# Patient Record
Sex: MALE | Race: WHITE | NOT HISPANIC OR LATINO | Employment: OTHER | URBAN - METROPOLITAN AREA
[De-identification: names, ages, dates, MRNs, and addresses within clinical notes are randomized per-mention and may not be internally consistent; named-entity substitution may affect disease eponyms.]

---

## 2017-01-03 ENCOUNTER — OFFICE VISIT (OUTPATIENT)
Dept: FAMILY MEDICINE | Facility: CLINIC | Age: 62
End: 2017-01-03
Payer: MEDICARE

## 2017-01-03 VITALS
SYSTOLIC BLOOD PRESSURE: 96 MMHG | RESPIRATION RATE: 24 BRPM | WEIGHT: 207 LBS | HEART RATE: 66 BPM | TEMPERATURE: 99.1 F | HEIGHT: 68 IN | BODY MASS INDEX: 31.37 KG/M2 | OXYGEN SATURATION: 98 % | DIASTOLIC BLOOD PRESSURE: 55 MMHG

## 2017-01-03 DIAGNOSIS — M50.20 HERNIATION OF CERVICAL INTERVERTEBRAL DISC: ICD-10-CM

## 2017-01-03 DIAGNOSIS — Z11.59 NEED FOR HEPATITIS C SCREENING TEST: ICD-10-CM

## 2017-01-03 DIAGNOSIS — N18.30 TYPE 2 DIABETES MELLITUS WITH STAGE 3 CHRONIC KIDNEY DISEASE, WITH LONG-TERM CURRENT USE OF INSULIN (H): ICD-10-CM

## 2017-01-03 DIAGNOSIS — I25.810 CORONARY ARTERY DISEASE INVOLVING CORONARY BYPASS GRAFT OF NATIVE HEART WITHOUT ANGINA PECTORIS: ICD-10-CM

## 2017-01-03 DIAGNOSIS — J44.9 CHRONIC OBSTRUCTIVE PULMONARY DISEASE, UNSPECIFIED COPD TYPE (H): ICD-10-CM

## 2017-01-03 DIAGNOSIS — E11.22 TYPE 2 DIABETES MELLITUS WITH STAGE 3 CHRONIC KIDNEY DISEASE, WITH LONG-TERM CURRENT USE OF INSULIN (H): ICD-10-CM

## 2017-01-03 DIAGNOSIS — G89.4 CHRONIC PAIN SYNDROME: Primary | ICD-10-CM

## 2017-01-03 DIAGNOSIS — Z79.4 TYPE 2 DIABETES MELLITUS WITH STAGE 3 CHRONIC KIDNEY DISEASE, WITH LONG-TERM CURRENT USE OF INSULIN (H): ICD-10-CM

## 2017-01-03 PROCEDURE — 99000 SPECIMEN HANDLING OFFICE-LAB: CPT | Performed by: NURSE PRACTITIONER

## 2017-01-03 PROCEDURE — 99214 OFFICE O/P EST MOD 30 MIN: CPT | Performed by: NURSE PRACTITIONER

## 2017-01-03 PROCEDURE — 80061 LIPID PANEL: CPT | Performed by: NURSE PRACTITIONER

## 2017-01-03 PROCEDURE — 36415 COLL VENOUS BLD VENIPUNCTURE: CPT | Performed by: NURSE PRACTITIONER

## 2017-01-03 PROCEDURE — 82043 UR ALBUMIN QUANTITATIVE: CPT | Performed by: NURSE PRACTITIONER

## 2017-01-03 PROCEDURE — 86803 HEPATITIS C AB TEST: CPT | Performed by: NURSE PRACTITIONER

## 2017-01-03 RX ORDER — OXYCODONE AND ACETAMINOPHEN 5; 325 MG/1; MG/1
1 TABLET ORAL AT BEDTIME
Qty: 30 TABLET | Refills: 0 | Status: SHIPPED | OUTPATIENT
Start: 2017-01-29 | End: 2017-01-03

## 2017-01-03 RX ORDER — ALBUTEROL SULFATE 90 UG/1
2 AEROSOL, METERED RESPIRATORY (INHALATION) EVERY 6 HOURS PRN
Qty: 1 INHALER | Status: SHIPPED | OUTPATIENT
Start: 2017-01-03 | End: 2020-04-09

## 2017-01-03 RX ORDER — OXYCODONE AND ACETAMINOPHEN 5; 325 MG/1; MG/1
1 TABLET ORAL AT BEDTIME
Qty: 30 TABLET | Refills: 0 | Status: SHIPPED | OUTPATIENT
Start: 2017-02-28 | End: 2017-01-03

## 2017-01-03 RX ORDER — OXYCODONE AND ACETAMINOPHEN 5; 325 MG/1; MG/1
1 TABLET ORAL AT BEDTIME
Qty: 30 TABLET | Refills: 0 | Status: SHIPPED | OUTPATIENT
Start: 2017-03-30 | End: 2017-05-08

## 2017-01-03 RX ORDER — GABAPENTIN 300 MG/1
300 CAPSULE ORAL AT BEDTIME
Qty: 30 CAPSULE | Refills: 3 | Status: SHIPPED | OUTPATIENT
Start: 2017-01-03 | End: 2017-06-29

## 2017-01-03 NOTE — NURSING NOTE
"Chief Complaint   Patient presents with     Recheck Medication       Initial BP 96/55 mmHg  Pulse 66  Temp(Src) 99.1  F (37.3  C) (Oral)  Resp 24  Ht 5' 7.75\" (1.721 m)  Wt 207 lb (93.895 kg)  BMI 31.70 kg/m2  SpO2 98% Estimated body mass index is 31.7 kg/(m^2) as calculated from the following:    Height as of this encounter: 5' 7.75\" (1.721 m).    Weight as of this encounter: 207 lb (93.895 kg).  BP completed using cuff size: latoya Wood MA      "

## 2017-01-03 NOTE — MR AVS SNAPSHOT
After Visit Summary   1/3/2017    Hayder Alves    MRN: [id number]           Patient Information     Date Of Birth          1955        Visit Information        Provider Department      1/3/2017 2:45 PM Suma Jenkins NP [facility]        Today's Diagnoses     Chronic pain syndrome    -  1     Herniation of cervical intervertebral disc         Type 2 diabetes mellitus with stage 3 chronic kidney disease, with long-term current use of insulin (H)         Coronary artery disease involving coronary bypass graft of native heart without angina pectoris         Chronic obstructive pulmonary disease, unspecified COPD type (H)         Need for hepatitis C screening test           Care Instructions    Take Gabapentin at bedtime.  Only take Percocet at bedtime if needed.  Do not take a muscle relaxer.    Follow up with me in 3 months.         Follow-ups after your visit        Follow-up notes from your care team     Return in about 3 months (around 4/3/2017).      Your next 10 appointments already scheduled     Feb 06, 2017 12:45 PM   Fani JAMES Return with Alexandre Arthur MD   [facility] [place] ([address] [address] [phone]              Who to contact     If you have questions or need follow up information about today's clinic visit or your schedule please contact [facility] directly at [phone].  Normal or non-critical lab and imaging results will be communicated to you by MyChart, letter or phone within 4 business days after the clinic has received the results. If you do not hear from us within 7 days, please contact the clinic through MyChart or phone. If you have a critical or abnormal lab result, we will notify you by phone as soon as possible.  Submit refill requests through [employer] or call your pharmacy and they will forward the refill request to us.  "Please allow 3 business days for your refill to be completed.          Additional Information About Your Visit        MyChart Information     [employer] lets you send messages to your doctor, view your test results, renew your prescriptions, schedule appointments and more. To sign up, go to [email or web address] . Click on \"Log in\" on the left side of the screen, which will take you to the Welcome page. Then click on \"Sign up Now\" on the right side of the page.     You will be asked to enter the access code listed below, as well as some personal information. Please follow the directions to create your username and password.     Your access code is: [email or web address]  Expires: 4/3/2017  3:40 PM     Your access code will  in 90 days. If you need help or a new code, please call your [place] clinic or [phone].        Care EveryWhere ID     This is your Care EveryWhere ID. This could be used by other organizations to access your [place] medical records  [id number]        Your Vitals Were     Pulse Temperature Respirations Height BMI (Body Mass Index) Pulse Oximetry    66 99.1  F (37.3  C) (Oral) 24 5' 7.75\" (1.721 m) 31.70 kg/m2 98%       Blood Pressure from Last 3 Encounters:   17 96/55   16 122/58   16 80/48    Weight from Last 3 Encounters:   17 207 lb (93.895 kg)   16 204 lb 12.8 oz (92.897 kg)   16 203 lb (92.08 kg)              We Performed the Following     Albumin Random Urine Quantitative     Hepatitis C Screen Reflex to HCV RNA Quant and Genotype     Lipid panel reflex to direct LDL          Today's Medication Changes          These changes are accurate as of: 1/3/17  3:40 PM.  If you have any questions, ask your nurse or doctor.               Start taking these medicines.        Dose/Directions    gabapentin 300 MG capsule   Commonly known as:  NEURONTIN   Used for:  Chronic pain syndrome, Herniation of cervical intervertebral disc, Type 2 diabetes mellitus with " stage 3 chronic kidney disease, with long-term current use of insulin (H)   Started by:  Suma Jenkins NP        Dose:  300 mg   Take 1 capsule (300 mg) by mouth At Bedtime   Quantity:  30 capsule   Refills:  3       glucagon 1 MG kit   Commonly known as:  GLUCAGON EMERGENCY   Used for:  Type 2 diabetes mellitus with stage 3 chronic kidney disease, with long-term current use of insulin (H)   Started by:  Suma Jenkins NP        Dose:  1 mg   Inject 1 mg into the muscle once for 1 dose   Quantity:  1 mg   Refills:  0       oxyCODONE-acetaminophen 5-325 MG per tablet   Commonly known as:  PERCOCET   Used for:  Herniation of cervical intervertebral disc, Chronic pain syndrome   Started by:  Suma Jenkins NP        Dose:  1 tablet   Start taking on:  3/30/2017   Take 1 tablet by mouth At Bedtime Must last 30 days   Quantity:  30 tablet   Refills:  0            Where to get your medicines      [facility] [address] [place] [address] AT [address] [address]    Hours:  24-hours Phone:  [phone]    - albuterol 108 (90 BASE) MCG/ACT Inhaler  - gabapentin 300 MG capsule  - glucagon 1 MG kit      Some of these will need a paper prescription and others can be bought over the counter.  Ask your nurse if you have questions.     Bring a paper prescription for each of these medications    - oxyCODONE-acetaminophen 5-325 MG per tablet             Primary Care Provider Office Phone # Fax #    Suma Jenkins [phone]       [facility] [address] [place] [address]  [facility] [address]        Thank you!     Thank you for choosing [facility]  for your care. Our goal is always to provide you with excellent care. Hearing back from our patients is one way we can continue to improve our services. Please take a few minutes to complete the written survey that you may  receive in the mail after your visit with us. Thank you!             Your Updated Medication List - Protect others around you: Learn how to safely use, store and throw away your medicines at www.disposemymeds.org.          This list is accurate as of: 1/3/17  3:40 PM.  Always use your most recent med list.                   Brand Name Dispense Instructions for use    * albuterol (2.5 MG/3ML) 0.083% neb solution     25 vial    Take 1 vial (2.5 mg) by nebulization every 6 hours as needed for shortness of breath / dyspnea or wheezing       * albuterol 108 (90 BASE) MCG/ACT Inhaler    PROAIR HFA/PROVENTIL HFA/VENTOLIN HFA    1 Inhaler    Inhale 2 puffs into the lungs every 6 hours as needed for shortness of breath / dyspnea       ASPIRIN PO      Take 81 mg by mouth daily       blood glucose monitoring lancets     100 each    Test once daily       blood glucose monitoring test strip    no brand specified    3 Box    Use to test blood sugars 2 times daily or as directed - use strips compatible with pt's meter and insurance       CENTRUM SILVER per tablet      Take 1 tablet by mouth daily       citalopram 20 MG tablet    celeXA    30 tablet    Take 1 tablet (20 mg) by mouth daily       gabapentin 300 MG capsule    NEURONTIN    30 capsule    Take 1 capsule (300 mg) by mouth At Bedtime       glucagon 1 MG kit    GLUCAGON EMERGENCY    1 mg    Inject 1 mg into the muscle once for 1 dose       lovastatin 20 MG tablet    MEVACOR    90 tablet    Take 1 tablet (20 mg) by mouth At Bedtime       metFORMIN 500 MG tablet    GLUCOPHAGE    180 tablet    Take 1 tablet (500 mg) by mouth 2 times daily (with meals)       metoprolol 50 MG tablet    LOPRESSOR    60 tablet    Take 1 tablet (50 mg) by mouth 2 times daily       nitroglycerin 0.4 MG sublingual tablet    NITROSTAT    25 tablet    Place 1 tablet (0.4 mg) under the tongue every 5 minutes as needed       order for DME     1 Device    Equipment being ordered: Nebulizer with tubing        order for DME     1 Device    Equipment being ordered: Other: Nebulizer machine x 1 Treatment Diagnosis: COPD.       oxyCODONE-acetaminophen 5-325 MG per tablet   Start taking on:  3/30/2017    PERCOCET    30 tablet    Take 1 tablet by mouth At Bedtime Must last 30 days       traZODone 50 MG tablet    DESYREL    60 tablet    Take 1-2 tablets ( mg) by mouth At Bedtime       * Notice:  This list has 2 medication(s) that are the same as other medications prescribed for you. Read the directions carefully, and ask your doctor or other care provider to review them with you.

## 2017-01-03 NOTE — PROGRESS NOTES
"  SUBJECTIVE:                                                    Hayder Alves is a 61 year old male who presents to clinic today for the following health issues:      Medication Followup of Percocet     Taking Medication as prescribed: yes    Side Effects:  Pt takes before he goes to bed to help with foot and leg pain     Medication Helping Symptoms:  Yes, pt states it helps him sleep.      He takes Percocet at bedtime for chronic neck pain due to cervical stenosis.  He has been having more burning, stabbing pain in his feet, especially at night.  He just restarted Gabapentin 300 mg at HS a couple of days ago.  His wife thinks he took some Tizanidine recently.  She is wondering if this is contributing to his lower blood pressure.  [facility] was low at [facility] and they stopped his Lisinopril and the next week it was better, but is a bit low again today.  He does have some lightheadedness when he first stands up.  He has not fallen since prior to his last hospitalization.  Wife would like to have a glucagon kit at home.    He has not scheduled a psychiatry appointment yet.           Problem list and histories reviewed & adjusted, as indicated.  Additional history: as documented    Problem list, Medication list, Allergies, and Medical/Social/Surgical histories [facility] and updated as appropriate.    ROS:  C: NEGATIVE for fever, chills, change in weight  E/M: NEGATIVE for ear, mouth and throat problems  R: NEGATIVE for significant cough or SOB  CV: NEGATIVE for chest pain, palpitations or peripheral edema  GI: NEGATIVE for nausea, abdominal pain, heartburn, or change in bowel habits  MUSCULOSKELETAL: see HPI  NEURO: see HPI  PSYCHIATRIC: NEGATIVE for changes in mood or affect    OBJECTIVE:                                                    BP 96/55 mmHg  Pulse 66  Temp(Src) 99.1  F (37.3  C) (Oral)  Resp 24  Ht 5' 7.75\" (1.721 m)  Wt 207 lb (93.895 kg)  BMI 31.70 kg/m2  SpO2 98%  Body mass index " is 31.7 kg/(m^2).  GENERAL: healthy, alert and no distress  RESP: lungs clear to auscultation - no rales, rhonchi or wheezes  CV: regular rate and rhythm, normal S1 S2, no S3 or S4, no murmur, click or rub, no peripheral edema and peripheral pulses strong  PSYCH: mentation appears normal, affect normal         ASSESSMENT/PLAN:                                                            1. Chronic pain syndrome  Refills for 3 months of Percocet given.   Discussed that he should not take a muscle relaxer.  Continue with just 300 mg of Gabapentin at HS for now.  - oxyCODONE-acetaminophen (PERCOCET) 5-325 MG per tablet; Take 1 tablet by mouth At Bedtime Must last 30 days  Dispense: 30 tablet; Refill: 0  - gabapentin (NEURONTIN) 300 MG capsule; Take 1 capsule (300 mg) by mouth At Bedtime  Dispense: 30 capsule; Refill: 3    2. Herniation of cervical intervertebral disc  See above.   - oxyCODONE-acetaminophen (PERCOCET) 5-325 MG per tablet; Take 1 tablet by mouth At Bedtime Must last 30 days  Dispense: 30 tablet; Refill: 0  - gabapentin (NEURONTIN) 300 MG capsule; Take 1 capsule (300 mg) by mouth At Bedtime  Dispense: 30 capsule; Refill: 3    3. Type 2 diabetes mellitus with stage 3 chronic kidney disease, with long-term current use of insulin (H)  At goal  Follow up in 3 months.   - Lipid panel reflex to direct LDL  - Albumin Random Urine Quantitative  - glucagon (GLUCAGON EMERGENCY) 1 MG kit; Inject 1 mg into the muscle once for 1 dose  Dispense: 1 mg; Refill: 0  - gabapentin (NEURONTIN) 300 MG capsule; Take 1 capsule (300 mg) by mouth At Bedtime  Dispense: 30 capsule; Refill: 3    4. Coronary artery disease involving coronary bypass graft of native heart without angina pectoris  He has an appointment with cardiology scheduled for 2/6/17.    5. Chronic obstructive pulmonary disease, unspecified COPD type (H)    - albuterol (PROAIR HFA/PROVENTIL HFA/VENTOLIN HFA) 108 (90 BASE) MCG/ACT Inhaler; Inhale 2 puffs into the lungs  every 6 hours as needed for shortness of breath / dyspnea  Dispense: 1 Inhaler; Refill: PRN    6. Need for hepatitis C screening test    - Hepatitis C Screen Reflex to HCV RNA Quant and Genotype        Suma Jenkins NP  [facility]

## 2017-01-03 NOTE — PATIENT INSTRUCTIONS
Take Gabapentin at bedtime.  Only take Percocet at bedtime if needed.  Do not take a muscle relaxer.    Follow up with me in 3 months.

## 2017-01-04 LAB
CHOLEST SERPL-MCNC: 160 MG/DL
CREAT UR-MCNC: 423 MG/DL
HCV AB SERPL QL IA: NORMAL
HDLC SERPL-MCNC: 35 MG/DL
LDLC SERPL CALC-MCNC: 80 MG/DL
MICROALBUMIN UR-MCNC: 241 MG/L
MICROALBUMIN/CREAT UR: 56.97 MG/G CR (ref 0–17)
NONHDLC SERPL-MCNC: 125 MG/DL
TRIGL SERPL-MCNC: 224 MG/DL

## 2017-01-26 DIAGNOSIS — N18.30 TYPE 2 DIABETES MELLITUS WITH STAGE 3 CHRONIC KIDNEY DISEASE, WITH LONG-TERM CURRENT USE OF INSULIN (H): Primary | ICD-10-CM

## 2017-01-26 DIAGNOSIS — E11.22 TYPE 2 DIABETES MELLITUS WITH STAGE 3 CHRONIC KIDNEY DISEASE, WITH LONG-TERM CURRENT USE OF INSULIN (H): Primary | ICD-10-CM

## 2017-01-26 DIAGNOSIS — Z79.4 TYPE 2 DIABETES MELLITUS WITH STAGE 3 CHRONIC KIDNEY DISEASE, WITH LONG-TERM CURRENT USE OF INSULIN (H): Primary | ICD-10-CM

## 2017-01-26 NOTE — TELEPHONE ENCOUNTER
METFORMIN 1000 MG         Last Written Prescription Date: 11-30-16  Last Fill Quantity: 180, # refills: 3  [facility] Office Visit with [facility] or [facility] prescribing provider:  1-3-17        BP Readings from Last 3 Encounters:   01/03/17 96/55   12/09/16 122/58   12/01/16 80/48     MICROL      241   1/3/2017  No results found for this basename: microalbumin  CREATININE   Date Value Ref Range Status   12/09/2016 1.39* 0.70 - 1.30 mg/dL Final   ]  GFR ESTIMATE   Date Value Ref Range Status   12/09/2016 52* >60 mL/min/1.7m2 Final   12/01/2016 32* >60 mL/min/1.7m2 Final   10/27/2016 45* >60 mL/min/1.7m2 Final     GFR ESTIMATE IF BLACK   Date Value Ref Range Status   12/09/2016 63 >60 mL/min/1.7m2 Final   12/01/2016 39* >60 mL/min/1.7m2 Final   10/27/2016 54* >60 mL/min/1.7m2 Final     CHOL      160   1/3/2017  HDL       35   1/3/2017  LDL       80   1/3/2017  LDL      138   12/10/2014  TRIG      224   1/3/2017  CHOLHDLRATIO      7.9   10/5/2015  AST       22   10/12/2016  ALT       28   10/12/2016  A1C      6.2   10/12/2016  A1C      6.3   1/19/2016  A1C      9.7   10/5/2015  A1C      7.0   4/29/2015  A1C      8.3   12/10/2014  POTASSIUM   Date Value Ref Range Status   12/09/2016 4.3 3.5 - 5.1 mmol/L Final

## 2017-01-27 NOTE — TELEPHONE ENCOUNTER
Routing refill request to provider for review/approval because: please sign off new script   Medication is reported/historical  Per 11/30/2016 visit, pharmacy still has him taking 1000 mg BID  He will check blood sugars 1-2 times a day and follow up in one month with a log.    - metFORMIN (GLUCOPHAGE) 500 MG tablet; Take 1 tablet (500 mg) by mouth 2 times daily (with meals)  Dispense: 180 tablet; Refill: 3

## 2017-02-01 NOTE — TELEPHONE ENCOUNTER
He should have refills then because the 500 mg dose of Metformin was sent in on 11/30 with a year of refills.

## 2017-02-01 NOTE — TELEPHONE ENCOUNTER
Spoke with pt and he said he takes 1/2 of a metformin 1000mg (500mg) twice daily.  Please advise.     Thanks!     Kandi De Oliveira RN

## 2017-02-06 ENCOUNTER — OFFICE VISIT (OUTPATIENT)
Dept: CARDIOLOGY | Facility: CLINIC | Age: 62
End: 2017-02-06
Attending: NURSE PRACTITIONER
Payer: MEDICARE

## 2017-02-06 VITALS
WEIGHT: 214 LBS | HEART RATE: 56 BPM | HEIGHT: 68 IN | SYSTOLIC BLOOD PRESSURE: 110 MMHG | DIASTOLIC BLOOD PRESSURE: 60 MMHG | BODY MASS INDEX: 32.43 KG/M2

## 2017-02-06 DIAGNOSIS — E78.00 HYPERCHOLESTEROLEMIA: ICD-10-CM

## 2017-02-06 DIAGNOSIS — N18.30 CKD (CHRONIC KIDNEY DISEASE) STAGE 3, GFR 30-59 ML/MIN (H): ICD-10-CM

## 2017-02-06 DIAGNOSIS — I25.810 CORONARY ARTERY DISEASE INVOLVING CORONARY BYPASS GRAFT OF NATIVE HEART WITHOUT ANGINA PECTORIS: Primary | ICD-10-CM

## 2017-02-06 DIAGNOSIS — I50.21 ACUTE SYSTOLIC HEART FAILURE (H): ICD-10-CM

## 2017-02-06 DIAGNOSIS — I10 HYPERTENSION GOAL BP (BLOOD PRESSURE) < 130/80: ICD-10-CM

## 2017-02-06 PROCEDURE — 99215 OFFICE O/P EST HI 40 MIN: CPT | Performed by: INTERNAL MEDICINE

## 2017-02-06 RX ORDER — ROSUVASTATIN CALCIUM 20 MG/1
20 TABLET, COATED ORAL DAILY
Qty: 90 TABLET | Refills: 3 | Status: SHIPPED | OUTPATIENT
Start: 2017-02-06 | End: 2019-02-27

## 2017-02-06 RX ORDER — METOPROLOL TARTRATE 25 MG/1
25 TABLET, FILM COATED ORAL 2 TIMES DAILY
Qty: 180 TABLET | Refills: 3 | Status: SHIPPED | OUTPATIENT
Start: 2017-02-06 | End: 2017-05-08

## 2017-02-06 NOTE — MR AVS SNAPSHOT
After Visit Summary   2/6/2017    Hayder Alves    MRN: [id number]           Patient Information     Date Of Birth          1955        Visit Information        Provider Department      2/6/2017 12:45 PM Alexandre Arthur MD [facility]        Today's Diagnoses     Coronary artery disease involving coronary bypass graft of native heart without angina pectoris    -  1     Hypercholesterolemia         Hypertension goal BP (blood pressure) < 130/80         Acute systolic heart failure (H)         CKD (chronic kidney disease) stage 3, GFR 30-59 ml/min            Follow-ups after your visit        Additional Services     Follow-Up with Cardiac Advanced Practice Provider           Follow-Up with Cardiologist                 Future tests that were ordered for you today     Open Future Orders        Priority Expected Expires Ordered    Basic metabolic panel Routine 8/5/2017 2/6/2018 2/6/2017    Follow-Up with Cardiologist Routine 8/5/2017 2/6/2018 2/6/2017    Echocardiogram Routine 8/5/2017 2/6/2018 2/6/2017    Follow-Up with Cardiac Advanced Practice Provider Routine 5/7/2017 2/6/2018 2/6/2017    Basic metabolic panel Routine 5/7/2017 2/6/2018 2/6/2017    Lipid Profile Routine 8/5/2017 2/6/2018 2/6/2017    ALT Routine 8/5/2017 2/6/2018 2/6/2017            Who to contact     If you have questions or need follow up information about today's clinic visit or your schedule please contact [facility] directly at [phone].  Normal or non-critical lab and imaging results will be communicated to you by MyChart, letter or phone within 4 business days after the clinic has received the results. If you do not hear from us within 7 days, please contact the clinic through [employer] or phone. If you have a critical or abnormal lab result, we will notify you by phone as soon as possible.  Submit refill requests through [employer] or  "call your pharmacy and they will forward the refill request to us. Please allow 3 business days for your refill to be completed.          Additional Information About Your Visit        MyChart Information     [employer] lets you send messages to your doctor, view your test results, renew your prescriptions, schedule appointments and more. To sign up, go to [email or web address] . Click on \"Log in\" on the left side of the screen, which will take you to the Welcome page. Then click on \"Sign up Now\" on the right side of the page.     You will be asked to enter the access code listed below, as well as some personal information. Please follow the directions to create your username and password.     Your access code is: [email or web address]  Expires: 4/3/2017  3:40 PM     Your access code will  in 90 days. If you need help or a new code, please call your [place] clinic or [phone].        Care EveryWhere ID     This is your Care EveryWhere ID. This could be used by other organizations to access your [place] medical records  [id number]        Your Vitals Were     Pulse Height BMI (Body Mass Index)             56 1.721 m (5' 7.76\") 32.77 kg/m2          Blood Pressure from Last 3 Encounters:   17 110/60   17 96/55   16 122/58    Weight from Last 3 Encounters:   17 97.07 kg (214 lb)   17 93.895 kg (207 lb)   16 92.897 kg (204 lb 12.8 oz)              We Performed the Following     Follow-Up with Cardiologist          Today's Medication Changes          These changes are accurate as of: 17  1:45 PM.  If you have any questions, ask your nurse or doctor.               Start taking these medicines.        Dose/Directions    rosuvastatin 20 MG tablet   Commonly known as:  CRESTOR   Used for:  Hypercholesterolemia   Started by:  Alexandre Arthur MD        Dose:  20 mg   Take 1 tablet (20 mg) by mouth daily   Quantity:  90 tablet   Refills:  3         These medicines have changed or have " updated prescriptions.        Dose/Directions    metoprolol 25 MG tablet   Commonly known as:  LOPRESSOR   This may have changed:    - medication strength  - how much to take   Used for:  Coronary artery disease involving coronary bypass graft of native heart without angina pectoris   Changed by:  Alexandre Arthur MD        Dose:  25 mg   Take 1 tablet (25 mg) by mouth 2 times daily   Quantity:  180 tablet   Refills:  3         Stop taking these medicines if you haven't already. Please contact your care team if you have questions.     lovastatin 20 MG tablet   Commonly known as:  MEVACOR   Stopped by:  Alexandre Arthur MD                Where to get your medicines      These medications were sent to [facility] [address] [place] [address] AT [address] [address]    Hours:  24-hours Phone:  [phone]    - metoprolol 25 MG tablet  - rosuvastatin 20 MG tablet             Primary Care Provider Office Phone # Fax #    Suma Jenkins [phone]       [facility] [address] [place] [address]  [facility] [address]        Thank you!     Thank you for choosing [facility] AT [place]  for your care. Our goal is always to provide you with excellent care. Hearing back from our patients is one way we can continue to improve our services. Please take a few minutes to complete the written survey that you may receive in the mail after your visit with us. Thank you!             Your Updated Medication List - Protect others around you: Learn how to safely use, store and throw away your medicines at www.disposemymeds.org.          This list is accurate as of: 2/6/17  1:45 PM.  Always use your most recent med list.                   Brand Name Dispense Instructions for use    * albuterol (2.5 MG/3ML) 0.083% neb solution     25 vial    Take 1 vial (2.5 mg) by nebulization every 6 hours as needed for  shortness of breath / dyspnea or wheezing       * albuterol 108 (90 BASE) MCG/ACT Inhaler    PROAIR HFA/PROVENTIL HFA/VENTOLIN HFA    1 Inhaler    Inhale 2 puffs into the lungs every 6 hours as needed for shortness of breath / dyspnea       ASPIRIN PO      Take 81 mg by mouth daily       blood glucose monitoring lancets     100 each    Test once daily       blood glucose monitoring test strip    no brand specified    3 Box    Use to test blood sugars 2 times daily or as directed - use strips compatible with pt's meter and insurance       CENTRUM SILVER per tablet      Take 1 tablet by mouth daily       citalopram 20 MG tablet    celeXA    30 tablet    Take 1 tablet (20 mg) by mouth daily       gabapentin 300 MG capsule    NEURONTIN    30 capsule    Take 1 capsule (300 mg) by mouth At Bedtime       metFORMIN 500 MG tablet    GLUCOPHAGE    180 tablet    Take 1 tablet (500 mg) by mouth 2 times daily (with meals)       metoprolol 25 MG tablet    LOPRESSOR    180 tablet    Take 1 tablet (25 mg) by mouth 2 times daily       nitroglycerin 0.4 MG sublingual tablet    NITROSTAT    25 tablet    Place 1 tablet (0.4 mg) under the tongue every 5 minutes as needed       order for DME     1 Device    Equipment being ordered: Nebulizer with tubing       order for DME     1 Device    Equipment being ordered: Other: Nebulizer machine x 1 Treatment Diagnosis: COPD.       oxyCODONE-acetaminophen 5-325 MG per tablet   Start taking on:  3/30/2017    PERCOCET    30 tablet    Take 1 tablet by mouth At Bedtime Must last 30 days       rosuvastatin 20 MG tablet    CRESTOR    90 tablet    Take 1 tablet (20 mg) by mouth daily       traZODone 50 MG tablet    DESYREL    60 tablet    Take 1-2 tablets ( mg) by mouth At Bedtime       * Notice:  This list has 2 medication(s) that are the same as other medications prescribed for you. Read the directions carefully, and ask your doctor or other care provider to review them with you.

## 2017-02-06 NOTE — Clinical Note
2017             Suma Jenkins NP   [facility]   [address] [address]       RE:    Hayder Alves   MRN:  [id number]   :  1955      Dear Ms. Jenkins:       I had the opportunity to see Mr. Hayder Alves in Cardiology Clinic today at the [facility] in [place] for an initial C.O.R.E. M consultation.        As you recall, he is a 62-year-old gentleman with a history of coronary artery disease and bypass surgery in .  He had angioplasty and stenting of the left circumflex in .  He came into the hospital with acute respiratory failure at [facility] in 10/2016.  He required intubation and mechanical ventilation.  X-ray suggested bilateral pneumonia and he had a recent history of fevers and chills preceding this event.  However, he also was discovered to have a cardiomyopathy with an ejection fraction of about 35% and had some slightly elevated troponin levels.  His congestive heart failure was also thought to be part of the mix.  He was treated with diuresis in addition to his antibiotic therapy and eventually underwent coronary angiography on 10/19/2016 prior to discharge.  Fortunately, his bypasses were all open as was his stent in the left circumflex.  There was no acute coronary artery disease identified nor was there evidence of any significant flow-limiting lesions that might cause significant areas of ischemia.        A followup echocardiogram prior to discharge on 10/20/2016 showed a significant improvement in left ventricular function with an ejection fraction up to 50%-55% with resolution of regional wall motion abnormalities affecting the anteroseptal, apical and anterior walls.  I believe this led to some suspicion that he may have had a stress cardiomyopathy upon his initial presentation.      He was continued on medical therapy and hospitalized twice more since then, both times for falling episodes.  One of  those episodes was typical of orthostatic hypotension.  Another episode was likely due to hypoglycemia.  His glucose was in the 40s when he presented to the emergency room at [facility] with some head trauma and resuscitated quickly with glucose.      As far as his cardiac symptoms go, he has done very well.  He has not had any residual shortness of breath, chest discomfort or palpitations.  When he presented with his nontransmural infarction in 2001 prior to his bypass surgery, he did experience classic central angina in the chest radiating to the left arm.  He has had none of that over the course of this recent episode.      However, he continues to have lightheadedness.  With standing, he is immediately lightheaded and it lasts a few moments before it clears and he is able to continue.  In fact, his wife has put a chair between the bed and the bathroom so that if he needs to sit down on his way he can do that.  In fact, he also had another falling episode just a few days ago during which he was not injured, but was again related to lightheadedness.      Other pertinent issues include suboptimally treated hypercholesterolemia.  He is only on lovastatin 20 mg a day.  He also has chronic kidney disease with a creatinine of about 1.4 to 1.6.  This was up to 2.09 recently due to hypotension and ACE inhibitor.  With discontinuation of his amlodipine, Bumex, Imdur and lisinopril, his creatinine has come down to 1.39 most recently about 2 months ago.      On examination today, his blood pressure is 110/60, heart rate 56 and weight 214 pounds.  He tells me he is gaining weight, probably due to inactivity.  His lungs are clear except for maybe an occasional expiratory wheeze.  I do not see any jugular venous distention.  Heart rhythm is regular.  I do not hear any cardiac murmurs.  He has no edema.      IMPRESSION:  Mr. Hayder Alves is a 62-year-old gentleman with hypertension, dyslipidemia,  coronary artery disease and previous bypass surgery as well as subsequent stenting to his native left circumflex artery who presented with respiratory failure in October, probably due primarily to pneumonia complicating some degree of COPD.  However, he also had evidence of a cardiomyopathy with elevated troponins, but no new coronary artery disease.  A stress cardiomyopathy is a very likely explanation in this situation.  Fortunately, his left ventricular function apparently has normalized based on an echocardiogram performed at [facility] [place] recently where he went after a falling episode.  He has had several falling episodes since his initial hospitalization, probably due to overmedication for treatment of his cardiomyopathy.  Now that his left ventricular function has normalized, I think we can back off on his medications again since he continues to have falling episodes and lightheadedness.  I will decrease his metoprolol from 50 mg b.i.d. to 25 mg b.i.d.  I will be more aggressive with his cholesterol medication, however, stopping his Mevacor and starting him on Crestor 20 mg a day.      I will have him follow up with us again in 3 months and we will reevaluate his situation we go.      Sincerely,         Alexandre Arthur MD, F.A.C.C.

## 2017-02-06 NOTE — PROGRESS NOTES
HPI and Plan:   See dictation    Orders Placed This Encounter   Procedures     Basic metabolic panel     Basic metabolic panel     Lipid Profile     ALT     Follow-Up with Cardiologist     Follow-Up with Cardiac Advanced Practice Provider     Echocardiogram       Orders Placed This Encounter   Medications     metoprolol (LOPRESSOR) 25 MG tablet     Sig: Take 1 tablet (25 mg) by mouth 2 times daily     Dispense:  180 tablet     Refill:  3     rosuvastatin (CRESTOR) 20 MG tablet     Sig: Take 1 tablet (20 mg) by mouth daily     Dispense:  90 tablet     Refill:  3       Medications Discontinued During This Encounter   Medication Reason     glucagon (GLUCAGON EMERGENCY) 1 MG kit Discontinued by another Health Care Provider     metoprolol (LOPRESSOR) 50 MG tablet Reorder     lovastatin (MEVACOR) 20 MG tablet          Encounter Diagnoses   Name Primary?     Coronary artery disease involving coronary bypass graft of native heart without angina pectoris Yes     Hypercholesterolemia      Hypertension goal BP (blood pressure) < 130/80      Acute systolic heart failure (H)      CKD (chronic kidney disease) stage 3, GFR 30-59 ml/min        CURRENT MEDICATIONS:  Current Outpatient Prescriptions   Medication Sig Dispense Refill     metoprolol (LOPRESSOR) 25 MG tablet Take 1 tablet (25 mg) by mouth 2 times daily 180 tablet 3     rosuvastatin (CRESTOR) 20 MG tablet Take 1 tablet (20 mg) by mouth daily 90 tablet 3     albuterol (PROAIR HFA/PROVENTIL HFA/VENTOLIN HFA) 108 (90 BASE) MCG/ACT Inhaler Inhale 2 puffs into the lungs every 6 hours as needed for shortness of breath / dyspnea 1 Inhaler PRN     [START ON 3/30/2017] oxyCODONE-acetaminophen (PERCOCET) 5-325 MG per tablet Take 1 tablet by mouth At Bedtime Must last 30 days 30 tablet 0     gabapentin (NEURONTIN) 300 MG capsule Take 1 capsule (300 mg) by mouth At Bedtime 30 capsule 3     Multiple Vitamins-Minerals (CENTRUM SILVER) per tablet Take 1 tablet by mouth daily        traZODone (DESYREL) 50 MG tablet Take 1-2 tablets ( mg) by mouth At Bedtime 60 tablet PRN     metFORMIN (GLUCOPHAGE) 500 MG tablet Take 1 tablet (500 mg) by mouth 2 times daily (with meals) 180 tablet 3     albuterol (2.5 MG/3ML) 0.083% nebulizer solution Take 1 vial (2.5 mg) by nebulization every 6 hours as needed for shortness of breath / dyspnea or wheezing 25 vial 1     citalopram (CELEXA) 20 MG tablet Take 1 tablet (20 mg) by mouth daily 30 tablet PRN     nitroglycerin (NITROSTAT) 0.4 MG SL tablet Place 1 tablet (0.4 mg) under the tongue every 5 minutes as needed 25 tablet 5     ASPIRIN PO Take 81 mg by mouth daily       Lancets (MICROLET) MISC Test once daily 100 each 11     [DISCONTINUED] metoprolol (LOPRESSOR) 50 MG tablet Take 1 tablet (50 mg) by mouth 2 times daily 60 tablet PRN     order for DME Equipment being ordered: Other: Nebulizer machine x 1  Treatment Diagnosis: COPD. 1 Device 0     blood glucose monitoring (NO BRAND SPECIFIED) test strip Use to test blood sugars 2 times daily or as directed - use strips compatible with pt's meter and insurance 3 Box 3     order for DME Equipment being ordered: Nebulizer with tubing 1 Device 0       ALLERGIES     Allergies   Allergen Reactions     Adhesive Tape      From blood draw site.     Atorvastatin Calcium Other (See Comments)     myalgias     Latex      Lunesta      Strange dreams. Cooking during sleep        PAST MEDICAL HISTORY:  Past Medical History   Diagnosis Date     PLANTAR fasciaitis      Essential hypertension, benign      CAD (coronary artery disease) 2001     MI, CAB      Pure hypercholesterolemia      Impaired fasting glucose      Other anxiety states      Insomnia, unspecified      Bipolar I disorder, most recent episode (or current) manic, severe, specified as with psychotic behavior      Type II or unspecified type diabetes mellitus without mention of complication, not stated as uncontrolled      Syncope      probably iatrogenic due to  medications     Ischemic cardiomyopathy      Chronic systolic CHF (congestive heart failure) (H)        PAST SURGICAL HISTORY:  Past Surgical History   Procedure Laterality Date     C cabg, arterial, three  3/00     CABG X 3, stent     Surgical history of -        skin grafts X 4     Surgical history of -   1978     lumbar fusion L1-5     Surgical history of -   2008     C4-7 cervical fusion     Surgical history of -   12/09     C 6-7 fusion     Hc drug-eluting stents, single  9/10     left main       FAMILY HISTORY:  Family History   Problem Relation Age of Onset     C.A.D. Mother      mi,htn, 60's     C.A.D. Father      mi,stroke,htn, 60's     CEREBROVASCULAR DISEASE Father 64     Breast Cancer No family hx of      Cancer - colorectal No family hx of      Prostate Cancer No family hx of      CANCER Sister      pancreatic ca       SOCIAL HISTORY:  Social History     Social History     Marital Status:      Spouse Name: Lilia Alves     Number of Children: 3     Years of Education: N/A     Social History Main Topics     Smoking status: Former Smoker -- 1.50 packs/day for 30.0 years     Types: Cigarettes     Quit date: 01/06/2000     Smokeless tobacco: Never Used     Alcohol Use: Yes      Comment: occasionally      Drug Use: No     Sexual Activity:     Partners: Female     Birth Control/ Protection: None     Other Topics Concern     Parent/Sibling W/ Cabg, Mi Or Angioplasty Before 65f 55m? Yes     Special Diet No     Exercise Yes     trying     Social History Narrative       Review of Systems:  Skin:  Negative       Eyes:  Positive for glasses    ENT:  Negative      Respiratory:  Positive for dyspnea on exertion;cough     Cardiovascular:    Positive for;lightheadedness;dizziness;palpitations chest pain with exertion  Gastroenterology: Negative      Genitourinary:  Negative      Musculoskeletal:  Positive for neck pain disregard previous note, pt does not have gout  Neurologic:  Positive for headaches  "neuropathy in bilat. feet  Psychiatric:  Negative      Heme/Lymph/Imm:  Positive for allergies    Endocrine:  Positive for diabetes      Physical Exam:  Vitals: /60 mmHg  Pulse 56  Ht 1.721 m (5' 7.76\")  Wt 97.07 kg (214 lb)  BMI 32.77 kg/m2    Constitutional:  cooperative, alert and oriented, well developed, well nourished, in no acute distress        Skin:  warm and dry to the touch, no apparent skin lesions or masses noted        Head:  normocephalic, no masses or lesions        Eyes:  conjunctivae and lids unremarkable        ENT:  no pallor or cyanosis, dentition good        Neck:  JVP normal        Chest:  normal breath sounds, clear to auscultation, normal A-P diameter, normal symmetry, normal respiratory excursion, no use of accessory muscles          Cardiac: regular rhythm;normal S1 and S2;no S3 or S4;no murmurs, gallops or rubs detected                  Abdomen:  abdomen soft        Vascular: not assessed this visit                                        Extremities and Back:  no deformities, clubbing, cyanosis, erythema observed;no edema              Neurological:  affect appropriate, oriented to time, person and place;no gross motor deficits              CC  Suma Jenkins NP  [facility]  [address] [place] [address]                "

## 2017-02-07 NOTE — PROGRESS NOTES
2017             Suma Jenkins NP   [facility]   [address] [place] [place] [address]       RE:    Hayder Alves   MRN:  [id number]   :  1955      Dear Ms. Jenkins:       I had the opportunity to see Mr. Hayder Alves in Cardiology Clinic today at the [facility] in [place] for an initial C.O.R.E. M consultation.        As you recall, he is a 62-year-old gentleman with a history of coronary artery disease and bypass surgery in .  He had angioplasty and stenting of the left circumflex in .  He came into the hospital with acute respiratory failure at [facility] in 10/2016.  He required intubation and mechanical ventilation.  X-ray suggested bilateral pneumonia and he had a recent history of fevers and chills preceding this event.  However, he also was discovered to have a cardiomyopathy with an ejection fraction of about 35% and had some slightly elevated troponin levels.  His congestive heart failure was also thought to be part of the mix.  He was treated with diuresis in addition to his antibiotic therapy and eventually underwent coronary angiography on 10/19/2016 prior to discharge.  Fortunately, his bypasses were all open as was his stent in the left circumflex.  There was no acute coronary artery disease identified nor was there evidence of any significant flow-limiting lesions that might cause significant areas of ischemia.        A followup echocardiogram prior to discharge on 10/20/2016 showed a significant improvement in left ventricular function with an ejection fraction up to 50%-55% with resolution of regional wall motion abnormalities affecting the anteroseptal, apical and anterior walls.  I believe this led to some suspicion that he may have had a stress cardiomyopathy upon his initial presentation.      He was continued on medical therapy and hospitalized twice more since then, both times for falling episodes.  One of  those episodes was typical of orthostatic hypotension.  Another episode was likely due to hypoglycemia.  His glucose was in the 40s when he presented to the emergency room at [facility] with some head trauma and resuscitated quickly with glucose.      As far as his cardiac symptoms go, he has done very well.  He has not had any residual shortness of breath, chest discomfort or palpitations.  When he presented with his nontransmural infarction in 2001 prior to his bypass surgery, he did experience classic central angina in the chest radiating to the left arm.  He has had none of that over the course of this recent episode.      However, he continues to have lightheadedness.  With standing, he is immediately lightheaded and it lasts a few moments before it clears and he is able to continue.  In fact, his wife has put a chair between the bed and the bathroom so that if he needs to sit down on his way he can do that.  In fact, he also had another falling episode just a few days ago during which he was not injured, but was again related to lightheadedness.      Other pertinent issues include suboptimally treated hypercholesterolemia.  He is only on lovastatin 20 mg a day.  He also has chronic kidney disease with a creatinine of about 1.4 to 1.6.  This was up to 2.09 recently due to hypotension and ACE inhibitor.  With discontinuation of his amlodipine, Bumex, Imdur and lisinopril, his creatinine has come down to 1.39 most recently about 2 months ago.      On examination today, his blood pressure is 110/60, heart rate 56 and weight 214 pounds.  He tells me he is gaining weight, probably due to inactivity.  His lungs are clear except for maybe an occasional expiratory wheeze.  I do not see any jugular venous distention.  Heart rhythm is regular.  I do not hear any cardiac murmurs.  He has no edema.      IMPRESSION:  Mr. Hayder Alves is a 62-year-old gentleman with hypertension, dyslipidemia,  coronary artery disease and previous bypass surgery as well as subsequent stenting to his native left circumflex artery who presented with respiratory failure in October, probably due primarily to pneumonia complicating some degree of COPD.  However, he also had evidence of a cardiomyopathy with elevated troponins, but no new coronary artery disease.  A stress cardiomyopathy is a very likely explanation in this situation.  Fortunately, his left ventricular function apparently has normalized based on an echocardiogram performed at [facility] recently where he went after a falling episode.  He has had several falling episodes since his initial hospitalization, probably due to overmedication for treatment of his cardiomyopathy.  Now that his left ventricular function has normalized, I think we can back off on his medications again since he continues to have falling episodes and lightheadedness.  I will decrease his metoprolol from 50 mg b.i.d. to 25 mg b.i.d.  I will be more aggressive with his cholesterol medication, however, stopping his Mevacor and starting him on Crestor 20 mg a day.      I will have him follow up with us again in 3 months and we will reevaluate his situation we go.      Sincerely,         Alexandre Arthur MD, F.A.C.C.         ALEXANDRE ARTHUR MD, [facility]             D: 2017 13:59   T: 2017 18:04   MT: CARLOS ALBERTO      Name:     MANSOOR MOORE   MRN:      [id number]        Account:      [id number]   :      1955           Service Date: 2017      Document: [id number]

## 2017-04-11 DIAGNOSIS — G89.4 CHRONIC PAIN SYNDROME: ICD-10-CM

## 2017-04-11 DIAGNOSIS — M50.20 HERNIATION OF CERVICAL INTERVERTEBRAL DISC: ICD-10-CM

## 2017-04-11 RX ORDER — OXYCODONE AND ACETAMINOPHEN 5; 325 MG/1; MG/1
1 TABLET ORAL AT BEDTIME
Qty: 30 TABLET | Refills: 0 | Status: CANCELLED | OUTPATIENT
Start: 2017-04-11

## 2017-04-11 NOTE — TELEPHONE ENCOUNTER
Patient had oxy refill on 3/30/17 and told it must last 30 days.  This is an early refill request.    LMOM.  Wendy Lockwood RN

## 2017-04-11 NOTE — TELEPHONE ENCOUNTER
Reason for Call:  Medication or medication refill:    Do you use a [facility]?  Name of the pharmacy and phone number for the current request: NA-will  hard copy    Name of the medication requested: oxyCODONE-acetaminophen (PERCOCET) 5-325 MG per tablet    Other request: Pt will  at      Can we leave a detailed message on this number? YES    Phone number patient can be reached at: Home number on file [phone] (home)    Best Time: anytime    Call taken on 4/11/2017 at 3:43 PM by Jasmin Avery

## 2017-05-08 ENCOUNTER — OFFICE VISIT (OUTPATIENT)
Dept: FAMILY MEDICINE | Facility: CLINIC | Age: 62
End: 2017-05-08
Payer: MEDICARE

## 2017-05-08 VITALS
HEART RATE: 91 BPM | DIASTOLIC BLOOD PRESSURE: 80 MMHG | WEIGHT: 207.13 LBS | SYSTOLIC BLOOD PRESSURE: 139 MMHG | RESPIRATION RATE: 18 BRPM | OXYGEN SATURATION: 96 % | TEMPERATURE: 98 F | BODY MASS INDEX: 31.72 KG/M2

## 2017-05-08 DIAGNOSIS — E11.22 TYPE 2 DIABETES MELLITUS WITH STAGE 3 CHRONIC KIDNEY DISEASE, WITH LONG-TERM CURRENT USE OF INSULIN (H): ICD-10-CM

## 2017-05-08 DIAGNOSIS — N18.30 TYPE 2 DIABETES MELLITUS WITH STAGE 3 CHRONIC KIDNEY DISEASE, WITH LONG-TERM CURRENT USE OF INSULIN (H): ICD-10-CM

## 2017-05-08 DIAGNOSIS — I10 HYPERTENSION GOAL BP (BLOOD PRESSURE) < 130/80: ICD-10-CM

## 2017-05-08 DIAGNOSIS — I25.810 CORONARY ARTERY DISEASE INVOLVING CORONARY BYPASS GRAFT OF NATIVE HEART WITHOUT ANGINA PECTORIS: ICD-10-CM

## 2017-05-08 DIAGNOSIS — Z79.4 TYPE 2 DIABETES MELLITUS WITH STAGE 3 CHRONIC KIDNEY DISEASE, WITH LONG-TERM CURRENT USE OF INSULIN (H): ICD-10-CM

## 2017-05-08 DIAGNOSIS — I50.22 CHRONIC SYSTOLIC HEART FAILURE (H): ICD-10-CM

## 2017-05-08 DIAGNOSIS — M50.20 HERNIATION OF CERVICAL INTERVERTEBRAL DISC: ICD-10-CM

## 2017-05-08 DIAGNOSIS — I25.5 ISCHEMIC CARDIOMYOPATHY: ICD-10-CM

## 2017-05-08 DIAGNOSIS — G89.4 CHRONIC PAIN SYNDROME: Primary | ICD-10-CM

## 2017-05-08 LAB — HBA1C MFR BLD: 7 % (ref 4.3–6)

## 2017-05-08 PROCEDURE — 99214 OFFICE O/P EST MOD 30 MIN: CPT | Performed by: NURSE PRACTITIONER

## 2017-05-08 PROCEDURE — 83036 HEMOGLOBIN GLYCOSYLATED A1C: CPT | Performed by: NURSE PRACTITIONER

## 2017-05-08 PROCEDURE — 80048 BASIC METABOLIC PNL TOTAL CA: CPT | Performed by: NURSE PRACTITIONER

## 2017-05-08 PROCEDURE — 36415 COLL VENOUS BLD VENIPUNCTURE: CPT | Performed by: NURSE PRACTITIONER

## 2017-05-08 RX ORDER — OXYCODONE AND ACETAMINOPHEN 5; 325 MG/1; MG/1
1 TABLET ORAL AT BEDTIME
Qty: 30 TABLET | Refills: 0 | Status: SHIPPED | OUTPATIENT
Start: 2017-05-08 | End: 2017-05-08

## 2017-05-08 RX ORDER — OXYCODONE AND ACETAMINOPHEN 5; 325 MG/1; MG/1
1 TABLET ORAL AT BEDTIME
Qty: 30 TABLET | Refills: 0 | Status: SHIPPED | OUTPATIENT
Start: 2017-07-07 | End: 2017-08-10

## 2017-05-08 RX ORDER — OXYCODONE AND ACETAMINOPHEN 5; 325 MG/1; MG/1
1 TABLET ORAL AT BEDTIME
Qty: 30 TABLET | Refills: 0 | Status: SHIPPED | OUTPATIENT
Start: 2017-06-07 | End: 2017-05-08

## 2017-05-08 RX ORDER — METOPROLOL TARTRATE 50 MG
50 TABLET ORAL 2 TIMES DAILY
Qty: 60 TABLET | Status: SHIPPED | OUTPATIENT
Start: 2017-05-08 | End: 2018-05-12

## 2017-05-08 ASSESSMENT — ANXIETY QUESTIONNAIRES
GAD7 TOTAL SCORE: 5
5. BEING SO RESTLESS THAT IT IS HARD TO SIT STILL: MORE THAN HALF THE DAYS
3. WORRYING TOO MUCH ABOUT DIFFERENT THINGS: NOT AT ALL
7. FEELING AFRAID AS IF SOMETHING AWFUL MIGHT HAPPEN: NOT AT ALL
2. NOT BEING ABLE TO STOP OR CONTROL WORRYING: NOT AT ALL
6. BECOMING EASILY ANNOYED OR IRRITABLE: NOT AT ALL
1. FEELING NERVOUS, ANXIOUS, OR ON EDGE: SEVERAL DAYS

## 2017-05-08 ASSESSMENT — PATIENT HEALTH QUESTIONNAIRE - PHQ9: 5. POOR APPETITE OR OVEREATING: MORE THAN HALF THE DAYS

## 2017-05-08 NOTE — NURSING NOTE
"Chief Complaint   Patient presents with     Recheck Medication     Medication Problem     discuss options for nerve pain. Diabetic nerve pain in hands and feet.        Initial /80  Pulse 91  Temp 98  F (36.7  C) (Oral)  Resp 18  Wt 207 lb 2 oz (94 kg)  SpO2 96%  BMI 31.72 kg/m2 Estimated body mass index is 31.72 kg/(m^2) as calculated from the following:    Height as of 2/6/17: 5' 7.76\" (1.721 m).    Weight as of this encounter: 207 lb 2 oz (94 kg).  Medication Reconciliation: complete     Aparna Wood MA      "

## 2017-05-08 NOTE — PATIENT INSTRUCTIONS
Follow up in 3 months for medication refills.    A cardiology  will call you to set up an appointment to see Dr. Arthur.

## 2017-05-08 NOTE — PROGRESS NOTES
SUBJECTIVE:  Hayder Alves, a 62 year old male scheduled an appointment to discuss the following issues:     Chronic pain syndrome  Herniation of cervical intervertebral disc  He takes one oxycodone at bedtime to help manage his chronic neck and back pain due to stenosis and degenerative changes.    Coronary artery disease involving coronary bypass graft of native heart without angina pectoris  Chronic systolic heart failure (H)  Ischemic cardiomyopathy  He has not followed up with his cardiologist after hospitalization last fall.  [facility].  He was taken off of Lisinopril due to hypotension and elevated creatinine.  His weight has been stable.  He denies any chest pain, shortness of breath, palpitations, edema.    Type 2 diabetes mellitus with stage 3 chronic kidney disease, with long-term current use of insulin (H)  He has not been checking his blood sugars.      Medical, social, surgical, and family histories reviewed.    ROS:  C: NEGATIVE for fever, chills  E/M: NEGATIVE for ear, mouth and throat problems  R: NEGATIVE for significant cough or SOB  CV: NEGATIVE for chest pain, palpitations   GI: NEGATIVE for nausea, abdominal pain, heartburn, or change in bowel habits  MUSCULOSKELETAL:see HPI  N: NEGATIVE for weakness, dizziness or paresthesias or headache  P: NEGATIVE for changes in mood or affect    OBJECTIVE:  /80  Pulse 91  Temp 98  F (36.7  C) (Oral)  Resp 18  Wt 207 lb 2 oz (94 kg)  SpO2 96%  BMI 31.72 kg/m2  EXAM:  GENERAL APPEARANCE: healthy, alert and no distress  RESP: lungs clear to auscultation - no rales, rhonchi or wheezes  CV: regular rates and rhythm, normal S1 S2, no S3 or S4 and no murmur, click or rub -  SKIN: no suspicious lesions or rashes  PSYCH: mentation appears normal and affect normal/bright    ASSESSMENT/PLAN:  (G89.4) Chronic pain syndrome  (primary encounter diagnosis)  Comment:   Plan: oxyCODONE-acetaminophen (PERCOCET) 5-325 MG  per        tablet, DISCONTINUED: oxyCODONE-acetaminophen         (PERCOCET) 5-325 MG per tablet, DISCONTINUED:         oxyCODONE-acetaminophen (PERCOCET) 5-325 MG per        tablet        Refills given for 3 months.  Follow up in 3 months.     (M50.20) Herniation of cervical intervertebral disc  Comment:   Plan: oxyCODONE-acetaminophen (PERCOCET) 5-325 MG per        tablet, DISCONTINUED: oxyCODONE-acetaminophen         (PERCOCET) 5-325 MG per tablet, DISCONTINUED:         oxyCODONE-acetaminophen (PERCOCET) 5-325 MG per        tablet        See above.     (I25.810) Coronary artery disease involving coronary bypass graft of native heart without angina pectoris  Comment:   Plan: metoprolol (LOPRESSOR) 50 MG tablet, CARDIO          ADULT REFERRAL        Schedule to see cardiology.     (I50.22) Chronic systolic heart failure (H)  Comment:   Plan: ACE/ARB NOT PRESCRIBED, INTENTIONAL,        See above.     (I25.5) Ischemic cardiomyopathy  Comment:   Plan: See above.     (E11.22,  N18.3,  Z79.4) Type 2 diabetes mellitus with stage 3 chronic kidney disease, with long-term current use of insulin (H)  Comment: results pending  Plan: Hemoglobin A1c, Basic metabolic panel, ACE/ARB         NOT PRESCRIBED, INTENTIONAL,        Discussed diet and exercise.    Hypertension  Plan: Increase Metoprolol to 50 mg BID.

## 2017-05-08 NOTE — LETTER
[facility]   [address], [place]  [address]  [phone]      May 10, 2017      Hayder DEYSI Alves  [address] [address]              Dear Abby Long,    A1c is a little higher than last time.  Kidney function is back to normal.    Results for orders placed or performed in visit on 05/08/17   Hemoglobin A1c   Result Value Ref Range    Hemoglobin A1C 7.0 (H) 4.3 - 6.0 %   Basic metabolic panel   Result Value Ref Range    Sodium 141 133 - 144 mmol/L    Potassium 4.7 3.4 - 5.3 mmol/L    Chloride 106 94 - 109 mmol/L    Carbon Dioxide 25 20 - 32 mmol/L    Anion Gap 10 3 - 14 mmol/L    Glucose 209 (H) 70 - 99 mg/dL    Urea Nitrogen 15 7 - 30 mg/dL    Creatinine 1.10 0.66 - 1.25 mg/dL    GFR Estimate 68 >60 mL/min/1.7m2    GFR Estimate If Black 82 >60 mL/min/1.7m2    Calcium 9.2 8.5 - 10.1 mg/dL           Sincerely,    Suma Jenkins, CNP/nr

## 2017-05-08 NOTE — MR AVS SNAPSHOT
After Visit Summary   5/8/2017    Hayder Alves    MRN: [id number]           Patient Information     Date Of Birth          1955        Visit Information        Provider Department      5/8/2017 1:00 PM Suma Jenkins NP [facility]        Today's Diagnoses     Chronic pain syndrome    -  1    Herniation of cervical intervertebral disc        Coronary artery disease involving coronary bypass graft of native heart without angina pectoris        Chronic systolic heart failure (H)        Ischemic cardiomyopathy        Type 2 diabetes mellitus with stage 3 chronic kidney disease, with long-term current use of insulin (H)          Care Instructions    Follow up in 3 months for medication refills.    A cardiology  will call you to set up an appointment to see Dr. Arthur.         Follow-ups after your visit        Additional Services     CARDIO  ADULT REFERRAL       [facility] is referring you to Cardiology Services.       The  Representative will assist you in the coordination of your Cardiology care as prescribed by your physician.    The  Representative will call you within 24 hours to help schedule your appointment, or you may contact the  Representative at: [phone].         Type of Referral: Cardiology Follow Up  Dr. Arthur          Timeframe requested: within 4 weeks       Coverage of these services is subject to the terms and limitations of your health insurance plan.  Please call member services at your health plan with any benefit or coverage questions.      If X-rays, CT or MRI's have been performed, please contact the facility where they were done to arrange for , prior to your scheduled appointment.  Please bring this referral request to your appointment and present it to your specialist.                  Who to contact     If you have questions or need follow up information about today's  "clinic visit or your schedule please contact [facility] directly at [phone].  Normal or non-critical lab and imaging results will be communicated to you by MyChart, letter or phone within 4 business days after the clinic has received the results. If you do not hear from us within 7 days, please contact the clinic through [employer] or phone. If you have a critical or abnormal lab result, we will notify you by phone as soon as possible.  Submit refill requests through [employer] or call your pharmacy and they will forward the refill request to us. Please allow 3 business days for your refill to be completed.          Additional Information About Your Visit        MyChart Information     [employer] lets you send messages to your doctor, view your test results, renew your prescriptions, schedule appointments and more. To sign up, go to [email or web address] . Click on \"Log in\" on the left side of the screen, which will take you to the Welcome page. Then click on \"Sign up Now\" on the right side of the page.     You will be asked to enter the access code listed below, as well as some personal information. Please follow the directions to create your username and password.     Your access code is: [email or web address]  Expires: 2017  4:46 PM     Your access code will  in 90 days. If you need help or a new code, please call your [place] clinic or [phone].        Care EveryWhere ID     This is your Care EveryWhere ID. This could be used by other organizations to access your [place] medical records  [id number]        Your Vitals Were     Pulse Temperature Respirations Pulse Oximetry BMI (Body Mass Index)       91 98  F (36.7  C) (Oral) 18 96% 31.72 kg/m2        Blood Pressure from Last 3 Encounters:   17 (!) 165/104   17 110/60   17 96/55    Weight from Last 3 Encounters:   17 207 lb 2 oz (94 kg)   17 214 lb (97.1 kg)   17 207 lb (93.9 kg)              We " Performed the Following     Basic metabolic panel     CARDIO [facility] ADULT REFERRAL     Hemoglobin A1c          Today's Medication Changes          These changes are accurate as of: 5/8/17  1:32 PM.  If you have any questions, ask your nurse or doctor.               Start taking these medicines.        Dose/Directions    oxyCODONE-acetaminophen 5-325 MG per tablet   Commonly known as:  PERCOCET   Used for:  Herniation of cervical intervertebral disc, Chronic pain syndrome        Dose:  1 tablet   Start taking on:  7/7/2017   Take 1 tablet by mouth At Bedtime Must last 30 days   Quantity:  30 tablet   Refills:  0         These medicines have changed or have updated prescriptions.        Dose/Directions    metoprolol 50 MG tablet   Commonly known as:  LOPRESSOR   This may have changed:    - medication strength  - how much to take   Used for:  Coronary artery disease involving coronary bypass graft of native heart without angina pectoris        Dose:  50 mg   Take 1 tablet (50 mg) by mouth 2 times daily   Quantity:  60 tablet   Refills:  PRN            Where to get your medicines      These medications were sent to [facility] - [place], [place] - [address]  [address], [place] [address]     Phone:  [phone]     metoprolol 50 MG tablet         Some of these will need a paper prescription and others can be bought over the counter.  Ask your nurse if you have questions.     Bring a paper prescription for each of these medications     oxyCODONE-acetaminophen 5-325 MG per tablet                Primary Care Provider Office Phone # Fax #    Suma Jenkins [phone]       [facility] [address]  [facility] [address]        Thank you!     Thank you for choosing [facility]  for your care. Our goal is always to provide you with excellent care. Hearing back from our patients is one way we can continue to improve our services. Please  take a few minutes to complete the written survey that you may receive in the mail after your visit with us. Thank you!             Your Updated Medication List - Protect others around you: Learn how to safely use, store and throw away your medicines at www.disposemymeds.org.          This list is accurate as of: 5/8/17  1:32 PM.  Always use your most recent med list.                   Brand Name Dispense Instructions for use    * albuterol (2.5 MG/3ML) 0.083% neb solution     25 vial    Take 1 vial (2.5 mg) by nebulization every 6 hours as needed for shortness of breath / dyspnea or wheezing       * albuterol 108 (90 BASE) MCG/ACT Inhaler    PROAIR HFA/PROVENTIL HFA/VENTOLIN HFA    1 Inhaler    Inhale 2 puffs into the lungs every 6 hours as needed for shortness of breath / dyspnea       ASPIRIN PO      Take 81 mg by mouth daily       blood glucose monitoring lancets     100 each    Test once daily       blood glucose monitoring test strip    no brand specified    3 Box    Use to test blood sugars 2 times daily or as directed - use strips compatible with pt's meter and insurance       CENTRUM SILVER per tablet      Take 1 tablet by mouth daily       citalopram 20 MG tablet    celeXA    30 tablet    Take 1 tablet (20 mg) by mouth daily       gabapentin 300 MG capsule    NEURONTIN    30 capsule    Take 1 capsule (300 mg) by mouth At Bedtime       metFORMIN 500 MG tablet    GLUCOPHAGE    180 tablet    Take 1 tablet (500 mg) by mouth 2 times daily (with meals)       metoprolol 50 MG tablet    LOPRESSOR    60 tablet    Take 1 tablet (50 mg) by mouth 2 times daily       nitroglycerin 0.4 MG sublingual tablet    NITROSTAT    25 tablet    Place 1 tablet (0.4 mg) under the tongue every 5 minutes as needed       order for DME     1 Device    Equipment being ordered: Nebulizer with tubing       order for DME     1 Device    Equipment being ordered: Other: Nebulizer machine x 1 Treatment Diagnosis: COPD.        oxyCODONE-acetaminophen 5-325 MG per tablet   Start taking on:  7/7/2017    PERCOCET    30 tablet    Take 1 tablet by mouth At Bedtime Must last 30 days       rosuvastatin 20 MG tablet    CRESTOR    90 tablet    Take 1 tablet (20 mg) by mouth daily       traZODone 50 MG tablet    DESYREL    60 tablet    Take 1-2 tablets ( mg) by mouth At Bedtime       * Notice:  This list has 2 medication(s) that are the same as other medications prescribed for you. Read the directions carefully, and ask your doctor or other care provider to review them with you.

## 2017-05-09 DIAGNOSIS — I10 HYPERTENSION GOAL BP (BLOOD PRESSURE) < 130/80: ICD-10-CM

## 2017-05-09 LAB
ANION GAP SERPL CALCULATED.3IONS-SCNC: 10 MMOL/L (ref 3–14)
BUN SERPL-MCNC: 15 MG/DL (ref 7–30)
CALCIUM SERPL-MCNC: 9.2 MG/DL (ref 8.5–10.1)
CHLORIDE SERPL-SCNC: 106 MMOL/L (ref 94–109)
CO2 SERPL-SCNC: 25 MMOL/L (ref 20–32)
CREAT SERPL-MCNC: 1.1 MG/DL (ref 0.66–1.25)
GFR SERPL CREATININE-BSD FRML MDRD: 68 ML/MIN/1.7M2
GLUCOSE SERPL-MCNC: 209 MG/DL (ref 70–99)
POTASSIUM SERPL-SCNC: 4.7 MMOL/L (ref 3.4–5.3)
SODIUM SERPL-SCNC: 141 MMOL/L (ref 133–144)

## 2017-05-09 RX ORDER — LISINOPRIL 20 MG/1
TABLET ORAL
Qty: 0.1 TABLET | Refills: 0 | OUTPATIENT
Start: 2017-05-09

## 2017-05-09 RX ORDER — AMLODIPINE BESYLATE 10 MG/1
TABLET ORAL
Qty: 30 TABLET | Refills: 0 | OUTPATIENT
Start: 2017-05-09

## 2017-05-09 ASSESSMENT — ANXIETY QUESTIONNAIRES: GAD7 TOTAL SCORE: 5

## 2017-05-09 ASSESSMENT — PATIENT HEALTH QUESTIONNAIRE - PHQ9: SUM OF ALL RESPONSES TO PHQ QUESTIONS 1-9: 12

## 2017-06-28 ENCOUNTER — TELEPHONE (OUTPATIENT)
Dept: FAMILY MEDICINE | Facility: CLINIC | Age: 62
End: 2017-06-28

## 2017-06-28 NOTE — TELEPHONE ENCOUNTER
Pt feels he is having neuropathy pain in bilateral feet. He said its been going on for alittle over a month, and he shouldve mentioned it at his last appointment, but he didn't.  He said he is having trouble getting comfortable at night because of this and has trouble getting much sleep.  He wondered if you could rx something for his neuropathy pain.    Thanks!     Kandi De Oliveira RN

## 2017-06-29 ENCOUNTER — TELEPHONE (OUTPATIENT)
Dept: GENERAL RADIOLOGY | Facility: CLINIC | Age: 62
End: 2017-06-29

## 2017-06-29 DIAGNOSIS — E11.22 TYPE 2 DIABETES MELLITUS WITH STAGE 3 CHRONIC KIDNEY DISEASE, WITH LONG-TERM CURRENT USE OF INSULIN (H): ICD-10-CM

## 2017-06-29 DIAGNOSIS — N18.30 TYPE 2 DIABETES MELLITUS WITH STAGE 3 CHRONIC KIDNEY DISEASE, WITH LONG-TERM CURRENT USE OF INSULIN (H): ICD-10-CM

## 2017-06-29 DIAGNOSIS — G89.4 CHRONIC PAIN SYNDROME: ICD-10-CM

## 2017-06-29 DIAGNOSIS — Z79.4 TYPE 2 DIABETES MELLITUS WITH STAGE 3 CHRONIC KIDNEY DISEASE, WITH LONG-TERM CURRENT USE OF INSULIN (H): ICD-10-CM

## 2017-06-29 DIAGNOSIS — M50.20 HERNIATION OF CERVICAL INTERVERTEBRAL DISC: ICD-10-CM

## 2017-06-29 RX ORDER — GABAPENTIN 300 MG/1
600 CAPSULE ORAL AT BEDTIME
Qty: 30 CAPSULE | Refills: 3 | Status: SHIPPED | OUTPATIENT
Start: 2017-06-29 | End: 2017-09-22

## 2017-06-29 NOTE — TELEPHONE ENCOUNTER
Left message with female at home phone to call back to triage and ask for kandi.Kandi De Oliveira RN

## 2017-06-29 NOTE — TELEPHONE ENCOUNTER
Patient confirmed he is taking 300 mg at bedtime. Patient instructed to take 600 mg at bed as directed by Geno LAMAR DNP. Prescription refilled to reflect the change. Patient updated.       Thanks! Catalina Gramajo RN

## 2017-06-29 NOTE — TELEPHONE ENCOUNTER
Medication Detail      Disp Refills Start End KELLY   lovastatin (MEVACOR) 20 MG tablet (Discontinued) 90 tablet PRN 4/27/2016 2/6/2017 No   Sig: Take 1 tablet (20 mg) by mouth At Bedtime       Last Office Visit with [facility], P or [facility] prescribing provider: 5-8-17       Lab Results   Component Value Date    CHOL 160 01/03/2017     Lab Results   Component Value Date    HDL 35 01/03/2017     Lab Results   Component Value Date    LDL 80 01/03/2017     Lab Results   Component Value Date    TRIG 224 01/03/2017     Lab Results   Component Value Date    CHOLHDLRATIO 7.9 10/05/2015

## 2017-06-29 NOTE — TELEPHONE ENCOUNTER
Confirm that he is taking Gabapentin 300 mg at HS.  If he isn't taking it, he should start.  If his is taking it, he can increase to 2 capsules (600 mg) at bedtime (please change prescription in that case).

## 2017-06-29 NOTE — TELEPHONE ENCOUNTER
Writer called patient left non detailed message requesting return call to clinic and ask to speak with nurse            Drug not active on patient's medication list  Writer notes patient currently has rosuvastatin (CRESTOR) 20 MG tablet active on medication list with refills      Medication Detail         Disp Refills Start End KELLY     lovastatin (MEVACOR) 20 MG tablet (Discontinued) 90 tablet PRN 4/27/2016 2/6/2017 No     Sig: Take 1 tablet (20 mg) by mouth At Bedtime     Class: E-Prescribe     Route: Oral     Order: [id number]     E-Prescribing Status: Receipt confirmed by pharmacy (4/27/2016  5:25 PM CDT)       Printout Tracking      External Result Report       Pharmacy      [facility] [address] [place], [place] [address] AT [address]       This Order Has Been Discontinued      Order Status By On Reason     Discontinued Alexandre Arthur MD 2/6/17 7639 None       Associated Diagnoses      Hyperlipidemia LDL goal <100 [E78.5]         Thank you,  Shreyas Knight RN

## 2017-08-03 ENCOUNTER — TELEPHONE (OUTPATIENT)
Dept: FAMILY MEDICINE | Facility: CLINIC | Age: 62
End: 2017-08-03

## 2017-08-03 NOTE — TELEPHONE ENCOUNTER
req oxycodone  Last office visit 5/8/17  Office visit note says to follow up in 3 months  No upcoming appointment made    Please advise  Thanks!     Kandi De Oliveira RN

## 2017-08-03 NOTE — TELEPHONE ENCOUNTER
Reason for Call:  Medication or medication refill:    Do you use a [facility]?  Name of the pharmacy and phone number for the current request:  [facility] [address] - [phone]    Name of the medication requested: Oxycodone    Other request: Pt is requesting a refill for his oxycodone. Pt states he is currently all out. Please assist ASAP thanks!    Can we leave a detailed message on this number? YES    Phone number patient can be reached at: Home number on file [phone] (home)    Best Time: anytime    Call taken on 8/3/2017 at 11:41 AM by Lilia Coburn

## 2017-08-03 NOTE — TELEPHONE ENCOUNTER
Left message for patient that this cannot be filled without an office visit. Advised pt to schedule an appointment for this refill.    LAZARO AdamN, RN  [facility]

## 2017-08-03 NOTE — TELEPHONE ENCOUNTER
Needs an appointment.  He has been told MANY times that he needs an appointment every 3 months for refills.

## 2017-08-10 ENCOUNTER — OFFICE VISIT (OUTPATIENT)
Dept: FAMILY MEDICINE | Facility: CLINIC | Age: 62
End: 2017-08-10
Payer: MEDICARE

## 2017-08-10 VITALS
HEIGHT: 66 IN | RESPIRATION RATE: 14 BRPM | TEMPERATURE: 97.2 F | HEART RATE: 92 BPM | DIASTOLIC BLOOD PRESSURE: 84 MMHG | BODY MASS INDEX: 34.55 KG/M2 | OXYGEN SATURATION: 95 % | WEIGHT: 215 LBS | SYSTOLIC BLOOD PRESSURE: 138 MMHG

## 2017-08-10 DIAGNOSIS — E11.22 TYPE 2 DIABETES MELLITUS WITH STAGE 3 CHRONIC KIDNEY DISEASE, WITH LONG-TERM CURRENT USE OF INSULIN (H): ICD-10-CM

## 2017-08-10 DIAGNOSIS — M50.20 HERNIATION OF CERVICAL INTERVERTEBRAL DISC: ICD-10-CM

## 2017-08-10 DIAGNOSIS — G89.4 CHRONIC PAIN SYNDROME: Primary | ICD-10-CM

## 2017-08-10 DIAGNOSIS — Z79.4 TYPE 2 DIABETES MELLITUS WITH STAGE 3 CHRONIC KIDNEY DISEASE, WITH LONG-TERM CURRENT USE OF INSULIN (H): ICD-10-CM

## 2017-08-10 DIAGNOSIS — N18.30 TYPE 2 DIABETES MELLITUS WITH STAGE 3 CHRONIC KIDNEY DISEASE, WITH LONG-TERM CURRENT USE OF INSULIN (H): ICD-10-CM

## 2017-08-10 PROCEDURE — 99214 OFFICE O/P EST MOD 30 MIN: CPT | Performed by: NURSE PRACTITIONER

## 2017-08-10 RX ORDER — OXYCODONE AND ACETAMINOPHEN 5; 325 MG/1; MG/1
1 TABLET ORAL AT BEDTIME
Qty: 30 TABLET | Refills: 0 | Status: SHIPPED | OUTPATIENT
Start: 2017-08-10 | End: 2017-08-10

## 2017-08-10 RX ORDER — OXYCODONE AND ACETAMINOPHEN 5; 325 MG/1; MG/1
1 TABLET ORAL AT BEDTIME
Qty: 30 TABLET | Refills: 0 | Status: SHIPPED | OUTPATIENT
Start: 2017-10-09 | End: 2017-11-21

## 2017-08-10 RX ORDER — OXYCODONE AND ACETAMINOPHEN 5; 325 MG/1; MG/1
1 TABLET ORAL AT BEDTIME
Qty: 30 TABLET | Refills: 0 | Status: SHIPPED | OUTPATIENT
Start: 2017-09-09 | End: 2017-08-10

## 2017-08-10 NOTE — MR AVS SNAPSHOT
"              After Visit Summary   8/10/2017    Hayder Alves    MRN: [id number]           Patient Information     Date Of Birth          1955        Visit Information        Provider Department      8/10/2017 2:45 PM Suma Jenkins NP [facility]        Today's Diagnoses     Type 2 diabetes mellitus with stage 3 chronic kidney disease, with long-term current use of insulin (H)    -  1    Herniation of cervical intervertebral disc        Chronic pain syndrome           Follow-ups after your visit        Follow-up notes from your care team     Return in about 3 months (around 11/10/2017).      Who to contact     If you have questions or need follow up information about today's clinic visit or your schedule please contact [facility] directly at [phone].  Normal or non-critical lab and imaging results will be communicated to you by MyChart, letter or phone within 4 business days after the clinic has received the results. If you do not hear from us within 7 days, please contact the clinic through MyChart or phone. If you have a critical or abnormal lab result, we will notify you by phone as soon as possible.  Submit refill requests through [employer] or call your pharmacy and they will forward the refill request to us. Please allow 3 business days for your refill to be completed.          Additional Information About Your Visit        MyChart Information     [employer] lets you send messages to your doctor, view your test results, renew your prescriptions, schedule appointments and more. To sign up, go to [email or web address] . Click on \"Log in\" on the left side of the screen, which will take you to the Welcome page. Then click on \"Sign up Now\" on the right side of the page.     You will be asked to enter the access code listed below, as well as some personal information. Please follow the directions to create your username and password.     Your access code is: " [email or web address]  Expires: 2017  3:25 PM     Your access code will  in 90 days. If you need help or a new code, please call your [facility] or [phone].        Care EveryWhere ID     This is your Care EveryWhere ID. This could be used by other organizations to access your [place] medical records  [id number]        Your Vitals Were     Pulse Temperature Respirations Height Pulse Oximetry BMI (Body Mass Index)    92 97.2  F (36.2  C) (Tympanic) 14 5' 5.76\" (1.67 m) 95% 34.96 kg/m2       Blood Pressure from Last 3 Encounters:   08/10/17 140/84   17 139/80   17 110/60    Weight from Last 3 Encounters:   08/10/17 215 lb (97.5 kg)   17 207 lb 2 oz (94 kg)   17 214 lb (97.1 kg)              Today, you had the following     No orders found for display         Today's Medication Changes          These changes are accurate as of: 8/10/17  3:25 PM.  If you have any questions, ask your nurse or doctor.               Start taking these medicines.        Dose/Directions    dulaglutide 0.75 MG/0.5ML pen   Commonly known as:  TRULICITY   Used for:  Type 2 diabetes mellitus with stage 3 chronic kidney disease, with long-term current use of insulin (H)   Started by:  Suma Jenkins NP        Dose:  0.75 mg   Inject 0.75 mg Subcutaneous every 7 days   Quantity:  0.5 mL   Refills:  2       oxyCODONE-acetaminophen 5-325 MG per tablet   Commonly known as:  PERCOCET   Used for:  Herniation of cervical intervertebral disc, Chronic pain syndrome   Started by:  Suma Jenkins NP        Dose:  1 tablet   Start taking on:  10/9/2017   Take 1 tablet by mouth At Bedtime Must last 30 days   Quantity:  30 tablet   Refills:  0            Where to get your medicines      These medications were sent to [facility] [address] [address] AT [address] [address]    Hours:  24-hours Phone:  [phone]     dulaglutide " 0.75 MG/0.5ML pen         Some of these will need a paper prescription and others can be bought over the counter.  Ask your nurse if you have questions.     Bring a paper prescription for each of these medications     oxyCODONE-acetaminophen 5-325 MG per tablet                Primary Care Provider Office Phone # Fax #    Suma Jenkins [phone] [address] [facility] [address]        Equal Access to Services     YNES YU : Hadii aad ku hadasho Soomaali, waaxda luqadaha, qaybta kaalmada adeegyada, waxay idiin hayaan adeeg kharash la'beulah . So [facility] [phone].    ATENCIÓN: Si habla español, tiene a ellis disposición servicios gratuitos de asistencia lingüística. [facility] [phone].    We comply with applicable federal civil rights laws and [place] laws. We do not discriminate on the basis of race, color, national origin, age, disability sex, sexual orientation or gender identity.            Thank you!     Thank you for choosing [facility]  for your care. Our goal is always to provide you with excellent care. Hearing back from our patients is one way we can continue to improve our services. Please take a few minutes to complete the written survey that you may receive in the mail after your visit with us. Thank you!             Your Updated Medication List - Protect others around you: Learn how to safely use, store and throw away your medicines at www.disposemymeds.org.          This list is accurate as of: 8/10/17  3:25 PM.  Always use your most recent med list.                   Brand Name Dispense Instructions for use Diagnosis    ACE/ARB NOT PRESCRIBED (INTENTIONAL)      Please choose reason not prescribed, below    Type 2 diabetes mellitus with stage 3 chronic kidney disease, with long-term current use of insulin (H), Chronic systolic heart failure (H)       * albuterol (2.5 MG/3ML) 0.083% neb solution     25 vial    Take 1 vial (2.5 mg) by  nebulization every 6 hours as needed for shortness of breath / dyspnea or wheezing    Chronic obstructive pulmonary disease, unspecified COPD type (H)       * albuterol 108 (90 BASE) MCG/ACT Inhaler    PROAIR HFA/PROVENTIL HFA/VENTOLIN HFA    1 Inhaler    Inhale 2 puffs into the lungs every 6 hours as needed for shortness of breath / dyspnea    Chronic obstructive pulmonary disease, unspecified COPD type (H)       ASPIRIN PO      Take 81 mg by mouth daily        blood glucose monitoring lancets     100 each    Test once daily    Type 2 diabetes, HbA1C goal < 8% (H)       blood glucose monitoring test strip    no brand specified    3 Box    Use to test blood sugars 2 times daily or as directed - use strips compatible with pt's meter and insurance    Type 2 diabetes mellitus with stage 3 chronic kidney disease, unspecified long term insulin use status (H)       CENTRUM SILVER per tablet      Take 1 tablet by mouth daily        citalopram 20 MG tablet    celeXA    30 tablet    Take 1 tablet (20 mg) by mouth daily    Anxiety       dulaglutide 0.75 MG/0.5ML pen    TRULICITY    0.5 mL    Inject 0.75 mg Subcutaneous every 7 days    Type 2 diabetes mellitus with stage 3 chronic kidney disease, with long-term current use of insulin (H)       gabapentin 300 MG capsule    NEURONTIN    30 capsule    Take 2 capsules (600 mg) by mouth At Bedtime    Chronic pain syndrome, Herniation of cervical intervertebral disc, Type 2 diabetes mellitus with stage 3 chronic kidney disease, with long-term current use of insulin (H)       metFORMIN 500 MG tablet    GLUCOPHAGE    180 tablet    Take 1 tablet (500 mg) by mouth 2 times daily (with meals)    Type 2 diabetes mellitus with stage 3 chronic kidney disease, with long-term current use of insulin (H)       metoprolol 50 MG tablet    LOPRESSOR    60 tablet    Take 1 tablet (50 mg) by mouth 2 times daily    Hypertension goal BP (blood pressure) < 130/80       nitroGLYcerin 0.4 MG sublingual  tablet    NITROSTAT    25 tablet    Place 1 tablet (0.4 mg) under the tongue every 5 minutes as needed    Intermediate coronary syndrome (H)       order for DME     1 Device    Equipment being ordered: Nebulizer with tubing    Chronic obstructive pulmonary disease, unspecified COPD type (H)       order for DME     1 Device    Equipment being ordered: Other: Nebulizer machine x 1 Treatment Diagnosis: COPD.    Chronic obstructive pulmonary disease, unspecified COPD type (H)       oxyCODONE-acetaminophen 5-325 MG per tablet   Start taking on:  10/9/2017    PERCOCET    30 tablet    Take 1 tablet by mouth At Bedtime Must last 30 days    Herniation of cervical intervertebral disc, Chronic pain syndrome       rosuvastatin 20 MG tablet    CRESTOR    90 tablet    Take 1 tablet (20 mg) by mouth daily    Hypercholesterolemia       traZODone 50 MG tablet    DESYREL    60 tablet    Take 1-2 tablets ( mg) by mouth At Bedtime    Insomnia, unspecified type       * Notice:  This list has 2 medication(s) that are the same as other medications prescribed for you. Read the directions carefully, and ask your doctor or other care provider to review them with you.

## 2017-08-10 NOTE — PROGRESS NOTES
SUBJECTIVE:                                                    Hayder Alves is a 62 year old male who presents to clinic today for the following health issues:      Chronic Pain Follow-Up       Type / Location of Pain: neck and lower back  Analgesia/pain control:       Recent changes:  same      Overall control: Tolerable with discomfort  Activity level/function:      Daily activities:  Able to do light housework, cooking and Able to do moderate activities    Work:  not applicable  Adverse effects:  No  Adherance    Taking medication as directed?  Yes    Participating in other treatments: yes massages once a month  Risk Factors:    Sleep:  Fair    Mood/anxiety:  not addressed due to acute problem    Recent family or social stressors:  none noted    Other aggravating factors: none  PHQ-9 SCORE 6/3/2014 1/19/2016 5/8/2017   Total Score 18 - -   Total Score - 11 12     MIRIAM-7 SCORE 1/19/2016 5/8/2017   Total Score 2 5     Encounter-Level CSA - 10/05/2015:          Controlled Substance Agreement - Scan on 10/7/2015 10:21 AM : CONTROLLED SUBSTANCE AGREEMENT (below)                  Amount of exercise or physical activity: 4-5 days/week for an average of 15-30 minutes    Problems taking medications regularly: No    Medication side effects: feet pain  Diet: regular (no restrictions)    He is interested in medical marijuana.  Despite taking Oxycodone at bedtime and Gabapentin, he is still having pain in his hands and feet at night.      He is also interested in Trulicity.  He is currently on Metformin.           Problem list and histories reviewed & adjusted, as indicated.  Additional history: as documented        Reviewed and updated as needed this visit by clinical staff     Reviewed and updated as needed this visit by Provider         ROS:  C: NEGATIVE for fever, chills, change in weight  R: NEGATIVE for significant cough or SOB  CV: NEGATIVE for chest pain, palpitations or peripheral edema  MUSCULOSKELETAL: see  "HPI  NEURO: NEGATIVE for weakness, dizziness   ENDOCRINE: NEGATIVE for temperature intolerance, skin/hair changes    OBJECTIVE:     /84  Pulse 92  Temp 97.2  F (36.2  C) (Tympanic)  Resp 14  Ht 5' 5.76\" (1.67 m)  Wt 215 lb (97.5 kg)  SpO2 95%  BMI 34.96 kg/m2  Body mass index is 34.96 kg/(m^2).  GENERAL: healthy, alert and no distress  RESP: lungs clear to auscultation - no rales, rhonchi or wheezes  CV: regular rate and rhythm, normal S1 S2, no S3 or S4, no murmur, click or rub, no peripheral edema and peripheral pulses strong  PSYCH: mentation appears normal, affect normal/bright        ASSESSMENT/PLAN:             1. Chronic pain syndrome  I did certify him for medical marijuana.  Discussed the goal if he starts this program would be to get off of opioids.    Follow up in 3 months.   - oxyCODONE-acetaminophen (PERCOCET) 5-325 MG per tablet; Take 1 tablet by mouth At Bedtime Must last 30 days  Dispense: 30 tablet; Refill: 0    2. Herniation of cervical intervertebral disc  See above.   - oxyCODONE-acetaminophen (PERCOCET) 5-325 MG per tablet; Take 1 tablet by mouth At Bedtime Must last 30 days  Dispense: 30 tablet; Refill: 0    3. Type 2 diabetes mellitus with stage 3 chronic kidney disease, with long-term current use of insulin (H)  Discussed the use and indication of this medication as well as potential side effects.   Follow up in 3 months.   - dulaglutide (TRULICITY) 0.75 MG/0.5ML pen; Inject 0.75 mg Subcutaneous every 7 days  Dispense: 0.5 mL; Refill: 2        Suma Jenkins NP  [facility]  "

## 2017-08-10 NOTE — NURSING NOTE
"Chief Complaint   Patient presents with     Refill Request     oxycodone       Initial /84 (BP Location: Right arm, Patient Position: Sitting, Cuff Size: Adult Large)  Pulse 92  Temp 97.2  F (36.2  C) (Tympanic)  Resp 14  Ht 5' 5.76\" (1.67 m)  Wt 215 lb (97.5 kg)  SpO2 95%  BMI 34.96 kg/m2 Estimated body mass index is 34.96 kg/(m^2) as calculated from the following:    Height as of this encounter: 5' 5.76\" (1.67 m).    Weight as of this encounter: 215 lb (97.5 kg).  Medication Reconciliation: complete     sma Rae    "

## 2017-08-14 ENCOUNTER — OFFICE VISIT (OUTPATIENT)
Dept: URGENT CARE | Facility: URGENT CARE | Age: 62
End: 2017-08-14
Payer: MEDICARE

## 2017-08-14 ENCOUNTER — TELEPHONE (OUTPATIENT)
Dept: FAMILY MEDICINE | Facility: CLINIC | Age: 62
End: 2017-08-14

## 2017-08-14 VITALS
HEIGHT: 66 IN | RESPIRATION RATE: 30 BRPM | WEIGHT: 211 LBS | BODY MASS INDEX: 33.91 KG/M2 | DIASTOLIC BLOOD PRESSURE: 80 MMHG | SYSTOLIC BLOOD PRESSURE: 116 MMHG | OXYGEN SATURATION: 99 % | HEART RATE: 66 BPM | TEMPERATURE: 98.6 F

## 2017-08-14 DIAGNOSIS — J44.1 COPD EXACERBATION (H): Primary | ICD-10-CM

## 2017-08-14 PROCEDURE — 94640 AIRWAY INHALATION TREATMENT: CPT | Performed by: INTERNAL MEDICINE

## 2017-08-14 PROCEDURE — 99214 OFFICE O/P EST MOD 30 MIN: CPT | Mod: 25 | Performed by: INTERNAL MEDICINE

## 2017-08-14 RX ORDER — IPRATROPIUM BROMIDE AND ALBUTEROL SULFATE 2.5; .5 MG/3ML; MG/3ML
1 SOLUTION RESPIRATORY (INHALATION) ONCE
Qty: 1 VIAL | Refills: 1 | Status: SHIPPED
Start: 2017-08-14 | End: 2019-02-27

## 2017-08-14 RX ORDER — IPRATROPIUM BROMIDE AND ALBUTEROL SULFATE 2.5; .5 MG/3ML; MG/3ML
1 SOLUTION RESPIRATORY (INHALATION) EVERY 4 HOURS PRN
Qty: 30 VIAL | Refills: 1 | Status: SHIPPED | OUTPATIENT
Start: 2017-08-14 | End: 2017-08-16

## 2017-08-14 RX ORDER — PREDNISONE 20 MG/1
40 TABLET ORAL DAILY
Qty: 10 TABLET | Refills: 0 | Status: SHIPPED | OUTPATIENT
Start: 2017-08-14 | End: 2017-08-19

## 2017-08-14 NOTE — NURSING NOTE
"Chief Complaint   Patient presents with     Urgent Care     URI     sick x 3 days, wheezy and bad cough.        Initial /80  Pulse 66  Temp 98.6  F (37  C) (Oral)  Resp 30  Ht 5' 6\" (1.676 m)  Wt 211 lb (95.7 kg)  SpO2 99%  BMI 34.06 kg/m2 Estimated body mass index is 34.06 kg/(m^2) as calculated from the following:    Height as of this encounter: 5' 6\" (1.676 m).    Weight as of this encounter: 211 lb (95.7 kg).  Medication Reconciliation: complete  "

## 2017-08-14 NOTE — PROGRESS NOTES
SUBJECTIVE:   Hayder Alves is a 62 year old male presenting with a chief complaint of   Chief Complaint   Patient presents with     Urgent Care     URI     sick x 3 days, wheezy and bad cough.      .  Onset of symptoms was 3 day(s) ago.  Course of illness is worsening.    Current and Associated symptoms: cough   Treatment measures tried include Inhaler (name: albuterol).  Predisposing factors include HX of COPD, seasonal allergies and wife sick.    Past Medical History:   Diagnosis Date     Bipolar I disorder, most recent episode (or current) manic, severe, specified as with psychotic behavior      CAD (coronary artery disease) 2001    MI, CAB      Chronic systolic CHF (congestive heart failure) (H)      Essential hypertension, benign      Impaired fasting glucose      Insomnia, unspecified      Ischemic cardiomyopathy      Other anxiety states      PLANTAR fasciaitis      Pure hypercholesterolemia      Syncope     probably iatrogenic due to medications     Type II or unspecified type diabetes mellitus without mention of complication, not stated as uncontrolled      Current Outpatient Prescriptions   Medication Sig Dispense Refill     ipratropium - albuterol 0.5 mg/2.5 mg/3 mL (DUONEB) 0.5-2.5 (3) MG/3ML neb solution Take 1 vial (3 mLs) by nebulization once for 1 dose 1 vial 1     ipratropium - albuterol 0.5 mg/2.5 mg/3 mL (DUONEB) 0.5-2.5 (3) MG/3ML neb solution Take 1 vial (3 mLs) by nebulization every 4 hours as needed for shortness of breath / dyspnea or wheezing 30 vial 1     predniSONE (DELTASONE) 20 MG tablet Take 2 tablets (40 mg) by mouth daily for 5 days 10 tablet 0     [START ON 10/9/2017] oxyCODONE-acetaminophen (PERCOCET) 5-325 MG per tablet Take 1 tablet by mouth At Bedtime Must last 30 days 30 tablet 0     dulaglutide (TRULICITY) 0.75 MG/0.5ML pen Inject 0.75 mg Subcutaneous every 7 days 0.5 mL 2     gabapentin (NEURONTIN) 300 MG capsule Take 2 capsules (600 mg) by mouth At Bedtime 30 capsule 3      metoprolol (LOPRESSOR) 50 MG tablet Take 1 tablet (50 mg) by mouth 2 times daily 60 tablet PRN     ACE/ARB NOT PRESCRIBED, INTENTIONAL, Please choose reason not prescribed, below       rosuvastatin (CRESTOR) 20 MG tablet Take 1 tablet (20 mg) by mouth daily 90 tablet 3     albuterol (PROAIR HFA/PROVENTIL HFA/VENTOLIN HFA) 108 (90 BASE) MCG/ACT Inhaler Inhale 2 puffs into the lungs every 6 hours as needed for shortness of breath / dyspnea 1 Inhaler PRN     Multiple Vitamins-Minerals (CENTRUM SILVER) per tablet Take 1 tablet by mouth daily       traZODone (DESYREL) 50 MG tablet Take 1-2 tablets ( mg) by mouth At Bedtime 60 tablet PRN     metFORMIN (GLUCOPHAGE) 500 MG tablet Take 1 tablet (500 mg) by mouth 2 times daily (with meals) 180 tablet 3     order for DME Equipment being ordered: Other: Nebulizer machine x 1  Treatment Diagnosis: COPD. 1 Device 0     blood glucose monitoring (NO BRAND SPECIFIED) test strip Use to test blood sugars 2 times daily or as directed - use strips compatible with pt's meter and insurance 3 Box 3     albuterol (2.5 MG/3ML) 0.083% nebulizer solution Take 1 vial (2.5 mg) by nebulization every 6 hours as needed for shortness of breath / dyspnea or wheezing 25 vial 1     order for DME Equipment being ordered: Nebulizer with tubing 1 Device 0     citalopram (CELEXA) 20 MG tablet Take 1 tablet (20 mg) by mouth daily 30 tablet PRN     nitroglycerin (NITROSTAT) 0.4 MG SL tablet Place 1 tablet (0.4 mg) under the tongue every 5 minutes as needed 25 tablet 5     ASPIRIN PO Take 81 mg by mouth daily       Lancets (MICROLET) MISC Test once daily 100 each 11     Social History   Substance Use Topics     Smoking status: Former Smoker     Packs/day: 1.50     Years: 30.00     Types: Cigarettes     Quit date: 1/6/2000     Smokeless tobacco: Never Used     Alcohol use Yes      Comment: occasionally        ROS:  CONSTITUTIONAL:feels warm    ENT/MOUTH: no runny nose or sore throat   Has swollen  "gland  RESP:POSITIVE for cough-productive, SOB/dyspnea and wheezing  dyspnea on exertion with walking    OBJECTIVE  :/80  Pulse 66  Temp 98.6  F (37  C) (Oral)  Resp 30  Ht 5' 6\" (1.676 m)  Wt 211 lb (95.7 kg)  SpO2 99%  BMI 34.06 kg/m2  GENERAL APPEARANCE: healthy, alert and no distress  HENT: ear canals and TM's normal.  Nose and mouth without ulcers, erythema or lesions  NECK: no adenopathy  RESP: tight, wheezing thoughout  CV: regular rates and rhythm, normal S1 S2, no murmur noted    duoneb given  Re-exam improved air movement   Wheezing throughout    ASSESSMENT:    ICD-10-CM    1. COPD exacerbation (H) J44.1 INHALATION/NEBULIZER TREATMENT, INITIAL     ipratropium - albuterol 0.5 mg/2.5 mg/3 mL (DUONEB) 0.5-2.5 (3) MG/3ML neb solution     ipratropium - albuterol 0.5 mg/2.5 mg/3 mL (DUONEB) 0.5-2.5 (3) MG/3ML neb solution     predniSONE (DELTASONE) 20 MG tablet         PLAN:  Patient Instructions   duoneb every 4 hours while awake  Prednisone 5 day burst  Consider starting daily inhaler.  Discuss with primary.    Recheck in 1-2 days with primary clinic.  ER if worse      COPD Flare    You have had a flare-up of your COPD.  COPD, or chronic obstructive pulmonary disease, is a common lung disease. It causes your airways to become irritated and narrower. This makes it harder for you to breathe. Emphysema and chronic bronchitis are both types of COPD. This is a chronic condition, which means you always have it. Sometimes it gets worse. When this happens, it is called a flare-up.  Symptoms of COPD  People with COPD may have symptoms most of the time. In a flare-up, your symptoms get worse. These symptoms may mean you are having a flare-up:    Shortness of breath, shallow or rapid breathing, or wheezing that gets worse    Lung infection    Cough that gets worse    More mucus, thicker mucus or mucus of a different color    Tiredness, decreased energy, or trouble doing your usual " activities    Fever    Chest tightness    Your symptoms don t get better even when you use your usual medicines, inhalers, and nebulizer    Trouble talking    You feel confused  Causes of flare-ups  Unfortunately, a flare-up can happen even though you did everything right, and you followed your doctor s instructions. Some causes of flare-ups are:    Smoking or secondhand smoke    Colds, the flu, or respiratory infections    Air pollution    Sudden change in the weather    Dust, irritating chemicals, or strong fumes    Not taking your medicines as prescribed  Home care  Here are some things you can do at home to treat a flare-up:    Try not to panic. This makes it harder to breathe, and keeps you from doing the right things.    Don t smoke or be around others who are smoking.    Try to drink more fluids than usual during a flare-up, unless your doctor has told you not to because of heart and kidney problems. More fluids can help loosen the mucus.    Use your inhalers and nebulizer, if you have one, as you have been told to.    If you were given antibiotics, take them until they are used up or your doctor tells you to stop. It s important to finish the antibiotics, even though you feel better. This will make sure the infection has cleared.    If you were given prednisone or another steroid, finish it even if you feel better.  Preventing a flare-up  Even though flare-ups happen, the best way to treat one is to prevent it before it starts. Here are some pointers:    Don t smoke or be around others who are smoking.    Take your medicines as you have been told.    Talk with your doctor about getting a flu shot every year. Also find out if you need a pneumonia shot.    If there is a weather advisory warning to stay indoors, try to stay inside when possible.    Try to eat healthy and get plenty of sleep.    Try to avoid things that usually set you off, like dust, chemical fumes, hairsprays, or strong perfumes.  Follow-up  care  Follow up with your healthcare provider, or as advised.  If a culture was done, you will be told if your treatment needs to be changed. You can call as directed for the results.  If X-rays were done, you will be notified of any new findings that may affect your care.  Call 911  Call 911 if any of these occur:    You have trouble breathing    You feel confused or it s difficult to wake you up    You faint or lose consciousness    You have a rapid heart rate    You have new pain in your chest, arm, shoulder, neck or upper back  When to seek medical advice  Call your healthcare provider right away if any of these occur:    Wheezing or shortness of breath gets worse    You need to use your inhalers more often than usual without relief    Fever of 100.4 F (38 C) or higher, or as directed by your healthcare provider    Coughing up lots of dark-colored or bloody mucus (sputum)    Chest pain with each breath    You do not start to get better within 24 hours    Swelling of your ankles gets worse    Dizziness or weakness  Date Last Reviewed: 9/1/2016 2000-2017 [employer]. [address] [place] [address], [place] [address]. All rights reserved. This information is not intended as a substitute for professional medical care. Always follow your healthcare professional's instructions.         rebecca

## 2017-08-14 NOTE — PATIENT INSTRUCTIONS
duoneb every 4 hours while awake  Prednisone 5 day burst  Consider starting daily inhaler.  Discuss with primary.    Recheck in 1-2 days with primary clinic.  ER if worse      COPD Flare    You have had a flare-up of your COPD.  COPD, or chronic obstructive pulmonary disease, is a common lung disease. It causes your airways to become irritated and narrower. This makes it harder for you to breathe. Emphysema and chronic bronchitis are both types of COPD. This is a chronic condition, which means you always have it. Sometimes it gets worse. When this happens, it is called a flare-up.  Symptoms of COPD  People with COPD may have symptoms most of the time. In a flare-up, your symptoms get worse. These symptoms may mean you are having a flare-up:    Shortness of breath, shallow or rapid breathing, or wheezing that gets worse    Lung infection    Cough that gets worse    More mucus, thicker mucus or mucus of a different color    Tiredness, decreased energy, or trouble doing your usual activities    Fever    Chest tightness    Your symptoms don t get better even when you use your usual medicines, inhalers, and nebulizer    Trouble talking    You feel confused  Causes of flare-ups  Unfortunately, a flare-up can happen even though you did everything right, and you followed your doctor s instructions. Some causes of flare-ups are:    Smoking or secondhand smoke    Colds, the flu, or respiratory infections    Air pollution    Sudden change in the weather    Dust, irritating chemicals, or strong fumes    Not taking your medicines as prescribed  Home care  Here are some things you can do at home to treat a flare-up:    Try not to panic. This makes it harder to breathe, and keeps you from doing the right things.    Don t smoke or be around others who are smoking.    Try to drink more fluids than usual during a flare-up, unless your doctor has told you not to because of heart and kidney problems. More fluids can help loosen the  mucus.    Use your inhalers and nebulizer, if you have one, as you have been told to.    If you were given antibiotics, take them until they are used up or your doctor tells you to stop. It s important to finish the antibiotics, even though you feel better. This will make sure the infection has cleared.    If you were given prednisone or another steroid, finish it even if you feel better.  Preventing a flare-up  Even though flare-ups happen, the best way to treat one is to prevent it before it starts. Here are some pointers:    Don t smoke or be around others who are smoking.    Take your medicines as you have been told.    Talk with your doctor about getting a flu shot every year. Also find out if you need a pneumonia shot.    If there is a weather advisory warning to stay indoors, try to stay inside when possible.    Try to eat healthy and get plenty of sleep.    Try to avoid things that usually set you off, like dust, chemical fumes, hairsprays, or strong perfumes.  Follow-up care  Follow up with your healthcare provider, or as advised.  If a culture was done, you will be told if your treatment needs to be changed. You can call as directed for the results.  If X-rays were done, you will be notified of any new findings that may affect your care.  Call 911  Call 911 if any of these occur:    You have trouble breathing    You feel confused or it s difficult to wake you up    You faint or lose consciousness    You have a rapid heart rate    You have new pain in your chest, arm, shoulder, neck or upper back  When to seek medical advice  Call your healthcare provider right away if any of these occur:    Wheezing or shortness of breath gets worse    You need to use your inhalers more often than usual without relief    Fever of 100.4 F (38 C) or higher, or as directed by your healthcare provider    Coughing up lots of dark-colored or bloody mucus (sputum)    Chest pain with each breath    You do not start to get better  within 24 hours    Swelling of your ankles gets worse    Dizziness or weakness  Date Last Reviewed: 9/1/2016 2000-2017 [employer]. [address] [place] [address], [place] [address]. All rights reserved. This information is not intended as a substitute for professional medical care. Always follow your healthcare professional's instructions.

## 2017-08-14 NOTE — TELEPHONE ENCOUNTER
Spoke with pt  He is wheezing  No appointment's  Offered uc at  or [facility]  He will come to [facility] uc  Kandi De Oliveira RN

## 2017-08-14 NOTE — TELEPHONE ENCOUNTER
Reason for call:  Patient reporting a symptom    Symptom or request: Cough, wheezing, headache    Duration (how long have symptoms been present): 3 days    Have you been treated for this before? No    Additional comments: N/A    Phone Number patient can be reached at:  Home number on file [phone] (home)    Best Time:  Anytime    Can we leave a detailed message on this number:  YES    Call taken on 8/14/2017 at 2:09 PM by Lawanda Allred

## 2017-08-14 NOTE — MR AVS SNAPSHOT
After Visit Summary   8/14/2017    Hayder Alves    MRN: [id number]           Patient Information     Date Of Birth          1955        Visit Information        Provider Department      8/14/2017 5:10 PM Afsaneh Coffman MD [facility]        Today's Diagnoses     COPD exacerbation (H)    -  1      Care Instructions    duoneb every 4 hours while awake  Prednisone 5 day burst  Consider starting daily inhaler.  Discuss with primary.    Recheck in 1-2 days with primary clinic.  ER if worse      COPD Flare    You have had a flare-up of your COPD.  COPD, or chronic obstructive pulmonary disease, is a common lung disease. It causes your airways to become irritated and narrower. This makes it harder for you to breathe. Emphysema and chronic bronchitis are both types of COPD. This is a chronic condition, which means you always have it. Sometimes it gets worse. When this happens, it is called a flare-up.  Symptoms of COPD  People with COPD may have symptoms most of the time. In a flare-up, your symptoms get worse. These symptoms may mean you are having a flare-up:    Shortness of breath, shallow or rapid breathing, or wheezing that gets worse    Lung infection    Cough that gets worse    More mucus, thicker mucus or mucus of a different color    Tiredness, decreased energy, or trouble doing your usual activities    Fever    Chest tightness    Your symptoms don t get better even when you use your usual medicines, inhalers, and nebulizer    Trouble talking    You feel confused  Causes of flare-ups  Unfortunately, a flare-up can happen even though you did everything right, and you followed your doctor s instructions. Some causes of flare-ups are:    Smoking or secondhand smoke    Colds, the flu, or respiratory infections    Air pollution    Sudden change in the weather    Dust, irritating chemicals, or strong fumes    Not taking your medicines as prescribed  Home  care  Here are some things you can do at home to treat a flare-up:    Try not to panic. This makes it harder to breathe, and keeps you from doing the right things.    Don t smoke or be around others who are smoking.    Try to drink more fluids than usual during a flare-up, unless your doctor has told you not to because of heart and kidney problems. More fluids can help loosen the mucus.    Use your inhalers and nebulizer, if you have one, as you have been told to.    If you were given antibiotics, take them until they are used up or your doctor tells you to stop. It s important to finish the antibiotics, even though you feel better. This will make sure the infection has cleared.    If you were given prednisone or another steroid, finish it even if you feel better.  Preventing a flare-up  Even though flare-ups happen, the best way to treat one is to prevent it before it starts. Here are some pointers:    Don t smoke or be around others who are smoking.    Take your medicines as you have been told.    Talk with your doctor about getting a flu shot every year. Also find out if you need a pneumonia shot.    If there is a weather advisory warning to stay indoors, try to stay inside when possible.    Try to eat healthy and get plenty of sleep.    Try to avoid things that usually set you off, like dust, chemical fumes, hairsprays, or strong perfumes.  Follow-up care  Follow up with your healthcare provider, or as advised.  If a culture was done, you will be told if your treatment needs to be changed. You can call as directed for the results.  If X-rays were done, you will be notified of any new findings that may affect your care.  Call 911  Call 911 if any of these occur:    You have trouble breathing    You feel confused or it s difficult to wake you up    You faint or lose consciousness    You have a rapid heart rate    You have new pain in your chest, arm, shoulder, neck or upper back  When to seek medical advice  Call  "your healthcare provider right away if any of these occur:    Wheezing or shortness of breath gets worse    You need to use your inhalers more often than usual without relief    Fever of 100.4 F (38 C) or higher, or as directed by your healthcare provider    Coughing up lots of dark-colored or bloody mucus (sputum)    Chest pain with each breath    You do not start to get better within 24 hours    Swelling of your ankles gets worse    Dizziness or weakness  Date Last Reviewed: 9/1/2016 2000-2017 [employer]. [address] [place] [address]. All rights reserved. This information is not intended as a substitute for professional medical care. Always follow your healthcare professional's instructions.                Follow-ups after your visit        Who to contact     If you have questions or need follow up information about today's clinic visit or your schedule please contact [facility] directly at [phone].  Normal or non-critical lab and imaging results will be communicated to you by Revstrhart, letter or phone within 4 business days after the clinic has received the results. If you do not hear from us within 7 days, please contact the clinic through Revstrhart or phone. If you have a critical or abnormal lab result, we will notify you by phone as soon as possible.  Submit refill requests through adflyer or call your pharmacy and they will forward the refill request to us. Please allow 3 business days for your refill to be completed.          Additional Information About Your Visit        adflyer Information     adflyer lets you send messages to your doctor, view your test results, renew your prescriptions, schedule appointments and more. To sign up, go to [email or web address] . Click on \"Log in\" on the left side of the screen, which will take you to the Welcome page. Then click on \"Sign up Now\" on the right side of the page.     You will be asked to enter the " "access code listed below, as well as some personal information. Please follow the directions to create your username and password.     Your access code is: [email or web address]  Expires: 2017  3:25 PM     Your access code will  in 90 days. If you need help or a new code, please call your [place] clinic or [phone].        Care EveryWhere ID     This is your Care EveryWhere ID. This could be used by other organizations to access your [place] medical records  [id number]        Your Vitals Were     Pulse Temperature Respirations Height Pulse Oximetry BMI (Body Mass Index)    66 98.6  F (37  C) (Oral) 30 5' 6\" (1.676 m) 99% 34.06 kg/m2       Blood Pressure from Last 3 Encounters:   17 116/80   08/10/17 138/84   17 139/80    Weight from Last 3 Encounters:   17 211 lb (95.7 kg)   08/10/17 215 lb (97.5 kg)   17 207 lb 2 oz (94 kg)              We Performed the Following     INHALATION/NEBULIZER TREATMENT, INITIAL          Today's Medication Changes          These changes are accurate as of: 17  6:11 PM.  If you have any questions, ask your nurse or doctor.               Start taking these medicines.        Dose/Directions    * ipratropium - albuterol 0.5 mg/2.5 mg/3 mL 0.5-2.5 (3) MG/3ML neb solution   Commonly known as:  DUONEB   Used for:  COPD exacerbation (H)   Started by:  Afsaneh Coffman MD        Dose:  1 vial   Take 1 vial (3 mLs) by nebulization once for 1 dose   Quantity:  1 vial   Refills:  1       * ipratropium - albuterol 0.5 mg/2.5 mg/3 mL 0.5-2.5 (3) MG/3ML neb solution   Commonly known as:  DUONEB   Used for:  COPD exacerbation (H)   Started by:  Afsaneh Coffman MD        Dose:  1 vial   Take 1 vial (3 mLs) by nebulization every 4 hours as needed for shortness of breath / dyspnea or wheezing   Quantity:  30 vial   Refills:  1       predniSONE 20 MG tablet   Commonly known as:  DELTASONE   Used for:  COPD exacerbation (H)   Started by:  Afsaneh Coffman " MD Olga        Dose:  40 mg   Take 2 tablets (40 mg) by mouth daily for 5 days   Quantity:  10 tablet   Refills:  0       * Notice:  This list has 2 medication(s) that are the same as other medications prescribed for you. Read the directions carefully, and ask your doctor or other care provider to review them with you.         Where to get your medicines      These medications were sent to [facility] [address] [place] [address] AT [address] [address]    Hours:  24-hours Phone:  [phone]     predniSONE 20 MG tablet         Call your pharmacy to confirm that your medication is ready for pickup. It may take up to 24 hours for them to receive the prescription. If the prescription is not ready within 3 business days, please contact your clinic or your provider.     We will let you know when these medications are ready. If you don't hear back within 3 business days, please contact us.     ipratropium - albuterol 0.5 mg/2.5 mg/3 mL 0.5-2.5 (3) MG/3ML neb solution    ipratropium - albuterol 0.5 mg/2.5 mg/3 mL 0.5-2.5 (3) MG/3ML neb solution                Primary Care Provider Office Phone # Fax #    Suma MATTHEW Jenkins [phone] [address] [facility] [address]        Equal Access to Services     [facility]: Hadii lili tarangoo Sojonathan, waaxda luqadaha, qaybta kaalmada jermanyageo, sheryl pearce. So [facility] [phone].    ATENCIÓN: Si habla español, tiene a ellis disposición servicios gratuitos de asistencia lingüística. Llame al [phone].    We comply with applicable federal civil rights laws and [place] laws. We do not discriminate on the basis of race, color, national origin, age, disability sex, sexual orientation or gender identity.            Thank you!     Thank you for choosing [facility]  for your care. Our goal is always to provide you with  excellent care. Hearing back from our patients is one way we can continue to improve our services. Please take a few minutes to complete the written survey that you may receive in the mail after your visit with us. Thank you!             Your Updated Medication List - Protect others around you: Learn how to safely use, store and throw away your medicines at www.disposemymeds.org.          This list is accurate as of: 8/14/17  6:11 PM.  Always use your most recent med list.                   Brand Name Dispense Instructions for use Diagnosis    ACE/ARB NOT PRESCRIBED (INTENTIONAL)      Please choose reason not prescribed, below    Type 2 diabetes mellitus with stage 3 chronic kidney disease, with long-term current use of insulin (H), Chronic systolic heart failure (H)       * albuterol (2.5 MG/3ML) 0.083% neb solution     25 vial    Take 1 vial (2.5 mg) by nebulization every 6 hours as needed for shortness of breath / dyspnea or wheezing    Chronic obstructive pulmonary disease, unspecified COPD type (H)       * albuterol 108 (90 BASE) MCG/ACT Inhaler    PROAIR HFA/PROVENTIL HFA/VENTOLIN HFA    1 Inhaler    Inhale 2 puffs into the lungs every 6 hours as needed for shortness of breath / dyspnea    Chronic obstructive pulmonary disease, unspecified COPD type (H)       ASPIRIN PO      Take 81 mg by mouth daily        blood glucose monitoring lancets     100 each    Test once daily    Type 2 diabetes, HbA1C goal < 8% (H)       blood glucose monitoring test strip    no brand specified    3 Box    Use to test blood sugars 2 times daily or as directed - use strips compatible with pt's meter and insurance    Type 2 diabetes mellitus with stage 3 chronic kidney disease, unspecified long term insulin use status (H)       CENTRUM SILVER per tablet      Take 1 tablet by mouth daily        citalopram 20 MG tablet    celeXA    30 tablet    Take 1 tablet (20 mg) by mouth daily    Anxiety       dulaglutide 0.75 MG/0.5ML pen     TRULICITY    0.5 mL    Inject 0.75 mg Subcutaneous every 7 days    Type 2 diabetes mellitus with stage 3 chronic kidney disease, with long-term current use of insulin (H)       gabapentin 300 MG capsule    NEURONTIN    30 capsule    Take 2 capsules (600 mg) by mouth At Bedtime    Chronic pain syndrome, Herniation of cervical intervertebral disc, Type 2 diabetes mellitus with stage 3 chronic kidney disease, with long-term current use of insulin (H)       * ipratropium - albuterol 0.5 mg/2.5 mg/3 mL 0.5-2.5 (3) MG/3ML neb solution    DUONEB    1 vial    Take 1 vial (3 mLs) by nebulization once for 1 dose    COPD exacerbation (H)       * ipratropium - albuterol 0.5 mg/2.5 mg/3 mL 0.5-2.5 (3) MG/3ML neb solution    DUONEB    30 vial    Take 1 vial (3 mLs) by nebulization every 4 hours as needed for shortness of breath / dyspnea or wheezing    COPD exacerbation (H)       metFORMIN 500 MG tablet    GLUCOPHAGE    180 tablet    Take 1 tablet (500 mg) by mouth 2 times daily (with meals)    Type 2 diabetes mellitus with stage 3 chronic kidney disease, with long-term current use of insulin (H)       metoprolol 50 MG tablet    LOPRESSOR    60 tablet    Take 1 tablet (50 mg) by mouth 2 times daily    Hypertension goal BP (blood pressure) < 130/80       nitroGLYcerin 0.4 MG sublingual tablet    NITROSTAT    25 tablet    Place 1 tablet (0.4 mg) under the tongue every 5 minutes as needed    Intermediate coronary syndrome (H)       order for DME     1 Device    Equipment being ordered: Nebulizer with tubing    Chronic obstructive pulmonary disease, unspecified COPD type (H)       order for DME     1 Device    Equipment being ordered: Other: Nebulizer machine x 1 Treatment Diagnosis: COPD.    Chronic obstructive pulmonary disease, unspecified COPD type (H)       oxyCODONE-acetaminophen 5-325 MG per tablet   Start taking on:  10/9/2017    PERCOCET    30 tablet    Take 1 tablet by mouth At Bedtime Must last 30 days    Herniation of  cervical intervertebral disc, Chronic pain syndrome       predniSONE 20 MG tablet    DELTASONE    10 tablet    Take 2 tablets (40 mg) by mouth daily for 5 days    COPD exacerbation (H)       rosuvastatin 20 MG tablet    CRESTOR    90 tablet    Take 1 tablet (20 mg) by mouth daily    Hypercholesterolemia       traZODone 50 MG tablet    DESYREL    60 tablet    Take 1-2 tablets ( mg) by mouth At Bedtime    Insomnia, unspecified type       * Notice:  This list has 4 medication(s) that are the same as other medications prescribed for you. Read the directions carefully, and ask your doctor or other care provider to review them with you.

## 2017-08-16 DIAGNOSIS — J44.1 COPD EXACERBATION (H): ICD-10-CM

## 2017-08-16 NOTE — TELEPHONE ENCOUNTER
Rx was denied for below medication/s:    Med Prescribed:  IPRATROPI/ALB  Reason: PLAN DOES NOT COVER THIS MED  Recommendations from Insurance:  PA  Insurance Plan:  NOT GIVEN   Patient ID:  [id number]  BIN/RX#:  [id number]  Phone:  [phone]  Fax:      On current med list:  YES    Would you like to change Rx or start PA. If you would like to start PA please include any pertinent information as to why it is needed, other medications taken and reasons why other medication were discontinued.      Please forward to your MA pool.      Thank you,  Wendy ARCOS (R)(M)

## 2017-08-17 RX ORDER — IPRATROPIUM BROMIDE AND ALBUTEROL SULFATE 2.5; .5 MG/3ML; MG/3ML
1 SOLUTION RESPIRATORY (INHALATION) EVERY 4 HOURS PRN
Qty: 30 VIAL | Refills: 1 | Status: SHIPPED | OUTPATIENT
Start: 2017-08-17 | End: 2022-01-01

## 2017-08-17 RX ORDER — ALBUTEROL SULFATE 0.83 MG/ML
1 SOLUTION RESPIRATORY (INHALATION) EVERY 6 HOURS PRN
Qty: 25 VIAL | Refills: 1 | Status: SHIPPED | OUTPATIENT
Start: 2017-08-17 | End: 2022-01-01

## 2017-08-17 NOTE — TELEPHONE ENCOUNTER
Contacted patient's pharmacy, [facility] on  [address] to confirm the albuterol neb does not need a PA.  Informed patient his neb was ready at the pharmacy.  Scheduled appointment with Dr. Lockwood Friday at 2 pm.  Maribel Marques RN

## 2017-08-18 ENCOUNTER — OFFICE VISIT (OUTPATIENT)
Dept: FAMILY MEDICINE | Facility: CLINIC | Age: 62
End: 2017-08-18
Payer: MEDICARE

## 2017-08-18 ENCOUNTER — RADIANT APPOINTMENT (OUTPATIENT)
Dept: GENERAL RADIOLOGY | Facility: CLINIC | Age: 62
End: 2017-08-18
Attending: FAMILY MEDICINE
Payer: MEDICARE

## 2017-08-18 VITALS
DIASTOLIC BLOOD PRESSURE: 82 MMHG | OXYGEN SATURATION: 97 % | WEIGHT: 215.8 LBS | RESPIRATION RATE: 12 BRPM | HEART RATE: 82 BPM | SYSTOLIC BLOOD PRESSURE: 136 MMHG | TEMPERATURE: 98.4 F | BODY MASS INDEX: 34.83 KG/M2

## 2017-08-18 DIAGNOSIS — R05.9 COUGH: ICD-10-CM

## 2017-08-18 DIAGNOSIS — R06.09 DOE (DYSPNEA ON EXERTION): ICD-10-CM

## 2017-08-18 DIAGNOSIS — I50.22 CHRONIC SYSTOLIC HEART FAILURE (H): ICD-10-CM

## 2017-08-18 DIAGNOSIS — J44.1 OBSTRUCTIVE CHRONIC BRONCHITIS WITH EXACERBATION (H): Primary | ICD-10-CM

## 2017-08-18 LAB
ERYTHROCYTE [DISTWIDTH] IN BLOOD BY AUTOMATED COUNT: 11.9 % (ref 10–15)
HCT VFR BLD AUTO: 43 % (ref 40–53)
HGB BLD-MCNC: 14.6 G/DL (ref 13.3–17.7)
MCH RBC QN AUTO: 31.3 PG (ref 26.5–33)
MCHC RBC AUTO-ENTMCNC: 34 G/DL (ref 31.5–36.5)
MCV RBC AUTO: 92 FL (ref 78–100)
NT-PROBNP SERPL-MCNC: 2728 PG/ML (ref 0–125)
PLATELET # BLD AUTO: 132 10E9/L (ref 150–450)
RBC # BLD AUTO: 4.66 10E12/L (ref 4.4–5.9)
WBC # BLD AUTO: 8.4 10E9/L (ref 4–11)

## 2017-08-18 PROCEDURE — 85027 COMPLETE CBC AUTOMATED: CPT | Performed by: FAMILY MEDICINE

## 2017-08-18 PROCEDURE — 80048 BASIC METABOLIC PNL TOTAL CA: CPT | Performed by: FAMILY MEDICINE

## 2017-08-18 PROCEDURE — 99214 OFFICE O/P EST MOD 30 MIN: CPT | Performed by: FAMILY MEDICINE

## 2017-08-18 PROCEDURE — 83880 ASSAY OF NATRIURETIC PEPTIDE: CPT | Performed by: FAMILY MEDICINE

## 2017-08-18 PROCEDURE — 36415 COLL VENOUS BLD VENIPUNCTURE: CPT | Performed by: FAMILY MEDICINE

## 2017-08-18 PROCEDURE — 71020 XR CHEST 2 VW: CPT

## 2017-08-18 RX ORDER — AZITHROMYCIN 250 MG/1
TABLET, FILM COATED ORAL
Qty: 0.1 TABLET | Refills: 0 | Status: SHIPPED | OUTPATIENT
Start: 2017-08-18 | End: 2017-08-18

## 2017-08-18 RX ORDER — DOXYCYCLINE 100 MG/1
100 CAPSULE ORAL 2 TIMES DAILY
Qty: 20 CAPSULE | Refills: 0 | Status: SHIPPED | OUTPATIENT
Start: 2017-08-18 | End: 2017-10-20

## 2017-08-18 RX ORDER — CODEINE PHOSPHATE AND GUAIFENESIN 10; 100 MG/5ML; MG/5ML
1 SOLUTION ORAL EVERY 4 HOURS PRN
Qty: 120 ML | Refills: 0 | Status: SHIPPED | OUTPATIENT
Start: 2017-08-18 | End: 2017-10-20

## 2017-08-18 RX ORDER — AZITHROMYCIN 250 MG/1
TABLET, FILM COATED ORAL
Qty: 6 TABLET | Refills: 0 | Status: SHIPPED | OUTPATIENT
Start: 2017-08-18 | End: 2017-08-18

## 2017-08-18 NOTE — PROGRESS NOTES
SUBJECTIVE:   Hayder Alves is a 62 year old male who presents to clinic today for the following health issues:    63 yo with a history of COPD with one week of respiratory symptoms that are worsening including cough, shortness of breath and wheezing. Seen about 4 days ago and placed on prednisone without much difference in symptoms. symptoms worse at night.     No fever. occ sputum.     Exposed to wife with similar symptoms. she is improving.     Reports no const, cv, other resp symptoms     med hx, family hx, and soc hx reviewed and updated     Includes diabetes and cad.     OBJECTIVE: /82 (BP Location: Left arm, Patient Position: Chair, Cuff Size: Adult Regular)  Pulse 82  Temp 98.4  F (36.9  C) (Oral)  Resp 12  Wt 215 lb 12.8 oz (97.9 kg)  SpO2 97%  BMI 34.83 kg/m2 no apparent distress   Exam:  GENERAL APPEARANCE: healthy, alert and no distress  EYES: Eyes grossly normal to inspection  HENT: ear canals and TM's normal and nose and mouth without ulcers or lesions  NECK: no adenopathy, no asymmetry, masses, or scars and thyroid normal to palpation  RESP: rales L lower posterior and expiratory wheezes throughout  CV: regular rates and rhythm, normal S1 S2, no S3 or S4 and no murmur, click or rub -  ABDOMEN:  soft, nontender, no HSM or masses and bowel sounds normal  SKIN: no suspicious lesions or rashes     cxr no discrete infiltrate noted.      ICD-10-CM    1. Obstructive chronic bronchitis with exacerbation (H) J44.1    2. Cough R05 GASTROENTEROLOGY ADULT REF PROCEDURE ONLY     XR Chest 2 Views     doxycycline (VIBRAMYCIN) 100 MG capsule     CBC with platelets     BNP-N terminal pro     guaiFENesin-codeine (ROBITUSSIN AC) 100-10 MG/5ML SOLN solution     DISCONTINUED: azithromycin (ZITHROMAX) 250 MG tablet     DISCONTINUED: azithromycin (ZITHROMAX) 250 MG tablet   3. CORNELIUS (dyspnea on exertion) R06.09 BNP-N terminal pro    given not imporving yet with prednisone, added in antibiotics. follow up  discussed. Will also check bmp given hear history. follow up discussed. Risk of gerardo ac discussed.

## 2017-08-18 NOTE — NURSING NOTE
"Chief Complaint   Patient presents with     URI     cough and SOB       Initial /82 (BP Location: Left arm, Patient Position: Chair, Cuff Size: Adult Regular)  Pulse 82  Temp 98.4  F (36.9  C) (Oral)  Resp 12  Wt 215 lb 12.8 oz (97.9 kg)  SpO2 97%  BMI 34.83 kg/m2 Estimated body mass index is 34.83 kg/(m^2) as calculated from the following:    Height as of 8/14/17: 5' 6\" (1.676 m).    Weight as of this encounter: 215 lb 12.8 oz (97.9 kg).  Medication Reconciliation: complete     Saima Henning MA      "

## 2017-08-18 NOTE — MR AVS SNAPSHOT
After Visit Summary   8/18/2017    Hayder Alves    MRN: [id number]           Patient Information     Date Of Birth          1955        Visit Information        Provider Department      8/18/2017 2:00 PM Abram Lockwood MD [facility]        Today's Diagnoses     Cough    -  1    CORNELIUS (dyspnea on exertion)           Follow-ups after your visit        Additional Services     GASTROENTEROLOGY ADULT REF PROCEDURE ONLY       Last Lab Result: Creatinine (mg/dL)       Date                     Value                 05/08/2017               1.10             ----------  Body mass index is 34.83 kg/(m^2).     Needed:  No  Language:  English    Patient will be contacted to schedule procedure.     Please be aware that coverage of these services is subject to the terms and limitations of your health insurance plan.  Call member services at your health plan with any benefit or coverage questions.  Any procedures must be performed at a [facility] facility OR coordinated by your clinic's referral office.    Please bring the following with you to your appointment:    (1) Any X-Rays, CTs or MRIs which have been performed.  Contact the facility where they were done to arrange for  prior to your scheduled appointment.    (2) List of current medications   (3) This referral request   (4) Any documents/labs given to you for this referral                  Who to contact     If you have questions or need follow up information about today's clinic visit or your schedule please contact [facility] directly at [phone].  Normal or non-critical lab and imaging results will be communicated to you by MyChart, letter or phone within 4 business days after the clinic has received the results. If you do not hear from us within 7 days, please contact the clinic through MyChart or phone. If you have a critical or abnormal lab result, we will notify you by phone as soon  "as possible.  Submit refill requests through [employer] or call your pharmacy and they will forward the refill request to us. Please allow 3 business days for your refill to be completed.          Additional Information About Your Visit        [employer] Information     [employer] lets you send messages to your doctor, view your test results, renew your prescriptions, schedule appointments and more. To sign up, go to [email or web address] . Click on \"Log in\" on the left side of the screen, which will take you to the Welcome page. Then click on \"Sign up Now\" on the right side of the page.     You will be asked to enter the access code listed below, as well as some personal information. Please follow the directions to create your username and password.     Your access code is: [email or web address]  Expires: 2017  3:25 PM     Your access code will  in 90 days. If you need help or a new code, please call your [place] clinic or [phone].        Care EveryWhere ID     This is your Care EveryWhere ID. This could be used by other organizations to access your [place] medical records  [id number]        Your Vitals Were     Pulse Temperature Respirations Pulse Oximetry BMI (Body Mass Index)       82 98.4  F (36.9  C) (Oral) 12 97% 34.83 kg/m2        Blood Pressure from Last 3 Encounters:   17 136/82   17 116/80   08/10/17 138/84    Weight from Last 3 Encounters:   17 215 lb 12.8 oz (97.9 kg)   17 211 lb (95.7 kg)   08/10/17 215 lb (97.5 kg)              We Performed the Following     BNP-N terminal pro     CBC with platelets     GASTROENTEROLOGY ADULT REF PROCEDURE ONLY          Today's Medication Changes          These changes are accurate as of: 17  2:40 PM.  If you have any questions, ask your nurse or doctor.               Start taking these medicines.        Dose/Directions    doxycycline 100 MG capsule   Commonly known as:  VIBRAMYCIN   Used for:  Cough   Started by:  Abram Lockwood MD "        Dose:  100 mg   Take 1 capsule (100 mg) by mouth 2 times daily   Quantity:  20 capsule   Refills:  0       guaiFENesin-codeine 100-10 MG/5ML Soln solution   Commonly known as:  ROBITUSSIN AC   Used for:  Cough   Started by:  Abram Lockwood MD        Dose:  1 tsp.   Take 5 mLs by mouth every 4 hours as needed for cough   Quantity:  120 mL   Refills:  0            Where to get your medicines      These medications were sent to [facility] [address] [place] [address] AT [address] [address]    Hours:  24-hours Phone:  [phone]     doxycycline 100 MG capsule         Some of these will need a paper prescription and others can be bought over the counter.  Ask your nurse if you have questions.     Bring a paper prescription for each of these medications     guaiFENesin-codeine 100-10 MG/5ML Soln solution                Primary Care Provider Office Phone # Fax #    Suma Jenkins [phone] [address] [facility] [address]        Equal Access to Services     [facility] AH: Hadii aad ku hadasho Soomaali, waaxda luqadaha, qaybta kaalmada adeegyada, waxay kristanin hayaan adeisma cheung . So [facility] [phone].    ATENCIÓN: Si habla español, tiene a ellis disposición servicios gratGallup Indian Medical Centeros de asistencia lingüística. [facility] [phone].    We comply with applicable federal civil rights laws and [place] laws. We do not discriminate on the basis of race, color, national origin, age, disability sex, sexual orientation or gender identity.            Thank you!     Thank you for choosing [facility]  for your care. Our goal is always to provide you with excellent care. Hearing back from our patients is one way we can continue to improve our services. Please take a few minutes to complete the written survey that you may receive in the mail after your visit with us. Thank you!              Your Updated Medication List - Protect others around you: Learn how to safely use, store and throw away your medicines at www.disposemymeds.org.          This list is accurate as of: 8/18/17  2:40 PM.  Always use your most recent med list.                   Brand Name Dispense Instructions for use Diagnosis    ACE/ARB NOT PRESCRIBED (INTENTIONAL)      Please choose reason not prescribed, below    Type 2 diabetes mellitus with stage 3 chronic kidney disease, with long-term current use of insulin (H), Chronic systolic heart failure (H)       * albuterol (2.5 MG/3ML) 0.083% neb solution     25 vial    Take 1 vial (2.5 mg) by nebulization every 6 hours as needed for shortness of breath / dyspnea or wheezing    Chronic obstructive pulmonary disease, unspecified COPD type (H)       * albuterol 108 (90 BASE) MCG/ACT Inhaler    PROAIR HFA/PROVENTIL HFA/VENTOLIN HFA    1 Inhaler    Inhale 2 puffs into the lungs every 6 hours as needed for shortness of breath / dyspnea    Chronic obstructive pulmonary disease, unspecified COPD type (H)       * albuterol (2.5 MG/3ML) 0.083% neb solution     25 vial    Take 1 vial (2.5 mg) by nebulization every 6 hours as needed for shortness of breath / dyspnea or wheezing    COPD exacerbation (H)       ASPIRIN PO      Take 81 mg by mouth daily        blood glucose monitoring lancets     100 each    Test once daily    Type 2 diabetes, HbA1C goal < 8% (H)       blood glucose monitoring test strip    no brand specified    3 Box    Use to test blood sugars 2 times daily or as directed - use strips compatible with pt's meter and insurance    Type 2 diabetes mellitus with stage 3 chronic kidney disease, unspecified long term insulin use status (H)       CENTRUM SILVER per tablet      Take 1 tablet by mouth daily        citalopram 20 MG tablet    celeXA    30 tablet    Take 1 tablet (20 mg) by mouth daily    Anxiety       doxycycline 100 MG capsule    VIBRAMYCIN    20 capsule    Take 1 capsule  (100 mg) by mouth 2 times daily    Cough       dulaglutide 0.75 MG/0.5ML pen    TRULICITY    0.5 mL    Inject 0.75 mg Subcutaneous every 7 days    Type 2 diabetes mellitus with stage 3 chronic kidney disease, with long-term current use of insulin (H)       gabapentin 300 MG capsule    NEURONTIN    30 capsule    Take 2 capsules (600 mg) by mouth At Bedtime    Chronic pain syndrome, Herniation of cervical intervertebral disc, Type 2 diabetes mellitus with stage 3 chronic kidney disease, with long-term current use of insulin (H)       guaiFENesin-codeine 100-10 MG/5ML Soln solution    ROBITUSSIN AC    120 mL    Take 5 mLs by mouth every 4 hours as needed for cough    Cough       * ipratropium - albuterol 0.5 mg/2.5 mg/3 mL 0.5-2.5 (3) MG/3ML neb solution    DUONEB    1 vial    Take 1 vial (3 mLs) by nebulization once for 1 dose    COPD exacerbation (H)       * ipratropium - albuterol 0.5 mg/2.5 mg/3 mL 0.5-2.5 (3) MG/3ML neb solution    DUONEB    30 vial    Take 1 vial (3 mLs) by nebulization every 4 hours as needed for shortness of breath / dyspnea or wheezing    COPD exacerbation (H)       metFORMIN 500 MG tablet    GLUCOPHAGE    180 tablet    Take 1 tablet (500 mg) by mouth 2 times daily (with meals)    Type 2 diabetes mellitus with stage 3 chronic kidney disease, with long-term current use of insulin (H)       metoprolol 50 MG tablet    LOPRESSOR    60 tablet    Take 1 tablet (50 mg) by mouth 2 times daily    Hypertension goal BP (blood pressure) < 130/80       nitroGLYcerin 0.4 MG sublingual tablet    NITROSTAT    25 tablet    Place 1 tablet (0.4 mg) under the tongue every 5 minutes as needed    Intermediate coronary syndrome (H)       order for DME     1 Device    Equipment being ordered: Nebulizer with tubing    Chronic obstructive pulmonary disease, unspecified COPD type (H)       order for DME     1 Device    Equipment being ordered: Other: Nebulizer machine x 1 Treatment Diagnosis: COPD.    Chronic  obstructive pulmonary disease, unspecified COPD type (H)       oxyCODONE-acetaminophen 5-325 MG per tablet   Start taking on:  10/9/2017    PERCOCET    30 tablet    Take 1 tablet by mouth At Bedtime Must last 30 days    Herniation of cervical intervertebral disc, Chronic pain syndrome       predniSONE 20 MG tablet    DELTASONE    10 tablet    Take 2 tablets (40 mg) by mouth daily for 5 days    COPD exacerbation (H)       rosuvastatin 20 MG tablet    CRESTOR    90 tablet    Take 1 tablet (20 mg) by mouth daily    Hypercholesterolemia       traZODone 50 MG tablet    DESYREL    60 tablet    Take 1-2 tablets ( mg) by mouth At Bedtime    Insomnia, unspecified type       * Notice:  This list has 5 medication(s) that are the same as other medications prescribed for you. Read the directions carefully, and ask your doctor or other care provider to review them with you.

## 2017-08-19 ENCOUNTER — TELEPHONE (OUTPATIENT)
Dept: FAMILY MEDICINE | Facility: CLINIC | Age: 62
End: 2017-08-19

## 2017-08-19 DIAGNOSIS — I50.9 CONGESTIVE HEART FAILURE (H): Primary | ICD-10-CM

## 2017-08-19 LAB
ANION GAP SERPL CALCULATED.3IONS-SCNC: 13 MMOL/L (ref 3–14)
BUN SERPL-MCNC: 20 MG/DL (ref 7–30)
CALCIUM SERPL-MCNC: 8.8 MG/DL (ref 8.5–10.1)
CHLORIDE SERPL-SCNC: 106 MMOL/L (ref 94–109)
CO2 SERPL-SCNC: 19 MMOL/L (ref 20–32)
CREAT SERPL-MCNC: 1.22 MG/DL (ref 0.66–1.25)
GFR SERPL CREATININE-BSD FRML MDRD: 60 ML/MIN/1.7M2
GLUCOSE SERPL-MCNC: 273 MG/DL (ref 70–99)
POTASSIUM SERPL-SCNC: 4.7 MMOL/L (ref 3.4–5.3)
SODIUM SERPL-SCNC: 138 MMOL/L (ref 133–144)

## 2017-08-19 RX ORDER — FUROSEMIDE 20 MG
20 TABLET ORAL 2 TIMES DAILY
Qty: 30 TABLET | Refills: 0 | Status: SHIPPED | OUTPATIENT
Start: 2017-08-19 | End: 2017-10-06

## 2017-08-19 NOTE — TELEPHONE ENCOUNTER
I tired calling him a number of times today to go over the lab results.     His BNP was elevated and I am concerned his shortness of breath is related to CHF in part.     In case he call back, I sent in a prescription for lasix to take 2 times daily.     If his sx are worsening going to the ER also is reasonable.     Abram Lockwood

## 2017-08-21 NOTE — TELEPHONE ENCOUNTER
Left voice mail asking patient to call back and ask to speak with [place] triage nurse for a message from .    Ann Pickard RN

## 2017-08-22 ENCOUNTER — TELEPHONE (OUTPATIENT)
Dept: CARDIOLOGY | Facility: CLINIC | Age: 62
End: 2017-08-22

## 2017-08-22 NOTE — TELEPHONE ENCOUNTER
Overdue for cardiology f/u with labs. Noted has been recently seen by primary MD clinic, Dr. Lockwood. Pro BNP elevated.   Called and left voice message for patient to call for f/u OV with Dr. Arthur. Await call back.

## 2017-08-23 NOTE — TELEPHONE ENCOUNTER
spoke with pt  [facility] clinic number given  Advised to take lasix bid  Eat a banana  And come back to the clinic in 2 weeks for a lab only K check if the [facility] clinic cant get him in within 2 weeks.  Pt in agreement with plan and verbalized understanding.  Thanks!  Kandi, RN  Triage Nurse

## 2017-08-23 NOTE — TELEPHONE ENCOUNTER
"Pt called back, he is \"feeling fine\" now but when asked about shortness of breath, he said he only has it \"with exertion\"  Abram Lockwood MD had ordered Lasix for twice   Pt didn't know what that was  Explained it to him  Do you think that we should start him out on potassium too?  Or check his bmp after starting the lasix  Please see BNP  Thanks!     Kandi De Oliveira RN      "

## 2017-08-23 NOTE — TELEPHONE ENCOUNTER
He should make a follow up appointment with the [facility] clinic.  They could recheck potassium at that time.

## 2017-09-07 ENCOUNTER — TELEPHONE (OUTPATIENT)
Dept: CARDIOLOGY | Facility: CLINIC | Age: 62
End: 2017-09-07

## 2017-09-07 NOTE — TELEPHONE ENCOUNTER
Again left a voice message for patient to call back for f/u OV. Was originally due to f/u in May 2017. This is the second call in attempt to connect with patient for office visit.  Noted patient had seen PMD who was also recommending f/u with cardiology.   Await call back.

## 2017-09-22 DIAGNOSIS — E11.22 TYPE 2 DIABETES MELLITUS WITH STAGE 3 CHRONIC KIDNEY DISEASE, WITH LONG-TERM CURRENT USE OF INSULIN (H): ICD-10-CM

## 2017-09-22 DIAGNOSIS — N18.30 TYPE 2 DIABETES MELLITUS WITH STAGE 3 CHRONIC KIDNEY DISEASE, WITH LONG-TERM CURRENT USE OF INSULIN (H): ICD-10-CM

## 2017-09-22 DIAGNOSIS — G89.4 CHRONIC PAIN SYNDROME: ICD-10-CM

## 2017-09-22 DIAGNOSIS — Z79.4 TYPE 2 DIABETES MELLITUS WITH STAGE 3 CHRONIC KIDNEY DISEASE, WITH LONG-TERM CURRENT USE OF INSULIN (H): ICD-10-CM

## 2017-09-22 DIAGNOSIS — M50.20 HERNIATION OF CERVICAL INTERVERTEBRAL DISC: ICD-10-CM

## 2017-09-22 NOTE — TELEPHONE ENCOUNTER
Medication Detail      Disp Refills Start End KELLY   dulaglutide (TRULICITY) 0.75 MG/0.5ML pen 0.5 mL 2 8/10/2017  --   Sig: Inject 0.75 mg Subcutaneous every 7 days       Last Office Visit with [facility], [facility] or [facility] prescribing provider:  8-18-17   Next 5 appointments (look out 90 days)     Sep 29, 2017  1:15 PM CDT   Return Visit with Alexandre Arthur MD   [facility] [place] ([facility])    [address] [place] [address] [phone]                   BP Readings from Last 3 Encounters:   08/18/17 136/82   08/14/17 116/80   08/10/17 138/84     Lab Results   Component Value Date    MICROL 241 01/03/2017     Lab Results   Component Value Date    UMALCR 56.97 01/03/2017     Creatinine   Date Value Ref Range Status   08/18/2017 1.22 0.66 - 1.25 mg/dL Final   ]  GFR Estimate   Date Value Ref Range Status   08/18/2017 60 (L) >60 mL/min/1.7m2 Final     Comment:     Non  GFR Calc   05/08/2017 68 >60 mL/min/1.7m2 Final     Comment:     Non  GFR Calc   12/09/2016 52 (L) >60 mL/min/1.7m2 Final     GFR Estimate If Black   Date Value Ref Range Status   08/18/2017 72 >60 mL/min/1.7m2 Final     Comment:      GFR Calc   05/08/2017 82 >60 mL/min/1.7m2 Final     Comment:      GFR Calc   12/09/2016 63 >60 mL/min/1.7m2 Final     Lab Results   Component Value Date    CHOL 160 01/03/2017     Lab Results   Component Value Date    HDL 35 01/03/2017     Lab Results   Component Value Date    LDL 80 01/03/2017     Lab Results   Component Value Date    TRIG 224 01/03/2017     Lab Results   Component Value Date    CHOLHDLRATIO 7.9 10/05/2015     Lab Results   Component Value Date    AST 22 10/12/2016     Lab Results   Component Value Date    ALT 28 10/12/2016     Lab Results   Component Value Date    A1C 7.0 05/08/2017    A1C 6.2 10/12/2016    A1C 6.3 01/19/2016    A1C 9.7 10/05/2015    A1C 7.0 04/29/2015      Potassium   Date Value Ref Range Status   08/18/2017 4.7 3.4 - 5.3 mmol/L Final         Medication Detail      Disp Refills Start End KELLY   gabapentin (NEURONTIN) 300 MG capsule 30 capsule 3 6/29/2017  No   Sig: Take 2 capsules (600 mg) by mouth At Bedtime       Last Office Visit with [facility] or  [employer] [employer] [facility] prescribing provider: 8-18-17  Future Office visit:    Next 5 appointments (look out 90 days)     Sep 29, 2017  1:15 PM CDT   Return Visit with Alexandre Arthur MD   [facility] [place] ([facility])    [address] [place] [id number] [phone]                   Routing refill request to provider for review/approval because:  Drug not on the [facility], [facility] or  [employer] [facility] refill protocol or controlled substance

## 2017-09-26 RX ORDER — GABAPENTIN 300 MG/1
600 CAPSULE ORAL AT BEDTIME
Qty: 60 CAPSULE | Refills: 3 | Status: SHIPPED | OUTPATIENT
Start: 2017-09-26 | End: 2018-03-05

## 2017-09-29 ENCOUNTER — TELEPHONE (OUTPATIENT)
Dept: GENERAL RADIOLOGY | Facility: CLINIC | Age: 62
End: 2017-09-29

## 2017-09-29 DIAGNOSIS — N18.30 TYPE 2 DIABETES MELLITUS WITH STAGE 3 CHRONIC KIDNEY DISEASE, WITH LONG-TERM CURRENT USE OF INSULIN (H): ICD-10-CM

## 2017-09-29 DIAGNOSIS — E11.22 TYPE 2 DIABETES MELLITUS WITH STAGE 3 CHRONIC KIDNEY DISEASE, WITH LONG-TERM CURRENT USE OF INSULIN (H): ICD-10-CM

## 2017-09-29 DIAGNOSIS — Z79.4 TYPE 2 DIABETES MELLITUS WITH STAGE 3 CHRONIC KIDNEY DISEASE, WITH LONG-TERM CURRENT USE OF INSULIN (H): ICD-10-CM

## 2017-09-29 NOTE — TELEPHONE ENCOUNTER
PLEASE CLARIFY THE QUANTITY FOR TRULICITY PEN INJ 0.75/0.5* (1 BOX = 2 ML). IF APPROPRIATE, A 84 DAY SUPPLY IS PREFERRED (3 BOXES (6MLS)). THANK YOU.

## 2017-10-06 DIAGNOSIS — R06.09 DOE (DYSPNEA ON EXERTION): ICD-10-CM

## 2017-10-06 NOTE — TELEPHONE ENCOUNTER
Medication Detail      Disp Refills Start End KELLY   furosemide (LASIX) 20 MG tablet 30 tablet 0 8/19/2017  --   Sig: Take 1 tablet (20 mg) by mouth 2 times daily       Last Office Visit with [facility], P or [facility] prescribing provider: 10-4-17       Potassium   Date Value Ref Range Status   08/18/2017 4.7 3.4 - 5.3 mmol/L Final     Creatinine   Date Value Ref Range Status   08/18/2017 1.22 0.66 - 1.25 mg/dL Final     BP Readings from Last 3 Encounters:   08/18/17 136/82   08/14/17 116/80   08/10/17 138/84

## 2017-10-09 RX ORDER — FUROSEMIDE 20 MG
20 TABLET ORAL 2 TIMES DAILY
Qty: 60 TABLET | Refills: 0 | Status: SHIPPED | OUTPATIENT
Start: 2017-10-09 | End: 2017-11-06

## 2017-10-09 NOTE — TELEPHONE ENCOUNTER
See 8/19 message.  He is overdue for [facility] clinic follow up - he needs to see them for any further refills of Lasix.

## 2017-10-10 NOTE — TELEPHONE ENCOUNTER
LMOM again as reminder to get in touch with the [facility] clinic cardiology about further lasix refills.  Wendy Lockwood RN

## 2017-10-10 NOTE — TELEPHONE ENCOUNTER
MADDY and informed patient to call the [facility] clinic. Told him if he does not have the number to call us back and we will find it for him.  Wendy Lockwood RN

## 2017-10-19 ENCOUNTER — TELEPHONE (OUTPATIENT)
Dept: FAMILY MEDICINE | Facility: CLINIC | Age: 62
End: 2017-10-19

## 2017-10-19 NOTE — TELEPHONE ENCOUNTER
Patient called back and was informed of message per   Dr. Jaimes.    Patient verbalized understanding and stated he believes he has been experiencing diabetic nerve pain in hands and feet, so would like to keep appt tomorrow.    Dr. Jaimes-For your review and may close encounter.    Thank you!  DAVID Sanabria, LAZARON, RN

## 2017-10-19 NOTE — TELEPHONE ENCOUNTER
Dr. Jaimes asked writer to contact patient regarding tomorrow's appointment.    Per Dr. Jaimes:  1. Patient has controlled substance agreement with DALE Jenkins NP.  2. Dr. Jaimes will not refill any controlled substance medications.    Left message with person who answered phone to please have patient call back and ask to speak with triage.    LAZARO RuckerN, RN

## 2017-11-06 DIAGNOSIS — R06.09 DOE (DYSPNEA ON EXERTION): ICD-10-CM

## 2017-11-06 NOTE — LETTER
November 10, 2017      Hayder Alves  [address] [facility] [address]        Dear Hayder,       We went ahead and refilled your Furosemide medication.  For further refills you will need to set up an appointment with your Cardiologist at the .    The Cardiology appointment number is [phone].    Let us know if you have any questions by calling our clinic number.  Ask for the triage nurse.      Sincerely,        Suma Jenkins NP/Wendy Lockwood RN

## 2017-11-08 RX ORDER — FUROSEMIDE 20 MG
TABLET ORAL
Qty: 60 TABLET | Refills: 0 | Status: SHIPPED | OUTPATIENT
Start: 2017-11-08 | End: 2018-01-30

## 2017-11-09 NOTE — TELEPHONE ENCOUNTER
He has had an excessive amount of no shows.  Please call and let him know he absolutely must be seen by cardiology for any further refills!

## 2017-11-09 NOTE — TELEPHONE ENCOUNTER
Left a clear message and gave cardiology number at [facility] . Told he needs to f/u with further refills.  Wendy Lockwood RN

## 2017-11-10 NOTE — TELEPHONE ENCOUNTER
MADDY and asked to call for any questions.  Encouraged appt with cardiology.   Will send a letter also.   Wendy Lockwood RN

## 2017-11-21 ENCOUNTER — OFFICE VISIT (OUTPATIENT)
Dept: FAMILY MEDICINE | Facility: CLINIC | Age: 62
End: 2017-11-21
Payer: MEDICARE

## 2017-11-21 VITALS — OXYGEN SATURATION: 98 % | HEART RATE: 72 BPM

## 2017-11-21 DIAGNOSIS — Z79.4 TYPE 2 DIABETES MELLITUS WITH STAGE 3 CHRONIC KIDNEY DISEASE, WITH LONG-TERM CURRENT USE OF INSULIN (H): ICD-10-CM

## 2017-11-21 DIAGNOSIS — G89.4 CHRONIC PAIN SYNDROME: Primary | ICD-10-CM

## 2017-11-21 DIAGNOSIS — N18.30 TYPE 2 DIABETES MELLITUS WITH STAGE 3 CHRONIC KIDNEY DISEASE, WITH LONG-TERM CURRENT USE OF INSULIN (H): ICD-10-CM

## 2017-11-21 DIAGNOSIS — F31.70 BIPOLAR AFFECTIVE DISORDER IN REMISSION (H): ICD-10-CM

## 2017-11-21 DIAGNOSIS — Z23 NEED FOR PROPHYLACTIC VACCINATION AND INOCULATION AGAINST INFLUENZA: ICD-10-CM

## 2017-11-21 DIAGNOSIS — E11.42 DIABETIC POLYNEUROPATHY ASSOCIATED WITH TYPE 2 DIABETES MELLITUS (H): ICD-10-CM

## 2017-11-21 DIAGNOSIS — M50.20 HERNIATION OF CERVICAL INTERVERTEBRAL DISC: ICD-10-CM

## 2017-11-21 DIAGNOSIS — E11.22 TYPE 2 DIABETES MELLITUS WITH STAGE 3 CHRONIC KIDNEY DISEASE, WITH LONG-TERM CURRENT USE OF INSULIN (H): ICD-10-CM

## 2017-11-21 DIAGNOSIS — Z12.11 SPECIAL SCREENING FOR MALIGNANT NEOPLASMS, COLON: ICD-10-CM

## 2017-11-21 DIAGNOSIS — I50.22 CHRONIC SYSTOLIC HEART FAILURE (H): ICD-10-CM

## 2017-11-21 LAB — HBA1C MFR BLD: 7.5 % (ref 4.3–6)

## 2017-11-21 PROCEDURE — 90686 IIV4 VACC NO PRSV 0.5 ML IM: CPT | Performed by: NURSE PRACTITIONER

## 2017-11-21 PROCEDURE — G0008 ADMIN INFLUENZA VIRUS VAC: HCPCS | Performed by: NURSE PRACTITIONER

## 2017-11-21 PROCEDURE — 99214 OFFICE O/P EST MOD 30 MIN: CPT | Mod: 25 | Performed by: NURSE PRACTITIONER

## 2017-11-21 PROCEDURE — 83036 HEMOGLOBIN GLYCOSYLATED A1C: CPT | Performed by: NURSE PRACTITIONER

## 2017-11-21 PROCEDURE — 84460 ALANINE AMINO (ALT) (SGPT): CPT | Performed by: PATHOLOGY

## 2017-11-21 PROCEDURE — 36415 COLL VENOUS BLD VENIPUNCTURE: CPT | Performed by: NURSE PRACTITIONER

## 2017-11-21 RX ORDER — PREGABALIN 50 MG/1
CAPSULE ORAL
Qty: 270 CAPSULE | Refills: 5 | Status: SHIPPED | OUTPATIENT
Start: 2017-11-21 | End: 2018-05-23

## 2017-11-21 RX ORDER — OXYCODONE AND ACETAMINOPHEN 5; 325 MG/1; MG/1
1 TABLET ORAL AT BEDTIME
Qty: 30 TABLET | Refills: 0 | Status: SHIPPED | OUTPATIENT
Start: 2017-11-21 | End: 2017-11-21

## 2017-11-21 RX ORDER — OXYCODONE AND ACETAMINOPHEN 5; 325 MG/1; MG/1
1 TABLET ORAL AT BEDTIME
Qty: 30 TABLET | Refills: 0 | Status: SHIPPED | OUTPATIENT
Start: 2018-01-20 | End: 2018-03-05

## 2017-11-21 RX ORDER — OXYCODONE AND ACETAMINOPHEN 5; 325 MG/1; MG/1
1 TABLET ORAL AT BEDTIME
Qty: 30 TABLET | Refills: 0 | Status: SHIPPED | OUTPATIENT
Start: 2017-12-21 | End: 2017-11-21

## 2017-11-21 NOTE — PROGRESS NOTES
SUBJECTIVE:          SUBJECTIVE:  Hayder Alves, a 62 year old male scheduled an appointment to discuss the following issues:     Chronic pain syndrome  Herniation of cervical intervertebral disc  Diabetic polyneuropathy associated with type 2 diabetes mellitus (H)  He has been taking one Oxycodone daily for chronic neck pain.  He was taking it at bedtime, but now is taking it during the day.  He does have chronic hand and feet pain that bothers him at night, making it difficult to sleep at night.    Type 2 diabetes mellitus with stage 3 chronic kidney disease, with long-term current use of insulin (H)  He needs new batteries in his monitor, so has not been checking his blood sugars.    Special screening for malignant neoplasms, colon  Need for prophylactic vaccination and inoculation against influenza  Chronic systolic heart failure (H)  He still has not established care with a cardiologist or followed up at the [facility] clinic.  He denies any shortness of breath, peripheral edema, chest pain.     Medical, social, surgical, and family histories reviewed.    ROS:  C: NEGATIVE for fever, chills  E: NEGATIVE for vision changes   R: NEGATIVE for significant cough or SOB  CV: NEGATIVE for chest pain, palpitations   GI: NEGATIVE for nausea, abdominal pain, heartburn, or change in bowel habits  MUSCULOSKELETAL:see HPI  N: NEGATIVE for weakness, dizziness or paresthesias or headache  P: NEGATIVE for changes in mood or affect    OBJECTIVE:  BP (P) 132/63  Pulse 72  Temp (P) 98.3  F (36.8  C) (Oral)  Resp (P) 18  Wt (P) 207 lb 6 oz (94.1 kg)  SpO2 98%  BMI (P) 33.47 kg/m2  EXAM:  GENERAL APPEARANCE: healthy, alert and no distress  RESP: lungs clear to auscultation - no rales, rhonchi or wheezes  CV: regular rates and rhythm, normal S1 S2, no S3 or S4 and no murmur, click or rub -  PSYCH: mentation appears normal and affect normal/bright    ASSESSMENT/PLAN:  (G89.4) Chronic pain syndrome  (primary encounter  diagnosis)  Comment:   Plan: oxyCODONE-acetaminophen (PERCOCET) 5-325 MG per        tablet, DISCONTINUED: oxyCODONE-acetaminophen         (PERCOCET) 5-325 MG per tablet, DISCONTINUED:         oxyCODONE-acetaminophen (PERCOCET) 5-325 MG per        tablet        Refills given for 3 months.  Follow up in 3 months.    (M50.20) Herniation of cervical intervertebral disc  Comment:   Plan: oxyCODONE-acetaminophen (PERCOCET) 5-325 MG per        tablet, DISCONTINUED: oxyCODONE-acetaminophen         (PERCOCET) 5-325 MG per tablet, DISCONTINUED:         oxyCODONE-acetaminophen (PERCOCET) 5-325 MG per        tablet        See above.     (E11.42) Diabetic polyneuropathy associated with type 2 diabetes mellitus (H)  Comment:   Plan: pregabalin (LYRICA) 50 MG capsule        Discussed the use and indication of this medication as well as potential side effects.  May increase dose after one month if needed.     (E11.22,  N18.3,  Z79.4) Type 2 diabetes mellitus with stage 3 chronic kidney disease, with long-term current use of insulin (H)  Comment: results pending  Plan: metFORMIN (GLUCOPHAGE) 500 MG tablet,         dulaglutide (TRULICITY) 0.75 MG/0.5ML pen,         Hemoglobin A1c, ALT, CANCELED: Comprehensive         metabolic panel        The current medical regimen is effective;  continue present plan and medications.     (Z12.11) Special screening for malignant neoplasms, colon  Comment:   Plan: GASTROENTEROLOGY ADULT REF PROCEDURE ONLY            (Z23) Need for prophylactic vaccination and inoculation against influenza  Comment:   Plan: HC FLU VAC PRESRV FREE QUAD SPLIT VIR 3+YRS IM,        ADMIN 1st VACCINE            (I50.22) Chronic systolic heart failure (H)  Comment:   Plan: CARDIO  ADULT REFERRAL        Referral to cardiology given.    Bipolar disorder  Plan: mood stable.         Injectable Influenza Immunization Documentation    Prior to injection verified patient identity using patient's name and date of  birth.      1.  Is the person to be vaccinated sick today?   No    2. Does the person to be vaccinated have an allergy to a component   of the vaccine?   No  Egg Allergy Algorithm Link    3. Has the person to be vaccinated ever had a serious reaction   to influenza vaccine in the past?   No    4. Has the person to be vaccinated ever had Guillain-Barré syndrome?   No    Form completed by Aparna Wood MA

## 2017-11-21 NOTE — LETTER
November 27, 2017      Hayder Medellinreiber  [address] [facility] [address]        Dear ,    We are writing to inform you of your test results.    A1c is acceptable.  ALT (liver enzyme) is mildly elevated.  You should avoid alcohol and we can recheck this at your next visit in 3 months.    Resulted Orders   Hemoglobin A1c   Result Value Ref Range    Hemoglobin A1C 7.5 (H) 4.3 - 6.0 %   ALT   Result Value Ref Range    ALT 80 (H) 0 - 70 U/L       If you have any questions or concerns, please call the clinic at the number listed above.       Sincerely,        Suma Jenkins NP/nr

## 2017-11-21 NOTE — MR AVS SNAPSHOT
After Visit Summary   11/21/2017    Hayder Alves    MRN: [id number]           Patient Information     Date Of Birth          1955        Visit Information        Provider Department      11/21/2017 1:30 PM Suma Jenkins NP [facility]        Today's Diagnoses     Chronic pain syndrome    -  1    Herniation of cervical intervertebral disc        Diabetic polyneuropathy associated with type 2 diabetes mellitus (H)        Type 2 diabetes mellitus with stage 3 chronic kidney disease, with long-term current use of insulin (H)        Special screening for malignant neoplasms, colon        Need for prophylactic vaccination and inoculation against influenza        Chronic systolic heart failure (H)           Follow-ups after your visit        Additional Services     CARDIO  ADULT REFERRAL       [facility] is referring you to Cardiology Services.       The  Representative will assist you in the coordination of your Cardiology care as prescribed by your physician.    The  Representative will call you within 24 hours to help schedule your appointment, or you may contact the  Representative at: [phone].         Type of Referral: Cardiology Follow Up            Timeframe requested: within 4 weeks       Coverage of these services is subject to the terms and limitations of your health insurance plan.  Please call member services at your health plan with any benefit or coverage questions.      If X-rays, CT or MRI's have been performed, please contact the facility where they were done to arrange for , prior to your scheduled appointment.  Please bring this referral request to your appointment and present it to your specialist.            GASTROENTEROLOGY ADULT REF PROCEDURE ONLY       Last Lab Result: Creatinine (mg/dL)       Date                     Value                 08/18/2017               1.22            "  ----------  Body mass index is 33.47 kg/(m^2) (pended).     Needed:  No  Language:  English    Patient will be contacted to schedule procedure.     Please be aware that coverage of these services is subject to the terms and limitations of your health insurance plan.  Call member services at your health plan with any benefit or coverage questions.  Any procedures must be performed at a [facility] facility OR coordinated by your clinic's referral office.    Please bring the following with you to your appointment:    (1) Any X-Rays, CTs or MRIs which have been performed.  Contact the facility where they were done to arrange for  prior to your scheduled appointment.    (2) List of current medications   (3) This referral request   (4) Any documents/labs given to you for this referral                  Follow-up notes from your care team     Return in about 3 months (around 2/21/2018).      Who to contact     If you have questions or need follow up information about today's clinic visit or your schedule please contact [facility] directly at [phone].  Normal or non-critical lab and imaging results will be communicated to you by Audiamhart, letter or phone within 4 business days after the clinic has received the results. If you do not hear from us within 7 days, please contact the clinic through Audiamhart or phone. If you have a critical or abnormal lab result, we will notify you by phone as soon as possible.  Submit refill requests through dinCloud or call your pharmacy and they will forward the refill request to us. Please allow 3 business days for your refill to be completed.          Additional Information About Your Visit        AudiamharPhotozeen Information     dinCloud lets you send messages to your doctor, view your test results, renew your prescriptions, schedule appointments and more. To sign up, go to [email or web address] . Click on \"Log in\" on the left side of the screen, which will " "take you to the Welcome page. Then click on \"Sign up Now\" on the right side of the page.     You will be asked to enter the access code listed below, as well as some personal information. Please follow the directions to create your username and password.     Your access code is: [email or web address]  Expires: 2018  2:33 PM     Your access code will  in 90 days. If you need help or a new code, please call your [place] clinic or [phone].        Care EveryWhere ID     This is your Care EveryWhere ID. This could be used by other organizations to access your [place] medical records  [id number]        Your Vitals Were     Pulse Pulse Oximetry                72 98%           Blood Pressure from Last 3 Encounters:   17 (P) 132/63   17 136/82   17 116/80    Weight from Last 3 Encounters:   17 (P) 207 lb 6 oz (94.1 kg)   17 215 lb 12.8 oz (97.9 kg)   17 211 lb (95.7 kg)              We Performed the Following     ADMIN 1st VACCINE     ALT     CARDIO  ADULT REFERRAL     GASTROENTEROLOGY ADULT REF PROCEDURE ONLY     HC FLU VAC PRESRV FREE QUAD SPLIT VIR 3+YRS IM     Hemoglobin A1c          Today's Medication Changes          These changes are accurate as of: 17  2:33 PM.  If you have any questions, ask your nurse or doctor.               Start taking these medicines.        Dose/Directions    oxyCODONE-acetaminophen 5-325 MG per tablet   Commonly known as:  PERCOCET   Used for:  Herniation of cervical intervertebral disc, Chronic pain syndrome   Started by:  Suma Jenkins NP        Dose:  1 tablet   Start taking on:  2018   Take 1 tablet by mouth At Bedtime Must last 30 days   Quantity:  30 tablet   Refills:  0       pregabalin 50 MG capsule   Commonly known as:  LYRICA   Used for:  Diabetic polyneuropathy associated with type 2 diabetes mellitus (H)   Started by:  Suma Jenkins NP        Take one tablet at bedtime for one week then increase to one " tablet twice daily for one week then increase to one tablet three times daily   Quantity:  270 capsule   Refills:  5            Where to get your medicines      These medications were sent to [facility] [id number] [place], [place] [address] AT [address] [address]     Phone:  [phone]     dulaglutide 0.75 MG/0.5ML pen    metFORMIN 500 MG tablet         Some of these will need a paper prescription and others can be bought over the counter.  Ask your nurse if you have questions.     Bring a paper prescription for each of these medications     oxyCODONE-acetaminophen 5-325 MG per tablet    pregabalin 50 MG capsule                Primary Care Provider Office Phone # Fax #    Suma Jenkins [phone] [address] [facility] [address]        Equal Access to Services     RENA [facility] HospitalCLARA : Hadii lili ku hadasho Soomaali, waaxda luqadaha, qaybta kaalmada adeegyada, waxay kristanin hayaan jerman cheung . So [facility] [place] [phone].    ATENCIÓN: Si habla español, tiene a ellis disposición servicios gratuitos de asistencia lingüística. Radhaalistair al [phone].    We comply with applicable federal civil rights laws and [place] laws. We do not discriminate on the basis of race, color, national origin, age, disability, sex, sexual orientation, or gender identity.            Thank you!     Thank you for choosing [facility]  for your care. Our goal is always to provide you with excellent care. Hearing back from our patients is one way we can continue to improve our services. Please take a few minutes to complete the written survey that you may receive in the mail after your visit with us. Thank you!             Your Updated Medication List - Protect others around you: Learn how to safely use, store and throw away your medicines at www.disposemymeds.org.          This list is accurate as of: 11/21/17  2:33 PM.   Always use your most recent med list.                   Brand Name Dispense Instructions for use Diagnosis    ACE/ARB/ARNI NOT PRESCRIBED (INTENTIONAL)      Please choose reason not prescribed, below    Type 2 diabetes mellitus with stage 3 chronic kidney disease, with long-term current use of insulin (H), Chronic systolic heart failure (H)       * albuterol 108 (90 BASE) MCG/ACT Inhaler    PROAIR HFA/PROVENTIL HFA/VENTOLIN HFA    1 Inhaler    Inhale 2 puffs into the lungs every 6 hours as needed for shortness of breath / dyspnea    Chronic obstructive pulmonary disease, unspecified COPD type (H)       * albuterol (2.5 MG/3ML) 0.083% neb solution     25 vial    Take 1 vial (2.5 mg) by nebulization every 6 hours as needed for shortness of breath / dyspnea or wheezing    COPD exacerbation (H)       ASPIRIN PO      Take 81 mg by mouth daily        blood glucose monitoring lancets     100 each    Test once daily    Type 2 diabetes, HbA1C goal < 8% (H)       blood glucose monitoring test strip    no brand specified    3 Box    Use to test blood sugars 2 times daily or as directed - use strips compatible with pt's meter and insurance    Type 2 diabetes mellitus with stage 3 chronic kidney disease, unspecified long term insulin use status (H)       CENTRUM SILVER per tablet      Take 1 tablet by mouth daily        citalopram 20 MG tablet    celeXA    30 tablet    Take 1 tablet (20 mg) by mouth daily    Anxiety       dulaglutide 0.75 MG/0.5ML pen    TRULICITY    6 mL    Inject 0.75 mg Subcutaneous every 7 days    Type 2 diabetes mellitus with stage 3 chronic kidney disease, with long-term current use of insulin (H)       furosemide 20 MG tablet    LASIX    60 tablet    TAKE 1 TABLET BY MOUTH TWICE DAILY    CORNELIUS (dyspnea on exertion)       gabapentin 300 MG capsule    NEURONTIN    60 capsule    Take 2 capsules (600 mg) by mouth At Bedtime    Chronic pain syndrome, Herniation of cervical intervertebral disc, Type 2 diabetes  mellitus with stage 3 chronic kidney disease, with long-term current use of insulin (H)       ipratropium - albuterol 0.5 mg/2.5 mg/3 mL 0.5-2.5 (3) MG/3ML neb solution    DUONEB    30 vial    Take 1 vial (3 mLs) by nebulization every 4 hours as needed for shortness of breath / dyspnea or wheezing    COPD exacerbation (H)       metFORMIN 500 MG tablet    GLUCOPHAGE    180 tablet    Take 1 tablet (500 mg) by mouth 2 times daily (with meals)    Type 2 diabetes mellitus with stage 3 chronic kidney disease, with long-term current use of insulin (H)       metoprolol 50 MG tablet    LOPRESSOR    60 tablet    Take 1 tablet (50 mg) by mouth 2 times daily    Hypertension goal BP (blood pressure) < 130/80       nitroGLYcerin 0.4 MG sublingual tablet    NITROSTAT    25 tablet    Place 1 tablet (0.4 mg) under the tongue every 5 minutes as needed    Intermediate coronary syndrome (H)       oxyCODONE-acetaminophen 5-325 MG per tablet   Start taking on:  1/20/2018    PERCOCET    30 tablet    Take 1 tablet by mouth At Bedtime Must last 30 days    Herniation of cervical intervertebral disc, Chronic pain syndrome       pregabalin 50 MG capsule    LYRICA    270 capsule    Take one tablet at bedtime for one week then increase to one tablet twice daily for one week then increase to one tablet three times daily    Diabetic polyneuropathy associated with type 2 diabetes mellitus (H)       rosuvastatin 20 MG tablet    CRESTOR    90 tablet    Take 1 tablet (20 mg) by mouth daily    Hypercholesterolemia       traZODone 50 MG tablet    DESYREL    60 tablet    Take 1-2 tablets ( mg) by mouth At Bedtime    Insomnia, unspecified type       * Notice:  This list has 2 medication(s) that are the same as other medications prescribed for you. Read the directions carefully, and ask your doctor or other care provider to review them with you.

## 2017-11-22 LAB — ALT SERPL W P-5'-P-CCNC: 80 U/L (ref 0–70)

## 2018-01-30 DIAGNOSIS — R06.09 DOE (DYSPNEA ON EXERTION): ICD-10-CM

## 2018-01-30 DIAGNOSIS — N18.30 TYPE 2 DIABETES MELLITUS WITH STAGE 3 CHRONIC KIDNEY DISEASE, WITH LONG-TERM CURRENT USE OF INSULIN (H): ICD-10-CM

## 2018-01-30 DIAGNOSIS — G89.4 CHRONIC PAIN SYNDROME: ICD-10-CM

## 2018-01-30 DIAGNOSIS — E11.22 TYPE 2 DIABETES MELLITUS WITH STAGE 3 CHRONIC KIDNEY DISEASE, WITH LONG-TERM CURRENT USE OF INSULIN (H): ICD-10-CM

## 2018-01-30 DIAGNOSIS — M50.20 HERNIATION OF CERVICAL INTERVERTEBRAL DISC: ICD-10-CM

## 2018-01-30 DIAGNOSIS — Z79.4 TYPE 2 DIABETES MELLITUS WITH STAGE 3 CHRONIC KIDNEY DISEASE, WITH LONG-TERM CURRENT USE OF INSULIN (H): ICD-10-CM

## 2018-01-30 NOTE — TELEPHONE ENCOUNTER
"Requested Prescriptions   Pending Prescriptions Disp Refills    GABAPENTIN 300MG CAPSULES        Last Written Prescription Date:  9/26/2017  Last Fill Quantity: 60 capsule,   # refills: 3  Last Office Visit: 11/21/2017  Future Office visit:       Routing refill request to provider for review/approval because:  Drug not on the [facility], [facility] or [facility] refill protocol or controlled substance                  ________________________________________________________________________________       furosemide (LASIX) 20 MG tablet [Pharmacy Med Name: FUROSEMIDE 20MG TABLETS]  Last Written Prescription Date:  11/8/2017  Last Fill Quantity: 60 tablet,  # refills: 0   Last Office Visit: 11/21/2017   Future Office Visit:      60 tablet 0     Sig: TAKE 1 TABLET BY MOUTH TWICE DAILY    Diuretics (Including Combos) Protocol Passed    1/30/2018 10:15 AM       Passed - Blood pressure under 140/90    BP Readings from Last 3 Encounters:   11/21/17 (P) 132/63   08/18/17 136/82   08/14/17 116/80          Passed - Recent or future visit with authorizing provider's specialty    Patient had office visit in the last year or has a visit in the next 30 days with authorizing provider.  See \"Patient Info\" tab in inbasket, or \"Choose Columns\" in Meds & Orders section of the refill encounter.          Passed - Patient is age 18 or older       Passed - Normal serum creatinine on file in past 12 months    Recent Labs   Lab Test  08/18/17   1442   CR  1.22           Passed - Normal serum potassium on file in past 12 months    Recent Labs   Lab Test  08/18/17   1442   POTASSIUM  4.7           Passed - Normal serum sodium on file in past 12 months    Recent Labs   Lab Test  08/18/17   1442   NA  138                "

## 2018-02-02 NOTE — TELEPHONE ENCOUNTER
Routing refill request to provider for review/approval because:  Drug not on the [facility] refill protocol: Gabapentin  -Dyspnea on exertion not an indication for use for Furosemide, per refill protocol    Geno-Please sign if agree.    Thank you!  DAVID Oro, LAZARON, RN

## 2018-02-04 NOTE — TELEPHONE ENCOUNTER
He has been told many times (in several previous refill encounters, messages, last office visit, etc) that he needs to follow up in the [facility] clinic (heart failure clinic) and cardiology for the Furosemide refills.  He needs to have this scheduled before I will refill.  Please route back to me to refill once a date is scheduled.

## 2018-02-05 NOTE — TELEPHONE ENCOUNTER
Hp reception  Please see Geno Jenkins CNP msg below and help pt schedule with [facility] clinic  Then Geno Jenkins CNP will refill  Thanks!     Kandi De Oliveira RN

## 2018-02-06 NOTE — TELEPHONE ENCOUNTER
[facility] to relay Geno's message about scheduling with [facility]-you may contact the  Representative at: [phone].

## 2018-02-14 RX ORDER — FUROSEMIDE 20 MG
TABLET ORAL
Qty: 60 TABLET | Refills: 0 | Status: SHIPPED | OUTPATIENT
Start: 2018-02-14 | End: 2018-04-07

## 2018-02-14 RX ORDER — GABAPENTIN 300 MG/1
CAPSULE ORAL
Qty: 60 CAPSULE | Refills: 0 | Status: SHIPPED | OUTPATIENT
Start: 2018-02-14 | End: 2018-03-05

## 2018-02-28 ENCOUNTER — TELEPHONE (OUTPATIENT)
Dept: FAMILY MEDICINE | Facility: CLINIC | Age: 63
End: 2018-02-28

## 2018-02-28 DIAGNOSIS — M50.20 HERNIATION OF CERVICAL INTERVERTEBRAL DISC: ICD-10-CM

## 2018-02-28 DIAGNOSIS — G89.4 CHRONIC PAIN SYNDROME: ICD-10-CM

## 2018-02-28 RX ORDER — OXYCODONE AND ACETAMINOPHEN 5; 325 MG/1; MG/1
1 TABLET ORAL AT BEDTIME
Qty: 30 TABLET | Refills: 0 | Status: CANCELLED | OUTPATIENT
Start: 2018-02-28

## 2018-02-28 NOTE — TELEPHONE ENCOUNTER
Reason for Call:  Medication or medication refill: oxyCODONE-acetaminophen (PERCOCET) 5-325 MG per tablet    Do you use a [facility]?  Name of the pharmacy and phone number for the current request:  [facility] - [phone]    Name of the medication requested: oxyCODONE-acetaminophen (PERCOCET) 5-325 MG per tablet    Other request: Pt was wondering if an OV was needed in order to refill this script. He was last seen in November and thought he needed a three month check up. If appointment is needed please let pt know so we can schedule something as soon as possible.    Can we leave a detailed message on this number? YES    Phone number patient can be reached at: Cell number on file:    Telephone Information:   Mobile [phone]       Best Time: anytime    Call taken on 2/28/2018 at 1:43 PM by Conrad Adam

## 2018-02-28 NOTE — TELEPHONE ENCOUNTER
DISCONTINUED 12/21/17  citalopram (CELEXA) 20 MG tablet (Discontinued)      Last Written Prescription Date:  4/27/16  Last Fill Quantity: 30 TABLET,   # refills: PRN  Last Office Visit: 11/21/17 WITH LELA  Future Office visit:       Routing refill request to provider for review/approval because:  Drug not active on patient's medication list

## 2018-03-01 NOTE — TELEPHONE ENCOUNTER
Not on current med list.  LMOM for patient to see if he requested this.     Pharmacy cannot tell.    Wendy Lockwood RN

## 2018-03-02 NOTE — TELEPHONE ENCOUNTER
LMOM again asking pt to let us know if he is restarting this med or else call pharmacy and have them take it off the automated refill list.  Wendy Lockwood RN

## 2018-03-05 ENCOUNTER — OFFICE VISIT (OUTPATIENT)
Dept: FAMILY MEDICINE | Facility: CLINIC | Age: 63
End: 2018-03-05
Payer: MEDICARE

## 2018-03-05 VITALS
TEMPERATURE: 98.6 F | DIASTOLIC BLOOD PRESSURE: 79 MMHG | OXYGEN SATURATION: 97 % | RESPIRATION RATE: 16 BRPM | BODY MASS INDEX: 33.33 KG/M2 | WEIGHT: 207.38 LBS | SYSTOLIC BLOOD PRESSURE: 132 MMHG | HEIGHT: 66 IN | HEART RATE: 86 BPM

## 2018-03-05 DIAGNOSIS — Z12.11 SPECIAL SCREENING FOR MALIGNANT NEOPLASMS, COLON: ICD-10-CM

## 2018-03-05 DIAGNOSIS — Z79.4 TYPE 2 DIABETES MELLITUS WITH STAGE 3 CHRONIC KIDNEY DISEASE, WITH LONG-TERM CURRENT USE OF INSULIN (H): ICD-10-CM

## 2018-03-05 DIAGNOSIS — J44.9 CHRONIC OBSTRUCTIVE PULMONARY DISEASE, UNSPECIFIED COPD TYPE (H): ICD-10-CM

## 2018-03-05 DIAGNOSIS — M50.20 HERNIATION OF CERVICAL INTERVERTEBRAL DISC: ICD-10-CM

## 2018-03-05 DIAGNOSIS — F31.70 BIPOLAR AFFECTIVE DISORDER IN REMISSION (H): ICD-10-CM

## 2018-03-05 DIAGNOSIS — G89.4 CHRONIC PAIN SYNDROME: Primary | ICD-10-CM

## 2018-03-05 DIAGNOSIS — E78.5 HYPERLIPIDEMIA LDL GOAL <100: ICD-10-CM

## 2018-03-05 DIAGNOSIS — N18.30 TYPE 2 DIABETES MELLITUS WITH STAGE 3 CHRONIC KIDNEY DISEASE, WITH LONG-TERM CURRENT USE OF INSULIN (H): ICD-10-CM

## 2018-03-05 DIAGNOSIS — I50.22 CHRONIC SYSTOLIC HEART FAILURE (H): ICD-10-CM

## 2018-03-05 DIAGNOSIS — E11.22 TYPE 2 DIABETES MELLITUS WITH STAGE 3 CHRONIC KIDNEY DISEASE, WITH LONG-TERM CURRENT USE OF INSULIN (H): ICD-10-CM

## 2018-03-05 PROCEDURE — 99214 OFFICE O/P EST MOD 30 MIN: CPT | Performed by: NURSE PRACTITIONER

## 2018-03-05 PROCEDURE — 36415 COLL VENOUS BLD VENIPUNCTURE: CPT | Performed by: NURSE PRACTITIONER

## 2018-03-05 PROCEDURE — 83721 ASSAY OF BLOOD LIPOPROTEIN: CPT | Mod: 59 | Performed by: NURSE PRACTITIONER

## 2018-03-05 PROCEDURE — 80061 LIPID PANEL: CPT | Performed by: NURSE PRACTITIONER

## 2018-03-05 RX ORDER — OXYCODONE AND ACETAMINOPHEN 5; 325 MG/1; MG/1
1 TABLET ORAL AT BEDTIME
Qty: 30 TABLET | Refills: 0 | Status: SHIPPED | OUTPATIENT
Start: 2018-04-04 | End: 2018-03-05

## 2018-03-05 RX ORDER — OXYCODONE AND ACETAMINOPHEN 5; 325 MG/1; MG/1
1 TABLET ORAL AT BEDTIME
Qty: 30 TABLET | Refills: 0 | Status: SHIPPED | OUTPATIENT
Start: 2018-05-04 | End: 2018-05-23

## 2018-03-05 RX ORDER — OXYCODONE AND ACETAMINOPHEN 5; 325 MG/1; MG/1
1 TABLET ORAL AT BEDTIME
Qty: 30 TABLET | Refills: 0 | Status: SHIPPED | OUTPATIENT
Start: 2018-03-05 | End: 2018-03-05

## 2018-03-05 RX ORDER — GABAPENTIN 300 MG/1
CAPSULE ORAL
Qty: 120 CAPSULE | Refills: 5 | Status: SHIPPED | OUTPATIENT
Start: 2018-03-05 | End: 2018-05-23 | Stop reason: ALTCHOICE

## 2018-03-05 NOTE — NURSING NOTE
Informed patient about Health Care Directive.  Patient will bring a copy of an updated Health Care Directive to clinic.      Shawanda Henning MA

## 2018-03-05 NOTE — LETTER
March 8, 2018      Hayder Garciaiber  [address] [facility] [address]        Dear ,    We are writing to inform you of your test results.    We can recheck your cholesterol again at next visit fasting.    Resulted Orders   Lipid panel reflex to direct LDL Fasting   Result Value Ref Range    Cholesterol 165 <200 mg/dL    Triglycerides 519 (H) <150 mg/dL      Comment:      Borderline high:  150-199 mg/dl  High:             200-499 mg/dl  Very high:       >499 mg/dl  Non Fasting      HDL Cholesterol 38 (L) >39 mg/dL    LDL Cholesterol Calculated  <100 mg/dL     Cannot estimate LDL when triglyceride exceeds 400 mg/dL    Non HDL Cholesterol 127 <130 mg/dL   LDL cholesterol direct   Result Value Ref Range    LDL Cholesterol Direct 81 <100 mg/dL      Comment:      Desirable:       <100 mg/dl       If you have any questions or concerns, please call the clinic at the number listed above.     Sincerely,    Suma Jenkins, TOBIAS/nr

## 2018-03-05 NOTE — PROGRESS NOTES
"SUBJECTIVE:  Hayder Alves, a 63 year old male scheduled an appointment to discuss the following issues:     Chronic pain syndrome  Herniation of cervical intervertebral disc  His pain is stable on Percocet, which he takes once a day, typically at bedtime, for chronic neck pain.  He has been having more burning pain in his hands and feet bilaterally.  The pain bothers him more at night, but he does notice it during the day as well. He is on Gabapentin 600 mg at HS.    Type 2 diabetes mellitus with stage 3 chronic kidney disease, with long-term current use of insulin (H)  He has not been checking his blood sugars regularly.    Hyperlipidemia LDL goal <100  Special screening for malignant neoplasms, colon    Medical, social, surgical, and family histories reviewed.    ROS:  CONSTITUTIONAL: NEGATIVE for fever, chills  EYES: NEGATIVE for vision changes   RESP: NEGATIVE for significant cough or SOB  CV: NEGATIVE for chest pain, palpitations   GI: NEGATIVE for nausea, abdominal pain, heartburn, or change in bowel habits  MUSCULOSKELETAL:see HPI  NEURO: NEGATIVE for weakness, dizziness or paresthesias or headache  PSYCHIATRIC: NEGATIVE for changes in mood or affect    OBJECTIVE:  /79 (BP Location: Left arm, Patient Position: Sitting, Cuff Size: Adult Regular)  Pulse 86  Temp 98.6  F (37  C) (Oral)  Resp 16  Ht 5' 6\" (1.676 m)  Wt 207 lb 6 oz (94.1 kg)  SpO2 97%  BMI 33.47 kg/m2  EXAM:  GENERAL APPEARANCE: healthy, alert and no distress  RESP: lungs clear to auscultation - no rales, rhonchi or wheezes  CV: regular rates and rhythm, normal S1 S2, no S3 or S4 and no murmur, click or rub -  PSYCH: mentation appears normal and affect normal/bright    ASSESSMENT/PLAN:  (G89.4) Chronic pain syndrome  (primary encounter diagnosis)  Comment: stable  Plan: gabapentin (NEURONTIN) 300 MG capsule,         oxyCODONE-acetaminophen (PERCOCET) 5-325 MG per        tablet, DISCONTINUED: oxyCODONE-acetaminophen         " (PERCOCET) 5-325 MG per tablet, DISCONTINUED:         oxyCODONE-acetaminophen (PERCOCET) 5-325 MG per        tablet        Refills given for 3 months.  Follow up in 3 months.    (M50.20) Herniation of cervical intervertebral disc  Comment:   Plan: gabapentin (NEURONTIN) 300 MG capsule,         oxyCODONE-acetaminophen (PERCOCET) 5-325 MG per        tablet, DISCONTINUED: oxyCODONE-acetaminophen         (PERCOCET) 5-325 MG per tablet, DISCONTINUED:         oxyCODONE-acetaminophen (PERCOCET) 5-325 MG per        tablet        See above.    (E11.22,  N18.3,  Z79.4) Type 2 diabetes mellitus with stage 3 chronic kidney disease, with long-term current use of insulin (H)  Comment: stable  Plan: gabapentin (NEURONTIN) 300 MG capsule        The current medical regimen is effective;  continue present plan and medications.     (F31.70) Bipolar affective disorder in remission (H)  Comment: stable  Plan: The current medical regimen is effective;  continue present plan and medications.     (J44.9) Chronic obstructive pulmonary disease, unspecified COPD type (H)  Comment: stable  Plan: The current medical regimen is effective;  continue present plan and medications.     (I50.22) Chronic systolic heart failure (H)  Comment: stable  Plan: He has an appointment scheduled with the [facility] clinic.     (Z12.11) Special screening for malignant neoplasms, colon  Comment:   Plan: GASTROENTEROLOGY ADULT REF PROCEDURE ONLY         Srinivasa Urrutia [phone]            (E78.5) Hyperlipidemia LDL goal <100  Comment:   Plan: Lipid panel reflex to direct LDL Fasting

## 2018-03-05 NOTE — MR AVS SNAPSHOT
After Visit Summary   3/5/2018    Hayder Alves    MRN: [id number]           Patient Information     Date Of Birth          1955        Visit Information        Provider Department      3/5/2018 2:45 PM Suma Jenkins NP [facility]        Today's Diagnoses     Chronic pain syndrome    -  1    Herniation of cervical intervertebral disc        Type 2 diabetes mellitus with stage 3 chronic kidney disease, with long-term current use of insulin (H)        Hyperlipidemia LDL goal <100        Special screening for malignant neoplasms, colon          Care Instructions    Gabapentin - one capsule twice daily and 2 capsules at bedtime    Follow up in 3 months.          Follow-ups after your visit        Additional Services     GASTROENTEROLOGY ADULT REF PROCEDURE ONLY Srinivasa Urrutia [phone]       Last Lab Result: Creatinine (mg/dL)       Date                     Value                 08/18/2017               1.22             ----------  Body mass index is 33.47 kg/(m^2).     Needed:  No  Language:  English    Patient will be contacted to schedule procedure.     Please be aware that coverage of these services is subject to the terms and limitations of your health insurance plan.  Call member services at your health plan with any benefit or coverage questions.  Any procedures must be performed at a [facility] facility OR coordinated by your clinic's referral office.    Please bring the following with you to your appointment:    (1) Any X-Rays, CTs or MRIs which have been performed.  Contact the facility where they were done to arrange for  prior to your scheduled appointment.    (2) List of current medications   (3) This referral request   (4) Any documents/labs given to you for this referral                  Who to contact     If you have questions or need follow up information about today's clinic visit or your schedule please contact [facility] directly at [phone].  Normal or non-critical lab and imaging results will be communicated to you by Eventdoohart, letter or phone within 4 business days after the clinic has received the results. If you do not hear from us within 7 days, please contact the clinic through Eventdoohart or phone. If you have a critical or abnormal lab result, we will notify you by phone as soon as possible.  Submit refill requests through THEMA or call your pharmacy and they will forward the refill request to us. Please allow 3 business days for your refill to be completed.          Additional Information About Your Visit        [facility] Information     THEMA lets you send messages to your doctor, view your test results, renew your prescriptions, schedule appointments and more. To sign up, go to [email or web address] . Click on \"Log in\" on the left side of the screen, which will take you to the Welcome page. Then click on \"Sign up Now\" on the right side of the page.     You will be asked to enter the access code listed below, as well as some personal information. Please follow the directions to create your username and password.     Your access code is: [email or web address]  Expires: 6/3/2018  3:27 PM     Your access code will  in 90 days. If you need help or a new code, please call your [facility] clinic or [phone].        Care EveryWhere ID     This is your Care EveryWhere ID. This could be used by other organizations to access your [facility] medical records  [id number]        Your Vitals Were     Pulse Temperature Respirations Height Pulse Oximetry BMI (Body Mass Index)    86 98.6  F (37  C) (Oral) 16 5' 6\" (1.676 m) 97% 33.47 kg/m2       Blood Pressure from Last 3 Encounters:   18 132/79   17 (P) 132/63   17 136/82    Weight from Last 3 Encounters:   18 207 lb 6 oz (94.1 kg)   17 (P) 207 lb 6 oz (94.1 kg)   17 215 lb 12.8 oz (97.9 kg)              We Performed the " Following     GASTROENTEROLOGY ADULT REF PROCEDURE ONLY Srinivasa Urrutia [phone]     Lipid panel reflex to direct LDL Fasting          Today's Medication Changes          These changes are accurate as of 3/5/18  3:27 PM.  If you have any questions, ask your nurse or doctor.               Start taking these medicines.        Dose/Directions    oxyCODONE-acetaminophen 5-325 MG per tablet   Commonly known as:  PERCOCET   Used for:  Herniation of cervical intervertebral disc, Chronic pain syndrome   Started by:  Suma Jenkins NP        Dose:  1 tablet   Start taking on:  5/4/2018   Take 1 tablet by mouth At Bedtime Must last 30 days   Quantity:  30 tablet   Refills:  0         These medicines have changed or have updated prescriptions.        Dose/Directions    gabapentin 300 MG capsule   Commonly known as:  NEURONTIN   This may have changed:  See the new instructions.   Used for:  Chronic pain syndrome, Herniation of cervical intervertebral disc, Type 2 diabetes mellitus with stage 3 chronic kidney disease, with long-term current use of insulin (H)   Changed by:  Suma Jenkins NP        Take one capsule twice daily and 2 capsules at bedtime   Quantity:  120 capsule   Refills:  5            Where to get your medicines      These medications were sent to [facility] [address] [place] [address] AT [address] [address]     Phone:  [phone]     gabapentin 300 MG capsule         Some of these will need a paper prescription and others can be bought over the counter.  Ask your nurse if you have questions.     Bring a paper prescription for each of these medications     oxyCODONE-acetaminophen 5-325 MG per tablet                Primary Care Provider Office Phone # Fax #    Suma Jenkins -[phone]       [address] [facility] [address]        Equal Access to Services     YNES SHANNON: Kailash pearson  yessenia Emmanuel, wawendy branangelha, qamengta kairvin marinelli, sheryl kristanin hayaalizy agarwalisma dejuanlisbeth lamakennalizy ramses. So [facility] [phone].    ATENCIÓN: Si habla marizolañol, tiene a ellis disposición servicios gratuitos de asistencia lingüística. Linda al [phone].    We comply with applicable federal civil rights laws and [place] laws. We do not discriminate on the basis of race, color, national origin, age, disability, sex, sexual orientation, or gender identity.            Thank you!     Thank you for choosing [facility]  for your care. Our goal is always to provide you with excellent care. Hearing back from our patients is one way we can continue to improve our services. Please take a few minutes to complete the written survey that you may receive in the mail after your visit with us. Thank you!             Your Updated Medication List - Protect others around you: Learn how to safely use, store and throw away your medicines at www.disposemymeds.org.          This list is accurate as of 3/5/18  3:27 PM.  Always use your most recent med list.                   Brand Name Dispense Instructions for use Diagnosis    ACE/ARB/ARNI NOT PRESCRIBED (INTENTIONAL)      Please choose reason not prescribed, below    Type 2 diabetes mellitus with stage 3 chronic kidney disease, with long-term current use of insulin (H), Chronic systolic heart failure (H)       * albuterol 108 (90 BASE) MCG/ACT Inhaler    PROAIR HFA/PROVENTIL HFA/VENTOLIN HFA    1 Inhaler    Inhale 2 puffs into the lungs every 6 hours as needed for shortness of breath / dyspnea    Chronic obstructive pulmonary disease, unspecified COPD type (H)       * albuterol (2.5 MG/3ML) 0.083% neb solution     25 vial    Take 1 vial (2.5 mg) by nebulization every 6 hours as needed for shortness of breath / dyspnea or wheezing    COPD exacerbation (H)       ASPIRIN PO      Take 81 mg by mouth daily        blood glucose monitoring lancets     100 each    Test once  daily    Type 2 diabetes, HbA1C goal < 8% (H)       blood glucose monitoring test strip    no brand specified    3 Box    Use to test blood sugars 2 times daily or as directed - use strips compatible with pt's meter and insurance    Type 2 diabetes mellitus with stage 3 chronic kidney disease, unspecified long term insulin use status (H)       CENTRUM SILVER per tablet      Take 1 tablet by mouth daily        dulaglutide 0.75 MG/0.5ML pen    TRULICITY    6 mL    Inject 0.75 mg Subcutaneous every 7 days    Type 2 diabetes mellitus with stage 3 chronic kidney disease, with long-term current use of insulin (H)       furosemide 20 MG tablet    LASIX    60 tablet    TAKE 1 TABLET BY MOUTH TWICE DAILY    CORNELIUS (dyspnea on exertion)       gabapentin 300 MG capsule    NEURONTIN    120 capsule    Take one capsule twice daily and 2 capsules at bedtime    Chronic pain syndrome, Herniation of cervical intervertebral disc, Type 2 diabetes mellitus with stage 3 chronic kidney disease, with long-term current use of insulin (H)       ipratropium - albuterol 0.5 mg/2.5 mg/3 mL 0.5-2.5 (3) MG/3ML neb solution    DUONEB    30 vial    Take 1 vial (3 mLs) by nebulization every 4 hours as needed for shortness of breath / dyspnea or wheezing    COPD exacerbation (H)       metFORMIN 500 MG tablet    GLUCOPHAGE    180 tablet    Take 1 tablet (500 mg) by mouth 2 times daily (with meals)    Type 2 diabetes mellitus with stage 3 chronic kidney disease, with long-term current use of insulin (H)       metoprolol tartrate 50 MG tablet    LOPRESSOR    60 tablet    Take 1 tablet (50 mg) by mouth 2 times daily    Hypertension goal BP (blood pressure) < 130/80       nitroGLYcerin 0.4 MG sublingual tablet    NITROSTAT    25 tablet    Place 1 tablet (0.4 mg) under the tongue every 5 minutes as needed    Intermediate coronary syndrome (H)       oxyCODONE-acetaminophen 5-325 MG per tablet   Start taking on:  5/4/2018    PERCOCET    30 tablet    Take 1  tablet by mouth At Bedtime Must last 30 days    Herniation of cervical intervertebral disc, Chronic pain syndrome       pregabalin 50 MG capsule    LYRICA    270 capsule    Take one tablet at bedtime for one week then increase to one tablet twice daily for one week then increase to one tablet three times daily    Diabetic polyneuropathy associated with type 2 diabetes mellitus (H)       rosuvastatin 20 MG tablet    CRESTOR    90 tablet    Take 1 tablet (20 mg) by mouth daily    Hypercholesterolemia       traZODone 50 MG tablet    DESYREL    60 tablet    Take 1-2 tablets ( mg) by mouth At Bedtime    Insomnia, unspecified type       * Notice:  This list has 2 medication(s) that are the same as other medications prescribed for you. Read the directions carefully, and ask your doctor or other care provider to review them with you.

## 2018-03-06 LAB
CHOLEST SERPL-MCNC: 165 MG/DL
HDLC SERPL-MCNC: 38 MG/DL
LDLC SERPL CALC-MCNC: ABNORMAL MG/DL
LDLC SERPL DIRECT ASSAY-MCNC: 81 MG/DL
NONHDLC SERPL-MCNC: 127 MG/DL
TRIGL SERPL-MCNC: 519 MG/DL

## 2018-03-06 NOTE — TELEPHONE ENCOUNTER
Attempted ot reach again but unable to LMOM. No call backs.  Will close encounter for now.  Wendy Lockwood RN

## 2018-03-16 ENCOUNTER — NURSE TRIAGE (OUTPATIENT)
Dept: NURSING | Facility: CLINIC | Age: 63
End: 2018-03-16

## 2018-03-16 NOTE — TELEPHONE ENCOUNTER
"  [facility] Triage Call  Presenting Problem:\"I am returning a call from the clinic regarding oxygen.\" Gave NP message per epic. \"I can't even walk across the street without getting SOB. I thought maybe it would help me.\"   Patient Recommendations/Teaching:Advised to call clinic or make another appt with NP.  Cindi Gandara RN [facility]          "

## 2018-03-22 ENCOUNTER — TELEPHONE (OUTPATIENT)
Dept: FAMILY MEDICINE | Facility: CLINIC | Age: 63
End: 2018-03-22

## 2018-03-22 NOTE — TELEPHONE ENCOUNTER
natalya   Please see pt msg below and advise if there is a different med in place of Lyrica.    Thanks!     Kandi De Oliveira RN

## 2018-03-22 NOTE — TELEPHONE ENCOUNTER
There is no generic for Lyrica.  I think there is some confusion - is he taking Lyrica or Gabapentin?  He can just take Gabapentin instead of Lyrica, as that is less expensive.

## 2018-03-22 NOTE — TELEPHONE ENCOUNTER
Reason for Call:  Other prescription    Detailed comments: Pt is calling stating he went to refill his pregabalin (LYRICA) 50 MG capsule but it costs over $300. He is hoping to get a generic or a similar med that is cheaper.      Phone Number Patient can be reached at: Home number on file [phone] (home)    Best Time: anytime    Can we leave a detailed message on this number? YES    Call taken on 3/22/2018 at 3:31 PM by Jasmin Avery

## 2018-03-23 NOTE — TELEPHONE ENCOUNTER
LMOM trying to clarify what he is doing with his meds. (lyrica and gabapentin)  Wendy Lockwood RN

## 2018-03-29 NOTE — TELEPHONE ENCOUNTER
Triage called pharmacy and pt picked up the gabapentin on 3/28 for $12 .  He must have gotten the nurse message.  Wendy Lockwood RN

## 2018-05-12 DIAGNOSIS — I10 HYPERTENSION GOAL BP (BLOOD PRESSURE) < 130/80: ICD-10-CM

## 2018-05-12 NOTE — TELEPHONE ENCOUNTER
"Requested Prescriptions   Pending Prescriptions Disp Refills     metoprolol tartrate (LOPRESSOR) 50 MG tablet  Last Written Prescription Date:  5/8/2017  Last Fill Quantity: 60 tabs,  # refills: PRN   Last office visit: 3/5/2018 with prescribing provider:  Gregory   Future Office Visit:     60 tablet PRN     Sig: Take 1 tablet (50 mg) by mouth 2 times daily    Beta-Blockers Protocol Passed    5/12/2018  9:56 AM       Passed - Blood pressure under 140/90 in past 12 months    BP Readings from Last 3 Encounters:   03/05/18 132/79   11/21/17 (P) 132/63   08/18/17 136/82                Passed - Patient is age 6 or older       Passed - Recent (12 mo) or future (30 days) visit within the authorizing provider's specialty    Patient had office visit in the last 12 months or has a visit in the next 30 days with authorizing provider or within the authorizing provider's specialty.  See \"Patient Info\" tab in inbasket, or \"Choose Columns\" in Meds & Orders section of the refill encounter.              "

## 2018-05-17 RX ORDER — METOPROLOL TARTRATE 50 MG
50 TABLET ORAL 2 TIMES DAILY
Qty: 60 TABLET | Refills: 9 | Status: SHIPPED | OUTPATIENT
Start: 2018-05-17 | End: 2019-06-26

## 2018-05-22 DIAGNOSIS — G89.4 CHRONIC PAIN SYNDROME: ICD-10-CM

## 2018-05-22 DIAGNOSIS — M50.20 HERNIATION OF CERVICAL INTERVERTEBRAL DISC: ICD-10-CM

## 2018-05-22 NOTE — TELEPHONE ENCOUNTER
Reason for Call:  Medication or medication refill:    Do you use a [facility]?  Name of the pharmacy and phone number for the current request:  [facility] [address] [phone]    Name of the medication requested: oxyCODONE-acetaminophen (PERCOCET) 5-325 MG per tablet    Other request: NA    Can we leave a detailed message on this number? YES    Phone number patient can be reached at: Cell number on file:    Telephone Information:   Mobile [phone]       Best Time: Anytime    Call taken on 5/22/2018 at 12:58 PM by Andie Wiggins

## 2018-05-22 NOTE — TELEPHONE ENCOUNTER
DEYSI Jenkins patient must be looking ahead as this RX will run out on Sunday June 1. Its a little early for filling.  Oxycodone filled on 5/4 #30.   Wendy Lockwood RN

## 2018-05-23 ENCOUNTER — OFFICE VISIT (OUTPATIENT)
Dept: FAMILY MEDICINE | Facility: CLINIC | Age: 63
End: 2018-05-23
Payer: MEDICARE

## 2018-05-23 VITALS
BODY MASS INDEX: 33.47 KG/M2 | OXYGEN SATURATION: 97 % | DIASTOLIC BLOOD PRESSURE: 74 MMHG | RESPIRATION RATE: 18 BRPM | SYSTOLIC BLOOD PRESSURE: 139 MMHG | WEIGHT: 207.38 LBS | HEART RATE: 65 BPM | TEMPERATURE: 97.9 F

## 2018-05-23 DIAGNOSIS — G47.00 INSOMNIA, UNSPECIFIED TYPE: ICD-10-CM

## 2018-05-23 DIAGNOSIS — M50.20 HERNIATION OF CERVICAL INTERVERTEBRAL DISC: ICD-10-CM

## 2018-05-23 DIAGNOSIS — E11.42 DIABETIC POLYNEUROPATHY ASSOCIATED WITH TYPE 2 DIABETES MELLITUS (H): ICD-10-CM

## 2018-05-23 DIAGNOSIS — G89.4 CHRONIC PAIN SYNDROME: ICD-10-CM

## 2018-05-23 PROCEDURE — 99214 OFFICE O/P EST MOD 30 MIN: CPT | Performed by: NURSE PRACTITIONER

## 2018-05-23 RX ORDER — PERPHENAZINE 16 MG
600 TABLET ORAL DAILY
Qty: 30 CAPSULE
Start: 2018-05-23 | End: 2022-01-01

## 2018-05-23 RX ORDER — OXYCODONE AND ACETAMINOPHEN 5; 325 MG/1; MG/1
1 TABLET ORAL AT BEDTIME
Qty: 30 TABLET | Refills: 0 | Status: SHIPPED | OUTPATIENT
Start: 2018-07-22 | End: 2018-08-22

## 2018-05-23 RX ORDER — OXYCODONE AND ACETAMINOPHEN 5; 325 MG/1; MG/1
1 TABLET ORAL AT BEDTIME
Qty: 30 TABLET | Refills: 0 | Status: SHIPPED | OUTPATIENT
Start: 2018-05-23 | End: 2018-05-23

## 2018-05-23 RX ORDER — AMITRIPTYLINE HYDROCHLORIDE 10 MG/1
TABLET ORAL
Qty: 90 TABLET | Refills: 5 | Status: SHIPPED | OUTPATIENT
Start: 2018-05-23 | End: 2019-03-27

## 2018-05-23 RX ORDER — OXYCODONE AND ACETAMINOPHEN 5; 325 MG/1; MG/1
1 TABLET ORAL AT BEDTIME
Qty: 30 TABLET | Refills: 0 | Status: SHIPPED | OUTPATIENT
Start: 2018-06-22 | End: 2018-05-23

## 2018-05-23 RX ORDER — PREGABALIN 50 MG/1
CAPSULE ORAL
Qty: 270 CAPSULE | Refills: 5 | Status: SHIPPED | OUTPATIENT
Start: 2018-05-23 | End: 2018-08-22

## 2018-05-23 ASSESSMENT — PATIENT HEALTH QUESTIONNAIRE - PHQ9: 5. POOR APPETITE OR OVEREATING: SEVERAL DAYS

## 2018-05-23 ASSESSMENT — ANXIETY QUESTIONNAIRES
7. FEELING AFRAID AS IF SOMETHING AWFUL MIGHT HAPPEN: NOT AT ALL
6. BECOMING EASILY ANNOYED OR IRRITABLE: NOT AT ALL
GAD7 TOTAL SCORE: 2
2. NOT BEING ABLE TO STOP OR CONTROL WORRYING: NOT AT ALL
3. WORRYING TOO MUCH ABOUT DIFFERENT THINGS: NOT AT ALL
5. BEING SO RESTLESS THAT IT IS HARD TO SIT STILL: SEVERAL DAYS
1. FEELING NERVOUS, ANXIOUS, OR ON EDGE: NOT AT ALL

## 2018-05-23 NOTE — MR AVS SNAPSHOT
After Visit Summary   5/23/2018    Hayder Alves    MRN: [id number]           Patient Information     Date Of Birth          1955        Visit Information        Provider Department      5/23/2018 2:15 PM Suma Jenkins NP [facility]        Today's Diagnoses     Chronic pain syndrome        Herniation of cervical intervertebral disc        Diabetic polyneuropathy associated with type 2 diabetes mellitus (H)        Insomnia, unspecified type          Care Instructions    Stop Trazodone and start Amitriptyline at bedtime.  Start Lyrica instead of Gabapentin.  Follow up in 3 months.      Schedule colonoscopy by calling [phone]          Follow-ups after your visit        Who to contact     If you have questions or need follow up information about today's clinic visit or your schedule please contact [facility] directly at [phone].  Normal or non-critical lab and imaging results will be communicated to you by Zytoprotechart, letter or phone within 4 business days after the clinic has received the results. If you do not hear from us within 7 days, please contact the clinic through Zytoprotechart or phone. If you have a critical or abnormal lab result, we will notify you by phone as soon as possible.  Submit refill requests through Plugaround or call your pharmacy and they will forward the refill request to us. Please allow 3 business days for your refill to be completed.          Additional Information About Your Visit        MyChart Information     Plugaround gives you secure access to your electronic health record. If you see a primary care provider, you can also send messages to your care team and make appointments. If you have questions, please call your primary care clinic.  If you do not have a primary care provider, please call [phone] and they will assist you.        Care EveryWhere ID     This is your Care EveryWhere ID. This could be used by other  organizations to access your [place] medical records  [id number]        Your Vitals Were     Pulse Temperature Respirations Pulse Oximetry BMI (Body Mass Index)       65 97.9  F (36.6  C) (Oral) 18 97% 33.47 kg/m2        Blood Pressure from Last 3 Encounters:   05/23/18 139/74   03/05/18 132/79   11/21/17 (P) 132/63    Weight from Last 3 Encounters:   05/23/18 207 lb 6 oz (94.1 kg)   03/05/18 207 lb 6 oz (94.1 kg)   11/21/17 (P) 207 lb 6 oz (94.1 kg)              Today, you had the following     No orders found for display         Today's Medication Changes          These changes are accurate as of 5/23/18  2:43 PM.  If you have any questions, ask your nurse or doctor.               Start taking these medicines.        Dose/Directions    amitriptyline 10 MG tablet   Commonly known as:  ELAVIL   Used for:  Insomnia, unspecified type   Replaces:  traZODone 50 MG tablet   Started by:  Suma Jenkins NP        Start at 1 tab at bedtime for 3 days, then 2 tabs at bedtime for 3 days, then 3 tabs at bedtime.   Quantity:  90 tablet   Refills:  5       oxyCODONE-acetaminophen 5-325 MG per tablet   Commonly known as:  PERCOCET   Used for:  Herniation of cervical intervertebral disc, Chronic pain syndrome   Started by:  Suma Jenkins NP        Dose:  1 tablet   Start taking on:  7/22/2018   Take 1 tablet by mouth At Bedtime Must last 30 days   Quantity:  30 tablet   Refills:  0         Stop taking these medicines if you haven't already. Please contact your care team if you have questions.     gabapentin 300 MG capsule   Commonly known as:  NEURONTIN   Stopped by:  Suma Jenkins NP           traZODone 50 MG tablet   Commonly known as:  DESYREL   Replaced by:  amitriptyline 10 MG tablet   Stopped by:  Suma Jenkins NP                Where to get your medicines      [facility] [address] AT [place] [address]  [facility] [address]    Hours:  24-hours Phone:  [phone]     amitriptyline 10 MG tablet         Some of these will need a paper prescription and others can be bought over the counter.  Ask your nurse if you have questions.     Bring a paper prescription for each of these medications     oxyCODONE-acetaminophen 5-325 MG per tablet    pregabalin 50 MG capsule               Information about OPIOIDS     PRESCRIPTION OPIOIDS: WHAT YOU NEED TO KNOW   You have a prescription for an opioid (narcotic) pain medicine. Opioids can cause addiction. If you have a history of chemical dependency of any type, you are at a higher risk of becoming addicted to opioids. Only take this medicine after all other options have been tried. Take it for as short a time and as few doses as possible.     Do not:    Drive. If you drive while taking these medicines, you could be arrested for driving under the influence (DUI).    Operate heavy machinery    Do any other dangerous activities while taking these medicines.     Drink any alcohol while taking these medicines.      Take with any other medicines that contain acetaminophen. Read all labels carefully. Look for the word  acetaminophen  or  Tylenol.  Ask your pharmacist if you have questions or are unsure.    Store your pills in a secure place, locked if possible. We will not replace any lost or stolen medicine. If you don t finish your medicine, please throw away (dispose) as directed by your pharmacist. The [place] Pollution Control Agency has more information about safe disposal: https://www.pca.[facility].[place].us/living-green/managing-unwanted-medications    All opioids tend to cause constipation. Drink plenty of water and eat foods that have a lot of fiber, such as fruits, vegetables, prune juice, apple juice and high-fiber cereal. Take a laxative (Miralax, milk of magnesia, Colace, Senna) if you don t move your bowels at least every other day.          Primary Care  Provider Office Phone # Fax #    Suma JenkinsTOBIAS [phone] [address] [facility] [address]        Equal Access to Services     YNES YU : Hadii aad ku hadsosao Soomaali, waaxda luqadaha, qaybta kaalmada adeegyada, sheryl laguna laCiscobeulah pearce. So [facility] [phone].    ATENCIÓN: Si habla español, tiene a ellis disposición servicios gratuitos de asistencia lingüística. Llame al [phone].    We comply with applicable federal civil rights laws and [place] laws. We do not discriminate on the basis of race, color, national origin, age, disability, sex, sexual orientation, or gender identity.            Thank you!     Thank you for choosing [facility]  for your care. Our goal is always to provide you with excellent care. Hearing back from our patients is one way we can continue to improve our services. Please take a few minutes to complete the written survey that you may receive in the mail after your visit with us. Thank you!             Your Updated Medication List - Protect others around you: Learn how to safely use, store and throw away your medicines at www.disposemymeds.org.          This list is accurate as of 5/23/18  2:43 PM.  Always use your most recent med list.                   Brand Name Dispense Instructions for use Diagnosis    ACE/ARB/ARNI NOT PRESCRIBED (INTENTIONAL)      Please choose reason not prescribed, below    Type 2 diabetes mellitus with stage 3 chronic kidney disease, with long-term current use of insulin (H), Chronic systolic heart failure (H)       * albuterol 108 (90 Base) MCG/ACT Inhaler    PROAIR HFA/PROVENTIL HFA/VENTOLIN HFA    1 Inhaler    Inhale 2 puffs into the lungs every 6 hours as needed for shortness of breath / dyspnea    Chronic obstructive pulmonary disease, unspecified COPD type (H)       * albuterol (2.5 MG/3ML) 0.083% neb solution     25 vial    Take 1 vial (2.5 mg) by nebulization every 6 hours as  needed for shortness of breath / dyspnea or wheezing    COPD exacerbation (H)       amitriptyline 10 MG tablet    ELAVIL    90 tablet    Start at 1 tab at bedtime for 3 days, then 2 tabs at bedtime for 3 days, then 3 tabs at bedtime.    Insomnia, unspecified type       ASPIRIN PO      Take 81 mg by mouth daily        blood glucose monitoring lancets     100 each    Test once daily    Type 2 diabetes, HbA1C goal < 8% (H)       blood glucose monitoring test strip    no brand specified    3 Box    Use to test blood sugars 2 times daily or as directed - use strips compatible with pt's meter and insurance    Type 2 diabetes mellitus with stage 3 chronic kidney disease, unspecified long term insulin use status (H)       CENTRUM SILVER per tablet      Take 1 tablet by mouth daily        dulaglutide 0.75 MG/0.5ML pen    TRULICITY    6 mL    Inject 0.75 mg Subcutaneous every 7 days    Type 2 diabetes mellitus with stage 3 chronic kidney disease, with long-term current use of insulin (H)       furosemide 20 MG tablet    LASIX    60 tablet    TAKE 1 TABLET BY MOUTH TWICE DAILY    CORNELIUS (dyspnea on exertion), Chronic systolic heart failure (H)       ipratropium - albuterol 0.5 mg/2.5 mg/3 mL 0.5-2.5 (3) MG/3ML neb solution    DUONEB    30 vial    Take 1 vial (3 mLs) by nebulization every 4 hours as needed for shortness of breath / dyspnea or wheezing    COPD exacerbation (H)       metFORMIN 500 MG tablet    GLUCOPHAGE    180 tablet    Take 1 tablet (500 mg) by mouth 2 times daily (with meals)    Type 2 diabetes mellitus with stage 3 chronic kidney disease, with long-term current use of insulin (H)       metoprolol tartrate 50 MG tablet    LOPRESSOR    60 tablet    Take 1 tablet (50 mg) by mouth 2 times daily    Hypertension goal BP (blood pressure) < 130/80       nitroGLYcerin 0.4 MG sublingual tablet    NITROSTAT    25 tablet    Place 1 tablet (0.4 mg) under the tongue every 5 minutes as needed    Intermediate coronary syndrome  (H)       oxyCODONE-acetaminophen 5-325 MG per tablet   Start taking on:  7/22/2018    PERCOCET    30 tablet    Take 1 tablet by mouth At Bedtime Must last 30 days    Herniation of cervical intervertebral disc, Chronic pain syndrome       pregabalin 50 MG capsule    LYRICA    270 capsule    Take one tablet at bedtime for one week then increase to one tablet twice daily for one week then increase to one tablet three times daily    Diabetic polyneuropathy associated with type 2 diabetes mellitus (H)       rosuvastatin 20 MG tablet    CRESTOR    90 tablet    Take 1 tablet (20 mg) by mouth daily    Hypercholesterolemia       * Notice:  This list has 2 medication(s) that are the same as other medications prescribed for you. Read the directions carefully, and ask your doctor or other care provider to review them with you.

## 2018-05-23 NOTE — PATIENT INSTRUCTIONS
Stop Trazodone and start Amitriptyline at bedtime.  Start Lyrica instead of Gabapentin.  Follow up in 3 months.      Schedule colonoscopy by calling [phone]

## 2018-05-23 NOTE — PROGRESS NOTES
SUBJECTIVE:  Hayder Alves, a 63 year old male scheduled an appointment to discuss the following issues:     Chronic pain syndrome  Herniation of cervical intervertebral disc  Diabetic polyneuropathy associated with type 2 diabetes mellitus (H)  Insomnia, unspecified type    He continues to complain of pain in his hands and feet, interfering with sleep.  He has been taking Oxycodone more than just at bedtime, then runs out early.  It is unclear if he ever tried Lyrica.  Currently on Gabapentin, which he has been on in the past, does not feel that it is helping.       Medical, social, surgical, and family histories reviewed.    ROS:  CONSTITUTIONAL: NEGATIVE for fever, chills  RESP: NEGATIVE for significant cough or SOB  CV: NEGATIVE for chest pain, palpitations   GI: NEGATIVE for nausea, abdominal pain, heartburn, or change in bowel habits  MUSCULOSKELETAL:see HPI  NEURO: NEGATIVE for weakness, dizziness or paresthesias or headache  PSYCHIATRIC: NEGATIVE for changes in mood or affect    OBJECTIVE:  /74  Pulse 65  Temp 97.9  F (36.6  C) (Oral)  Resp 18  Wt 207 lb 6 oz (94.1 kg)  SpO2 97%  BMI 33.47 kg/m2  EXAM:  GENERAL APPEARANCE: healthy, alert and no distress  RESP: lungs clear to auscultation - no rales, rhonchi or wheezes  CV: regular rates and rhythm, normal S1 S2, no S3 or S4 and no murmur, click or rub -  PSYCH: mentation appears normal and affect normal/bright    ASSESSMENT/PLAN:  (G89.4) Chronic pain syndrome  Comment: due to DDD of the cervical spine and diabetic neuropathy  Plan: oxyCODONE-acetaminophen (PERCOCET) 5-325 MG per        tablet, DISCONTINUED: oxyCODONE-acetaminophen         (PERCOCET) 5-325 MG per tablet, DISCONTINUED:         oxyCODONE-acetaminophen (PERCOCET) 5-325 MG per        tablet        Refills given for 3 months.    Follow up in 3 months.  See below.     (M50.20) Herniation of cervical intervertebral disc  Comment:   Plan: oxyCODONE-acetaminophen (PERCOCET) 5-325 MG  per        tablet, DISCONTINUED: oxyCODONE-acetaminophen         (PERCOCET) 5-325 MG per tablet, DISCONTINUED:         oxyCODONE-acetaminophen (PERCOCET) 5-325 MG per        tablet        See above.     (E11.42) Diabetic polyneuropathy associated with type 2 diabetes mellitus (H)  Comment:   Plan: pregabalin (LYRICA) 50 MG capsule, alpha-lipoic        acid 600 MG CAPS        Will reorder Lyrica, take instead of Gabapentin.  Change Trazodone to Amitriptyline.  Start alpha lipoic acid.   Will increase Amitriptyline dose in future if needed.  Consider adding Cymbalta in future.     (G47.00) Insomnia, unspecified type  Comment:   Plan: amitriptyline (ELAVIL) 10 MG tablet        See above.

## 2018-05-24 ENCOUNTER — TELEPHONE (OUTPATIENT)
Dept: FAMILY MEDICINE | Facility: CLINIC | Age: 63
End: 2018-05-24

## 2018-05-24 ASSESSMENT — PATIENT HEALTH QUESTIONNAIRE - PHQ9: SUM OF ALL RESPONSES TO PHQ QUESTIONS 1-9: 6

## 2018-05-24 ASSESSMENT — ANXIETY QUESTIONNAIRES: GAD7 TOTAL SCORE: 2

## 2018-05-24 NOTE — TELEPHONE ENCOUNTER
Prior Authorization Retail Medication Request    Medication/Dose: pregabalin (LYRICA) 50 MG capsule  ICD code (if different than what is on RX):  Previously Tried and Failed:  Rationale:    Insurance Name:    Insurance ID: [id number]    Pharmacy Information (if different than what is on RX)  Name:  Phone:    Please include previous medications tried and failed.  Please ask insurance for medications on formulary.

## 2018-05-24 NOTE — TELEPHONE ENCOUNTER
Central Prior Authorization Team   Phone: [phone]      PA Initiation    Medication: pregabalin (LYRICA) 50 MG capsule, rec 5-23-18  Insurance Company: [employer] ([facility]) - Phone [phone] Fax [phone]  [facility] the Rx[facility] [address] [place] [address] [address] AT [facility] & [address] [place] [address]  Filling Pharmacy Phone: [phone]  Filling Pharmacy Fax: [phone]  Start Date: 5/24/2018

## 2018-05-24 NOTE — TELEPHONE ENCOUNTER
Prior Authorization Not Needed per Insurance    Medication: pregabalin (LYRICA) 50 MG capsule, rec 5-23-18  Insurance Company: [email or web address] ([facility]) - Phone [phone] Fax [phone]  Expected CoPay:      Pharmacy Filling the Rx: [facility] [address] [facility] [address] AT [address]  Pharmacy Notified: Yes  Patient Notified: Yes

## 2018-05-25 NOTE — TELEPHONE ENCOUNTER
Pt doesn't use my chart  Please call him and advise that he needs an appointment to get this refilled- per natalya's note below.Kandi De Oliveira RN

## 2018-05-29 RX ORDER — OXYCODONE AND ACETAMINOPHEN 5; 325 MG/1; MG/1
1 TABLET ORAL AT BEDTIME
Qty: 0.01 TABLET | Refills: 0 | Status: CANCELLED | OUTPATIENT
Start: 2018-05-29

## 2018-05-31 ENCOUNTER — TELEPHONE (OUTPATIENT)
Dept: FAMILY MEDICINE | Facility: CLINIC | Age: 63
End: 2018-05-31

## 2018-05-31 NOTE — TELEPHONE ENCOUNTER
Central Prior Authorization Team   Phone: [phone]    PA Initiation    Medication: dulaglutide (TRULICITY) 0.75 MG/0.5ML pen, REC 5-30-18  Insurance Company: [employer] ([facility]) - Phone [phone] Fax [phone]  Pharmacy Filling the Rx: [facility] [address] [place] [address] [address] AT [facility] & [address] [place] [address]  Filling Pharmacy Phone: [phone]  Filling Pharmacy Fax: [phone]  Start Date: 5/31/2018

## 2018-05-31 NOTE — TELEPHONE ENCOUNTER
"Prior Authorization Retail Medication Request    Medication/Dose: dulaglutide (TRULICITY) 0.75 MG/0.5ML pen  ICD code (if different than what is on RX):  Previously Tried and Failed:  Rationale:    Insurance Name:    Insurance ID:      Pharmacy Information (if different than what is on RX)  Name:  Phone:    Please include previous medications tried and failed.  Please ask insurance for medications on formulary.    PA started for dulaglutide (TRULICITY) 0.75 MG/0.5ML pen.  To submit the PA:  1. Go to [email or web address] and click Enter a key  2. Enter the pt's last name and date of birth and the key   Patient last name:TERESA   :[birth year]   Key:[id number]  3. Complete the form and click \"Send to Plan\"       "

## 2018-07-10 ENCOUNTER — TELEPHONE (OUTPATIENT)
Dept: FAMILY MEDICINE | Facility: CLINIC | Age: 63
End: 2018-07-10

## 2018-07-10 NOTE — TELEPHONE ENCOUNTER
Reason for Call:  Medication or medication refill:    Do you use a [facility]?  Name of the pharmacy and phone number for the current request:  [facility] [address] - [phone]    Name of the medication requested: Trulicity, pt found out it cost $630 for one month.  Wants a less expensive alternative to that medication.    Other request: Please call with any questions    Can we leave a detailed message on this number? YES    Phone number patient can be reached at: Home number on file [phone] (home)    Best Time: today if poss    Call taken on 7/10/2018 at 8:45 AM by Karen Terrell

## 2018-08-22 ENCOUNTER — OFFICE VISIT (OUTPATIENT)
Dept: FAMILY MEDICINE | Facility: CLINIC | Age: 63
End: 2018-08-22
Payer: MEDICARE

## 2018-08-22 VITALS
TEMPERATURE: 96.8 F | DIASTOLIC BLOOD PRESSURE: 70 MMHG | SYSTOLIC BLOOD PRESSURE: 138 MMHG | RESPIRATION RATE: 18 BRPM | BODY MASS INDEX: 33.09 KG/M2 | WEIGHT: 205 LBS

## 2018-08-22 DIAGNOSIS — G89.4 CHRONIC PAIN SYNDROME: ICD-10-CM

## 2018-08-22 DIAGNOSIS — Z79.4 TYPE 2 DIABETES MELLITUS WITH STAGE 3 CHRONIC KIDNEY DISEASE, WITH LONG-TERM CURRENT USE OF INSULIN (H): Primary | ICD-10-CM

## 2018-08-22 DIAGNOSIS — E11.22 TYPE 2 DIABETES MELLITUS WITH STAGE 3 CHRONIC KIDNEY DISEASE, WITH LONG-TERM CURRENT USE OF INSULIN (H): Primary | ICD-10-CM

## 2018-08-22 DIAGNOSIS — E11.42 DIABETIC POLYNEUROPATHY ASSOCIATED WITH TYPE 2 DIABETES MELLITUS (H): ICD-10-CM

## 2018-08-22 DIAGNOSIS — M50.20 HERNIATION OF CERVICAL INTERVERTEBRAL DISC: ICD-10-CM

## 2018-08-22 DIAGNOSIS — N18.30 TYPE 2 DIABETES MELLITUS WITH STAGE 3 CHRONIC KIDNEY DISEASE, WITH LONG-TERM CURRENT USE OF INSULIN (H): Primary | ICD-10-CM

## 2018-08-22 DIAGNOSIS — M72.0 DUPUYTREN CONTRACTURE: ICD-10-CM

## 2018-08-22 DIAGNOSIS — I25.810 CORONARY ARTERY DISEASE INVOLVING CORONARY BYPASS GRAFT OF NATIVE HEART WITHOUT ANGINA PECTORIS: ICD-10-CM

## 2018-08-22 LAB — HBA1C MFR BLD: 9.6 % (ref 0–5.6)

## 2018-08-22 PROCEDURE — 80048 BASIC METABOLIC PNL TOTAL CA: CPT | Performed by: NURSE PRACTITIONER

## 2018-08-22 PROCEDURE — 36415 COLL VENOUS BLD VENIPUNCTURE: CPT | Performed by: NURSE PRACTITIONER

## 2018-08-22 PROCEDURE — 99214 OFFICE O/P EST MOD 30 MIN: CPT | Performed by: NURSE PRACTITIONER

## 2018-08-22 PROCEDURE — 83036 HEMOGLOBIN GLYCOSYLATED A1C: CPT | Performed by: NURSE PRACTITIONER

## 2018-08-22 RX ORDER — OXYCODONE AND ACETAMINOPHEN 5; 325 MG/1; MG/1
1 TABLET ORAL AT BEDTIME
Qty: 30 TABLET | Refills: 0 | Status: SHIPPED | OUTPATIENT
Start: 2018-10-21 | End: 2018-12-13

## 2018-08-22 RX ORDER — BLOOD-GLUCOSE METER
KIT MISCELLANEOUS
Qty: 1 KIT | Refills: 0 | Status: SHIPPED | OUTPATIENT
Start: 2018-08-22 | End: 2023-01-01

## 2018-08-22 RX ORDER — OXYCODONE AND ACETAMINOPHEN 5; 325 MG/1; MG/1
1 TABLET ORAL AT BEDTIME
Qty: 30 TABLET | Refills: 0 | Status: SHIPPED | OUTPATIENT
Start: 2018-08-22 | End: 2018-08-22

## 2018-08-22 RX ORDER — OXYCODONE AND ACETAMINOPHEN 5; 325 MG/1; MG/1
1 TABLET ORAL AT BEDTIME
Qty: 30 TABLET | Refills: 0 | Status: SHIPPED | OUTPATIENT
Start: 2018-09-21 | End: 2018-08-22

## 2018-08-22 RX ORDER — PREGABALIN 150 MG/1
150 CAPSULE ORAL 2 TIMES DAILY
Qty: 60 CAPSULE | Refills: 5 | Status: SHIPPED | OUTPATIENT
Start: 2018-08-22 | End: 2019-04-16

## 2018-08-22 NOTE — MR AVS SNAPSHOT
After Visit Summary   8/22/2018    Hayder Alves    MRN: [id number]           Patient Information     Date Of Birth          1955        Visit Information        Provider Department      8/22/2018 2:15 PM Suma Jenkins NP [facility]        Today's Diagnoses     Type 2 diabetes mellitus with stage 3 chronic kidney disease, with long-term current use of insulin (H)    -  1    Diabetic polyneuropathy associated with type 2 diabetes mellitus (H)        Herniation of cervical intervertebral disc        Chronic pain syndrome        Coronary artery disease involving coronary bypass graft of native heart without angina pectoris        Dupuytren contracture          Care Instructions    Increase Lyrica to 150 mg twice daily.    A  will call you to set up a cardiology follow up appointment.     See me again in 3 months.           Follow-ups after your visit        Additional Services     CARDIO  ADULT REFERRAL       [facility] is referring you to Cardiology Services.       The  Representative will assist you in the coordination of your Cardiology care as prescribed by your physician.    The  Representative will call you within 24 hours to help schedule your appointment, or you may contact the  Representative at: [phone].         Type of Referral: Cardiology Follow Up            Timeframe requested: routine       Coverage of these services is subject to the terms and limitations of your health insurance plan.  Please call member services at your health plan with any benefit or coverage questions.      If X-rays, CT or MRI's have been performed, please contact the facility where they were done to arrange for , prior to your scheduled appointment.  Please bring this referral request to your appointment and present it to your specialist.            ORTHOPEDICS ADULT REFERRAL       Your provider has referred  you to: [facility] [place] [phone]   [email or web address]    Please be aware that coverage of these services is subject to the terms and limitations of your health insurance plan.  Call member services at your health plan with any benefit or coverage questions.      Please bring the following to your appointment:    >>   Any x-rays, CTs or MRIs which have been performed.  Contact the facility where they were done to arrange for  prior to your scheduled appointment.    >>   List of current medications   >>   This referral request   >>   Any documents/labs given to you for this referral                  Your next 10 appointments already scheduled     Sep 13, 2018  2:00 PM CDT   New Visit with Eri Palafox MD   [facility] ([facility])    [facility] [place] [id number] [phone]              Who to contact     If you have questions or need follow up information about today's clinic visit or your schedule please contact [facility] directly at [phone].  Normal or non-critical lab and imaging results will be communicated to you by MyChart, letter or phone within 4 business days after the clinic has received the results. If you do not hear from us within 7 days, please contact the clinic through [employer] or phone. If you have a critical or abnormal lab result, we will notify you by phone as soon as possible.  Submit refill requests through [employer] or call your pharmacy and they will forward the refill request to us. Please allow 3 business days for your refill to be completed.          Additional Information About Your Visit        [employer] Information     [employer] gives you secure access to your electronic health record. If you see a primary care provider, you can also send messages to your care team and make appointments. If you have questions, please call your primary care  clinic.  If you do not have a primary care provider, please call [phone] and they will assist you.        Care EveryWhere ID     This is your Care EveryWhere ID. This could be used by other organizations to access your [facility] medical records  [id number]        Your Vitals Were     Temperature Respirations BMI (Body Mass Index)             96.8  F (36  C) (Oral) 18 33.09 kg/m2          Blood Pressure from Last 3 Encounters:   08/22/18 138/70   05/23/18 139/74   03/05/18 132/79    Weight from Last 3 Encounters:   08/22/18 205 lb (93 kg)   05/23/18 207 lb 6 oz (94.1 kg)   03/05/18 207 lb 6 oz (94.1 kg)              We Performed the Following     Basic metabolic panel     CARDIO  ADULT REFERRAL     Hemoglobin A1c     ORTHOPEDICS ADULT REFERRAL          Today's Medication Changes          These changes are accurate as of 8/22/18  2:56 PM.  If you have any questions, ask your nurse or doctor.               Start taking these medicines.        Dose/Directions    blood glucose monitoring meter device kit   Used for:  Type 2 diabetes mellitus with stage 3 chronic kidney disease, with long-term current use of insulin (H)        Use to test blood sugars 2 times daily or as directed.   Quantity:  1 kit   Refills:  0       oxyCODONE-acetaminophen 5-325 MG per tablet   Commonly known as:  PERCOCET   Used for:  Herniation of cervical intervertebral disc, Chronic pain syndrome        Dose:  1 tablet   Start taking on:  10/21/2018   Take 1 tablet by mouth At Bedtime Must last 30 days   Quantity:  30 tablet   Refills:  0         These medicines have changed or have updated prescriptions.        Dose/Directions    pregabalin 150 MG capsule   Commonly known as:  LYRICA   This may have changed:    - medication strength  - how much to take  - how to take this  - when to take this  - additional instructions   Used for:  Diabetic polyneuropathy associated with type 2 diabetes mellitus (H)        Dose:  150 mg   Take 1  capsule (150 mg) by mouth 2 times daily   Quantity:  60 capsule   Refills:  5            Where to get your medicines      These medications were sent to [facility] [address] [facility] [address] [facility] & [address] [place], [place] [address] [address]    Hours:  24-hours Phone:  [phone]     blood glucose monitoring meter device kit         Some of these will need a paper prescription and others can be bought over the counter.  Ask your nurse if you have questions.     Bring a paper prescription for each of these medications     oxyCODONE-acetaminophen 5-325 MG per tablet    pregabalin 150 MG capsule               Information about OPIOIDS     PRESCRIPTION OPIOIDS: WHAT YOU NEED TO KNOW   We gave you an opioid (narcotic) pain medicine. It is important to manage your pain, but opioids are not always the best choice. You should first try all the other options your care team gave you. Take this medicine for as short a time (and as few doses) as possible.    Some activities can increase your pain, such as bandage changes or therapy sessions. It may help to take your pain medicine 30 to 60 minutes before these activities. Reduce your stress by getting enough sleep, working on hobbies you enjoy and practicing relaxation or meditation. Talk to your care team about ways to manage your pain beyond prescription opioids.    These medicines have risks:    DO NOT drive when on new or higher doses of pain medicine. These medicines can affect your alertness and reaction times, and you could be arrested for driving under the influence (DUI). If you need to use opioids long-term, talk to your care team about driving.    DO NOT operate heavy machinery    DO NOT do any other dangerous activities while taking these medicines.    DO NOT drink any alcohol while taking these medicines.     If the opioid prescribed includes acetaminophen, DO NOT take with any other medicines that contain  acetaminophen. Read all labels carefully. Look for the word  acetaminophen  or  Tylenol.  Ask your pharmacist if you have questions or are unsure.    You can get addicted to pain medicines, especially if you have a history of addiction (chemical, alcohol or substance dependence). Talk to your care team about ways to reduce this risk.    All opioids tend to cause constipation. Drink plenty of water and eat foods that have a lot of fiber, such as fruits, vegetables, prune juice, apple juice and high-fiber cereal. Take a laxative (Miralax, milk of magnesia, Colace, Senna) if you don t move your bowels at least every other day. Other side effects include upset stomach, sleepiness, dizziness, throwing up, tolerance (needing more of the medicine to have the same effect), physical dependence and slowed breathing.    Store your pills in a secure place, locked if possible. We will not replace any lost or stolen medicine. If you don t finish your medicine, please throw away (dispose) as directed by your pharmacist. The [place] Pollution Control Agency has more information about safe disposal: [email or web address]         Primary Care Provider Office Phone # Fax #    Suma Jenkins [phone] [address] [facility] [address]        Equal Access to Services     YNES YU : Hadii lili casas hadasho Soomaali, waaxda luqadaha, qaybta kaalmada adeegyada, sheryl pearce. So [facility] [phone].    ATENCIÓN: Si habla español, tiene a ellis disposición servicios gratuitos de asistencia lingüística. Llame al [phone].    We comply with applicable federal civil rights laws and [place] laws. We do not discriminate on the basis of race, color, national origin, age, disability, sex, sexual orientation, or gender identity.            Thank you!     Thank you for choosing [facility]  for your care. Our goal is  always to provide you with excellent care. Hearing back from our patients is one way we can continue to improve our services. Please take a few minutes to complete the written survey that you may receive in the mail after your visit with us. Thank you!             Your Updated Medication List - Protect others around you: Learn how to safely use, store and throw away your medicines at www.disposemymeds.org.          This list is accurate as of 8/22/18  2:56 PM.  Always use your most recent med list.                   Brand Name Dispense Instructions for use Diagnosis    ACE/ARB/ARNI NOT PRESCRIBED (INTENTIONAL)      Please choose reason not prescribed, below    Type 2 diabetes mellitus with stage 3 chronic kidney disease, with long-term current use of insulin (H), Chronic systolic heart failure (H)       * albuterol 108 (90 Base) MCG/ACT inhaler    PROAIR HFA/PROVENTIL HFA/VENTOLIN HFA    1 Inhaler    Inhale 2 puffs into the lungs every 6 hours as needed for shortness of breath / dyspnea    Chronic obstructive pulmonary disease, unspecified COPD type (H)       * albuterol (2.5 MG/3ML) 0.083% neb solution     25 vial    Take 1 vial (2.5 mg) by nebulization every 6 hours as needed for shortness of breath / dyspnea or wheezing    COPD exacerbation (H)       alpha-lipoic acid 600 MG Caps     30 capsule    Take 600 mg by mouth daily    Diabetic polyneuropathy associated with type 2 diabetes mellitus (H)       amitriptyline 10 MG tablet    ELAVIL    90 tablet    Start at 1 tab at bedtime for 3 days, then 2 tabs at bedtime for 3 days, then 3 tabs at bedtime.    Insomnia, unspecified type       ASPIRIN PO      Take 81 mg by mouth daily        blood glucose monitoring lancets     100 each    Test once daily    Type 2 diabetes, HbA1C goal < 8% (H)       blood glucose monitoring meter device kit     1 kit    Use to test blood sugars 2 times daily or as directed.    Type 2 diabetes mellitus with stage 3 chronic kidney disease,  with long-term current use of insulin (H)       blood glucose monitoring test strip    no brand specified    3 Box    Use to test blood sugars 2 times daily or as directed - use strips compatible with pt's meter and insurance    Type 2 diabetes mellitus with stage 3 chronic kidney disease, unspecified long term insulin use status (H)       CENTRUM SILVER per tablet      Take 1 tablet by mouth daily        dulaglutide 0.75 MG/0.5ML pen    TRULICITY    6 mL    Inject 0.75 mg Subcutaneous every 7 days    Type 2 diabetes mellitus with stage 3 chronic kidney disease, with long-term current use of insulin (H)       furosemide 20 MG tablet    LASIX    60 tablet    TAKE 1 TABLET BY MOUTH TWICE DAILY    CORNELIUS (dyspnea on exertion), Chronic systolic heart failure (H)       ipratropium - albuterol 0.5 mg/2.5 mg/3 mL 0.5-2.5 (3) MG/3ML neb solution    DUONEB    30 vial    Take 1 vial (3 mLs) by nebulization every 4 hours as needed for shortness of breath / dyspnea or wheezing    COPD exacerbation (H)       metFORMIN 500 MG tablet    GLUCOPHAGE    180 tablet    Take 1 tablet (500 mg) by mouth 2 times daily (with meals)    Type 2 diabetes mellitus with stage 3 chronic kidney disease, with long-term current use of insulin (H)       metoprolol tartrate 50 MG tablet    LOPRESSOR    60 tablet    Take 1 tablet (50 mg) by mouth 2 times daily    Hypertension goal BP (blood pressure) < 130/80       nitroGLYcerin 0.4 MG sublingual tablet    NITROSTAT    25 tablet    Place 1 tablet (0.4 mg) under the tongue every 5 minutes as needed    Intermediate coronary syndrome (H)       oxyCODONE-acetaminophen 5-325 MG per tablet   Start taking on:  10/21/2018    PERCOCET    30 tablet    Take 1 tablet by mouth At Bedtime Must last 30 days    Herniation of cervical intervertebral disc, Chronic pain syndrome       pregabalin 150 MG capsule    LYRICA    60 capsule    Take 1 capsule (150 mg) by mouth 2 times daily    Diabetic polyneuropathy associated with  type 2 diabetes mellitus (H)       rosuvastatin 20 MG tablet    CRESTOR    90 tablet    Take 1 tablet (20 mg) by mouth daily    Hypercholesterolemia       * Notice:  This list has 2 medication(s) that are the same as other medications prescribed for you. Read the directions carefully, and ask your doctor or other care provider to review them with you.

## 2018-08-22 NOTE — PROGRESS NOTES
SUBJECTIVE:  Hayder Alves, a 63 year old male scheduled an appointment to discuss the following issues:     Type 2 diabetes mellitus with stage 3 chronic kidney disease, with long-term current use of insulin (H)  He has not checked his blood sugars in over 3 months - can't get batteries for his machine.  Trulicity not covered.  Diabetic polyneuropathy associated with type 2 diabetes mellitus (H)  Herniation of cervical intervertebral disc  Chronic pain syndrome  He was changed from Gabapentin to Lyrica 3 months ago.  He is still having pain, mostly at night and difficulty falling asleep.  He was also changed from Trazodone to Amitriptyline but is not finding this helpful for sleep.   He takes one oxycodone nightly for pain.  Coronary artery disease involving coronary bypass graft of native heart without angina pectoris  He has not seen cardiology in over a year.  Stable, denies any chest pain, shortness of breath, edema.    Dupuytren contracture  Left hand contracture is worsening, more difficult to open hand, more pain.      Medical, social, surgical, and family histories reviewed.    ROS:  CONSTITUTIONAL: NEGATIVE for fever, chills  EYES: NEGATIVE for vision changes   RESP: NEGATIVE for significant cough or SOB  CV: NEGATIVE for chest pain, palpitations   GI: NEGATIVE for nausea, abdominal pain, heartburn, or change in bowel habits  : NEGATIVE for frequency, dysuria, or hematuria  MUSCULOSKELETAL:see HPI  NEURO: see HPI  PSYCHIATRIC: NEGATIVE for changes in mood or affect    OBJECTIVE:  /70  Pulse (P) 65  Temp 96.8  F (36  C) (Oral)  Resp 18  Wt 205 lb (93 kg)  SpO2 (P) 100%  BMI 33.09 kg/m2  EXAM:  GENERAL APPEARANCE: healthy, alert and no distress  RESP: lungs clear to auscultation - no rales, rhonchi or wheezes  CV: regular rates and rhythm, normal S1 S2, no S3 or S4 and no murmur, click or rub -  MS: contracture left palm  PSYCH: mentation appears normal and affect  normal/bright    ASSESSMENT/PLAN:  (E11.22,  N18.3,  Z79.4) Type 2 diabetes mellitus with stage 3 chronic kidney disease, with long-term current use of insulin (H)  (primary encounter diagnosis)  Comment: not at goal  Plan: blood glucose monitoring (FREESTYLE FREEDOM         LITE) meter device kit, Hemoglobin A1c, Basic         metabolic panel        Will refer to endocrinology due to need for more intense education, difficulty affording medications.    (E11.42) Diabetic polyneuropathy associated with type 2 diabetes mellitus (H)  Comment:   Plan: pregabalin (LYRICA) 150 MG capsule        Will increase Lyrica to 150 mg BID.     (M50.20) Herniation of cervical intervertebral disc  Comment:   Plan: oxyCODONE-acetaminophen (PERCOCET) 5-325 MG per        tablet, DISCONTINUED: oxyCODONE-acetaminophen         (PERCOCET) 5-325 MG per tablet, DISCONTINUED:         oxyCODONE-acetaminophen (PERCOCET) 5-325 MG per        tablet        Refills given for 3 months.   Follow up in 3 months.     (G89.4) Chronic pain syndrome  Comment:   Plan: oxyCODONE-acetaminophen (PERCOCET) 5-325 MG per        tablet, DISCONTINUED: oxyCODONE-acetaminophen         (PERCOCET) 5-325 MG per tablet, DISCONTINUED:         oxyCODONE-acetaminophen (PERCOCET) 5-325 MG per        tablet        See above.     (I25.810) Coronary artery disease involving coronary bypass graft of native heart without angina pectoris  Comment:   Plan: CARDIO  ADULT REFERRAL        See cardiology at [facility].     (M72.0) Dupuytren contracture  Comment:   Plan: ORTHOPEDICS ADULT REFERRAL        Referral to hand ortho given.

## 2018-08-22 NOTE — LETTER
August 27, 2018      Hayder JO Long  [address] [facility] [address] [address]        Dear Vijay,       Enclosed is a copy of your recent lab results. After review, your A1c is much higher and not at goal. Per Suma Jenkins she would like you to see an endocrinologist. See below for endocrinology referral, please call to schedule. If you have any questions about your lab results, please contact us at [phone].      Your provider has referred you to: [facility] [phone]   [email or web address]  [facility]: [facility] [place] [phone]   [email or web address]      Please be aware that coverage of these services is subject to the terms and limitations of your health insurance plan.  Call member services at your health plan with any benefit or coverage questions.      Please bring the following to your appointment:    >>   Any x-rays, CTs or MRIs which have been performed.  Contact the facility where they were done to arrange for  prior to your scheduled appointment.    >>   List of current medications   >>   This referral request   >>   Any documents/labs given to you for this referral          Results for orders placed or performed in visit on 08/22/18   Hemoglobin A1c   Result Value Ref Range    Hemoglobin A1C 9.6 (H) 0 - 5.6 %   Basic metabolic panel   Result Value Ref Range    Sodium 140 133 - 144 mmol/L    Potassium 4.0 3.4 - 5.3 mmol/L    Chloride 106 94 - 109 mmol/L    Carbon Dioxide 26 20 - 32 mmol/L    Anion Gap 8 3 - 14 mmol/L    Glucose 238 (H) 70 - 99 mg/dL    Urea Nitrogen 12 7 - 30 mg/dL    Creatinine 1.06 0.66 - 1.25 mg/dL    GFR Estimate 70 >60 mL/min/1.7m2    GFR Estimate If Black 85 >60 mL/min/1.7m2    Calcium 8.5 8.5 - 10.1 mg/dL         Sincerely,        Aparna MORRISON MA/Suma Jenkins, NP

## 2018-08-22 NOTE — PATIENT INSTRUCTIONS
Increase Lyrica to 150 mg twice daily.    A  will call you to set up a cardiology follow up appointment.     See me again in 3 months.

## 2018-08-23 LAB
ANION GAP SERPL CALCULATED.3IONS-SCNC: 8 MMOL/L (ref 3–14)
BUN SERPL-MCNC: 12 MG/DL (ref 7–30)
CALCIUM SERPL-MCNC: 8.5 MG/DL (ref 8.5–10.1)
CHLORIDE SERPL-SCNC: 106 MMOL/L (ref 94–109)
CO2 SERPL-SCNC: 26 MMOL/L (ref 20–32)
CREAT SERPL-MCNC: 1.06 MG/DL (ref 0.66–1.25)
GFR SERPL CREATININE-BSD FRML MDRD: 70 ML/MIN/1.7M2
GLUCOSE SERPL-MCNC: 238 MG/DL (ref 70–99)
POTASSIUM SERPL-SCNC: 4 MMOL/L (ref 3.4–5.3)
SODIUM SERPL-SCNC: 140 MMOL/L (ref 133–144)

## 2018-09-12 ENCOUNTER — DOCUMENTATION ONLY (OUTPATIENT)
Dept: CARDIOLOGY | Facility: CLINIC | Age: 63
End: 2018-09-12

## 2018-09-12 NOTE — PROGRESS NOTES
Patient overdue for follow up with Dr. Arthur, echo, and labs. Pt was due to follow up 9/2017, pt no showed to that appt. Multiple attempts made to reach patient to arrange. Mailed reminder letter for OV, echo, and labs, order date extended. GASPER Aguiar 2:23 PM 09/12/18

## 2018-10-11 DIAGNOSIS — R06.09 DOE (DYSPNEA ON EXERTION): ICD-10-CM

## 2018-10-11 DIAGNOSIS — I50.22 CHRONIC SYSTOLIC HEART FAILURE (H): ICD-10-CM

## 2018-10-13 NOTE — TELEPHONE ENCOUNTER
"Requested Prescriptions   Pending Prescriptions Disp Refills     furosemide (LASIX) 20 MG tablet [Pharmacy Med Name: FUROSEMIDE 20MG TABLETS]  Last Written Prescription Date:  4/11/2018  Last Fill Quantity: 60 tabs,  # refills: 2   Last office visit: 8/18/2017 with prescribing provider:  Gregory   Future Office Visit:     60 tablet 0     Sig: TAKE 1 TABLET BY MOUTH TWICE DAILY    Diuretics (Including Combos) Protocol Passed    10/11/2018 12:56 PM       Passed - Blood pressure under 140/90 in past 12 months    BP Readings from Last 3 Encounters:   08/22/18 138/70   05/23/18 139/74   03/05/18 132/79                Passed - Recent (12 mo) or future (30 days) visit within the authorizing provider's specialty    Patient had office visit in the last 12 months or has a visit in the next 30 days with authorizing provider or within the authorizing provider's specialty.  See \"Patient Info\" tab in inbasket, or \"Choose Columns\" in Meds & Orders section of the refill encounter.           Passed - Patient is age 18 or older       Passed - Normal serum creatinine on file in past 12 months    Recent Labs   Lab Test  08/22/18   1445   CR  1.06             Passed - Normal serum potassium on file in past 12 months    Recent Labs   Lab Test  08/22/18   1445   POTASSIUM  4.0                   Passed - Normal serum sodium on file in past 12 months    Recent Labs   Lab Test  08/22/18   1445   NA  140                "

## 2018-10-15 RX ORDER — FUROSEMIDE 20 MG
20 TABLET ORAL 2 TIMES DAILY
Qty: 60 TABLET | Refills: 1 | Status: SHIPPED | OUTPATIENT
Start: 2018-10-15 | End: 2019-02-11

## 2018-10-15 NOTE — TELEPHONE ENCOUNTER
Due for appt in Nov  This noted on refill for pt reminder. Refilled per [facility] refill policy.    Tamika Quach RN

## 2018-12-04 ENCOUNTER — TELEPHONE (OUTPATIENT)
Dept: FAMILY MEDICINE | Facility: CLINIC | Age: 63
End: 2018-12-04

## 2018-12-04 DIAGNOSIS — M50.20 HERNIATION OF CERVICAL INTERVERTEBRAL DISC: ICD-10-CM

## 2018-12-04 DIAGNOSIS — G89.4 CHRONIC PAIN SYNDROME: ICD-10-CM

## 2018-12-04 NOTE — TELEPHONE ENCOUNTER
According to last visit notes needed a 3 month follow up for pain med refill.   Plan: set up appt for 12/13 for 3 month f/u.  States he has run out of pain med but ok to wait for next week.  Wendy Lockwood RN

## 2018-12-04 NOTE — TELEPHONE ENCOUNTER
Reason for Call:  Medication or medication refill:    Do you use a [facility]?  Name of the pharmacy and phone number for the current request:  [facility] on [address]     Name of the medication requested: oxyCODONE-acetaminophen (PERCOCET) 5-325 MG per tablet    Other request: Pt will  Rx from [facility]     Can we leave a detailed message on this number? YES    Phone number patient can be reached at: Home number on file [phone] (home)    Best Time: Any Time    Call taken on 12/4/2018 at 11:36 AM by Lilia Monte

## 2018-12-13 ENCOUNTER — OFFICE VISIT (OUTPATIENT)
Dept: FAMILY MEDICINE | Facility: CLINIC | Age: 63
End: 2018-12-13
Payer: MEDICARE

## 2018-12-13 VITALS
OXYGEN SATURATION: 99 % | DIASTOLIC BLOOD PRESSURE: 66 MMHG | WEIGHT: 195 LBS | TEMPERATURE: 97.9 F | BODY MASS INDEX: 31.47 KG/M2 | RESPIRATION RATE: 16 BRPM | HEART RATE: 77 BPM | SYSTOLIC BLOOD PRESSURE: 132 MMHG

## 2018-12-13 DIAGNOSIS — G89.4 CHRONIC PAIN SYNDROME: ICD-10-CM

## 2018-12-13 DIAGNOSIS — Z79.4 TYPE 2 DIABETES MELLITUS WITH STAGE 3 CHRONIC KIDNEY DISEASE, WITH LONG-TERM CURRENT USE OF INSULIN (H): Primary | ICD-10-CM

## 2018-12-13 DIAGNOSIS — M72.0 DUPUYTREN CONTRACTURE: ICD-10-CM

## 2018-12-13 DIAGNOSIS — E11.22 TYPE 2 DIABETES MELLITUS WITH STAGE 3 CHRONIC KIDNEY DISEASE, WITH LONG-TERM CURRENT USE OF INSULIN (H): Primary | ICD-10-CM

## 2018-12-13 DIAGNOSIS — N18.30 TYPE 2 DIABETES MELLITUS WITH STAGE 3 CHRONIC KIDNEY DISEASE, WITH LONG-TERM CURRENT USE OF INSULIN (H): Primary | ICD-10-CM

## 2018-12-13 DIAGNOSIS — Z23 NEED FOR PROPHYLACTIC VACCINATION AND INOCULATION AGAINST INFLUENZA: ICD-10-CM

## 2018-12-13 DIAGNOSIS — M50.20 HERNIATION OF CERVICAL INTERVERTEBRAL DISC: ICD-10-CM

## 2018-12-13 DIAGNOSIS — I25.810 CORONARY ARTERY DISEASE INVOLVING CORONARY BYPASS GRAFT OF NATIVE HEART WITHOUT ANGINA PECTORIS: ICD-10-CM

## 2018-12-13 LAB — HBA1C MFR BLD: 11.3 % (ref 0–5.6)

## 2018-12-13 PROCEDURE — 80061 LIPID PANEL: CPT | Performed by: NURSE PRACTITIONER

## 2018-12-13 PROCEDURE — 99214 OFFICE O/P EST MOD 30 MIN: CPT | Mod: 25 | Performed by: NURSE PRACTITIONER

## 2018-12-13 PROCEDURE — 90682 RIV4 VACC RECOMBINANT DNA IM: CPT | Performed by: NURSE PRACTITIONER

## 2018-12-13 PROCEDURE — G0008 ADMIN INFLUENZA VIRUS VAC: HCPCS | Performed by: NURSE PRACTITIONER

## 2018-12-13 PROCEDURE — 36415 COLL VENOUS BLD VENIPUNCTURE: CPT | Performed by: NURSE PRACTITIONER

## 2018-12-13 PROCEDURE — 80053 COMPREHEN METABOLIC PANEL: CPT | Performed by: NURSE PRACTITIONER

## 2018-12-13 PROCEDURE — 83036 HEMOGLOBIN GLYCOSYLATED A1C: CPT | Performed by: NURSE PRACTITIONER

## 2018-12-13 PROCEDURE — 82043 UR ALBUMIN QUANTITATIVE: CPT | Performed by: NURSE PRACTITIONER

## 2018-12-13 PROCEDURE — 84443 ASSAY THYROID STIM HORMONE: CPT | Performed by: NURSE PRACTITIONER

## 2018-12-13 PROCEDURE — 83721 ASSAY OF BLOOD LIPOPROTEIN: CPT | Mod: 59 | Performed by: NURSE PRACTITIONER

## 2018-12-13 RX ORDER — OXYCODONE AND ACETAMINOPHEN 5; 325 MG/1; MG/1
1 TABLET ORAL AT BEDTIME
Qty: 30 TABLET | Refills: 0 | Status: SHIPPED | OUTPATIENT
Start: 2018-12-13 | End: 2018-12-13

## 2018-12-13 RX ORDER — OXYCODONE AND ACETAMINOPHEN 5; 325 MG/1; MG/1
1 TABLET ORAL AT BEDTIME
Qty: 30 TABLET | Refills: 0 | Status: SHIPPED | OUTPATIENT
Start: 2019-01-12 | End: 2018-12-13

## 2018-12-13 RX ORDER — OXYCODONE AND ACETAMINOPHEN 5; 325 MG/1; MG/1
1 TABLET ORAL AT BEDTIME
Qty: 30 TABLET | Refills: 0 | Status: SHIPPED | OUTPATIENT
Start: 2019-02-11 | End: 2019-02-27

## 2018-12-13 NOTE — PROGRESS NOTES
SUBJECTIVE:   Hayder Alves is a 63 year old male who presents to clinic today for the following health issues:      Diabetes Follow-up      Patient is checking blood sugars: not at all    Diabetic concerns: None     Symptoms of hypoglycemia (low blood sugar): none     Paresthesias (numbness or burning in feet) or sores: No     Date of last diabetic eye exam: DUE FOR ONE     BP Readings from Last 2 Encounters:   12/13/18 147/77   08/22/18 138/70     Hemoglobin A1C (%)   Date Value   08/22/2018 9.6 (H)   11/21/2017 7.5 (H)     LDL Cholesterol Calculated (mg/dL)   Date Value   03/05/2018     Cannot estimate LDL when triglyceride exceeds 400 mg/dL   01/03/2017 80     LDL Cholesterol Direct (mg/dL)   Date Value   03/05/2018 81       Diabetes Management Resources    Amount of exercise or physical activity: None    Problems taking medications regularly: No    Medication side effects: none    Diet: regular (no restrictions)    Medication Followup of percocet     Taking Medication as prescribed: yes    Side Effects:  None    Medication Helping Symptoms:  yes      Would like left hand exam and referral if needed.    Notes from last visit:    Type 2 diabetes mellitus with stage 3 chronic kidney disease, with long-term current use of insulin (H)  He has not checked his blood sugars in over 3 months - can't get batteries for his machine.  Trulicity not covered.  Diabetic polyneuropathy associated with type 2 diabetes mellitus (H)  Herniation of cervical intervertebral disc  Chronic pain syndrome  He was changed from Gabapentin to Lyrica 3 months ago.  He is still having pain, mostly at night and difficulty falling asleep.  He was also changed from Trazodone to Amitriptyline but is not finding this helpful for sleep.   He takes one oxycodone nightly for pain.  Coronary artery disease involving coronary bypass graft of native heart without angina pectoris  He has not seen cardiology in over a year.  Stable, denies any  chest pain, shortness of breath, edema.     Dupuytren contracture  Left hand contracture is worsening, more difficult to open hand, more pain.    He did not schedule with cardiology yet.  He did not follow through with ortho.  He was given a referral to endo after his lab results were back, but he does not remember getting this.     Problem list and histories reviewed & adjusted, as indicated.  Additional history: as documented        Reviewed and updated as needed this visit by clinical staff  Tobacco  Allergies  Meds  Med Hx  Surg Hx  Fam Hx  Soc Hx      Reviewed and updated as needed this visit by Provider         ROS:  CONSTITUTIONAL: NEGATIVE for fever, chills, change in weight  ENT/MOUTH: NEGATIVE for ear, mouth and throat problems  RESP: NEGATIVE for significant cough or SOB  CV: NEGATIVE for chest pain, palpitations or peripheral edema  MUSCULOSKELETAL: see HPI  NEURO: see HPI  PSYCHIATRIC: NEGATIVE for changes in mood or affect    OBJECTIVE:     /66   Pulse 77   Temp 97.9  F (36.6  C) (Oral)   Resp 16   Wt 88.5 kg (195 lb)   SpO2 99%   BMI 31.47 kg/m    Body mass index is 31.47 kg/m .  GENERAL: healthy, alert and no distress  RESP: lungs clear to auscultation - no rales, rhonchi or wheezes  CV: regular rate and rhythm, normal S1 S2, no S3 or S4, no murmur, click or rub, no peripheral edema and peripheral pulses strong  PSYCH: mentation appears normal, affect normal/bright    Diagnostic Test Results:  Results for orders placed or performed in visit on 12/13/18 (from the past 24 hour(s))   Hemoglobin A1c   Result Value Ref Range    Hemoglobin A1C 11.3 (H) 0 - 5.6 %       ASSESSMENT/PLAN:             1. Type 2 diabetes mellitus with stage 3 chronic kidney disease, with long-term current use of insulin (H)  Not at goal  Reprinted   - Hemoglobin A1c  - Lipid panel reflex to direct LDL Fasting  - Comprehensive metabolic panel  - Albumin Random Urine Quantitative with Creat Ratio  - TSH with free  T4 reflex    2. Chronic pain syndrome  Refills given for 3 months.  Follow up in 3 months.   - oxyCODONE-acetaminophen (PERCOCET) 5-325 MG tablet; Take 1 tablet by mouth At Bedtime Must last 30 days  Dispense: 30 tablet; Refill: 0    3. Herniation of cervical intervertebral disc  See above.   - oxyCODONE-acetaminophen (PERCOCET) 5-325 MG tablet; Take 1 tablet by mouth At Bedtime Must last 30 days  Dispense: 30 tablet; Refill: 0    4. Coronary artery disease involving coronary bypass graft of native heart without angina pectoris  Schedule cardiology appointment.     5. Need for prophylactic vaccination and inoculation against influenza    - FLU VACCINE, (RIV4) RECOMBINANT HA  , IM (FluBlok, egg free) [29547]- >18 YRS (FMG recommended  50-64 YRS)  - Vaccine Administration, Initial [68561]    6. Dupuytren contracture  Reprinted ortho referral.           Suma Jenkins NP  [facility]    Injectable Influenza Immunization Documentation    1.  Is the person to be vaccinated sick today?   No    2. Does the person to be vaccinated have an allergy to a component   of the vaccine?   No  Egg Allergy Algorithm Link    3. Has the person to be vaccinated ever had a serious reaction   to influenza vaccine in the past?   No    4. Has the person to be vaccinated ever had Guillain-Barré syndrome?   No    Form completed by

## 2018-12-13 NOTE — LETTER
December 17, 2018      Hayder Medellinreiber  [address] [address]        Dear ,    We are writing to inform you of your test results.    Your A1c is very high.  Please make sure to schedule an appointment with the endocrinologist.    Resulted Orders   Hemoglobin A1c   Result Value Ref Range    Hemoglobin A1C 11.3 (H) 0 - 5.6 %      Comment:      Normal <5.7% Prediabetes 5.7-6.4%  Diabetes 6.5% or higher - adopted from ADA   consensus guidelines.         If you have any questions or concerns, please call the clinic at the number listed above.       Sincerely,        Suma Jenkins NP/nr

## 2018-12-14 ENCOUNTER — TELEPHONE (OUTPATIENT)
Dept: FAMILY MEDICINE | Facility: CLINIC | Age: 63
End: 2018-12-14

## 2018-12-14 DIAGNOSIS — Z79.4 TYPE 2 DIABETES MELLITUS WITH STAGE 3 CHRONIC KIDNEY DISEASE, WITH LONG-TERM CURRENT USE OF INSULIN (H): Primary | ICD-10-CM

## 2018-12-14 DIAGNOSIS — N18.30 TYPE 2 DIABETES MELLITUS WITH STAGE 3 CHRONIC KIDNEY DISEASE, WITH LONG-TERM CURRENT USE OF INSULIN (H): Primary | ICD-10-CM

## 2018-12-14 DIAGNOSIS — E11.22 TYPE 2 DIABETES MELLITUS WITH STAGE 3 CHRONIC KIDNEY DISEASE, WITH LONG-TERM CURRENT USE OF INSULIN (H): Primary | ICD-10-CM

## 2018-12-14 LAB
ALBUMIN SERPL-MCNC: 3.8 G/DL (ref 3.4–5)
ALP SERPL-CCNC: 62 U/L (ref 40–150)
ALT SERPL W P-5'-P-CCNC: 43 U/L (ref 0–70)
ANION GAP SERPL CALCULATED.3IONS-SCNC: 13 MMOL/L (ref 3–14)
AST SERPL W P-5'-P-CCNC: 29 U/L (ref 0–45)
BILIRUB SERPL-MCNC: 0.5 MG/DL (ref 0.2–1.3)
BUN SERPL-MCNC: 16 MG/DL (ref 7–30)
CALCIUM SERPL-MCNC: 8.7 MG/DL (ref 8.5–10.1)
CHLORIDE SERPL-SCNC: 98 MMOL/L (ref 94–109)
CHOLEST SERPL-MCNC: 285 MG/DL
CO2 SERPL-SCNC: 22 MMOL/L (ref 20–32)
CREAT SERPL-MCNC: 1.3 MG/DL (ref 0.66–1.25)
CREAT UR-MCNC: 71 MG/DL
GFR SERPL CREATININE-BSD FRML MDRD: 55 ML/MIN/1.7M2
GLUCOSE SERPL-MCNC: 453 MG/DL (ref 70–99)
HDLC SERPL-MCNC: 35 MG/DL
LDLC SERPL CALC-MCNC: ABNORMAL MG/DL
LDLC SERPL DIRECT ASSAY-MCNC: 176 MG/DL
MICROALBUMIN UR-MCNC: 547 MG/L
MICROALBUMIN/CREAT UR: 772.6 MG/G CR (ref 0–17)
NONHDLC SERPL-MCNC: 250 MG/DL
POTASSIUM SERPL-SCNC: 3.6 MMOL/L (ref 3.4–5.3)
PROT SERPL-MCNC: 7.3 G/DL (ref 6.8–8.8)
SODIUM SERPL-SCNC: 133 MMOL/L (ref 133–144)
TRIGL SERPL-MCNC: 485 MG/DL
TSH SERPL DL<=0.005 MIU/L-ACNC: 2.1 MU/L (ref 0.4–4)

## 2018-12-14 NOTE — TELEPHONE ENCOUNTER
MADDY reporting his elevated BS of 453.  Asking him to call back today as we check in to see if he is doing okay.  Taking his diabetes medication?   Wendy Lockwood RN

## 2018-12-14 NOTE — LETTER
December 17, 2018      Hayder Alves  [address] [address]        Dear Hayder,     We have left numerous phone messages about your extremely elevated glucose level which is of great concern.  It was reading 453 on 12/13/18.    We would like to know if you are taking your diabetes medication and see how you are feeling.  This letter is just reaching our to you and we hope you will respond to us.    [phone].            Sincerely,        Suma Jenkins NP/Wendy Lockwood RN

## 2018-12-17 NOTE — TELEPHONE ENCOUNTER
Suma Rausch NP - endo appt. Scheduled for 1/23/19   Do you want to see patient in clinic prior to that date or MTM ?       Phone call from patient     Reviewed glucose results and A1C     Patient is taking metformin     Has NOT been taking trulicity which is on med list as it was not covered by his insurance     Discussed endo appointment - gave phone number to schedule with endo clinic (referral in epic)     Patient is unable to check his blood sugar at home as his meter is NOT working     Patient is feeling very thirsty - no blurry vision, dizziness, nausea/emesis - advised to go to ER if any of these symptoms arise     Patient will call to schedule with endo right after this call ends (scheduled for 1/23/19)     Sending to pcp as LESLIE Rosales Registered Nurse   [facility]

## 2018-12-17 NOTE — PATIENT INSTRUCTIONS
Patient Education     High Blood Sugar (Hyperglycemia)     When you have hyperglycemia, drink plenty of water or other sugar-free, caffeine-free liquids.   Too much glucose (sugar) in your blood is called hyperglycemia or high blood sugar. High blood sugar can lead to a dangerous condition called ketoacidosis. In severe cases, it can lead to dehydration and coma.  Possible causes of hyperglycemia    Inadequate treatment plan for diabetes     Being sick    Being under stress    Taking certain medicines, such as steroids    Eating too much food, especially carbohydrates    Being less active than usual    Not taking enough diabetes medicine  Symptoms of hyperglycemia  Hyperglycemia may not cause symptoms. If you do have symptoms, they may include:    Thirst    Frequent need to urinate    Feeling tired    Nausea and vomiting    Itchy, dry skin    Blurry vision    Fast breathing and breath that smells fruity     Weakness    Dizziness    Wounds or skin infections that don t heal    Unexplained weight loss if hyperglycemia lasts for more than a few days   What you should do  Make sure you do the following:    Check your blood sugar.    Drink plenty of sugar-free, caffeine-free liquids such as water. Don t drink fruit juice.    Check your blood sugar again every 4 hours. If you take insulin or diabetes medicines, follow your sick-day plan for taking medicine. Call your healthcare provider if you are not able to eat.    Check your blood or urine for ketones as directed.    Call your healthcare provider if your blood sugar and ketones do not return to your target range.  Preventing high blood sugar  To help keep your blood sugar from getting too high:    Control stress.    When you're ill, follow your sick-day plan.     Follow your meal plan. Eat only the amount of food on your meal plan    Follow your exercise plan.    Take your insulin or diabetes medicines as directed by your healthcare team. Also test your blood sugar  as directed. If the plan is not working for you, discuss it with your healthcare provider.  Other things to do    Carry a medical ID card, a compact USB drive, or wear a medical alert bracelet or necklace. It should say that you have diabetes. It should also say what to do in case you pass out or go into a coma.    Make sure family, friends, and coworkers know the signs of high blood sugar. Tell them what to do if your blood sugar gets very high and you can t help yourself.    Talk to your healthcare team about other things you can do to prevent high blood sugar.   Special note: Drink plenty of sugar-free and caffeine-free liquids when you feel symptoms of hyperglycemia. Call your healthcare provider if you keep having episodes of hyperglycemia.   Date Last Reviewed: 5/1/2016 2000-2018 [employer]. [address] [place] [address], [place] [address]. All rights reserved. This information is not intended as a substitute for professional medical care. Always follow your healthcare professional's instructions.

## 2018-12-17 NOTE — TELEPHONE ENCOUNTER
Sending letter.  Attempted to contact spouse but the number at work was not correct as she has not worked there for 4-5 years!  Patient is not responding to any phone calls up until now.     Wendy Jenkins

## 2019-01-02 NOTE — TELEPHONE ENCOUNTER
Attempted to reach, unable. Left message  to call back.    Due to critical nature of information detailed message was left on his phone that he should  this cheaper form of insulin at [facility] and to call triage RN so we know he got this message.      Tamika Quach , RN

## 2019-01-02 NOTE — TELEPHONE ENCOUNTER
He was doing NPH insulin (vial) in the past because it was the cheapest insulin option (around $29 a vial at [facility]) but it looks like he is no longer doing this.  He should probably get back on this until he see claudette.  I will send to [facility] in [place], since I think that might be the closest [facility] to him.

## 2019-01-07 NOTE — TELEPHONE ENCOUNTER
Message left for patient to return call to clinic and ask to speak to available triage nurse     Mona Rosales Registered Nurse   [facility]

## 2019-01-07 NOTE — TELEPHONE ENCOUNTER
Pt has appt on 1/23/ with endocrine    Pharmacy was contacted . He did  his insulin.    Therefore .Encounter will be closed    Tamika Quach RN

## 2019-01-09 ENCOUNTER — PATIENT OUTREACH (OUTPATIENT)
Dept: CARE COORDINATION | Facility: CLINIC | Age: 64
End: 2019-01-09

## 2019-01-16 ENCOUNTER — TELEPHONE (OUTPATIENT)
Dept: FAMILY MEDICINE | Facility: CLINIC | Age: 64
End: 2019-01-16

## 2019-01-16 NOTE — TELEPHONE ENCOUNTER
PT called back.  I reviewed this message with pt.  He did ask about NPH rx. He had been only taking it once per day.  I reviewed the bid dosing.  GASPER Huitron

## 2019-02-13 ENCOUNTER — ALLIED HEALTH/NURSE VISIT (OUTPATIENT)
Dept: NURSING | Facility: CLINIC | Age: 64
End: 2019-02-13
Payer: MEDICARE

## 2019-02-13 DIAGNOSIS — Z23 NEED FOR VACCINATION: Primary | ICD-10-CM

## 2019-02-13 PROCEDURE — G0009 ADMIN PNEUMOCOCCAL VACCINE: HCPCS

## 2019-02-13 PROCEDURE — 90670 PCV13 VACCINE IM: CPT

## 2019-02-13 NOTE — NURSING NOTE
Screening Questionnaire for Adult Immunization    Are you sick today?   No   Do you have allergies to medications, food, a vaccine component or latex?   No   Have you ever had a serious reaction after receiving a vaccination?   No   Do you have a long-term health problem with heart disease, lung disease, asthma, kidney disease, metabolic disease (e.g. diabetes), anemia, or other blood disorder?   yes   Do you have cancer, leukemia, HIV/AIDS, or any other immune system problem?   No   In the past 3 months, have you taken medications that affect  your immune system, such as prednisone, other steroids, or anticancer drugs; drugs for the treatment of rheumatoid arthritis, Crohn s disease, or psoriasis; or have you had radiation treatments?   No   Have you had a seizure, or a brain or other nervous system problem?   No   During the past year, have you received a transfusion of blood or blood     products, or been given immune (gamma) globulin or antiviral drug?   No   For women: Are you pregnant or is there a chance you could become        pregnant during the next month?   No   Have you received any vaccinations in the past 4 weeks?   No     Immunization questionnaire answers were all negative.        Per orders of Dr. Campo, injection of PcV 13 given by Lynne Serrano. Patient instructed to remain in clinic for 15 minutes afterwards, and to report any adverse reaction to me immediately.       Screening performed by Lynne Serrano on 2/13/2019 at 3:23 PM.

## 2019-02-27 ENCOUNTER — OFFICE VISIT (OUTPATIENT)
Dept: FAMILY MEDICINE | Facility: CLINIC | Age: 64
End: 2019-02-27
Payer: MEDICARE

## 2019-02-27 VITALS
TEMPERATURE: 97.4 F | DIASTOLIC BLOOD PRESSURE: 72 MMHG | OXYGEN SATURATION: 98 % | WEIGHT: 199 LBS | BODY MASS INDEX: 32.12 KG/M2 | RESPIRATION RATE: 18 BRPM | HEART RATE: 89 BPM | SYSTOLIC BLOOD PRESSURE: 130 MMHG

## 2019-02-27 DIAGNOSIS — E78.00 HYPERCHOLESTEROLEMIA: ICD-10-CM

## 2019-02-27 DIAGNOSIS — N18.30 TYPE 2 DIABETES MELLITUS WITH STAGE 3 CHRONIC KIDNEY DISEASE, WITH LONG-TERM CURRENT USE OF INSULIN (H): ICD-10-CM

## 2019-02-27 DIAGNOSIS — M50.20 HERNIATION OF CERVICAL INTERVERTEBRAL DISC: ICD-10-CM

## 2019-02-27 DIAGNOSIS — I50.22 CHRONIC SYSTOLIC HEART FAILURE (H): ICD-10-CM

## 2019-02-27 DIAGNOSIS — J44.9 CHRONIC OBSTRUCTIVE PULMONARY DISEASE, UNSPECIFIED COPD TYPE (H): ICD-10-CM

## 2019-02-27 DIAGNOSIS — N18.30 CKD (CHRONIC KIDNEY DISEASE) STAGE 3, GFR 30-59 ML/MIN (H): ICD-10-CM

## 2019-02-27 DIAGNOSIS — F31.70 BIPOLAR AFFECTIVE DISORDER IN REMISSION (H): ICD-10-CM

## 2019-02-27 DIAGNOSIS — Z79.4 TYPE 2 DIABETES MELLITUS WITH STAGE 3 CHRONIC KIDNEY DISEASE, WITH LONG-TERM CURRENT USE OF INSULIN (H): ICD-10-CM

## 2019-02-27 DIAGNOSIS — E11.22 TYPE 2 DIABETES MELLITUS WITH STAGE 3 CHRONIC KIDNEY DISEASE, WITH LONG-TERM CURRENT USE OF INSULIN (H): ICD-10-CM

## 2019-02-27 DIAGNOSIS — G89.4 CHRONIC PAIN SYNDROME: Primary | ICD-10-CM

## 2019-02-27 LAB — HBA1C MFR BLD: 8.5 % (ref 0–5.6)

## 2019-02-27 PROCEDURE — 83036 HEMOGLOBIN GLYCOSYLATED A1C: CPT | Performed by: NURSE PRACTITIONER

## 2019-02-27 PROCEDURE — 36415 COLL VENOUS BLD VENIPUNCTURE: CPT | Performed by: NURSE PRACTITIONER

## 2019-02-27 PROCEDURE — 80048 BASIC METABOLIC PNL TOTAL CA: CPT | Performed by: NURSE PRACTITIONER

## 2019-02-27 PROCEDURE — 99214 OFFICE O/P EST MOD 30 MIN: CPT | Performed by: NURSE PRACTITIONER

## 2019-02-27 RX ORDER — OXYCODONE AND ACETAMINOPHEN 5; 325 MG/1; MG/1
1 TABLET ORAL AT BEDTIME
Qty: 30 TABLET | Refills: 0 | Status: SHIPPED | OUTPATIENT
Start: 2019-03-13 | End: 2019-02-27

## 2019-02-27 RX ORDER — OXYCODONE AND ACETAMINOPHEN 5; 325 MG/1; MG/1
1 TABLET ORAL AT BEDTIME
Qty: 30 TABLET | Refills: 0 | Status: SHIPPED | OUTPATIENT
Start: 2019-04-12 | End: 2019-02-27

## 2019-02-27 RX ORDER — OXYCODONE AND ACETAMINOPHEN 5; 325 MG/1; MG/1
1 TABLET ORAL AT BEDTIME
Qty: 30 TABLET | Refills: 0 | Status: SHIPPED | OUTPATIENT
Start: 2019-05-11 | End: 2019-08-08

## 2019-02-27 RX ORDER — ESZOPICLONE 3 MG/1
3 TABLET, FILM COATED ORAL
COMMUNITY
End: 2019-08-08 | Stop reason: SINTOL

## 2019-02-27 RX ORDER — ROSUVASTATIN CALCIUM 20 MG/1
20 TABLET, COATED ORAL DAILY
Qty: 90 TABLET | Refills: 3 | Status: SHIPPED | OUTPATIENT
Start: 2019-02-27 | End: 2020-04-08

## 2019-02-27 ASSESSMENT — ANXIETY QUESTIONNAIRES
6. BECOMING EASILY ANNOYED OR IRRITABLE: NOT AT ALL
5. BEING SO RESTLESS THAT IT IS HARD TO SIT STILL: NOT AT ALL
IF YOU CHECKED OFF ANY PROBLEMS ON THIS QUESTIONNAIRE, HOW DIFFICULT HAVE THESE PROBLEMS MADE IT FOR YOU TO DO YOUR WORK, TAKE CARE OF THINGS AT HOME, OR GET ALONG WITH OTHER PEOPLE: NOT DIFFICULT AT ALL
3. WORRYING TOO MUCH ABOUT DIFFERENT THINGS: NOT AT ALL
2. NOT BEING ABLE TO STOP OR CONTROL WORRYING: NOT AT ALL
GAD7 TOTAL SCORE: 1
1. FEELING NERVOUS, ANXIOUS, OR ON EDGE: NOT AT ALL
7. FEELING AFRAID AS IF SOMETHING AWFUL MIGHT HAPPEN: NOT AT ALL

## 2019-02-27 ASSESSMENT — PATIENT HEALTH QUESTIONNAIRE - PHQ9
SUM OF ALL RESPONSES TO PHQ QUESTIONS 1-9: 6
5. POOR APPETITE OR OVEREATING: SEVERAL DAYS

## 2019-02-27 NOTE — PATIENT INSTRUCTIONS
Increase insulin 2 units every 3 days   Check blood sugars at least twice daily.  Continue to increase insulin by an additional 2 units every 3 days until your morning blood sugar is around 120.

## 2019-02-27 NOTE — PROGRESS NOTES
SUBJECTIVE:   Hayder Alves is a 64 year old male who presents to clinic today for the following health issues:      Chronic Pain Follow-Up       Type / Location of Pain: Herniation of cervical intervertebral disc  Analgesia/pain control:       Recent changes:  Difficult to get comfortable at night to sleep       Overall control: Inadequate pain control  Activity level/function:      Daily activities:  Can do most things most days, with some rest. Unable to stand for long periods     Work:  Retired   Adverse effects:  No  Adherance    Taking medication as directed?  Yes    Participating in other treatments: no. Thinking of signing up for yoga class  Risk Factors:    Sleep:  Poor    Mood/anxiety:  controlled    Recent family or social stressors: family member is in a care facility     Other aggravating factor: none   PHQ-9 SCORE 1/19/2016 5/8/2017 5/23/2018   PHQ-9 Total Score - - -   PHQ-9 Total Score 11 12 6     MIRIAM-7 SCORE 1/19/2016 5/8/2017 5/23/2018   Total Score 2 5 2     Encounter-Level CSA - 10/05/2015:    Controlled Substance Agreement - Scan on 10/7/2015 10:21 AM: CONTROLLED SUBSTANCE AGREEMENT (below)       Patient-Level CSA:    There are no patient-level csa.         He is back on insulin - NPH since he cannot afford other insulins.  He is not checking his blood sugar regularly, but readings have been around high 200s.      He has an appointment for an echo and cardiology for follow up on his heart failure, which he is long overdue.  He is feeling well, denies any chest pain, shortness of breath, edema.    Mood is stable.              Problem list and histories reviewed & adjusted, as indicated.  Additional history: as documented        Reviewed and updated as needed this visit by clinical staff  Tobacco  Allergies  Meds  Med Hx  Surg Hx  Fam Hx  Soc Hx      Reviewed and updated as needed this visit by Provider         ROS:  CONSTITUTIONAL: NEGATIVE for fever, chills, change in  weight  ENT/MOUTH: NEGATIVE for ear, mouth and throat problems  RESP: NEGATIVE for significant cough or SOB  CV: NEGATIVE for chest pain, palpitations or peripheral edema  GI: NEGATIVE for nausea, abdominal pain, heartburn, or change in bowel habits  MUSCULOSKELETAL: see HPI  PSYCHIATRIC: NEGATIVE for changes in mood or affect    OBJECTIVE:     /72   Pulse 89   Temp 97.4  F (36.3  C) (Oral)   Resp 18   Wt 90.3 kg (199 lb)   SpO2 98%   BMI 32.12 kg/m    Body mass index is 32.12 kg/m .  GENERAL: healthy, alert and no distress  RESP: lungs clear to auscultation - no rales, rhonchi or wheezes  CV: regular rate and rhythm, normal S1 S2, no S3 or S4, no murmur, click or rub, no peripheral edema and peripheral pulses strong  PSYCH: mentation appears normal, affect normal/bright        ASSESSMENT/PLAN:             1. Chronic pain syndrome  Refills given for 3 months.   - oxyCODONE-acetaminophen (PERCOCET) 5-325 MG tablet; Take 1 tablet by mouth At Bedtime Must last 30 days  Dispense: 30 tablet; Refill: 0    2. Herniation of cervical intervertebral disc  See above.   - oxyCODONE-acetaminophen (PERCOCET) 5-325 MG tablet; Take 1 tablet by mouth At Bedtime Must last 30 days  Dispense: 30 tablet; Refill: 0    3. Type 2 diabetes mellitus with stage 3 chronic kidney disease, with long-term current use of insulin (H)  Not at goal  Patient Instructions   Increase insulin 2 units every 3 days   Check blood sugars at least twice daily.  Continue to increase insulin by an additional 2 units every 3 days until your morning blood sugar is around 120.       - insulin NPH (HUMULIN N/NOVOLIN N VIAL) 100 UNIT/ML vial; Inject 19 Units Subcutaneous 2 times daily  Dispense: 3 mL; Refill: 3  - metFORMIN (GLUCOPHAGE) 500 MG tablet; Take 1 tablet (500 mg) by mouth 2 times daily (with meals)  Dispense: 180 tablet; Refill: 3  - Hemoglobin A1c    4. CKD (chronic kidney disease) stage 3, GFR 30-59 ml/min (H)    - Basic metabolic  panel    5. Hypercholesterolemia    - rosuvastatin (CRESTOR) 20 MG tablet; Take 1 tablet (20 mg) by mouth daily  Dispense: 90 tablet; Refill: 3    6. Chronic systolic heart failure (H)  Follow up with cardiology.     7. Bipolar affective disorder in remission (H)  Stable.     8. Chronic obstructive pulmonary disease, unspecified COPD type (H)  Stable.           Suma Jenkins NP  [facility]

## 2019-02-27 NOTE — LETTER
February 28, 2019      Hayder Alves  [address] [address]        Dear Vijay,    Here are your recent test results:    A1c is improving. We can recheck this again in 3 months.  Results for orders placed or performed in visit on 02/27/19   Hemoglobin A1c   Result Value Ref Range    Hemoglobin A1C 8.5 (H) 0 - 5.6 %               Sincerely,        Suma Jenkins NP/zarina

## 2019-02-28 LAB
ANION GAP SERPL CALCULATED.3IONS-SCNC: 9 MMOL/L (ref 3–14)
BUN SERPL-MCNC: 17 MG/DL (ref 7–30)
CALCIUM SERPL-MCNC: 9.8 MG/DL (ref 8.5–10.1)
CHLORIDE SERPL-SCNC: 104 MMOL/L (ref 94–109)
CO2 SERPL-SCNC: 25 MMOL/L (ref 20–32)
CREAT SERPL-MCNC: 1.45 MG/DL (ref 0.66–1.25)
GFR SERPL CREATININE-BSD FRML MDRD: 50 ML/MIN/{1.73_M2}
GLUCOSE SERPL-MCNC: 237 MG/DL (ref 70–99)
POTASSIUM SERPL-SCNC: 5.1 MMOL/L (ref 3.4–5.3)
SODIUM SERPL-SCNC: 138 MMOL/L (ref 133–144)

## 2019-02-28 ASSESSMENT — ANXIETY QUESTIONNAIRES: GAD7 TOTAL SCORE: 1

## 2019-03-04 ENCOUNTER — HOSPITAL ENCOUNTER (OUTPATIENT)
Dept: CARDIOLOGY | Facility: CLINIC | Age: 64
Discharge: HOME | End: 2019-03-04
Attending: INTERNAL MEDICINE | Admitting: INTERNAL MEDICINE
Payer: MEDICARE

## 2019-03-04 DIAGNOSIS — I50.21 ACUTE SYSTOLIC HEART FAILURE (H): ICD-10-CM

## 2019-03-04 PROCEDURE — 93306 TTE W/DOPPLER COMPLETE: CPT | Mod: 26 | Performed by: INTERNAL MEDICINE

## 2019-03-04 PROCEDURE — 40000264 ECHOCARDIOGRAM COMPLETE

## 2019-03-04 PROCEDURE — 25500064 ZZH RX 255 OP 636: Performed by: INTERNAL MEDICINE

## 2019-03-04 RX ADMIN — HUMAN ALBUMIN MICROSPHERES AND PERFLUTREN 9 ML: 10; .22 INJECTION, SOLUTION INTRAVENOUS at 15:34

## 2019-03-05 ENCOUNTER — CARE COORDINATION (OUTPATIENT)
Dept: CARDIOLOGY | Facility: CLINIC | Age: 64
End: 2019-03-05

## 2019-03-05 NOTE — PROGRESS NOTES
Pt had an Echocardiogram done, that Dr. Arthur ordered > 2 years ago. Patient missed an OV in 9/2017 and has not returned several attempts to reschedule appointments. Call placed to pt, no answer, left  requesting pt return the call to schedule an overdue office visit. Also mailed overdue letter.

## 2019-03-15 DIAGNOSIS — E11.22 TYPE 2 DIABETES MELLITUS WITH STAGE 3 CHRONIC KIDNEY DISEASE, WITH LONG-TERM CURRENT USE OF INSULIN (H): ICD-10-CM

## 2019-03-15 DIAGNOSIS — Z79.4 TYPE 2 DIABETES MELLITUS WITH STAGE 3 CHRONIC KIDNEY DISEASE, WITH LONG-TERM CURRENT USE OF INSULIN (H): ICD-10-CM

## 2019-03-15 DIAGNOSIS — N18.30 TYPE 2 DIABETES MELLITUS WITH STAGE 3 CHRONIC KIDNEY DISEASE, WITH LONG-TERM CURRENT USE OF INSULIN (H): ICD-10-CM

## 2019-03-15 DIAGNOSIS — E11.9 TYPE 2 DIABETES, HBA1C GOAL < 8% (H): ICD-10-CM

## 2019-03-19 RX ORDER — SYRINGE-NEEDLE,INSULIN,0.5 ML 27GX1/2"
SYRINGE, EMPTY DISPOSABLE MISCELLANEOUS
Qty: 100 EACH | Refills: 1 | Status: SHIPPED | OUTPATIENT
Start: 2019-03-19 | End: 2019-08-08

## 2019-03-19 NOTE — TELEPHONE ENCOUNTER
Left message on voicemail and ask for triage    Need to clarify if pt needs this rx-  He has refills at [facility] pharmacy  And [facility] doesn't have this on file for him- they only have trulicity    (side note- spoke with [facility]- and they have no record of faxing this request)    Thanks!     Kandi De Oliveira RN

## 2019-03-19 NOTE — TELEPHONE ENCOUNTER
"Patient stated he has plenty of insulin. Patient needs needles \"fine\" needles for Humulin Vial.       Thanks! Catalina Gramajo RN    "

## 2019-03-27 DIAGNOSIS — G47.00 INSOMNIA, UNSPECIFIED TYPE: ICD-10-CM

## 2019-03-28 RX ORDER — AMITRIPTYLINE HYDROCHLORIDE 10 MG/1
TABLET ORAL
Qty: 90 TABLET | Refills: 1 | Status: SHIPPED | OUTPATIENT
Start: 2019-03-28 | End: 2019-06-26

## 2019-03-28 NOTE — TELEPHONE ENCOUNTER
"Requested Prescriptions   Pending Prescriptions Disp Refills     amitriptyline (ELAVIL) 10 MG tablet [Pharmacy Med Name: AMITRIPTYLINE HCL 10MG TABS] 90 tablet 1     Sig: TAKE THREE TABLETS BY MOUTH EVERY NIGHT AT BEDTIME    Tricyclic Agents ( Annual appt and no PHQ9) Passed - 3/27/2019  3:09 PM       Passed - Blood Pressure under 140/90 in past 12 mos    BP Readings from Last 3 Encounters:   02/27/19 130/72   12/13/18 132/66   08/22/18 138/70                Passed - Recent (12 mo) or future (30 days) visit within authorizing provider's specialty    Patient had office visit in the last 12 months or has a visit in the next 30 days with authorizing provider or within the authorizing provider's specialty.  See \"Patient Info\" tab in inbasket, or \"Choose Columns\" in Meds & Orders section of the refill encounter.             Passed - Medication is active on med list       Passed - Patient is age 18 or older          "

## 2019-06-26 DIAGNOSIS — G47.00 INSOMNIA, UNSPECIFIED TYPE: ICD-10-CM

## 2019-06-26 DIAGNOSIS — I10 HYPERTENSION GOAL BP (BLOOD PRESSURE) < 130/80: ICD-10-CM

## 2019-06-26 NOTE — TELEPHONE ENCOUNTER
"Requested Prescriptions   Pending Prescriptions Disp Refills     metoprolol tartrate (LOPRESSOR) 50 MG tablet [Pharmacy Med Name: METOPROLOL TARTRATE 50MG TABS] 60 tablet 5     Sig: TAKE ONE TABLET BY MOUTH TWICE A DAY      Last Written Prescription Date:  5/17/2018  Last Fill Quantity: 60 tablet    ,  # refills: 9   Last Office Visit: 2/27/2019   Future Office Visit:            Beta-Blockers Protocol Passed - 6/26/2019  1:19 PM        Passed - Blood pressure under 140/90 in past 12 months     BP Readings from Last 3 Encounters:   02/27/19 130/72   12/13/18 132/66   08/22/18 138/70           Passed - Patient is age 6 or older        Passed - Recent (12 mo) or future (30 days) visit within the authorizing provider's specialty     Patient had office visit in the last 12 months or has a visit in the next 30 days with authorizing provider or within the authorizing provider's specialty.  See \"Patient Info\" tab in inbasket, or \"Choose Columns\" in Meds & Orders section of the refill encounter.          Passed - Medication is active on med list        amitriptyline (ELAVIL) 10 MG tablet [Pharmacy Med Name: AMITRIPTYLINE HCL 10MG TABS] 90 tablet 1     Sig: TAKE THREE TABLETS BY MOUTH EVERY NIGHT AT BEDTIME      Last Written Prescription Date:  3/28/2019  Last Fill Quantity: 90 tablet    ,  # refills: 1   Last Office Visit: 2/27/2019   Future Office Visit:            Tricyclic Agents ( Annual appt and no PHQ9) Passed - 6/26/2019  1:19 PM        Passed - Blood Pressure under 140/90 in past 12 mos     BP Readings from Last 3 Encounters:   02/27/19 130/72   12/13/18 132/66   08/22/18 138/70           Passed - Recent (12 mo) or future (30 days) visit within authorizing provider's specialty     Patient had office visit in the last 12 months or has a visit in the next 30 days with authorizing provider or within the authorizing provider's specialty.  See \"Patient Info\" tab in inbasket, or \"Choose Columns\" in Meds & Orders section of " the refill encounter.          Passed - Medication is active on med list        Passed - Patient is age 18 or older

## 2019-06-26 NOTE — LETTER
[facility]  [address] [address]  Phone: [phone]    06/27/19    Hayder Alves  [address] [address]      To whom it may concern:     In order to ensure we are providing the best quality care, we have reviewed your chart and see that you are due for a follow up appointment for further refills.    We have also sent your medications to your pharmacy. For future medication refills, please contact your primary care clinic to schedule the above appointment. This can be requested via [employer] or by calling the clinic at [phone].    We greatly appreciate the opportunity to serve you. Thank you for trusting us with your health care.      Sincerely,      Your care team at [facility]

## 2019-06-27 RX ORDER — AMITRIPTYLINE HYDROCHLORIDE 10 MG/1
TABLET ORAL
Qty: 90 TABLET | Refills: 0 | Status: SHIPPED | OUTPATIENT
Start: 2019-06-27 | End: 2019-08-08

## 2019-06-27 RX ORDER — METOPROLOL TARTRATE 50 MG
TABLET ORAL
Qty: 60 TABLET | Refills: 5 | Status: SHIPPED | OUTPATIENT
Start: 2019-06-27 | End: 2019-10-03

## 2019-06-27 NOTE — TELEPHONE ENCOUNTER
Left message on answering machine that medication was approved and sent to pharmacy and that an additional appointment must be scheduled prior to additional refills given. Sending letter.    Swathi Collado

## 2019-06-27 NOTE — TELEPHONE ENCOUNTER
LOV:2/27/2019     Patient was to return for f/u in May    Please call patient and schedule.     Will give 1 x luz.    Thanks! Catalina Gramajo RN

## 2019-08-08 ENCOUNTER — OFFICE VISIT (OUTPATIENT)
Dept: FAMILY MEDICINE | Facility: CLINIC | Age: 64
End: 2019-08-08
Payer: MEDICARE

## 2019-08-08 VITALS
OXYGEN SATURATION: 98 % | HEART RATE: 106 BPM | WEIGHT: 207 LBS | SYSTOLIC BLOOD PRESSURE: 138 MMHG | BODY MASS INDEX: 31.37 KG/M2 | HEIGHT: 68 IN | DIASTOLIC BLOOD PRESSURE: 86 MMHG | TEMPERATURE: 97.7 F | RESPIRATION RATE: 22 BRPM

## 2019-08-08 DIAGNOSIS — Z79.4 TYPE 2 DIABETES MELLITUS WITH STAGE 3 CHRONIC KIDNEY DISEASE, WITH LONG-TERM CURRENT USE OF INSULIN (H): ICD-10-CM

## 2019-08-08 DIAGNOSIS — G89.4 CHRONIC PAIN SYNDROME: Primary | ICD-10-CM

## 2019-08-08 DIAGNOSIS — G47.00 INSOMNIA, UNSPECIFIED TYPE: ICD-10-CM

## 2019-08-08 DIAGNOSIS — E11.22 TYPE 2 DIABETES MELLITUS WITH STAGE 3 CHRONIC KIDNEY DISEASE, WITH LONG-TERM CURRENT USE OF INSULIN (H): ICD-10-CM

## 2019-08-08 DIAGNOSIS — Z12.11 SPECIAL SCREENING FOR MALIGNANT NEOPLASMS, COLON: ICD-10-CM

## 2019-08-08 DIAGNOSIS — I50.22 CHRONIC SYSTOLIC HEART FAILURE (H): ICD-10-CM

## 2019-08-08 DIAGNOSIS — M50.20 HERNIATION OF CERVICAL INTERVERTEBRAL DISC: ICD-10-CM

## 2019-08-08 DIAGNOSIS — N18.30 TYPE 2 DIABETES MELLITUS WITH STAGE 3 CHRONIC KIDNEY DISEASE, WITH LONG-TERM CURRENT USE OF INSULIN (H): ICD-10-CM

## 2019-08-08 DIAGNOSIS — E11.42 DIABETIC POLYNEUROPATHY ASSOCIATED WITH TYPE 2 DIABETES MELLITUS (H): ICD-10-CM

## 2019-08-08 DIAGNOSIS — N18.30 CKD (CHRONIC KIDNEY DISEASE) STAGE 3, GFR 30-59 ML/MIN (H): ICD-10-CM

## 2019-08-08 DIAGNOSIS — E78.5 HYPERLIPIDEMIA LDL GOAL <100: ICD-10-CM

## 2019-08-08 LAB — HBA1C MFR BLD: 9 % (ref 0–5.6)

## 2019-08-08 PROCEDURE — 80061 LIPID PANEL: CPT | Performed by: NURSE PRACTITIONER

## 2019-08-08 PROCEDURE — 99214 OFFICE O/P EST MOD 30 MIN: CPT | Performed by: NURSE PRACTITIONER

## 2019-08-08 PROCEDURE — 83036 HEMOGLOBIN GLYCOSYLATED A1C: CPT | Performed by: NURSE PRACTITIONER

## 2019-08-08 PROCEDURE — 36415 COLL VENOUS BLD VENIPUNCTURE: CPT | Performed by: NURSE PRACTITIONER

## 2019-08-08 PROCEDURE — 80048 BASIC METABOLIC PNL TOTAL CA: CPT | Performed by: NURSE PRACTITIONER

## 2019-08-08 RX ORDER — OXYCODONE AND ACETAMINOPHEN 5; 325 MG/1; MG/1
1 TABLET ORAL AT BEDTIME
Qty: 28 TABLET | Refills: 0 | Status: SHIPPED | OUTPATIENT
Start: 2019-09-05 | End: 2019-10-03

## 2019-08-08 RX ORDER — AMITRIPTYLINE HYDROCHLORIDE 10 MG/1
TABLET ORAL
Qty: 0.1 TABLET | Refills: 0 | OUTPATIENT
Start: 2019-08-08

## 2019-08-08 RX ORDER — FUROSEMIDE 20 MG
20 TABLET ORAL 2 TIMES DAILY
Qty: 60 TABLET | Refills: 2 | Status: SHIPPED | OUTPATIENT
Start: 2019-08-08 | End: 2019-08-22 | Stop reason: ALTCHOICE

## 2019-08-08 RX ORDER — OXYCODONE AND ACETAMINOPHEN 5; 325 MG/1; MG/1
1 TABLET ORAL AT BEDTIME
Qty: 28 TABLET | Refills: 0 | Status: SHIPPED | OUTPATIENT
Start: 2019-08-08 | End: 2019-08-08

## 2019-08-08 RX ORDER — AMITRIPTYLINE HYDROCHLORIDE 10 MG/1
TABLET ORAL
Qty: 90 TABLET | Refills: 11 | Status: SHIPPED | OUTPATIENT
Start: 2019-08-08 | End: 2020-08-30

## 2019-08-08 RX ORDER — GABAPENTIN 300 MG/1
300 CAPSULE ORAL 2 TIMES DAILY
Qty: 180 CAPSULE | Refills: 1 | Status: SHIPPED | OUTPATIENT
Start: 2019-08-08 | End: 2020-03-16

## 2019-08-08 RX ORDER — SYRINGE-NEEDLE,INSULIN,0.5 ML 27GX1/2"
SYRINGE, EMPTY DISPOSABLE MISCELLANEOUS
Qty: 100 EACH | Refills: 1 | Status: SHIPPED | OUTPATIENT
Start: 2019-08-08 | End: 2020-04-16

## 2019-08-08 ASSESSMENT — PATIENT HEALTH QUESTIONNAIRE - PHQ9
SUM OF ALL RESPONSES TO PHQ QUESTIONS 1-9: 9
5. POOR APPETITE OR OVEREATING: SEVERAL DAYS

## 2019-08-08 ASSESSMENT — ANXIETY QUESTIONNAIRES
2. NOT BEING ABLE TO STOP OR CONTROL WORRYING: NOT AT ALL
IF YOU CHECKED OFF ANY PROBLEMS ON THIS QUESTIONNAIRE, HOW DIFFICULT HAVE THESE PROBLEMS MADE IT FOR YOU TO DO YOUR WORK, TAKE CARE OF THINGS AT HOME, OR GET ALONG WITH OTHER PEOPLE: NOT DIFFICULT AT ALL
GAD7 TOTAL SCORE: 1
3. WORRYING TOO MUCH ABOUT DIFFERENT THINGS: NOT AT ALL
5. BEING SO RESTLESS THAT IT IS HARD TO SIT STILL: NOT AT ALL
7. FEELING AFRAID AS IF SOMETHING AWFUL MIGHT HAPPEN: NOT AT ALL
1. FEELING NERVOUS, ANXIOUS, OR ON EDGE: NOT AT ALL
6. BECOMING EASILY ANNOYED OR IRRITABLE: NOT AT ALL

## 2019-08-08 ASSESSMENT — MIFFLIN-ST. JEOR: SCORE: 1698.45

## 2019-08-08 NOTE — PROGRESS NOTES
"Subjective     Hayder Alves is a 64 year old male who presents to clinic today for the following health issues:  Pt is not taking Lasix  HPI     Chronic Pain Follow-Up       Type / Location of Pain: Chronic pain  Analgesia/pain control:       Recent changes:  same      Overall control: Comfortably manageable  Activity level/function:      Daily activities:  Able to do light housework, cooking. Resting in between    Work:  not applicable  Adverse effects:  No  Adherance    Taking medication as directed?  Yes    Participating in other treatments: massage once a month   Risk Factors:    Sleep:  Poor    Mood/anxiety:  controlled    Recent family or social stressors:  None. New grand babies     Other aggravating factors: none  PHQ-9 SCORE 5/23/2018 2/27/2019 8/8/2019   PHQ-9 Total Score - - -   PHQ-9 Total Score 6 6 9     MIRIAM-7 SCORE 5/23/2018 2/27/2019 8/8/2019   Total Score 2 1 1     Encounter-Level CSA - 10/05/2015:    Controlled Substance Agreement - Scan on 10/7/2015 10:21 AM: CONTROLLED SUBSTANCE AGREEMENT (below)       Patient-Level CSA:    There are no patient-level csa.         Amount of exercise or physical activity: none neuropathy     Problems taking medications regularly: No    Medication side effects: none    Diet: regular (no restrictions)      Diabetes Follow-up      How often are you checking your blood sugar? Not at all. \"I keep going through meters\"    What time of day are you checking your blood sugars (select all that apply)?  N/A    Have you had any blood sugars above 200?  Not sure     Have you had any blood sugars below 70?  Not sure     What symptoms do you notice when your blood sugar is low?  None    What concerns do you have today about your diabetes? Feet/leg swelling. Pt is not taking Lasix      Do you have any of these symptoms? (Select all that apply)  Numbness in feet and Burning in feet     Have you had a diabetic eye exam in the last 12 months? No    BP Readings from Last 2 " "Encounters:   08/08/19 138/86   02/27/19 130/72     Hemoglobin A1C (%)   Date Value   08/08/2019 9.0 (H)   02/27/2019 8.5 (H)     LDL Cholesterol Calculated (mg/dL)   Date Value   08/08/2019 177 (H)   12/13/2018     Cannot estimate LDL when triglyceride exceeds 400 mg/dL     LDL Cholesterol Direct (mg/dL)   Date Value   12/13/2018 176 (H)       Diabetes Management Resources         He has still not followed up with cardiology, but did have an echo.  He denies any chest pain, shortness of breath, peripheral edema.             Reviewed and updated as needed this visit by Provider  Tobacco  Allergies  Meds  Problems  Med Hx  Surg Hx  Fam Hx         Review of Systems   ROS COMP: CONSTITUTIONAL: NEGATIVE for fever, chills, change in weight  ENT/MOUTH: NEGATIVE for ear, mouth and throat problems  RESP: NEGATIVE for significant cough or SOB  CV: NEGATIVE for chest pain, palpitations or peripheral edema  GI: NEGATIVE for nausea, abdominal pain, heartburn, or change in bowel habits  MUSCULOSKELETAL: see HPI  NEURO: see HPI  PSYCHIATRIC: NEGATIVE for changes in mood or affect      Objective    /86   Pulse 106   Temp 97.7  F (36.5  C) (Oral)   Resp 22   Ht 1.727 m (5' 8\")   Wt 93.9 kg (207 lb)   SpO2 98%   BMI 31.47 kg/m    Body mass index is 31.47 kg/m .  Physical Exam   GENERAL: healthy, alert and no distress  RESP: lungs clear to auscultation - no rales, rhonchi or wheezes  CV: regular rate and rhythm, normal S1 S2, no S3 or S4, no murmur, click or rub, no peripheral edema and peripheral pulses strong  PSYCH: mentation appears normal, affect normal/bright            Assessment & Plan     1. Chronic pain syndrome  Changed to 28 days and two prescriptions printed.   He will call for a refill on 10/3 dated 10/4.  Follow up in 3 months.   - oxyCODONE-acetaminophen (PERCOCET) 5-325 MG tablet; Take 1 tablet by mouth At Bedtime Must last 28 days  Dispense: 28 tablet; Refill: 0    2. Herniation of cervical " "intervertebral disc  See above.  - oxyCODONE-acetaminophen (PERCOCET) 5-325 MG tablet; Take 1 tablet by mouth At Bedtime Must last 28 days  Dispense: 28 tablet; Refill: 0    3. Insomnia, unspecified type  The current medical regimen is effective;  continue present plan and medications.   - amitriptyline (ELAVIL) 10 MG tablet; TAKE THREE TABLETS BY MOUTH EVERY NIGHT AT BEDTIME  Dispense: 90 tablet; Refill: 11    4. Diabetic polyneuropathy associated with type 2 diabetes mellitus (H)  The current medical regimen is effective;  continue present plan and medications.   - gabapentin (NEURONTIN) 300 MG capsule; Take 1 capsule (300 mg) by mouth 2 times daily  Dispense: 180 capsule; Refill: 1    5. Chronic systolic heart failure (H)  Schedule follow up with cardiology.   - furosemide (LASIX) 20 MG tablet; Take 1 tablet (20 mg) by mouth 2 times daily  Dispense: 60 tablet; Refill: 2    6. Type 2 diabetes mellitus with stage 3 chronic kidney disease, with long-term current use of insulin (H)  Not at goal  See MTM.   - insulin syringe-needle U-100 (31G X 5/16\" 1 ML) 31G X 5/16\" 1 ML miscellaneous; Use 2 syringes daily or as directed.  Dispense: 100 each; Refill: 1  - Hemoglobin A1c  - Basic metabolic panel    7. Hyperlipidemia LDL goal <100    - Lipid panel reflex to direct LDL Fasting    8. Special screening for malignant neoplasms, colon    - GASTROENTEROLOGY ADULT REF PROCEDURE ONLY [facility] [phone]; No Provider Preference    9. CKD (chronic kidney disease) stage 3, GFR 30-59 ml/min (H)    - Basic metabolic panel     BMI:   Estimated body mass index is 31.47 kg/m  as calculated from the following:    Height as of this encounter: 1.727 m (5' 8\").    Weight as of this encounter: 93.9 kg (207 lb).   Weight management plan: Discussed healthy diet and exercise guidelines            Return in about 3 months (around 11/8/2019).    Suma Jenkins NP  [facility]        "

## 2019-08-08 NOTE — TELEPHONE ENCOUNTER
"Requested Prescriptions   Pending Prescriptions Disp Refills     amitriptyline (ELAVIL) 10 MG tablet [Pharmacy Med Name: AMITRIPTYLINE HCL 10MG TABS] 90 tablet 0     Sig: TAKE THREE TABLETS BY MOUTH EVERY NIGHT AT BEDTIME      Last Written Prescription Date:  8/8/2019  Last Fill Quantity: 90 tablet    ,  # refills: 11   Last Office Visit:  8/8/2019   Future Office Visit:            Tricyclic Agents ( Annual appt and no PHQ9) Passed - 8/8/2019  2:48 PM        Passed - Blood Pressure under 140/90 in past 12 mos     BP Readings from Last 3 Encounters:   08/08/19 (!) 154/76   02/27/19 130/72   12/13/18 132/66           Passed - Recent (12 mo) or future (30 days) visit within authorizing provider's specialty     Patient had office visit in the last 12 months or has a visit in the next 30 days with authorizing provider or within the authorizing provider's specialty.  See \"Patient Info\" tab in inbasket, or \"Choose Columns\" in Meds & Orders section of the refill encounter.          Passed - Medication is active on med list        Passed - Patient is age 18 or older          "

## 2019-08-09 LAB
ANION GAP SERPL CALCULATED.3IONS-SCNC: 9 MMOL/L (ref 3–14)
BUN SERPL-MCNC: 18 MG/DL (ref 7–30)
CALCIUM SERPL-MCNC: 9.2 MG/DL (ref 8.5–10.1)
CHLORIDE SERPL-SCNC: 104 MMOL/L (ref 94–109)
CHOLEST SERPL-MCNC: 293 MG/DL
CO2 SERPL-SCNC: 26 MMOL/L (ref 20–32)
CREAT SERPL-MCNC: 1.31 MG/DL (ref 0.66–1.25)
GFR SERPL CREATININE-BSD FRML MDRD: 57 ML/MIN/{1.73_M2}
GLUCOSE SERPL-MCNC: 258 MG/DL (ref 70–99)
HDLC SERPL-MCNC: 36 MG/DL
LDLC SERPL CALC-MCNC: 177 MG/DL
NONHDLC SERPL-MCNC: 257 MG/DL
POTASSIUM SERPL-SCNC: 4.7 MMOL/L (ref 3.4–5.3)
SODIUM SERPL-SCNC: 139 MMOL/L (ref 133–144)
TRIGL SERPL-MCNC: 400 MG/DL

## 2019-08-09 ASSESSMENT — ANXIETY QUESTIONNAIRES: GAD7 TOTAL SCORE: 1

## 2019-08-09 NOTE — TELEPHONE ENCOUNTER
Refused Prescriptions:                       Disp   Refills    amitriptyline (ELAVIL) 10 MG tablet [Pharm*0.1 ta*0        Sig: TAKE THREE TABLETS BY MOUTH EVERY NIGHT AT BEDTIME  Refused By: KIMI BOWMAN  Reason for Refusal: Duplicate

## 2019-08-21 ENCOUNTER — TELEPHONE (OUTPATIENT)
Dept: FAMILY MEDICINE | Facility: CLINIC | Age: 64
End: 2019-08-21

## 2019-08-21 NOTE — TELEPHONE ENCOUNTER
Reason for Call:  Other prescription - rosuvastatin (CRESTOR) 20 MG tablet    Detailed comments: FYI- patient was unable to log in on [employer] for lab results. Writer assisted him with login info & gave him results. PCP asked if patient was taking his cholesterol medication. Patient checked his pill bottles did not see any for rosuvastatin (CRESTOR) 20 MG tablet. His call was transferred to [facility] for further refills.     Phone Number Patient can be reached at: Home number on file [phone] (home)    Best Time: anytime    Can we leave a detailed message on this number? YES    Call taken on 8/21/2019 at 11:07 AM by Swathi Coburn

## 2019-08-22 ENCOUNTER — OFFICE VISIT (OUTPATIENT)
Dept: CARDIOLOGY | Facility: CLINIC | Age: 64
End: 2019-08-22
Payer: MEDICARE

## 2019-08-22 VITALS
HEART RATE: 105 BPM | BODY MASS INDEX: 31.17 KG/M2 | SYSTOLIC BLOOD PRESSURE: 144 MMHG | OXYGEN SATURATION: 98 % | WEIGHT: 205 LBS | DIASTOLIC BLOOD PRESSURE: 78 MMHG | TEMPERATURE: 98.2 F

## 2019-08-22 DIAGNOSIS — I25.10 CORONARY ARTERY DISEASE INVOLVING NATIVE CORONARY ARTERY OF NATIVE HEART WITHOUT ANGINA PECTORIS: ICD-10-CM

## 2019-08-22 DIAGNOSIS — I10 ESSENTIAL HYPERTENSION: Primary | ICD-10-CM

## 2019-08-22 DIAGNOSIS — E78.5 HYPERLIPIDEMIA LDL GOAL <100: ICD-10-CM

## 2019-08-22 DIAGNOSIS — Z79.4 TYPE 2 DIABETES MELLITUS WITH STAGE 1 CHRONIC KIDNEY DISEASE, WITH LONG-TERM CURRENT USE OF INSULIN (H): ICD-10-CM

## 2019-08-22 DIAGNOSIS — E11.22 TYPE 2 DIABETES MELLITUS WITH STAGE 1 CHRONIC KIDNEY DISEASE, WITH LONG-TERM CURRENT USE OF INSULIN (H): ICD-10-CM

## 2019-08-22 DIAGNOSIS — N18.1 TYPE 2 DIABETES MELLITUS WITH STAGE 1 CHRONIC KIDNEY DISEASE, WITH LONG-TERM CURRENT USE OF INSULIN (H): ICD-10-CM

## 2019-08-22 PROCEDURE — 99215 OFFICE O/P EST HI 40 MIN: CPT | Mod: 25 | Performed by: INTERNAL MEDICINE

## 2019-08-22 PROCEDURE — 93005 ELECTROCARDIOGRAM TRACING: CPT | Performed by: INTERNAL MEDICINE

## 2019-08-22 RX ORDER — LISINOPRIL 20 MG/1
20 TABLET ORAL DAILY
Qty: 30 TABLET | Refills: 3 | Status: SHIPPED | OUTPATIENT
Start: 2019-08-22 | End: 2019-10-03

## 2019-08-22 NOTE — LETTER
8/22/2019      RE: Hayder Alves  [address] [facility] [address]       Dear Colleague,    Thank you for the opportunity to participate in the care of your patient, Hayder Alves, at the [facility]. Please see a copy of my visit note below.      In addition,   Constitutional: No change in weight, sleep or appetite.  Normal energy.  No fever or chills  Eyes: Negative for vision changes or eye problems  ENT: No problems with ears, nose or throat.  No difficulty swallowing.  Resp: No coughing, wheezing or shortness of breath  GI: No nausea, vomiting,  heartburn, abdominal pain, diarrhea, constipation or change in bowel habits  : No urinary frequency or dysuria, bladder or kidney problems  Musculoskeletal: No significant muscle or joint pains  Neurologic: No headaches, numbness, tingling, weakness, problems with balance or coordination  Psychiatric: No problems with anxiety, depression or mental health  Heme/immune/allergy: No history of bleeding or clotting problems or anemia.  No allergies or immune system problems  Integumentary: No rashes,worrisome lesions or skin problems      Physical examination  Vitals: 144/78, 105 bpm  BMI= 32    Constitutional: In general, the patient is a pleasant male in no apparent distress.    Eyes: PERRLA.  EOMI.  Sclerae white, not injected.  ENT/mouth: Normiocephalic and atraumatic.  Nares clear.  Pharynx without erythema or exudate.  Dentition intact.  No adenopathy.  No thyromegaly. Carotids +2/2 bilaterally without bruits.  No jugular venous distension.   Card/Vasc: The PMI is in the 5th ICS in the midclavicular line. There is no heave. Regular rate and rhythm. Normal S1, S2. No murmur, rub, click, or gallop. Pulses are normal bilaterally throughout. No peripheral edema.  Respiratory: Clear to asculation.  No ronchi, wheezes, rales.  No dullness to percussion.   GI:  Abdomen is soft, nontender, nondistended. No organomegaly. No AAA.  No bruits.   Integument: No significant bruises or rashes  Neurological: The neurological examination reveal a patient who was oriented to person, place, and time.    Psych: Normal  Heme/Lymph/Immun: no significant adenopathy    error    I am delighted to see Hayder Alves in consultation for coronary artery disease.    History of Present Illness:  As you know, the patient is a 64 year old  Male with h/o CABG in 2001 and PCI 2005. He last saw cardiology 2017. At that time, he had just had hospitalization for acute respiratory failure which was thought to be from COPD/pneumonia, he had troponin elevation but no new disease on angiogram, EF better than before. He also was noted to have some gait instability/falls and metoprolol was reduced. He had a h/o myalgias with atorvastatin and was advised to continue rosuvastatin 20 every day.    Patient had not seen cardiology since 2/2017. He did not bring his meds with him today and does not know what he's on. He states that he had not started taking rosuvastatin, didn't know that he was supposed to be on it, and can't  any medications until next week when he gets paid.  He reports that his grandkids are visiting and he's a bit distracted. Denies chest discomfort, dyspnea, dizziness, falls. Does not exercise because of leg pain from neuropathy.    The following portions of the patient's history were reviewed and updated as appropriate: allergies, current medications, past family history, past medical history, past social history, past surgical history, and the problem list.    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     Atorvastatin Calcium Other (See Comments)     myalgias     Latex      Lunesta      Strange dreams. Cooking during sleep          Family History:   Family History   Problem Relation Age of Onset     Cancer Sister         pancreatic ca     C.A.D. Father         mi,stroke,htn, 60's     Cerebrovascular Disease Father 64     C.A.D. Mother         mi,htn, 60's     Breast Cancer No family hx of      Cancer - colorectal No family hx of      Prostate Cancer No family hx of        Psychosocial history:  reports that he quit smoking about 19 years ago. His smoking use included cigarettes. He has a 45.00 pack-year smoking history. He has never used smokeless tobacco. He reports that he drinks alcohol. He reports that he does not use drugs.    Review of systems:   Cardiovascular: No palpitations, chest pain, shortness of breath at rest, dyspnea with exertion, orthopnea, paroxysmal nocturia dyspnea, nocturia, dizziness, syncope.    In addition,   Constitutional: No change in weight, sleep or appetite.  Normal energy.  No fever or chills  Eyes: Negative for vision changes or eye problems  ENT: No problems with ears, nose or throat.  No difficulty swallowing.  Resp: No coughing, wheezing or shortness of breath  GI: No nausea, vomiting,  heartburn, abdominal pain, diarrhea, constipation or change in bowel habits  : No urinary frequency or dysuria, bladder or kidney problems  Musculoskeletal: No significant muscle or joint pains  Neurologic: No headaches, numbness, tingling, weakness, problems with balance or coordination  Psychiatric: No problems with anxiety, depression or mental health  Heme/immune/allergy: No history of bleeding or clotting problems or anemia.  No allergies or  immune system problems  Integumentary: No rashes,worrisome lesions or skin problems      Physical examination  Vitals: 144/78, 105 bpm  BMI= 32    Constitutional: In general, the patient is a pleasant male in no apparent distress.    Eyes: PERRLA.  EOMI.  Sclerae white, not injected.  ENT/mouth: Normiocephalic and atraumatic.  Nares clear.  Pharynx without erythema or exudate.  Dentition intact.  No adenopathy.  No thyromegaly. Carotids +2/2 bilaterally without bruits.  No jugular venous distension.   Card/Vasc: The PMI is in the 5th ICS in the midclavicular line. There is no heave. Regular rate and rhythm. Normal S1, S2. No murmur, rub, click, or gallop. Pulses are normal bilaterally throughout. No peripheral edema.  Respiratory: Clear to asculation.  No ronchi, wheezes, rales.  No dullness to percussion.   GI: Abdomen is soft, nontender, nondistended. No organomegaly. No AAA.  No bruits.   Integument: No significant bruises or rashes  Neurological: The neurological examination reveal a patient who was oriented to person, place, and time.    Psych: Normal  Heme/Lymph/Immun: no significant adenopathy      I have reviewed the following labs/imaging:  Labs: 19 - (fasting) cholesterol 293, HDL 36, , , k 4.7, cr 1.31, A1C 9  Echo: 3/4/19 - Ef 50-55%, mild to moderate LVH, normal RV, no effusion, mild biatrial enlargement, mild MAC with mild MR  Angiogram 10/19/16 - LIMA to LAD patent; LCx stent 50%, SVG to occluded OM patent; 100% RCA, free RIM from SVG  to PDA patent; LVEDP 19      I have personally and independently reviewed the following:  EK19 - sinus 100 bpm, CURTIS, no acute ST/T changes      Assessment :  1. Coronary artery disease s/p CABG/PCI. Stable and asymptomatic. No CHF symptoms or signs. Normalized EF.  2. Hyperlipidemia - needs therapy. He is not taking rosuvastatin, encouraged him to start  3. Hypertension - could use better control. Consider adding lisinopril for diabetic  nephropathy. Can increase metoprolol if needed  4. Diabetes, poorly controlled. Recommend adding lisinopril and follow creatinine carefully  5. ? Medical noncompliance. I'm a bit concerned about compliance. He's not clear on what he's taking, and there may be a financial issue as well.      I suggest patient be on the following meds:  Aspirin 81 every day  Metoprolol tartrate 50 bid  Rosuvastatin 20 qhs  Lisinopril 20 every day    Discontinue lasix (I'm not sure he's taking it anyway)    The following tests will be ordered at this visit:    BMP in two weeks after starting lisinopril  Repeat fasting lipid profile in 3 months, assuming he starts rosuvastatin    The patient is to return 6 months . The patient understood the treatment plan as outlined above.  There were no barriers to learning.      Eri Palafox MD

## 2019-08-22 NOTE — PROGRESS NOTES
In addition,   Constitutional: No change in weight, sleep or appetite.  Normal energy.  No fever or chills  Eyes: Negative for vision changes or eye problems  ENT: No problems with ears, nose or throat.  No difficulty swallowing.  Resp: No coughing, wheezing or shortness of breath  GI: No nausea, vomiting,  heartburn, abdominal pain, diarrhea, constipation or change in bowel habits  : No urinary frequency or dysuria, bladder or kidney problems  Musculoskeletal: No significant muscle or joint pains  Neurologic: No headaches, numbness, tingling, weakness, problems with balance or coordination  Psychiatric: No problems with anxiety, depression or mental health  Heme/immune/allergy: No history of bleeding or clotting problems or anemia.  No allergies or immune system problems  Integumentary: No rashes,worrisome lesions or skin problems      Physical examination  Vitals: 144/78, 105 bpm  BMI= 32    Constitutional: In general, the patient is a pleasant male in no apparent distress.    Eyes: PERRLA.  EOMI.  Sclerae white, not injected.  ENT/mouth: Normiocephalic and atraumatic.  Nares clear.  Pharynx without erythema or exudate.  Dentition intact.  No adenopathy.  No thyromegaly. Carotids +2/2 bilaterally without bruits.  No jugular venous distension.   Card/Vasc: The PMI is in the 5th ICS in the midclavicular line. There is no heave. Regular rate and rhythm. Normal S1, S2. No murmur, rub, click, or gallop. Pulses are normal bilaterally throughout. No peripheral edema.  Respiratory: Clear to asculation.  No ronchi, wheezes, rales.  No dullness to percussion.   GI: Abdomen is soft, nontender, nondistended. No organomegaly. No AAA.  No bruits.   Integument: No significant bruises or rashes  Neurological: The neurological examination reveal a patient who was oriented to person, place, and time.    Psych: Normal  Heme/Lymph/Immun: no significant adenopathy    error

## 2019-08-22 NOTE — PATIENT INSTRUCTIONS
You were seen today in the Cardiovascular Clinic at [facility].     Cardiology Providers you saw during your visit: Dr. Eri Palafox    Diagnosis:  Hypertension, coronary artery disease    Results: discussed with patient      Orders:   none    Medication Changes:   Don't take lasix (furosemide)  DO take crestor (Rosuvastatin) (cholesterol medicine)  Start lisinopril 20 mg once a day (blood pressure and kidneys) -     Recommendations:   Blood test for kidney function AFTER 19    Follow-up:    6 months      For medication refills please contact your Primary Care Provider for future refills.     Please follow up with primary care provider for medication refills     Please feel free to call me with any questions or concerns.        Questions and [phone]      For Radiology / Imaging [phone]       On Call Cardiologist for after hours or on weekends: [phone]   option #4 and ask to speak to the on-call Cardiologist.          If you need a medication refill please contact your pharmacy.  Please allow 3 business days for your refill to be completed.

## 2019-08-23 NOTE — PROGRESS NOTES
I am delighted to see Hayder Alves in consultation for coronary artery disease.    History of Present Illness:  As you know, the patient is a 64 year old  Male with h/o CABG in 2001 and PCI 2005. He last saw cardiology 2017. At that time, he had just had hospitalization for acute respiratory failure which was thought to be from COPD/pneumonia, he had troponin elevation but no new disease on angiogram, EF better than before. He also was noted to have some gait instability/falls and metoprolol was reduced. He had a h/o myalgias with atorvastatin and was advised to continue rosuvastatin 20 every day.    Patient had not seen cardiology since 2/2017. He did not bring his meds with him today and does not know what he's on. He states that he had not started taking rosuvastatin, didn't know that he was supposed to be on it, and can't  any medications until next week when he gets paid.  He reports that his grandkids are visiting and he's a bit distracted. Denies chest discomfort, dyspnea, dizziness, falls. Does not exercise because of leg pain from neuropathy.    The following portions of the patient's history were reviewed and updated as appropriate: allergies, current medications, past family history, past medical history, past social history, past surgical history, and the problem list.    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     Atorvastatin Calcium Other (See Comments)      myalgias     Latex      Lunesta      Strange dreams. Cooking during sleep          Family History:   Family History   Problem Relation Age of Onset     Cancer Sister         pancreatic ca     C.A.D. Father         mi,stroke,htn, 60's     Cerebrovascular Disease Father 64     C.A.D. Mother         mi,htn, 60's     Breast Cancer No family hx of      Cancer - colorectal No family hx of      Prostate Cancer No family hx of        Psychosocial history:  reports that he quit smoking about 19 years ago. His smoking use included cigarettes. He has a 45.00 pack-year smoking history. He has never used smokeless tobacco. He reports that he drinks alcohol. He reports that he does not use drugs.    Review of systems:   Cardiovascular: No palpitations, chest pain, shortness of breath at rest, dyspnea with exertion, orthopnea, paroxysmal nocturia dyspnea, nocturia, dizziness, syncope.    In addition,   Constitutional: No change in weight, sleep or appetite.  Normal energy.  No fever or chills  Eyes: Negative for vision changes or eye problems  ENT: No problems with ears, nose or throat.  No difficulty swallowing.  Resp: No coughing, wheezing or shortness of breath  GI: No nausea, vomiting,  heartburn, abdominal pain, diarrhea, constipation or change in bowel habits  : No urinary frequency or dysuria, bladder or kidney problems  Musculoskeletal: No significant muscle or joint pains  Neurologic: No headaches, numbness, tingling, weakness, problems with balance or coordination  Psychiatric: No problems with anxiety, depression or mental health  Heme/immune/allergy: No history of bleeding or clotting problems or anemia.  No allergies or immune system problems  Integumentary: No rashes,worrisome lesions or skin problems      Physical examination  Vitals: 144/78, 105 bpm  BMI= 32    Constitutional: In general, the patient is a pleasant male in no apparent distress.    Eyes: PERRLA.  EOMI.  Sclerae white, not injected.  ENT/mouth:  Normiocephalic and atraumatic.  Nares clear.  Pharynx without erythema or exudate.  Dentition intact.  No adenopathy.  No thyromegaly. Carotids +2/2 bilaterally without bruits.  No jugular venous distension.   Card/Vasc: The PMI is in the 5th ICS in the midclavicular line. There is no heave. Regular rate and rhythm. Normal S1, S2. No murmur, rub, click, or gallop. Pulses are normal bilaterally throughout. No peripheral edema.  Respiratory: Clear to asculation.  No ronchi, wheezes, rales.  No dullness to percussion.   GI: Abdomen is soft, nontender, nondistended. No organomegaly. No AAA.  No bruits.   Integument: No significant bruises or rashes  Neurological: The neurological examination reveal a patient who was oriented to person, place, and time.    Psych: Normal  Heme/Lymph/Immun: no significant adenopathy      I have reviewed the following labs/imaging:  Labs: 19 - (fasting) cholesterol 293, HDL 36, , , k 4.7, cr 1.31, A1C 9  Echo: 3/4/19 - Ef 50-55%, mild to moderate LVH, normal RV, no effusion, mild biatrial enlargement, mild MAC with mild MR  Angiogram 10/19/16 - LIMA to LAD patent; LCx stent 50%, SVG to occluded OM patent; 100% RCA, free RIM from SVG  to PDA patent; LVEDP 19      I have personally and independently reviewed the following:  EK19 - sinus 100 bpm, CURTIS, no acute ST/T changes      Assessment :  1. Coronary artery disease s/p CABG/PCI. Stable and asymptomatic. No CHF symptoms or signs. Normalized EF.  2. Hyperlipidemia - needs therapy. He is not taking rosuvastatin, encouraged him to start  3. Hypertension - could use better control. Consider adding lisinopril for diabetic nephropathy. Can increase metoprolol if needed  4. Diabetes, poorly controlled. Recommend adding lisinopril and follow creatinine carefully  5. ? Medical noncompliance. I'm a bit concerned about compliance. He's not clear on what he's taking, and there may be a financial issue as well.      I suggest  patient be on the following meds:  Aspirin 81 every day  Metoprolol tartrate 50 bid  Rosuvastatin 20 qhs  Lisinopril 20 every day    Discontinue lasix (I'm not sure he's taking it anyway)    The following tests will be ordered at this visit:    BMP in two weeks after starting lisinopril  Repeat fasting lipid profile in 3 months, assuming he starts rosuvastatin        The patient is to return 6 months . The patient understood the treatment plan as outlined above.  There were no barriers to learning.      Eri Palafox MD

## 2019-10-03 ENCOUNTER — OFFICE VISIT (OUTPATIENT)
Dept: FAMILY MEDICINE | Facility: CLINIC | Age: 64
End: 2019-10-03
Payer: MEDICARE

## 2019-10-03 VITALS
RESPIRATION RATE: 18 BRPM | WEIGHT: 207 LBS | HEART RATE: 103 BPM | SYSTOLIC BLOOD PRESSURE: 148 MMHG | BODY MASS INDEX: 31.47 KG/M2 | DIASTOLIC BLOOD PRESSURE: 78 MMHG

## 2019-10-03 DIAGNOSIS — E11.22 TYPE 2 DIABETES MELLITUS WITH STAGE 3 CHRONIC KIDNEY DISEASE, WITH LONG-TERM CURRENT USE OF INSULIN (H): ICD-10-CM

## 2019-10-03 DIAGNOSIS — G89.4 CHRONIC PAIN SYNDROME: Primary | ICD-10-CM

## 2019-10-03 DIAGNOSIS — M50.20 HERNIATION OF CERVICAL INTERVERTEBRAL DISC: ICD-10-CM

## 2019-10-03 DIAGNOSIS — I10 ESSENTIAL HYPERTENSION: ICD-10-CM

## 2019-10-03 DIAGNOSIS — N18.30 TYPE 2 DIABETES MELLITUS WITH STAGE 3 CHRONIC KIDNEY DISEASE, WITH LONG-TERM CURRENT USE OF INSULIN (H): ICD-10-CM

## 2019-10-03 DIAGNOSIS — Z79.4 TYPE 2 DIABETES MELLITUS WITH STAGE 3 CHRONIC KIDNEY DISEASE, WITH LONG-TERM CURRENT USE OF INSULIN (H): ICD-10-CM

## 2019-10-03 DIAGNOSIS — I25.810 CORONARY ARTERY DISEASE INVOLVING CORONARY BYPASS GRAFT OF NATIVE HEART WITHOUT ANGINA PECTORIS: ICD-10-CM

## 2019-10-03 DIAGNOSIS — Z12.11 SPECIAL SCREENING FOR MALIGNANT NEOPLASMS, COLON: ICD-10-CM

## 2019-10-03 LAB
ANION GAP SERPL CALCULATED.3IONS-SCNC: 14 MMOL/L (ref 3–14)
BUN SERPL-MCNC: 44 MG/DL (ref 7–30)
CALCIUM SERPL-MCNC: 9.1 MG/DL (ref 8.5–10.1)
CHLORIDE SERPL-SCNC: 96 MMOL/L (ref 94–109)
CHOLEST SERPL-MCNC: 159 MG/DL
CO2 SERPL-SCNC: 22 MMOL/L (ref 20–32)
CREAT SERPL-MCNC: 1.65 MG/DL (ref 0.66–1.25)
GFR SERPL CREATININE-BSD FRML MDRD: 43 ML/MIN/{1.73_M2}
GLUCOSE SERPL-MCNC: 281 MG/DL (ref 70–99)
HDLC SERPL-MCNC: 42 MG/DL
LDLC SERPL CALC-MCNC: 38 MG/DL
NONHDLC SERPL-MCNC: 117 MG/DL
POTASSIUM SERPL-SCNC: 4.7 MMOL/L (ref 3.4–5.3)
SODIUM SERPL-SCNC: 132 MMOL/L (ref 133–144)
TRIGL SERPL-MCNC: 393 MG/DL

## 2019-10-03 PROCEDURE — 80061 LIPID PANEL: CPT | Performed by: NURSE PRACTITIONER

## 2019-10-03 PROCEDURE — 80048 BASIC METABOLIC PNL TOTAL CA: CPT | Performed by: NURSE PRACTITIONER

## 2019-10-03 PROCEDURE — 36415 COLL VENOUS BLD VENIPUNCTURE: CPT | Performed by: NURSE PRACTITIONER

## 2019-10-03 PROCEDURE — 99214 OFFICE O/P EST MOD 30 MIN: CPT | Performed by: NURSE PRACTITIONER

## 2019-10-03 RX ORDER — LISINOPRIL 40 MG/1
40 TABLET ORAL DAILY
Qty: 90 TABLET | Status: SHIPPED | OUTPATIENT
Start: 2019-10-03 | End: 2020-12-27

## 2019-10-03 RX ORDER — OXYCODONE AND ACETAMINOPHEN 5; 325 MG/1; MG/1
1 TABLET ORAL AT BEDTIME
Qty: 28 TABLET | Refills: 0 | Status: SHIPPED | OUTPATIENT
Start: 2019-10-04 | End: 2019-10-03

## 2019-10-03 RX ORDER — OXYCODONE AND ACETAMINOPHEN 5; 325 MG/1; MG/1
1 TABLET ORAL AT BEDTIME
Qty: 28 TABLET | Refills: 0 | Status: SHIPPED | OUTPATIENT
Start: 2019-10-03 | End: 2019-10-03

## 2019-10-03 RX ORDER — OXYCODONE AND ACETAMINOPHEN 5; 325 MG/1; MG/1
1 TABLET ORAL AT BEDTIME
Qty: 28 TABLET | Refills: 0 | Status: SHIPPED | OUTPATIENT
Start: 2019-10-31 | End: 2019-11-13

## 2019-10-03 NOTE — PATIENT INSTRUCTIONS
Increase Lisinopril to 40 mg (use up what you have by taking 2 of the 20 mg) and I will send in a new prescription for 40 mg.    Check blood sugar twice daily - once in the morning and once 2 hours after eating.    Follow up the week of November 25th

## 2019-10-03 NOTE — PROGRESS NOTES
SUBJECTIVE:  Hayder Alves, a 64 year old male scheduled an appointment to discuss the following issues:     Herniation of cervical intervertebral disc  Chronic pain syndrome  His pain is stable on one oxycodone at HS.    Essential hypertension  He was started on Lisinopril about 6 weeks ago.  He denies any side effects.    Type 2 diabetes mellitus with stage 3 chronic kidney disease, with long-term current use of insulin (H)  He has not been checking blood sugars.  A1c was 9.0 in August.     Coronary artery disease involving coronary bypass graft of native heart without angina pectoris  Special screening for malignant neoplasms, colon    Medical, social, surgical, and family histories reviewed.    ROS:  CONSTITUTIONAL: NEGATIVE for fever, chills  EYES: NEGATIVE for vision changes   RESP: NEGATIVE for significant cough or SOB  CV: NEGATIVE for chest pain, palpitations   GI: NEGATIVE for nausea, abdominal pain, heartburn, or change in bowel habits  : NEGATIVE for frequency, dysuria, or hematuria  MUSCULOSKELETAL:see HPI  NEURO: NEGATIVE for weakness, dizziness or paresthesias or headache  PSYCHIATRIC: NEGATIVE for changes in mood or affect    OBJECTIVE:  BP (!) 148/78   Pulse 103   Resp 18   Wt 93.9 kg (207 lb)   BMI 31.47 kg/m    EXAM:  GENERAL APPEARANCE: healthy, alert and no distress  RESP: lungs clear to auscultation - no rales, rhonchi or wheezes  CV: regular rates and rhythm, normal S1 S2, no S3 or S4 and no murmur, click or rub -  PSYCH: mentation appears normal and affect normal/bright    ASSESSMENT/PLAN:  (G89.4) Chronic pain syndrome  (primary encounter diagnosis)  Comment:   Plan: oxyCODONE-acetaminophen (PERCOCET) 5-325 MG         tablet, DISCONTINUED: oxyCODONE-acetaminophen         (PERCOCET) 5-325 MG tablet, DISCONTINUED:         oxyCODONE-acetaminophen (PERCOCET) 5-325 MG         tablet        Refill given dated 10/3 and 10/31.  He will follow up the week of Nov 25.    (M50.20) Herniation  of cervical intervertebral disc  Comment:   Plan: oxyCODONE-acetaminophen (PERCOCET) 5-325 MG         tablet, DISCONTINUED: oxyCODONE-acetaminophen         (PERCOCET) 5-325 MG tablet, DISCONTINUED:         oxyCODONE-acetaminophen (PERCOCET) 5-325 MG         tablet        See above.    (I10) Essential hypertension  Comment: not at goal  Plan: lisinopril (PRINIVIL/ZESTRIL) 40 MG tablet,         Basic metabolic panel        Will check creat and potassium today (since he did not get this rechecked after starting Lisinopril) and if normal, will increase to 40 mg daily.  Follow up in November.     (E11.22,  N18.3,  Z79.4) Type 2 diabetes mellitus with stage 3 chronic kidney disease, with long-term current use of insulin (H)  Comment: not at goal  Plan: Discussed diet.  Check blood sugars regularly.  Follow up in November.     (I25.810) Coronary artery disease involving coronary bypass graft of native heart without angina pectoris  Comment:   Plan: Lipid panel reflex to direct LDL Fasting            (Z12.11) Special screening for malignant neoplasms, colon  Comment:   Plan: GASTROENTEROLOGY ADULT REF PROCEDURE ONLY         Srinivasa Urrutia [phone]

## 2019-10-03 NOTE — LETTER
October 4, 2019      Hayder Alves  [address] [address]        Dear ,    We are writing to inform you of your test results.    Cholesterol level is improved!     Kidney function is stable.     Resulted Orders   Basic metabolic panel   Result Value Ref Range    Sodium 132 (L) 133 - 144 mmol/L    Potassium 4.7 3.4 - 5.3 mmol/L    Chloride 96 94 - 109 mmol/L    Carbon Dioxide 22 20 - 32 mmol/L    Anion Gap 14 3 - 14 mmol/L    Glucose 281 (H) 70 - 99 mg/dL      Comment:      Fasting specimen    Urea Nitrogen 44 (H) 7 - 30 mg/dL    Creatinine 1.65 (H) 0.66 - 1.25 mg/dL    GFR Estimate 43 (L) >60 mL/min/[1.73_m2]      Comment:      Non  GFR Calc  Starting 12/18/2018, serum creatinine based estimated GFR (eGFR) will be   calculated using the Chronic Kidney Disease Epidemiology Collaboration   (CKD-EPI) equation.      GFR Estimate If Black 50 (L) >60 mL/min/[1.73_m2]      Comment:       GFR Calc  Starting 12/18/2018, serum creatinine based estimated GFR (eGFR) will be   calculated using the Chronic Kidney Disease Epidemiology Collaboration   (CKD-EPI) equation.      Calcium 9.1 8.5 - 10.1 mg/dL   Lipid panel reflex to direct LDL Fasting   Result Value Ref Range    Cholesterol 159 <200 mg/dL    Triglycerides 393 (H) <150 mg/dL      Comment:      Borderline high:  150-199 mg/dl  High:             200-499 mg/dl  Very high:       >499 mg/dl  Fasting specimen      HDL Cholesterol 42 >39 mg/dL    LDL Cholesterol Calculated 38 <100 mg/dL      Comment:      Desirable:       <100 mg/dl    Non HDL Cholesterol 117 <130 mg/dL       If you have any questions or concerns, please call the clinic at the number listed above.       Sincerely,        Suma Jenkins, NP/nr

## 2019-10-23 DIAGNOSIS — N18.30 TYPE 2 DIABETES MELLITUS WITH STAGE 3 CHRONIC KIDNEY DISEASE, WITH LONG-TERM CURRENT USE OF INSULIN (H): ICD-10-CM

## 2019-10-23 DIAGNOSIS — E11.22 TYPE 2 DIABETES MELLITUS WITH STAGE 3 CHRONIC KIDNEY DISEASE, WITH LONG-TERM CURRENT USE OF INSULIN (H): ICD-10-CM

## 2019-10-23 DIAGNOSIS — Z79.4 TYPE 2 DIABETES MELLITUS WITH STAGE 3 CHRONIC KIDNEY DISEASE, WITH LONG-TERM CURRENT USE OF INSULIN (H): ICD-10-CM

## 2019-10-24 NOTE — TELEPHONE ENCOUNTER
Requested Prescriptions   Pending Prescriptions Disp Refills     metFORMIN (GLUCOPHAGE) 500 MG tablet [Pharmacy Med Name: METFORMIN 500MG TABLETS] 180 tablet 0     Sig: TAKE 1 TABLET(500 MG) BY MOUTH TWICE DAILY WITH MEALS      Last Written Prescription Date:  2/27/2019  Last Fill Quantity: 180 tablet    ,  # refills: 3   Last Office Visit: 10/3/2019   Future Office Visit:    Next 5 appointments (look out 90 days)    Nov 13, 2019  3:15 PM CST  Office Visit with Suma Jenkins NP  [facility] ([facility]) [address] [address]  [phone]             Biguanide Agents Failed - 10/23/2019 12:43 PM        Failed - Blood pressure less than 140/90 in past 6 months     BP Readings from Last 3 Encounters:   10/03/19 (!) 148/78   08/22/19 (!) 144/78   08/08/19 138/86           Failed - Patient's CR is NOT>1.4 OR Patient's EGFR is NOT<45 within past 12 mos.     Recent Labs   Lab Test 10/03/19  1154   GFRESTIMATED 43*   GFRESTBLACK 50*     Recent Labs   Lab Test 10/03/19  1154   CR 1.65*           Passed - Patient has documented LDL within the past 12 mos.     Recent Labs   Lab Test 10/03/19  1154   LDL 38           Passed - Patient has had a Microalbumin in the past 15 mos.     Recent Labs   Lab Test 12/13/18  1516   MICROL 547   UMALCR 772.60*           Passed - Patient is age 10 or older        Passed - Patient has documented A1c within the specified period of time.     If HgbA1C is 8 or greater, it needs to be on file within the past 3 months.  If less than 8, must be on file within the past 6 months.     Recent Labs   Lab Test 08/08/19  1609   A1C 9.0*           Passed - Patient does NOT have a diagnosis of CHF.        Passed - Medication is active on med list        Passed - Recent (6 mo) or future (30 days) visit within the authorizing provider's specialty     Patient had office visit in the last 6 months or has a visit in the next 30 days with authorizing  "provider or within the authorizing provider's specialty.  See \"Patient Info\" tab in inbasket, or \"Choose Columns\" in Meds & Orders section of the refill encounter.            "

## 2019-10-24 NOTE — TELEPHONE ENCOUNTER
Writer notes Rx sent to [facility] pharmacy on 02/27/2019 #180/3 refills    Medication refused with note to pharmacy to transfer Rx    Thank You!  Evelin Silva, RN  Triage Nurse

## 2019-10-28 DIAGNOSIS — Z79.4 TYPE 2 DIABETES MELLITUS WITH STAGE 3 CHRONIC KIDNEY DISEASE, WITH LONG-TERM CURRENT USE OF INSULIN (H): ICD-10-CM

## 2019-10-28 DIAGNOSIS — N18.30 TYPE 2 DIABETES MELLITUS WITH STAGE 3 CHRONIC KIDNEY DISEASE, WITH LONG-TERM CURRENT USE OF INSULIN (H): ICD-10-CM

## 2019-10-28 DIAGNOSIS — E11.22 TYPE 2 DIABETES MELLITUS WITH STAGE 3 CHRONIC KIDNEY DISEASE, WITH LONG-TERM CURRENT USE OF INSULIN (H): ICD-10-CM

## 2019-10-28 NOTE — TELEPHONE ENCOUNTER
Patient informed that he has refills on file at [facility] pharmacy. Patient will contact [facility] for transfer.     Swathi SEO  [place]

## 2019-10-29 NOTE — TELEPHONE ENCOUNTER
Message sent to pharmacy - Refusal reason: OTHER (ORDERS AT [facility]. [phone] PT CAN MOVE.).  Fauzia JACKMAN

## 2019-11-13 DIAGNOSIS — M50.20 HERNIATION OF CERVICAL INTERVERTEBRAL DISC: ICD-10-CM

## 2019-11-13 DIAGNOSIS — G89.4 CHRONIC PAIN SYNDROME: ICD-10-CM

## 2019-11-13 RX ORDER — OXYCODONE AND ACETAMINOPHEN 5; 325 MG/1; MG/1
1 TABLET ORAL AT BEDTIME
Qty: 28 TABLET | Refills: 0 | Status: SHIPPED | OUTPATIENT
Start: 2019-11-13 | End: 2019-12-02

## 2019-11-13 NOTE — TELEPHONE ENCOUNTER
I sent in a refill, but he no showed his appointment with me today.  Please let him know that he needs to be seen.

## 2019-11-13 NOTE — TELEPHONE ENCOUNTER
Pt requests a refill on his Percocet Rx. We had an extra hardcopy on file that was dated to start on/after 10/31, but it's been more than 30 days since the written date of 10/03, so now it's .    Thanks!    - Pharmacy Ellinger

## 2019-11-13 NOTE — LETTER
[facility]  [address] [address]  Phone: [phone]    11/14/19    Hayder Alves  [address] [address]      To whom it may concern:     In order to ensure we are providing the best quality care, we have reviewed your chart and see that you are due for a follow-up appointment.    We have also sent your oxyCODONE-acetaminophen (PERCOCET) 5-325 MG tablet medication to your pharmacy. For future medication refills, please contact the clinic to schedule the above appointment. This can be requested via [employer] or by calling the clinic at [phone].    We greatly appreciate the opportunity to serve you. Thank you for trusting us with your health care.      Sincerely,    Your care team at [facility]

## 2019-11-14 RX ORDER — OXYCODONE AND ACETAMINOPHEN 5; 325 MG/1; MG/1
TABLET ORAL
Qty: 28 TABLET | Refills: 0 | OUTPATIENT
Start: 2019-11-14

## 2019-11-14 NOTE — TELEPHONE ENCOUNTER
Requested Prescriptions   Pending Prescriptions Disp Refills     oxyCODONE-acetaminophen (PERCOCET) 5-325 MG tablet [Pharmacy Med Name: OXYCODONE-ACETAMINOPHEN 5-325 TABS] 28 tablet 0     Sig: TAKE ONE TABLET BY MOUTH AT BEDTIME MUST LAST 28 DAYS       There is no refill protocol information for this order              Last Written Prescription Date:  11/13/19  Last Fill Quantity: 28,   # refills: 0  Last Office Visit: 10/3/19  Future Office visit:       Routing refill request to provider for review/approval because:  Drug not on the [facility], [facility] or [facility] refill protocol or controlled substance

## 2019-11-14 NOTE — TELEPHONE ENCOUNTER
LM informing patient that RX was refilled. Patient is due for an office visit prior to additional refills given. Sending letter.    Swathi Collado

## 2019-11-29 DIAGNOSIS — G89.4 CHRONIC PAIN SYNDROME: ICD-10-CM

## 2019-11-29 DIAGNOSIS — M50.20 HERNIATION OF CERVICAL INTERVERTEBRAL DISC: ICD-10-CM

## 2019-11-29 NOTE — LETTER
[facility]  [address] [place] [address]  Phone: [phone]    12/03/19    Hayder Alves  [address] [address]      To whom it may concern:     In order to ensure we are providing the best quality care, we have reviewed your chart and see that you are due for a follow up visit with your medical provider.    We have also sent your oxyCODONE-acetaminophen (PERCOCET) 5-325 MG tablet medication to your pharmacy. For future medication refills, please contact the clinic to schedule the above appointment. This can be requested via [employer] or by calling the clinic at [phone].    We greatly appreciate the opportunity to serve you. Thank you for trusting us with your health care.      Sincerely,    Your care team at [facility]

## 2019-12-02 DIAGNOSIS — I50.22 CHRONIC SYSTOLIC HEART FAILURE (H): ICD-10-CM

## 2019-12-02 RX ORDER — OXYCODONE AND ACETAMINOPHEN 5; 325 MG/1; MG/1
1 TABLET ORAL AT BEDTIME
Qty: 28 TABLET | Refills: 0 | Status: SHIPPED | OUTPATIENT
Start: 2019-12-02 | End: 2020-02-19

## 2019-12-02 NOTE — TELEPHONE ENCOUNTER
The patient called checking on the status of his refill request.  Please follow up with the patient.

## 2019-12-02 NOTE — LETTER
[facility]  [address] [place] [address]  Phone: [phone]    12/05/19    Hayder Alves  [address] [address]      To whom it may concern:     In order to ensure we are providing the best quality care, we have reviewed your chart and see that you are due for a blood pressure follow-up appointment.    We have also sent your furosemide (LASIX) 20 MG tablet medication to your pharmacy. For future medication refills, please contact the clinic to schedule the above appointment. This can be requested via [employer] or by calling the clinic at [phone].    We greatly appreciate the opportunity to serve you. Thank you for trusting us with your health care.      Sincerely,    Your care team at [facility]

## 2019-12-03 NOTE — TELEPHONE ENCOUNTER
KENRICK to return clinic phone call. Patient is due for follow up visit.  Patient informed that a medication refill was sent to their pharmacy.   Sending pt letter as he does not read his [employer] message.         Amaya ARREOLA     [facility]

## 2019-12-04 RX ORDER — FUROSEMIDE 20 MG
TABLET ORAL
Qty: 60 TABLET | Refills: 0 | Status: SHIPPED | OUTPATIENT
Start: 2019-12-04 | End: 2019-12-04

## 2019-12-04 NOTE — TELEPHONE ENCOUNTER
"Geno-Please sign if agree.  BP elevated, creatinine and sodium abnormal.    Team coordinators-please contact patient to schedule BP follow up visit.    Thank you!  LAZARO PerdueN, RN          Requested Prescriptions   Pending Prescriptions Disp Refills     furosemide (LASIX) 20 MG tablet [Pharmacy Med Name: FUROSEMIDE 20MG TABLETS] 60 tablet 0     Sig: TAKE 1 TABLET(20 MG) BY MOUTH TWICE DAILY       Diuretics (Including Combos) Protocol Failed - 12/2/2019  4:43 PM        Failed - Blood pressure under 140/90 in past 12 months     BP Readings from Last 3 Encounters:   10/03/19 (!) 148/78   08/22/19 (!) 144/78   08/08/19 138/86                 Failed - Medication is active on med list        Failed - Normal serum creatinine on file in past 12 months     Recent Labs   Lab Test 10/03/19  1154   CR 1.65*              Failed - Normal serum sodium on file in past 12 months     Recent Labs   Lab Test 10/03/19  1154   *              Passed - Recent (12 mo) or future (30 days) visit within the authorizing provider's specialty     Patient has had an office visit with the authorizing provider or a provider within the authorizing providers department within the previous 12 mos or has a future within next 30 days. See \"Patient Info\" tab in inbasket, or \"Choose Columns\" in Meds & Orders section of the refill encounter.              Passed - Patient is age 18 or older        Passed - Normal serum potassium on file in past 12 months     Recent Labs   Lab Test 10/03/19  1154   POTASSIUM 4.7                      "

## 2019-12-05 NOTE — TELEPHONE ENCOUNTER
LM informing patient that RX was refilled. Patient is due for BP Follow-up prior to additional refills given. Sending letter.    Swathi Collado

## 2019-12-10 DIAGNOSIS — N18.30 TYPE 2 DIABETES MELLITUS WITH STAGE 3 CHRONIC KIDNEY DISEASE, WITH LONG-TERM CURRENT USE OF INSULIN (H): ICD-10-CM

## 2019-12-10 DIAGNOSIS — E11.22 TYPE 2 DIABETES MELLITUS WITH STAGE 3 CHRONIC KIDNEY DISEASE, WITH LONG-TERM CURRENT USE OF INSULIN (H): ICD-10-CM

## 2019-12-10 DIAGNOSIS — Z79.4 TYPE 2 DIABETES MELLITUS WITH STAGE 3 CHRONIC KIDNEY DISEASE, WITH LONG-TERM CURRENT USE OF INSULIN (H): ICD-10-CM

## 2019-12-10 NOTE — LETTER
[facility]  [address] [place] [address]  Phone: [phone]    12/16/19    Hayder Alves  [address] [address]      To whom it may concern:     In order to ensure we are providing the best quality care, we have reviewed your chart and see that you are due for a diabetic follow-up appointment.    We have also sent your HUMULIN N VIAL 100 UNIT/ML susp medication to your pharmacy. For future medication refills, please contact the clinic to schedule the above appointment. This can be requested via [employer] or by calling the clinic at [phone].    We greatly appreciate the opportunity to serve you. Thank you for trusting us with your health care.      Sincerely,    Your care team at [facility]

## 2019-12-11 NOTE — TELEPHONE ENCOUNTER
"Requested Prescriptions   Pending Prescriptions Disp Refills     HUMULIN N VIAL 100 UNIT/ML susp [Pharmacy Med Name: HUMULIN N INSULIN  Last Written Prescription Date:  2/27/19  Last Fill Quantity: 3,  # refills: 3   Last office visit: 10/3/2019 with prescribing provider:     Future Office Visit:   Next 5 appointments (look out 90 days)    Feb 20, 2020  4:00 PM CST  Return Visit with Eri Palafox MD  [facility] ([facility] [place]) [address] [place] [id number] [phone]          (\[facility] [place] [facility]\"")] 10 mL 0     Sig: INJECT 19 UNITS SUBCUTANEOUS TWICE DAILY       Intermediate Acting Insulin Protocol Failed - 12/10/2019  7:38 AM        Failed - Blood pressure less than 140/90 in past 6 months     BP Readings from Last 3 Encounters:   10/03/19 (!) 148/78   08/22/19 (!) 144/78   08/08/19 138/86           Failed - HgbA1C in past 3 or 6 months     If HgbA1C is 8 or greater, it needs to be on file within the past 3 months.  If less than 8, must be on file within the past 6 months.     Recent Labs   Lab Test 08/08/19  1609   A1C 9.0*           Passed - LDL on file in past 12 months     Recent Labs   Lab Test 10/03/19  1154   LDL 38           Passed - Microalbumin on file in past 12 months     Recent Labs   Lab Test 12/13/18  1516   MICROL 547   UMALCR 772.60*           Passed - Serum creatinine on file in past 12 months     Recent Labs   Lab Test 10/03/19  1154  02/08/13  1554   CR 1.65*   < >  --    CREAT  --   --  1.3*    < > = values in this interval not displayed.           Passed - Medication is active on med list        Passed - Patient is age 18 or older        Passed - Recent (6 mo) or future (30 days) visit within the authorizing provider's specialty     Patient had office visit in the last 6 months or has a visit in the next 30 days with authorizing provider or within the authorizing provider's specialty.  See \"Patient Info\" tab in inbasket, or \"Choose " "Columns\" in Meds & Orders section of the refill encounter.              "

## 2019-12-15 RX ORDER — INSULIN HUMAN 100 [IU]/ML
INJECTION, SUSPENSION SUBCUTANEOUS
Qty: 10 ML | Refills: 0 | Status: SHIPPED | OUTPATIENT
Start: 2019-12-15 | End: 2020-02-03

## 2019-12-15 NOTE — TELEPHONE ENCOUNTER
HP Reception Medication is being filled for 1 time refill only due to:  Patient needs to be seen because due for diabetes f/u.     Signed Prescriptions:                        Disp   Refills    HUMULIN N VIAL 100 UNIT/ML susp            10 mL  0        Sig: INJECT 19 UNITS SUBCUTANEOUS TWICE DAILY  Authorizing Provider: NICOL VOGEL  Ordering User: KIMI BOWMAN

## 2019-12-16 NOTE — TELEPHONE ENCOUNTER
LM informing patient that RX was refilled. Patient is due for a diabetic follow-up appt prior to additional refills given. Sending letter.    Swathi Collado

## 2020-02-03 DIAGNOSIS — N18.30 TYPE 2 DIABETES MELLITUS WITH STAGE 3 CHRONIC KIDNEY DISEASE, WITH LONG-TERM CURRENT USE OF INSULIN (H): ICD-10-CM

## 2020-02-03 DIAGNOSIS — Z79.4 TYPE 2 DIABETES MELLITUS WITH STAGE 3 CHRONIC KIDNEY DISEASE, WITH LONG-TERM CURRENT USE OF INSULIN (H): ICD-10-CM

## 2020-02-03 DIAGNOSIS — E11.22 TYPE 2 DIABETES MELLITUS WITH STAGE 3 CHRONIC KIDNEY DISEASE, WITH LONG-TERM CURRENT USE OF INSULIN (H): ICD-10-CM

## 2020-02-03 RX ORDER — INSULIN HUMAN 100 [IU]/ML
INJECTION, SUSPENSION SUBCUTANEOUS
Qty: 10 ML | Refills: 0 | Status: SHIPPED | OUTPATIENT
Start: 2020-02-03 | End: 2020-05-21

## 2020-02-03 NOTE — TELEPHONE ENCOUNTER
"Requested Prescriptions   Pending Prescriptions Disp Refills     HUMULIN N VIAL 100 UNIT/ML susp [Pharmacy Med Name: HUMULIN N INSULIN ([id number])]  Last Written Prescription Date:  12-15-19  Last Fill Quantity: 10 ml,  # refills: 0   Last office visit: 10/3/2019 with prescribing provider:  Geno Jenkins    Future Office Visit:   Next 5 appointments (look out 90 days)    Feb 20, 2020  4:00 PM CST  Return Visit with Eri Palafox MD  [facility] ([facility]) [address] [place] [id number] [phone]       10 mL 0     Sig: INJECT 19 UNITS SUBCUTANEOUSLY TWICE DAILY       Intermediate Acting Insulin Protocol Failed - 2/3/2020 11:01 AM        Failed - Blood pressure less than 140/90 in past 6 months     BP Readings from Last 3 Encounters:   10/03/19 (!) 148/78   08/22/19 (!) 144/78   08/08/19 138/86           Failed - HgbA1C in past 3 or 6 months     If HgbA1C is 8 or greater, it needs to be on file within the past 3 months.  If less than 8, must be on file within the past 6 months.     Recent Labs   Lab Test 08/08/19  1609   A1C 9.0*           Passed - LDL on file in past 12 months     Recent Labs   Lab Test 10/03/19  1154   LDL 38           Passed - Microalbumin on file in past 12 months     Recent Labs   Lab Test 12/13/18  1516   MICROL 547   UMALCR 772.60*           Passed - Serum creatinine on file in past 12 months     Recent Labs   Lab Test 10/03/19  1154  02/08/13  1554   CR 1.65*   < >  --    CREAT  --   --  1.3*    < > = values in this interval not displayed.           Passed - Medication is active on med list        Passed - Patient is age 18 or older        Passed - Recent (6 mo) or future (30 days) visit within the authorizing provider's specialty     Patient had office visit in the last 6 months or has a visit in the next 30 days with authorizing provider or within the authorizing provider's specialty.  See \"Patient Info\" tab in inbasket, " "or \"Choose Columns\" in Meds & Orders section of the refill encounter.           "

## 2020-02-03 NOTE — LETTER
[facility]  [address] [place] [address]  Phone: [phone]    02/04/20    Hayder lAves  [address] [facility] [address]      To whom it may concern:     In order to ensure we are providing the best quality care, we have reviewed your chart and see that you are due for a diabetic follow-up appointment.    We have also sent your HUMULIN N VIAL 100 UNIT/ML susp medication to your pharmacy. For future refills on this medication, please contact the clinic to schedule the above appointment. This can be requested via [employer] or by calling the clinic at [phone].    We greatly appreciate the opportunity to serve you. Thank you for trusting us with your health care.      Sincerely,    Your care team at [facility]

## 2020-02-04 NOTE — TELEPHONE ENCOUNTER
LM informing patient that RX was refilled. Patient is due for a diabetic follow-up appiontment prior to additional refills given. Sending letter.    Swathi SEO     [facility]

## 2020-02-04 NOTE — TELEPHONE ENCOUNTER
Routing refill request to provider for review/approval because:  Gabrielle given x1 and patient did not follow up, please advise      HP Reception - please encourage office visit for future refills

## 2020-02-19 ENCOUNTER — OFFICE VISIT (OUTPATIENT)
Dept: FAMILY MEDICINE | Facility: CLINIC | Age: 65
End: 2020-02-19
Payer: COMMERCIAL

## 2020-02-19 VITALS
OXYGEN SATURATION: 96 % | DIASTOLIC BLOOD PRESSURE: 77 MMHG | HEART RATE: 93 BPM | WEIGHT: 212 LBS | BODY MASS INDEX: 32.23 KG/M2 | SYSTOLIC BLOOD PRESSURE: 128 MMHG | RESPIRATION RATE: 20 BRPM | TEMPERATURE: 98 F

## 2020-02-19 DIAGNOSIS — G89.4 CHRONIC PAIN SYNDROME: Primary | ICD-10-CM

## 2020-02-19 DIAGNOSIS — N18.30 CKD (CHRONIC KIDNEY DISEASE) STAGE 3, GFR 30-59 ML/MIN (H): ICD-10-CM

## 2020-02-19 DIAGNOSIS — E11.22 TYPE 2 DIABETES MELLITUS WITH STAGE 3 CHRONIC KIDNEY DISEASE, WITH LONG-TERM CURRENT USE OF INSULIN (H): ICD-10-CM

## 2020-02-19 DIAGNOSIS — Z79.4 TYPE 2 DIABETES MELLITUS WITH STAGE 3 CHRONIC KIDNEY DISEASE, WITH LONG-TERM CURRENT USE OF INSULIN (H): ICD-10-CM

## 2020-02-19 DIAGNOSIS — J44.9 CHRONIC OBSTRUCTIVE PULMONARY DISEASE, UNSPECIFIED COPD TYPE (H): ICD-10-CM

## 2020-02-19 DIAGNOSIS — N18.30 TYPE 2 DIABETES MELLITUS WITH STAGE 3 CHRONIC KIDNEY DISEASE, WITH LONG-TERM CURRENT USE OF INSULIN (H): ICD-10-CM

## 2020-02-19 DIAGNOSIS — I10 HYPERTENSION GOAL BP (BLOOD PRESSURE) < 130/80: ICD-10-CM

## 2020-02-19 DIAGNOSIS — M50.20 HERNIATION OF CERVICAL INTERVERTEBRAL DISC: ICD-10-CM

## 2020-02-19 DIAGNOSIS — F31.70 BIPOLAR AFFECTIVE DISORDER IN REMISSION (H): ICD-10-CM

## 2020-02-19 DIAGNOSIS — I50.22 CHRONIC SYSTOLIC HEART FAILURE (H): ICD-10-CM

## 2020-02-19 LAB
ALBUMIN SERPL-MCNC: 3.9 G/DL (ref 3.4–5)
ALP SERPL-CCNC: 61 U/L (ref 40–150)
ALT SERPL W P-5'-P-CCNC: 31 U/L (ref 0–70)
AMPHETAMINES UR QL: NOT DETECTED NG/ML
ANION GAP SERPL CALCULATED.3IONS-SCNC: 10 MMOL/L (ref 3–14)
AST SERPL W P-5'-P-CCNC: 22 U/L (ref 0–45)
BARBITURATES UR QL SCN: NOT DETECTED NG/ML
BENZODIAZ UR QL SCN: NOT DETECTED NG/ML
BILIRUB SERPL-MCNC: 0.3 MG/DL (ref 0.2–1.3)
BUN SERPL-MCNC: 24 MG/DL (ref 7–30)
BUPRENORPHINE UR QL: NOT DETECTED NG/ML
CALCIUM SERPL-MCNC: 9 MG/DL (ref 8.5–10.1)
CANNABINOIDS UR QL: ABNORMAL NG/ML
CHLORIDE SERPL-SCNC: 107 MMOL/L (ref 94–109)
CO2 SERPL-SCNC: 21 MMOL/L (ref 20–32)
COCAINE UR QL SCN: NOT DETECTED NG/ML
CREAT SERPL-MCNC: 1.46 MG/DL (ref 0.66–1.25)
CREAT UR-MCNC: 196 MG/DL
D-METHAMPHET UR QL: NOT DETECTED NG/ML
ERYTHROCYTE [DISTWIDTH] IN BLOOD BY AUTOMATED COUNT: 11.7 % (ref 10–15)
GFR SERPL CREATININE-BSD FRML MDRD: 49 ML/MIN/{1.73_M2}
GLUCOSE SERPL-MCNC: 227 MG/DL (ref 70–99)
HBA1C MFR BLD: 6.9 % (ref 0–5.6)
HCT VFR BLD AUTO: 45 % (ref 40–53)
HGB BLD-MCNC: 15.4 G/DL (ref 13.3–17.7)
MCH RBC QN AUTO: 30.8 PG (ref 26.5–33)
MCHC RBC AUTO-ENTMCNC: 34.2 G/DL (ref 31.5–36.5)
MCV RBC AUTO: 90 FL (ref 78–100)
METHADONE UR QL SCN: NOT DETECTED NG/ML
MICROALBUMIN UR-MCNC: 2030 MG/L
MICROALBUMIN/CREAT UR: 1035.71 MG/G CR (ref 0–17)
OPIATES UR QL SCN: NOT DETECTED NG/ML
OXYCODONE UR QL SCN: NOT DETECTED NG/ML
PCP UR QL SCN: NOT DETECTED NG/ML
PLATELET # BLD AUTO: 113 10E9/L (ref 150–450)
POTASSIUM SERPL-SCNC: 4.4 MMOL/L (ref 3.4–5.3)
PROPOXYPH UR QL: NOT DETECTED NG/ML
PROT SERPL-MCNC: 7.5 G/DL (ref 6.8–8.8)
RBC # BLD AUTO: 5 10E12/L (ref 4.4–5.9)
SODIUM SERPL-SCNC: 138 MMOL/L (ref 133–144)
TRICYCLICS UR QL SCN: ABNORMAL NG/ML
WBC # BLD AUTO: 6.5 10E9/L (ref 4–11)

## 2020-02-19 PROCEDURE — 36415 COLL VENOUS BLD VENIPUNCTURE: CPT | Performed by: NURSE PRACTITIONER

## 2020-02-19 PROCEDURE — 83036 HEMOGLOBIN GLYCOSYLATED A1C: CPT | Performed by: NURSE PRACTITIONER

## 2020-02-19 PROCEDURE — 99214 OFFICE O/P EST MOD 30 MIN: CPT | Performed by: NURSE PRACTITIONER

## 2020-02-19 PROCEDURE — 85027 COMPLETE CBC AUTOMATED: CPT | Performed by: NURSE PRACTITIONER

## 2020-02-19 PROCEDURE — 80306 DRUG TEST PRSMV INSTRMNT: CPT | Performed by: NURSE PRACTITIONER

## 2020-02-19 PROCEDURE — 82043 UR ALBUMIN QUANTITATIVE: CPT | Performed by: NURSE PRACTITIONER

## 2020-02-19 PROCEDURE — 80053 COMPREHEN METABOLIC PANEL: CPT | Performed by: NURSE PRACTITIONER

## 2020-02-19 RX ORDER — OXYCODONE AND ACETAMINOPHEN 5; 325 MG/1; MG/1
1 TABLET ORAL AT BEDTIME
Qty: 28 TABLET | Refills: 0 | Status: SHIPPED | OUTPATIENT
Start: 2020-03-18 | End: 2020-03-26

## 2020-02-19 RX ORDER — OXYCODONE AND ACETAMINOPHEN 5; 325 MG/1; MG/1
1 TABLET ORAL AT BEDTIME
Qty: 28 TABLET | Refills: 0 | Status: SHIPPED | OUTPATIENT
Start: 2020-02-19 | End: 2020-02-19

## 2020-02-19 ASSESSMENT — ANXIETY QUESTIONNAIRES
3. WORRYING TOO MUCH ABOUT DIFFERENT THINGS: NOT AT ALL
IF YOU CHECKED OFF ANY PROBLEMS ON THIS QUESTIONNAIRE, HOW DIFFICULT HAVE THESE PROBLEMS MADE IT FOR YOU TO DO YOUR WORK, TAKE CARE OF THINGS AT HOME, OR GET ALONG WITH OTHER PEOPLE: NOT DIFFICULT AT ALL
6. BECOMING EASILY ANNOYED OR IRRITABLE: NOT AT ALL
1. FEELING NERVOUS, ANXIOUS, OR ON EDGE: SEVERAL DAYS
5. BEING SO RESTLESS THAT IT IS HARD TO SIT STILL: NOT AT ALL
7. FEELING AFRAID AS IF SOMETHING AWFUL MIGHT HAPPEN: SEVERAL DAYS
2. NOT BEING ABLE TO STOP OR CONTROL WORRYING: NOT AT ALL
GAD7 TOTAL SCORE: 3

## 2020-02-19 ASSESSMENT — PATIENT HEALTH QUESTIONNAIRE - PHQ9
SUM OF ALL RESPONSES TO PHQ QUESTIONS 1-9: 8
5. POOR APPETITE OR OVEREATING: SEVERAL DAYS

## 2020-02-19 NOTE — PATIENT INSTRUCTIONS
Schedule Diabetic eye exam     Refill dated 3/18 is on file at our pharmacy.  Call for a refill on 4/15.    See me again in 3 months (May)

## 2020-02-19 NOTE — LETTER
[facility]  02/19/20    Patient: Hayder Alves  YOB: 1955  Medical Record Number: [id number]                                                                  Opioid / Opioid Plus Controlled Substance Agreement    I understand that my care provider has prescribed an opioid (narcotic) controlled substance to help manage my condition(s). I am taking this medicine to help me function or work. I know this is strong medicine, and that it can cause serious side effects. Opioid medicine can be sedating, addicting and may cause a dependency on the drug. They can affect my ability to drive or think, and cause depression. They need to be taken exactly as prescribed. Combining opioids with certain medicines or chemicals (such as cocaine, sedatives and tranquilizers, sleeping pills, meth) can be dangerous or even fatal. Also, if I stop opioids suddenly, I may have severe withdrawal symptoms. Last, I understand that opioids do not work for all types of pain nor for all patients. If not helpful, I may be asked to stop them.        The risks, benefits, and side effects of these medicine(s) were explained to me. I agree that:    1. I will take part in other treatments as advised by my care team. This may be psychiatry or counseling, physical therapy, behavioral therapy, group treatment or a referral to a pain clinic. I will reduce or stop my medicine when my care team tells me to do so.  2. I will take my medicines as prescribed. I will not change the dose or schedule unless my care team tells me to. There will be no refills if I  run out early.   I may be contactedwithout warning and asked to complete a urine drug test or pill count at any time.   3. I will keep all my appointments, and understand this is part of the monitoring of opioids. My care team may require an office visit for EVERY opioid/controlled substance refill. If I miss appointments or don t follow instructions, my care team  may stop my medicine.  4. I will not ask other providers to prescribe controlled substances, and I will not accept controlled substances from other people. If I need another prescribed controlled substance for a new reason, I will tell my care team within 1 business day.  5. I will use one pharmacy to fill all of my controlled substance prescriptions, and it is up to me to make sure that I do not run out of my medicines on weekends or holidays. If my care team is willing to refill my opioid prescription without a visit, I must request refills only during office hours, refills may take up to 3 days to process, and it may take up to 5 to 7 days for my medicine to be mailed and ready at my pharmacy. Prescriptions will not be mailed anywhere except my pharmacy.        [id number]  Rev 12/18         Registration to scan to EHR                             Page 1 of 2               Controlled Substance Agreement Opioid        [facility]  02/19/20  Patient: Hayder Alves  YOB: 1955  Medical Record Number: [id number]                                                                  6. I am responsible for my prescriptions. If the medicine/prescription is lost or stolen, it will not be replaced. I also agree not to share controlled substance medicines with anyone.  7. I agree to not use ANY illegal or recreational drugs. This includes marijuana, cocaine, bath salts or other drugs. I agree not to use alcohol unless my care team says I may.          I agree to give urine samples whenever asked. If I don t give a urine sample, the care team may stop my medicine.    8. If I enroll in the [place] Medical Marijuana program, I will tell my care team. I will also sign an agreement to share my medical records with my care team.   9. I will bring in my list of medicines (or my medicine bottles) each time I come to the clinic.   10. I will tell my care team right away if I become pregnant or have a new  medical problem treated outside of my regular clinic.  11. I understand that this medicine can affect my thinking and judgment. It may be unsafe for me to drive, use machinery and do dangerous tasks. I will not do any of these things until I know how the medicine affects me. If my dose changes, I will wait to see how it affects me. I will contact my care team if I have concerns about medicine side effects.    I understand that if I do not follow any of the conditions above, my prescriptions or treatment may be stopped.      I agree that my provider, clinic care team, and pharmacy may work with any city, state or federal law enforcement agency that investigates the misuse, sale, or other diversion of my controlled medicine. I will allow my provider to discuss my care with or share a copy of this agreement with any other treating provider, pharmacy or emergency room where I receive care. I agree to give up (waive) any right of privacy or confidentiality with respect to these consents.     I have read this agreement and have asked questions about anything I did not understand.      ________________________________________________________________________  Patient signature - Date/Time -  Hayder Alves                                      ________________________________________________________________________  Witness signature                                                            ________________________________________________________________________  Provider signature - Suma Jenkins, NP      [id number]  Rev 12/18         Registration to scan to EHR                         Page 2 of 2                   Controlled Substance Agreement Opioid           Page 1 of 2  Opioid Pain Medicines (also known as Narcotics)  What You Need to Know    What are opioids?   Opioids are pain medicines that must be prescribed by a doctor.  They are also known as narcotics.    Examples are:     morphine (MS Contin,  Radha)    oxycodone (Oxycontin)    oxycodone and acetaminophen (Percocet)    hydrocodone and acetaminophen (Vicodin, Norco)     fentanyl patch (Duragesic)     hydromorphone (Dilaudid)     methadone     What do opioids do well?   Opioids are best for short-term pain after a surgery or injury. They also work well for cancer pain. Unlike other pain medicines, they do not cause liver or kidney failure or ulcers. They may help some people with long-lasting (chronic) pain.     What do opioids NOT do well?   Opioids never get rid of pain entirely, and they do not work well for most patients with chronic pain. Opioids do not reduce swelling, one of the causes of pain. They also don t work well for nerve pain.                           For informational purposes only.  Not to replace the advice of your care provider.  Copyright [facility]. All right reserved. Comic Reply 961199-Yai 02/18.      Page 2 of 2    Risks and side effects   Talk to your doctor before you start or decide to keep taking one of these medicines. Side effects include:    Lowering your breathing rate enough to cause death    Overdose, including death, especially if taking higher than prescribed doses    Long-term opioid use    Worse depression symptoms; less pleasure in things you usually enjoy    Feeling tired or sluggish    Slower thoughts or cloudy thinking    Being more sensitive to pain over time; pain is harder to control    Trouble sleeping or restless sleep    Changes in hormone levels (for example, less testosterone)    Changes in sex drive or ability to have sex    Constipation    Unsafe driving    Itching and sweating    Feeling dizzy    Nausea, vomiting and dry mouth    What else should I know about opioids?  When someone takes opioids for too long or too often, they become dependent. This means that if you stop or reduce the medicine too quickly, you will have withdrawal symptoms.    Dependence is not the same as addiction.  Addiction is when people keep using a substance that harms their body, their mind or their relations with others. If you have a history of drug or alcohol abuse, taking opioids can cause a relapse.    Over time, opioids don t work as well. Most people will need higher and higher doses. The higher the dose, the more serious the side effects. We don t know the long-term effects of opioids.      Prescribed opioids aren't the best way to manage chronic pain    Other ways to manage pain include:      Ibuprofen or acetaminophen.  You should always try this first.      Treat health problems that may be causing pain.      acupuncture or massage, deep breathing, meditation, visual imagery, aromatherapy.      Use heat or ice at the pain site      Physical therapy and exercise      Stop smoking      See a counselor or therapist                                                  People who have used opioids for a long time may have a lower quality of life, worse depression, higher levels of pain and more visits to doctors.    Never share your opioids with others. Be sure to store opioids in a secure place, locked if possible.Young children can easily swallow them and overdose.     You can overdose on opioids.  Signs of overdose include decrease or loss of consciousness, slowed breathing, trouble waking and blue lips.  If someone is worried about overdose, they should call 911.    If you are at risk for overdose, you may get naloxone (Narcan, a medicine that reverses the effects of opioids.  If you overdose, a friend or family member can give you Narcan while waiting for the ambulance.  They need to know the signs of overdose and how to give Narcan.    While you're taking opioids:    Don't use alcohol or street drugs. Taking them together can cause death.    Don't take any of these medicines unless your doctor says its okay.  Taking these with opioids can cause death.    Benzodiazepines (such as lorazepam         or  diazepam)    Muscle relaxers (such as cyclobenzaprine)    sleeping pills    other opioids    Safe disposal of opioids  Find your area drug take-back program, your pharmacy mail-back program, buy a special disposal bag (such as Deterra) from your pharmacy or flush them down the toilet.  Use the guidelines at:  www.fda.gov/drugs/resourcesforyou

## 2020-02-19 NOTE — PROGRESS NOTES
"Subjective     Hayder Alves is a 65 year old male who presents to clinic today for the following health issues:    HPI     Hand Problem   Left Hand    Chronic Pain Follow-Up    Where in your body do you have pain? Foot pain has been severe  How has your pain affected your ability to work? Retired   Which of these pain treatments have you tried since your last clinic visit? Massage once a month   How well are you sleeping? Can't get comfortable   How has your mood been since your last visit? \"Down and out\".   Have you had a significant life event? No  Taking medication as directed? Yes    PHQ-9 SCORE 2/27/2019 8/8/2019 2/19/2020   PHQ-9 Total Score - - -   PHQ-9 Total Score 6 9 8     MIRIAM-7 SCORE 2/27/2019 8/8/2019 2/19/2020   Total Score 1 1 3     No flowsheet data found.  Encounter-Level CSA - 10/05/2015:    Controlled Substance Agreement - Scan on 10/7/2015 10:21 AM: CONTROLLED SUBSTANCE AGREEMENT     Patient-Level CSA:    There are no patient-level csa.       He takes one Oxycodone at  for chronic cervical pain and neuropathy.    He has not been checking blood sugars.    He has a follow up appointment with cardiology next week.  He denies any chest pain, shortness of breath, edema.                Reviewed and updated as needed this visit by Provider         Review of Systems   ROS COMP: CONSTITUTIONAL: NEGATIVE for fever, chills, change in weight  ENT/MOUTH: NEGATIVE for ear, mouth and throat problems  RESP: NEGATIVE for significant cough or SOB  CV: NEGATIVE for chest pain, palpitations or peripheral edema  GI: NEGATIVE for nausea, abdominal pain, heartburn, or change in bowel habits  MUSCULOSKELETAL: see HPI  NEURO: see HPI  ENDOCRINE: NEGATIVE for temperature intolerance, skin/hair changes  PSYCHIATRIC: NEGATIVE for changes in mood or affect      Objective    BP (!) 149/80   Pulse 93   Temp 98  F (36.7  C) (Oral)   Resp 20   Wt 96.2 kg (212 lb)   SpO2 96%   BMI 32.23 kg/m    Body mass index is " 32.23 kg/m .  Physical Exam   GENERAL: healthy, alert and no distress  RESP: lungs clear to auscultation - no rales, rhonchi or wheezes  CV: regular rate and rhythm, normal S1 S2, no S3 or S4, no murmur, click or rub, no peripheral edema and peripheral pulses strong  ABDOMEN: soft, nontender, no hepatosplenomegaly, no masses and bowel sounds normal  PSYCH: mentation appears normal, affect normal/bright    Diagnostic Test Results:  Labs reviewed in Epic  Results for orders placed or performed in visit on 02/19/20 (from the past 24 hour(s))   Hemoglobin A1c   Result Value Ref Range    Hemoglobin A1C 6.9 (H) 0 - 5.6 %   CBC with platelets   Result Value Ref Range    WBC 6.5 4.0 - 11.0 10e9/L    RBC Count 5.00 4.4 - 5.9 10e12/L    Hemoglobin 15.4 13.3 - 17.7 g/dL    Hematocrit 45.0 40.0 - 53.0 %    MCV 90 78 - 100 fl    MCH 30.8 26.5 - 33.0 pg    MCHC 34.2 31.5 - 36.5 g/dL    RDW 11.7 10.0 - 15.0 %    Platelet Count 113 (L) 150 - 450 10e9/L           Assessment & Plan     (G89.4) Chronic pain syndrome  (primary encounter diagnosis)  Comment:   Plan: oxyCODONE-acetaminophen (PERCOCET) 5-325 MG PO         tablet, Drug Abuse Screen Panel 13, Urine (Pain        Care Package), DISCONTINUED:         oxyCODONE-acetaminophen (PERCOCET) 5-325 MG PO         tablet         checked.  Updated CSA signed.  Refills given dated today and 3/18.  He will call for a refill on 4/15 and follow up in 3 months.     (M50.20) Herniation of cervical intervertebral disc  Comment:   Plan: oxyCODONE-acetaminophen (PERCOCET) 5-325 MG PO         tablet, DISCONTINUED: oxyCODONE-acetaminophen         (PERCOCET) 5-325 MG PO tablet        See above.     (E11.22,  N18.3,  Z79.4) Type 2 diabetes mellitus with stage 3 chronic kidney disease, with long-term current use of insulin (H)  Comment: at goal  Plan: Albumin Random Urine Quantitative with Creat         Ratio, Hemoglobin A1c        The current medical regimen is effective;  continue present plan  "and medications.   Follow up in 6 months.     (I10) Hypertension goal BP (blood pressure) < 130/80  Comment: at goal  Plan: Comprehensive metabolic panel        The current medical regimen is effective;  continue present plan and medications.     (N18.3) CKD (chronic kidney disease) stage 3, GFR 30-59 ml/min (H)  Comment:   Plan: CBC with platelets, Comprehensive metabolic         panel            (F31.70) Bipolar affective disorder in remission (H)  Comment: stable  Plan: The current medical regimen is effective;  continue present plan and medications.     (I50.22) Chronic systolic heart failure (H)  Comment: stable  Plan: Follow up with cardiology.     (J44.9) Chronic obstructive pulmonary disease, unspecified COPD type (H)  Comment: stable  Plan: The current medical regimen is effective;  continue present plan and medications.      BMI:   Estimated body mass index is 32.23 kg/m  as calculated from the following:    Height as of 8/8/19: 1.727 m (5' 8\").    Weight as of this encounter: 96.2 kg (212 lb).   Weight management plan: Discussed healthy diet and exercise guidelines            No follow-ups on file.    Suma Jenkins NP  [facility]        "

## 2020-02-20 ENCOUNTER — OFFICE VISIT (OUTPATIENT)
Dept: CARDIOLOGY | Facility: CLINIC | Age: 65
End: 2020-02-20
Attending: INTERNAL MEDICINE
Payer: COMMERCIAL

## 2020-02-20 VITALS
OXYGEN SATURATION: 99 % | HEART RATE: 88 BPM | DIASTOLIC BLOOD PRESSURE: 76 MMHG | BODY MASS INDEX: 32.61 KG/M2 | TEMPERATURE: 98.9 F | RESPIRATION RATE: 20 BRPM | WEIGHT: 214.5 LBS | SYSTOLIC BLOOD PRESSURE: 136 MMHG

## 2020-02-20 DIAGNOSIS — E11.42 DIABETIC POLYNEUROPATHY ASSOCIATED WITH TYPE 2 DIABETES MELLITUS (H): ICD-10-CM

## 2020-02-20 DIAGNOSIS — E78.5 HYPERLIPIDEMIA LDL GOAL <100: ICD-10-CM

## 2020-02-20 DIAGNOSIS — R06.00 DYSPNEA, UNSPECIFIED TYPE: Primary | ICD-10-CM

## 2020-02-20 DIAGNOSIS — Z95.1 HX OF CORONARY ARTERY BYPASS GRAFT: ICD-10-CM

## 2020-02-20 DIAGNOSIS — I10 ESSENTIAL HYPERTENSION: ICD-10-CM

## 2020-02-20 PROCEDURE — 99214 OFFICE O/P EST MOD 30 MIN: CPT | Performed by: INTERNAL MEDICINE

## 2020-02-20 ASSESSMENT — ANXIETY QUESTIONNAIRES: GAD7 TOTAL SCORE: 3

## 2020-02-20 NOTE — NURSING NOTE
Cardiac Testing: Patient given instructions regarding  stress echocardiogram . Discussed purpose, preparation, procedure and when to expect results reported back to the patient. Patient demonstrated understanding of this information and agreed to call with further questions or concerns.  Return Appointment: Patient given instructions regarding scheduling next clinic visit. Patient demonstrated understanding of this information and agreed to call with further questions or concerns.  Patient stated he understood all health information given and agreed to call with further questions or concerns.

## 2020-02-20 NOTE — PATIENT INSTRUCTIONS
You were seen today in the Cardiovascular Clinic at [facility].     Cardiology Providers you saw during your visit: Dr. Eri Palafox    Diagnosis:   Encounter Diagnoses   Name Primary?     Essential hypertension      Hx of coronary artery bypass graft      Dyspnea Yes        Orders:   Orders Placed This Encounter   Procedures     Follow-Up with Cardiologist     Dobutamine Stress Echocardiogram     Recommendations:  1. Dobutamine Stress echocardiogram  2. 1 year follow up with Eri Palafox MD     Follow-up:    As needed- For future medication refills please call your Primary Care Providers    Please follow up with primary care provider for medication refills     Please feel free to call me with any questions or concerns.        Questions and [phone]      For Radiology / Imaging [phone]       On Call Cardiologist for after hours or on weekends: [phone]   option #4 and ask to speak to the on-call Cardiologist.          If you need a medication refill please contact your pharmacy.  Please allow 3 business days for your refill to be completed.    INSTRUCTIONS FOR DOBUTAMINE STRESS ECHO:    Nothing to eat or drink for 3 hours before test except for water.   No caffeine, tobacco, or alcohol for 12 hrs before test.   Stop your beta blocker two days before the test.  Metoprolol. Take your blood pressure prior to appointment.       If you have further questions, please utilize [email or web address] to contact us.   If your question concerns the above instructions, contact:  Severiano Browne LPN  Nurse Care Coordinator- [facility] Care  [phone]    If your question concerns the schedule/appointment times, contact:  [phone]

## 2020-02-20 NOTE — LETTER
2/20/2020      RE: Hayder Alves  [address] [facility] [id number]       Dear Colleague,    Thank you for the opportunity to participate in the care of your patient, Hayder Alves, at the [facility] at [facility]. Please see a copy of my visit note below.    I am delighted to see Hayder Alves for follow up of CAD.     As you know, the patient is a 65 year old  male with h/o CABG in 2001 and PCI 2005. I met him 8/22/2019 at which time he did not bring any medications with him, and did not know what meds he was on. He was supposed to on rosuvastatin but said he didn't have money for it. He was also hypertensive.  I gave him a list of medications that I think he should be on, including starting lisinopril. Since that time he reports compliance with medication. He admits that he's not been very active and both him and his dog are putting on weight. He has COPD and chronic shortness of breath with doing a flight of stairs, does not use inhalers, thinks breathing has been worse in the last 6 months. Denies any chest discomfort or arm pain which were his symptoms prior to CABG. No syncope or orthopnea.    The following portions of the patient's history were reviewed and updated as appropriate: allergies, current medications, past family history, past medical history, past social history, past surgical history, and the problem list.    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     Atorvastatin Calcium Other (See Comments)     myalgias     Latex      Lunesta      Strange dreams. Cooking during sleep        Medications:   Aspirin 81 every day  Lasix 20  qd  Metoprolol tartrate 50 bid  Rosuvastatin 20 qhs  Lisinopril 40 every day  Insulin  Metformin 500 bid  Elavil  Albuterol  Gabapentin    Family History:   Family History   Problem Relation Age of Onset     Cancer Sister         pancreatic ca     C.A.D. Father         mi,stroke,htn, 60's     Cerebrovascular Disease Father 64     C.A.D. Mother         mi,htn, 60's     Breast Cancer No family hx of      Cancer - colorectal No family hx of      Prostate Cancer No family hx of        Psychosocial history:  reports that he quit smoking about 20 years ago. His smoking use included cigarettes. He has a 45.00 pack-year smoking history. He has never used smokeless tobacco. He reports current alcohol use. He reports that he does not use drugs.    Review of systems: Cardiovascular - no chest pain, palpitations, dizziness, shortness of breath, dyspnea, orthopnea, PND.    In addition,   Constitutional: No change in weight, sleep or appetite.  Normal energy.  No fever or chills  Eyes: Negative for vision changes or eye problems  ENT: No problems with ears, nose or throat.  No difficulty swallowing.  Resp: No coughing, wheezing or shortness of breath  GI: No nausea, vomiting,  heartburn, abdominal pain, diarrhea, constipation or change in bowel habits  : No urinary frequency or dysuria, bladder or kidney problems  Musculoskeletal: No significant muscle or joint pains  Neurologic: No headaches, numbness, tingling, weakness, problems with balance or coordination  Psychiatric: No problems with anxiety, depression or mental health  Heme/immune/allergy: No history of bleeding or clotting problems or anemia.  No allergies or immune system problems  Integumentary: No rashes,worrisome lesions or skin problems      Physical examination  Vitals: 136/76, 88 bpm  BMI= 31 (97 kg)    Constitutional: In general, the patient is a pleasant male in no apparent distress.    Eyes: PERRLA.  EOMI.  Sclerae white, not injected.  ENT/mouth:  Normiocephalic and atraumatic.  Nares clear.  Pharynx without erythema or exudate.  Dentition intact.  No adenopathy.  No thyromegaly. Carotids +2/2 bilaterally without bruits.  No jugular venous distension.   Card/Vasc: The PMI is in the 5th ICS in the midclavicular line. There is no heave. Regular rate and rhythm. Normal S1, S2. No murmur, rub, click, or gallop. Pulses are normal bilaterally throughout. No peripheral edema.  Respiratory: Clear to asculation.  No ronchi, wheezes, rales.  No dullness to percussion.   GI: Abdomen is soft, nontender, nondistended. No organomegaly. No AAA.  No bruits.   Integument: No significant bruises or rashes  Neurological: The neurological examination reveal a patient who was oriented to person, place, and time.    Psych: Normal  Heme/Lymph/Immun: no significant adenopathy    I have previously reviewed the following:  Echo: 3/4/19 - E F 50-55%, mild to moderate LVH, normal RV, no effusion, mild biatrial enlargement, mild MAC with mild MR  Angiogram 10/19/16 - LIMA to LAD patent; LCx stent 50%, SVG to occluded OM patent; 100% RCA, free RIM from SVG to PDA patent; LVEDP 19  EK19 - sinus 100 bpm, CURTIS, no acute ST/T changes    I have reviewed the following labs/imaging:  Labs: 10/3/2019: cholesterol 159, HDL 42, LDL 38,   2020: K 4.4, cr 1.46, A1C 6.9, hgb 15, plt 113K      Assessment :  1. Coronary artery disease s/p CABG/PCI.  No CHF symptoms or signs. Normalized EF. Worsening dyspnea likely a combination of COPD and deconditioning; however it has been several years since any ischemia evaluations are done, will order dobutamine stress echo.  2. Hyperlipidemia - improved and at goal for LDL.  3. Hypertension - on metoprolol and lisinopril  4. Diabetes, poorly controlled, with nephropathy. On lisinopril with stable creatinine.    Plan:  Continue current meds  Dobutamine stress echo for worsening dyspnea      The patient is to return pending above. The patient  understood the treatment plan as outlined above.  There were no barriers to learning.      Eri Palafox MD

## 2020-02-20 NOTE — PROGRESS NOTES
I am delighted to see Hayder Alves for follow up of CAD.     As you know, the patient is a 65 year old  male with h/o CABG in 2001 and PCI 2005. I met him 8/22/2019 at which time he did not bring any medications with him, and did not know what meds he was on. He was supposed to on rosuvastatin but said he didn't have money for it. He was also hypertensive.  I gave him a list of medications that I think he should be on, including starting lisinopril. Since that time he reports compliance with medication. He admits that he's not been very active and both him and his dog are putting on weight. He has COPD and chronic shortness of breath with doing a flight of stairs, does not use inhalers, thinks breathing has been worse in the last 6 months. Denies any chest discomfort or arm pain which were his symptoms prior to CABG. No syncope or orthopnea.    The following portions of the patient's history were reviewed and updated as appropriate: allergies, current medications, past family history, past medical history, past social history, past surgical history, and the problem list.    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     Atorvastatin Calcium Other (See Comments)     myalgias     Latex      Lunesta      Strange dreams. Cooking during sleep        Medications:   Aspirin 81 every day  Lasix 20 qd  Metoprolol tartrate 50 bid  Rosuvastatin 20 qhs  Lisinopril 40 every day  Insulin  Metformin 500 bid  Elavil  Albuterol  Gabapentin    Family History:   Family History   Problem Relation Age of Onset     Cancer Sister         pancreatic ca     C.A.D. Father         mi,stroke,htn, 60's     Cerebrovascular Disease Father 64     C.A.D. Mother         mi,htn,  60's     Breast Cancer No family hx of      Cancer - colorectal No family hx of      Prostate Cancer No family hx of        Psychosocial history:  reports that he quit smoking about 20 years ago. His smoking use included cigarettes. He has a 45.00 pack-year smoking history. He has never used smokeless tobacco. He reports current alcohol use. He reports that he does not use drugs.    Review of systems: Cardiovascular - no chest pain, palpitations, dizziness, shortness of breath, dyspnea, orthopnea, PND.    In addition,   Constitutional: No change in weight, sleep or appetite.  Normal energy.  No fever or chills  Eyes: Negative for vision changes or eye problems  ENT: No problems with ears, nose or throat.  No difficulty swallowing.  Resp: No coughing, wheezing or shortness of breath  GI: No nausea, vomiting,  heartburn, abdominal pain, diarrhea, constipation or change in bowel habits  : No urinary frequency or dysuria, bladder or kidney problems  Musculoskeletal: No significant muscle or joint pains  Neurologic: No headaches, numbness, tingling, weakness, problems with balance or coordination  Psychiatric: No problems with anxiety, depression or mental health  Heme/immune/allergy: No history of bleeding or clotting problems or anemia.  No allergies or immune system problems  Integumentary: No rashes,worrisome lesions or skin problems      Physical examination  Vitals: 136/76, 88 bpm  BMI= 31 (97 kg)    Constitutional: In general, the patient is a pleasant male in no apparent distress.    Eyes: PERRLA.  EOMI.  Sclerae white, not injected.  ENT/mouth: Normiocephalic and atraumatic.  Nares clear.  Pharynx without erythema or exudate.  Dentition intact.  No adenopathy.  No thyromegaly. Carotids +2/2 bilaterally without bruits.  No jugular venous distension.   Card/Vasc: The PMI is in the 5th ICS in the midclavicular line. There is no heave. Regular rate and rhythm. Normal S1, S2. No murmur, rub, click, or gallop.  Pulses are normal bilaterally throughout. No peripheral edema.  Respiratory: Clear to asculation.  No ronchi, wheezes, rales.  No dullness to percussion.   GI: Abdomen is soft, nontender, nondistended. No organomegaly. No AAA.  No bruits.   Integument: No significant bruises or rashes  Neurological: The neurological examination reveal a patient who was oriented to person, place, and time.    Psych: Normal  Heme/Lymph/Immun: no significant adenopathy    I have previously reviewed the following:  Echo: 3/4/19 - EF 50-55%, mild to moderate LVH, normal RV, no effusion, mild biatrial enlargement, mild MAC with mild MR  Angiogram 10/19/16 - LIMA to LAD patent; LCx stent 50%, SVG to occluded OM patent; 100% RCA, free RIM from SVG to PDA patent; LVEDP 19  EK19 - sinus 100 bpm, CURTIS, no acute ST/T changes    I have reviewed the following labs/imaging:  Labs: 10/3/2019: cholesterol 159, HDL 42, LDL 38,   2020: K 4.4, cr 1.46, A1C 6.9, hgb 15, plt 113K      Assessment :  1. Coronary artery disease s/p CABG/PCI.  No CHF symptoms or signs. Normalized EF. Worsening dyspnea likely a combination of COPD and deconditioning; however it has been several years since any ischemia evaluations are done, will order dobutamine stress echo.  2. Hyperlipidemia - improved and at goal for LDL.  3. Hypertension - on metoprolol and lisinopril  4. Diabetes, poorly controlled, with nephropathy. On lisinopril with stable creatinine.    Plan:  Continue current meds  Dobutamine stress echo for worsening dyspnea      The patient is to return pending above. The patient understood the treatment plan as outlined above.  There were no barriers to learning.      Eri Palafox MD

## 2020-02-29 DIAGNOSIS — Z79.4 TYPE 2 DIABETES MELLITUS WITH STAGE 3 CHRONIC KIDNEY DISEASE, WITH LONG-TERM CURRENT USE OF INSULIN (H): ICD-10-CM

## 2020-02-29 DIAGNOSIS — N18.30 TYPE 2 DIABETES MELLITUS WITH STAGE 3 CHRONIC KIDNEY DISEASE, WITH LONG-TERM CURRENT USE OF INSULIN (H): ICD-10-CM

## 2020-02-29 DIAGNOSIS — E11.22 TYPE 2 DIABETES MELLITUS WITH STAGE 3 CHRONIC KIDNEY DISEASE, WITH LONG-TERM CURRENT USE OF INSULIN (H): ICD-10-CM

## 2020-02-29 NOTE — TELEPHONE ENCOUNTER
"Requested Prescriptions   Pending Prescriptions Disp Refills     metFORMIN (GLUCOPHAGE) 500 MG tablet  Last Written Prescription Date:  2/27/2019  Last Fill Quantity: 180 tabs,  # refills: 3   Last office visit: 2/19/2020 with prescribing provider:  Gregory   Future Office Visit:   180 tablet 3     Sig: Take 1 tablet (500 mg) by mouth 2 times daily (with meals)       Biguanide Agents Passed - 2/29/2020  9:53 AM        Passed - Blood pressure less than 140/90 in past 6 months     BP Readings from Last 3 Encounters:   02/20/20 136/76   02/19/20 128/77   10/03/19 (!) 148/78                 Passed - Patient has documented LDL within the past 12 mos.     Recent Labs   Lab Test 10/03/19  1154   LDL 38             Passed - Patient has had a Microalbumin in the past 15 mos.     Recent Labs   Lab Test 02/19/20  1055   MICROL 2,030   UMALCR 1,035.71*             Passed - Patient is age 10 or older        Passed - Patient has documented A1c within the specified period of time.     If HgbA1C is 8 or greater, it needs to be on file within the past 3 months.  If less than 8, must be on file within the past 6 months.     Recent Labs   Lab Test 02/19/20  1046   A1C 6.9*             Passed - Patient's CR is NOT>1.4 OR Patient's EGFR is NOT<45 within past 12 mos.     Recent Labs   Lab Test 02/19/20  1046   GFRESTIMATED 49*   GFRESTBLACK 57*       Recent Labs   Lab Test 02/19/20  1046   CR 1.46*             Passed - Patient does NOT have a diagnosis of CHF.        Passed - Medication is active on med list        Passed - Recent (6 mo) or future (30 days) visit within the authorizing provider's specialty     Patient had office visit in the last 6 months or has a visit in the next 30 days with authorizing provider or within the authorizing provider's specialty.  See \"Patient Info\" tab in inbasket, or \"Choose Columns\" in Meds & Orders section of the refill encounter.             "

## 2020-03-03 NOTE — TELEPHONE ENCOUNTER
HP Reception  - please update patient refill approved - review refill policy      Signed Prescriptions:                        Disp   Refills    metFORMIN (GLUCOPHAGE) 500 MG tablet       180 ta*1        Sig: Take 1 tablet (500 mg) by mouth 2 times daily (with           meals)  Authorizing Provider: NICOL VOGEL  Ordering User: KIMI BOWMAN

## 2020-03-04 NOTE — TELEPHONE ENCOUNTER
KENRICK informing patient that a medication refill was sent to their pharmacy.    Swathi SEO     [facility]

## 2020-03-05 ENCOUNTER — HOSPITAL ENCOUNTER (OUTPATIENT)
Facility: CLINIC | Age: 65
End: 2020-03-05
Admitting: INTERNAL MEDICINE
Payer: COMMERCIAL

## 2020-03-15 DIAGNOSIS — E11.42 DIABETIC POLYNEUROPATHY ASSOCIATED WITH TYPE 2 DIABETES MELLITUS (H): ICD-10-CM

## 2020-03-16 RX ORDER — GABAPENTIN 300 MG/1
CAPSULE ORAL
Qty: 180 CAPSULE | Refills: 1 | Status: SHIPPED | OUTPATIENT
Start: 2020-03-16 | End: 2020-10-19

## 2020-03-26 DIAGNOSIS — G89.4 CHRONIC PAIN SYNDROME: ICD-10-CM

## 2020-03-26 DIAGNOSIS — M50.20 HERNIATION OF CERVICAL INTERVERTEBRAL DISC: ICD-10-CM

## 2020-03-26 RX ORDER — OXYCODONE AND ACETAMINOPHEN 5; 325 MG/1; MG/1
1 TABLET ORAL AT BEDTIME
Qty: 28 TABLET | Refills: 0 | Status: SHIPPED | OUTPATIENT
Start: 2020-03-26 | End: 2020-07-16

## 2020-04-07 DIAGNOSIS — J44.9 CHRONIC OBSTRUCTIVE PULMONARY DISEASE, UNSPECIFIED COPD TYPE (H): ICD-10-CM

## 2020-04-07 DIAGNOSIS — E78.00 HYPERCHOLESTEROLEMIA: ICD-10-CM

## 2020-04-07 RX ORDER — ROSUVASTATIN CALCIUM 20 MG/1
TABLET, COATED ORAL
Qty: 90 TABLET | Refills: 3 | Status: CANCELLED | OUTPATIENT
Start: 2020-04-07

## 2020-04-07 NOTE — TELEPHONE ENCOUNTER
Reason for call:  Other   Patient called regarding (reason for call): prescription  Additional comments: Patient is requesting his inhaler prescription be refilled    Phone number to reach patient:  Home number on file [phone] (home)    Best Time:  any    Can we leave a detailed message on this number?  YES    Travel screening: Negative

## 2020-04-08 RX ORDER — ROSUVASTATIN CALCIUM 20 MG/1
TABLET, COATED ORAL
Qty: 90 TABLET | Refills: 1 | Status: SHIPPED | OUTPATIENT
Start: 2020-04-08 | End: 2020-09-17

## 2020-04-08 NOTE — TELEPHONE ENCOUNTER
"Requested Prescriptions   Pending Prescriptions Disp Refills     rosuvastatin (CRESTOR) 20 MG tablet [Pharmacy Med Name: ROSUVASTATIN 20MG TABLETS] 90 tablet 3     Sig: TAKE 1 TABLET BY MOUTH EVERY DAY       Statins Protocol Passed - 4/7/2020  5:11 PM        Passed - LDL on file in past 12 months     Recent Labs   Lab Test 10/03/19  1154   LDL 38             Passed - No abnormal creatine kinase in past 12 months     Recent Labs   Lab Test 10/11/16  0050                   Passed - Recent (12 mo) or future (30 days) visit within the authorizing provider's specialty     Patient has had an office visit with the authorizing provider or a provider within the authorizing providers department within the previous 12 mos or has a future within next 30 days. See \"Patient Info\" tab in inbasket, or \"Choose Columns\" in Meds & Orders section of the refill encounter.              Passed - Medication is active on med list        Passed - Patient is age 18 or older           Signed Prescriptions:                        Disp   Refills    rosuvastatin (CRESTOR) 20 MG tablet        90 tab*1        Sig: TAKE 1 TABLET BY MOUTH EVERY DAY  Authorizing Provider: NICOL VOGEL  Ordering User: KIMI BOWMAN      "

## 2020-04-09 RX ORDER — ALBUTEROL SULFATE 90 UG/1
2 AEROSOL, METERED RESPIRATORY (INHALATION) EVERY 6 HOURS PRN
Qty: 1 INHALER | Refills: 1 | Status: SHIPPED | OUTPATIENT
Start: 2020-04-09 | End: 2023-01-01

## 2020-04-09 NOTE — TELEPHONE ENCOUNTER
PFT's 3/17/11.    Prescription approved per [facility].  Ann Pickard RN    Last Office Visit: 2/19/2020

## 2020-04-15 DIAGNOSIS — N18.30 TYPE 2 DIABETES MELLITUS WITH STAGE 3 CHRONIC KIDNEY DISEASE, WITH LONG-TERM CURRENT USE OF INSULIN (H): ICD-10-CM

## 2020-04-15 DIAGNOSIS — E11.22 TYPE 2 DIABETES MELLITUS WITH STAGE 3 CHRONIC KIDNEY DISEASE, WITH LONG-TERM CURRENT USE OF INSULIN (H): ICD-10-CM

## 2020-04-15 DIAGNOSIS — Z79.4 TYPE 2 DIABETES MELLITUS WITH STAGE 3 CHRONIC KIDNEY DISEASE, WITH LONG-TERM CURRENT USE OF INSULIN (H): ICD-10-CM

## 2020-04-15 NOTE — TELEPHONE ENCOUNTER
"Requested Prescriptions   Pending Prescriptions Disp Refills     insulin syringe 31G X 5/16\" 1 ML MISC [Pharmacy Med Name: JENNIFER SYR/NDL 31G 1ML5/16 (8MM)]       Sig: USE 2 SYRINGES DAILY OR AS DIRECTED      Last Written Prescription Date:  8/8/2019  Last Fill Quantity: 100 each    ,  # refills: 1   Last Office Visit: 2/19/2020   Future Office Visit:            Diabetic Supplies Protocol Passed - 4/15/2020 12:38 PM        Passed - Medication is active on med list        Passed - Patient is 18 years of age or older        Passed - Recent (6 mo) or future (30 days) visit within the authorizing provider's specialty     Patient had office visit in the last 6 months or has a visit in the next 30 days with authorizing provider.  See \"Patient Info\" tab in inbasket, or \"Choose Columns\" in Meds & Orders section of the refill encounter.              "

## 2020-04-16 RX ORDER — CALCIUM CARB/VITAMIN D3/VIT K1 500-100-40
TABLET,CHEWABLE ORAL
Qty: 180 EACH | Refills: 1 | Status: SHIPPED | OUTPATIENT
Start: 2020-04-16 | End: 2021-04-26

## 2020-05-19 DIAGNOSIS — N18.30 TYPE 2 DIABETES MELLITUS WITH STAGE 3 CHRONIC KIDNEY DISEASE, WITH LONG-TERM CURRENT USE OF INSULIN (H): ICD-10-CM

## 2020-05-19 DIAGNOSIS — Z79.4 TYPE 2 DIABETES MELLITUS WITH STAGE 3 CHRONIC KIDNEY DISEASE, WITH LONG-TERM CURRENT USE OF INSULIN (H): ICD-10-CM

## 2020-05-19 DIAGNOSIS — E11.22 TYPE 2 DIABETES MELLITUS WITH STAGE 3 CHRONIC KIDNEY DISEASE, WITH LONG-TERM CURRENT USE OF INSULIN (H): ICD-10-CM

## 2020-05-21 RX ORDER — INSULIN HUMAN 100 [IU]/ML
INJECTION, SUSPENSION SUBCUTANEOUS
Qty: 10 ML | Refills: 0 | Status: SHIPPED | OUTPATIENT
Start: 2020-05-21 | End: 2020-06-23

## 2020-05-28 ENCOUNTER — HOSPITAL ENCOUNTER (OUTPATIENT)
Facility: CLINIC | Age: 65
Discharge: HOME | End: 2020-05-28
Attending: INTERNAL MEDICINE | Admitting: INTERNAL MEDICINE
Payer: COMMERCIAL

## 2020-06-18 ENCOUNTER — TELEPHONE (OUTPATIENT)
Dept: CARDIOLOGY | Facility: CLINIC | Age: 65
End: 2020-06-18

## 2020-06-18 NOTE — TELEPHONE ENCOUNTER
[facility] Call Center    Phone Message    May a detailed message be left on voicemail: yes     Reason for Call: Order(s): Other:   Reason for requested: Dobutamine Stress Echocardiogram  Date needed: 20  Provider name: Dr. Eri Zavaleta is calling requesting updated orders for the Dobutamine Stress Echocardiogram for Bill as the previous ones have now . He is currently scheduled appointment for 20 and will need the updated orders prior. Thank you.    Action Taken: Message routed to:  [facility] ([facility] Cardiology    Travel Screening: Not Applicable

## 2020-06-23 DIAGNOSIS — E11.22 TYPE 2 DIABETES MELLITUS WITH STAGE 3 CHRONIC KIDNEY DISEASE, WITH LONG-TERM CURRENT USE OF INSULIN (H): ICD-10-CM

## 2020-06-23 DIAGNOSIS — N18.30 TYPE 2 DIABETES MELLITUS WITH STAGE 3 CHRONIC KIDNEY DISEASE, WITH LONG-TERM CURRENT USE OF INSULIN (H): ICD-10-CM

## 2020-06-23 DIAGNOSIS — Z79.4 TYPE 2 DIABETES MELLITUS WITH STAGE 3 CHRONIC KIDNEY DISEASE, WITH LONG-TERM CURRENT USE OF INSULIN (H): ICD-10-CM

## 2020-06-23 RX ORDER — INSULIN HUMAN 100 [IU]/ML
INJECTION, SUSPENSION SUBCUTANEOUS
Qty: 10 ML | Refills: 0 | Status: SHIPPED | OUTPATIENT
Start: 2020-06-23 | End: 2020-07-23

## 2020-06-23 NOTE — TELEPHONE ENCOUNTER
Intermediate Acting Insulin Protocol [id number]/23/2020 08:22 AM   Serum creatinine on file in past 12 months Protocol Details    HgbA1C in past 3 or 6 months     Medication is active on med list     Patient is age 18 or older     Recent (6 mo) or future (30 days) visit within the authorizing provider's specialty

## 2020-06-24 ENCOUNTER — TELEPHONE (OUTPATIENT)
Dept: CARDIOLOGY | Facility: CLINIC | Age: 65
End: 2020-06-24

## 2020-06-24 NOTE — TELEPHONE ENCOUNTER
PATIENT WELLNESS TELEPHONE SCREENING   Left voicemail to return call for wellness screening.    Vashti Gomez, EP

## 2020-06-25 ENCOUNTER — HOSPITAL ENCOUNTER (OUTPATIENT)
Dept: CARDIOLOGY | Facility: CLINIC | Age: 65
End: 2020-06-25
Attending: INTERNAL MEDICINE | Admitting: INTERNAL MEDICINE
Payer: COMMERCIAL

## 2020-06-25 ENCOUNTER — HOSPITAL ENCOUNTER (OUTPATIENT)
Facility: CLINIC | Age: 65
Discharge: HOME | End: 2020-06-25
Attending: INTERNAL MEDICINE | Admitting: INTERNAL MEDICINE
Payer: COMMERCIAL

## 2020-06-25 DIAGNOSIS — R06.00 DYSPNEA, UNSPECIFIED TYPE: ICD-10-CM

## 2020-06-25 DIAGNOSIS — Z95.1 HX OF CORONARY ARTERY BYPASS GRAFT: ICD-10-CM

## 2020-06-25 PROCEDURE — 93005 ELECTROCARDIOGRAM TRACING: CPT

## 2020-06-25 PROCEDURE — 93010 ELECTROCARDIOGRAM REPORT: CPT | Performed by: INTERNAL MEDICINE

## 2020-06-25 PROCEDURE — 93306 TTE W/DOPPLER COMPLETE: CPT

## 2020-06-25 PROCEDURE — 93306 TTE W/DOPPLER COMPLETE: CPT | Mod: 26 | Performed by: INTERNAL MEDICINE

## 2020-06-27 LAB — INTERPRETATION ECG - MUSE: NORMAL

## 2020-07-08 ENCOUNTER — CARE COORDINATION (OUTPATIENT)
Dept: CARDIOLOGY | Facility: CLINIC | Age: 65
End: 2020-07-08

## 2020-07-08 NOTE — PROGRESS NOTES
Called patient and left  for callback. MC message sent.    Simona Grove, LAZARON, RN, PHN  Electrophysiology Nurse Coordinator

## 2020-07-13 NOTE — PROGRESS NOTES
Spoke to patient. Agreeable to follow-up. In-person visit scheduled 7/28 at 5:00 PM. Address given to patient.    Simona Grove, LAZARON, RN, PHN  Electrophysiology Nurse Coordinator

## 2020-07-16 ENCOUNTER — VIRTUAL VISIT (OUTPATIENT)
Dept: FAMILY MEDICINE | Facility: CLINIC | Age: 65
End: 2020-07-16
Payer: COMMERCIAL

## 2020-07-16 DIAGNOSIS — G89.4 CHRONIC PAIN SYNDROME: Primary | ICD-10-CM

## 2020-07-16 DIAGNOSIS — E11.22 TYPE 2 DIABETES MELLITUS WITH STAGE 3 CHRONIC KIDNEY DISEASE, WITH LONG-TERM CURRENT USE OF INSULIN (H): ICD-10-CM

## 2020-07-16 DIAGNOSIS — M50.20 HERNIATION OF CERVICAL INTERVERTEBRAL DISC: ICD-10-CM

## 2020-07-16 DIAGNOSIS — N18.30 TYPE 2 DIABETES MELLITUS WITH STAGE 3 CHRONIC KIDNEY DISEASE, WITH LONG-TERM CURRENT USE OF INSULIN (H): ICD-10-CM

## 2020-07-16 DIAGNOSIS — Z79.4 TYPE 2 DIABETES MELLITUS WITH STAGE 3 CHRONIC KIDNEY DISEASE, WITH LONG-TERM CURRENT USE OF INSULIN (H): ICD-10-CM

## 2020-07-16 PROCEDURE — 99214 OFFICE O/P EST MOD 30 MIN: CPT | Mod: 95 | Performed by: NURSE PRACTITIONER

## 2020-07-16 RX ORDER — OXYCODONE AND ACETAMINOPHEN 5; 325 MG/1; MG/1
1 TABLET ORAL AT BEDTIME
Qty: 30 TABLET | Refills: 0 | Status: SHIPPED | OUTPATIENT
Start: 2020-08-15 | End: 2020-07-16

## 2020-07-16 RX ORDER — OXYCODONE AND ACETAMINOPHEN 5; 325 MG/1; MG/1
1 TABLET ORAL AT BEDTIME
Qty: 30 TABLET | Refills: 0 | Status: SHIPPED | OUTPATIENT
Start: 2020-07-16 | End: 2020-07-16

## 2020-07-16 RX ORDER — OXYCODONE AND ACETAMINOPHEN 5; 325 MG/1; MG/1
1 TABLET ORAL AT BEDTIME
Qty: 30 TABLET | Refills: 0 | Status: SHIPPED | OUTPATIENT
Start: 2020-09-14 | End: 2020-09-28

## 2020-07-16 ASSESSMENT — PATIENT HEALTH QUESTIONNAIRE - PHQ9
SUM OF ALL RESPONSES TO PHQ QUESTIONS 1-9: 14
5. POOR APPETITE OR OVEREATING: NEARLY EVERY DAY

## 2020-07-16 ASSESSMENT — ANXIETY QUESTIONNAIRES
2. NOT BEING ABLE TO STOP OR CONTROL WORRYING: NOT AT ALL
7. FEELING AFRAID AS IF SOMETHING AWFUL MIGHT HAPPEN: NOT AT ALL
1. FEELING NERVOUS, ANXIOUS, OR ON EDGE: NOT AT ALL
6. BECOMING EASILY ANNOYED OR IRRITABLE: NOT AT ALL
GAD7 TOTAL SCORE: 6
5. BEING SO RESTLESS THAT IT IS HARD TO SIT STILL: NEARLY EVERY DAY
3. WORRYING TOO MUCH ABOUT DIFFERENT THINGS: NOT AT ALL

## 2020-07-16 NOTE — PROGRESS NOTES
"Hayder Alves is a 65 year old male who is being evaluated via a billable telephone visit.      The patient has been notified of following:     \"This telephone visit will be conducted via a call between you and your physician/provider. We have found that certain health care needs can be provided without the need for a physical exam.  This service lets us provide the care you need with a short phone conversation.  If a prescription is necessary we can send it directly to your pharmacy.  If lab work is needed we can place an order for that and you can then stop by our lab to have the test done at a later time.    Telephone visits are billed at different rates depending on your insurance coverage. During this emergency period, for some insurers they may be billed the same as an in-person visit.  Please reach out to your insurance provider with any questions.    If during the course of the call the physician/provider feels a telephone visit is not appropriate, you will not be charged for this service.\"    Patient has given verbal consent for Telephone visit?  Yes    What phone number would you like to be contacted at? [phone]    How would you like to obtain your AVS? Mail a copy    Subjective   Pt does not know if he is taking alpha-lipoic acid, Lasix 20 MG, and Metoprolol 50 MG   Hayder Alves is a 65 year old male who presents via phone visit today for the following health issues:  Pt is interested in mail order pharmacy but is not sure which pharmacy. MA asked pt to let us know which mail order he will be using.    HPI    Chronic Pain Follow-Up    Where in your body do you have pain? Neck pain, lower back   How has your pain affected your ability to work? Retired   Which of these pain treatments have you tried since your last clinic visit? Massage  How well are you sleeping? Can't get comfotable to get to sleep   How has your mood been since your last visit? Slightly worse  Have you had a significant " life event? Sister had Covid-19   Other aggravating factors: none  Taking medication as directed? Yes    PHQ-9 SCORE 8/8/2019 2/19/2020 7/16/2020   PHQ-9 Total Score - - -   PHQ-9 Total Score 9 8 14     MIRIAM-7 SCORE 8/8/2019 2/19/2020 7/16/2020   Total Score 1 3 6     No flowsheet data found.  Encounter-Level CSA - 10/05/2015:    Controlled Substance Agreement - Scan on 10/7/2015 10:21 AM: CONTROLLED SUBSTANCE AGREEMENT     Patient-Level CSA:    Controlled Substance Agreement - Opioid - Scan on 2/19/2020  2:11 PM         How many servings of fruits and vegetables do you eat daily? 2     On average, how many sweetened beverages do you drink each day (Examples: soda, juice, sweet tea, etc.  Do NOT count diet or artificially sweetened beverages)? 1 20 oz can Red bull     How many days per week do you exercise enough to make your heart beat faster? 0    How many minutes a day do you exercise enough to make your heart beat faster? N/A    How many days per week do you miss taking your medication? 0    He takes one Oxycodone at  for chronic cervical pain and neuropathy.     He has not been checking blood sugars very regularly, when he does, they are typically around 130.             Reviewed and updated as needed this visit by Provider         Review of Systems   CONSTITUTIONAL: NEGATIVE for fever, chills, change in weight  ENT/MOUTH: NEGATIVE for ear, mouth and throat problems  RESP: NEGATIVE for significant cough or SOB  CV: NEGATIVE for chest pain, palpitations or peripheral edema  GI: NEGATIVE for nausea, abdominal pain, heartburn, or change in bowel habits  MUSCULOSKELETAL: see HPI  NEURO: see HPI  PSYCHIATRIC: NEGATIVE for changes in mood or affect       Objective   Reported vitals:  There were no vitals taken for this visit.   healthy, alert and no distress  PSYCH: Alert and oriented times 3; coherent speech, normal   rate and volume, able to articulate logical thoughts, able   to abstract reason, no tangential  thoughts, no hallucinations   or delusions  His affect is normal  RESP: No cough, no audible wheezing, able to talk in full sentences  Remainder of exam unable to be completed due to telephone visits            Assessment/Plan:    1. Chronic pain syndrome  Stable  Monitoring high risk mediations  Refills for 3 months sent in.   Follow up in 3 months.  - oxyCODONE-acetaminophen (PERCOCET) 5-325 MG tablet; Take 1 tablet by mouth At Bedtime Must last 30 days  Dispense: 30 tablet; Refill: 0    2. Herniation of cervical intervertebral disc  See above.  - oxyCODONE-acetaminophen (PERCOCET) 5-325 MG tablet; Take 1 tablet by mouth At Bedtime Must last 30 days  Dispense: 30 tablet; Refill: 0    3. Type 2 diabetes mellitus with stage 3 chronic kidney disease, with long-term current use of insulin (H)  At goal  The current medical regimen is effective;  continue present plan and medications.   Follow up in 3 months.      No follow-ups on file.      Phone call duration:  15 minutes    Suma Jenkins NP

## 2020-07-17 ASSESSMENT — ANXIETY QUESTIONNAIRES: GAD7 TOTAL SCORE: 6

## 2020-07-28 ENCOUNTER — OFFICE VISIT (OUTPATIENT)
Dept: CARDIOLOGY | Facility: CLINIC | Age: 65
End: 2020-07-28
Attending: INTERNAL MEDICINE
Payer: COMMERCIAL

## 2020-07-28 VITALS
BODY MASS INDEX: 33.19 KG/M2 | OXYGEN SATURATION: 97 % | DIASTOLIC BLOOD PRESSURE: 92 MMHG | WEIGHT: 219 LBS | SYSTOLIC BLOOD PRESSURE: 185 MMHG | HEIGHT: 68 IN | HEART RATE: 80 BPM

## 2020-07-28 DIAGNOSIS — Z95.1 S/P CABG (CORONARY ARTERY BYPASS GRAFT): ICD-10-CM

## 2020-07-28 DIAGNOSIS — I10 ESSENTIAL HYPERTENSION: ICD-10-CM

## 2020-07-28 DIAGNOSIS — I48.4 ATYPICAL ATRIAL FLUTTER (H): Primary | ICD-10-CM

## 2020-07-28 PROCEDURE — 99214 OFFICE O/P EST MOD 30 MIN: CPT | Mod: ZP | Performed by: INTERNAL MEDICINE

## 2020-07-28 PROCEDURE — G0463 HOSPITAL OUTPT CLINIC VISIT: HCPCS | Mod: 25,ZF

## 2020-07-28 PROCEDURE — 93005 ELECTROCARDIOGRAM TRACING: CPT | Mod: ZF

## 2020-07-28 ASSESSMENT — MIFFLIN-ST. JEOR: SCORE: 1747.88

## 2020-07-28 ASSESSMENT — PAIN SCALES - GENERAL: PAINLEVEL: NO PAIN (0)

## 2020-07-28 NOTE — PATIENT INSTRUCTIONS
"You were seen today in the Cardiovascular Clinic at the [facility].     Cardiology Providers you saw during your visit: Dr. Eri Palafox    Diagnosis:  Atrial flutter    Results: discussed with patient    Orders:   None    Medication Changes:   Start Apixaban (Eliquis) 5 mg twice a day (blood thinner)  Continue aspirin     Recommendations:   None    Follow-up:   We will call you to schedule a cardioversion (shock to convert abnormal heart rhythm)    Please follow up with primary care provider for medication refills         Please feel free to call me with any questions or concerns.       Simona Grove RN     Questions and [phone].   First press #1 for the [employer] and then press #3 for \"Medical Questions\" to reach us Cardiology Nurses.      On Call Cardiologist for after hours or on weekends: [phone]   option #4 and ask to speak to the on-call Cardiologist.          If you need a medication refill please contact your pharmacy.  Please allow 3 business days for your refill to be completed.      "

## 2020-07-28 NOTE — LETTER
7/28/2020    RE: Hayder Alves  [address] [id number]     Dear Colleague,    Thank you for the opportunity to participate in the care of your patient, Hayder Alves, at the [facility] at [facility]. Please see a copy of my visit note below.    I am delighted to see Hayder Alves for evaluation of new atrial flutter.      As you know, the patient is a 65 year old  male with a h/o CABG and preserved EF. I saw him on 2/20/2020 at which time he had noted shortness of breath, similar to his COPD but progressively worsening. He had no chest tightness or arm pain where were his symptoms prior to CABG. I had arranged a dobutamine stress echo which was unfortunately postponed due to coronavirus pandemic. When he presented for his dobutamine stress echo on 6/25/2020 it was noted that he was in atrial flutter at a controlled ventricular rate. The stress portion was deferred. Baseline echo showed normal LV function without significant valve disease.    He tells me that his dyspnea has progressed since I saw him in February. Being isolated at home hadn't helped much since he's not exercising and has gained 20 lbs (by our scale today he's only up 5 lbs since February). He denies orthopnea, chest tightness, palpitations.    The following portions of the patient's history were reviewed and updated as appropriate: allergies, current medications, past family history, past medical history, past social history, past surgical history, and the problem list.      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     Atorvastatin Calcium Other (See Comments)     myalgias     Latex      Lunesta       Strange dreams. Cooking during sleep        Medications:   Aspirin 81 every day  Lasix 20 bid  Metoprolol tartrate 50 bid  Rosuvastatin 20 qhs  Lisinopril 40 every day  Insulin  Metformin 500 bid  Elavil  Albuterol  Gabapentin    Family History:   Family History   Problem Relation Age of Onset     Cancer Sister         pancreatic ca     C.A.D. Father         mi,stroke,htn, 60's     Cerebrovascular Disease Father 64     C.A.D. Mother         mi,htn, 60's     Breast Cancer No family hx of      Cancer - colorectal No family hx of      Prostate Cancer No family hx of        Psychosocial history:  reports that he quit smoking about 20 years ago. His smoking use included cigarettes. He has a 45.00 pack-year smoking history. He has never used smokeless tobacco. He reports current alcohol use. He reports that he does not use drugs.    Review of systems: Cardiovascular - no chest pain, palpitations, dizziness, shortness of breath, dyspnea, orthopnea, PND.    In addition,   Constitutional: No change in weight, sleep or appetite.  Normal energy.  No fever or chills  Eyes: Negative for vision changes or eye problems  ENT: No problems with ears, nose or throat.  No difficulty swallowing.  Resp: No coughing, wheezing or shortness of breath  GI: No nausea, vomiting,  heartburn, abdominal pain, diarrhea, constipation or change in bowel habits  : No urinary frequency or dysuria, bladder or kidney problems  Musculoskeletal: No significant muscle or joint pains  Neurologic: No headaches, numbness, tingling, weakness, problems with balance or coordination  Psychiatric: No problems with anxiety, depression or mental health  Heme/immune/allergy: No history of bleeding or clotting problems or anemia.  No allergies or immune system problems  Integumentary: No rashes,worrisome lesions or skin problems      Physical examination  Vitals: BP (!) 185/92 (BP Location: Right arm, Patient Position: Chair, Cuff Size: Adult Regular)   Pulse  "80   Ht 1.727 m (5' 8\")   Wt 99.3 kg (219 lb)   SpO2 97%   BMI 33.30 kg/m    BMI= Body mass index is 33.3 kg/m .     Repeat manual BP by me right arm: 140/80  Weight today is 219 lbs which is up only 5 lbs from 2/2020    Constitutional: In general, the patient is a pleasant male in no apparent distress.    Eyes: PERRLA.  EOMI.  Sclerae white, not injected.  ENT/mouth: Normiocephalic and atraumatic.  Nares clear.  Pharynx without erythema or exudate.  Dentition intact.  No adenopathy.  No thyromegaly. Carotids +2/2 bilaterally without bruits.  No jugular venous distension.   Card/Vasc: The PMI is in the 5th ICS in the midclavicular line. There is no heave. Irregularly irregular.  Normal S1, S2. No murmur, rub, click, or gallop. Pulses are normal bilaterally throughout. No peripheral edema.  Respiratory: Clear to asculation.  No ronchi, wheezes, rales.  No dullness to percussion.   GI: Abdomen is soft, nontender, nondistended. No organomegaly. No AAA.  No bruits.   Integument: No significant bruises or rashes  Neurological: The neurological examination reveal a patient who was oriented to person, place, and time.    Psych: Normal  Heme/Lymph/Immun: no significant adenopathy      I have reviewed the following labs/imaging:  Labs: 2/19/2020: cr 1.46, A1C 5.9, hgb 15, plt 113K  Echo: 6/25/2020 - EF 55-60%, JENNIFER 37    I have personally and independently reviewed the following:  EKG:   Today 7/28/2020: atypical atrial flutter at 82 bpm, RBBB, unchanged compared to 6/25/2020 8/22/2019: sinus 101 bpm, IVCD      Assessment :  1. Atypical atrial flutter, new. Unknown duration but occurred sometime between February and June 2020. May be contributing to dyspnea. [id number] score is 4 for hypertension, age >65, diabetes, CAD. He weights 33 kg, cr 1.46, is 64 yo - will start apixaban 5 mg bid today, and plan for cardioversion in 4 weeks.  2. Hypertension. A bit higher than usual today. Will follow.  3. CAD/CABG. Normal EF. " Continue aspirin 81 every day. If dyspnea does not improve with restoration of sinus rhythm will get nuclear lexiscan stress test.  4. Type II diabetes.    Plan:  Begin apixaban 5 mg bid  Continue all current meds  Cardioversion 4 weeks  Follow up with me 2 weeks post cardioversion      I spent a total of 20 minutes face to face with  Hayder Alves during today's office visit. Over 50% of this time was spent counseling the patient and/or coordinating care regarding management strategies.    The patient is to return pending above. The patient understood the treatment plan as outlined above.  There were no barriers to learning.      Eri Palafox MD

## 2020-07-28 NOTE — PROGRESS NOTES
I am delighted to see Hayder Garciaiber for evaluation of new atrial flutter.      As you know, the patient is a 65 year old  male with a h/o CABG and preserved EF. I saw him on 2/20/2020 at which time he had noted shortness of breath, similar to his COPD but progressively worsening. He had no chest tightness or arm pain where were his symptoms prior to CABG. I had arranged a dobutamine stress echo which was unfortunately postponed due to coronavirus pandemic. When he presented for his dobutamine stress echo on 6/25/2020 it was noted that he was in atrial flutter at a controlled ventricular rate. The stress portion was deferred. Baseline echo showed normal LV function without significant valve disease.    He tells me that his dyspnea has progressed since I saw him in February. Being isolated at home hadn't helped much since he's not exercising and has gained 20 lbs (by our scale today he's only up 5 lbs since February). He denies orthopnea, chest tightness, palpitations.    The following portions of the patient's history were reviewed and updated as appropriate: allergies, current medications, past family history, past medical history, past social history, past surgical history, and the problem list.      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     Atorvastatin Calcium Other (See Comments)     myalgias     Latex      Lunesta      Strange dreams. Cooking during sleep        Medications:   Aspirin 81 every day  Lasix 20 bid  Metoprolol tartrate 50 bid  Rosuvastatin 20 qhs  Lisinopril 40 every day  Insulin  Metformin 500 bid  Elavil  Albuterol  Gabapentin    Family History:   Family History   Problem Relation Age of Onset     Cancer Sister          "pancreatic ca     C.A.D. Father         mi,stroke,htn, 60's     Cerebrovascular Disease Father 64     C.A.D. Mother         mi,htn, 60's     Breast Cancer No family hx of      Cancer - colorectal No family hx of      Prostate Cancer No family hx of        Psychosocial history:  reports that he quit smoking about 20 years ago. His smoking use included cigarettes. He has a 45.00 pack-year smoking history. He has never used smokeless tobacco. He reports current alcohol use. He reports that he does not use drugs.    Review of systems: Cardiovascular - no chest pain, palpitations, dizziness, shortness of breath, dyspnea, orthopnea, PND.    In addition,   Constitutional: No change in weight, sleep or appetite.  Normal energy.  No fever or chills  Eyes: Negative for vision changes or eye problems  ENT: No problems with ears, nose or throat.  No difficulty swallowing.  Resp: No coughing, wheezing or shortness of breath  GI: No nausea, vomiting,  heartburn, abdominal pain, diarrhea, constipation or change in bowel habits  : No urinary frequency or dysuria, bladder or kidney problems  Musculoskeletal: No significant muscle or joint pains  Neurologic: No headaches, numbness, tingling, weakness, problems with balance or coordination  Psychiatric: No problems with anxiety, depression or mental health  Heme/immune/allergy: No history of bleeding or clotting problems or anemia.  No allergies or immune system problems  Integumentary: No rashes,worrisome lesions or skin problems      Physical examination  Vitals: BP (!) 185/92 (BP Location: Right arm, Patient Position: Chair, Cuff Size: Adult Regular)   Pulse 80   Ht 1.727 m (5' 8\")   Wt 99.3 kg (219 lb)   SpO2 97%   BMI 33.30 kg/m    BMI= Body mass index is 33.3 kg/m .     Repeat manual BP by me right arm: 140/80  Weight today is 219 lbs which is up only 5 lbs from 2/2020    Constitutional: In general, the patient is a pleasant male in no apparent distress.    Eyes: PERRLA.  " EOMI.  Sclerae white, not injected.  ENT/mouth: Normiocephalic and atraumatic.  Nares clear.  Pharynx without erythema or exudate.  Dentition intact.  No adenopathy.  No thyromegaly. Carotids +2/2 bilaterally without bruits.  No jugular venous distension.   Card/Vasc: The PMI is in the 5th ICS in the midclavicular line. There is no heave. Irregularly irregular.  Normal S1, S2. No murmur, rub, click, or gallop. Pulses are normal bilaterally throughout. No peripheral edema.  Respiratory: Clear to asculation.  No ronchi, wheezes, rales.  No dullness to percussion.   GI: Abdomen is soft, nontender, nondistended. No organomegaly. No AAA.  No bruits.   Integument: No significant bruises or rashes  Neurological: The neurological examination reveal a patient who was oriented to person, place, and time.    Psych: Normal  Heme/Lymph/Immun: no significant adenopathy      I have reviewed the following labs/imaging:  Labs: 2/19/2020: cr 1.46, A1C 5.9, hgb 15, plt 113K  Echo: 6/25/2020 - EF 55-60%, JENNIFER 37    I have personally and independently reviewed the following:  EKG:   Today 7/28/2020: atypical atrial flutter at 82 bpm, RBBB, unchanged compared to 6/25/2020 8/22/2019: sinus 101 bpm, IVCD      Assessment :  1. Atypical atrial flutter, new. Unknown duration but occurred sometime between February and June 2020. May be contributing to dyspnea. [id number] score is 4 for hypertension, age >65, diabetes, CAD. He weights 33 kg, cr 1.46, is 64 yo - will start apixaban 5 mg bid today, and plan for cardioversion in 4 weeks.  2. Hypertension. A bit higher than usual today. Will follow.  3. CAD/CABG. Normal EF. Continue aspirin 81 every day. If dyspnea does not improve with restoration of sinus rhythm will get nuclear lexiscan stress test.  4. Type II diabetes.    Plan:  Begin apixaban 5 mg bid  Continue all current meds  Cardioversion 4 weeks  Follow up with me 2 weeks post cardioversion      I spent a total of 20 minutes face to  face with  Hayder Alves during today's office visit. Over 50% of this time was spent counseling the patient and/or coordinating care regarding management strategies.        The patient is to return pending above. The patient understood the treatment plan as outlined above.  There were no barriers to learning.      Eri Palafox MD

## 2020-08-05 DIAGNOSIS — I48.4 ATYPICAL ATRIAL FLUTTER (H): Primary | ICD-10-CM

## 2020-08-05 RX ORDER — POTASSIUM CHLORIDE 1500 MG/1
40 TABLET, EXTENDED RELEASE ORAL
Status: CANCELLED | OUTPATIENT
Start: 2020-08-05

## 2020-08-05 RX ORDER — MAGNESIUM SULFATE HEPTAHYDRATE 40 MG/ML
2 INJECTION, SOLUTION INTRAVENOUS
Status: CANCELLED | OUTPATIENT
Start: 2020-08-05

## 2020-08-05 RX ORDER — POTASSIUM CHLORIDE 1500 MG/1
20 TABLET, EXTENDED RELEASE ORAL
Status: CANCELLED | OUTPATIENT
Start: 2020-08-05

## 2020-08-05 RX ORDER — LIDOCAINE 40 MG/G
CREAM TOPICAL
Status: CANCELLED | OUTPATIENT
Start: 2020-08-05

## 2020-08-06 DIAGNOSIS — Z01.818 PRE-OP EXAM: Primary | ICD-10-CM

## 2020-08-18 ENCOUNTER — TELEPHONE (OUTPATIENT)
Dept: CARDIOLOGY | Facility: CLINIC | Age: 65
End: 2020-08-18

## 2020-08-18 NOTE — TELEPHONE ENCOUNTER
The patient had called me back and I booked his cardioversion for Aug 24th. Once I alerted the provider, she informed that that it needed to be after August 29th I cancelled all of the appointments made and have called him twice to get things rescheduled. He has not responded to vm or email,    Lili Malik  [facility] Electrophysiology   [phone]

## 2020-08-18 NOTE — TELEPHONE ENCOUNTER
EP scheduling has made multiple attempts to reach this patient to schedule his cardioversion. First phone call was made on July 29th and the patient returned the call on Sat. Aug 1.  informed him that she wasn't online and was unable to schedule due to the need for anesthesia and echo to please call on Monday.     The  has since made multiple attempts, left voice messages, the RNCC has also attempted to reach the patient with no call back. A [employer] message has gone unread.  A letter will be mailed to the home today.     Lili Malik  Periop Electrophysiology   [phone]

## 2020-08-19 NOTE — TELEPHONE ENCOUNTER
Another message left to tell the patient that we have to reschedule his COVID test and his cardioversion. No answer to email or phone calls.     Lili Malik  Periop Electrophysiology   [phone]

## 2020-08-21 DIAGNOSIS — E11.22 TYPE 2 DIABETES MELLITUS WITH STAGE 3 CHRONIC KIDNEY DISEASE, WITH LONG-TERM CURRENT USE OF INSULIN (H): ICD-10-CM

## 2020-08-21 DIAGNOSIS — N18.30 TYPE 2 DIABETES MELLITUS WITH STAGE 3 CHRONIC KIDNEY DISEASE, WITH LONG-TERM CURRENT USE OF INSULIN (H): ICD-10-CM

## 2020-08-21 DIAGNOSIS — Z79.4 TYPE 2 DIABETES MELLITUS WITH STAGE 3 CHRONIC KIDNEY DISEASE, WITH LONG-TERM CURRENT USE OF INSULIN (H): ICD-10-CM

## 2020-08-22 RX ORDER — INSULIN HUMAN 100 [IU]/ML
INJECTION, SUSPENSION SUBCUTANEOUS
Qty: 10 ML | Refills: 0 | Status: SHIPPED | OUTPATIENT
Start: 2020-08-22 | End: 2020-12-16

## 2020-08-24 ENCOUNTER — DOCUMENTATION ONLY (OUTPATIENT)
Dept: CARE COORDINATION | Facility: CLINIC | Age: 65
End: 2020-08-24

## 2020-08-28 DIAGNOSIS — G47.00 INSOMNIA, UNSPECIFIED TYPE: ICD-10-CM

## 2020-08-30 RX ORDER — AMITRIPTYLINE HYDROCHLORIDE 10 MG/1
TABLET ORAL
Qty: 90 TABLET | Refills: 1 | Status: SHIPPED | OUTPATIENT
Start: 2020-08-30 | End: 2020-10-20

## 2020-08-30 NOTE — TELEPHONE ENCOUNTER
Prescription approved per [facility] Refill Protocol.  Graciela Katlheen RN

## 2020-09-08 ENCOUNTER — HOSPITAL ENCOUNTER (OUTPATIENT)
Facility: CLINIC | Age: 65
End: 2020-09-08
Attending: ANESTHESIOLOGY | Admitting: ANESTHESIOLOGY
Payer: COMMERCIAL

## 2020-09-10 ENCOUNTER — TELEPHONE (OUTPATIENT)
Dept: CARDIOLOGY | Facility: CLINIC | Age: 65
End: 2020-09-10

## 2020-09-15 DIAGNOSIS — N18.30 TYPE 2 DIABETES MELLITUS WITH STAGE 3 CHRONIC KIDNEY DISEASE, WITH LONG-TERM CURRENT USE OF INSULIN (H): ICD-10-CM

## 2020-09-15 DIAGNOSIS — E78.00 HYPERCHOLESTEROLEMIA: ICD-10-CM

## 2020-09-15 DIAGNOSIS — E11.22 TYPE 2 DIABETES MELLITUS WITH STAGE 3 CHRONIC KIDNEY DISEASE, WITH LONG-TERM CURRENT USE OF INSULIN (H): ICD-10-CM

## 2020-09-15 DIAGNOSIS — Z79.4 TYPE 2 DIABETES MELLITUS WITH STAGE 3 CHRONIC KIDNEY DISEASE, WITH LONG-TERM CURRENT USE OF INSULIN (H): ICD-10-CM

## 2020-09-17 RX ORDER — ROSUVASTATIN CALCIUM 20 MG/1
TABLET, COATED ORAL
Qty: 90 TABLET | Refills: 1 | Status: SHIPPED | OUTPATIENT
Start: 2020-09-17 | End: 2021-03-04

## 2020-09-17 NOTE — TELEPHONE ENCOUNTER
Biguanide Agents Mmhcea89/15/2020 03:36 AM   Patient has documented A1c within the specified period of time. Protocol Details    Patient is age 10 or older     Patient's CR is NOT>1.4 OR Patient's EGFR is NOT<45 within past 12 mos.     Patient does NOT have a diagnosis of CHF.     Medication is active on med list     Recent (6 mo) or future (30 days) visit within the authorizing provider's specialty

## 2020-09-23 ENCOUNTER — TELEPHONE (OUTPATIENT)
Dept: FAMILY MEDICINE | Facility: CLINIC | Age: 65
End: 2020-09-23

## 2020-09-23 DIAGNOSIS — G89.4 CHRONIC PAIN SYNDROME: ICD-10-CM

## 2020-09-23 DIAGNOSIS — M50.20 HERNIATION OF CERVICAL INTERVERTEBRAL DISC: ICD-10-CM

## 2020-09-23 NOTE — TELEPHONE ENCOUNTER
It looks like he has not filled the 9/14 script according to the .  However, this pharmacy is currently not stocking controlled medications, so I will need to send to a different pharmacy.

## 2020-09-23 NOTE — TELEPHONE ENCOUNTER
Patient is calling stating that he needed a refill for Oxycodone sent to [facility]. I explained to him that there was a refill just sent on 09/14/2020, but he stated that he spoke with Gregory and informed her that he would be out before he 30 days. Thank you    Carmen Owen on 9/23/2020 at 1:33 PM

## 2020-09-24 NOTE — TELEPHONE ENCOUNTER
LM to [facility]-Need a different pharmacy as the one the prescription was sent to can't fill narcotic at this time. Jahaira Miranda RN

## 2020-09-28 RX ORDER — OXYCODONE AND ACETAMINOPHEN 5; 325 MG/1; MG/1
1 TABLET ORAL AT BEDTIME
Qty: 30 TABLET | Refills: 0 | Status: SHIPPED | OUTPATIENT
Start: 2020-09-28 | End: 2020-10-19

## 2020-10-06 NOTE — TELEPHONE ENCOUNTER
That was the original pharmacy.  The message I sent was that this pharmacy is not currently stocking controlled medications and he will need to chose a DIFFERENT pharmacy.

## 2020-10-06 NOTE — TELEPHONE ENCOUNTER
Lm on  asking patient to give us a different pharmacy he would like it to go to.  When speaking with him earlier the message from MA to patient just said need different pharmacy, there was some confusion as to which pharmacy was not filling controlled medications. Patient did not seem to know what pharmacy we were talking about so I just sent what pharmacy he wanted it to go to which this was the one in the first place and he seemed to not know this.  So I left another message to let us know what pharmacy he wants this sent to.

## 2020-10-13 ENCOUNTER — TELEPHONE (OUTPATIENT)
Dept: FAMILY MEDICINE | Facility: CLINIC | Age: 65
End: 2020-10-13

## 2020-10-13 NOTE — TELEPHONE ENCOUNTER
Patient is calling in needing refill for Oxycodone and an oxygen tank sent to [facility]. Thank You    Carmen Owen on 10/13/2020 at 4:40 PM

## 2020-10-14 NOTE — TELEPHONE ENCOUNTER
LM to [facility]-Percocet was sent to the [facility] on 9/28/2020 for a 30 day supply. Also need more info is pt is on oxygen and if so who is his DME as he should contact them. If this is a new order/request he will need to be evaluated in clinic to see if he meets criteria for home oxygen. Jahaira Miranda RN

## 2020-10-15 NOTE — TELEPHONE ENCOUNTER
Patient returned call.    Writer explained to patient Percocet RX from 9/28/20 needs to last at least 30 days so it is too early to refill.  Please request refill close to 10/28/20.    Patient verbalized understanding and in agreement with plan.    Patient stated he is not currently on oxygen but thinks he may need to start using supplemental oxygen.  Patient reported increased feeling of being winded with walking around house for the past 2 months.    Writer informed patient in-person evaluation needed for this type of request.  Appt scheduled on 10/19/20.  Appt date, time and location confirmed with patient.    Patient denied significant difficulty breathing and any other symptoms.  Patient encouraged to contact clinic should breathing worsen or any symptoms develop.    Patient verbalized understanding and in agreement with plan.  LZAARO PerdueN, RN

## 2020-10-16 DIAGNOSIS — G47.00 INSOMNIA, UNSPECIFIED TYPE: ICD-10-CM

## 2020-10-19 ENCOUNTER — OFFICE VISIT (OUTPATIENT)
Dept: FAMILY MEDICINE | Facility: CLINIC | Age: 65
End: 2020-10-19
Payer: COMMERCIAL

## 2020-10-19 VITALS
WEIGHT: 207 LBS | OXYGEN SATURATION: 96 % | BODY MASS INDEX: 31.47 KG/M2 | HEART RATE: 77 BPM | TEMPERATURE: 98.2 F | SYSTOLIC BLOOD PRESSURE: 123 MMHG | DIASTOLIC BLOOD PRESSURE: 62 MMHG

## 2020-10-19 DIAGNOSIS — Z23 NEED FOR PROPHYLACTIC VACCINATION AND INOCULATION AGAINST INFLUENZA: ICD-10-CM

## 2020-10-19 DIAGNOSIS — I48.92 ATRIAL FLUTTER, UNSPECIFIED TYPE (H): Primary | ICD-10-CM

## 2020-10-19 DIAGNOSIS — E11.42 DIABETIC POLYNEUROPATHY ASSOCIATED WITH TYPE 2 DIABETES MELLITUS (H): ICD-10-CM

## 2020-10-19 DIAGNOSIS — M72.0 DUPUYTREN CONTRACTURE: ICD-10-CM

## 2020-10-19 DIAGNOSIS — M50.20 HERNIATION OF CERVICAL INTERVERTEBRAL DISC: ICD-10-CM

## 2020-10-19 DIAGNOSIS — G89.4 CHRONIC PAIN SYNDROME: ICD-10-CM

## 2020-10-19 PROCEDURE — 99214 OFFICE O/P EST MOD 30 MIN: CPT | Mod: 25 | Performed by: NURSE PRACTITIONER

## 2020-10-19 PROCEDURE — 90662 IIV NO PRSV INCREASED AG IM: CPT | Performed by: NURSE PRACTITIONER

## 2020-10-19 PROCEDURE — G0008 ADMIN INFLUENZA VIRUS VAC: HCPCS | Performed by: NURSE PRACTITIONER

## 2020-10-19 RX ORDER — GABAPENTIN 300 MG/1
CAPSULE ORAL
Qty: 180 CAPSULE | Refills: 1 | Status: SHIPPED | OUTPATIENT
Start: 2020-10-19 | End: 2021-05-10

## 2020-10-19 RX ORDER — OXYCODONE AND ACETAMINOPHEN 5; 325 MG/1; MG/1
1 TABLET ORAL AT BEDTIME
Qty: 30 TABLET | Refills: 0 | Status: SHIPPED | OUTPATIENT
Start: 2020-10-27 | End: 2020-10-19

## 2020-10-19 RX ORDER — OXYCODONE AND ACETAMINOPHEN 5; 325 MG/1; MG/1
1 TABLET ORAL AT BEDTIME
Qty: 30 TABLET | Refills: 0 | Status: SHIPPED | OUTPATIENT
Start: 2020-11-26 | End: 2020-10-19

## 2020-10-19 RX ORDER — OXYCODONE AND ACETAMINOPHEN 5; 325 MG/1; MG/1
1 TABLET ORAL AT BEDTIME
Qty: 30 TABLET | Refills: 0 | Status: SHIPPED | OUTPATIENT
Start: 2020-12-26 | End: 2021-01-19

## 2020-10-19 NOTE — Clinical Note
Vijay came to see me today for ongoing shortness of breath.  It looks like his cardioversion was rescheduled because he didn't get COVID testing done.  How can we arrange to get this rescheduled?

## 2020-10-19 NOTE — PATIENT INSTRUCTIONS
The pain clinic will call you to schedule an appointment.    I will reach out to Dr. Palafox about getting the cardioversion rescheduled.      A  should call you to set up an appointment with Dr. Leonard.

## 2020-10-19 NOTE — PROGRESS NOTES
Subjective     Hayder Alves is a 65 year old male who presents to clinic today for the following health issues:    HPI          Chronic Pain Follow-Up  Needs hand surgery   Where in your body do you have pain? Neck pain, lower back   How has your pain affected your ability to work? Retired   Which of these pain treatments have you tried since your last clinic visit? No   How well are you sleeping? Poor. Having trouble falling asleep   How has your mood been since your last visit? About the same  Have you had a significant life event? No  Other aggravating factors: prolonged sitting and prolonged standing  Taking medication as directed? Yes    PHQ-9 SCORE 8/8/2019 2/19/2020 7/16/2020   PHQ-9 Total Score - - -   PHQ-9 Total Score 9 8 14     MIRIAM-7 SCORE 8/8/2019 2/19/2020 7/16/2020   Total Score 1 3 6     No flowsheet data found.  Encounter-Level CSA - 10/05/2015:    Controlled Substance Agreement - Scan on 10/7/2015 10:21 AM: CONTROLLED SUBSTANCE AGREEMENT     Patient-Level CSA:    Controlled Substance Agreement - Opioid - Scan on 2/19/2020  2:11 PM         Breathing Concern   Onset/Duration: a few months   Description:  Requesting oxygen  Progression of Symptoms: worsening  Accompanying Signs & Symptoms:  Shortness of breath: YES  Sweating: no  Nausea/vomiting: no  Lightheadedness: no  Palpitations: no  Fever/Chills: no  Cough: no           Heartburn: no  Precipitating factors:   Worse with exertion: YES  Therapies tried and outcome: inhaler PRN     He is noticing more shortness of breath for the past several months.  He wants to know if he can get oxygen ordered.  He does have COPD and heart failure.  Review of his chart shows that he was found to be in atrial flutter and was supposed to get a cardioversion done.  He states he was never contacted to get COVID testing done, therefore the procedure was cancelled.      His neck pain is worsening.  He has been taking oxycodone more than once a day and is running  out.            Review of Systems   CONSTITUTIONAL: NEGATIVE for fever, chills, change in weight  ENT/MOUTH: NEGATIVE for ear, mouth and throat problems  RESP:see HPI  CV: NEGATIVE for chest pain, palpitations or peripheral edema  GI: NEGATIVE for nausea, abdominal pain, heartburn, or change in bowel habits  MUSCULOSKELETAL: see HPI  NEURO: NEGATIVE for weakness, dizziness or paresthesias  PSYCHIATRIC: NEGATIVE for changes in mood or affect      Objective    /62   Pulse 77   Temp 98.2  F (36.8  C) (Oral)   Wt 93.9 kg (207 lb)   SpO2 96%   BMI 31.47 kg/m    Body mass index is 31.47 kg/m .  Physical Exam   GENERAL: healthy, alert and no distress  RESP: lungs clear to auscultation - no rales, rhonchi or wheezes  CV: irregular rhythm, normal S1 S2, no S3 or S4, no murmur, click or rub, no peripheral edema and peripheral pulses strong  MS: contracture of left middle finger  SKIN: no suspicious lesions or rashes  PSYCH: mentation appears normal, affect normal/bright            Assessment & Plan     Atrial flutter, unspecified type (H)  Discussed that his shortness of breath is likely related to his atrial flutter.  I will contact Montrell Palafox to see how to get the cardioversion rescheduled.   Will follow up if shortness of breath persists.     Chronic pain syndrome  Will refer for a consult regarding changes in medication regimen.  Refills for 3 months given.   - PAIN MANAGEMENT REFERRAL; Future  - oxyCODONE-acetaminophen (PERCOCET) 5-325 MG tablet; Take 1 tablet by mouth At Bedtime Must last 30 days    Herniation of cervical intervertebral disc  See above.   - PAIN MANAGEMENT REFERRAL; Future  - oxyCODONE-acetaminophen (PERCOCET) 5-325 MG tablet; Take 1 tablet by mouth At Bedtime Must last 30 days    Diabetic polyneuropathy associated with type 2 diabetes mellitus (H)  He has not been taking consistently, discussed.   - gabapentin (NEURONTIN) 300 MG capsule; TAKE 1 CAPSULE(300 MG) BY MOUTH TWICE DAILY    Dinesh  "contracture  Referral to ortho given.   - Orthopedic & Spine  Referral; Future    Need for prophylactic vaccination and inoculation against influenza    - FLUZONE HIGH DOSE 65+  [69028]  - Vaccine Administration, Initial [71135]     BMI:   Estimated body mass index is 31.47 kg/m  as calculated from the following:    Height as of 7/28/20: 1.727 m (5' 8\").    Weight as of this encounter: 93.9 kg (207 lb).   Weight management plan: Discussed healthy diet and exercise guidelines             No follow-ups on file.    Suma Jenkins NP  [facility]      "

## 2020-10-20 RX ORDER — AMITRIPTYLINE HYDROCHLORIDE 10 MG/1
TABLET ORAL
Qty: 90 TABLET | Refills: 11 | Status: SHIPPED | OUTPATIENT
Start: 2020-10-20 | End: 2021-10-25

## 2020-10-20 NOTE — TELEPHONE ENCOUNTER
--Prescription approved per [facility].  Ann Pickard RN          --Last visit: 10/19/2020  --Future Office Visit:  none

## 2020-10-22 ENCOUNTER — TELEPHONE (OUTPATIENT)
Dept: CARDIOLOGY | Facility: CLINIC | Age: 65
End: 2020-10-22

## 2020-10-22 DIAGNOSIS — I48.4 ATYPICAL ATRIAL FLUTTER (H): Primary | ICD-10-CM

## 2020-10-22 RX ORDER — MAGNESIUM SULFATE HEPTAHYDRATE 40 MG/ML
2 INJECTION, SOLUTION INTRAVENOUS
Status: CANCELLED | OUTPATIENT
Start: 2020-10-22

## 2020-10-22 RX ORDER — LIDOCAINE 40 MG/G
CREAM TOPICAL
Status: CANCELLED | OUTPATIENT
Start: 2020-10-22

## 2020-10-22 RX ORDER — POTASSIUM CHLORIDE 1500 MG/1
20 TABLET, EXTENDED RELEASE ORAL
Status: CANCELLED | OUTPATIENT
Start: 2020-10-22

## 2020-10-22 RX ORDER — POTASSIUM CHLORIDE 1500 MG/1
40 TABLET, EXTENDED RELEASE ORAL
Status: CANCELLED | OUTPATIENT
Start: 2020-10-22

## 2020-10-22 NOTE — TELEPHONE ENCOUNTER
EP Scheduling called the patient to schedule cardioversion with Dr. Palafox. The number [phone] was left for the patient to return the call and schedule the procedure.    Lili Malik  Periop Electrophysiology   [phone]

## 2020-10-23 DIAGNOSIS — Z11.59 ENCOUNTER FOR SCREENING FOR OTHER VIRAL DISEASES: Primary | ICD-10-CM

## 2020-10-26 ENCOUNTER — ANESTHESIA EVENT (OUTPATIENT)
Dept: SURGERY | Facility: CLINIC | Age: 65
End: 2020-10-26
Payer: COMMERCIAL

## 2020-10-27 DIAGNOSIS — Z11.59 ENCOUNTER FOR SCREENING FOR OTHER VIRAL DISEASES: ICD-10-CM

## 2020-10-27 DIAGNOSIS — Z01.818 PRE-OP EXAM: ICD-10-CM

## 2020-10-27 LAB
SARS-COV-2 RNA SPEC QL NAA+PROBE: NORMAL
SPECIMEN SOURCE: NORMAL

## 2020-10-27 PROCEDURE — U0003 INFECTIOUS AGENT DETECTION BY NUCLEIC ACID (DNA OR RNA); SEVERE ACUTE RESPIRATORY SYNDROME CORONAVIRUS 2 (SARS-COV-2) (CORONAVIRUS DISEASE [COVID-19]), AMPLIFIED PROBE TECHNIQUE, MAKING USE OF HIGH THROUGHPUT TECHNOLOGIES AS DESCRIBED BY CMS-2020-01-R: HCPCS | Performed by: INTERNAL MEDICINE

## 2020-10-28 LAB
LABORATORY COMMENT REPORT: NORMAL
SARS-COV-2 RNA SPEC QL NAA+PROBE: NEGATIVE
SPECIMEN SOURCE: NORMAL

## 2020-10-29 ENCOUNTER — APPOINTMENT (OUTPATIENT)
Dept: MEDSURG UNIT | Facility: CLINIC | Age: 65
End: 2020-10-29
Attending: INTERNAL MEDICINE
Payer: COMMERCIAL

## 2020-10-29 ENCOUNTER — HOSPITAL ENCOUNTER (OUTPATIENT)
Dept: CARDIOLOGY | Facility: CLINIC | Age: 65
End: 2020-10-29
Attending: INTERNAL MEDICINE
Payer: COMMERCIAL

## 2020-10-29 ENCOUNTER — ANESTHESIA (OUTPATIENT)
Dept: SURGERY | Facility: CLINIC | Age: 65
End: 2020-10-29
Payer: COMMERCIAL

## 2020-10-29 ENCOUNTER — HOSPITAL ENCOUNTER (OUTPATIENT)
Facility: CLINIC | Age: 65
Discharge: HOME | End: 2020-10-29
Attending: INTERNAL MEDICINE | Admitting: INTERNAL MEDICINE
Payer: COMMERCIAL

## 2020-10-29 ENCOUNTER — APPOINTMENT (OUTPATIENT)
Dept: LAB | Facility: CLINIC | Age: 65
End: 2020-10-29
Attending: INTERNAL MEDICINE
Payer: COMMERCIAL

## 2020-10-29 VITALS
OXYGEN SATURATION: 98 % | DIASTOLIC BLOOD PRESSURE: 88 MMHG | HEART RATE: 69 BPM | RESPIRATION RATE: 20 BRPM | SYSTOLIC BLOOD PRESSURE: 125 MMHG

## 2020-10-29 DIAGNOSIS — I48.4 ATYPICAL ATRIAL FLUTTER (H): ICD-10-CM

## 2020-10-29 LAB — GLUCOSE BLDC GLUCOMTR-MCNC: 131 MG/DL (ref 70–99)

## 2020-10-29 PROCEDURE — 93010 ELECTROCARDIOGRAM REPORT: CPT | Mod: 59 | Performed by: INTERNAL MEDICINE

## 2020-10-29 PROCEDURE — 250N000009 HC RX 250: Performed by: NURSE ANESTHETIST, CERTIFIED REGISTERED

## 2020-10-29 PROCEDURE — 999N001017 HC STATISTIC GLUCOSE BY METER IP

## 2020-10-29 PROCEDURE — 999N000054 HC STATISTIC EKG NON-CHARGEABLE

## 2020-10-29 PROCEDURE — 92960 CARDIOVERSION ELECTRIC EXT: CPT | Performed by: NURSE PRACTITIONER

## 2020-10-29 PROCEDURE — 93005 ELECTROCARDIOGRAM TRACING: CPT

## 2020-10-29 PROCEDURE — 92960 CARDIOVERSION ELECTRIC EXT: CPT

## 2020-10-29 PROCEDURE — 370N000001 HC ANESTHESIA TECHNICAL FEE, 1ST 30 MIN

## 2020-10-29 RX ORDER — POTASSIUM CHLORIDE 1500 MG/1
40 TABLET, EXTENDED RELEASE ORAL
Status: DISCONTINUED | OUTPATIENT
Start: 2020-10-29 | End: 2020-10-30 | Stop reason: HOSPADM

## 2020-10-29 RX ORDER — POTASSIUM CHLORIDE 1500 MG/1
20 TABLET, EXTENDED RELEASE ORAL
Status: DISCONTINUED | OUTPATIENT
Start: 2020-10-29 | End: 2020-10-30 | Stop reason: HOSPADM

## 2020-10-29 RX ORDER — LIDOCAINE 40 MG/G
CREAM TOPICAL
Status: DISCONTINUED | OUTPATIENT
Start: 2020-10-29 | End: 2020-10-30 | Stop reason: HOSPADM

## 2020-10-29 RX ORDER — MAGNESIUM SULFATE HEPTAHYDRATE 40 MG/ML
2 INJECTION, SOLUTION INTRAVENOUS
Status: DISCONTINUED | OUTPATIENT
Start: 2020-10-29 | End: 2020-10-30 | Stop reason: HOSPADM

## 2020-10-29 RX ADMIN — LIDOCAINE HYDROCHLORIDE 50 MG: 10 INJECTION, SOLUTION EPIDURAL; INFILTRATION; INTRACAUDAL; PERINEURAL at 13:36

## 2020-10-29 NOTE — DISCHARGE INSTRUCTIONS
GOING HOME AFTER YOUR CARDIOVERSION    FOR NEXT 24 HOURS:    An adult should stay with you.    Relax and take it easy.    DO NOT make any important legal decisions.    DO NOT drive or operate machines at home or at work.    Resume your regular diet and drink plenty of fluids.    If you have any redness/skin sorness where the patches were placed, you may use aloe vera gel or 1% hydrocortisone cream to the skin (sold at drug stores)    Take Tylenol (Acetaminophen) per your provider's recommendations as needed to help relieve local pain.     CALL YOUR HEALTHCARE PROVIDER IF:    You develop nausea or vomiting    You develop hives or a rash or any unexplained itching    Have irregular heartbeat or fast pulse    Feel faint, dizzy, or lightheaded    Have chest pain with increased activity    Have bleeding issues from blood-thinning medicines    CALL 911 RIGHT AWAY IF YOU HAVE:    Pain in your chest, arm, shoulder, neck, or upper back    Shortness of breath    Loss of vision, speech, or strength or coordination in any body part    Weakness in your arm or leg    Uncontrolled bleeding    Feel unstable in any way     FOLLOW UP APPOINTMENT:      Follow up as scheduled with EP/Cardiology Provider in 4 weeks    ADDITIONAL INFORMATION:  Cardiovascular Clinic:   [address] [place] [address]. [place] [address]  Your Care Team:  EP Cardiology   Telephone Number     Graciela Armstrong NP, Dr. *** [phone]   [place] RN  [phone]     For scheduling appts or procedures:    Lili Malik   [phone]   For the Device Clinic (Pacemakers and ICD's)   RN's :   Maribel Cristina  During business hours: [phone]     After business hours:   [phone]- select option 4 and ask for job code [id number].       As always, Thank you for trusting us with your health care needs!

## 2020-10-29 NOTE — ANESTHESIA CARE TRANSFER NOTE
Patient: Hayder Alves    Procedure(s):  ANESTHESIA, FOR CARDIOVERSION @1400  Latex Allergy    Diagnosis: Atypical atrial flutter (H) [I48.4]  Diagnosis Additional Information: No value filed.    Anesthesia Type:   General     Note:  Airway :Nasal Cannula  Patient transferred to:Telemetry/Step Down Unit  Comments: Pt alert, breathing spontaneously on 4L O2 via NC. After successful cardioversion x1. VSS throughout. Report shared with RN at bedside. Handoff Report: Identifed the Patient, Identified the Reponsible Provider, Reviewed the pertinent medical history, Discussed the surgical course, Reviewed Intra-OP anesthesia mangement and issues during anesthesia, Set expectations for post-procedure period and Allowed opportunity for questions and acknowledgement of understanding      Vitals: (Last set prior to Anesthesia Care Transfer)    CRNA VITALS  10/29/2020 1310 - 10/29/2020 1346      10/29/2020             EKG:  Sinus rhythm                Electronically Signed By: MICKEY Garcia CRNA  October 29, 2020  1:46 PM

## 2020-10-29 NOTE — ANESTHESIA POSTPROCEDURE EVALUATION
Anesthesia POST Procedure Evaluation    Patient: Hayder Alves   MRN:     [id number] Gender:   male   Age:    65 year old :      1955        Preoperative Diagnosis: Atypical atrial flutter (H) [I48.4]   Procedure(s):  ANESTHESIA, FOR CARDIOVERSION @1400  Latex Allergy   Postop Comments: No value filed.     Anesthesia Type: General       Disposition: Outpatient   Postop Pain Control: Uneventful            Sign Out: Well controlled pain   PONV: No   Neuro/Psych: Uneventful            Sign Out: Acceptable/Baseline neuro status   Airway/Respiratory: Uneventful            Sign Out: Acceptable/Baseline resp. status   CV/Hemodynamics: Uneventful            Sign Out: Acceptable CV status   Other NRE: NONE   DID A NON-ROUTINE EVENT OCCUR? No         Last Anesthesia Record Vitals:  CRNA VITALS  10/29/2020 1310 - 10/29/2020 1410      10/29/2020             EKG:  Sinus rhythm          Last PACU Vitals:  No vitals data found for the desired time range.        Electronically Signed By: Christopher J. Behrens, MD, 2020, 3:05 PM

## 2020-10-29 NOTE — SEDATION DOCUMENTATION
Pt arrived in ECHO department  for scheduled synchronized cardioversion.  Procedure explained, questions answered and consent signed. Discharge instructions discussed with patient.  Patient denied any missed doses of his eliquis so proceeded with DCCV.  Anesthesia gave pt 50 mg IV brevitol for sedation and pt was DCCV at 150 Joules to a SR.  Pt denied chest pain after procedure and is monitored x 60 minutes until he is alert. VSS.    Transported via wheelchair out to lobby to meet his wife Lilia who is driving today.

## 2020-10-29 NOTE — ANESTHESIA PREPROCEDURE EVALUATION
Anesthesia Pre-Procedure Evaluation    Patient: Hayder Alves   MRN:     [id number] Gender:   male   Age:    65 year old :      1955        Preoperative Diagnosis: Atypical atrial flutter (H) [I48.4]   Procedure(s):  ANESTHESIA, FOR CARDIOVERSION @1400  Latex Allergy     LABS:  CBC:   Lab Results   Component Value Date    WBC 6.5 2020    WBC 8.4 2017    HGB 15.4 2020    HGB 14.6 2017    HCT 45.0 2020    HCT 43.0 2017     (L) 2020     (L) 2017     BMP:   Lab Results   Component Value Date     2020     (L) 10/03/2019    POTASSIUM 4.9 10/29/2020    POTASSIUM 4.4 2020    CHLORIDE 107 2020    CHLORIDE 96 10/03/2019    CO2 21 2020    CO2 22 10/03/2019    BUN 24 2020    BUN 44 (H) 10/03/2019    CR 1.46 (H) 2020    CR 1.65 (H) 10/03/2019     (H) 2020     (H) 10/03/2019     COAGS:   Lab Results   Component Value Date    PTT 25 2011    INR 1.08 10/10/2016     POC:   Lab Results   Component Value Date     (H) 10/20/2016     OTHER:   Lab Results   Component Value Date    PH 7.46 (H) 10/13/2016    LACT 2.6 (H) 10/11/2016    A1C 6.9 (H) 2020    WANDA 9.0 2020    PHOS 4.0 10/14/2016    MAG 1.7 10/29/2020    ALBUMIN 3.9 2020    PROTTOTAL 7.5 2020    ALT 31 2020    AST 22 2020    GGT 40 2006    ALKPHOS 61 2020    BILITOTAL 0.3 2020    TSH 2.10 2018    SED 9 2007        Preop Vitals    BP Readings from Last 3 Encounters:   10/29/20 (!) 155/89   10/19/20 123/62   20 (!) 185/92    Pulse Readings from Last 3 Encounters:   10/29/20 81   10/19/20 77   20 80      Resp Readings from Last 3 Encounters:   10/29/20 18   20 20   20 20    SpO2 Readings from Last 3 Encounters:   10/29/20 99%   10/19/20 96%   20 97%      Temp Readings from Last 1 Encounters:   10/19/20 36.8  C (98.2  F) (Oral)    Ht  "Readings from Last 1 Encounters:   07/28/20 1.727 m (5' 8\")      Wt Readings from Last 1 Encounters:   10/19/20 93.9 kg (207 lb)    Estimated body mass index is 31.47 kg/m  as calculated from the following:    Height as of 7/28/20: 1.727 m (5' 8\").    Weight as of 10/19/20: 93.9 kg (207 lb).     LDA:  Peripheral IV 10/29/20 Right Lower forearm (Active)   Site Assessment WDL 10/29/20 1313   Line Status Saline locked 10/29/20 1313   Phlebitis Scale 0-->no symptoms 10/29/20 1313   Infiltration Scale 0 10/29/20 1313   Number of days: 0       Peripheral IV 03/04/19 Left Upper forearm (Active)   Number of days: 605        Past Medical History:   Diagnosis Date     Bipolar I disorder, most recent episode (or current) manic, severe, specified as with psychotic behavior      CAD (coronary artery disease) 2001    MI, CAB      Cardiovascular disease 5/26/2005     Problem list name updated by automated process. Provider to review     Chronic systolic CHF (congestive heart failure) (H)      Essential hypertension, benign      Impaired fasting glucose      Insomnia, unspecified      Ischemic cardiomyopathy      Other anxiety states      PLANTAR fasciaitis      Pneumonia 10/25/2016     Pure hypercholesterolemia      Respiratory failure (H) 10/11/2016     Syncope     probably iatrogenic due to medications     Type II or unspecified type diabetes mellitus without mention of complication, not stated as uncontrolled       Past Surgical History:   Procedure Laterality Date     C CABG, ARTERIAL, THREE  3/00    CABG X 3, stent     HC DRUG-ELUTING STENTS, SINGLE  9/10    left main     SURGICAL HISTORY OF -       skin grafts X 4     SURGICAL HISTORY OF -   1978    lumbar fusion L1-5     SURGICAL HISTORY OF -   2008    C4-7 cervical fusion     SURGICAL HISTORY OF -   12/09    C 6-7 fusion      Allergies   Allergen Reactions     Adhesive Tape      From blood draw site.     Atorvastatin Calcium Other (See Comments)     myalgias     Latex      " Lunesta      Strange dreams. Cooking during sleep              JZG FV AN PHYSICAL EXAM    Assessment:   ASA SCORE: 4    H&P: History and physical reviewed and following examination; no interval change.    NPO Status: NPO Appropriate     Plan:   Anes. Type:  General   Pre-Medication: None   Induction:  IV (Standard)   Airway: Native Airway   Access/Monitoring: PIV   Maintenance: Balanced     Postop Plan:   Postop Pain: None  Postop Sedation/Airway: Not planned     CONSENT: Direct conversation   Plan and risks discussed with: Patient   Blood Products: Consent Deferred (Minimal Blood Loss)                   Christopher J. Behrens, MD

## 2020-10-30 LAB — INTERPRETATION ECG - MUSE: NORMAL

## 2020-12-22 ENCOUNTER — TELEPHONE (OUTPATIENT)
Dept: FAMILY MEDICINE | Facility: CLINIC | Age: 65
End: 2020-12-22

## 2020-12-22 NOTE — TELEPHONE ENCOUNTER
Reason for Call:  Medication or medication refill:    Do you use a [facility]?  Name of the pharmacy and phone number for the current request:  [facility] - [place], [place] - [address]    Name of the medication requested: oxyCODONE-acetaminophen (PERCOCET) 5-325 MG tablet    Other request: Patient states 30 isn't enough and he has told his provider this before    Can we leave a detailed message on this number? YES    Phone number patient can be reached at: Home number on file [phone] (home)    Best Time: anytime      Call taken on 12/22/2020 at 4:23 PM by Lyn Harp

## 2020-12-23 ENCOUNTER — TELEPHONE (OUTPATIENT)
Dept: FAMILY MEDICINE | Facility: CLINIC | Age: 65
End: 2020-12-23

## 2020-12-23 NOTE — TELEPHONE ENCOUNTER
"""Artificial Tears 2-4 times a day Reason for Call:  Form, our goal is to have forms completed with 72 hours, however, some forms may require a visit or additional information.    Type of letter, form or note:  medical    Who is the form from?: [employer] (if other please explain)    Where did the form come from: form was faxed in    What clinic location was the form placed at?: [facility]    Where the form was placed: Suma Jenkins's Box/Folder    What number is listed as a contact on the form?:          Additional comments:     Call taken on 12/23/2020 at 9:26 AM by Adriana Nichols

## 2020-12-23 NOTE — TELEPHONE ENCOUNTER
"Geno/Covering providers-Patient is requesting increase dispense for Percocet.    1. Per 10/19/20 office visit:    \"Chronic pain syndrome  Will refer for a consult regarding changes in medication regimen.  Refills for 3 months given.   - PAIN MANAGEMENT REFERRAL; Future  - oxyCODONE-acetaminophen (PERCOCET) 5-325 MG tablet; Take 1 tablet by mouth At Bedtime Must last 30 days\"    2. Unclear to writer if patient established with pain management  3. Writer would recommend visit with provider to address request for dispense quantity change    Thank you!  DAVID Oro, BSN, RN    "

## 2020-12-28 ENCOUNTER — TELEPHONE (OUTPATIENT)
Dept: FAMILY MEDICINE | Facility: CLINIC | Age: 65
End: 2020-12-28

## 2020-12-28 NOTE — TELEPHONE ENCOUNTER
He was given a referral to pain management on 10/19 and will need a consult with them for their recommendations before any changes will be made.

## 2020-12-28 NOTE — TELEPHONE ENCOUNTER
Patient is requesting that the dosage information for the Oxycodone be changed as he is taking more and is running out faster. Please call patient back at [phone] to discuss. Thank You    Carmen Owen on 12/28/2020 at 12:48 PM

## 2020-12-28 NOTE — TELEPHONE ENCOUNTER
Left message on machine to call back.  Ask to speak to any triage nurse, let them know it's a return call.    Leave a number and time that you can be reached.   Wendy Stock RN

## 2020-12-28 NOTE — TELEPHONE ENCOUNTER
Filled out as much as I could. Attached medication list and placed in Suma Jenkins's signature folder     Aparna Wood MA

## 2020-12-29 DIAGNOSIS — N18.30 TYPE 2 DIABETES MELLITUS WITH STAGE 3 CHRONIC KIDNEY DISEASE, WITH LONG-TERM CURRENT USE OF INSULIN, UNSPECIFIED WHETHER STAGE 3A OR 3B CKD (H): ICD-10-CM

## 2020-12-29 DIAGNOSIS — E11.22 TYPE 2 DIABETES MELLITUS WITH STAGE 3 CHRONIC KIDNEY DISEASE, WITH LONG-TERM CURRENT USE OF INSULIN, UNSPECIFIED WHETHER STAGE 3A OR 3B CKD (H): ICD-10-CM

## 2020-12-29 DIAGNOSIS — Z79.4 TYPE 2 DIABETES MELLITUS WITH STAGE 3 CHRONIC KIDNEY DISEASE, WITH LONG-TERM CURRENT USE OF INSULIN, UNSPECIFIED WHETHER STAGE 3A OR 3B CKD (H): ICD-10-CM

## 2020-12-29 NOTE — TELEPHONE ENCOUNTER
Patient ID:  Miles Callahan  [id number]  1941 78 year old    CHIEF COMPLAINT:  Chest discomfort and exertional dyspnea    ASSESSMENT AND PLAN:  1. Acute on chronic combined systolic and diastolic heart failure/ ischemic cardiomyopathy  IV Lasix,  case discussed with Cardiology patient will undergo stress test today to evaluate his feelings of chest pressure and exertional dyspnea.   2. Uncontrolled hypertension- blood pressures improved after patient was restarted on his home meds  3. Paroxysmal atrial fibrillation- continue sotalol on aspirin Plavix  4. Diabetes mellitus- on insulin pump.  Continue insulin pump per home settings, with hypoglycemia and hyperglycemia, followed by endocrinology as an outpatient  5.Depression-continue Effexor    Code status: Full Resuscitation      SUBJECTIVE:  Patient seen examined this morning in ICU.  He states that he has had ongoing episodes chest discomfort, he states the episodes happen randomly, and are usually only lasting for few minutes, they spontaneously resolved.  Patient states he has a feeling of pressure or heaviness.  He states the most recent 1 had been and he just 20 minutes before my arrival, t and spontaneously resolved within eating a couple minutes.  He continues to have conversational dyspnea, although it seems somewhat improved compared to yesterday.  On exam he has had slid down in the bed and appeared to be  only elevated approximately 20°.  I did suggest that the patient try getting into a chair for breakfast and he agreed.  He stated he did have hypoglycemia overnight.  He states that he does have problems with this at home.  He has a freestyle Farrah sensor and frequently checks his blood glucose, it was 226 during our interview.  No headache, sore throat.  No nausea or abdominal discomfort.  He states that he feels his lower extremity edema is improving    OBJECTIVE/EXAM:     Vital Last Value 24 Hour Range   Non-Invasive  Blood Pressure (!) 156/65  Patient returned call and said can call anytime today the [phone]     Patient wondering about what this is about, nurse sees script for controlled substance.     Not clear what RN Wendy Stock wished to tell patient.     Patient states runs out of his pain medication early because he sometimes has to take one during the day.       Will try to route back to GASPER Stock.                  Julianne Christie RN  Triage Nurse  [facility], 24 hour nurse line, available by calling clinic at [phone] and following prompts.             (07/23/20 1020) BP  Min: 130/60  Max: 208/92   Pulse 78 (07/23/20 1020) Pulse  Min: 65  Max: 83   Respiratory 16 (07/23/20 1020) Resp  Min: 16  Max: 29   Temperature 98.1 °F (36.7 °C) (07/23/20 0820) Temp  Min: 97.7 °F (36.5 °C)  Max: 98.2 °F (36.8 °C)   Pulse Oximetry 97 % (07/23/20 1020) SpO2  Min: 91 %  Max: 97 %       Intake/Output Summary (Last 24 hours) at 7/23/2020 1310  Last data filed at 7/22/2020 1500  Gross per 24 hour   Intake 240 ml   Output --   Net 240 ml       GEN: Alert, oriented x 3.  No acute distress.  HEENT: eomi, oral mucous membranes moist.  CV: Regular rate and rhythm. No rub or gallop  CHEST: Clear to auscultation bilaterally, normal respiratory effort.  ABDOMEN: Soft, non-tender, non-distended, active bowel sounds.  EXT: No  cyanosis or clubbing.  Edema improving  SKIN: Warm and dry. No rashes.    LABS:  Recent Labs   Lab 07/23/20  0258 07/22/20  1020   WBC 7.6 7.4   HGB 11.7* 12.8*   HCT 37.8* 41.1    223   SEG 70 81   SODIUM 140 136   POTASSIUM 3.9 3.7   CHLORIDE 101 100   BUN 20 20   CREATININE 0.85 0.88   GLUCOSE 74 385*   ALBUMIN  --  3.2*   AST  --  23   BILIRUBIN  --  1.0       DIAGNOSTICS:   Ct Chest Pe Imaging    Result Date: 7/22/2020    Impression: IMPRESSION:  1. No evidence for pulmonary embolism. 2. Cardiomegaly, dilated pulmonary artery, groundglass infiltrates, small pleural effusions, and peripheral interstitial intralobular thickening. These findings all suggest congestive heart failure/fluid overload. 3. Stable granulomatous changes. Stable less than 2 mm nodules left lower lobe.     Signed:  Jennie Mathews NP  7/23/2020  1:10 PM

## 2020-12-29 NOTE — TELEPHONE ENCOUNTER
Form is requesting medications that have never been prescribed and are not appropriate, appears to be a scam.  Form shredded.

## 2020-12-30 ENCOUNTER — TELEPHONE (OUTPATIENT)
Dept: FAMILY MEDICINE | Facility: CLINIC | Age: 65
End: 2020-12-30

## 2020-12-30 RX ORDER — INSULIN HUMAN 100 [IU]/ML
INJECTION, SUSPENSION SUBCUTANEOUS
Qty: 10 ML | Refills: 0 | Status: SHIPPED | OUTPATIENT
Start: 2020-12-30 | End: 2021-06-28

## 2021-01-06 DIAGNOSIS — Z79.4 TYPE 2 DIABETES MELLITUS WITH STAGE 3 CHRONIC KIDNEY DISEASE, WITH LONG-TERM CURRENT USE OF INSULIN, UNSPECIFIED WHETHER STAGE 3A OR 3B CKD (H): ICD-10-CM

## 2021-01-06 DIAGNOSIS — E11.22 TYPE 2 DIABETES MELLITUS WITH STAGE 3 CHRONIC KIDNEY DISEASE, WITH LONG-TERM CURRENT USE OF INSULIN, UNSPECIFIED WHETHER STAGE 3A OR 3B CKD (H): ICD-10-CM

## 2021-01-06 DIAGNOSIS — N18.30 TYPE 2 DIABETES MELLITUS WITH STAGE 3 CHRONIC KIDNEY DISEASE, WITH LONG-TERM CURRENT USE OF INSULIN, UNSPECIFIED WHETHER STAGE 3A OR 3B CKD (H): ICD-10-CM

## 2021-01-07 ENCOUNTER — TELEPHONE (OUTPATIENT)
Dept: FAMILY MEDICINE | Facility: CLINIC | Age: 66
End: 2021-01-07

## 2021-01-07 DIAGNOSIS — I48.4 ATYPICAL ATRIAL FLUTTER (H): Primary | ICD-10-CM

## 2021-01-07 RX ORDER — INSULIN HUMAN 100 [IU]/ML
INJECTION, SUSPENSION SUBCUTANEOUS
Qty: 10 ML | Refills: 0 | Status: CANCELLED | OUTPATIENT
Start: 2021-01-07

## 2021-01-07 NOTE — TELEPHONE ENCOUNTER
Geno-Please advise if you recommend patient switch to Warfarin, given increased cost of Eliquis?  INR referral pended.    When triage RN calls back, will triage patient's throat symptoms.    Thank you!  LAZARO PerdueN, RN  [facility]

## 2021-01-07 NOTE — TELEPHONE ENCOUNTER
Call patient to advise if he needs to keep taking this medication    He has sore throat more so at night    call to advise

## 2021-01-07 NOTE — TELEPHONE ENCOUNTER
Reason for Call:  Other prescription    Detailed comments: Patients wife had called stating that the price for patients apixaban ANTICOAGULANT (ELIQUIS) 5 MG tablet has gone up a lot from $47 to $420. They do not have the money to be spending that much on medication and would like to know if there are other options for this medication but a cheaper version or is this medication really even needed?    Phone Number Patient can be reached at: Home number on file [phone] (home)    Best Time: Anytime    Can we leave a detailed message on this number? YES    Call taken on 1/7/2021 at 1:44 PM by Jade Rodriguez

## 2021-01-07 NOTE — TELEPHONE ENCOUNTER
PCP already refilled Humulin one time for pt to this pharmacy on 12/30/20    He is overdue for diabetic check and labs.       Geno, do you want him to be seen in person for diabetic check or virtual?      Tamika Quach, RN, BSN

## 2021-01-11 NOTE — TELEPHONE ENCOUNTER
Pt would like Suma Jenkins to send him in a prescription of Xarelto to see if it is covered. Jahaira Miranda RN

## 2021-01-12 NOTE — TELEPHONE ENCOUNTER
I called pt about another issue.  I reviewed this message with pt.  Told him about he new rx for xarelto.  He will check cost at pharmacy.  GASPER Huitron

## 2021-01-19 ENCOUNTER — TELEPHONE (OUTPATIENT)
Dept: FAMILY MEDICINE | Facility: CLINIC | Age: 66
End: 2021-01-19

## 2021-01-20 DIAGNOSIS — E87.5 HYPERKALEMIA: Primary | ICD-10-CM

## 2021-01-28 ENCOUNTER — TELEPHONE (OUTPATIENT)
Dept: FAMILY MEDICINE | Facility: CLINIC | Age: 66
End: 2021-01-28

## 2021-01-28 NOTE — TELEPHONE ENCOUNTER
Suma Jenkins, NP   1/20/2021  6:40 PM CST      Please call - his potassium level was high and his kidney function much worse than his baseline.  I want him to stop his Lisinopril and increase his fluids.  He should not be taking any NSAIDs (Advil/Aleve).  Schedule him for a lab-only appointment for one week.

## 2021-02-01 ENCOUNTER — TRANSFERRED RECORDS (OUTPATIENT)
Dept: HEALTH INFORMATION MANAGEMENT | Facility: CLINIC | Age: 66
End: 2021-02-01

## 2021-02-01 LAB — RETINOPATHY: NEGATIVE

## 2021-02-02 ENCOUNTER — TELEPHONE (OUTPATIENT)
Dept: FAMILY MEDICINE | Facility: CLINIC | Age: 66
End: 2021-02-02

## 2021-02-02 NOTE — TELEPHONE ENCOUNTER
Called and LVM for patient to call back regarding abnormal labs.    Please regard message below per Gregory.        Please call - his potassium level was high and his kidney function much worse than his baseline.  I want him to stop his Lisinopril and increase his fluids.  He should not be taking any NSAIDs (Advil/Aleve).  Schedule him for a lab-only appointment for one week.     Saima Henning MA

## 2021-02-03 NOTE — TELEPHONE ENCOUNTER
Left message on answering machine for patient to call clinic triage.  We want to review this provider message with you.  Please speak with any available triage RN.  GASPER Huitron

## 2021-02-04 ENCOUNTER — MYC MEDICAL ADVICE (OUTPATIENT)
Dept: FAMILY MEDICINE | Facility: CLINIC | Age: 66
End: 2021-02-04

## 2021-02-04 NOTE — TELEPHONE ENCOUNTER
Call received from patient and result message from DALE Jenkins CNP, reviewed with patient.    Patient verbalized understanding and in agreement with plan.    Patient already scheduled 2/10/21 lab visit.    Patient's questions regarding COVID-19 vaccination answered to the best of writer's knowledge and patient encouraged to check website frequently for updates: [email or web address]      LAZARO PerdueN, RN  [facility]

## 2021-02-10 DIAGNOSIS — E87.5 HYPERKALEMIA: ICD-10-CM

## 2021-02-10 PROCEDURE — 36415 COLL VENOUS BLD VENIPUNCTURE: CPT | Performed by: NURSE PRACTITIONER

## 2021-02-10 PROCEDURE — 80048 BASIC METABOLIC PNL TOTAL CA: CPT | Performed by: NURSE PRACTITIONER

## 2021-02-11 DIAGNOSIS — N18.32 STAGE 3B CHRONIC KIDNEY DISEASE (H): Primary | ICD-10-CM

## 2021-02-11 LAB
ANION GAP SERPL CALCULATED.3IONS-SCNC: 9 MMOL/L (ref 3–14)
BUN SERPL-MCNC: 27 MG/DL (ref 7–30)
CALCIUM SERPL-MCNC: 9.6 MG/DL (ref 8.5–10.1)
CHLORIDE SERPL-SCNC: 105 MMOL/L (ref 94–109)
CO2 SERPL-SCNC: 23 MMOL/L (ref 20–32)
CREAT SERPL-MCNC: 1.98 MG/DL (ref 0.66–1.25)
GFR SERPL CREATININE-BSD FRML MDRD: 34 ML/MIN/{1.73_M2}
GLUCOSE SERPL-MCNC: 280 MG/DL (ref 70–99)
POTASSIUM SERPL-SCNC: 4.6 MMOL/L (ref 3.4–5.3)
SODIUM SERPL-SCNC: 137 MMOL/L (ref 133–144)

## 2021-03-08 ENCOUNTER — IMMUNIZATION (OUTPATIENT)
Dept: NURSING | Facility: CLINIC | Age: 66
End: 2021-03-08
Payer: COMMERCIAL

## 2021-03-08 ENCOUNTER — OFFICE VISIT (OUTPATIENT)
Dept: FAMILY MEDICINE | Facility: CLINIC | Age: 66
End: 2021-03-08
Payer: COMMERCIAL

## 2021-03-08 VITALS
OXYGEN SATURATION: 97 % | TEMPERATURE: 97 F | BODY MASS INDEX: 31.61 KG/M2 | SYSTOLIC BLOOD PRESSURE: 128 MMHG | WEIGHT: 208.6 LBS | HEART RATE: 66 BPM | DIASTOLIC BLOOD PRESSURE: 88 MMHG | HEIGHT: 68 IN

## 2021-03-08 DIAGNOSIS — I10 HYPERTENSION GOAL BP (BLOOD PRESSURE) < 130/80: ICD-10-CM

## 2021-03-08 DIAGNOSIS — E11.22 TYPE 2 DIABETES MELLITUS WITH STAGE 3B CHRONIC KIDNEY DISEASE, WITH LONG-TERM CURRENT USE OF INSULIN (H): ICD-10-CM

## 2021-03-08 DIAGNOSIS — M50.20 HERNIATION OF CERVICAL INTERVERTEBRAL DISC: ICD-10-CM

## 2021-03-08 DIAGNOSIS — Z79.4 TYPE 2 DIABETES MELLITUS WITH STAGE 3B CHRONIC KIDNEY DISEASE, WITH LONG-TERM CURRENT USE OF INSULIN (H): ICD-10-CM

## 2021-03-08 DIAGNOSIS — F31.70 BIPOLAR AFFECTIVE DISORDER IN REMISSION (H): ICD-10-CM

## 2021-03-08 DIAGNOSIS — E78.00 HYPERCHOLESTEROLEMIA: ICD-10-CM

## 2021-03-08 DIAGNOSIS — N18.32 TYPE 2 DIABETES MELLITUS WITH STAGE 3B CHRONIC KIDNEY DISEASE, WITH LONG-TERM CURRENT USE OF INSULIN (H): ICD-10-CM

## 2021-03-08 DIAGNOSIS — I50.22 CHRONIC SYSTOLIC HEART FAILURE (H): ICD-10-CM

## 2021-03-08 DIAGNOSIS — M72.0 DUPUYTREN'S CONTRACTURE: Primary | ICD-10-CM

## 2021-03-08 DIAGNOSIS — I48.4 ATYPICAL ATRIAL FLUTTER (H): ICD-10-CM

## 2021-03-08 DIAGNOSIS — J44.9 CHRONIC OBSTRUCTIVE PULMONARY DISEASE, UNSPECIFIED COPD TYPE (H): ICD-10-CM

## 2021-03-08 DIAGNOSIS — G89.4 CHRONIC PAIN SYNDROME: ICD-10-CM

## 2021-03-08 LAB
AMPHETAMINES UR QL: NOT DETECTED NG/ML
BARBITURATES UR QL SCN: NOT DETECTED NG/ML
BENZODIAZ UR QL SCN: NOT DETECTED NG/ML
BUPRENORPHINE UR QL: NOT DETECTED NG/ML
CANNABINOIDS UR QL: ABNORMAL NG/ML
COCAINE UR QL SCN: NOT DETECTED NG/ML
D-METHAMPHET UR QL: NOT DETECTED NG/ML
METHADONE UR QL SCN: NOT DETECTED NG/ML
OPIATES UR QL SCN: NOT DETECTED NG/ML
OXYCODONE UR QL SCN: NOT DETECTED NG/ML
PCP UR QL SCN: NOT DETECTED NG/ML
PROPOXYPH UR QL: NOT DETECTED NG/ML
TRICYCLICS UR QL SCN: ABNORMAL NG/ML

## 2021-03-08 PROCEDURE — 0031A PR COVID VAC JANSSEN AD26 0.5ML: CPT

## 2021-03-08 PROCEDURE — 91303 PR COVID VAC JANSSEN AD26 0.5ML: CPT

## 2021-03-08 PROCEDURE — 80306 DRUG TEST PRSMV INSTRMNT: CPT | Performed by: NURSE PRACTITIONER

## 2021-03-08 PROCEDURE — 99214 OFFICE O/P EST MOD 30 MIN: CPT | Performed by: NURSE PRACTITIONER

## 2021-03-08 RX ORDER — OXYCODONE AND ACETAMINOPHEN 5; 325 MG/1; MG/1
1 TABLET ORAL AT BEDTIME
Qty: 30 TABLET | Refills: 0 | Status: SHIPPED | OUTPATIENT
Start: 2021-04-07 | End: 2022-06-20

## 2021-03-08 RX ORDER — METOPROLOL TARTRATE 50 MG
50 TABLET ORAL 2 TIMES DAILY
Qty: 180 TABLET | Refills: 0 | Status: SHIPPED | OUTPATIENT
Start: 2021-03-08 | End: 2021-03-08

## 2021-03-08 RX ORDER — METOPROLOL TARTRATE 50 MG
50 TABLET ORAL 2 TIMES DAILY
Qty: 180 TABLET | Refills: 1 | Status: SHIPPED | OUTPATIENT
Start: 2021-03-08 | End: 2022-02-21

## 2021-03-08 RX ORDER — ROSUVASTATIN CALCIUM 20 MG/1
20 TABLET, COATED ORAL DAILY
Qty: 90 TABLET | Refills: 3 | Status: SHIPPED | OUTPATIENT
Start: 2021-03-08 | End: 2022-03-17

## 2021-03-08 RX ORDER — OXYCODONE AND ACETAMINOPHEN 5; 325 MG/1; MG/1
1 TABLET ORAL AT BEDTIME
Qty: 30 TABLET | Refills: 0 | Status: SHIPPED | OUTPATIENT
Start: 2021-03-08 | End: 2022-06-20

## 2021-03-08 RX ORDER — OXYCODONE AND ACETAMINOPHEN 5; 325 MG/1; MG/1
1 TABLET ORAL AT BEDTIME
Qty: 30 TABLET | Refills: 0 | Status: SHIPPED | OUTPATIENT
Start: 2021-05-07 | End: 2022-06-20

## 2021-03-08 ASSESSMENT — MIFFLIN-ST. JEOR: SCORE: 1695.7

## 2021-03-08 NOTE — PROGRESS NOTES
Assessment & Plan     Dupuytren's contracture  Will refer to hand ortho.   - Orthopedic & Spine  Referral; Future    Chronic pain syndrome  Monitoring of high risk medication.   checked.  New CSA reviewed and signed.  Urine drug screen.  Refills given for 3 months.  Follow up in 3 months.   - oxyCODONE-acetaminophen (PERCOCET) 5-325 MG tablet; Take 1 tablet by mouth At Bedtime Must last 30 days  - Drug Abuse Screen Panel 13, Urine (Pain Care Package)  - oxyCODONE-acetaminophen (PERCOCET) 5-325 MG tablet; Take 1 tablet by mouth At Bedtime  - oxyCODONE-acetaminophen (PERCOCET) 5-325 MG tablet; Take 1 tablet by mouth At Bedtime    Herniation of cervical intervertebral disc  See above.   - oxyCODONE-acetaminophen (PERCOCET) 5-325 MG tablet; Take 1 tablet by mouth At Bedtime Must last 30 days    Hypertension goal BP (blood pressure) < 130/80  At goal  The current medical regimen is effective;  continue present plan and medications.   - metoprolol tartrate (LOPRESSOR) 50 MG tablet; Take 1 tablet (50 mg) by mouth 2 times daily    Hypercholesterolemia  The current medical regimen is effective;  continue present plan and medications.   - rosuvastatin (CRESTOR) 20 MG tablet; Take 1 tablet (20 mg) by mouth daily    Type 2 diabetes mellitus with stage 3b chronic kidney disease, with long-term current use of insulin (H)  At goal  - metFORMIN (GLUCOPHAGE) 500 MG tablet; TAKE 1 TABLET(500 MG) BY MOUTH TWICE DAILY WITH MEALS    Chronic obstructive pulmonary disease, unspecified COPD type (H)  stable    Chronic systolic heart failure (H)  stable    Bipolar affective disorder in remission (H)  stable    Atypical atrial flutter (H)  Stable  The current medical regimen is effective;  continue present plan and medications.                    Return in about 3 months (around 6/8/2021).    Suma Jenkins NP  [facility]    Makenna Linares is a 66 year old who presents for the following  "health issues     HPI     Musculoskeletal problem/pain  Onset/Duration: 2-3 months   Description  Location: palm  - left   Joint Swelling: YES  Redness: no but his index finger turns purple   Pain: YES  Warmth: no  Intensity:  mild  Progression of Symptoms:  worsening  Accompanying signs and symptoms:   Fevers: no  Numbness/tingling/weakness: YES  History  Trauma to the area: no  Recent illness:  no  Previous similar problem: YES  Previous evaluation:  no  Precipitating or alleviating factors:  Aggravating factors include: none  Therapies tried and outcome: advil and tylenol prn for the pain   He did have surgery for Dupuytren's contracture on the right several years ago.     His chronic neck pain is stable with oxycodone at bedtime.    His blood sugars have been controlled.    He denies any chest pain, shortness of breath, palpitations.     Mood is stable.         Review of Systems         Objective    /88   Pulse 66   Temp 97  F (36.1  C) (Temporal)   Ht 1.727 m (5' 8\")   Wt 94.6 kg (208 lb 9.6 oz)   SpO2 97%   BMI 31.72 kg/m    Body mass index is 31.72 kg/m .  Physical Exam   GENERAL: healthy, alert and no distress  NECK: no adenopathy, no asymmetry, masses, or scars and thyroid normal to palpation  RESP: lungs clear to auscultation - no rales, rhonchi or wheezes  CV: regular rate and rhythm, normal S1 S2, no S3 or S4, no murmur, click or rub, no peripheral edema and peripheral pulses strong  ABDOMEN: soft, nontender, no hepatosplenomegaly, no masses and bowel sounds normal  MS: contracture of left middle finger  PSYCH: mentation appears normal, affect normal/bright                "

## 2021-03-08 NOTE — PATIENT INSTRUCTIONS
Call [phone] to schedule an appointment with a nephrologist (kidney specialist)    Call [phone] to schedule an appointment with Dr. Leonard.

## 2021-03-08 NOTE — LETTER
[facility]  03/08/21    Patient: Hayder Alves  YOB: 1955  Medical Record Number: [id number]                                                                  Opioid / Opioid Plus Controlled Substance Agreement    I understand that my care provider has prescribed an opioid (narcotic) controlled substance to help manage my condition(s). I am taking this medicine to help me function or work. I know this is strong medicine, and that it can cause serious side effects. Opioid medicine can be sedating, addicting and may cause a dependency on the drug. They can affect my ability to drive or think, and cause depression. They need to be taken exactly as prescribed. Combining opioids with certain medicines or chemicals (such as cocaine, sedatives and tranquilizers, sleeping pills, meth) can be dangerous or even fatal. Also, if I stop opioids suddenly, I may have severe withdrawal symptoms. Last, I understand that opioids do not work for all types of pain nor for all patients. If not helpful, I may be asked to stop them.        The risks, benefits, and side effects of these medicine(s) were explained to me. I agree that:    1. I will take part in other treatments as advised by my care team. This may be psychiatry or counseling, physical therapy, behavioral therapy, group treatment or a referral to a pain clinic. I will reduce or stop my medicine when my care team tells me to do so.  2. I will take my medicines as prescribed. I will not change the dose or schedule unless my care team tells me to. There will be no refills if I  run out early.   I may be contactedwithout warning and asked to complete a urine drug test or pill count at any time.   3. I will keep all my appointments, and understand this is part of the monitoring of opioids. My care team may require an office visit for EVERY opioid/controlled substance refill. If I miss appointments or don t follow instructions, my care  team may stop my medicine.  4. I will not ask other providers to prescribe controlled substances, and I will not accept controlled substances from other people. If I need another prescribed controlled substance for a new reason, I will tell my care team within 1 business day.  5. I will use one pharmacy to fill all of my controlled substance prescriptions, and it is up to me to make sure that I do not run out of my medicines on weekends or holidays. If my care team is willing to refill my opioid prescription without a visit, I must request refills only during office hours, refills may take up to 3 days to process, and it may take up to 5 to 7 days for my medicine to be mailed and ready at my pharmacy. Prescriptions will not be mailed anywhere except my pharmacy.        [id number]  Rev 12/18         Registration to scan to EHR                             Page 1 of 2               Controlled Substance Agreement [facility]  03/08/21  Patient: Hayder Alves  YOB: 1955  Medical Record Number: [id number]                                                                  6. I am responsible for my prescriptions. If the medicine/prescription is lost or stolen, it will not be replaced. I also agree not to share controlled substance medicines with anyone.  7. I agree to not use ANY illegal or recreational drugs. This includes marijuana, cocaine, bath salts or other drugs. I agree not to use alcohol unless my care team says I may.          I agree to give urine samples whenever asked. If I don t give a urine sample, the care team may stop my medicine.    8. If I enroll in the [place] Medical Marijuana program, I will tell my care team. I will also sign an agreement to share my medical records with my care team.   9. I will bring in my list of medicines (or my medicine bottles) each time I come to the clinic.   10. I will tell my care team right away if I become pregnant  or have a new medical problem treated outside of my regular clinic.  11. I understand that this medicine can affect my thinking and judgment. It may be unsafe for me to drive, use machinery and do dangerous tasks. I will not do any of these things until I know how the medicine affects me. If my dose changes, I will wait to see how it affects me. I will contact my care team if I have concerns about medicine side effects.    I understand that if I do not follow any of the conditions above, my prescriptions or treatment may be stopped.      I agree that my provider, clinic care team, and pharmacy may work with any city, state or federal law enforcement agency that investigates the misuse, sale, or other diversion of my controlled medicine. I will allow my provider to discuss my care with or share a copy of this agreement with any other treating provider, pharmacy or emergency room where I receive care. I agree to give up (waive) any right of privacy or confidentiality with respect to these consents.     I have read this agreement and have asked questions about anything I did not understand.      ________________________________________________________________________  Patient signature - Date/Time -  Hayder Alves                                      ________________________________________________________________________  Witness signature                                                            ________________________________________________________________________  Provider signature - Suma Jenkins, NP      [id number]  Rev 12/18         Registration to scan to EHR                         Page 2 of 2                   Controlled Substance Agreement Opioid           Page 1 of 2  Opioid Pain Medicines (also known as Narcotics)  What You Need to Know    What are opioids?   Opioids are pain medicines that must be prescribed by a doctor.  They are also known as narcotics.    Examples are:     morphine (MS  Contin, Radha)    oxycodone (Oxycontin)    oxycodone and acetaminophen (Percocet)    hydrocodone and acetaminophen (Vicodin, Norco)     fentanyl patch (Duragesic)     hydromorphone (Dilaudid)     methadone     What do opioids do well?   Opioids are best for short-term pain after a surgery or injury. They also work well for cancer pain. Unlike other pain medicines, they do not cause liver or kidney failure or ulcers. They may help some people with long-lasting (chronic) pain.     What do opioids NOT do well?   Opioids never get rid of pain entirely, and they do not work well for most patients with chronic pain. Opioids do not reduce swelling, one of the causes of pain. They also don t work well for nerve pain.                           For informational purposes only.  Not to replace the advice of your care provider.  Copyright [facility]. All right reserved. [employer] [id number] 02/18.      Page 2 of 2    Risks and side effects   Talk to your doctor before you start or decide to keep taking one of these medicines. Side effects include:    Lowering your breathing rate enough to cause death    Overdose, including death, especially if taking higher than prescribed doses    Long-term opioid use    Worse depression symptoms; less pleasure in things you usually enjoy    Feeling tired or sluggish    Slower thoughts or cloudy thinking    Being more sensitive to pain over time; pain is harder to control    Trouble sleeping or restless sleep    Changes in hormone levels (for example, less testosterone)    Changes in sex drive or ability to have sex    Constipation    Unsafe driving    Itching and sweating    Feeling dizzy    Nausea, vomiting and dry mouth    What else should I know about opioids?  When someone takes opioids for too long or too often, they become dependent. This means that if you stop or reduce the medicine too quickly, you will have withdrawal symptoms.    Dependence is not the same as addiction.  Addiction is when people keep using a substance that harms their body, their mind or their relations with others. If you have a history of drug or alcohol abuse, taking opioids can cause a relapse.    Over time, opioids don t work as well. Most people will need higher and higher doses. The higher the dose, the more serious the side effects. We don t know the long-term effects of opioids.      Prescribed opioids aren't the best way to manage chronic pain    Other ways to manage pain include:      Ibuprofen or acetaminophen.  You should always try this first.      Treat health problems that may be causing pain.      acupuncture or massage, deep breathing, meditation, visual imagery, aromatherapy.      Use heat or ice at the pain site      Physical therapy and exercise      Stop smoking      See a counselor or therapist                                                  People who have used opioids for a long time may have a lower quality of life, worse depression, higher levels of pain and more visits to doctors.    Never share your opioids with others. Be sure to store opioids in a secure place, locked if possible.Young children can easily swallow them and overdose.     You can overdose on opioids.  Signs of overdose include decrease or loss of consciousness, slowed breathing, trouble waking and blue lips.  If someone is worried about overdose, they should call 911.    If you are at risk for overdose, you may get naloxone (Narcan, a medicine that reverses the effects of opioids.  If you overdose, a friend or family member can give you Narcan while waiting for the ambulance.  They need to know the signs of overdose and how to give Narcan.    While you're taking opioids:    Don't use alcohol or street drugs. Taking them together can cause death.    Don't take any of these medicines unless your doctor says its okay.  Taking these with opioids can cause death.    Benzodiazepines (such as lorazepam         or  diazepam)    Muscle relaxers (such as cyclobenzaprine)    sleeping pills    other opioids    Safe disposal of opioids  Find your area drug take-back program, your pharmacy mail-back program, buy a special disposal bag (such as Deterra) from your pharmacy or flush them down the toilet.  Use the guidelines at:  www.fda.gov/drugs/resourcesforyou

## 2021-03-21 ENCOUNTER — HEALTH MAINTENANCE LETTER (OUTPATIENT)
Age: 66
End: 2021-03-21

## 2021-03-22 NOTE — TELEPHONE ENCOUNTER
RECORDS RECEIVED FROM: [facility]   DATE RECEIVED: 06.10.2021    NOTES STATUS DETAILS   OFFICE NOTE from referring provider [facility] 02.11.2021 Suma Jenkins NP   OFFICE NOTE from other specialist  N/A    *Only VASCULITIS or LUPUS gather office notes for the following N/A    *PULMONARY   N/A    *ENT N/A    *DERMATOLOGY N/A    *RHEUMATOLOGY N/A    DISCHARGE SUMMARY from hospital N/A    DISCHARGE REPORT from the ER N/A    MEDICATION LIST Internal / CE    IMAGING  (NEED IMAGES AND REPORTS)     KIDNEY CT SCAN N/A    KIDNEY ULTRASOUND Internal 10.11.2016 US Renal Complete   MR ABDOMEN N/A    NUCLEAR MEDICINE RENAL N/A    LABS     CBC Internal 02.19.2020   CMP Internal 02.19.2020   BMP Internal 02.10.2021   UA N/A    URINE PROTEIN N/A    RENAL PANEL N/A    BIOPSY     KIDNEY BIOPSY  N/A

## 2021-03-25 ENCOUNTER — TELEPHONE (OUTPATIENT)
Dept: FAMILY MEDICINE | Facility: CLINIC | Age: 66
End: 2021-03-25

## 2021-03-25 ENCOUNTER — MYC MEDICAL ADVICE (OUTPATIENT)
Dept: FAMILY MEDICINE | Facility: CLINIC | Age: 66
End: 2021-03-25

## 2021-03-25 NOTE — TELEPHONE ENCOUNTER
"Located another form request faxed on 3/23/2021 from [facility]: Address: [facility] [place] [address].      [\"Dear Physician,  This patient has requested our assistance in obtaining the following supplies from our pharmacy mentioned in attached Rx. They have requested that we send these supplies to help overcome the discomfort they experience during their day to day activities.     Please sign the attached paperwork and fax it back to us (within 48 hours) so that we can update our records on this fax and together we may continue to treat this patient. Thank you for your time, cooperation and continued support. Your prompt response is highly appreciated.\"]      They are requesting Elbow orthosis with Joint L & R, Cervical Collar, & Wrist hand orthosis L & R        This fax also appears suspicious, will route to Suma Jenkins. Placed in her signature folder in [facility].       Aparna Wood MA     "

## 2021-03-25 NOTE — TELEPHONE ENCOUNTER
Reason for Call:  Form, our goal is to have forms completed with 72 hours, however, some forms may require a visit or additional information.    Type of letter, form or note: received suspicions fax requesting back brace from unknown company     Who is the form from?: faxed in     Where did the form come from: faxed in (no company information listed on form)     What clinic location was the form placed at?: Placed forms in Suma Jenkins's signature folder.     Where the form was placed: Given to physician folder    What number is listed as a contact on the form?: fax # listed on form: [phone]       Additional comments: I called patient and left voice mail to call back. Did patient request back brace?         Aparna Wood MA

## 2021-03-30 NOTE — TELEPHONE ENCOUNTER
Denied written on forms and faxed back     I tried calling again and left another voice mail to call clinic back. (see below)         Forms placed in Suma Jenkins's faxed folder in [facility]     Aparna Wood MA

## 2021-03-31 DIAGNOSIS — I50.22 CHRONIC SYSTOLIC HEART FAILURE (H): ICD-10-CM

## 2021-03-31 RX ORDER — FUROSEMIDE 20 MG
20 TABLET ORAL 2 TIMES DAILY
Qty: 180 TABLET | Refills: 0 | Status: SHIPPED | OUTPATIENT
Start: 2021-03-31 | End: 2021-06-28

## 2021-04-06 NOTE — TELEPHONE ENCOUNTER
I attempted to reach patient again    I left message notifying Bill that we received suspicious forms. Suma Jenkins did not fill the forms.   If He needed any forms filled out he will need to drop them off at the clinic     No further action needed at this time     Aparna Wood MA

## 2021-04-09 DIAGNOSIS — G89.4 CHRONIC PAIN SYNDROME: ICD-10-CM

## 2021-04-09 DIAGNOSIS — M50.20 HERNIATION OF CERVICAL INTERVERTEBRAL DISC: ICD-10-CM

## 2021-04-12 RX ORDER — OXYCODONE AND ACETAMINOPHEN 5; 325 MG/1; MG/1
1 TABLET ORAL AT BEDTIME
Qty: 30 TABLET | Refills: 0 | OUTPATIENT
Start: 2021-04-12

## 2021-04-12 NOTE — TELEPHONE ENCOUNTER
Message sent to pharmacy - Refusal reason: OTHER (ORDERS AT [facility] [phone] FOR 3/8, 4/7, 5/7 PT CAN MOVE?).  Fauzia JACKMAN

## 2021-05-04 ENCOUNTER — TELEPHONE (OUTPATIENT)
Dept: FAMILY MEDICINE | Facility: CLINIC | Age: 66
End: 2021-05-04

## 2021-05-04 NOTE — TELEPHONE ENCOUNTER
Reason for Call:  Form, our goal is to have forms completed with 72 hours, however, some forms may require a visit or additional information.    Type of letter, form or note:  medical    Who is the form from?: [employer] (if other please explain)    Where did the form come from: form was faxed in    What clinic location was the form placed at?: [facility]    Where the form was placed: placed in pod A providers form folder Box/Folder    What number is listed as a contact on the form?: [phone]       Additional comments: certificate of medical necessity [id number]- Pneumatic compression deivces    Call taken on 5/4/2021 at 12:29 PM by Adriana Nichols

## 2021-05-10 DIAGNOSIS — E11.42 DIABETIC POLYNEUROPATHY ASSOCIATED WITH TYPE 2 DIABETES MELLITUS (H): ICD-10-CM

## 2021-05-10 RX ORDER — GABAPENTIN 300 MG/1
CAPSULE ORAL
Qty: 180 CAPSULE | Refills: 1 | Status: SHIPPED | OUTPATIENT
Start: 2021-05-10 | End: 2022-06-20

## 2021-05-10 NOTE — TELEPHONE ENCOUNTER
Routing refill request to provider for review/approval because:  Drug not on the [facility] refill protocol       Last Written Prescription Date:  10/19/2020  Last Fill Quantity: 180,  # refills: 1   Last office visit: 3/8/2021 with prescribing provider:     Future Office Visit:   Next 5 appointments (look out 90 days)    Vicente 10, 2021  3:30 PM  Telephone Visit with Yesica Garner MD  [facility] ([facility] ) [address] [place] [id number] [phone]

## 2021-05-12 DIAGNOSIS — M50.20 HERNIATION OF CERVICAL INTERVERTEBRAL DISC: ICD-10-CM

## 2021-05-12 DIAGNOSIS — G89.4 CHRONIC PAIN SYNDROME: ICD-10-CM

## 2021-05-13 RX ORDER — OXYCODONE AND ACETAMINOPHEN 5; 325 MG/1; MG/1
1 TABLET ORAL AT BEDTIME
Qty: 30 TABLET | Refills: 0 | OUTPATIENT
Start: 2021-05-13

## 2021-05-13 NOTE — TELEPHONE ENCOUNTER
Refills available at Pharmacy: [id number] - [place], [place] - [address] AT [facility] & [address] [place] [address] Dose History

## 2021-05-18 ENCOUNTER — TELEPHONE (OUTPATIENT)
Dept: FAMILY MEDICINE | Facility: CLINIC | Age: 66
End: 2021-05-18

## 2021-05-18 DIAGNOSIS — M50.20 HERNIATION OF CERVICAL INTERVERTEBRAL DISC: ICD-10-CM

## 2021-05-18 DIAGNOSIS — G89.4 CHRONIC PAIN SYNDROME: ICD-10-CM

## 2021-05-18 NOTE — TELEPHONE ENCOUNTER
Vijay calling abut his foot pain, it started a couple days ago and is worse at night. He thinks its from his diabetes. He is wondering what he can take for the foot pain, OTC Tylenol is not helping. He has a visit scheduled with you next Monday, 5/24/2021. Is there something you can call in for him to use before his appt?    Thank you

## 2021-05-18 NOTE — TELEPHONE ENCOUNTER
He can increase his Gabapentin to 2 capsules (600 mg) at bedtime and this should help.  After looking at the , it appears that he might not be taking this.  If he has not been taking it, he can take one capsule at bedtime.

## 2021-05-18 NOTE — TELEPHONE ENCOUNTER
Left message to call back and ask to speak with an available triage nurse.  LAZARO PerdueN, RN  [facility]

## 2021-05-20 RX ORDER — OXYCODONE AND ACETAMINOPHEN 5; 325 MG/1; MG/1
1 TABLET ORAL AT BEDTIME
Qty: 30 TABLET | Refills: 0 | OUTPATIENT
Start: 2021-05-20

## 2021-05-20 NOTE — TELEPHONE ENCOUNTER
Patient is scheduled with DALE Jenkins CNP, on 5/24/21.    This medication refill can be addressed then.    CASSANDRA Perdue, RN  [facility]

## 2021-05-21 NOTE — TELEPHONE ENCOUNTER
Patient given message from Suma below.  States he has been taking the Gabapentin 1 tab twice a day so will incease to 2 tabs at bedtime and continue with ine in the morning.  Will keep appointment as scheduled on Monday 5/24/2021.  Wendy Stock RN  [facility]

## 2021-06-02 ENCOUNTER — TELEPHONE (OUTPATIENT)
Dept: FAMILY MEDICINE | Facility: CLINIC | Age: 66
End: 2021-06-02

## 2021-06-02 NOTE — TELEPHONE ENCOUNTER
Left VM for Bill stating that his visit today was virtual as his provider is doing virtual visits only today. Appt is scheduled as in-person so left message detailing that pt can continue with virtual appt today or can reschedule to an in-clinic visit today or another date that works. I also offered an urgent care visit should he need it and stated it opens at 10am. Please reschedule pt if calls back or put in notes if pt approves of today's virtual visit.     Priscilla Ponce on 6/2/2021 at 8:54 AM

## 2021-06-07 DIAGNOSIS — N18.32 STAGE 3B CHRONIC KIDNEY DISEASE (H): Primary | ICD-10-CM

## 2021-06-07 DIAGNOSIS — D63.1 ANEMIA IN CHRONIC KIDNEY DISEASE (CODE): ICD-10-CM

## 2021-06-10 ENCOUNTER — TELEPHONE (OUTPATIENT)
Dept: NEPHROLOGY | Facility: CLINIC | Age: 66
End: 2021-06-10

## 2021-06-10 ENCOUNTER — PRE VISIT (OUTPATIENT)
Dept: NEPHROLOGY | Facility: CLINIC | Age: 66
End: 2021-06-10

## 2021-06-10 NOTE — TELEPHONE ENCOUNTER
Left voicemail for patient regarding appointment today with Dr. Garner at 3:30 PM. Dr. Garner will not be available to complete telephone visit, patient will need to be rescheduled. Left direct number and main clinic's number to call back to reschedule phone visit.    Nadira Mason LPN

## 2021-06-24 DIAGNOSIS — E11.22 TYPE 2 DIABETES MELLITUS WITH STAGE 3 CHRONIC KIDNEY DISEASE, WITH LONG-TERM CURRENT USE OF INSULIN, UNSPECIFIED WHETHER STAGE 3A OR 3B CKD (H): ICD-10-CM

## 2021-06-24 DIAGNOSIS — Z79.4 TYPE 2 DIABETES MELLITUS WITH STAGE 3 CHRONIC KIDNEY DISEASE, WITH LONG-TERM CURRENT USE OF INSULIN, UNSPECIFIED WHETHER STAGE 3A OR 3B CKD (H): ICD-10-CM

## 2021-06-24 DIAGNOSIS — I50.22 CHRONIC SYSTOLIC HEART FAILURE (H): ICD-10-CM

## 2021-06-24 DIAGNOSIS — N18.30 TYPE 2 DIABETES MELLITUS WITH STAGE 3 CHRONIC KIDNEY DISEASE, WITH LONG-TERM CURRENT USE OF INSULIN, UNSPECIFIED WHETHER STAGE 3A OR 3B CKD (H): ICD-10-CM

## 2021-06-28 RX ORDER — INSULIN HUMAN 100 [IU]/ML
INJECTION, SUSPENSION SUBCUTANEOUS
Qty: 10 ML | Refills: 0 | OUTPATIENT
Start: 2021-06-28

## 2021-06-28 RX ORDER — FUROSEMIDE 20 MG
TABLET ORAL
Qty: 180 TABLET | Refills: 2 | Status: SHIPPED | OUTPATIENT
Start: 2021-06-28 | End: 2022-06-20

## 2021-06-28 NOTE — TELEPHONE ENCOUNTER
Routing refill request to provider for review/approval because:  --Labs out of range:  Scr.      --Last visit:  3/8/2021     Creatinine   Date Value Ref Range Status   02/10/2021 1.98 (H) 0.66 - 1.25 mg/dL Final   01/18/2021 2.45 (H) 0.66 - 1.25 mg/dL Final   02/19/2020 1.46 (H) 0.66 - 1.25 mg/dL Final   10/03/2019 1.65 (H) 0.66 - 1.25 mg/dL Final   08/08/2019 1.31 (H) 0.66 - 1.25 mg/dL Final         --I see Jahaira Miranda sent order for Humulin to requesting pharmacy on 6/28/21.

## 2021-07-06 DIAGNOSIS — N18.30 TYPE 2 DIABETES MELLITUS WITH STAGE 3 CHRONIC KIDNEY DISEASE, WITH LONG-TERM CURRENT USE OF INSULIN, UNSPECIFIED WHETHER STAGE 3A OR 3B CKD (H): ICD-10-CM

## 2021-07-06 DIAGNOSIS — E11.22 TYPE 2 DIABETES MELLITUS WITH STAGE 3 CHRONIC KIDNEY DISEASE, WITH LONG-TERM CURRENT USE OF INSULIN, UNSPECIFIED WHETHER STAGE 3A OR 3B CKD (H): ICD-10-CM

## 2021-07-06 DIAGNOSIS — Z79.4 TYPE 2 DIABETES MELLITUS WITH STAGE 3 CHRONIC KIDNEY DISEASE, WITH LONG-TERM CURRENT USE OF INSULIN, UNSPECIFIED WHETHER STAGE 3A OR 3B CKD (H): ICD-10-CM

## 2021-07-07 RX ORDER — INSULIN HUMAN 100 [IU]/ML
INJECTION, SUSPENSION SUBCUTANEOUS
Qty: 10 ML | Refills: 0 | Status: SHIPPED | OUTPATIENT
Start: 2021-07-07 | End: 2021-07-18

## 2021-07-14 DIAGNOSIS — Z79.4 TYPE 2 DIABETES MELLITUS WITH STAGE 3 CHRONIC KIDNEY DISEASE, WITH LONG-TERM CURRENT USE OF INSULIN, UNSPECIFIED WHETHER STAGE 3A OR 3B CKD (H): ICD-10-CM

## 2021-07-14 DIAGNOSIS — N18.30 TYPE 2 DIABETES MELLITUS WITH STAGE 3 CHRONIC KIDNEY DISEASE, WITH LONG-TERM CURRENT USE OF INSULIN, UNSPECIFIED WHETHER STAGE 3A OR 3B CKD (H): ICD-10-CM

## 2021-07-14 DIAGNOSIS — E11.22 TYPE 2 DIABETES MELLITUS WITH STAGE 3 CHRONIC KIDNEY DISEASE, WITH LONG-TERM CURRENT USE OF INSULIN, UNSPECIFIED WHETHER STAGE 3A OR 3B CKD (H): ICD-10-CM

## 2021-07-18 RX ORDER — INSULIN HUMAN 100 [IU]/ML
INJECTION, SUSPENSION SUBCUTANEOUS
Qty: 10 ML | Refills: 0 | Status: SHIPPED | OUTPATIENT
Start: 2021-07-18 | End: 2021-10-27

## 2021-07-18 NOTE — TELEPHONE ENCOUNTER
,  --Please contact patient and ask to schedule a lab-only for a six month a1c..    --Future lab order in place.    ---Prescription approved per G Refill Protocol.       Ann Pickard RN BSN     [facility]                    --Last visit:  3/8/2021     --Future Visit: NONE

## 2021-07-21 NOTE — TELEPHONE ENCOUNTER
Ep called the patient because he did not report for his COVID testing. A message was left telling him that the cardioversion had to be cancelled and will need to be rescheduled.     Multiple telephone attempts, and email attempt. Wife has same number and daughter (other emergency consult) doesn't have a number listed at all.     Lili Malik  Periop Electrophysiology   [phone]    
ambulatory

## 2021-08-10 ENCOUNTER — TELEPHONE (OUTPATIENT)
Dept: FAMILY MEDICINE | Facility: CLINIC | Age: 66
End: 2021-08-10

## 2021-08-10 NOTE — TELEPHONE ENCOUNTER
Received approval on the 3rd for diabetic supplies  Needs to know how many times a day patient uses insulin syringes.  Verbal clarification and order given.  Wendy Stock RN  [facility]

## 2021-08-11 ENCOUNTER — TELEPHONE (OUTPATIENT)
Dept: FAMILY MEDICINE | Facility: CLINIC | Age: 66
End: 2021-08-11
Payer: COMMERCIAL

## 2021-08-11 NOTE — TELEPHONE ENCOUNTER
"Call from [facility], phone is  phone  Fax [phone]  States they are waiting for \"Lidocaine and prilocaine cream for pain\"   Pharmacy states they  have already sent over the fax request , they  will send again today and also sent one week ago    Pt was last seen on 3/8/21 and he was suppose to come back in 3 months for recheck. We do not have this pharmacy on his list of meds and pharmacy stated they filled it in July of this year. This pharmacy does not come up on list of pharmacies in our system.     Pt was called directly to see if he requested this and where is this pharmacy.     Attempted to reach, unable. Left message  to call back.  Tamika Quach RN  .   "

## 2021-08-13 NOTE — TELEPHONE ENCOUNTER
PT called back.  He is not doing business with [facility]. This cream is not something he is taking.    He does want his oxycodone.  I did tell pt about his rx dated March,  April, and  May.  [facility]  PT said he didn't fill all these refills. He has been out for a few weeks.  I told pt to check with the [facility]. Are these rx still current? Are they ?    Last OV with EVA was 3/8/21.  PT really needs to keep his med refills at a consistent pharmacy. PT thought his oxycodone was getting filed at [facility] pharmacy.    GASPER Huitron

## 2021-09-05 ENCOUNTER — HEALTH MAINTENANCE LETTER (OUTPATIENT)
Age: 66
End: 2021-09-05

## 2021-11-23 ENCOUNTER — TRANSFERRED RECORDS (OUTPATIENT)
Dept: HEALTH INFORMATION MANAGEMENT | Facility: CLINIC | Age: 66
End: 2021-11-23
Payer: COMMERCIAL

## 2021-11-23 LAB — HEMOCCULT STL QL IA: NEGATIVE

## 2021-12-21 DIAGNOSIS — G47.00 INSOMNIA, UNSPECIFIED TYPE: ICD-10-CM

## 2021-12-22 RX ORDER — AMITRIPTYLINE HYDROCHLORIDE 10 MG/1
TABLET ORAL
Qty: 90 TABLET | Refills: 1 | OUTPATIENT
Start: 2021-12-22

## 2022-01-01 ENCOUNTER — OFFICE VISIT (OUTPATIENT)
Dept: PHARMACY | Facility: CLINIC | Age: 67
End: 2022-01-01
Payer: COMMERCIAL

## 2022-01-01 ENCOUNTER — LAB (OUTPATIENT)
Dept: LAB | Facility: CLINIC | Age: 67
End: 2022-01-01
Payer: COMMERCIAL

## 2022-01-01 ENCOUNTER — PATIENT OUTREACH (OUTPATIENT)
Dept: CARE COORDINATION | Facility: CLINIC | Age: 67
End: 2022-01-01

## 2022-01-01 ENCOUNTER — PATIENT OUTREACH (OUTPATIENT)
Dept: NURSING | Facility: CLINIC | Age: 67
End: 2022-01-01
Payer: COMMERCIAL

## 2022-01-01 ENCOUNTER — VIRTUAL VISIT (OUTPATIENT)
Dept: PHARMACY | Facility: CLINIC | Age: 67
End: 2022-01-01
Attending: NURSE PRACTITIONER

## 2022-01-01 ENCOUNTER — TELEPHONE (OUTPATIENT)
Dept: FAMILY MEDICINE | Facility: CLINIC | Age: 67
End: 2022-01-01

## 2022-01-01 ENCOUNTER — HEALTH MAINTENANCE LETTER (OUTPATIENT)
Age: 67
End: 2022-01-01

## 2022-01-01 VITALS
OXYGEN SATURATION: 97 % | HEART RATE: 94 BPM | DIASTOLIC BLOOD PRESSURE: 80 MMHG | SYSTOLIC BLOOD PRESSURE: 138 MMHG | WEIGHT: 186 LBS | BODY MASS INDEX: 28.28 KG/M2

## 2022-01-01 VITALS
OXYGEN SATURATION: 98 % | SYSTOLIC BLOOD PRESSURE: 150 MMHG | BODY MASS INDEX: 30.14 KG/M2 | HEART RATE: 103 BPM | WEIGHT: 198.2 LBS | DIASTOLIC BLOOD PRESSURE: 80 MMHG

## 2022-01-01 VITALS
DIASTOLIC BLOOD PRESSURE: 80 MMHG | WEIGHT: 193.2 LBS | HEART RATE: 93 BPM | BODY MASS INDEX: 29.38 KG/M2 | SYSTOLIC BLOOD PRESSURE: 138 MMHG | OXYGEN SATURATION: 97 %

## 2022-01-01 DIAGNOSIS — E11.22 TYPE 2 DIABETES MELLITUS WITH STAGE 3B CHRONIC KIDNEY DISEASE, WITH LONG-TERM CURRENT USE OF INSULIN (H): Primary | ICD-10-CM

## 2022-01-01 DIAGNOSIS — N18.32 TYPE 2 DIABETES MELLITUS WITH STAGE 3B CHRONIC KIDNEY DISEASE, WITH LONG-TERM CURRENT USE OF INSULIN (H): Primary | ICD-10-CM

## 2022-01-01 DIAGNOSIS — Z79.4 TYPE 2 DIABETES MELLITUS WITH STAGE 3B CHRONIC KIDNEY DISEASE, WITH LONG-TERM CURRENT USE OF INSULIN (H): Primary | ICD-10-CM

## 2022-01-01 DIAGNOSIS — E78.5 HYPERLIPIDEMIA LDL GOAL <100: ICD-10-CM

## 2022-01-01 DIAGNOSIS — J44.9 CHRONIC OBSTRUCTIVE PULMONARY DISEASE, UNSPECIFIED COPD TYPE (H): ICD-10-CM

## 2022-01-01 DIAGNOSIS — I10 HYPERTENSION GOAL BP (BLOOD PRESSURE) < 130/80: ICD-10-CM

## 2022-01-01 DIAGNOSIS — M79.2 PERIPHERAL NEURALGIA: ICD-10-CM

## 2022-01-01 DIAGNOSIS — E55.9 VITAMIN D DEFICIENCY: ICD-10-CM

## 2022-01-01 DIAGNOSIS — Z79.4 TYPE 2 DIABETES MELLITUS WITH STAGE 3B CHRONIC KIDNEY DISEASE, WITH LONG-TERM CURRENT USE OF INSULIN (H): ICD-10-CM

## 2022-01-01 DIAGNOSIS — E11.22 TYPE 2 DIABETES MELLITUS WITH STAGE 3B CHRONIC KIDNEY DISEASE, WITH LONG-TERM CURRENT USE OF INSULIN (H): ICD-10-CM

## 2022-01-01 DIAGNOSIS — N18.32 TYPE 2 DIABETES MELLITUS WITH STAGE 3B CHRONIC KIDNEY DISEASE, WITH LONG-TERM CURRENT USE OF INSULIN (H): ICD-10-CM

## 2022-01-01 LAB
ANION GAP SERPL CALCULATED.3IONS-SCNC: 12 MMOL/L (ref 7–15)
BUN SERPL-MCNC: 36.2 MG/DL (ref 8–23)
CALCIUM SERPL-MCNC: 8.9 MG/DL (ref 8.8–10.2)
CHLORIDE SERPL-SCNC: 100 MMOL/L (ref 98–107)
CREAT SERPL-MCNC: 2.29 MG/DL (ref 0.67–1.17)
DEPRECATED CALCIDIOL+CALCIFEROL SERPL-MC: 19 UG/L (ref 20–75)
DEPRECATED HCO3 PLAS-SCNC: 23 MMOL/L (ref 22–29)
GFR SERPL CREATININE-BSD FRML MDRD: 30 ML/MIN/1.73M2
GLUCOSE SERPL-MCNC: 383 MG/DL (ref 70–99)
HBA1C MFR BLD: 12.4 % (ref 0–5.6)
POTASSIUM SERPL-SCNC: 4.1 MMOL/L (ref 3.4–5.3)
SODIUM SERPL-SCNC: 135 MMOL/L (ref 136–145)

## 2022-01-01 PROCEDURE — 99207 PR NO CHARGE LOS: CPT | Performed by: PHARMACIST

## 2022-01-01 PROCEDURE — 80048 BASIC METABOLIC PNL TOTAL CA: CPT

## 2022-01-01 PROCEDURE — 82306 VITAMIN D 25 HYDROXY: CPT

## 2022-01-01 PROCEDURE — 36415 COLL VENOUS BLD VENIPUNCTURE: CPT

## 2022-01-01 PROCEDURE — 83036 HEMOGLOBIN GLYCOSYLATED A1C: CPT

## 2022-01-01 RX ORDER — AMLODIPINE BESYLATE 5 MG/1
5 TABLET ORAL AT BEDTIME
Qty: 90 TABLET | Refills: 1 | Status: SHIPPED | OUTPATIENT
Start: 2022-01-01 | End: 2023-01-01

## 2022-01-01 RX ORDER — PROCHLORPERAZINE 25 MG/1
SUPPOSITORY RECTAL
COMMUNITY

## 2022-01-01 RX ORDER — INSULIN HUMAN 100 [IU]/ML
INJECTION, SUSPENSION SUBCUTANEOUS
Qty: 10 ML | Refills: 5 | Status: SHIPPED | OUTPATIENT
Start: 2022-01-01 | End: 2023-01-01

## 2022-01-01 ASSESSMENT — ACTIVITIES OF DAILY LIVING (ADL): DEPENDENT_IADLS:: MEDICATION MANAGEMENT;INCONTINENCE

## 2022-02-20 DIAGNOSIS — N18.32 TYPE 2 DIABETES MELLITUS WITH STAGE 3B CHRONIC KIDNEY DISEASE, WITH LONG-TERM CURRENT USE OF INSULIN (H): ICD-10-CM

## 2022-02-20 DIAGNOSIS — E11.22 TYPE 2 DIABETES MELLITUS WITH STAGE 3B CHRONIC KIDNEY DISEASE, WITH LONG-TERM CURRENT USE OF INSULIN (H): ICD-10-CM

## 2022-02-20 DIAGNOSIS — Z79.4 TYPE 2 DIABETES MELLITUS WITH STAGE 3B CHRONIC KIDNEY DISEASE, WITH LONG-TERM CURRENT USE OF INSULIN (H): ICD-10-CM

## 2022-02-21 NOTE — TELEPHONE ENCOUNTER
Routing refill request to provider for review/approval because:  Labs out of range:  Scr, GFR, a1c.  Labs not current:  Cr, GFR,a1c.  Based on order history possible gap in taking this medication.  Has appointment tomorrow.      --Last visit:  3/8/2021     --Future Visit:   Next 5 appointments (look out 90 days)    Feb 22, 2022 10:30 AM  (Arrive by 10:10 AM)  Provider Visit with Suma Jenkins NP  [facility] ([facility] ) [address] [place] [id number] [phone]          Creatinine   Date Value Ref Range Status   02/10/2021 1.98 (H) 0.66 - 1.25 mg/dL Final   01/18/2021 2.45 (H) 0.66 - 1.25 mg/dL Final   02/19/2020 1.46 (H) 0.66 - 1.25 mg/dL Final   10/03/2019 1.65 (H) 0.66 - 1.25 mg/dL Final   08/08/2019 1.31 (H) 0.66 - 1.25 mg/dL Final       Lab Results   Component Value Date    A1C 7.1 01/18/2021    A1C 6.9 02/19/2020    A1C 9.0 08/08/2019    A1C 8.5 02/27/2019    A1C 11.3 12/13/2018       GFR Estimate   Date Value Ref Range Status   02/10/2021 34 (L) >60 mL/min/[1.73_m2] Final     Comment:     Non  GFR Calc  Starting 12/18/2018, serum creatinine based estimated GFR (eGFR) will be   calculated using the Chronic Kidney Disease Epidemiology Collaboration   (CKD-EPI) equation.       GFR Estimate If Black   Date Value Ref Range Status   02/10/2021 39 (L) >60 mL/min/[1.73_m2] Final     Comment:      GFR Calc  Starting 12/18/2018, serum creatinine based estimated GFR (eGFR) will be   calculated using the Chronic Kidney Disease Epidemiology Collaboration   (CKD-EPI) equation.

## 2022-03-03 ENCOUNTER — OFFICE VISIT (OUTPATIENT)
Dept: OTHER | Facility: CLINIC | Age: 67
End: 2022-03-03
Payer: COMMERCIAL

## 2022-03-03 VITALS
HEART RATE: 68 BPM | RESPIRATION RATE: 16 BRPM | TEMPERATURE: 97 F | BODY MASS INDEX: 31.07 KG/M2 | OXYGEN SATURATION: 99 % | DIASTOLIC BLOOD PRESSURE: 82 MMHG | HEIGHT: 68 IN | SYSTOLIC BLOOD PRESSURE: 153 MMHG | WEIGHT: 205 LBS

## 2022-03-03 DIAGNOSIS — Z00.00 ENCOUNTER FOR MEDICARE ANNUAL WELLNESS EXAM: Primary | ICD-10-CM

## 2022-03-03 PROCEDURE — G0438 PPPS, INITIAL VISIT: HCPCS | Performed by: FAMILY MEDICINE

## 2022-03-03 ASSESSMENT — PATIENT HEALTH QUESTIONNAIRE - PHQ9: SUM OF ALL RESPONSES TO PHQ QUESTIONS 1-9: 3

## 2022-03-03 ASSESSMENT — PAIN SCALES - GENERAL: PAINLEVEL: NO PAIN (0)

## 2022-03-03 ASSESSMENT — ACTIVITIES OF DAILY LIVING (ADL): CURRENT_FUNCTION: NO ASSISTANCE NEEDED

## 2022-03-03 NOTE — PATIENT INSTRUCTIONS
Patient Education   Personalized Prevention Plan  You are due for the preventive services outlined below.  Your care team is available to assist you in scheduling these services.  If you have already completed any of these items, please share that information with your care team to update in your medical record.  Health Maintenance Due   Topic Date Due     ANNUAL REVIEW OF HM ORDERS  Never done     COPD Action Plan  Never done     Zoster (Shingles) Vaccine (1 of 2) Never done     Diabetic Foot Exam  10/05/2016     Discuss Advance Care Planning  01/04/2017     AORTIC ANEURYSM SCREENING (SYSTEM ASSIGNED)  Never done     Pneumococcal Vaccine (2 of 2 - PPSV23) 02/13/2020     Kidney Microalbumin Urine Test  08/19/2020     Hemoglobin  02/19/2021     COVID-19 Vaccine (2 - Booster for Ralph series) 05/03/2021     FALL RISK ASSESSMENT  07/16/2021     A1C Lab  07/18/2021     Basic Metabolic Panel  08/10/2021     Flu Vaccine (1) 09/01/2021     Cholesterol Lab  01/18/2022     Eye Exam  02/01/2022     URINE DRUG SCREEN  03/08/2022

## 2022-03-03 NOTE — PROGRESS NOTES
"Assessment & Plan     ICD-10-CM    1. Encounter for Medicare annual wellness exam  Z00.00        Follow Up/Next Steps    Return in about 53 weeks (around 3/9/2023) for Annual Wellness Visit.  Recommended that patient follow up with PCP to address the following : Patient is overdue for COPD action plan, zoster vaccine, diabetic foot exam, aortic anrurysm screening, pnumococcal vaccine, hemoglobin, A1C, influenza vaccine, LIPID, and eye exam. Danielle states that he is due to see his PCP this month  for annual physical and will be making an appointment soon. /82, patient is on medication for high BP. Patient states that he will follow up with PCP regarding elevated BP and other chronic conditions.    Counseling and Education  Reviewed preventive health counseling, as reflected in patient instructions      BMI:   Estimated body mass index is 31.72 kg/m  as calculated from the following:    Height as of 3/8/21: 1.727 m (5' 8\").    Weight as of 3/8/21: 94.6 kg (208 lb 9.6 oz).   Weight management plan: Discussed healthy diet and exercise guidelines      Appropriate preventive services were discussed with this patient, including applicable screening as appropriate for cardiovascular disease, diabetes, osteopenia/osteoporosis, and glaucoma.  As appropriate for age/gender, discussed screening for colorectal cancer, prostate cancer, breast cancer, and cervical cancer. Checklist reviewing preventive services available has been given to the patient.    Reviewed patients plan of care and provided an AVS. The Basic Care Plan (routine screening as documented in Health Maintenance) for Hayder meets the Care Plan requirement. This Care Plan has been established and reviewed with the Patient.    Visit Provider: Abebe Tomlin RN  Supervising Provider: Angie Kirkland MD, MD  [facility]       Makenna Linares is a 67 year old who is being seen for an Annual Wellness Visit  " "accompanied by his None.    Healthy Habits:     In general, how would you rate your overall health?  Fair    Frequency of exercise:  None    Do you usually eat at least 4 servings of fruit and vegetables a day, include whole grains    & fiber and avoid regularly eating high fat or \"junk\" foods?  No    Taking medications regularly:  Yes    Medication side effects:  None    Ability to successfully perform activities of daily living:  No assistance needed    Home Safety:  No safety concerns identified    Hearing Impairment:  Difficulty understanding soft or whispered speech    In the past 6 months, have you been bothered by leaking of urine?  No    In general, how would you rate your overall mental or emotional health?  Good      PHQ-2 Total Score: 1    Additional concerns today:  No    Do you feel safe in your environment? Yes    Have you ever done Advance Care Planning? (For example, a Health Directive, POLST, or a discussion with a medical provider or your loved ones about your wishes): No, advance care planning information given to patient to review.  Patient plans to discuss their wishes with loved ones or provider.      Fall risk  Fallen 2 or more times in the past year?: No  Any fall with injury in the past year?: No  Cognitive Screening   1) Repeat 3 items (Leader, Season, Table)    2) Clock draw: NORMAL  3) 3 item recall: Recalls 2 objects   Results: NORMAL clock, 1-2 items recalled: COGNITIVE IMPAIRMENT LESS LIKELY    Mini-CogTM Copyright S [place]. Licensed by the author for use in [facility]; reprinted with permission (jason@[email or web address]). All rights reserved.      Do you have sleep apnea, excessive snoring or daytime drowsiness?: no    Reviewed and updated as needed this visit by clinical staff    Allergies  Meds  Problems             Social History     Tobacco Use     Smoking status: Former Smoker     Packs/day: 1.50     Years: 30.00     Pack years: 45.00     Types: Cigarettes     Quit date: " "2000     Years since quittin.1     Smokeless tobacco: Never Used   Substance Use Topics     Alcohol use: Yes     Comment: occasionally. 3-4 per week        Current providers sharing in care for this patient include:   Patient Care Team:  Suma Jenkins NP as PCP - General  Suma Jenkins NP as Assigned PCP  Simona Grove, RN as Specialty Care Coordinator (Clinical Cardiac Electrophysiology)  Eri Palafox MD as MD (Clinical Cardiac Electrophysiology)    The following health maintenance items are reviewed in Epic and correct as of today:  Health Maintenance Due   Topic Date Due     ANNUAL REVIEW OF HM ORDERS  Never done     COPD ACTION PLAN  Never done     ZOSTER IMMUNIZATION (1 of 2) Never done     DIABETIC FOOT EXAM  10/05/2016     ADVANCE CARE PLANNING  2017     AORTIC ANEURYSM SCREENING (SYSTEM ASSIGNED)  Never done     Pneumococcal Vaccine: 65+ Years (2 of 2 - PPSV23) 2020     MICROALBUMIN  2020     HEMOGLOBIN  2021     COVID-19 Vaccine (2 - Booster for Ralph series) 2021     FALL RISK ASSESSMENT  2021     A1C  2021     BMP  08/10/2021     INFLUENZA VACCINE (1) 2021     LIPID  2022     EYE EXAM  2022     URINE DRUG SCREEN  2022               Objective    BP (!) 162/89 (BP Location: Right arm, Patient Position: Sitting, Cuff Size: Adult Regular)   Pulse 68   Temp 97  F (36.1  C) (Temporal)   Resp 16   SpO2 99%  Estimated body mass index is 31.72 kg/m  as calculated from the following:    Height as of 3/8/21: 1.727 m (5' 8\").    Weight as of 3/8/21: 94.6 kg (208 lb 9.6 oz).  Physical Exam  Patient appears comfortable and in no acute distress.        Identified Health Risks:  "

## 2022-03-16 DIAGNOSIS — G47.00 INSOMNIA, UNSPECIFIED TYPE: ICD-10-CM

## 2022-03-16 DIAGNOSIS — E78.00 HYPERCHOLESTEROLEMIA: ICD-10-CM

## 2022-03-17 RX ORDER — ROSUVASTATIN CALCIUM 20 MG/1
20 TABLET, COATED ORAL DAILY
Qty: 90 TABLET | Refills: 0 | Status: SHIPPED | OUTPATIENT
Start: 2022-03-17 | End: 2022-06-20

## 2022-03-17 RX ORDER — AMITRIPTYLINE HYDROCHLORIDE 10 MG/1
TABLET ORAL
Qty: 90 TABLET | Refills: 0 | Status: SHIPPED | OUTPATIENT
Start: 2022-03-17 | End: 2022-04-19

## 2022-03-17 NOTE — TELEPHONE ENCOUNTER
Medication is being filled for 1 time refill only due to:  Patient needs to be seen because due for f/u with PCP.      Team Coordinators: Please contact patient to set up Hypertension follow-up [facility] PCP for any further refills.     LAZARO AlcalaN RN  [facility]

## 2022-03-22 ENCOUNTER — NURSE TRIAGE (OUTPATIENT)
Dept: NURSING | Facility: CLINIC | Age: 67
End: 2022-03-22
Payer: COMMERCIAL

## 2022-03-22 NOTE — TELEPHONE ENCOUNTER
Triage call:     Patient calling with pain and a lump in his left hand.   He states he had surgery 5-6 years ago in the right hand for similar symptoms. He does not recall what he was diagnosed with.   He calls complaining of a lump in palm of hand which makes movement difficult and causes him severe pain. He states he is barely able to open his hand completely. This has been worsening over the course of two years.   He also has a lump that he describes as a callus under his pinkey finger that makes him unable to straighten his finger completely.   He reports his fingers look crooked and deformed.   He is in severe pain.     Per protocol, patient was advised to go to office now. No appointments available at [facility]. Discussed walk in care. He verbalizes understanding and agreement with this plan and will go to [facility] as soon as possible.     Danielle Mercado RN   03/22/22 1:31 PM  [facility] Nurse Advisor      Reason for Disposition    SEVERE pain (e.g., excruciating, unable to use hand at all)    Additional Information    Negative: Similar pain previously and it was from 'heart attack'    Negative: Similar pain previously from 'angina' and not relieved by nitroglycerin    Negative: Sounds like a life-threatening emergency to the triager    Negative: Fever and red area (or area very tender to touch)    Negative: Fever and swollen joint    Negative: Patient sounds very sick or weak to the triager    Negative: Followed a hand or wrist injury    Negative: Chest pain    Negative: Caused by an animal bite    Negative: Wound looks infected    Protocols used: HAND AND WRIST PAIN-A-OH    COVID 19 Nurse Triage Plan/Patient Instructions    Please be aware that novel coronavirus (COVID-19) may be circulating in the community. If you develop symptoms such as fever, cough, or SOB or if you have concerns about the presence of another infection including coronavirus (COVID-19), please contact your  health care provider or visit [email or web address].     Disposition/Instructions    In-Person Visit with provider recommended. Reference Visit Selection Guide.    Thank you for taking steps to prevent the spread of this virus.  o Limit your contact with others.  o Wear a simple mask to cover your cough.  o Wash your hands well and often.    Resources    [facility]: About COVID-19: [email or web address]    CDC: What to Do If You're Sick: www.cdc.gov/coronavirus/2019-ncov/about/steps-when-sick.html    CDC: Ending Home Isolation: www.cdc.gov/coronavirus/2019-ncov/hcp/disposition-in-home-patients.html     CDC: Caring for Someone: www.cdc.gov/coronavirus/2019-ncov/if-you-are-sick/care-for-someone.html     [facility]: Interim Guidance for Hospital Discharge to Home: www.[email or web address].mn.us/diseases/coronavirus/hcp/hospdischarge.pdf    [facility] clinical trials (COVID-19 research studies): [email or web address]-clinical-trials     Below are the COVID-19 hotlines at the [place] Department of [employer] ([facility]). Interpreters are available.   o For health questions: Call 594-842-0379 or 1-419.982.4362 (7 a.m. to 7 p.m.)  o For questions about schools and childcare: Call 050-969-9491 or 1-587.205.8456 (7 a.m. to 7 p.m.)

## 2022-04-04 ENCOUNTER — TELEPHONE (OUTPATIENT)
Dept: FAMILY MEDICINE | Facility: CLINIC | Age: 67
End: 2022-04-04
Payer: COMMERCIAL

## 2022-04-04 NOTE — TELEPHONE ENCOUNTER
Medical Necessity form handed to triage.  Signed, but not completed info on form.      2 spots needs provider info.  Will put in EVA's folder.    Reviewed with EVA.  No company or phone # on the form.  This seems fraudulent.  Shredded form.  GASPER Huitron

## 2022-04-17 ENCOUNTER — HEALTH MAINTENANCE LETTER (OUTPATIENT)
Age: 67
End: 2022-04-17

## 2022-04-18 DIAGNOSIS — G47.00 INSOMNIA, UNSPECIFIED TYPE: ICD-10-CM

## 2022-04-19 RX ORDER — AMITRIPTYLINE HYDROCHLORIDE 10 MG/1
TABLET ORAL
Qty: 90 TABLET | Refills: 0 | Status: SHIPPED | OUTPATIENT
Start: 2022-04-19 | End: 2022-05-19

## 2022-04-19 NOTE — TELEPHONE ENCOUNTER
Geno-Please review and sign if agree.    Team Coordinators-Please call patient to schedule annual physical.    Routing refill request to provider for review/approval because:  Gabrielle given x1 and patient did not follow up, please advise    Last Written Prescription Date:  3/17/22  Last Fill Quantity: 90,  # refills: 0   Last office visit: 3/8/2021 with prescribing provider:  DALE Jenkins CNP   Future Office Visit:          Thank you!  LAZARO PerdueN, RN  [facility]

## 2022-05-18 DIAGNOSIS — G47.00 INSOMNIA, UNSPECIFIED TYPE: ICD-10-CM

## 2022-05-18 DIAGNOSIS — I10 HYPERTENSION GOAL BP (BLOOD PRESSURE) < 130/80: ICD-10-CM

## 2022-05-19 NOTE — TELEPHONE ENCOUNTER
Routing refill request to provider for review/approval because:   Blood Pressure under 140/90 in past 12 mos    BP Readings from Last 3 Encounters:   03/03/22 (!) 153/82   03/08/21 128/88   10/29/20 125/88     Upcoming visit 6/20/22    Cici Hernandez RN  [facility]

## 2022-05-21 RX ORDER — METOPROLOL TARTRATE 50 MG
50 TABLET ORAL 2 TIMES DAILY
Qty: 180 TABLET | Refills: 0 | Status: SHIPPED | OUTPATIENT
Start: 2022-05-21 | End: 2022-06-20

## 2022-05-21 RX ORDER — AMITRIPTYLINE HYDROCHLORIDE 10 MG/1
TABLET ORAL
Qty: 90 TABLET | Refills: 0 | Status: SHIPPED | OUTPATIENT
Start: 2022-05-21 | End: 2022-06-20

## 2022-06-15 ENCOUNTER — TELEPHONE (OUTPATIENT)
Dept: FAMILY MEDICINE | Facility: CLINIC | Age: 67
End: 2022-06-15
Payer: COMMERCIAL

## 2022-06-15 DIAGNOSIS — N18.30 TYPE 2 DIABETES MELLITUS WITH STAGE 3 CHRONIC KIDNEY DISEASE, WITH LONG-TERM CURRENT USE OF INSULIN, UNSPECIFIED WHETHER STAGE 3A OR 3B CKD (H): ICD-10-CM

## 2022-06-15 DIAGNOSIS — Z79.4 TYPE 2 DIABETES MELLITUS WITH STAGE 3 CHRONIC KIDNEY DISEASE, WITH LONG-TERM CURRENT USE OF INSULIN, UNSPECIFIED WHETHER STAGE 3A OR 3B CKD (H): ICD-10-CM

## 2022-06-15 DIAGNOSIS — E11.22 TYPE 2 DIABETES MELLITUS WITH STAGE 3 CHRONIC KIDNEY DISEASE, WITH LONG-TERM CURRENT USE OF INSULIN, UNSPECIFIED WHETHER STAGE 3A OR 3B CKD (H): ICD-10-CM

## 2022-06-16 NOTE — TELEPHONE ENCOUNTER
Geno-Writer is not able to pend Humulin N insulin suspension/vial to process refill.   system is forcing change to insulin NPH.  Please advise.    Thank you!  LAZARO PerdueN, RN  [facility]

## 2022-06-20 ENCOUNTER — OFFICE VISIT (OUTPATIENT)
Dept: FAMILY MEDICINE | Facility: CLINIC | Age: 67
End: 2022-06-20
Payer: COMMERCIAL

## 2022-06-20 VITALS
RESPIRATION RATE: 16 BRPM | OXYGEN SATURATION: 97 % | SYSTOLIC BLOOD PRESSURE: 128 MMHG | HEART RATE: 84 BPM | WEIGHT: 194 LBS | DIASTOLIC BLOOD PRESSURE: 78 MMHG | TEMPERATURE: 98.1 F | BODY MASS INDEX: 29.5 KG/M2

## 2022-06-20 DIAGNOSIS — I50.22 CHRONIC SYSTOLIC HEART FAILURE (H): ICD-10-CM

## 2022-06-20 DIAGNOSIS — G89.4 CHRONIC PAIN SYNDROME: ICD-10-CM

## 2022-06-20 DIAGNOSIS — M72.0 DUPUYTREN'S CONTRACTURE: ICD-10-CM

## 2022-06-20 DIAGNOSIS — F31.70 BIPOLAR AFFECTIVE DISORDER IN REMISSION (H): ICD-10-CM

## 2022-06-20 DIAGNOSIS — Z23 HIGH PRIORITY FOR 2019-NCOV VACCINE: ICD-10-CM

## 2022-06-20 DIAGNOSIS — E11.42 DIABETIC POLYNEUROPATHY ASSOCIATED WITH TYPE 2 DIABETES MELLITUS (H): ICD-10-CM

## 2022-06-20 DIAGNOSIS — J44.9 CHRONIC OBSTRUCTIVE PULMONARY DISEASE, UNSPECIFIED COPD TYPE (H): ICD-10-CM

## 2022-06-20 DIAGNOSIS — E11.22 TYPE 2 DIABETES MELLITUS WITH STAGE 3B CHRONIC KIDNEY DISEASE, WITH LONG-TERM CURRENT USE OF INSULIN (H): Primary | ICD-10-CM

## 2022-06-20 DIAGNOSIS — N18.32 TYPE 2 DIABETES MELLITUS WITH STAGE 3B CHRONIC KIDNEY DISEASE, WITH LONG-TERM CURRENT USE OF INSULIN (H): Primary | ICD-10-CM

## 2022-06-20 DIAGNOSIS — I10 HYPERTENSION GOAL BP (BLOOD PRESSURE) < 130/80: ICD-10-CM

## 2022-06-20 DIAGNOSIS — E78.00 HYPERCHOLESTEROLEMIA: ICD-10-CM

## 2022-06-20 DIAGNOSIS — Z23 NEED FOR PROPHYLACTIC VACCINATION AGAINST STREPTOCOCCUS PNEUMONIAE (PNEUMOCOCCUS): ICD-10-CM

## 2022-06-20 DIAGNOSIS — M50.20 HERNIATION OF CERVICAL INTERVERTEBRAL DISC: ICD-10-CM

## 2022-06-20 DIAGNOSIS — G47.00 INSOMNIA, UNSPECIFIED TYPE: ICD-10-CM

## 2022-06-20 DIAGNOSIS — Z79.4 TYPE 2 DIABETES MELLITUS WITH STAGE 3B CHRONIC KIDNEY DISEASE, WITH LONG-TERM CURRENT USE OF INSULIN (H): Primary | ICD-10-CM

## 2022-06-20 PROBLEM — I48.4 ATYPICAL ATRIAL FLUTTER (H): Status: RESOLVED | Noted: 2021-03-08 | Resolved: 2022-06-20

## 2022-06-20 LAB
CREAT UR-MCNC: 96 MG/DL
CREAT UR-MCNC: 98 MG/DL
HBA1C MFR BLD: 11.3 % (ref 0–5.6)
HGB BLD-MCNC: 14.8 G/DL (ref 13.3–17.7)
MICROALBUMIN UR-MCNC: 2990 MG/L
MICROALBUMIN/CREAT UR: 3051.02 MG/G CR (ref 0–17)

## 2022-06-20 PROCEDURE — 85018 HEMOGLOBIN: CPT | Performed by: NURSE PRACTITIONER

## 2022-06-20 PROCEDURE — 36415 COLL VENOUS BLD VENIPUNCTURE: CPT | Performed by: NURSE PRACTITIONER

## 2022-06-20 PROCEDURE — 80048 BASIC METABOLIC PNL TOTAL CA: CPT | Performed by: NURSE PRACTITIONER

## 2022-06-20 PROCEDURE — 91306 COVID-19,PF,MODERNA (18+ YRS BOOSTER .25ML): CPT | Performed by: NURSE PRACTITIONER

## 2022-06-20 PROCEDURE — 80061 LIPID PANEL: CPT | Performed by: NURSE PRACTITIONER

## 2022-06-20 PROCEDURE — G0009 ADMIN PNEUMOCOCCAL VACCINE: HCPCS | Performed by: NURSE PRACTITIONER

## 2022-06-20 PROCEDURE — 90677 PCV20 VACCINE IM: CPT | Performed by: NURSE PRACTITIONER

## 2022-06-20 PROCEDURE — 83036 HEMOGLOBIN GLYCOSYLATED A1C: CPT | Performed by: NURSE PRACTITIONER

## 2022-06-20 PROCEDURE — 80307 DRUG TEST PRSMV CHEM ANLYZR: CPT | Performed by: NURSE PRACTITIONER

## 2022-06-20 PROCEDURE — 0064A COVID-19,PF,MODERNA (18+ YRS BOOSTER .25ML): CPT | Performed by: NURSE PRACTITIONER

## 2022-06-20 PROCEDURE — 82043 UR ALBUMIN QUANTITATIVE: CPT | Performed by: NURSE PRACTITIONER

## 2022-06-20 PROCEDURE — 99215 OFFICE O/P EST HI 40 MIN: CPT | Mod: 25 | Performed by: NURSE PRACTITIONER

## 2022-06-20 RX ORDER — OXYCODONE AND ACETAMINOPHEN 5; 325 MG/1; MG/1
1 TABLET ORAL AT BEDTIME
Qty: 30 TABLET | Refills: 0 | Status: SHIPPED | OUTPATIENT
Start: 2022-06-20 | End: 2022-01-01

## 2022-06-20 RX ORDER — FLUTICASONE PROPIONATE AND SALMETEROL 113; 14 UG/1; UG/1
1 POWDER, METERED RESPIRATORY (INHALATION) 2 TIMES DAILY
Qty: 1 EACH | Refills: 12 | Status: SHIPPED | OUTPATIENT
Start: 2022-06-20 | End: 2022-01-01

## 2022-06-20 RX ORDER — OXYCODONE AND ACETAMINOPHEN 5; 325 MG/1; MG/1
1 TABLET ORAL AT BEDTIME
Qty: 30 TABLET | Refills: 0 | Status: SHIPPED | OUTPATIENT
Start: 2022-01-01 | End: 2022-01-01

## 2022-06-20 RX ORDER — ROSUVASTATIN CALCIUM 20 MG/1
20 TABLET, COATED ORAL DAILY
Qty: 90 TABLET | Refills: 3 | Status: SHIPPED | OUTPATIENT
Start: 2022-06-20

## 2022-06-20 RX ORDER — INSULIN HUMAN 100 [IU]/ML
INJECTION, SUSPENSION SUBCUTANEOUS
Qty: 10 ML | Refills: 3 | Status: SHIPPED | OUTPATIENT
Start: 2022-06-20 | End: 2022-01-01

## 2022-06-20 RX ORDER — AMITRIPTYLINE HYDROCHLORIDE 10 MG/1
TABLET ORAL
Qty: 90 TABLET | Refills: 3 | Status: SHIPPED | OUTPATIENT
Start: 2022-06-20 | End: 2023-01-01

## 2022-06-20 RX ORDER — CALCIUM CARB/VITAMIN D3/VIT K1 500-100-40
TABLET,CHEWABLE ORAL
Qty: 200 EACH | Refills: 1 | Status: SHIPPED | OUTPATIENT
Start: 2022-06-20

## 2022-06-20 RX ORDER — FUROSEMIDE 20 MG
TABLET ORAL
Qty: 180 TABLET | Refills: 1 | Status: SHIPPED | OUTPATIENT
Start: 2022-06-20 | End: 2023-01-01

## 2022-06-20 RX ORDER — GABAPENTIN 300 MG/1
CAPSULE ORAL
Qty: 180 CAPSULE | Refills: 1 | Status: SHIPPED | OUTPATIENT
Start: 2022-06-20 | End: 2023-01-01

## 2022-06-20 RX ORDER — OXYCODONE AND ACETAMINOPHEN 5; 325 MG/1; MG/1
1 TABLET ORAL AT BEDTIME
Qty: 30 TABLET | Refills: 0 | Status: SHIPPED | OUTPATIENT
Start: 2022-01-01 | End: 2023-01-01

## 2022-06-20 RX ORDER — METOPROLOL TARTRATE 50 MG
50 TABLET ORAL 2 TIMES DAILY
Qty: 180 TABLET | Refills: 1 | Status: SHIPPED | OUTPATIENT
Start: 2022-06-20 | End: 2023-01-01

## 2022-06-20 ASSESSMENT — ANXIETY QUESTIONNAIRES
1. FEELING NERVOUS, ANXIOUS, OR ON EDGE: SEVERAL DAYS
7. FEELING AFRAID AS IF SOMETHING AWFUL MIGHT HAPPEN: NOT AT ALL
8. IF YOU CHECKED OFF ANY PROBLEMS, HOW DIFFICULT HAVE THESE MADE IT FOR YOU TO DO YOUR WORK, TAKE CARE OF THINGS AT HOME, OR GET ALONG WITH OTHER PEOPLE?: SOMEWHAT DIFFICULT
5. BEING SO RESTLESS THAT IT IS HARD TO SIT STILL: SEVERAL DAYS
GAD7 TOTAL SCORE: 6
3. WORRYING TOO MUCH ABOUT DIFFERENT THINGS: SEVERAL DAYS
GAD7 TOTAL SCORE: 6
GAD7 TOTAL SCORE: 6
2. NOT BEING ABLE TO STOP OR CONTROL WORRYING: NOT AT ALL
6. BECOMING EASILY ANNOYED OR IRRITABLE: MORE THAN HALF THE DAYS
4. TROUBLE RELAXING: SEVERAL DAYS
7. FEELING AFRAID AS IF SOMETHING AWFUL MIGHT HAPPEN: NOT AT ALL

## 2022-06-20 ASSESSMENT — PATIENT HEALTH QUESTIONNAIRE - PHQ9
SUM OF ALL RESPONSES TO PHQ QUESTIONS 1-9: 16
10. IF YOU CHECKED OFF ANY PROBLEMS, HOW DIFFICULT HAVE THESE PROBLEMS MADE IT FOR YOU TO DO YOUR WORK, TAKE CARE OF THINGS AT HOME, OR GET ALONG WITH OTHER PEOPLE: VERY DIFFICULT
SUM OF ALL RESPONSES TO PHQ QUESTIONS 1-9: 16

## 2022-06-20 NOTE — LETTER
Opioid / Opioid Plus Controlled Substance Agreement    This is an agreement between you and your provider about the safe and appropriate use of controlled substance/opioids prescribed by your care team. Controlled substances are medicines that can cause physical and mental dependence (abuse).    There are strict laws about having and using these medicines. We here at [facility] are committing to working with you in your efforts to get better. To support you in this work, we ll help you schedule regular office appointments for medicine refills. If we must cancel or change your appointment for any reason, we ll make sure you have enough medicine to last until your next appointment.     As a Provider, I will:    Listen carefully to your concerns and treat you with respect.     Recommend a treatment plan that I believe is in your best interest. This plan may involve therapies other than opioid pain medication.     Talk with you often about the possible benefits, and the risk of harm of any medicine that we prescribe for you.     Provide a plan on how to taper (discontinue or go off) using this medicine if the decision is made to stop its use.    As a Patient, I understand that opioid(s):     Are a controlled substance prescribed by my care team to help me function or work and manage my condition(s).     Are strong medicines and can cause serious side effects such as:    Drowsiness, which can seriously affect my driving ability    A lower breathing rate, enough to cause death    Harm to my thinking ability     Depression     Abuse of and addiction to this medicine    Need to be taken exactly as prescribed. Combining opioids with certain medicines or chemicals (such as illegal drugs, sedatives, sleeping pills, and benzodiazepines) can be dangerous or even fatal. If I stop opioids suddenly, I may have severe withdrawal symptoms.    Do not work for all types of pain nor for all patients. If they re not helpful, I may  be asked to stop them.        The risks, benefits and side effects of these medicine(s) were explained to me. I agree that:  1. I will take part in other treatments as advised by my care team. This may be psychiatry or counseling, physical therapy, behavioral therapy, group treatment or a referral to a specialist.     2. I will keep all my appointments. I understand that this is part of the monitoring of opioids. My care team may require an office visit for EVERY opioid/controlled substance refill. If I miss appointments or don t follow instructions, my care team may stop my medicine.    3. I will take my medicines as prescribed. I will not change the dose or schedule unless my care team tells me to. There will be no refills if I run out early.     4. I may be asked to come to the clinic and complete a urine drug test or complete a pill count at any time. If I don t give a urine sample or participate in a pill count, the care team may stop my medicine.    5. I will only receive prescriptions from this clinic for chronic pain. If I am treated by another provider for acute pain issues, I will tell them that I am taking opioid pain medication for chronic pain and that I have a treatment agreement with this provider. I will inform my [facility] care team within one business day if I am given a prescription for any pain medication by another healthcare provider. My [facility] care team can contact other providers and pharmacists about my use of any medicines.    6. It is up to me to make sure that I don t run out of my medicines on weekends or holidays. If my care team is willing to refill my opioid prescription without a visit, I must request refills only during office hours. Refills may take up to 3 business days to process. I will use one pharmacy to fill all my opioid and other controlled substance prescriptions. I will notify the clinic about any changes to my insurance or medication  availability.    7. I am responsible for my prescriptions. If the medicine/prescription is lost, stolen or destroyed, it will not be replaced. I also agree not to share controlled substance medicines with anyone.    8. I am aware I should not use any illegal or recreational drugs. I agree not to drink alcohol unless my care team says I can.       9. If I enroll in the [place] Medical Cannabis program, I will tell my care team prior to my next refill.     10. I will tell my care team right away if I become pregnant, have a new medical problem treated outside of my regular clinic, or have a change in my medications.    11. I understand that this medicine can affect my thinking, judgment and reaction time. Alcohol and drugs affect the brain and body, which can affect the safety of my driving. Being under the influence of alcohol or drugs can affect my decision-making, behaviors, personal safety, and the safety of others. Driving while impaired (DWI) can occur if a person is driving, operating, or in physical control of a car, motorcycle, boat, snowmobile, ATV, motorbike, off-road vehicle, or any other motor vehicle ([place] Statute 169A.20). I understand the risk if I choose to drive or operate any vehicle or machinery.    I understand that if I do not follow any of the conditions above, my prescriptions or treatment may be stopped or changed.          Opioids  What You Need to Know    What are opioids?   Opioids are pain medicines that must be prescribed by a doctor. They are also known as narcotics.     Examples are:   1. morphine (MS Contin, Radha)  2. oxycodone (Oxycontin)  3. oxycodone and acetaminophen (Percocet)  4. hydrocodone and acetaminophen (Vicodin, Norco)   5. fentanyl patch (Duragesic)   6. hydromorphone (Dilaudid)   7. methadone  8. codeine (Tylenol #3)     What do opioids do well?   Opioids are best for severe short-term pain such as after a surgery or injury. They may work well for cancer pain. They may  help some people with long-lasting (chronic) pain.     What do opioids NOT do well?   Opioids never get rid of pain entirely, and they don t work well for most patients with chronic pain. Opioids don t reduce swelling, one of the causes of pain.                                    Other ways to manage chronic pain and improve function include:       Treat the health problem that may be causing pain    Anti-inflammation medicines, which reduce swelling and tenderness, such as ibuprofen (Advil, Motrin) or naproxen (Aleve)    Acetaminophen (Tylenol)    Antidepressants and anti-seizure medicines, especially for nerve pain    Topical treatments such as patches or creams    Injections or nerve blocks    Chiropractic or osteopathic treatment    Acupuncture, massage, deep breathing, meditation, visual imagery, aromatherapy    Use heat or ice at the pain site    Physical therapy     Exercise    Stop smoking    Take part in therapy       Risks and side effects     Talk to your doctor before you start or decide to keep taking opioids. Possible side effects include:      Lowering your breathing rate enough to cause death    Overdose, including death, especially if taking higher than prescribed doses    Worse depression symptoms; less pleasure in things you usually enjoy    Feeling tired or sluggish    Slower thoughts or cloudy thinking    Being more sensitive to pain over time; pain is harder to control    Trouble sleeping or restless sleep    Changes in hormone levels (for example, less testosterone)    Changes in sex drive or ability to have sex    Constipation    Unsafe driving    Itching and sweating    Dizziness    Nausea, throwing up and dry mouth    What else should I know about opioids?    Opioids may lead to dependence, tolerance, or addiction.      Dependence means that if you stop or reduce the medicine too quickly, you will have withdrawal symptoms. These include loose poop (diarrhea), jitters, flu-like symptoms,  nervousness and tremors. Dependence is not the same as addiction.                       Tolerance means needing higher doses over time to get the same effect. This may increase the chance of serious side effects.      Addiction is when people improperly use a substance that harms their body, their mind or their relations with others. Use of opiates can cause a relapse of addiction if you have a history of drug or alcohol abuse.      People who have used opioids for a long time may have a lower quality of life, worse depression, higher levels of pain and more visits to doctors.    You can overdose on opioids. Take these steps to lower your risk of overdose:    1. Recognize the signs:  Signs of overdose include decrease or loss of consciousness (blackout), slowed breathing, trouble waking up and blue lips. If someone is worried about overdose, they should call 911.    2. Talk to your doctor about Narcan (naloxone).   If you are at risk for overdose, you may be given a prescription for Narcan. This medicine very quickly reverses the effects of opioids.   If you overdose, a friend or family member can give you Narcan while waiting for the ambulance. They need to know the signs of overdose and how to give Narcan.     3. Don't use alcohol or street drugs.   Taking them with opioids can cause death.    4. Do not take any of these medicines unless your doctor says it s OK. Taking these with opioids can cause death:    Benzodiazepines, such as lorazepam (Ativan), alprazolam (Xanax) or diazepam (Valium)    Muscle relaxers, such as cyclobenzaprine (Flexeril)    Sleeping pills like zolpidem (Ambien)     Other opioids      How to keep you and other people safe while taking opioids:    1. Never share your opioids with others.  Opioid medicines are regulated by the Drug Enforcement Agency ([place]). Selling or sharing medications is a criminal act.    2. Be sure to store opioids in a secure place, locked up if possible. Young children  can easily swallow them and overdose.    3. When you are traveling with your medicines, keep them in the original bottles. If you use a pill box, be sure you also carry a copy of your medicine list from your clinic or pharmacy.    4. Safe disposal of opioids    Most pharmacies have places to get rid of medicine, called disposal kiosks. Medicine disposal options are also available in every [facility]. Search your county and  medication disposal  to find more options. You can find more details at:  [email or web address]./living-green/managing-unwanted-medications     I agree that my provider, clinic care team, and pharmacy may work with any city, state or federal law enforcement agency that investigates the misuse, sale, or other diversion of my controlled medicine. I will allow my provider to discuss my care with, or share a copy of, this agreement with any other treating provider, pharmacy or emergency room where I receive care.    I have read this agreement and have asked questions about anything I did not understand.    _______________________________________________________  Patient Signature - Hayder Alves _____________________                   Date     _______________________________________________________  Provider Signature - Suma Jenkins NP   _____________________                   Date     _______________________________________________________  Witness Signature (required if provider not present while patient signing)   _____________________                   Date

## 2022-06-20 NOTE — PATIENT INSTRUCTIONS
Preparing for Your Surgery  Getting started  A nurse will call you to review your health history and instructions. They will give you an arrival time based on your scheduled surgery time. Please be ready to share:    Your doctor's clinic name and phone number    Your medical, surgical and anesthesia history    A list of allergies and sensitivities    A list of medicines, including herbal treatments and over-the-counter drugs    Whether the patient has a legal guardian (ask how to send us the papers in advance)  Please tell us if you're pregnant--or if there's any chance you might be pregnant. Some surgeries may injure a fetus (unborn baby), so they require a pregnancy test. Surgeries that are safe for a fetus don't always need a test, and you can choose whether to have one.   If you have a child who's having surgery, please ask for a copy of Preparing for Your Child's Surgery.    Preparing for surgery    Within 30 days of surgery: Have a pre-op exam (sometimes called an H&P, or History and Physical). This can be done at a clinic or pre-operative center.  ? If you're having a , you may not need this exam. Talk to your care team.    At your pre-op exam, talk to your care team about all medicines you take. If you need to stop any medicines before surgery, ask when to start taking them again.  ? We do this for your safety. Many medicines can make you bleed too much during surgery. Some change how well surgery (anesthesia) drugs work.    Call your insurance company to let them know you're having surgery. (If you don't have insurance, call [phone].)    Call your clinic if there's any change in your health. This includes signs of a cold or flu (sore throat, runny nose, cough, rash, fever). It also includes a scrape or scratch near the surgery site.    If you have questions on the day of surgery, call your hospital or surgery center.  COVID testing  You may need to be tested for COVID-19 before having  surgery. If so, we will give you instructions.  Eating and drinking guidelines  For your safety: Unless your surgeon tells you otherwise, follow the guidelines below.    Eat and drink as usual until 8 hours before surgery. After that, no food or milk.    Drink clear liquids until 2 hours before surgery. These are liquids you can see through, like water, Gatorade and Propel Water. You may also have black coffee and tea (no cream or milk).    Nothing by mouth within 2 hours of surgery. This includes gum, candy and breath mints.    If you drink alcohol: Stop drinking it the night before surgery.    If your care team tells you to take medicine on the morning of surgery, it's okay to take it with a sip of water.  Preventing infection    Shower or bathe the night before and morning of your surgery. Follow the instructions your clinic gave you. (If no instructions, use regular soap.)    Don't shave or clip hair near your surgery site. We'll remove the hair if needed.    Don't smoke or vape the morning of surgery. You may chew nicotine gum up to 2 hours before surgery. A nicotine patch is okay.  ? Note: Some surgeries require you to completely quit smoking and nicotine. Check with your surgeon.    Your care team will make every effort to keep you safe from infection. We will:  ? Clean our hands often with soap and water (or an alcohol-based hand rub).  ? Clean the skin at your surgery site with a special soap that kills germs.  ? Give you a special gown to keep you warm. (Cold raises the risk of infection.)  ? Wear special hair covers, masks, gowns and gloves during surgery.  ? Give antibiotic medicine, if prescribed. Not all surgeries need antibiotics.  What to bring on the day of surgery    Photo ID and insurance card    Copy of your health care directive, if you have one    Glasses and hearing aides (bring cases)  ? You can't wear contacts during surgery    Inhaler and eye drops, if you use them (tell us about these when  you arrive)    CPAP machine or breathing device, if you use them    A few personal items, if spending the night    If you have . . .  ? A pacemaker, ICD (cardiac defibrillator) or other implant: Bring the ID card.  ? An implanted stimulator: Bring the remote control.  ? A legal guardian: Bring a copy of the certified (court-stamped) guardianship papers.  Please remove any jewelry, including body piercings. Leave jewelry and other valuables at home.  If you're going home the day of surgery    You must have a responsible adult drive you home. They should stay with you overnight as well.    If you don't have someone to stay with you, and you aren't safe to go home alone, we may keep you overnight. Insurance often won't pay for this.  After surgery  If it's hard to control your pain or you need more pain medicine, please call your surgeon's office.  Questions?   If you have any questions for your care team, list them here: _________________________________________________________________________________________________________________________________________________________________________ ____________________________________ ____________________________________ ____________________________________  For informational purposes only. Not to replace the advice of your health care provider. Copyright   2003, 2019 [facility]. All rights reserved. Clinically reviewed by Rekha Ludwig MD. [employer] [id number] - REV 07/21.

## 2022-06-20 NOTE — PROGRESS NOTES
"  Assessment & Plan      (E11.22,  N18.32,  Z79.4) Type 2 diabetes mellitus with stage 3b chronic kidney disease, with long-term current use of insulin (H)  (primary encounter diagnosis)  Comment: not at goal  Plan: Albumin Random Urine Quantitative with Creat         Ratio, Hemoglobin, BASIC METABOLIC PANEL, Lipid        panel reflex to direct LDL Fasting, Hemoglobin         A1c, Adult Eye Referral, insulin NPH (HUMULIN N        VIAL) 100 UNIT/ML vial, insulin syringe 31G X         5/16\" 1 ML MISC, metFORMIN (GLUCOPHAGE) 500 MG         tablet, AMB Adult Diabetes Educator Referral        Will refer to diabetes educator.     (G89.4) Chronic pain syndrome  Comment: stable  Plan: [id number] - Urine Drug Confirmation Panel         (Comprehensive), oxyCODONE-acetaminophen         (PERCOCET) 5-325 MG tablet,         oxyCODONE-acetaminophen (PERCOCET) 5-325 MG         tablet, oxyCODONE-acetaminophen (PERCOCET)         5-325 MG tablet         checked.  New CSA reviewed and signed.  Will check UDS.  Refills given for 3 months.  Follow up in person in 3 months.     (M50.20) Herniation of cervical intervertebral disc  Comment:   Plan: oxyCODONE-acetaminophen (PERCOCET) 5-325 MG         tablet        See above.     (E78.00) Hypercholesterolemia  Comment: stable  Plan: rosuvastatin (CRESTOR) 20 MG tablet        The current medical regimen is effective;  continue present plan and medications.     (I10) Hypertension goal BP (blood pressure) < 130/80  Comment: at goal  Plan: metoprolol tartrate (LOPRESSOR) 50 MG tablet        The current medical regimen is effective;  continue present plan and medications.     (G47.00) Insomnia, unspecified type  Comment: stable  Plan: amitriptyline (ELAVIL) 10 MG tablet        The current medical regimen is effective;  continue present plan and medications.     (E11.42) Diabetic polyneuropathy associated with type 2 diabetes mellitus (H)  Comment: stable  Plan: gabapentin (NEURONTIN) 300 MG " capsule        The current medical regimen is effective;  continue present plan and medications.     (I50.22) Chronic systolic heart failure (H)  Comment: stable  Plan: furosemide (LASIX) 20 MG tablet, Adult         Cardiology Eval  Referral        The current medical regimen is effective;  continue present plan and medications.  He is overdue for cardiology follow up - referral placed to [facility].     (M72.0) Dupuytren's contracture  Comment:   Plan: Orthopedic  Referral        Referral for hand surgeon given.     (J44.9) Chronic obstructive pulmonary disease, unspecified COPD type (H)  Comment: stable  Plan: fluticasone-salmeterol (AIRDUO RESPICLICK)         113-14 MCG/ACT inhaler, DISCONTINUED:         Fluticasone-Umeclidin-Vilanterol (TRELEGY         ELLIPTA) 100-62.5-25 MCG/INH oral inhaler        Trelegy too expensive, will try Advair.     (Z23) Need for prophylactic vaccination against Streptococcus pneumoniae (pneumococcus)  Comment:   Plan: Pneumococcal 20 Valent Conjugate (PCV20)            (Z23) High priority for 2019-nCoV vaccine  Comment:   Plan: COVID-19,PF,MODERNA (18+ Yrs BOOSTER .25mL)            (F31.70) Bipolar affective disorder in remission (H)  Comment:   Plan: Adult Mental Health  Referral        Will refer to psychiatry.         60 minutes spent on the date of the encounter doing chart review, patient visit and documentation        Depression Screening Follow Up    PHQ 6/20/2022   PHQ-9 Total Score 16   Q9: Thoughts of better off dead/self-harm past 2 weeks Nearly every day   F/U: Thoughts of suicide or self-harm No   F/U: Safety concerns No             He denies any active SI, intent or plan.   Return in about 3 months (around 9/20/2022).    Suma Jenkins NP  [facility]    Makenna Linares is a 67 year old, presenting for the following health issues:  Medication Follow-up, Referral (Ortho for hand surgery), and Imm/Inj (COVID-19  VACCINE)      History of Present Illness       COPD:  He presents for follow up of COPD.  Overall, COPD symptoms are slightly worse since last visit. He has more than usual fatigue or shortness of breath with exertion and same as usual shortness of breath at rest.  He rarely coughs and does not have change in sputum. No recent fever. He can walk the length of 3-5 rooms without stopping to rest. He can walk 3 or more flights of stairs without resting.The patient has had no ED, urgent care, or hospital admissions because of COPD since the last visit.     Mental Health Follow-up:  Patient presents to follow-up on Depression & Anxiety.Patient's depression since last visit has been:  Worse  The patient is having other symptoms associated with depression.  Patient's anxiety since last visit has been:  Bad  The patient is having other symptoms associated with anxiety.  Any significant life events: grief or loss and health concerns  Patient is not feeling anxious or having panic attacks.  Patient has no concerns about alcohol or drug use.    Diabetes:   He presents for follow up of diabetes.  He is checking home blood glucose a few times a week. He checks blood glucose at bedtime.  Blood glucose is never over 200 and never under 70. He is aware of hypoglycemia symptoms including shakiness and dizziness. He has no concerns regarding his diabetes at this time.  He is having numbness in feet and burning in feet. The patient has not had a diabetic eye exam in the last 12 months.         He eats 2-3 servings of fruits and vegetables daily.He consumes 2 sweetened beverage(s) daily.He exercises with enough effort to increase his heart rate 20 to 29 minutes per day.  He exercises with enough effort to increase his heart rate 5 days per week.   He is taking medications regularly.    Today's PHQ-9         PHQ-9 Total Score: 16    PHQ-9 Q9 Thoughts of better off dead/self-harm past 2 weeks :   Nearly every day  Thoughts of suicide or  self harm: (P) No  Self-harm Plan:     Self-harm Action:       Safety concerns for self or others: (P) No    How difficult have these problems made it for you to do your work, take care of things at home, or get along with other people: Very difficult  Today's MIRIAM-7 Score: 6     CSA -- Encounter Level on 10/05/2015:    Controlled Substance Agreement - Scan on 10/7/2015 10:21 AM: CONTROLLED SUBSTANCE AGREEMENT     CSA -- Patient Level:    Controlled Substance Agreement - Opioid - Scan on 3/9/2021  6:43 AM  Controlled Substance Agreement - Opioid - Scan on 2/19/2020  2:11 PM          His chronic feet and hand pain is unchanged.  He takes one oxycodone at bedtime.    He has a contracture right hand.  He did have surgery on his left hand for Dupuytren's contracture in the past.           Review of Systems         Objective    /78   Pulse 84   Temp 98.1  F (36.7  C) (Temporal)   Resp 16   Wt 88 kg (194 lb)   SpO2 97%   BMI 29.50 kg/m    Body mass index is 29.5 kg/m .  Physical Exam   GENERAL: healthy, alert and no distress  NECK: no adenopathy, no asymmetry, masses, or scars and thyroid normal to palpation  RESP: lungs clear to auscultation - no rales, rhonchi or wheezes  CV: regular rate and rhythm, normal S1 S2, no S3 or S4, no murmur, click or rub, no peripheral edema and peripheral pulses strong  ABDOMEN: soft, nontender, no hepatosplenomegaly, no masses and bowel sounds normal  MS: contracture right palm  PSYCH: mentation appears normal, affect normal/bright  Diabetic foot exam: normal DP and PT pulses, no trophic changes or ulcerative lesions and normal sensory exam                    .  ..

## 2022-06-22 ENCOUNTER — TELEPHONE (OUTPATIENT)
Dept: FAMILY MEDICINE | Facility: CLINIC | Age: 67
End: 2022-06-22

## 2022-06-22 LAB
ANION GAP SERPL CALCULATED.3IONS-SCNC: 9 MMOL/L (ref 3–14)
BUN SERPL-MCNC: 24 MG/DL (ref 7–30)
CALCIUM SERPL-MCNC: 8.6 MG/DL (ref 8.5–10.1)
CHLORIDE BLD-SCNC: 101 MMOL/L (ref 94–109)
CHOLEST SERPL-MCNC: 170 MG/DL
CO2 SERPL-SCNC: 25 MMOL/L (ref 20–32)
CREAT SERPL-MCNC: 1.98 MG/DL (ref 0.66–1.25)
FASTING STATUS PATIENT QL REPORTED: NO
GABAPENTIN UR QL CFM: PRESENT
GFR SERPL CREATININE-BSD FRML MDRD: 36 ML/MIN/1.73M2
GLUCOSE BLD-MCNC: 417 MG/DL (ref 70–99)
HDLC SERPL-MCNC: 38 MG/DL
LDLC SERPL CALC-MCNC: 61 MG/DL
NONHDLC SERPL-MCNC: 132 MG/DL
POTASSIUM BLD-SCNC: 4.2 MMOL/L (ref 3.4–5.3)
SODIUM SERPL-SCNC: 135 MMOL/L (ref 133–144)
TRIGL SERPL-MCNC: 354 MG/DL

## 2022-06-22 NOTE — TELEPHONE ENCOUNTER
Diabetes Education Scheduling Outreach #1:    Call to patient to schedule. Voicemail full.    Also sent Zoom message for second attempt. Requested patient to call to schedule.    Cici Salmon OnCall  Diabetes and Nutrition Scheduling

## 2022-07-08 ENCOUNTER — DOCUMENTATION ONLY (OUTPATIENT)
Dept: OTHER | Facility: CLINIC | Age: 67
End: 2022-07-08

## 2022-08-03 NOTE — PROGRESS NOTES
Left message on primary phone to confirm appt with Dr. Garner on 06/10/21 and to schedule lab appt.    English

## 2022-09-30 NOTE — PROGRESS NOTES
Clinic Care Coordination Contact  [facility]/Voicemail    Clinical Data: Care Coordinator Outreach  Outreach attempted x 1.  Left message on patient's voicemail with call back information and requested return call.    Plan: Care Coordinator will try to reach patient again in 1-2 business days.    Jemma Calloway CHW, B.A. [facility] Care Coordination  [facility]:   [facility]  [phone]

## 2022-10-03 NOTE — PROGRESS NOTES
Clinic Care Coordination Contact  [facility]/Voicemail    Clinical Data: Care Coordinator Outreach  Outreach attempted x 2.  Left message on patient's voicemail with call back information and requested return call.    Plan: Care Coordinator will send care coordination introduction letter with care coordinator contact information and explanation of care coordination services via [employer]. Care Coordinator will do no further outreaches at this time.    Jemma Calloway, CHW, B.A. [facility] Care Coordination  [facility]:   [facility]  [phone]

## 2022-10-03 NOTE — LETTER
[facility] CARE COORDINATION  [address] [place] [address]  [facility] [place] [address]  October 3, 2022    Hayder Alves  [address] [address]      Dear Hayder,        I am a clinic care coordinator who works with Suma Jenkins, NP with the [facility]. I recently tried to call and was unable to reach you. Below is a description of clinic care coordination and how I can further assist you.       The clinic care coordination team is made up of a registered nurse, , financial resource worker and community health worker who understand the health care system. The goal of clinic care coordination is to help you manage your health and improve access to the health care system. Our team works alongside your provider to assist you in determining your health and social needs. We can help you obtain health care and community resources, providing you with necessary information and education. We can work with you through any barriers and develop a care plan that helps coordinate and strengthen the communication between you and your care team.    Please feel free to contact me with any questions or concerns regarding care coordination and what we can offer.      We are focused on providing you with the highest-quality healthcare experience possible.    Sincerely,     Jemma Calloway, CHW, B.A. [facility] Care Coordination  [facility]:   [facility]  [phone]

## 2022-10-13 NOTE — PROGRESS NOTES
Clinic Care Coordination Contact  Community Health Worker Initial Outreach    CHW Initial Information Gathering:  Referral Source: PCP  Current living arrangement:: I live in a private home with spouse  Supplies Currently Used at Home: None  Equipment Currently Used at Home: none  Informal Support system:: Spouse  Transportation means:: Regular car  CHW Additional Questions  If ED/Hospital discharge, follow-up appointment scheduled as recommended?: N/A  Medication changes made following ED/Hospital discharge?: N/A  MyChart active?: Yes  Patient sent Social Determinants of Health questionnaire?: Yes    Patient accepts CC: Yes. Patient scheduled for assessment with [facility]NA Peoples on 10/17/22 at 10am. Patient noted desire to discuss in home care and obtaining bathroom equipment to prevent falls.     CHW notes:    CHW spoke to patient's wife, Lilia. Lilia states that her and the patient have been having trouble accessing MyChart because the patient doesn't remember his password. CHW assisted Lilia and walked her through resetting the patient's password. Lilia was able to log in.    Lilia states that they are interested in getting in home help for the patient to get on track with his medication. Lilia stats that they would also like to get bathroom equipment to prevent falls from happening.    NICOLE De Jesus, B.A. [facility]  Clinic Care Coordination  [facility]:   [facility]  [phone]

## 2022-10-17 NOTE — LETTER
[facility] CARE COORDINATION  [address] [address]  [facility] [address]  October 17, 2022    Hayder Alves  [address] [address]      Dear Hayder,        I am a clinic care coordinator who works with Suma Jenkins, NP with the [facility]. I wanted to thank you for spending the time to talk with me.  Below is a description of clinic care coordination and how I can further assist you.       The clinic care coordination team is made up of a registered nurse, , financial resource worker and community health worker who understand the health care system. The goal of clinic care coordination is to help you manage your health and improve access to the health care system. Our team works alongside your provider to assist you in determining your health and social needs. We can help you obtain health care and community resources, providing you with necessary information and education. We can work with you through any barriers and develop a care plan that helps coordinate and strengthen the communication between you and your care team.    Please feel free to contact me with any questions or concerns regarding care coordination and what we can offer.      We are focused on providing you with the highest-quality healthcare experience possible.    Below are the resources we discussed    Senior Linkage Line  [phone]    [facility]  [phone]    I spoke with Suma Jenkins and she recommended a Medication Therapy Management consult.     [facility]  [phone] or [phone] (toll free), or e-mail us at [email or web address].      During a private meeting, your [facility] pharmacist will review your medicines and answer any questions you may have. You'll get a list of all your medicines, with detailed advice on how to get the most out of them. Your [facility] pharmacist will be available to help you throughout your treatment.       An [facility] pharmacist will  help make sure that:    You are taking the right amount of medicine.    Your medicines are not interacting with each other.    You are taking only the medicines you need.    Any vitamins, supplements and over-the-counter drugs you are taking are safe and effective.    You are spending no more than you need for your medications.    Sincerely,     Richelle De La Garza [facility] - Clinic Care Coordination  [phone]      Enclosed: I have enclosed a copy of the Patient Centered Plan of Care. This has helpful information and goals that we have talked about. Please keep this in an easy to access place to use as needed.

## 2022-10-17 NOTE — LETTER
[facility]  Patient Centered Plan of Care  About Me:        Patient Name:  Hayder Moore    YOB: 1955  Age:         67 year old   Luis Antonio MRN:    [id number] Telephone Information:  Home Phone [phone]   Mobile [phone]       Address:  [address] [address] Email address:  [email or web address]      Emergency Contact(s)    Name Relationship Lgl Grd Work Phone Home Phone Mobile Phone   1. TYSON MOORE  No   [phone]   2. AMIRA MOORE Spouse No  [phone]   3. RUPERTO MOORE* Daughter No  [phone]    4. GAGE MOORE Son   [phone]            Primary language:  English     needed? No   [facility]:  [phone] op. 1  Other communication barriers:Lack of coping    Preferred Method of Communication:  Phone  Current living arrangement: I live in a private home with family    Mobility Status/ Medical Equipment: Independent        Health Maintenance  Health Maintenance Reviewed: No data recorded    My Access Plan  Medical Emergency 911   Primary Clinic Line [facility] - [phone]   24 Hour Appointment Line [phone] or  [phone]) (toll-free)   24 Hour Nurse Line [phone] (toll-free)   Preferred Urgent Care [facility], [phone]     Preferred Hospital [facility]  [phone]     Preferred Pharmacy [facility] - [place], [place] - [address]     Behavioral Health Crisis Line The National Suicide Prevention Lifeline at 1-770.868.8952 or Text/Call 838             My Care Team Members  Patient Care Team       Relationship Specialty Notifications Start End    Suma Jenkins, NP PCP - General   5/26/05     Phone: [phone] Fax: [phone]         [address] [facility] [address]    Suma Jenkins NP Assigned PCP   10/4/12     Phone: [phone]  Fax: [phone]         [address] [place] [facility] [address]    Simona Grove, RN Specialty Care Coordinator Clinical Cardiac Electrophysiology Admissions 7/27/20     Eri Palafox MD MD Clinical Cardiac Electrophysiology  7/27/20     Phone: [phone] Fax: [phone] [address] [facility] [address]    Jemma Calloway MA Community Health Worker Primary Care - CC Admissions 10/13/22     Richelle De La Garza Lead Care Coordinator  Admissions 10/17/22             My Care Plans  Self Management and Treatment Plan  Care Plan  Care Plan: Help At Home     Problem: Insufficient In-home support     Goal: Establish adequate home support     Start Date: 10/17/2022 Expected End Date: 1/17/2023    Note:     Barriers: lack of access to [facility] services  Strengths: good family support  Patient expressed understanding of goal: Yes  Action steps to achieve this goal:  1. I will reach out to Senior Linkage Line regarding benefits and SNF/Assisted Living Options  2. I will obtain equipment to help me be safe at home and reduce the risk of falling  3. I will keep in touch with care coordination                         Action Plans on File:                       Advance Care Plans/Directives Type:   No data recorded    My Medical and Care Information  Problem List   Patient Active Problem List   Diagnosis     Anxiety     Bipolar affective disorder in remission (H)     Allergic rhinitis     Cervicalgia     Type 2 diabetes mellitus with stage 3b chronic kidney disease, with long-term current use of insulin (H)     HYPERLIPIDEMIA LDL GOAL <100     Insomnia     Hypertension goal BP (blood pressure) < 130/80     COPD (chronic obstructive pulmonary disease) (H)     GERD (gastroesophageal reflux disease)     Peripheral neuralgia     Microalbuminuria     Vitamin B12 deficiency without anemia     Vitamin D deficiency     Coronary artery disease involving coronary bypass graft of native heart without angina pectoris     Chronic  pain     Ischemic cardiomyopathy     Chronic systolic heart failure (H)     Health Care Home     Diabetic polyneuropathy associated with type 2 diabetes mellitus (H)     CKD (chronic kidney disease) stage 3, GFR 30-59 ml/min (H)      Current Medications and Allergies:  See printed Medication Report.    Care Coordination Start Date: 10/13/2022   Frequency of Care Coordination: monthly     Form Last Updated: 10/17/2022

## 2022-10-23 NOTE — PROGRESS NOTES
Medication Therapy Management (MTM) Encounter    ASSESSMENT:                            Medication Adherence/Access: No issues identified    Type 2 Diabetes: Patient is not meeting A1c goal of < 8%. Self monitoring of blood glucose is not at goal of fasting  mg/dL and post prandial < 180 mg/dL. Patient is not meeting average glucose goal of <150mg/dL. Patient is not meeting goal of >70% time in target with continuous glucose monitoring.  Patient would benefit from ideal SMBG: Check blood sugars pre-prandial, 2 hours post-prandial, and with symptoms of hypoglycemia, Basal Insulin (NPH) :  stay on the same dose., Metformin :  stay on the same dose., Increased exercise and Increase in home glucose monitoring.    MTM ordered him dexcom G6 supples via [employer] supplier today --he is to bring all supplies in 3 weeks to next mtm appt. For education and applying first sensor.     DPNP:  Needs improvement in blood sugars for pain to be better controlled.     Hyperlipidemia: Stable.  Patient is on high intensity statin which is indicated based on 2019 ACC/AHA guidelines for lipid management.        Hypertension: Stable. Patient is meeting blood pressure goal of < 140/90mmHg. Patient would benefit from the following changes - continued blood pressure monitoring, watching diet, increasing exercise and weight loss and limiting/reducing sodium.    COPD: Stable  Patient would benefit from no changes at this time      PLAN:                              1. FYI--I checked with your insurance and I will be sending rx to [facility] in [place] --If your insurance approves it (almost 100% sure they will ) you will receive  the following: please bring all these supplies with you to your appt. With me on Monday November 14th -2022. At 11;30am.           2. For now continue all medications as is --we will adjust your diabetes medications once we see where your blood sugars are at.           Follow-up: Return in  about 3 weeks (around 2022), or @ 11:30 AM, for Medication Therapy Management Visit, A1c lab.          SUBJECTIVE/OBJECTIVE:                          Hayder Alves is a 67 year old male called for an initial visit. He was referred to me from Suma Jenkins       Wife Alfonso on call today!    Reason for visit: med review.    Fell last night getting out of tub.  Has fallen a few times.         Allergies/ADRs: Reviewed in chart  Past Medical History: Reviewed in chart; could not afford xarelto or eliquis.  Tobacco: He reports that he quit smoking about 22 years ago. His smoking use included cigarettes. He has a 45.00 pack-year smoking history. He has never used smokeless tobacco.   Exposed to second hand smoke.   Alcohol: 1-3 beverages / week  Caffeine: red bull x 1-2 day.   Social: Lives with alfonso -wife.   Personal Healthcare Goals: control DM, fix dpnp pain.   Activity:  None, wants to join gym but DL  cannot drive.     Medication Adherence/Access: no issues reported    Type 2 Diabetes:  Currently taking : NPH  Insulin 20 units once daily in PM before bedtime, metformin 500mg twice daily despite CKD issues?, cannot afford trulicity or sglt-2.    Patient is not experiencing side effects.  Blood sugar monitoring: not checking. Interested in CGM.       Symptoms of low blood sugar? none  Symptoms of high blood sugar? fatigue  Eye exam: due  Foot exam: up to date  Diet/Exercise:   Aspirin: Taking 81mg daily and denies side effects   Statin: Yes: Rosuvastatin    ACEi/ARB: No.   Urine Albumin:   Lab Results   Component Value Date    UMALCR 3,051.02 (H) 2022      Lab Results   Component Value Date    A1C 11.3 2022    A1C 7.1 2021    A1C 6.9 2020    A1C 9.0 2019    A1C 8.5 2019    A1C 11.3 2018      DPNP: feet killing him lately --takes gabapentin 300mg twice daily.  ALA 600mg./day -not been using them.   Also has amitriptyline 10mg. Tabs - 3 tabs every night at  bedtime for pain/sleep.     Has not been on percocet 5-325mg x 6 months --helped his pain/feet/sleep.     Hyperlipidemia: Current therapy includes : Rosuvastatin 20mg daily.  Patient reports no significant myalgias or other side effects.  The 10-year ASCVD risk score (Yojana PORTER, et al., 2019) is: 33.4%    Values used to calculate the score:      Age: 67 years      Sex: Male      Is Non- : No      Diabetic: Yes      Tobacco smoker: No      Systolic Blood Pressure: 128 mmHg      Is BP treated: Yes      HDL Cholesterol: 38 mg/dL      Total Cholesterol: 170 mg/dL  Recent Labs   Lab Test 06/20/22  1040 01/18/21  1623 01/03/17  1548 10/05/15  1215   CHOL 170 137   < > 293*   HDL 38* 44   < > 37*   LDL 61 59   < > 185*   TRIG 354* 172*   < > 354*   CHOLHDLRATIO  --   --   --  7.9*    < > = values in this interval not displayed.       Hypertension: Current medications include : Metoprolol 50mg. Tablet twice daily, Furosemide 20mg twice daily.   Patient does not self-monitor blood pressure. (has home meter but not using it ).   Patient reports no current medication side effects.  BP Readings from Last 3 Encounters:   06/20/22 128/78   03/03/22 (!) 153/82   03/08/21 128/88     COPD: Current medications: none.   He has several inhalers and nebs on hand --states has not used them in quite some time.   Patient is not experiencing side effects.   Patient reports the following symptoms: none.  Patient does not have an COPD Action Plan on file.   Has spirometry been completed: Doesn't know      ----------------      I spent 50 minutes with this patient today. All changes were made via collaborative practice agreement with Smua Jenkins NP. A copy of the visit note was provided to the patient's provider(s).    The patient was sent via [employer] a summary of these recommendations.     Jose Vazquez Rph.  Medication Therapy Management Provider  [phone]      Telemedicine Visit Details  Type of  service:  Telephone visit  Start Time: 3:30 PM  End Time: 4:20 PM  Originating Location (pt. Location): Home  Distant Location (provider location):  On-site  Provider has received verbal consent for a visit from the patient? Yes     Medication Therapy Recommendations  Type 2 diabetes mellitus with stage 3b chronic kidney disease, with long-term current use of insulin (H)    Current Medication: blood glucose monitoring (NO BRAND SPECIFIED) test strip   Rationale: More effective medication available - Ineffective medication - Effectiveness   Recommendation: Change Medication - Dexcom G6 Sensor Misc - start CGM --will bring supplies when mailed to his house to next mtm visit in 3 weeks for first placement of sensor.   Status: Accepted per CPA

## 2022-10-24 NOTE — LETTER
October 24, 2022  Hayder Alves  [address] [address]    Dear Mr. Alves, MATTHEW [facility]     Thank you for talking with me on Oct 24, 2022 about your health and medications. As a follow-up to our conversation, I have included two documents:      1. Your Recommended To-Do List has steps you should take to get the best results from your medications.  2. Your Medication List will help you keep track of your medications and how to take them.    If you want to talk about these documents, please call Jose Vazquez RPH at phone: [phone], Monday-Friday 8-4:30pm.    I look forward to working with you and your doctors to make sure your medications work well for you.    Sincerely,  Jose Vazquez RPH  [facility] Pharmacist, [facility]

## 2022-10-24 NOTE — PATIENT INSTRUCTIONS
"Recommendations from today's MTM visit:                                                    MTM (medication therapy management) is a service provided by a clinical pharmacist designed to help you get the most of out of your medicines.   Today we reviewed what your medicines are for, how to know if they are working, that your medicines are safe and how to make your medicine regimen as easy as possible.      1. FYI--I checked with your insurance and I will be sending rx to [facility] in [place] --If your insurance approves it (almost 100% sure they will ) you will receive  the following: please bring all these supplies with you to your appt. With me on Monday November 14th -2022. At 11;30am.           2. For now continue all medications as is --we will adjust your diabetes medications once we see where your blood sugars are at.           Follow-up: Return in about 3 weeks (around 11/14/2022), or @ 11:30 AM, for Medication Therapy Management Visit, A1c lab.    It was great speaking with you today.  I value your experience and would be very thankful for your time in providing feedback in our clinic survey. In the next few days, you may receive an email or text message from [email or web address] with a link to a survey related to your  clinical pharmacist.\"     To schedule another MTM appointment, please call the clinic directly or you may call the MTM scheduling line at [phone] or toll-free at [phone].     My Clinical Pharmacist's contact information:                                                      Please feel free to contact me with any questions or concerns you have.      Jose Vazquez Rph.  Medication Therapy Management Provider  [phone]    "

## 2022-10-24 NOTE — LETTER
"Recommended To-Do List      Prepared on: Oct 24, 2022       You can get the best results from your medications by completing the items on this \"To-Do List.\"      Bring your To-Do List when you go to your doctor. And, share it with your family or caregivers.    My To-Do List:  What we talked about: What I should do:   A medication that is not working    Change the medication you are taking from blood glucose (NO BRAND SPECIFIED) to Dexcom G6 Sensor-will apply first sensor in clinic with aldo.          What we talked about: What I should do:                       "

## 2022-10-24 NOTE — Clinical Note
Irene --laurencex for mtm referral --I ordered him dexcom g6 cgm --when it arrives he will bring into me for placement of fisr sensor and then I can help get his dm under control since he refuses to fingerstick.   Karen-aldo

## 2022-11-07 NOTE — TELEPHONE ENCOUNTER
General Call      Reason for Call:  Discuss VM received regarding monitor     What are your questions or concerns:  Received call from diabetic company and to call back regarding glucose monitor. Patients wife believes it is a scam and would like to discuss with you before moving further.    Date of last appointment with provider: 10/24/22    Could we send this information to you in [employer] or would you prefer to receive a phone call?:   Patient would prefer a phone call   Okay to leave a detailed message?: Yes at Other phone number:  [phone]

## 2022-11-14 NOTE — PROGRESS NOTES
Clinic Care Coordination Contact  [facility]/Voicemail       Clinical Data: Care Coordinator Outreach  Outreach attempted x 1.  Left message on patient's wife's voicemail with call back information and requested return call.    Plan: Care Coordinator will try to reach patient again in 10 business days.    Jemma Calloway CHW, B.A. [facility] Care Coordination  [facility]:   [facility]  [phone]

## 2022-11-25 NOTE — PROGRESS NOTES
Medication Therapy Management (MTM) Encounter    ASSESSMENT:                            Medication Adherence/Access: No issues identified    Type 2 Diabetes: Patient is not meeting A1c goal of < 8%. Self monitoring of blood glucose is not at goal of fasting  mg/dL and post prandial < 180 mg/dL. Patient is not meeting average glucose goal of <150mg/dL. Patient is not meeting goal of >70% time in target with continuous glucose monitoring.  Patient would benefit from ideal SMBG: Check blood sugars pre-prandial, 2 hours post-prandial, and with symptoms of hypoglycemia:  He did bring his dex-com G6 supplies --mtm educated patient and helped him insert his first sensor -he will go home and charge his reader device later this afternoon.      Basal Insulin (NPH) :  stay on the same dose., Metformin :  stay on the same dose., Increased exercise and Increase in home glucose monitoring.    See plan for details.       DPNP:  Needs improvement in blood sugars for pain to be better controlled.     Hyperlipidemia: Stable.  Patient is on high intensity statin which is indicated based on 2019 ACC/AHA guidelines for lipid management.        Hypertension: Stable. Patient is meeting blood pressure goal of < 140/90mmHg. Patient would benefit from the following changes - continued blood pressure monitoring, watching diet, increasing exercise and weight loss and limiting/reducing sodium.    COPD: Stable  Patient would benefit from no changes at this time      PLAN:                               1. Thanks for starting dex-com G6 --please charge it when you are home this afternoon.      Please bring your dex com reader device  to next mondays appt.--we can then set you up for remote blood sugar access and do telephone follow-ups .     I will recheck a1c next week and also then will be able to adjust your diabetes medications.       Follow-up: Return in about 1 week (around 12/5/2022), or @ 2 PM, for Blood sugar meter review,  Medication Therapy Management Visit.c lab.          SUBJECTIVE/OBJECTIVE:                          Hayder Alves is a 67 year old male coming in for a follow-up (10-24-22) visit. He was referred to me from Suma Jenkins     Significant other Alfonso attended visit with him and brought all his med bottles for him today .     Reason for visit:   Cgm start- Brought his dex-com G6 supplies today.          Allergies/ADRs: Reviewed in chart  Past Medical History: Reviewed in chart; could not afford xarelto or eliquis.  Tobacco: He reports that he quit smoking about 22 years ago. His smoking use included cigarettes. He has a 45.00 pack-year smoking history. He has never used smokeless tobacco.   Exposed to second hand smoke.   Alcohol: 1-3 beverages / week  Caffeine: large can red bull x 1-2 day.   Social: Lives with alfonso -wife.   Personal Healthcare Goals: control DM, fix dpnp pain.   Activity:  None, wants to join gym but [place]  cannot drive.     Medication Adherence/Access: no issues reported    Type 2 Diabetes:  Currently taking : NPH  Insulin 19 units once daily in PM before bedtime, metformin 500mg twice daily despite CKD issues?, cannot afford trulicity or sglt-2.    Patient is not experiencing side effects.  Blood sugar monitoring: not checking. Interested in CGM. mtm ordered him dex- com G6 --will teach today --inserted first sensor in office today 22.           Symptoms of low blood sugar? none  Symptoms of high blood sugar? fatigue  Eye exam: due  Foot exam: up to date  Diet/Exercise:   Aspirin: Taking 81mg daily and denies side effects   Statin: Yes: Rosuvastatin    ACEi/ARB: No.   Urine Albumin:   Lab Results   Component Value Date    UMALCR 3,051.02 (H) 2022      Lab Results   Component Value Date    A1C 11.3 2022    A1C 7.1 2021    A1C 6.9 2020    A1C 9.0 2019    A1C 8.5 2019    A1C 11.3 2018      DPNP: feet killing him lately --takes gabapentin 300mg  twice daily.  ALA 600mg./day -not been using them.   Also has amitriptyline 10mg. Tabs - 3 tabs every night at bedtime for pain/sleep.     Has not been on percocet 5-325mg x 6 months --helped his pain/feet/sleep. Will need to see pcp for more narcotic pain pills.     Hyperlipidemia: Current therapy includes : Rosuvastatin 20mg daily.  Patient reports no significant myalgias or other side effects.  The 10-year ASCVD risk score (Yojana PORTER, et al., 2019) is: 37.2%    Values used to calculate the score:      Age: 67 years      Sex: Male      Is Non- : No      Diabetic: Yes      Tobacco smoker: No      Systolic Blood Pressure: 138 mmHg      Is BP treated: Yes      HDL Cholesterol: 38 mg/dL      Total Cholesterol: 170 mg/dL  Recent Labs   Lab Test 06/20/22  1040 01/18/21  1623 01/03/17  1548 10/05/15  1215   CHOL 170 137   < > 293*   HDL 38* 44   < > 37*   LDL 61 59   < > 185*   TRIG 354* 172*   < > 354*   CHOLHDLRATIO  --   --   --  7.9*    < > = values in this interval not displayed.       Hypertension: Current medications include : Metoprolol 50mg. Tablet twice daily, Furosemide 20mg twice daily.   Patient does not self-monitor blood pressure. (has home meter but not using it ).   Patient reports no current medication side effects.  BP Readings from Last 3 Encounters:   11/28/22 138/80   06/20/22 128/78   03/03/22 (!) 153/82     COPD: Current medications: none.   He has several inhalers and nebs on hand --states has not used them in quite some time.   Patient is not experiencing side effects.   Patient reports the following symptoms: none.  Patient does not have an COPD Action Plan on file.   Has spirometry been completed: Doesn't know      ----------------      I spent 30 minutes with this patient today. All changes were made via collaborative practice agreement with Suma Jenkins NP. A copy of the visit note was provided to the patient's provider(s).    The patient was given a summary  today of these recommendations.     Jose Vazquez jules.  Medication Therapy Management Provider  [phone]           Medication Therapy Recommendations  Type 2 diabetes mellitus with stage 3b chronic kidney disease, with long-term current use of insulin (H)    Current Medication: blood glucose monitoring (FREESTYLE FREEDOM LITE) meter device kit   Rationale: More effective medication available - Ineffective medication - Effectiveness   Recommendation: Change Medication - Dexcom G6 Sensor Misc - apply as directed in office today , change sensor every 10 days.   Status: Accepted per CPA

## 2022-11-25 NOTE — PROGRESS NOTES
Care Coordination Clinician Chart Review     Situation: Patient chart reviewed by care coordinator.?     Background: Initial assessment and enrollment to Care Coordination was 10/17/22.?? Care plan(s) with patient-centered goal(s) were developed with participation from patient.? Lead CC handed patient off to CHW for continued outreach every 30 days.??     Assessment: Per chart review, patient outreach completed by CC CHW on 11/14/22.? Patient is actively working to accomplish goal(s).? Patient's goal(s) remain(s) appropriate at this time.? Patient is not due for updated Plan of Care.? Annual assessment will be due 10/2023.     Care Plan: Help At Home     Problem: Insufficient In-home support     Goal: Establish adequate home support     Start Date: 10/17/2022 Expected End Date: 1/17/2023    Note:     Barriers: lack of access to [facility] services  Strengths: good family support  Patient expressed understanding of goal: Yes  Action steps to achieve this goal:  1. I will reach out to Senior Linkage Line regarding benefits and SNF/Assisted Living Options  2. I will obtain equipment to help me be safe at home and reduce the risk of falling  3. I will keep in touch with care coordination                        Plan/Recommendations: The patient will continue working with Care Coordination to achieve above goal(s).? CHW will involve Lead CC as needed or if patient is ready to move to maintenance.? Lead CC will continue to monitor CHW s monthly outreaches and progress to goal(s) every 6 weeks.    Plan of Care updated and sent to patient: No    SEVEN Walton   [facility] Primary Care - Clinic Care Coordination  [phone]

## 2022-11-28 NOTE — Clinical Note
Irene --lizbet --mayte received his dex com g6 supplies --I inserted first sensor on him in office--and will see him next Monday to review bs's and check a1c and make dm med adjustments now.  Ignacia

## 2022-11-28 NOTE — PATIENT INSTRUCTIONS
"Recommendations from today's MTM visit:                                                         1. Thanks for starting dex-com G6 -- -please charge it when you are home this afternoon.      Please bring your dex com reader device  to next mondays appt.--we can then set you up for remote blood sugar access and do telephone follow-ups .     I will recheck a1c next week and also then will be able to adjust your diabetes medications.       Follow-up: Return in about 1 week (around 12/5/2022), or @ 2 PM, for Blood sugar meter review, Medication Therapy Management Visit.    It was great speaking with you today.  I value your experience and would be very thankful for your time in providing feedback in our clinic survey. In the next few days, you may receive an email or text message from [employer] with a link to a survey related to your  clinical pharmacist.\"     To schedule another MTM appointment, please call the clinic directly or you may call the MTM scheduling line at [phone] or toll-free at [phone].     My Clinical Pharmacist's contact information:                                                      Please feel free to contact me with any questions or concerns you have.      Jose Vazquez Rph.  Medication Therapy Management Provider  [phone]    "

## 2022-11-28 NOTE — PROGRESS NOTES
Clinic Care Coordination Contact    Community Health Worker Follow Up    Care Gaps:     Health Maintenance Due   Topic Date Due     COPD ACTION PLAN  Never done     ZOSTER IMMUNIZATION (1 of 2) Never done     AORTIC ANEURYSM SCREENING (SYSTEM ASSIGNED)  Never done     EYE EXAM  02/01/2022     DTAP/TDAP/TD IMMUNIZATION (2 - Td or Tdap) 04/03/2022     COVID-19 Vaccine (3 - Booster for Ralph series) 08/15/2022     INFLUENZA VACCINE (1) 09/01/2022     COLORECTAL CANCER SCREENING  11/23/2022     BMP  12/20/2022     MICROALBUMIN  12/20/2022       Postponed to next CHW outreach     Care Plan:   Care Plan: Help At Home     Problem: Insufficient In-home support     Goal: Establish adequate home support     Start Date: 10/17/2022 Expected End Date: 1/17/2023    This Visit's Progress: 20%    Note:     Barriers: lack of access to [facility] services  Strengths: good family support  Patient expressed understanding of goal: Yes  Action steps to achieve this goal:  1. I will reach out to [facility] regarding benefits and SNF/Assisted Living Options (patient's wife states that she hasn't done this because assisted living will be to expensive. CHW encouraged her to call because senior linkage line might be able to provide some assistance)  2. I will obtain equipment to help me be safe at home and reduce the risk of falling (patient's wife states that they obtained a blood sugar reader)  3. I will keep in touch with care coordination                        Intervention and Education during outreach:     CHW spoke with patient's wife. She states that the patient has an appointment with Dr. Vazquez today at 2pm so that they can learn how to use the patient's blood sugar reader. Patient's wife states that she is stressed because she is unsure of how she will convince the patient to get up to go to the appointment. Patient's wife states that the patient always has a hard time finding motivation to go to appointments.Patient's  wife states that she thinks the patient has been depressed. CHW asked patient's wife if the patient would be interested in therapy. Patient's wife said possibly, if virtual therapy is an option. CHW acknowledged and stated that she will consult with [facility] to find therapists in the area that offer virtual therapy.    When asked if they have called out to [facility], patient's wife asked CHW to remind her what she was going to call out to them for. Based off [facility]'s notes, CHW told patient's wife that [facility] can help them out with assisted living facilities. Patient's wife states that she can't afford an [facility] so she hasn't called. CHW encouraged patient's wife to call [facility] to see if they could get any assistance. Patient's wife acknowledged and inquired if insurance would cover the price if someone came into their home once a week to check in on the patient. CHW stated that she will get in touch with  to inquire about this and to find out what the process is for obtaining in home help through insurance. Patient acknowledged and thanked CHW.    CHW Plan: CHW will route patient to [facility] to inquire about therapy and in home help. CHW will do next patient outreach in one month.    NICOLE De Jesus, B.A. [facility] Care Coordination  [facility]:   [facility]  [phone]

## 2022-12-02 NOTE — PROGRESS NOTES
Medication Therapy Management (MTM) Encounter    ASSESSMENT:                            Medication Adherence/Access: No issues identified          Type 2 Diabetes: Patient is not meeting A1c goal of < 7%(12.4%). Self monitoring of blood glucose is not at goal of fasting  mg/dL and post prandial < 150 mg/dL. Patient is not meeting average glucose goal of <150mg/dL. Patient is not meeting goal of >70%(8%)time in target with continuous glucose monitoring.  Patient would benefit from adjusting diet(less carbs) after viewing CGM data, Basal Insulin (NPH) :  increase dose to 20 units twice daily now , Bolus / Rapid Acting Insulin (Regular-Humulin-R)) : Start at dose : 10 units twice daily at main meals, Metformin :  STOP now due to elevated scr June-2022--recheck BMP lab today ,  and Increased exercise-walk daily as best you can .  Also patient has left hand trigger finger --encouraged him to obtain trigger finger splint from [employer] --if that doesn't work -- see pcp for surgery discussion.         DPNP:  Needs improvement in blood sugars for pain to be better controlled.     Hyperlipidemia: Stable.  Patient is on high intensity statin which is indicated based on 2019 ACC/AHA guidelines for lipid management.        Hypertension: Stable. Patient is meeting blood pressure goal of < 140/90mmHg. Patient would benefit from the following changes - continued blood pressure monitoring, watching diet, increasing exercise and weight loss and limiting/reducing sodium.      COPD: Stable  Patient would benefit from no changes at this time      PLAN:                            1. STOP all the Metformin today --due to chronic kidney disease.     2. Also thank you for wearing the dex-com G6 -- I think your blood sugars are still reading too high --let make your NPH insulin 20 units twice daily --about 12 hours apart and also Lets START -mealtime Regular (R) insulin - 10 units -twice daily right before your 2 main meals/day .     3.  "Remember when you change your dex-com in a few days --the sensor - has a gray  in it -- save that and attach to next sensor.     4. See suma for oxycodone pain med refill -- also you have a left ring \"trigger Finger \" --before surgery option  you can try to correct with a trigger finger splint from [employer] cost is 14.99$ . Trigger finger is a diabetic complication --lets get better blood sugar control now.           Follow-up: Return in about 2 weeks (around 2022), or @ 3 PM, for Blood sugar meter review, Medication Therapy Management Visit, BP Recheck.          SUBJECTIVE/OBJECTIVE:                          Hayder Alves is a 67 year old male coming in for a follow-up (22) visit. He was referred to me from Suma Jenkins     Significant other Alfonso attended visit with him and brought all his med bottles for him today .     Reason for visit:   Cgm /bs review with dexcom G6.         Allergies/ADRs: Reviewed in chart  Past Medical History: Reviewed in chart; could not afford xarelto or eliquis.  Tobacco: He reports that he quit smoking about 22 years ago. His smoking use included cigarettes. He has a 45.00 pack-year smoking history. He has never used smokeless tobacco.   Exposed to second hand smoke.   Alcohol: 1-3 beverages / week  Caffeine: large can red bull x 1-2 day.   Social: Lives with alfonso -wife.   Personal Healthcare Goals: control DM, fix dpnp pain.   Activity:  None, wants to join gym but [place]  cannot drive.     Medication Adherence/Access: no issues reported    Type 2 Diabetes:  Currently taking : NPH  Insulin 19 units once daily in PM before bedtime, metformin 500mg twice daily despite CKD issues?, cannot afford trulicity or sglt-2.    Patient is not experiencing side effects.  Blood sugar monitoring:   Dex-com G6 now see below:                   Symptoms of low blood sugar? none  Symptoms of high blood sugar? fatigue  Eye exam: due  Foot exam: up to date  Diet/Exercise: "   Aspirin: Taking 81mg daily and denies side effects   Statin: Yes: Rosuvastatin    ACEi/ARB: No.   Urine Albumin:   Lab Results   Component Value Date    UMALCR 3,051.02 (H) 06/20/2022          Lab Results   Component Value Date    A1C 12.4 12/05/2022    A1C 11.3 06/20/2022    A1C 7.1 01/18/2021    A1C 6.9 02/19/2020    A1C 9.0 08/08/2019    A1C 8.5 02/27/2019    A1C 11.3 12/13/2018            DPNP: feet killing him lately --takes gabapentin 300mg twice daily.  ALA 600mg./day -not been using them.   Also has amitriptyline 10mg. Tabs - 3 tabs every night at bedtime for pain/sleep.     Has not been on percocet 5-325mg x 6 months --helped his pain/feet/sleep. Will need to see pcp for more narcotic pain pills.     Hyperlipidemia: Current therapy includes : Rosuvastatin 20mg daily.  Patient reports no significant myalgias or other side effects.  The 10-year ASCVD risk score (Yojana PORTER, et al., 2019) is: 37.2%    Values used to calculate the score:      Age: 67 years      Sex: Male      Is Non- : No      Diabetic: Yes      Tobacco smoker: No      Systolic Blood Pressure: 138 mmHg      Is BP treated: Yes      HDL Cholesterol: 38 mg/dL      Total Cholesterol: 170 mg/dL  Recent Labs   Lab Test 06/20/22  1040 01/18/21  1623 01/03/17  1548 10/05/15  1215   CHOL 170 137   < > 293*   HDL 38* 44   < > 37*   LDL 61 59   < > 185*   TRIG 354* 172*   < > 354*   CHOLHDLRATIO  --   --   --  7.9*    < > = values in this interval not displayed.       Hypertension: Current medications include : Metoprolol 50mg. Tablet twice daily, Furosemide 20mg twice daily.   Patient does not self-monitor blood pressure. (has home meter but not using it ).   Patient reports no current medication side effects.  BP Readings from Last 3 Encounters:   12/05/22 138/80   11/28/22 138/80   06/20/22 128/78     COPD: Current medications: none.   He has several inhalers and nebs on hand --states has not used them in quite some time.    Patient is not experiencing side effects.   Patient reports the following symptoms: none.  Patient does not have an COPD Action Plan on file.   Has spirometry been completed: Doesn't know    /80   Pulse 93   Wt 193 lb 3.2 oz (87.6 kg)   SpO2 97%   BMI 29.38 kg/m      ----------------------------------------------------------------------------------      I spent 30 minutes with this patient today. All changes were made via collaborative practice agreement with Suma Jenkins NP. A copy of the visit note was provided to the patient's provider(s).    The patient was given a summary today of these recommendations.     Jose Vazquez [facility].  Medication Therapy Management Provider  [phone]           Medication Therapy Recommendations  Type 2 diabetes mellitus with stage 3b chronic kidney disease, with long-term current use of insulin (H)    Current Medication: insulin NPH (HUMULIN N VIAL) 100 UNIT/ML vial   Rationale: Dose too low - Dosage too low - Effectiveness   Recommendation: Increase Dose   Status: Accepted per CPA          Current Medication: insulin regular 100 UNIT/ML vial   Rationale: Synergistic therapy - Needs additional medication therapy - Indication   Recommendation: Start Medication - Inject 10 units twice daily.   Status: Accepted per CPA          Current Medication: metFORMIN (GLUCOPHAGE) 500 MG tablet (Discontinued)   Rationale: Unsafe medication for the patient - Adverse medication event - Safety   Recommendation: Discontinue Medication   Status: Accepted per CPA

## 2022-12-05 NOTE — Clinical Note
Irene --nette--a1c 12.4 % today --I increased his nph and started r insulin bit stopped his metformin due to ckd issues.   F/up with me in 2 weeks for insulin adjustments.  Ignacia

## 2022-12-05 NOTE — PATIENT INSTRUCTIONS
"Recommendations from today's MTM visit:                                                         1. STOP all the Metformin today --due to chronic kidney disease.     2. Also thank you for wearing the dex-com G6 -- I think your blood sugars are still reading too high --let make your NPH insulin 20 units twice daily --about 12 hours apart and also Lets START -mealtime Regular (R) insulin - 10 units -twice daily right before your 2 main meals/day .     3. Remember when you change your dex-com in a few days --the sensor - has a gray  in it -- save that and attach to next sensor.     4. See reinaldo for oxycodone pain med refill -- also you have a left ring \"trigger Finger \" --before surgery option  you can try to correct with a trigger finger splint from [employer] cost is 14.99$ . Trigger finger is a diabetic complication --lets get better blood sugar control now.           Follow-up: Return in about 2 weeks (around 12/19/2022), or @ 3 PM, for Blood sugar meter review, Medication Therapy Management Visit, BP Recheck.    It was great speaking with you today.  I value your experience and would be very thankful for your time in providing feedback in our clinic survey. In the next few days, you may receive an email or text message from [employer] with a link to a survey related to your  clinical pharmacist.\"     To schedule another MTM appointment, please call the clinic directly or you may call the MTM scheduling line at [phone] or toll-free at [phone].     My Clinical Pharmacist's contact information:                                                      Please feel free to contact me with any questions or concerns you have.      Jose Vazquez Rp.  Medication Therapy Management Provider  [phone]    "

## 2022-12-18 NOTE — PROGRESS NOTES
Medication Therapy Management (MTM) Encounter    ASSESSMENT:                            Medication Adherence/Access: No issues identified          Type 2 Diabetes: Patient is not meeting A1c goal of < 7%(12.4%). Self monitoring of blood glucose is not at goal of fasting  mg/dL and post prandial < 150 mg/dL. Patient is not meeting average glucose goal of <150mg/dL. Patient is not meeting goal of >70%(8%)time in target with continuous glucose monitoring.  Patient would benefit from adjusting diet(less carbs) after viewing CGM data--today he stated couldn't figure out how to replace his G6 sensor so MTM did it with him via you tube CGM video , Basal Insulin (NPH) :  Stay at dose  20 units twice daily now , Bolus / Rapid Acting Insulin (Regular-Humulin-R) : Stay at dose : 10 units twice daily at main meals, Metformin : OFF all metformin 12-5-22 , recheck BMP 12-26-22.    Increased exercise-walk daily as best you can .  Also patient has left hand trigger finger --encouraged him to obtain trigger finger splint from [employer] --if that doesn't work -- see pcp for surgery discussion.         DPNP:  Needs improvement in blood sugars for pain to be better controlled.     Hyperlipidemia: Stable.  Patient is on high intensity statin which is indicated based on 2019 ACC/AHA guidelines for lipid management.        Hypertension: Patient is not meeting blood pressure goal of < 140/90mmHg. Patient would benefit from the following changes - addition of bedtime 5mg. Amlodipine , continued blood pressure monitoring, watching diet, increasing exercise and weight loss, limiting/reducing sodium.         COPD: Stable  Patient would benefit from no changes at this time      PLAN:                              1. Blood sugar review: we replaced your dex-com G6 sensor today -- warm up is 2 hours then it should read your blood sugars continuously. Please plug in and fully charge your dex-com G6 reader device at home today .  Watch this video  on you tube to help you with changing your sensor/transmitter .       FYi--:       Stay on NPH insulin 20 units twice daily + 10 units Regular mealtime insulin twice daily at your main meals.     2. Blood Pressure today too high --lets ADD a bedtime 5mg. Tablet of Amlodipine to help lower your blood pressure to <140/90 mmhg .     Stay on all other medications as is .         Follow-up: Return in about 1 week (around 2022), or @ 2:30 PM, for Medication Therapy Management Visit, Blood sugar meter review, BP Recheck.      SUBJECTIVE/OBJECTIVE:                          Hayder Alves is a 67 year old male coming in for a follow-up (22) visit. He was referred to me from Suma Jenkins     Significant other Alfonso attended visit with him and brought all his med bottles for him today .     Reason for visit:   Cgm /bs review with dexcom G6. He couldn't change sensor --no data last 2 weeks. He brought all his dexcom supplies --needs help with inserting a new sensor.           Allergies/ADRs: Reviewed in chart  Past Medical History: Reviewed in chart; could not afford xarelto or eliquis.  Tobacco: He reports that he quit smoking about 22 years ago. His smoking use included cigarettes. He has a 45.00 pack-year smoking history. He has never used smokeless tobacco.   Exposed to second hand smoke.   Alcohol: 1-3 beverages / week  Caffeine: large can red bull x 1-2 day.   Social: Lives with alfonso -wife.   Personal Healthcare Goals: control DM, fix dpnp pain.   Activity:  None, wants to join gym but [place]  cannot drive.     Medication Adherence/Access: no issues reported    Type 2 Diabetes:  Currently taking : NPH  Insulin 20 units twice daily (am and PM), OFF all metformin now due to ckd issues, also injecting 10 units twice daily Regular insulin at his 2 main meals/day .   Cannot afford trulicity or sglt-2.    Patient is not experiencing side effects.  Blood sugar monitoring:   Dex-com G6 now see below:                    Symptoms of low blood sugar? none  Symptoms of high blood sugar? fatigue  Eye exam: due  Foot exam: up to date  Diet/Exercise:   Aspirin: Taking 81mg daily and denies side effects   Statin: Yes: Rosuvastatin    ACEi/ARB: No.   Urine Albumin:   Lab Results   Component Value Date    UMALCR 3,051.02 (H) 06/20/2022          Lab Results   Component Value Date    A1C 12.4 12/05/2022    A1C 11.3 06/20/2022    A1C 7.1 01/18/2021    A1C 6.9 02/19/2020    A1C 9.0 08/08/2019    A1C 8.5 02/27/2019    A1C 11.3 12/13/2018            DPNP: feet killing him lately --takes gabapentin 300mg twice daily.  ALA 600mg./day -not been using them.   Also has amitriptyline 10mg. Tabs - 3 tabs every night at bedtime for pain/sleep.     Has not been on percocet 5-325mg x 6 months --helped his pain/feet/sleep. Will need to see pcp for more narcotic pain pills.     Hyperlipidemia: Current therapy includes : Rosuvastatin 20mg daily.  Patient reports no significant myalgias or other side effects.  The 10-year ASCVD risk score (Yojana DK, et al., 2019) is: 41.8%    Values used to calculate the score:      Age: 67 years      Sex: Male      Is Non- : No      Diabetic: Yes      Tobacco smoker: No      Systolic Blood Pressure: 150 mmHg      Is BP treated: Yes      HDL Cholesterol: 38 mg/dL      Total Cholesterol: 170 mg/dL  Recent Labs   Lab Test 06/20/22  1040 01/18/21  1623 01/03/17  1548 10/05/15  1215   CHOL 170 137   < > 293*   HDL 38* 44   < > 37*   LDL 61 59   < > 185*   TRIG 354* 172*   < > 354*   CHOLHDLRATIO  --   --   --  7.9*    < > = values in this interval not displayed.       Hypertension: Current medications include : Metoprolol 50mg. Tablet twice daily, Furosemide 20mg twice daily.   Patient does not self-monitor blood pressure. (has home meter but not using it ).   Patient reports no current medication side effects.  BP Readings from Last 3 Encounters:   12/19/22 (!) 150/80   12/05/22 138/80    11/28/22 138/80     COPD: Current medications: none.   He has several inhalers and nebs on hand --states has not used them in quite some time.   Patient is not experiencing side effects.   Patient reports the following symptoms: none.  Patient does not have an COPD Action Plan on file.   Has spirometry been completed: Doesn't know    BP (!) 150/80   Pulse 103   Wt 198 lb 3.2 oz (89.9 kg)   SpO2 98%   BMI 30.14 kg/m      ----------------------------------------------------------------------------------      I spent 30 minutes with this patient today. All changes were made via collaborative practice agreement with Suma Jenkins NP. A copy of the visit note was provided to the patient's provider(s).    The patient was given a summary today of these recommendations.     Jose Vazquez [facility].  Medication Therapy Management Provider  [phone]           Medication Therapy Recommendations  Hypertension goal BP (blood pressure) < 130/80    Current Medication: amLODIPine (NORVASC) 5 MG tablet   Rationale: Synergistic therapy - Needs additional medication therapy - Indication   Recommendation: Start Medication   Status: Accepted per [facility]         Type 2 diabetes mellitus with stage 3b chronic kidney disease, with long-term current use of insulin (H)    Current Medication: Continuous Blood Gluc Sensor (DEXCOM G6 SENSOR) MISC   Rationale: Does not understand instructions - Adherence - Adherence   Recommendation: Provide Education - re-inserted next 10 day G6 sensor via you tube video instructions.   Status: Accepted per [facility]   Note: He is to go home and recharge his dexcom G6 reader device.

## 2022-12-19 NOTE — PATIENT INSTRUCTIONS
"Recommendations from today's MTM visit:                                                         1. Blood sugar review: we replaced your dex-com G6 sensor today -- warm up is 2 hours then it should read your blood sugars continuously. Please plug in and fully charge your dex-com G6 reader device at home today .  Watch this video on you tube to help you with changing your sensor/transmitter .       FYi--:       Stay on NPH insulin 20 units twice daily + 10 units Regular mealtime insulin twice daily at your main meals.     2. Blood Pressure today too high --lets ADD a bedtime 5mg. Tablet of Amlodipine to help lower your blood pressure to <140/90 mmhg .     Stay on all other medications as is .         Follow-up: Return in about 1 week (around 12/26/2022), or @ 2:30 PM, for Medication Therapy Management Visit, Blood sugar meter review, BP Recheck.    It was great speaking with you today.  I value your experience and would be very thankful for your time in providing feedback in our clinic survey. In the next few days, you may receive an email or text message from [employer] with a link to a survey related to your  clinical pharmacist.\"     To schedule another MTM appointment, please call the clinic directly or you may call the MTM scheduling line at [phone] or toll-free at [phone].     My Clinical Pharmacist's contact information:                                                      Please feel free to contact me with any questions or concerns you have.      Jose Vazquez Rph.  Medication Therapy Management Provider  [phone]    "

## 2022-12-19 NOTE — Clinical Note
Irene --nette--mayte didn't know how to insert next 10 day dexcom cgm sensor so I inserted a new one on him today ; also Blood Pressure too high --I added 5mg bedtime amlodipine .  Recheck with me in 7 days .  Karen fang

## 2022-12-28 NOTE — PROGRESS NOTES
Clinic Care Coordination Contact    Community Health Worker Follow Up    Care Gaps:     Health Maintenance Due   Topic Date Due     COPD ACTION PLAN  Never done     ZOSTER IMMUNIZATION (1 of 2) Never done     AORTIC ANEURYSM SCREENING (SYSTEM ASSIGNED)  Never done     EYE EXAM  02/01/2022     DTAP/TDAP/TD IMMUNIZATION (2 - Td or Tdap) 04/03/2022     COVID-19 Vaccine (3 - Booster for Ralph series) 08/15/2022     INFLUENZA VACCINE (1) 09/01/2022     COLORECTAL CANCER SCREENING  11/23/2022     MICROALBUMIN  12/20/2022       Postponed to next CHW outreach     Care Plan:   Care Plan: Help At Home     Problem: Insufficient In-home support     Goal: Establish adequate home support     Start Date: 10/17/2022 Expected End Date: 1/17/2023    This Visit's Progress: 20% Recent Progress: 20%    Note:     Barriers: lack of access to [facility] services  Strengths: good family support  Patient expressed understanding of goal: Yes  Action steps to achieve this goal:  1. I will reach out to Senior Linkage Line regarding benefits and SNF/Assisted Living Options (patient's wife states that she hasn't done this because assisted living will be to expensive. CHW encouraged her to call because senior linkage line might be able to provide some assistance. CHW also encouraged patient's wife to call insurance to see if they'd be able to cover in home assistance)  2. I will obtain equipment to help me be safe at home and reduce the risk of falling (patient's wife states that they obtained a blood sugar reader)  3. I will keep in touch with care coordination                        Intervention and Education during outreach:     CHW spoke to patient's wife, Lilia. Lilia states that she is doing okay. Lilia states that her and the patient did not go over to their son's house for [place] because of the patient's behavior. CHW states that she was able to gather a list of therapists from the [facility] that do virtual visits if that's something that the  patient is still interested in doing. Lilia states that it is. CHW will mail out list of therapists to patient tomorrow and told Lilia to reach out if she has any questions or concerns. CHW also directed Lilia to call out to their insurance if they would like to get in home help to see if they can cover that service, otherwise it would have to be through the county. Lilia states that the patient does not qualify for MA, so it is likely that Lilia will have to call their insurance and see if they can cover it. Lilia states that she will plan to do that soon. Lilia thanked CHW and took note of CHW's phone number.    CHW Plan: CHW will mail out list of therapists to patient. CHW will do next patient outreach in one month.    NICOLE De Jesus, B.A. [facility] Care Coordination  [facility]:   [facility]  [phone]

## 2022-12-28 NOTE — LETTER
[facility] COORDINATION  [address] [place]  [facility] [address]    December 28, 2022    Hayder Alves  [address] [facility] [address]      Dear Marietta,    Thank you for taking the time to speak with me on the phone today. Here is a list of therapists that offer virtual therapy.

## 2022-12-28 NOTE — LETTER
Dear Lilia,    Thank you for taking the time to speak with me on the phone today. As I had mentioned, here is a list of therapists for Bill that offer virtual therapy.     Ilya Hilario   [facility]   [address] [place] [address] [phone]     Baljeet Oropeza   [facility]   [address] [place] [address] [phone]     Michael Vincent   [facility]   [address] [place] [address] [phone]     Pamela Sanchez   [facility]   [address] [place] [address] [phone]     Alexus Covarrubias   [facility]   [address]   [place] [address] [phone]     Toña Laguna   [facility]   [address]   [place], [place] [address] [phone]     Maranda Aaron [facility]   [address]   [place] [address] [phone]     Emelyn Stanton   [address] [place] [address] [phone]     Dajuan Quach   [address] [address]   [phone]     Please feel free to reach out to me if you have any questions or concerns.    Jemma Calloway, NICOLE, B.A. [facility] Care Coordination  [facility]:   [facility]  [phone]

## 2023-01-01 ENCOUNTER — APPOINTMENT (OUTPATIENT)
Dept: LAB | Facility: CLINIC | Age: 68
End: 2023-01-01
Payer: COMMERCIAL

## 2023-01-01 ENCOUNTER — PATIENT OUTREACH (OUTPATIENT)
Dept: CARE COORDINATION | Facility: CLINIC | Age: 68
End: 2023-01-01
Payer: COMMERCIAL

## 2023-01-01 ENCOUNTER — HOSPITAL ENCOUNTER (INPATIENT)
Facility: CLINIC | Age: 68
LOS: 21 days | DRG: 242 | End: 2023-07-20
Attending: EMERGENCY MEDICINE | Admitting: HOSPITALIST
Payer: COMMERCIAL

## 2023-01-01 ENCOUNTER — OFFICE VISIT (OUTPATIENT)
Dept: CARDIOLOGY | Facility: CLINIC | Age: 68
End: 2023-01-01
Attending: INTERNAL MEDICINE
Payer: COMMERCIAL

## 2023-01-01 ENCOUNTER — HOSPITAL ENCOUNTER (EMERGENCY)
Facility: CLINIC | Age: 68
Discharge: HOME | End: 2023-04-19
Attending: EMERGENCY MEDICINE | Admitting: EMERGENCY MEDICINE
Payer: COMMERCIAL

## 2023-01-01 ENCOUNTER — TELEPHONE (OUTPATIENT)
Dept: CARDIOLOGY | Facility: CLINIC | Age: 68
End: 2023-01-01

## 2023-01-01 ENCOUNTER — APPOINTMENT (OUTPATIENT)
Dept: GENERAL RADIOLOGY | Facility: CLINIC | Age: 68
DRG: 242 | End: 2023-01-01
Attending: HOSPITALIST
Payer: COMMERCIAL

## 2023-01-01 ENCOUNTER — TELEPHONE (OUTPATIENT)
Dept: FAMILY MEDICINE | Facility: CLINIC | Age: 68
End: 2023-01-01
Payer: COMMERCIAL

## 2023-01-01 ENCOUNTER — TELEPHONE (OUTPATIENT)
Dept: GASTROENTEROLOGY | Facility: CLINIC | Age: 68
End: 2023-01-01

## 2023-01-01 ENCOUNTER — PATIENT OUTREACH (OUTPATIENT)
Dept: FAMILY MEDICINE | Facility: CLINIC | Age: 68
End: 2023-01-01
Payer: COMMERCIAL

## 2023-01-01 ENCOUNTER — APPOINTMENT (OUTPATIENT)
Dept: CARDIOLOGY | Facility: CLINIC | Age: 68
DRG: 242 | End: 2023-01-01
Attending: INTERNAL MEDICINE
Payer: COMMERCIAL

## 2023-01-01 ENCOUNTER — APPOINTMENT (OUTPATIENT)
Dept: NUCLEAR MEDICINE | Facility: CLINIC | Age: 68
DRG: 242 | End: 2023-01-01
Attending: HOSPITALIST
Payer: COMMERCIAL

## 2023-01-01 ENCOUNTER — TELEPHONE (OUTPATIENT)
Dept: PHARMACY | Facility: CLINIC | Age: 68
End: 2023-01-01

## 2023-01-01 ENCOUNTER — TELEPHONE (OUTPATIENT)
Dept: NURSING | Facility: CLINIC | Age: 68
End: 2023-01-01
Payer: COMMERCIAL

## 2023-01-01 ENCOUNTER — HEALTH MAINTENANCE LETTER (OUTPATIENT)
Age: 68
End: 2023-01-01

## 2023-01-01 ENCOUNTER — APPOINTMENT (OUTPATIENT)
Dept: CT IMAGING | Facility: CLINIC | Age: 68
End: 2023-01-01
Attending: EMERGENCY MEDICINE
Payer: COMMERCIAL

## 2023-01-01 ENCOUNTER — OFFICE VISIT (OUTPATIENT)
Dept: OTHER | Facility: CLINIC | Age: 68
End: 2023-01-01
Attending: INTERNAL MEDICINE
Payer: COMMERCIAL

## 2023-01-01 ENCOUNTER — OFFICE VISIT (OUTPATIENT)
Dept: FAMILY MEDICINE | Facility: CLINIC | Age: 68
End: 2023-01-01
Payer: COMMERCIAL

## 2023-01-01 ENCOUNTER — APPOINTMENT (OUTPATIENT)
Dept: ULTRASOUND IMAGING | Facility: CLINIC | Age: 68
DRG: 242 | End: 2023-01-01
Attending: EMERGENCY MEDICINE
Payer: COMMERCIAL

## 2023-01-01 ENCOUNTER — APPOINTMENT (OUTPATIENT)
Dept: INTERVENTIONAL RADIOLOGY/VASCULAR | Facility: CLINIC | Age: 68
DRG: 242 | End: 2023-01-01
Attending: INTERNAL MEDICINE
Payer: COMMERCIAL

## 2023-01-01 ENCOUNTER — TELEPHONE (OUTPATIENT)
Dept: CARDIOLOGY | Facility: CLINIC | Age: 68
End: 2023-01-01
Payer: COMMERCIAL

## 2023-01-01 ENCOUNTER — TELEPHONE (OUTPATIENT)
Dept: PHARMACY | Facility: CLINIC | Age: 68
End: 2023-01-01
Payer: COMMERCIAL

## 2023-01-01 ENCOUNTER — HOSPITAL ENCOUNTER (OUTPATIENT)
Facility: CLINIC | Age: 68
End: 2023-01-01
Attending: COLON & RECTAL SURGERY | Admitting: COLON & RECTAL SURGERY
Payer: COMMERCIAL

## 2023-01-01 ENCOUNTER — TRANSFERRED RECORDS (OUTPATIENT)
Dept: HEALTH INFORMATION MANAGEMENT | Facility: CLINIC | Age: 68
End: 2023-01-01

## 2023-01-01 ENCOUNTER — APPOINTMENT (OUTPATIENT)
Dept: PHYSICAL THERAPY | Facility: CLINIC | Age: 68
DRG: 242 | End: 2023-01-01
Attending: HOSPITALIST
Payer: COMMERCIAL

## 2023-01-01 ENCOUNTER — APPOINTMENT (OUTPATIENT)
Dept: CARDIOLOGY | Facility: CLINIC | Age: 68
DRG: 242 | End: 2023-01-01
Attending: HOSPITALIST
Payer: COMMERCIAL

## 2023-01-01 ENCOUNTER — OFFICE VISIT (OUTPATIENT)
Dept: PHARMACY | Facility: CLINIC | Age: 68
End: 2023-01-01
Payer: COMMERCIAL

## 2023-01-01 ENCOUNTER — OFFICE VISIT (OUTPATIENT)
Dept: NEUROPSYCHOLOGY | Facility: CLINIC | Age: 68
End: 2023-01-01
Attending: NURSE PRACTITIONER
Payer: COMMERCIAL

## 2023-01-01 ENCOUNTER — TELEPHONE (OUTPATIENT)
Dept: GASTROENTEROLOGY | Facility: CLINIC | Age: 68
End: 2023-01-01
Payer: COMMERCIAL

## 2023-01-01 ENCOUNTER — APPOINTMENT (OUTPATIENT)
Dept: GENERAL RADIOLOGY | Facility: CLINIC | Age: 68
End: 2023-01-01
Attending: EMERGENCY MEDICINE
Payer: COMMERCIAL

## 2023-01-01 ENCOUNTER — APPOINTMENT (OUTPATIENT)
Dept: PHYSICAL THERAPY | Facility: CLINIC | Age: 68
DRG: 242 | End: 2023-01-01
Payer: COMMERCIAL

## 2023-01-01 ENCOUNTER — APPOINTMENT (OUTPATIENT)
Dept: OCCUPATIONAL THERAPY | Facility: CLINIC | Age: 68
DRG: 242 | End: 2023-01-01
Payer: COMMERCIAL

## 2023-01-01 ENCOUNTER — TELEPHONE (OUTPATIENT)
Dept: NEPHROLOGY | Facility: CLINIC | Age: 68
End: 2023-01-01
Payer: COMMERCIAL

## 2023-01-01 ENCOUNTER — APPOINTMENT (OUTPATIENT)
Dept: GENERAL RADIOLOGY | Facility: CLINIC | Age: 68
DRG: 242 | End: 2023-01-01
Attending: EMERGENCY MEDICINE
Payer: COMMERCIAL

## 2023-01-01 ENCOUNTER — APPOINTMENT (OUTPATIENT)
Dept: GENERAL RADIOLOGY | Facility: CLINIC | Age: 68
DRG: 242 | End: 2023-01-01
Attending: INTERNAL MEDICINE
Payer: COMMERCIAL

## 2023-01-01 ENCOUNTER — PATIENT OUTREACH (OUTPATIENT)
Dept: CARE COORDINATION | Facility: CLINIC | Age: 68
End: 2023-01-01

## 2023-01-01 ENCOUNTER — TRANSFERRED RECORDS (OUTPATIENT)
Dept: HEALTH INFORMATION MANAGEMENT | Facility: CLINIC | Age: 68
End: 2023-01-01
Payer: COMMERCIAL

## 2023-01-01 ENCOUNTER — ANCILLARY PROCEDURE (OUTPATIENT)
Dept: MRI IMAGING | Facility: CLINIC | Age: 68
End: 2023-01-01
Attending: NURSE PRACTITIONER
Payer: COMMERCIAL

## 2023-01-01 ENCOUNTER — APPOINTMENT (OUTPATIENT)
Dept: OCCUPATIONAL THERAPY | Facility: CLINIC | Age: 68
DRG: 242 | End: 2023-01-01
Attending: HOSPITALIST
Payer: COMMERCIAL

## 2023-01-01 ENCOUNTER — RESULTS ONLY (OUTPATIENT)
Dept: ADMINISTRATIVE | Facility: CLINIC | Age: 68
End: 2023-01-01

## 2023-01-01 ENCOUNTER — APPOINTMENT (OUTPATIENT)
Dept: CARDIOLOGY | Facility: CLINIC | Age: 68
DRG: 242 | End: 2023-01-01
Attending: PHYSICIAN ASSISTANT
Payer: COMMERCIAL

## 2023-01-01 ENCOUNTER — HOSPITAL ENCOUNTER (OUTPATIENT)
Dept: ULTRASOUND IMAGING | Facility: CLINIC | Age: 68
Discharge: HOME | End: 2023-05-02
Attending: INTERNAL MEDICINE
Payer: COMMERCIAL

## 2023-01-01 ENCOUNTER — TELEPHONE (OUTPATIENT)
Dept: OTHER | Facility: CLINIC | Age: 68
End: 2023-01-01
Payer: COMMERCIAL

## 2023-01-01 ENCOUNTER — NURSE TRIAGE (OUTPATIENT)
Dept: FAMILY MEDICINE | Facility: CLINIC | Age: 68
End: 2023-01-01
Payer: COMMERCIAL

## 2023-01-01 VITALS
WEIGHT: 195 LBS | TEMPERATURE: 97.9 F | RESPIRATION RATE: 16 BRPM | SYSTOLIC BLOOD PRESSURE: 120 MMHG | DIASTOLIC BLOOD PRESSURE: 66 MMHG | HEART RATE: 100 BPM | BODY MASS INDEX: 29.55 KG/M2 | OXYGEN SATURATION: 99 % | HEIGHT: 68 IN

## 2023-01-01 VITALS
DIASTOLIC BLOOD PRESSURE: 73 MMHG | HEART RATE: 57 BPM | WEIGHT: 183.9 LBS | OXYGEN SATURATION: 97 % | BODY MASS INDEX: 27.87 KG/M2 | HEIGHT: 68 IN | SYSTOLIC BLOOD PRESSURE: 128 MMHG

## 2023-01-01 VITALS
RESPIRATION RATE: 16 BRPM | OXYGEN SATURATION: 98 % | SYSTOLIC BLOOD PRESSURE: 145 MMHG | DIASTOLIC BLOOD PRESSURE: 78 MMHG | HEART RATE: 61 BPM

## 2023-01-01 VITALS
BODY MASS INDEX: 28.66 KG/M2 | SYSTOLIC BLOOD PRESSURE: 112 MMHG | OXYGEN SATURATION: 96 % | HEART RATE: 55 BPM | WEIGHT: 188.2 LBS | DIASTOLIC BLOOD PRESSURE: 60 MMHG

## 2023-01-01 VITALS
RESPIRATION RATE: 20 BRPM | HEART RATE: 76 BPM | TEMPERATURE: 98.2 F | SYSTOLIC BLOOD PRESSURE: 130 MMHG | BODY MASS INDEX: 28.34 KG/M2 | HEIGHT: 68 IN | WEIGHT: 187 LBS | OXYGEN SATURATION: 96 % | DIASTOLIC BLOOD PRESSURE: 60 MMHG

## 2023-01-01 VITALS
BODY MASS INDEX: 28.03 KG/M2 | OXYGEN SATURATION: 95 % | TEMPERATURE: 97.6 F | RESPIRATION RATE: 20 BRPM | SYSTOLIC BLOOD PRESSURE: 147 MMHG | HEART RATE: 61 BPM | DIASTOLIC BLOOD PRESSURE: 69 MMHG | WEIGHT: 184.08 LBS

## 2023-01-01 VITALS
DIASTOLIC BLOOD PRESSURE: 86 MMHG | WEIGHT: 182.6 LBS | BODY MASS INDEX: 27.68 KG/M2 | SYSTOLIC BLOOD PRESSURE: 152 MMHG | HEIGHT: 68 IN | HEART RATE: 87 BPM | OXYGEN SATURATION: 98 %

## 2023-01-01 DIAGNOSIS — M54.2 NECK PAIN: ICD-10-CM

## 2023-01-01 DIAGNOSIS — J44.9 CHRONIC OBSTRUCTIVE PULMONARY DISEASE, UNSPECIFIED COPD TYPE (H): ICD-10-CM

## 2023-01-01 DIAGNOSIS — N18.32 TYPE 2 DIABETES MELLITUS WITH STAGE 3B CHRONIC KIDNEY DISEASE, WITH LONG-TERM CURRENT USE OF INSULIN (H): Primary | ICD-10-CM

## 2023-01-01 DIAGNOSIS — I48.4 ATYPICAL ATRIAL FLUTTER (H): Primary | ICD-10-CM

## 2023-01-01 DIAGNOSIS — M54.2 NECK PAIN: Primary | ICD-10-CM

## 2023-01-01 DIAGNOSIS — E11.22 TYPE 2 DIABETES MELLITUS WITH STAGE 4 CHRONIC KIDNEY DISEASE, WITH LONG-TERM CURRENT USE OF INSULIN (H): ICD-10-CM

## 2023-01-01 DIAGNOSIS — I44.2 COMPLETE HEART BLOCK (H): ICD-10-CM

## 2023-01-01 DIAGNOSIS — M79.2 PERIPHERAL NEURALGIA: ICD-10-CM

## 2023-01-01 DIAGNOSIS — R00.1 BRADYCARDIA: Primary | ICD-10-CM

## 2023-01-01 DIAGNOSIS — Z79.4 TYPE 2 DIABETES MELLITUS WITH STAGE 4 CHRONIC KIDNEY DISEASE, WITH LONG-TERM CURRENT USE OF INSULIN (H): ICD-10-CM

## 2023-01-01 DIAGNOSIS — I50.22 CHRONIC SYSTOLIC HEART FAILURE (H): ICD-10-CM

## 2023-01-01 DIAGNOSIS — Z48.89 ENCOUNTER FOR OTHER SPECIFIED SURGICAL AFTERCARE: ICD-10-CM

## 2023-01-01 DIAGNOSIS — G89.4 CHRONIC PAIN SYNDROME: ICD-10-CM

## 2023-01-01 DIAGNOSIS — I20.0 INTERMEDIATE CORONARY SYNDROME (H): ICD-10-CM

## 2023-01-01 DIAGNOSIS — G31.83 LEWY BODY DEMENTIA WITH BEHAVIORAL DISTURBANCE (H): Primary | ICD-10-CM

## 2023-01-01 DIAGNOSIS — Z12.11 SPECIAL SCREENING FOR MALIGNANT NEOPLASMS, COLON: Primary | ICD-10-CM

## 2023-01-01 DIAGNOSIS — I44.2 COMPLETE HEART BLOCK (H): Primary | ICD-10-CM

## 2023-01-01 DIAGNOSIS — S60.512A ABRASION OF LEFT HAND, INITIAL ENCOUNTER: ICD-10-CM

## 2023-01-01 DIAGNOSIS — N18.4 TYPE 2 DIABETES MELLITUS WITH STAGE 4 CHRONIC KIDNEY DISEASE, WITH LONG-TERM CURRENT USE OF INSULIN (H): ICD-10-CM

## 2023-01-01 DIAGNOSIS — I10 HYPERTENSION GOAL BP (BLOOD PRESSURE) < 130/80: ICD-10-CM

## 2023-01-01 DIAGNOSIS — F02.818 LEWY BODY DEMENTIA WITH BEHAVIORAL DISTURBANCE (H): Primary | ICD-10-CM

## 2023-01-01 DIAGNOSIS — Z95.0 CARDIAC PACEMAKER IN SITU: ICD-10-CM

## 2023-01-01 DIAGNOSIS — E11.42 DIABETIC POLYNEUROPATHY ASSOCIATED WITH TYPE 2 DIABETES MELLITUS (H): ICD-10-CM

## 2023-01-01 DIAGNOSIS — M50.20 HERNIATION OF CERVICAL INTERVERTEBRAL DISC: ICD-10-CM

## 2023-01-01 DIAGNOSIS — R25.1 TREMOR: ICD-10-CM

## 2023-01-01 DIAGNOSIS — S02.2XXA CLOSED FRACTURE OF NASAL BONE, INITIAL ENCOUNTER: ICD-10-CM

## 2023-01-01 DIAGNOSIS — F41.9 ANXIETY: ICD-10-CM

## 2023-01-01 DIAGNOSIS — E78.5 HYPERLIPIDEMIA LDL GOAL <100: ICD-10-CM

## 2023-01-01 DIAGNOSIS — R41.3 MEMORY LOSS: ICD-10-CM

## 2023-01-01 DIAGNOSIS — I73.9 PAD (PERIPHERAL ARTERY DISEASE) (H): Primary | ICD-10-CM

## 2023-01-01 DIAGNOSIS — Z12.11 SCREEN FOR COLON CANCER: ICD-10-CM

## 2023-01-01 DIAGNOSIS — S01.81XA LACERATION OF FOREHEAD, INITIAL ENCOUNTER: ICD-10-CM

## 2023-01-01 DIAGNOSIS — J81.0 ACUTE PULMONARY EDEMA (H): ICD-10-CM
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
## 2023-01-01 DIAGNOSIS — I73.9 PAD (PERIPHERAL ARTERY DISEASE) (H): ICD-10-CM

## 2023-01-01 DIAGNOSIS — I25.810 CORONARY ARTERY DISEASE INVOLVING CORONARY BYPASS GRAFT OF NATIVE HEART WITHOUT ANGINA PECTORIS: ICD-10-CM

## 2023-01-01 DIAGNOSIS — Z79.4 TYPE 2 DIABETES MELLITUS WITH STAGE 3B CHRONIC KIDNEY DISEASE, WITH LONG-TERM CURRENT USE OF INSULIN (H): Primary | ICD-10-CM

## 2023-01-01 DIAGNOSIS — E11.22 TYPE 2 DIABETES MELLITUS WITH STAGE 3B CHRONIC KIDNEY DISEASE, WITH LONG-TERM CURRENT USE OF INSULIN (H): Primary | ICD-10-CM

## 2023-01-01 DIAGNOSIS — R93.89 ABNORMAL FINDINGS ON EXAMINATION OF BODY STRUCTURE: Primary | ICD-10-CM

## 2023-01-01 DIAGNOSIS — N18.4 CKD (CHRONIC KIDNEY DISEASE) STAGE 4, GFR 15-29 ML/MIN (H): ICD-10-CM

## 2023-01-01 DIAGNOSIS — S00.81XA ABRASION OF FACE, INITIAL ENCOUNTER: ICD-10-CM

## 2023-01-01 DIAGNOSIS — G47.00 INSOMNIA, UNSPECIFIED TYPE: ICD-10-CM

## 2023-01-01 DIAGNOSIS — R73.9 HYPERGLYCEMIA: ICD-10-CM

## 2023-01-01 DIAGNOSIS — J44.1 COPD EXACERBATION (H): ICD-10-CM

## 2023-01-01 DIAGNOSIS — I21.4 NSTEMI (NON-ST ELEVATED MYOCARDIAL INFARCTION) (H): ICD-10-CM

## 2023-01-01 DIAGNOSIS — M72.0 DUPUYTREN'S CONTRACTURE OF LEFT HAND: ICD-10-CM

## 2023-01-01 DIAGNOSIS — R26.89 BALANCE PROBLEMS: ICD-10-CM

## 2023-01-01 DIAGNOSIS — M79.672 PAIN IN BOTH FEET: ICD-10-CM

## 2023-01-01 DIAGNOSIS — M79.671 PAIN IN BOTH FEET: ICD-10-CM

## 2023-01-01 DIAGNOSIS — R19.5 POSITIVE FIT (FECAL IMMUNOCHEMICAL TEST): ICD-10-CM

## 2023-01-01 DIAGNOSIS — S01.21XA LACERATION OF NOSE, INITIAL ENCOUNTER: ICD-10-CM

## 2023-01-01 DIAGNOSIS — F31.70 BIPOLAR AFFECTIVE DISORDER IN REMISSION (H): ICD-10-CM

## 2023-01-01 DIAGNOSIS — E11.42 DIABETIC POLYNEUROPATHY ASSOCIATED WITH TYPE 2 DIABETES MELLITUS (H): Primary | ICD-10-CM

## 2023-01-01 DIAGNOSIS — Z23 HIGH PRIORITY FOR 2019-NCOV VACCINE: ICD-10-CM

## 2023-01-01 LAB
ALBUMIN SERPL BCG-MCNC: 3.4 G/DL (ref 3.5–5.2)
ALBUMIN SERPL BCG-MCNC: 3.6 G/DL (ref 3.5–5.2)
ALBUMIN SERPL BCG-MCNC: 3.6 G/DL (ref 3.5–5.2)
ALBUMIN SERPL BCG-MCNC: 4 G/DL (ref 3.5–5.2)
ALBUMIN SERPL ELPH-MCNC: 3.3 G/DL (ref 3.7–5.1)
ALLEN'S TEST: YES
ALP SERPL-CCNC: 93 U/L (ref 40–129)
ALPHA1 GLOB SERPL ELPH-MCNC: 0.4 G/DL (ref 0.2–0.4)
ALPHA2 GLOB SERPL ELPH-MCNC: 0.9 G/DL (ref 0.5–0.9)
ALT SERPL W P-5'-P-CCNC: 13 U/L (ref 0–70)
ANION GAP SERPL CALCULATED.3IONS-SCNC: 13 MMOL/L (ref 7–15)
ANION GAP SERPL CALCULATED.3IONS-SCNC: 13 MMOL/L (ref 7–15)
ANION GAP SERPL CALCULATED.3IONS-SCNC: 14 MMOL/L (ref 7–15)
ANION GAP SERPL CALCULATED.3IONS-SCNC: 15 MMOL/L (ref 7–15)
ANION GAP SERPL CALCULATED.3IONS-SCNC: 16 MMOL/L (ref 7–15)
ANION GAP SERPL CALCULATED.3IONS-SCNC: 16 MMOL/L (ref 7–15)
ANION GAP SERPL CALCULATED.3IONS-SCNC: 17 MMOL/L (ref 7–15)
ANION GAP SERPL CALCULATED.3IONS-SCNC: 18 MMOL/L (ref 7–15)
ANION GAP SERPL CALCULATED.3IONS-SCNC: 20 MMOL/L (ref 7–15)
ANION GAP SERPL CALCULATED.3IONS-SCNC: 20 MMOL/L (ref 7–15)
ANION GAP SERPL CALCULATED.3IONS-SCNC: 21 MMOL/L (ref 7–15)
ANION GAP SERPL CALCULATED.3IONS-SCNC: 21 MMOL/L (ref 7–15)
ANION GAP SERPL CALCULATED.3IONS-SCNC: 23 MMOL/L (ref 7–15)
ANION GAP SERPL CALCULATED.3IONS-SCNC: 25 MMOL/L (ref 7–15)
AST SERPL W P-5'-P-CCNC: 18 U/L (ref 0–45)
ATRIAL RATE - MUSE: 117 BPM
ATRIAL RATE - MUSE: 220 BPM
ATRIAL RATE - MUSE: 227 BPM
ATRIAL RATE - MUSE: 326 BPM
ATRIAL RATE - MUSE: 33 BPM
ATRIAL RATE - MUSE: 41 BPM
ATRIAL RATE - MUSE: 60 BPM
ATRIAL RATE - MUSE: 72 BPM
ATRIAL RATE - MUSE: 75 BPM
ATRIAL RATE - MUSE: 82 BPM
ATRIAL RATE - MUSE: NORMAL BPM
B-GLOBULIN SERPL ELPH-MCNC: 0.9 G/DL (ref 0.6–1)
B-OH-BUTYR SERPL-SCNC: 0.3 MMOL/L
BASE EXCESS BLDA CALC-SCNC: 2.3 MMOL/L (ref -9–1.8)
BASE EXCESS BLDV CALC-SCNC: -5.5 MMOL/L (ref -7.7–1.9)
BASOPHILS # BLD AUTO: 0 10E3/UL (ref 0–0.2)
BASOPHILS # BLD AUTO: 0.1 10E3/UL (ref 0–0.2)
BASOPHILS NFR BLD AUTO: 0 %
BASOPHILS NFR BLD AUTO: 0 %
BILIRUB SERPL-MCNC: 0.8 MG/DL
BUN SERPL-MCNC: 20.7 MG/DL (ref 8–23)
BUN SERPL-MCNC: 23.2 MG/DL (ref 8–23)
BUN SERPL-MCNC: 28.9 MG/DL (ref 8–23)
BUN SERPL-MCNC: 30.1 MG/DL (ref 8–23)
BUN SERPL-MCNC: 30.7 MG/DL (ref 8–23)
BUN SERPL-MCNC: 32.4 MG/DL (ref 8–23)
BUN SERPL-MCNC: 34.6 MG/DL (ref 8–23)
BUN SERPL-MCNC: 35.8 MG/DL (ref 8–23)
BUN SERPL-MCNC: 36.4 MG/DL (ref 8–23)
BUN SERPL-MCNC: 39.3 MG/DL (ref 8–23)
BUN SERPL-MCNC: 45.4 MG/DL (ref 8–23)
BUN SERPL-MCNC: 45.6 MG/DL (ref 8–23)
BUN SERPL-MCNC: 47.4 MG/DL (ref 8–23)
BUN SERPL-MCNC: 49.7 MG/DL (ref 8–23)
BUN SERPL-MCNC: 51.2 MG/DL (ref 8–23)
BUN SERPL-MCNC: 51.3 MG/DL (ref 8–23)
BUN SERPL-MCNC: 51.8 MG/DL (ref 8–23)
BUN SERPL-MCNC: 52.5 MG/DL (ref 8–23)
BUN SERPL-MCNC: 55.3 MG/DL (ref 8–23)
BUN SERPL-MCNC: 61.2 MG/DL (ref 8–23)
BUN SERPL-MCNC: 70.2 MG/DL (ref 8–23)
CALCIUM SERPL-MCNC: 8 MG/DL (ref 8.8–10.2)
CALCIUM SERPL-MCNC: 8 MG/DL (ref 8.8–10.2)
CALCIUM SERPL-MCNC: 8.4 MG/DL (ref 8.8–10.2)
CALCIUM SERPL-MCNC: 8.4 MG/DL (ref 8.8–10.2)
CALCIUM SERPL-MCNC: 8.5 MG/DL (ref 8.8–10.2)
CALCIUM SERPL-MCNC: 8.6 MG/DL (ref 8.8–10.2)
CALCIUM SERPL-MCNC: 8.7 MG/DL (ref 8.8–10.2)
CALCIUM SERPL-MCNC: 8.7 MG/DL (ref 8.8–10.2)
CALCIUM SERPL-MCNC: 8.8 MG/DL (ref 8.8–10.2)
CALCIUM SERPL-MCNC: 8.9 MG/DL (ref 8.8–10.2)
CALCIUM SERPL-MCNC: 9 MG/DL (ref 8.8–10.2)
CALCIUM SERPL-MCNC: 9 MG/DL (ref 8.8–10.2)
CALCIUM SERPL-MCNC: 9.2 MG/DL (ref 8.8–10.2)
CALCIUM SERPL-MCNC: 9.3 MG/DL (ref 8.8–10.2)
CALCIUM SERPL-MCNC: 9.4 MG/DL (ref 8.8–10.2)
CHLORIDE SERPL-SCNC: 87 MMOL/L (ref 98–107)
CHLORIDE SERPL-SCNC: 89 MMOL/L (ref 98–107)
CHLORIDE SERPL-SCNC: 90 MMOL/L (ref 98–107)
CHLORIDE SERPL-SCNC: 91 MMOL/L (ref 98–107)
CHLORIDE SERPL-SCNC: 92 MMOL/L (ref 98–107)
CHLORIDE SERPL-SCNC: 93 MMOL/L (ref 98–107)
CHLORIDE SERPL-SCNC: 94 MMOL/L (ref 98–107)
CHLORIDE SERPL-SCNC: 94 MMOL/L (ref 98–107)
CHLORIDE SERPL-SCNC: 95 MMOL/L (ref 98–107)
CHLORIDE SERPL-SCNC: 98 MMOL/L (ref 98–107)
CHLORIDE SERPL-SCNC: 98 MMOL/L (ref 98–107)
CREAT SERPL-MCNC: 2.25 MG/DL (ref 0.67–1.17)
CREAT SERPL-MCNC: 2.3 MG/DL (ref 0.67–1.17)
CREAT SERPL-MCNC: 2.5 MG/DL (ref 0.67–1.17)
CREAT SERPL-MCNC: 2.5 MG/DL (ref 0.67–1.17)
CREAT SERPL-MCNC: 2.52 MG/DL (ref 0.67–1.17)
CREAT SERPL-MCNC: 2.54 MG/DL (ref 0.67–1.17)
CREAT SERPL-MCNC: 2.62 MG/DL (ref 0.67–1.17)
CREAT SERPL-MCNC: 2.8 MG/DL (ref 0.67–1.17)
CREAT SERPL-MCNC: 2.84 MG/DL (ref 0.67–1.17)
CREAT SERPL-MCNC: 2.87 MG/DL (ref 0.67–1.17)
CREAT SERPL-MCNC: 2.98 MG/DL (ref 0.67–1.17)
CREAT SERPL-MCNC: 2.99 MG/DL (ref 0.67–1.17)
CREAT SERPL-MCNC: 2.99 MG/DL (ref 0.67–1.17)
CREAT SERPL-MCNC: 3.01 MG/DL (ref 0.67–1.17)
CREAT SERPL-MCNC: 3.04 MG/DL (ref 0.67–1.17)
CREAT SERPL-MCNC: 3.06 MG/DL (ref 0.67–1.17)
CREAT SERPL-MCNC: 3.1 MG/DL (ref 0.67–1.17)
CREAT SERPL-MCNC: 3.13 MG/DL (ref 0.67–1.17)
CREAT SERPL-MCNC: 3.13 MG/DL (ref 0.67–1.17)
CREAT SERPL-MCNC: 3.81 MG/DL (ref 0.67–1.17)
CREAT SERPL-MCNC: 4.2 MG/DL (ref 0.67–1.17)
CREAT SERPL-MCNC: 4.87 MG/DL (ref 0.67–1.17)
CREAT UR-MCNC: 65.1 MG/DL
D DIMER PPP FEU-MCNC: 1.01 UG/ML FEU (ref 0–0.5)
DEPRECATED CALCIDIOL+CALCIFEROL SERPL-MC: 21 UG/L (ref 20–75)
DEPRECATED HCO3 PLAS-SCNC: 13 MMOL/L (ref 22–29)
DEPRECATED HCO3 PLAS-SCNC: 14 MMOL/L (ref 22–29)
DEPRECATED HCO3 PLAS-SCNC: 14 MMOL/L (ref 22–29)
DEPRECATED HCO3 PLAS-SCNC: 15 MMOL/L (ref 22–29)
DEPRECATED HCO3 PLAS-SCNC: 15 MMOL/L (ref 22–29)
DEPRECATED HCO3 PLAS-SCNC: 16 MMOL/L (ref 22–29)
DEPRECATED HCO3 PLAS-SCNC: 18 MMOL/L (ref 22–29)
DEPRECATED HCO3 PLAS-SCNC: 18 MMOL/L (ref 22–29)
DEPRECATED HCO3 PLAS-SCNC: 19 MMOL/L (ref 22–29)
DEPRECATED HCO3 PLAS-SCNC: 19 MMOL/L (ref 22–29)
DEPRECATED HCO3 PLAS-SCNC: 20 MMOL/L (ref 22–29)
DEPRECATED HCO3 PLAS-SCNC: 20 MMOL/L (ref 22–29)
DEPRECATED HCO3 PLAS-SCNC: 21 MMOL/L (ref 22–29)
DEPRECATED HCO3 PLAS-SCNC: 21 MMOL/L (ref 22–29)
DEPRECATED HCO3 PLAS-SCNC: 22 MMOL/L (ref 22–29)
DEPRECATED HCO3 PLAS-SCNC: 22 MMOL/L (ref 22–29)
DEPRECATED HCO3 PLAS-SCNC: 23 MMOL/L (ref 22–29)
DEPRECATED HCO3 PLAS-SCNC: 25 MMOL/L (ref 22–29)
DIASTOLIC BLOOD PRESSURE - MUSE: NORMAL MMHG
EOSINOPHIL # BLD AUTO: 0 10E3/UL (ref 0–0.7)
EOSINOPHIL # BLD AUTO: 0.1 10E3/UL (ref 0–0.7)
EOSINOPHIL NFR BLD AUTO: 0 %
EOSINOPHIL NFR BLD AUTO: 1 %
ERYTHROCYTE [DISTWIDTH] IN BLOOD BY AUTOMATED COUNT: 12.4 % (ref 10–15)
ERYTHROCYTE [DISTWIDTH] IN BLOOD BY AUTOMATED COUNT: 12.5 % (ref 10–15)
ERYTHROCYTE [DISTWIDTH] IN BLOOD BY AUTOMATED COUNT: 12.6 % (ref 10–15)
ERYTHROCYTE [DISTWIDTH] IN BLOOD BY AUTOMATED COUNT: 12.7 % (ref 10–15)
ERYTHROCYTE [DISTWIDTH] IN BLOOD BY AUTOMATED COUNT: 12.7 % (ref 10–15)
ERYTHROCYTE [DISTWIDTH] IN BLOOD BY AUTOMATED COUNT: 12.9 % (ref 10–15)
ERYTHROCYTE [DISTWIDTH] IN BLOOD BY AUTOMATED COUNT: 13.1 % (ref 10–15)
ERYTHROCYTE [DISTWIDTH] IN BLOOD BY AUTOMATED COUNT: 13.5 % (ref 10–15)
FLUAV RNA SPEC QL NAA+PROBE: NEGATIVE
FLUBV RNA RESP QL NAA+PROBE: NEGATIVE
GAMMA GLOB SERPL ELPH-MCNC: 0.5 G/DL (ref 0.7–1.6)
GFR SERPL CREATININE-BSD FRML MDRD: 12 ML/MIN/1.73M2
GFR SERPL CREATININE-BSD FRML MDRD: 15 ML/MIN/1.73M2
GFR SERPL CREATININE-BSD FRML MDRD: 16 ML/MIN/1.73M2
GFR SERPL CREATININE-BSD FRML MDRD: 21 ML/MIN/1.73M2
GFR SERPL CREATININE-BSD FRML MDRD: 22 ML/MIN/1.73M2
GFR SERPL CREATININE-BSD FRML MDRD: 23 ML/MIN/1.73M2
GFR SERPL CREATININE-BSD FRML MDRD: 23 ML/MIN/1.73M2
GFR SERPL CREATININE-BSD FRML MDRD: 24 ML/MIN/1.73M2
GFR SERPL CREATININE-BSD FRML MDRD: 26 ML/MIN/1.73M2
GFR SERPL CREATININE-BSD FRML MDRD: 27 ML/MIN/1.73M2
GFR SERPL CREATININE-BSD FRML MDRD: 30 ML/MIN/1.73M2
GFR SERPL CREATININE-BSD FRML MDRD: 31 ML/MIN/1.73M2
GLUCOSE BLDC GLUCOMTR-MCNC: 117 MG/DL (ref 70–99)
GLUCOSE BLDC GLUCOMTR-MCNC: 117 MG/DL (ref 70–99)
GLUCOSE BLDC GLUCOMTR-MCNC: 121 MG/DL (ref 70–99)
GLUCOSE BLDC GLUCOMTR-MCNC: 123 MG/DL (ref 70–99)
GLUCOSE BLDC GLUCOMTR-MCNC: 125 MG/DL (ref 70–99)
GLUCOSE BLDC GLUCOMTR-MCNC: 125 MG/DL (ref 70–99)
GLUCOSE BLDC GLUCOMTR-MCNC: 127 MG/DL (ref 70–99)
GLUCOSE BLDC GLUCOMTR-MCNC: 127 MG/DL (ref 70–99)
GLUCOSE BLDC GLUCOMTR-MCNC: 129 MG/DL (ref 70–99)
GLUCOSE BLDC GLUCOMTR-MCNC: 131 MG/DL (ref 70–99)
GLUCOSE BLDC GLUCOMTR-MCNC: 133 MG/DL (ref 70–99)
GLUCOSE BLDC GLUCOMTR-MCNC: 134 MG/DL (ref 70–99)
GLUCOSE BLDC GLUCOMTR-MCNC: 137 MG/DL (ref 70–99)
GLUCOSE BLDC GLUCOMTR-MCNC: 141 MG/DL (ref 70–99)
GLUCOSE BLDC GLUCOMTR-MCNC: 142 MG/DL (ref 70–99)
GLUCOSE BLDC GLUCOMTR-MCNC: 144 MG/DL (ref 70–99)
GLUCOSE BLDC GLUCOMTR-MCNC: 146 MG/DL (ref 70–99)
GLUCOSE BLDC GLUCOMTR-MCNC: 149 MG/DL (ref 70–99)
GLUCOSE BLDC GLUCOMTR-MCNC: 150 MG/DL (ref 70–99)
GLUCOSE BLDC GLUCOMTR-MCNC: 152 MG/DL (ref 70–99)
GLUCOSE BLDC GLUCOMTR-MCNC: 152 MG/DL (ref 70–99)
GLUCOSE BLDC GLUCOMTR-MCNC: 154 MG/DL (ref 70–99)
GLUCOSE BLDC GLUCOMTR-MCNC: 154 MG/DL (ref 70–99)
GLUCOSE BLDC GLUCOMTR-MCNC: 156 MG/DL (ref 70–99)
GLUCOSE BLDC GLUCOMTR-MCNC: 158 MG/DL (ref 70–99)
GLUCOSE BLDC GLUCOMTR-MCNC: 160 MG/DL (ref 70–99)
GLUCOSE BLDC GLUCOMTR-MCNC: 162 MG/DL (ref 70–99)
GLUCOSE BLDC GLUCOMTR-MCNC: 162 MG/DL (ref 70–99)
GLUCOSE BLDC GLUCOMTR-MCNC: 164 MG/DL (ref 70–99)
GLUCOSE BLDC GLUCOMTR-MCNC: 164 MG/DL (ref 70–99)
GLUCOSE BLDC GLUCOMTR-MCNC: 166 MG/DL (ref 70–99)
GLUCOSE BLDC GLUCOMTR-MCNC: 168 MG/DL (ref 70–99)
GLUCOSE BLDC GLUCOMTR-MCNC: 170 MG/DL (ref 70–99)
GLUCOSE BLDC GLUCOMTR-MCNC: 170 MG/DL (ref 70–99)
GLUCOSE BLDC GLUCOMTR-MCNC: 171 MG/DL (ref 70–99)
GLUCOSE BLDC GLUCOMTR-MCNC: 171 MG/DL (ref 70–99)
GLUCOSE BLDC GLUCOMTR-MCNC: 172 MG/DL (ref 70–99)
GLUCOSE BLDC GLUCOMTR-MCNC: 173 MG/DL (ref 70–99)
GLUCOSE BLDC GLUCOMTR-MCNC: 173 MG/DL (ref 70–99)
GLUCOSE BLDC GLUCOMTR-MCNC: 174 MG/DL (ref 70–99)
GLUCOSE BLDC GLUCOMTR-MCNC: 175 MG/DL (ref 70–99)
GLUCOSE BLDC GLUCOMTR-MCNC: 175 MG/DL (ref 70–99)
GLUCOSE BLDC GLUCOMTR-MCNC: 176 MG/DL (ref 70–99)
GLUCOSE BLDC GLUCOMTR-MCNC: 179 MG/DL (ref 70–99)
GLUCOSE BLDC GLUCOMTR-MCNC: 180 MG/DL (ref 70–99)
GLUCOSE BLDC GLUCOMTR-MCNC: 180 MG/DL (ref 70–99)
GLUCOSE BLDC GLUCOMTR-MCNC: 181 MG/DL (ref 70–99)
GLUCOSE BLDC GLUCOMTR-MCNC: 185 MG/DL (ref 70–99)
GLUCOSE BLDC GLUCOMTR-MCNC: 188 MG/DL (ref 70–99)
GLUCOSE BLDC GLUCOMTR-MCNC: 191 MG/DL (ref 70–99)
GLUCOSE BLDC GLUCOMTR-MCNC: 191 MG/DL (ref 70–99)
GLUCOSE BLDC GLUCOMTR-MCNC: 194 MG/DL (ref 70–99)
GLUCOSE BLDC GLUCOMTR-MCNC: 194 MG/DL (ref 70–99)
GLUCOSE BLDC GLUCOMTR-MCNC: 196 MG/DL (ref 70–99)
GLUCOSE BLDC GLUCOMTR-MCNC: 197 MG/DL (ref 70–99)
GLUCOSE BLDC GLUCOMTR-MCNC: 199 MG/DL (ref 70–99)
GLUCOSE BLDC GLUCOMTR-MCNC: 200 MG/DL (ref 70–99)
GLUCOSE BLDC GLUCOMTR-MCNC: 203 MG/DL (ref 70–99)
GLUCOSE BLDC GLUCOMTR-MCNC: 204 MG/DL (ref 70–99)
GLUCOSE BLDC GLUCOMTR-MCNC: 205 MG/DL (ref 70–99)
GLUCOSE BLDC GLUCOMTR-MCNC: 206 MG/DL (ref 70–99)
GLUCOSE BLDC GLUCOMTR-MCNC: 206 MG/DL (ref 70–99)
GLUCOSE BLDC GLUCOMTR-MCNC: 210 MG/DL (ref 70–99)
GLUCOSE BLDC GLUCOMTR-MCNC: 210 MG/DL (ref 70–99)
GLUCOSE BLDC GLUCOMTR-MCNC: 216 MG/DL (ref 70–99)
GLUCOSE BLDC GLUCOMTR-MCNC: 217 MG/DL (ref 70–99)
GLUCOSE BLDC GLUCOMTR-MCNC: 221 MG/DL (ref 70–99)
GLUCOSE BLDC GLUCOMTR-MCNC: 224 MG/DL (ref 70–99)
GLUCOSE BLDC GLUCOMTR-MCNC: 224 MG/DL (ref 70–99)
GLUCOSE BLDC GLUCOMTR-MCNC: 226 MG/DL (ref 70–99)
GLUCOSE BLDC GLUCOMTR-MCNC: 228 MG/DL (ref 70–99)
GLUCOSE BLDC GLUCOMTR-MCNC: 234 MG/DL (ref 70–99)
GLUCOSE BLDC GLUCOMTR-MCNC: 241 MG/DL (ref 70–99)
GLUCOSE BLDC GLUCOMTR-MCNC: 242 MG/DL (ref 70–99)
GLUCOSE BLDC GLUCOMTR-MCNC: 248 MG/DL (ref 70–99)
GLUCOSE BLDC GLUCOMTR-MCNC: 265 MG/DL (ref 70–99)
GLUCOSE BLDC GLUCOMTR-MCNC: 269 MG/DL (ref 70–99)
GLUCOSE BLDC GLUCOMTR-MCNC: 272 MG/DL (ref 70–99)
GLUCOSE BLDC GLUCOMTR-MCNC: 279 MG/DL (ref 70–99)
GLUCOSE BLDC GLUCOMTR-MCNC: 288 MG/DL (ref 70–99)
GLUCOSE BLDC GLUCOMTR-MCNC: 294 MG/DL (ref 70–99)
GLUCOSE BLDC GLUCOMTR-MCNC: 314 MG/DL (ref 70–99)
GLUCOSE BLDC GLUCOMTR-MCNC: 319 MG/DL (ref 70–99)
GLUCOSE BLDC GLUCOMTR-MCNC: 321 MG/DL (ref 70–99)
GLUCOSE BLDC GLUCOMTR-MCNC: 337 MG/DL (ref 70–99)
GLUCOSE BLDC GLUCOMTR-MCNC: 347 MG/DL (ref 70–99)
GLUCOSE BLDC GLUCOMTR-MCNC: 354 MG/DL (ref 70–99)
GLUCOSE BLDC GLUCOMTR-MCNC: 442 MG/DL (ref 70–99)
GLUCOSE BLDC GLUCOMTR-MCNC: 466 MG/DL (ref 70–99)
GLUCOSE BLDC GLUCOMTR-MCNC: 89 MG/DL (ref 70–99)
GLUCOSE BLDC GLUCOMTR-MCNC: 94 MG/DL (ref 70–99)
GLUCOSE BLDC GLUCOMTR-MCNC: 99 MG/DL (ref 70–99)
GLUCOSE SERPL-MCNC: 121 MG/DL (ref 70–99)
GLUCOSE SERPL-MCNC: 137 MG/DL (ref 70–99)
GLUCOSE SERPL-MCNC: 143 MG/DL (ref 70–99)
GLUCOSE SERPL-MCNC: 153 MG/DL (ref 70–99)
GLUCOSE SERPL-MCNC: 157 MG/DL (ref 70–99)
GLUCOSE SERPL-MCNC: 170 MG/DL (ref 70–99)
GLUCOSE SERPL-MCNC: 171 MG/DL (ref 70–99)
GLUCOSE SERPL-MCNC: 176 MG/DL (ref 70–99)
GLUCOSE SERPL-MCNC: 178 MG/DL (ref 70–99)
GLUCOSE SERPL-MCNC: 184 MG/DL (ref 70–99)
GLUCOSE SERPL-MCNC: 193 MG/DL (ref 70–99)
GLUCOSE SERPL-MCNC: 201 MG/DL (ref 70–99)
GLUCOSE SERPL-MCNC: 210 MG/DL (ref 70–99)
GLUCOSE SERPL-MCNC: 231 MG/DL (ref 70–99)
GLUCOSE SERPL-MCNC: 253 MG/DL (ref 70–99)
GLUCOSE SERPL-MCNC: 264 MG/DL (ref 70–99)
GLUCOSE SERPL-MCNC: 268 MG/DL (ref 70–99)
GLUCOSE SERPL-MCNC: 406 MG/DL (ref 70–99)
GLUCOSE SERPL-MCNC: 510 MG/DL (ref 70–99)
GLUCOSE SERPL-MCNC: 655 MG/DL (ref 70–99)
GLUCOSE SERPL-MCNC: 91 MG/DL (ref 70–99)
HBA1C MFR BLD: 10.3 %
HBA1C MFR BLD: 12 % (ref 0–5.6)
HBV CORE AB SERPL QL IA: NONREACTIVE
HBV SURFACE AB SERPL IA-ACNC: 0.99 M[IU]/ML
HBV SURFACE AB SERPL IA-ACNC: NONREACTIVE M[IU]/ML
HBV SURFACE AG SERPL QL IA: NONREACTIVE
HCO3 BLD-SCNC: 27 MMOL/L (ref 21–28)
HCO3 BLDV-SCNC: 19 MMOL/L (ref 21–28)
HCO3 BLDV-SCNC: 20 MMOL/L (ref 21–28)
HCT VFR BLD AUTO: 30.4 % (ref 40–53)
HCT VFR BLD AUTO: 31.4 % (ref 40–53)
HCT VFR BLD AUTO: 31.6 % (ref 40–53)
HCT VFR BLD AUTO: 32.5 % (ref 40–53)
HCT VFR BLD AUTO: 32.9 % (ref 40–53)
HCT VFR BLD AUTO: 33.5 % (ref 40–53)
HCT VFR BLD AUTO: 33.9 % (ref 40–53)
HCT VFR BLD AUTO: 34 % (ref 40–53)
HCT VFR BLD AUTO: 36.3 % (ref 40–53)
HCT VFR BLD AUTO: 38.8 % (ref 40–53)
HCT VFR BLD AUTO: 38.8 % (ref 40–53)
HEMOCCULT STL QL IA: POSITIVE
HGB BLD-MCNC: 10 G/DL (ref 13.3–17.7)
HGB BLD-MCNC: 10.3 G/DL (ref 13.3–17.7)
HGB BLD-MCNC: 10.6 G/DL (ref 13.3–17.7)
HGB BLD-MCNC: 10.7 G/DL (ref 13.3–17.7)
HGB BLD-MCNC: 10.8 G/DL (ref 13.3–17.7)
HGB BLD-MCNC: 10.9 G/DL (ref 13.3–17.7)
HGB BLD-MCNC: 11.3 G/DL (ref 13.3–17.7)
HGB BLD-MCNC: 11.3 G/DL (ref 13.3–17.7)
HGB BLD-MCNC: 12.1 G/DL (ref 13.3–17.7)
HGB BLD-MCNC: 12.7 G/DL (ref 13.3–17.7)
HGB BLD-MCNC: 12.8 G/DL (ref 13.3–17.7)
HGB BLD-MCNC: 13.2 G/DL (ref 13.3–17.7)
HOLD SPECIMEN: NORMAL
IMM GRANULOCYTES # BLD: 0.1 10E3/UL
IMM GRANULOCYTES # BLD: 0.1 10E3/UL
IMM GRANULOCYTES NFR BLD: 1 %
IMM GRANULOCYTES NFR BLD: 1 %
INTERPRETATION ECG - MUSE: NORMAL
LACTATE BLD-SCNC: <0.3 MMOL/L
LACTATE SERPL-SCNC: 1.1 MMOL/L (ref 0.7–2)
LVEF ECHO: NORMAL
LVEF ECHO: NORMAL
LYMPHOCYTES # BLD AUTO: 0.7 10E3/UL (ref 0.8–5.3)
LYMPHOCYTES # BLD AUTO: 1.7 10E3/UL (ref 0.8–5.3)
LYMPHOCYTES NFR BLD AUTO: 11 %
LYMPHOCYTES NFR BLD AUTO: 9 %
M PROTEIN SERPL ELPH-MCNC: 0 G/DL
MAGNESIUM SERPL-MCNC: 2.3 MG/DL (ref 1.7–2.3)
MAGNESIUM SERPL-MCNC: 2.3 MG/DL (ref 1.7–2.3)
MAGNESIUM SERPL-MCNC: 2.6 MG/DL (ref 1.7–2.3)
MCH RBC QN AUTO: 28.1 PG (ref 26.5–33)
MCH RBC QN AUTO: 28.5 PG (ref 26.5–33)
MCH RBC QN AUTO: 28.7 PG (ref 26.5–33)
MCH RBC QN AUTO: 28.8 PG (ref 26.5–33)
MCH RBC QN AUTO: 29 PG (ref 26.5–33)
MCH RBC QN AUTO: 29 PG (ref 26.5–33)
MCHC RBC AUTO-ENTMCNC: 32.2 G/DL (ref 31.5–36.5)
MCHC RBC AUTO-ENTMCNC: 32.7 G/DL (ref 31.5–36.5)
MCHC RBC AUTO-ENTMCNC: 32.8 G/DL (ref 31.5–36.5)
MCHC RBC AUTO-ENTMCNC: 32.9 G/DL (ref 31.5–36.5)
MCHC RBC AUTO-ENTMCNC: 32.9 G/DL (ref 31.5–36.5)
MCHC RBC AUTO-ENTMCNC: 33 G/DL (ref 31.5–36.5)
MCHC RBC AUTO-ENTMCNC: 33.1 G/DL (ref 31.5–36.5)
MCHC RBC AUTO-ENTMCNC: 33.2 G/DL (ref 31.5–36.5)
MCHC RBC AUTO-ENTMCNC: 33.3 G/DL (ref 31.5–36.5)
MCHC RBC AUTO-ENTMCNC: 33.3 G/DL (ref 31.5–36.5)
MCHC RBC AUTO-ENTMCNC: 33.5 G/DL (ref 31.5–36.5)
MCV RBC AUTO: 85 FL (ref 78–100)
MCV RBC AUTO: 86 FL (ref 78–100)
MCV RBC AUTO: 87 FL (ref 78–100)
MCV RBC AUTO: 88 FL (ref 78–100)
MICROALBUMIN UR-MCNC: 2297 MG/L
MICROALBUMIN/CREAT UR: 3528.42 MG/G CR (ref 0–17)
MONOCYTES # BLD AUTO: 0.2 10E3/UL (ref 0–1.3)
MONOCYTES # BLD AUTO: 1.1 10E3/UL (ref 0–1.3)
MONOCYTES NFR BLD AUTO: 2 %
MONOCYTES NFR BLD AUTO: 8 %
NEUTROPHILS # BLD AUTO: 11.7 10E3/UL (ref 1.6–8.3)
NEUTROPHILS # BLD AUTO: 6.5 10E3/UL (ref 1.6–8.3)
NEUTROPHILS NFR BLD AUTO: 79 %
NEUTROPHILS NFR BLD AUTO: 88 %
NRBC # BLD AUTO: 0 10E3/UL
NRBC # BLD AUTO: 0 10E3/UL
NRBC BLD AUTO-RTO: 0 /100
NRBC BLD AUTO-RTO: 0 /100
NT-PROBNP SERPL-MCNC: ABNORMAL PG/ML (ref 0–900)
NT-PROBNP SERPL-MCNC: ABNORMAL PG/ML (ref 0–900)
O2/TOTAL GAS SETTING VFR VENT: 40 %
O2/TOTAL GAS SETTING VFR VENT: 95 %
OSMOLALITY SERPL: 308 MMOL/KG (ref 280–301)
OXYHGB MFR BLD: 57 % (ref 92–100)
P AXIS - MUSE: -80 DEGREES
P AXIS - MUSE: 58 DEGREES
P AXIS - MUSE: 85 DEGREES
P AXIS - MUSE: NORMAL DEGREES
PCO2 BLD: 39 MM HG (ref 35–45)
PCO2 BLDV: 32 MM HG (ref 40–50)
PCO2 BLDV: 37 MM HG (ref 40–50)
PH BLD: 7.44 [PH] (ref 7.35–7.45)
PH BLDV: 7.33 [PH] (ref 7.32–7.43)
PH BLDV: 7.38 [PH] (ref 7.32–7.43)
PHOSPHATE SERPL-MCNC: 3.9 MG/DL (ref 2.5–4.5)
PHOSPHATE SERPL-MCNC: 4.8 MG/DL (ref 2.5–4.5)
PHOSPHATE SERPL-MCNC: 5 MG/DL (ref 2.5–4.5)
PHOSPHATE SERPL-MCNC: 5.1 MG/DL (ref 2.5–4.5)
PHOSPHATE SERPL-MCNC: 5.4 MG/DL (ref 2.5–4.5)
PHOSPHATE SERPL-MCNC: 5.4 MG/DL (ref 2.5–4.5)
PLATELET # BLD AUTO: 101 10E3/UL (ref 150–450)
PLATELET # BLD AUTO: 111 10E3/UL (ref 150–450)
PLATELET # BLD AUTO: 117 10E3/UL (ref 150–450)
PLATELET # BLD AUTO: 120 10E3/UL (ref 150–450)
PLATELET # BLD AUTO: 124 10E3/UL (ref 150–450)
PLATELET # BLD AUTO: 126 10E3/UL (ref 150–450)
PLATELET # BLD AUTO: 131 10E3/UL (ref 150–450)
PLATELET # BLD AUTO: 142 10E3/UL (ref 150–450)
PLATELET # BLD AUTO: 182 10E3/UL (ref 150–450)
PLATELET # BLD AUTO: 190 10E3/UL (ref 150–450)
PLATELET # BLD AUTO: 198 10E3/UL (ref 150–450)
PO2 BLD: 31 MM HG (ref 80–105)
PO2 BLDV: 64 MM HG (ref 25–47)
PO2 BLDV: 8 MM HG (ref 25–47)
POTASSIUM SERPL-SCNC: 3.3 MMOL/L (ref 3.4–5.3)
POTASSIUM SERPL-SCNC: 3.3 MMOL/L (ref 3.4–5.3)
POTASSIUM SERPL-SCNC: 3.6 MMOL/L (ref 3.4–5.3)
POTASSIUM SERPL-SCNC: 3.7 MMOL/L (ref 3.4–5.3)
POTASSIUM SERPL-SCNC: 3.8 MMOL/L (ref 3.4–5.3)
POTASSIUM SERPL-SCNC: 3.9 MMOL/L (ref 3.4–5.3)
POTASSIUM SERPL-SCNC: 4 MMOL/L (ref 3.4–5.3)
POTASSIUM SERPL-SCNC: 4 MMOL/L (ref 3.4–5.3)
POTASSIUM SERPL-SCNC: 4.1 MMOL/L (ref 3.4–5.3)
POTASSIUM SERPL-SCNC: 4.1 MMOL/L (ref 3.4–5.3)
POTASSIUM SERPL-SCNC: 4.2 MMOL/L (ref 3.4–5.3)
POTASSIUM SERPL-SCNC: 4.3 MMOL/L (ref 3.4–5.3)
POTASSIUM SERPL-SCNC: 4.5 MMOL/L (ref 3.4–5.3)
POTASSIUM SERPL-SCNC: 4.7 MMOL/L (ref 3.4–5.3)
POTASSIUM SERPL-SCNC: 5 MMOL/L (ref 3.4–5.3)
POTASSIUM SERPL-SCNC: 5.3 MMOL/L (ref 3.4–5.3)
PR INTERVAL - MUSE: 214 MS
PR INTERVAL - MUSE: NORMAL MS
PROCALCITONIN SERPL IA-MCNC: 0.26 NG/ML
PROT PATTERN SERPL ELPH-IMP: ABNORMAL
PROT PATTERN UR ELPH-IMP: NORMAL
PROT SERPL-MCNC: 7.4 G/DL (ref 6.4–8.3)
PTH-INTACT SERPL-MCNC: 129 PG/ML (ref 15–65)
QRS DURATION - MUSE: 126 MS
QRS DURATION - MUSE: 136 MS
QRS DURATION - MUSE: 140 MS
QRS DURATION - MUSE: 142 MS
QRS DURATION - MUSE: 144 MS
QRS DURATION - MUSE: 170 MS
QRS DURATION - MUSE: 176 MS
QT - MUSE: 416 MS
QT - MUSE: 432 MS
QT - MUSE: 440 MS
QT - MUSE: 478 MS
QT - MUSE: 482 MS
QT - MUSE: 560 MS
QT - MUSE: 592 MS
QT - MUSE: 618 MS
QT - MUSE: 664 MS
QT - MUSE: 670 MS
QT - MUSE: 726 MS
QTC - MUSE: 461 MS
QTC - MUSE: 485 MS
QTC - MUSE: 485 MS
QTC - MUSE: 488 MS
QTC - MUSE: 491 MS
QTC - MUSE: 504 MS
QTC - MUSE: 511 MS
QTC - MUSE: 521 MS
QTC - MUSE: 537 MS
QTC - MUSE: 545 MS
QTC - MUSE: 560 MS
R AXIS - MUSE: -27 DEGREES
R AXIS - MUSE: -67 DEGREES
R AXIS - MUSE: -79 DEGREES
R AXIS - MUSE: -79 DEGREES
R AXIS - MUSE: 101 DEGREES
R AXIS - MUSE: 106 DEGREES
R AXIS - MUSE: 120 DEGREES
R AXIS - MUSE: 160 DEGREES
R AXIS - MUSE: 173 DEGREES
R AXIS - MUSE: 219 DEGREES
R AXIS - MUSE: 261 DEGREES
RBC # BLD AUTO: 3.56 10E6/UL (ref 4.4–5.9)
RBC # BLD AUTO: 3.67 10E6/UL (ref 4.4–5.9)
RBC # BLD AUTO: 3.69 10E6/UL (ref 4.4–5.9)
RBC # BLD AUTO: 3.76 10E6/UL (ref 4.4–5.9)
RBC # BLD AUTO: 3.83 10E6/UL (ref 4.4–5.9)
RBC # BLD AUTO: 3.84 10E6/UL (ref 4.4–5.9)
RBC # BLD AUTO: 3.89 10E6/UL (ref 4.4–5.9)
RBC # BLD AUTO: 3.96 10E6/UL (ref 4.4–5.9)
RBC # BLD AUTO: 4.2 10E6/UL (ref 4.4–5.9)
RBC # BLD AUTO: 4.42 10E6/UL (ref 4.4–5.9)
RBC # BLD AUTO: 4.46 10E6/UL (ref 4.4–5.9)
RSV RNA SPEC NAA+PROBE: NEGATIVE
SAO2 % BLDV: 6 % (ref 94–100)
SARS-COV-2 RNA RESP QL NAA+PROBE: NEGATIVE
SODIUM SERPL-SCNC: 124 MMOL/L (ref 136–145)
SODIUM SERPL-SCNC: 125 MMOL/L (ref 136–145)
SODIUM SERPL-SCNC: 127 MMOL/L (ref 136–145)
SODIUM SERPL-SCNC: 128 MMOL/L (ref 136–145)
SODIUM SERPL-SCNC: 129 MMOL/L (ref 136–145)
SODIUM SERPL-SCNC: 129 MMOL/L (ref 136–145)
SODIUM SERPL-SCNC: 130 MMOL/L (ref 136–145)
SODIUM SERPL-SCNC: 131 MMOL/L (ref 136–145)
SODIUM SERPL-SCNC: 133 MMOL/L (ref 136–145)
SODIUM SERPL-SCNC: 136 MMOL/L (ref 136–145)
SYSTOLIC BLOOD PRESSURE - MUSE: NORMAL MMHG
T AXIS - MUSE: -27 DEGREES
T AXIS - MUSE: -36 DEGREES
T AXIS - MUSE: 1 DEGREES
T AXIS - MUSE: 116 DEGREES
T AXIS - MUSE: 174 DEGREES
T AXIS - MUSE: 252 DEGREES
T AXIS - MUSE: 261 DEGREES
T AXIS - MUSE: 58 DEGREES
T AXIS - MUSE: 79 DEGREES
T AXIS - MUSE: 88 DEGREES
T AXIS - MUSE: 96 DEGREES
TOTAL PROTEIN SERUM FOR ELP: 6.1 G/DL (ref 6.4–8.3)
TROPONIN T SERPL HS-MCNC: 102 NG/L
TROPONIN T SERPL HS-MCNC: 103 NG/L
TROPONIN T SERPL HS-MCNC: 107 NG/L
TROPONIN T SERPL HS-MCNC: 118 NG/L
UFH PPP CHRO-ACNC: 0.48 IU/ML
UFH PPP CHRO-ACNC: 0.5 IU/ML
UFH PPP CHRO-ACNC: 0.71 IU/ML
UFH PPP CHRO-ACNC: >1.1 IU/ML
VENTRICULAR RATE- MUSE: 33 BPM
VENTRICULAR RATE- MUSE: 35 BPM
VENTRICULAR RATE- MUSE: 37 BPM
VENTRICULAR RATE- MUSE: 37 BPM
VENTRICULAR RATE- MUSE: 51 BPM
VENTRICULAR RATE- MUSE: 55 BPM
VENTRICULAR RATE- MUSE: 60 BPM
VENTRICULAR RATE- MUSE: 62 BPM
VENTRICULAR RATE- MUSE: 75 BPM
VENTRICULAR RATE- MUSE: 82 BPM
VENTRICULAR RATE- MUSE: 83 BPM
WBC # BLD AUTO: 10.1 10E3/UL (ref 4–11)
WBC # BLD AUTO: 10.7 10E3/UL (ref 4–11)
WBC # BLD AUTO: 14.5 10E3/UL (ref 4–11)
WBC # BLD AUTO: 14.8 10E3/UL (ref 4–11)
WBC # BLD AUTO: 6.2 10E3/UL (ref 4–11)
WBC # BLD AUTO: 6.2 10E3/UL (ref 4–11)
WBC # BLD AUTO: 7.4 10E3/UL (ref 4–11)
WBC # BLD AUTO: 8.2 10E3/UL (ref 4–11)
WBC # BLD AUTO: 9.6 10E3/UL (ref 4–11)
WBC # BLD AUTO: 9.7 10E3/UL (ref 4–11)
WBC # BLD AUTO: 9.8 10E3/UL (ref 4–11)

## 2023-01-01 PROCEDURE — 99233 SBSQ HOSP IP/OBS HIGH 50: CPT | Performed by: INTERNAL MEDICINE

## 2023-01-01 PROCEDURE — 71045 X-RAY EXAM CHEST 1 VIEW: CPT

## 2023-01-01 PROCEDURE — 258N000003 HC RX IP 258 OP 636: Performed by: INTERNAL MEDICINE

## 2023-01-01 PROCEDURE — 36415 COLL VENOUS BLD VENIPUNCTURE: CPT | Performed by: HOSPITALIST

## 2023-01-01 PROCEDURE — 210N000001 HC R&B IMCU HEART CARE

## 2023-01-01 PROCEDURE — 250N000013 HC RX MED GY IP 250 OP 250 PS 637: Performed by: HOSPITALIST

## 2023-01-01 PROCEDURE — 99152 MOD SED SAME PHYS/QHP 5/>YRS: CPT | Performed by: INTERNAL MEDICINE

## 2023-01-01 PROCEDURE — 83735 ASSAY OF MAGNESIUM: CPT | Performed by: INTERNAL MEDICINE

## 2023-01-01 PROCEDURE — 999N000157 HC STATISTIC RCP TIME EA 10 MIN

## 2023-01-01 PROCEDURE — 94640 AIRWAY INHALATION TREATMENT: CPT | Mod: 76

## 2023-01-01 PROCEDURE — 250N000009 HC RX 250: Performed by: HOSPITALIST

## 2023-01-01 PROCEDURE — 250N000013 HC RX MED GY IP 250 OP 250 PS 637: Performed by: INTERNAL MEDICINE

## 2023-01-01 PROCEDURE — 210N000002 HC R&B HEART CARE

## 2023-01-01 PROCEDURE — 99497 ADVNCD CARE PLAN 30 MIN: CPT | Mod: 25 | Performed by: NURSE PRACTITIONER

## 2023-01-01 PROCEDURE — 85014 HEMATOCRIT: CPT | Performed by: HOSPITALIST

## 2023-01-01 PROCEDURE — 272N000001 HC OR GENERAL SUPPLY STERILE: Performed by: INTERNAL MEDICINE

## 2023-01-01 PROCEDURE — 85027 COMPLETE CBC AUTOMATED: CPT | Performed by: NURSE PRACTITIONER

## 2023-01-01 PROCEDURE — 93005 ELECTROCARDIOGRAM TRACING: CPT

## 2023-01-01 PROCEDURE — 87637 SARSCOV2&INF A&B&RSV AMP PRB: CPT | Performed by: EMERGENCY MEDICINE

## 2023-01-01 PROCEDURE — 250N000011 HC RX IP 250 OP 636: Performed by: HOSPITALIST

## 2023-01-01 PROCEDURE — 82803 BLOOD GASES ANY COMBINATION: CPT

## 2023-01-01 PROCEDURE — 94640 AIRWAY INHALATION TREATMENT: CPT

## 2023-01-01 PROCEDURE — 250N000013 HC RX MED GY IP 250 OP 250 PS 637: Performed by: EMERGENCY MEDICINE

## 2023-01-01 PROCEDURE — 84165 PROTEIN E-PHORESIS SERUM: CPT | Mod: 26

## 2023-01-01 PROCEDURE — 93970 EXTREMITY STUDY: CPT

## 2023-01-01 PROCEDURE — 99417 PROLNG OP E/M EACH 15 MIN: CPT | Performed by: NURSE PRACTITIONER

## 2023-01-01 PROCEDURE — 84132 ASSAY OF SERUM POTASSIUM: CPT | Performed by: HOSPITALIST

## 2023-01-01 PROCEDURE — 272N000196 HC ACCESSORY CR5

## 2023-01-01 PROCEDURE — 97530 THERAPEUTIC ACTIVITIES: CPT | Mod: GO | Performed by: OCCUPATIONAL THERAPIST

## 2023-01-01 PROCEDURE — 99232 SBSQ HOSP IP/OBS MODERATE 35: CPT | Performed by: HOSPITALIST

## 2023-01-01 PROCEDURE — 36415 COLL VENOUS BLD VENIPUNCTURE: CPT | Performed by: INTERNAL MEDICINE

## 2023-01-01 PROCEDURE — 97116 GAIT TRAINING THERAPY: CPT | Mod: GP

## 2023-01-01 PROCEDURE — 0JH604Z INSERTION OF PACEMAKER, SINGLE CHAMBER INTO CHEST SUBCUTANEOUS TISSUE AND FASCIA, OPEN APPROACH: ICD-10-PCS | Performed by: INTERNAL MEDICINE

## 2023-01-01 PROCEDURE — 99233 SBSQ HOSP IP/OBS HIGH 50: CPT | Mod: FS | Performed by: NURSE PRACTITIONER

## 2023-01-01 PROCEDURE — 250N000011 HC RX IP 250 OP 636: Performed by: PHYSICIAN ASSISTANT

## 2023-01-01 PROCEDURE — 250N000011 HC RX IP 250 OP 636: Mod: JZ | Performed by: HOSPITALIST

## 2023-01-01 PROCEDURE — 80069 RENAL FUNCTION PANEL: CPT | Performed by: INTERNAL MEDICINE

## 2023-01-01 PROCEDURE — 99232 SBSQ HOSP IP/OBS MODERATE 35: CPT | Performed by: INTERNAL MEDICINE

## 2023-01-01 PROCEDURE — 258N000003 HC RX IP 258 OP 636: Performed by: EMERGENCY MEDICINE

## 2023-01-01 PROCEDURE — 93924 LWR XTR VASC STDY BILAT: CPT

## 2023-01-01 PROCEDURE — 74018 RADEX ABDOMEN 1 VIEW: CPT

## 2023-01-01 PROCEDURE — 85027 COMPLETE CBC AUTOMATED: CPT | Performed by: HOSPITALIST

## 2023-01-01 PROCEDURE — 97162 PT EVAL MOD COMPLEX 30 MIN: CPT | Mod: GP

## 2023-01-01 PROCEDURE — 5A1D70Z PERFORMANCE OF URINARY FILTRATION, INTERMITTENT, LESS THAN 6 HOURS PER DAY: ICD-10-PCS | Performed by: INTERNAL MEDICINE

## 2023-01-01 PROCEDURE — 84155 ASSAY OF PROTEIN SERUM: CPT | Performed by: INTERNAL MEDICINE

## 2023-01-01 PROCEDURE — 82010 KETONE BODYS QUAN: CPT | Performed by: INTERNAL MEDICINE

## 2023-01-01 PROCEDURE — 80048 BASIC METABOLIC PNL TOTAL CA: CPT | Performed by: HOSPITALIST

## 2023-01-01 PROCEDURE — 93010 ELECTROCARDIOGRAM REPORT: CPT | Mod: 59 | Performed by: INTERNAL MEDICINE

## 2023-01-01 PROCEDURE — 99291 CRITICAL CARE FIRST HOUR: CPT | Mod: 25 | Performed by: NURSE PRACTITIONER

## 2023-01-01 PROCEDURE — 73110 X-RAY EXAM OF WRIST: CPT | Mod: LT

## 2023-01-01 PROCEDURE — C1750 CATH, HEMODIALYSIS,LONG-TERM: HCPCS

## 2023-01-01 PROCEDURE — 99233 SBSQ HOSP IP/OBS HIGH 50: CPT | Performed by: NURSE PRACTITIONER

## 2023-01-01 PROCEDURE — 99207 PR NO CHARGE LOS: CPT | Performed by: PHARMACIST

## 2023-01-01 PROCEDURE — 84166 PROTEIN E-PHORESIS/URINE/CSF: CPT | Mod: 26

## 2023-01-01 PROCEDURE — 0124A COVID-19 VACCINE BIVALENT BOOSTER 12+ (PFIZER): CPT | Performed by: NURSE PRACTITIONER

## 2023-01-01 PROCEDURE — 85379 FIBRIN DEGRADATION QUANT: CPT | Performed by: EMERGENCY MEDICINE

## 2023-01-01 PROCEDURE — 84295 ASSAY OF SERUM SODIUM: CPT | Performed by: INTERNAL MEDICINE

## 2023-01-01 PROCEDURE — 83970 ASSAY OF PARATHORMONE: CPT | Performed by: INTERNAL MEDICINE

## 2023-01-01 PROCEDURE — C1894 INTRO/SHEATH, NON-LASER: HCPCS | Performed by: INTERNAL MEDICINE

## 2023-01-01 PROCEDURE — 99222 1ST HOSP IP/OBS MODERATE 55: CPT | Mod: FS | Performed by: PHYSICIAN ASSISTANT

## 2023-01-01 PROCEDURE — 96374 THER/PROPH/DIAG INJ IV PUSH: CPT

## 2023-01-01 PROCEDURE — 90791 PSYCH DIAGNOSTIC EVALUATION: CPT

## 2023-01-01 PROCEDURE — 99231 SBSQ HOSP IP/OBS SF/LOW 25: CPT | Performed by: INTERNAL MEDICINE

## 2023-01-01 PROCEDURE — 84165 PROTEIN E-PHORESIS SERUM: CPT | Mod: TC | Performed by: PATHOLOGY

## 2023-01-01 PROCEDURE — 97165 OT EVAL LOW COMPLEX 30 MIN: CPT | Mod: GO | Performed by: OCCUPATIONAL THERAPIST

## 2023-01-01 PROCEDURE — 93010 ELECTROCARDIOGRAM REPORT: CPT | Performed by: INTERNAL MEDICINE

## 2023-01-01 PROCEDURE — 250N000011 HC RX IP 250 OP 636: Performed by: STUDENT IN AN ORGANIZED HEALTH CARE EDUCATION/TRAINING PROGRAM

## 2023-01-01 PROCEDURE — 250N000012 HC RX MED GY IP 250 OP 636 PS 637: Performed by: INTERNAL MEDICINE

## 2023-01-01 PROCEDURE — 82805 BLOOD GASES W/O2 SATURATION: CPT | Performed by: NURSE PRACTITIONER

## 2023-01-01 PROCEDURE — 82803 BLOOD GASES ANY COMBINATION: CPT | Performed by: HOSPITALIST

## 2023-01-01 PROCEDURE — 99214 OFFICE O/P EST MOD 30 MIN: CPT | Mod: 25 | Performed by: NURSE PRACTITIONER

## 2023-01-01 PROCEDURE — 96139 PSYCL/NRPSYC TST TECH EA: CPT

## 2023-01-01 PROCEDURE — 85025 COMPLETE CBC W/AUTO DIFF WBC: CPT | Performed by: HOSPITALIST

## 2023-01-01 PROCEDURE — 84132 ASSAY OF SERUM POTASSIUM: CPT | Performed by: INTERNAL MEDICINE

## 2023-01-01 PROCEDURE — 82040 ASSAY OF SERUM ALBUMIN: CPT | Performed by: INTERNAL MEDICINE

## 2023-01-01 PROCEDURE — 99285 EMERGENCY DEPT VISIT HI MDM: CPT | Mod: 25

## 2023-01-01 PROCEDURE — 87340 HEPATITIS B SURFACE AG IA: CPT | Performed by: INTERNAL MEDICINE

## 2023-01-01 PROCEDURE — 80048 BASIC METABOLIC PNL TOTAL CA: CPT | Performed by: NURSE PRACTITIONER

## 2023-01-01 PROCEDURE — 90935 HEMODIALYSIS ONE EVALUATION: CPT | Performed by: INTERNAL MEDICINE

## 2023-01-01 PROCEDURE — C1786 PMKR, SINGLE, RATE-RESP: HCPCS | Performed by: INTERNAL MEDICINE

## 2023-01-01 PROCEDURE — 72125 CT NECK SPINE W/O DYE: CPT

## 2023-01-01 PROCEDURE — 250N000011 HC RX IP 250 OP 636: Performed by: RADIOLOGY

## 2023-01-01 PROCEDURE — 84100 ASSAY OF PHOSPHORUS: CPT | Performed by: INTERNAL MEDICINE

## 2023-01-01 PROCEDURE — 90714 TD VACC NO PRESV 7 YRS+ IM: CPT | Performed by: EMERGENCY MEDICINE

## 2023-01-01 PROCEDURE — 33207 INSERT HEART PM VENTRICULAR: CPT | Mod: KX | Performed by: INTERNAL MEDICINE

## 2023-01-01 PROCEDURE — 97535 SELF CARE MNGMENT TRAINING: CPT | Mod: GO | Performed by: OCCUPATIONAL THERAPIST

## 2023-01-01 PROCEDURE — 90937 HEMODIALYSIS REPEATED EVAL: CPT

## 2023-01-01 PROCEDURE — 80053 COMPREHEN METABOLIC PANEL: CPT | Performed by: EMERGENCY MEDICINE

## 2023-01-01 PROCEDURE — 255N000002 HC RX 255 OP 636: Performed by: INTERNAL MEDICINE

## 2023-01-01 PROCEDURE — 86704 HEP B CORE ANTIBODY TOTAL: CPT | Performed by: INTERNAL MEDICINE

## 2023-01-01 PROCEDURE — 94660 CPAP INITIATION&MGMT: CPT

## 2023-01-01 PROCEDURE — 02HK3JZ INSERTION OF PACEMAKER LEAD INTO RIGHT VENTRICLE, PERCUTANEOUS APPROACH: ICD-10-PCS | Performed by: INTERNAL MEDICINE

## 2023-01-01 PROCEDURE — 83036 HEMOGLOBIN GLYCOSYLATED A1C: CPT | Performed by: NURSE PRACTITIONER

## 2023-01-01 PROCEDURE — 99222 1ST HOSP IP/OBS MODERATE 55: CPT | Performed by: INTERNAL MEDICINE

## 2023-01-01 PROCEDURE — 82306 VITAMIN D 25 HYDROXY: CPT | Performed by: INTERNAL MEDICINE

## 2023-01-01 PROCEDURE — 70551 MRI BRAIN STEM W/O DYE: CPT

## 2023-01-01 PROCEDURE — 36415 COLL VENOUS BLD VENIPUNCTURE: CPT | Performed by: EMERGENCY MEDICINE

## 2023-01-01 PROCEDURE — 83036 HEMOGLOBIN GLYCOSYLATED A1C: CPT | Performed by: HOSPITALIST

## 2023-01-01 PROCEDURE — A9540 TC99M MAA: HCPCS | Performed by: HOSPITALIST

## 2023-01-01 PROCEDURE — 250N000011 HC RX IP 250 OP 636: Performed by: NURSE PRACTITIONER

## 2023-01-01 PROCEDURE — 97110 THERAPEUTIC EXERCISES: CPT | Mod: GP

## 2023-01-01 PROCEDURE — 82310 ASSAY OF CALCIUM: CPT | Performed by: HOSPITALIST

## 2023-01-01 PROCEDURE — 999N000215 HC STATISTIC HFNC ADULT NON-CPAP

## 2023-01-01 PROCEDURE — 96138 PSYCL/NRPSYC TECH 1ST: CPT

## 2023-01-01 PROCEDURE — 85520 HEPARIN ASSAY: CPT | Performed by: HOSPITALIST

## 2023-01-01 PROCEDURE — 96133 NRPSYC TST EVAL PHYS/QHP EA: CPT

## 2023-01-01 PROCEDURE — 250N000009 HC RX 250: Performed by: INTERNAL MEDICINE

## 2023-01-01 PROCEDURE — 250N000009 HC RX 250: Performed by: NURSE PRACTITIONER

## 2023-01-01 PROCEDURE — 85027 COMPLETE CBC AUTOMATED: CPT | Performed by: EMERGENCY MEDICINE

## 2023-01-01 PROCEDURE — 82570 ASSAY OF URINE CREATININE: CPT | Performed by: NURSE PRACTITIONER

## 2023-01-01 PROCEDURE — 36415 COLL VENOUS BLD VENIPUNCTURE: CPT | Performed by: NURSE PRACTITIONER

## 2023-01-01 PROCEDURE — 120N000001 HC R&B MED SURG/OB

## 2023-01-01 PROCEDURE — 99232 SBSQ HOSP IP/OBS MODERATE 35: CPT | Performed by: STUDENT IN AN ORGANIZED HEALTH CARE EDUCATION/TRAINING PROGRAM

## 2023-01-01 PROCEDURE — 250N000011 HC RX IP 250 OP 636: Mod: JZ | Performed by: INTERNAL MEDICINE

## 2023-01-01 PROCEDURE — 99215 OFFICE O/P EST HI 40 MIN: CPT | Performed by: NURSE PRACTITIONER

## 2023-01-01 PROCEDURE — 90935 HEMODIALYSIS ONE EVALUATION: CPT

## 2023-01-01 PROCEDURE — 99215 OFFICE O/P EST HI 40 MIN: CPT | Performed by: INTERNAL MEDICINE

## 2023-01-01 PROCEDURE — 250N000012 HC RX MED GY IP 250 OP 636 PS 637: Performed by: HOSPITALIST

## 2023-01-01 PROCEDURE — 99238 HOSP IP/OBS DSCHRG MGMT 30/<: CPT | Performed by: NURSE PRACTITIONER

## 2023-01-01 PROCEDURE — 78582 LUNG VENTILAT&PERFUS IMAGING: CPT

## 2023-01-01 PROCEDURE — 96375 TX/PRO/DX INJ NEW DRUG ADDON: CPT

## 2023-01-01 PROCEDURE — 250N000013 HC RX MED GY IP 250 OP 250 PS 637

## 2023-01-01 PROCEDURE — 99233 SBSQ HOSP IP/OBS HIGH 50: CPT | Performed by: STUDENT IN AN ORGANIZED HEALTH CARE EDUCATION/TRAINING PROGRAM

## 2023-01-01 PROCEDURE — 84484 ASSAY OF TROPONIN QUANT: CPT | Performed by: EMERGENCY MEDICINE

## 2023-01-01 PROCEDURE — G0463 HOSPITAL OUTPT CLINIC VISIT: HCPCS | Mod: 25 | Performed by: INTERNAL MEDICINE

## 2023-01-01 PROCEDURE — 99223 1ST HOSP IP/OBS HIGH 75: CPT | Performed by: HOSPITALIST

## 2023-01-01 PROCEDURE — 82043 UR ALBUMIN QUANTITATIVE: CPT | Performed by: NURSE PRACTITIONER

## 2023-01-01 PROCEDURE — 93306 TTE W/DOPPLER COMPLETE: CPT | Mod: 26 | Performed by: INTERNAL MEDICINE

## 2023-01-01 PROCEDURE — 80048 BASIC METABOLIC PNL TOTAL CA: CPT | Performed by: INTERNAL MEDICINE

## 2023-01-01 PROCEDURE — 99291 CRITICAL CARE FIRST HOUR: CPT | Performed by: HOSPITALIST

## 2023-01-01 PROCEDURE — 33207 INSERT HEART PM VENTRICULAR: CPT | Performed by: INTERNAL MEDICINE

## 2023-01-01 PROCEDURE — 250N000011 HC RX IP 250 OP 636: Performed by: EMERGENCY MEDICINE

## 2023-01-01 PROCEDURE — 93288 INTERROG EVL PM/LDLS PM IP: CPT

## 2023-01-01 PROCEDURE — 36558 INSERT TUNNELED CV CATH: CPT

## 2023-01-01 PROCEDURE — 83930 ASSAY OF BLOOD OSMOLALITY: CPT | Performed by: INTERNAL MEDICINE

## 2023-01-01 PROCEDURE — 85018 HEMOGLOBIN: CPT | Performed by: NURSE PRACTITIONER

## 2023-01-01 PROCEDURE — C1769 GUIDE WIRE: HCPCS

## 2023-01-01 PROCEDURE — 84145 PROCALCITONIN (PCT): CPT | Performed by: HOSPITALIST

## 2023-01-01 PROCEDURE — 99233 SBSQ HOSP IP/OBS HIGH 50: CPT | Performed by: HOSPITALIST

## 2023-01-01 PROCEDURE — 86706 HEP B SURFACE ANTIBODY: CPT | Performed by: INTERNAL MEDICINE

## 2023-01-01 PROCEDURE — 99204 OFFICE O/P NEW MOD 45 MIN: CPT | Performed by: INTERNAL MEDICINE

## 2023-01-01 PROCEDURE — 85027 COMPLETE CBC AUTOMATED: CPT | Performed by: INTERNAL MEDICINE

## 2023-01-01 PROCEDURE — 83880 ASSAY OF NATRIURETIC PEPTIDE: CPT | Performed by: HOSPITALIST

## 2023-01-01 PROCEDURE — 91312 COVID-19 VACCINE BIVALENT BOOSTER 12+ (PFIZER): CPT | Performed by: NURSE PRACTITIONER

## 2023-01-01 PROCEDURE — 343N000001 HC RX 343: Performed by: HOSPITALIST

## 2023-01-01 PROCEDURE — 250N000009 HC RX 250: Performed by: EMERGENCY MEDICINE

## 2023-01-01 PROCEDURE — 84484 ASSAY OF TROPONIN QUANT: CPT | Performed by: HOSPITALIST

## 2023-01-01 PROCEDURE — 84100 ASSAY OF PHOSPHORUS: CPT | Performed by: HOSPITALIST

## 2023-01-01 PROCEDURE — 97530 THERAPEUTIC ACTIVITIES: CPT | Mod: GP

## 2023-01-01 PROCEDURE — 90662 IIV NO PRSV INCREASED AG IM: CPT | Performed by: NURSE PRACTITIONER

## 2023-01-01 PROCEDURE — 94640 AIRWAY INHALATION TREATMENT: CPT | Performed by: HOSPITALIST

## 2023-01-01 PROCEDURE — 82962 GLUCOSE BLOOD TEST: CPT

## 2023-01-01 PROCEDURE — 250N000012 HC RX MED GY IP 250 OP 636 PS 637: Performed by: EMERGENCY MEDICINE

## 2023-01-01 PROCEDURE — G0008 ADMIN INFLUENZA VIRUS VAC: HCPCS | Performed by: NURSE PRACTITIONER

## 2023-01-01 PROCEDURE — 99291 CRITICAL CARE FIRST HOUR: CPT | Performed by: NURSE PRACTITIONER

## 2023-01-01 PROCEDURE — 70486 CT MAXILLOFACIAL W/O DYE: CPT

## 2023-01-01 PROCEDURE — 83605 ASSAY OF LACTIC ACID: CPT | Performed by: HOSPITALIST

## 2023-01-01 PROCEDURE — 0JH60XZ INSERTION OF TUNNELED VASCULAR ACCESS DEVICE INTO CHEST SUBCUTANEOUS TISSUE AND FASCIA, OPEN APPROACH: ICD-10-PCS | Performed by: RADIOLOGY

## 2023-01-01 PROCEDURE — 82565 ASSAY OF CREATININE: CPT | Performed by: INTERNAL MEDICINE

## 2023-01-01 PROCEDURE — 21315 CLSD TX NSL FX MNPJ WO STBLJ: CPT

## 2023-01-01 PROCEDURE — 99233 SBSQ HOSP IP/OBS HIGH 50: CPT | Mod: FS | Performed by: PHYSICIAN ASSISTANT

## 2023-01-01 PROCEDURE — 12011 RPR F/E/E/N/L/M 2.5 CM/<: CPT | Mod: XS

## 2023-01-01 PROCEDURE — 99223 1ST HOSP IP/OBS HIGH 75: CPT | Performed by: NURSE PRACTITIONER

## 2023-01-01 PROCEDURE — C1898 LEAD, PMKR, OTHER THAN TRANS: HCPCS | Performed by: INTERNAL MEDICINE

## 2023-01-01 PROCEDURE — 96132 NRPSYC TST EVAL PHYS/QHP 1ST: CPT

## 2023-01-01 PROCEDURE — A9567 TECHNETIUM TC-99M AEROSOL: HCPCS | Performed by: HOSPITALIST

## 2023-01-01 PROCEDURE — 72141 MRI NECK SPINE W/O DYE: CPT

## 2023-01-01 PROCEDURE — 99232 SBSQ HOSP IP/OBS MODERATE 35: CPT | Mod: FS | Performed by: INTERNAL MEDICINE

## 2023-01-01 PROCEDURE — 73130 X-RAY EXAM OF HAND: CPT | Mod: LT

## 2023-01-01 PROCEDURE — 02H633Z INSERTION OF INFUSION DEVICE INTO RIGHT ATRIUM, PERCUTANEOUS APPROACH: ICD-10-PCS | Performed by: RADIOLOGY

## 2023-01-01 PROCEDURE — 70450 CT HEAD/BRAIN W/O DYE: CPT

## 2023-01-01 PROCEDURE — 84166 PROTEIN E-PHORESIS/URINE/CSF: CPT | Performed by: PATHOLOGY

## 2023-01-01 PROCEDURE — G0463 HOSPITAL OUTPT CLINIC VISIT: HCPCS | Mod: 25

## 2023-01-01 PROCEDURE — 83880 ASSAY OF NATRIURETIC PEPTIDE: CPT | Performed by: EMERGENCY MEDICINE

## 2023-01-01 PROCEDURE — 999N000065 XR CHEST 2 VIEWS

## 2023-01-01 PROCEDURE — 85025 COMPLETE CBC W/AUTO DIFF WBC: CPT | Performed by: EMERGENCY MEDICINE

## 2023-01-01 PROCEDURE — 83735 ASSAY OF MAGNESIUM: CPT | Performed by: HOSPITALIST

## 2023-01-01 PROCEDURE — 85014 HEMATOCRIT: CPT | Performed by: INTERNAL MEDICINE

## 2023-01-01 PROCEDURE — 250N000009 HC RX 250: Performed by: PHYSICIAN ASSISTANT

## 2023-01-01 PROCEDURE — 99207 PR APP CREDIT; MD BILLING SHARED VISIT: CPT | Mod: GV | Performed by: STUDENT IN AN ORGANIZED HEALTH CARE EDUCATION/TRAINING PROGRAM

## 2023-01-01 PROCEDURE — 99291 CRITICAL CARE FIRST HOUR: CPT | Mod: 25

## 2023-01-01 PROCEDURE — 82274 ASSAY TEST FOR BLOOD FECAL: CPT | Performed by: NURSE PRACTITIONER

## 2023-01-01 PROCEDURE — 93005 ELECTROCARDIOGRAM TRACING: CPT | Mod: 76

## 2023-01-01 PROCEDURE — 90471 IMMUNIZATION ADMIN: CPT | Performed by: EMERGENCY MEDICINE

## 2023-01-01 PROCEDURE — 250N000013 HC RX MED GY IP 250 OP 250 PS 637: Performed by: NURSE PRACTITIONER

## 2023-01-01 PROCEDURE — 93010 ELECTROCARDIOGRAM REPORT: CPT | Mod: 76 | Performed by: INTERNAL MEDICINE

## 2023-01-01 PROCEDURE — 93306 TTE W/DOPPLER COMPLETE: CPT

## 2023-01-01 DEVICE — IMP LEAD PACING BIPOLAR CAPSUREFIX NOVUS 58CM 5076-58: Type: IMPLANTABLE DEVICE | Status: FUNCTIONAL

## 2023-01-01 DEVICE — PACEMAKER AZURE XT SR MRI SYS: Type: IMPLANTABLE DEVICE | Status: FUNCTIONAL

## 2023-01-01 RX ORDER — CARBOXYMETHYLCELLULOSE SODIUM 5 MG/ML
1 SOLUTION/ DROPS OPHTHALMIC
Status: DISCONTINUED | OUTPATIENT
Start: 2023-01-01 | End: 2023-07-21 | Stop reason: HOSPADM

## 2023-01-01 RX ORDER — OXYCODONE AND ACETAMINOPHEN 5; 325 MG/1; MG/1
1 TABLET ORAL AT BEDTIME
Qty: 30 TABLET | Refills: 0 | Status: SHIPPED | OUTPATIENT
Start: 2023-01-01 | End: 2023-01-01

## 2023-01-01 RX ORDER — FUROSEMIDE 10 MG/ML
60 INJECTION INTRAMUSCULAR; INTRAVENOUS ONCE
Status: COMPLETED | OUTPATIENT
Start: 2023-01-01 | End: 2023-01-01

## 2023-01-01 RX ORDER — NITROGLYCERIN 0.4 MG/1
TABLET SUBLINGUAL
Status: COMPLETED
Start: 2023-01-01 | End: 2023-01-01

## 2023-01-01 RX ORDER — NALOXONE HYDROCHLORIDE 0.4 MG/ML
0.2 INJECTION, SOLUTION INTRAMUSCULAR; INTRAVENOUS; SUBCUTANEOUS
Status: DISCONTINUED | OUTPATIENT
Start: 2023-01-01 | End: 2023-01-01

## 2023-01-01 RX ORDER — ROSUVASTATIN CALCIUM 20 MG/1
20 TABLET, COATED ORAL DAILY
Status: DISCONTINUED | OUTPATIENT
Start: 2023-01-01 | End: 2023-01-01

## 2023-01-01 RX ORDER — NALOXONE HYDROCHLORIDE 0.4 MG/ML
0.4 INJECTION, SOLUTION INTRAMUSCULAR; INTRAVENOUS; SUBCUTANEOUS
Status: DISCONTINUED | OUTPATIENT
Start: 2023-01-01 | End: 2023-01-01 | Stop reason: HOSPADM

## 2023-01-01 RX ORDER — AMOXICILLIN 250 MG
2 CAPSULE ORAL 2 TIMES DAILY
Status: DISCONTINUED | OUTPATIENT
Start: 2023-01-01 | End: 2023-07-21 | Stop reason: HOSPADM

## 2023-01-01 RX ORDER — FUROSEMIDE 20 MG
TABLET ORAL
Qty: 180 TABLET | Refills: 0 | Status: SHIPPED | OUTPATIENT
Start: 2023-01-01

## 2023-01-01 RX ORDER — HYDROMORPHONE HYDROCHLORIDE 1 MG/ML
.5-1 INJECTION, SOLUTION INTRAMUSCULAR; INTRAVENOUS; SUBCUTANEOUS
Status: DISCONTINUED | OUTPATIENT
Start: 2023-01-01 | End: 2023-01-01

## 2023-01-01 RX ORDER — CEFAZOLIN SODIUM 2 G/100ML
2 INJECTION, SOLUTION INTRAVENOUS ONCE
Status: CANCELLED | OUTPATIENT
Start: 2023-01-01

## 2023-01-01 RX ORDER — NITROGLYCERIN 0.4 MG/1
0.4 TABLET SUBLINGUAL EVERY 5 MIN PRN
Qty: 25 TABLET | Refills: 5 | Status: SHIPPED | OUTPATIENT
Start: 2023-01-01

## 2023-01-01 RX ORDER — BUSPIRONE HYDROCHLORIDE 5 MG/1
5 TABLET ORAL 3 TIMES DAILY
Qty: 90 TABLET | Refills: 0 | Status: ON HOLD | OUTPATIENT
Start: 2023-01-01 | End: 2023-01-01

## 2023-01-01 RX ORDER — IPRATROPIUM BROMIDE AND ALBUTEROL SULFATE 2.5; .5 MG/3ML; MG/3ML
3 SOLUTION RESPIRATORY (INHALATION) EVERY 4 HOURS PRN
Status: DISCONTINUED | OUTPATIENT
Start: 2023-01-01 | End: 2023-07-21 | Stop reason: HOSPADM

## 2023-01-01 RX ORDER — BISACODYL 10 MG
10 SUPPOSITORY, RECTAL RECTAL DAILY PRN
Status: DISCONTINUED | OUTPATIENT
Start: 2023-01-01 | End: 2023-07-21 | Stop reason: HOSPADM

## 2023-01-01 RX ORDER — LIDOCAINE 40 MG/G
CREAM TOPICAL
Status: DISCONTINUED | OUTPATIENT
Start: 2023-01-01 | End: 2023-01-01 | Stop reason: HOSPADM

## 2023-01-01 RX ORDER — ATROPINE SULFATE 10 MG/ML
2 SOLUTION/ DROPS OPHTHALMIC EVERY 4 HOURS PRN
Status: DISCONTINUED | OUTPATIENT
Start: 2023-01-01 | End: 2023-07-21 | Stop reason: HOSPADM

## 2023-01-01 RX ORDER — ACETAMINOPHEN 650 MG/1
650 SUPPOSITORY RECTAL EVERY 6 HOURS PRN
Status: DISCONTINUED | OUTPATIENT
Start: 2023-01-01 | End: 2023-07-21 | Stop reason: HOSPADM

## 2023-01-01 RX ORDER — OXYCODONE AND ACETAMINOPHEN 5; 325 MG/1; MG/1
1 TABLET ORAL EVERY 4 HOURS PRN
Status: DISCONTINUED | OUTPATIENT
Start: 2023-01-01 | End: 2023-01-01

## 2023-01-01 RX ORDER — HEPARIN SODIUM 1000 [USP'U]/ML
5000 INJECTION, SOLUTION INTRAVENOUS; SUBCUTANEOUS ONCE
Status: COMPLETED | OUTPATIENT
Start: 2023-01-01 | End: 2023-01-01

## 2023-01-01 RX ORDER — LORAZEPAM 2 MG/ML
.5-1 INJECTION INTRAMUSCULAR
Status: DISCONTINUED | OUTPATIENT
Start: 2023-01-01 | End: 2023-01-01

## 2023-01-01 RX ORDER — NALOXONE HYDROCHLORIDE 0.4 MG/ML
0.2 INJECTION, SOLUTION INTRAMUSCULAR; INTRAVENOUS; SUBCUTANEOUS
Status: DISCONTINUED | OUTPATIENT
Start: 2023-01-01 | End: 2023-01-01 | Stop reason: HOSPADM

## 2023-01-01 RX ORDER — GLYCOPYRROLATE 0.2 MG/ML
0.2 INJECTION, SOLUTION INTRAMUSCULAR; INTRAVENOUS EVERY 4 HOURS PRN
Status: DISCONTINUED | OUTPATIENT
Start: 2023-01-01 | End: 2023-07-21 | Stop reason: HOSPADM

## 2023-01-01 RX ORDER — GABAPENTIN 300 MG/1
300 CAPSULE ORAL 2 TIMES DAILY
Status: DISCONTINUED | OUTPATIENT
Start: 2023-01-01 | End: 2023-07-21 | Stop reason: HOSPADM

## 2023-01-01 RX ORDER — HEPARIN SODIUM 10000 [USP'U]/100ML
0-5000 INJECTION, SOLUTION INTRAVENOUS CONTINUOUS
Status: DISCONTINUED | OUTPATIENT
Start: 2023-01-01 | End: 2023-01-01

## 2023-01-01 RX ORDER — AMLODIPINE BESYLATE 5 MG/1
5 TABLET ORAL AT BEDTIME
Status: DISCONTINUED | OUTPATIENT
Start: 2023-01-01 | End: 2023-01-01

## 2023-01-01 RX ORDER — OXYCODONE HYDROCHLORIDE 5 MG/1
5 TABLET ORAL
Status: COMPLETED | OUTPATIENT
Start: 2023-01-01 | End: 2023-01-01

## 2023-01-01 RX ORDER — IPRATROPIUM BROMIDE AND ALBUTEROL SULFATE 2.5; .5 MG/3ML; MG/3ML
1 SOLUTION RESPIRATORY (INHALATION)
Status: DISCONTINUED | OUTPATIENT
Start: 2023-01-01 | End: 2023-01-01

## 2023-01-01 RX ORDER — LIDOCAINE 40 MG/G
CREAM TOPICAL
Status: DISCONTINUED | OUTPATIENT
Start: 2023-01-01 | End: 2023-07-21 | Stop reason: HOSPADM

## 2023-01-01 RX ORDER — CITRIC ACID/SODIUM CITRATE 334-500MG
30 SOLUTION, ORAL ORAL 2 TIMES DAILY
Status: DISCONTINUED | OUTPATIENT
Start: 2023-01-01 | End: 2023-01-01

## 2023-01-01 RX ORDER — OXYCODONE AND ACETAMINOPHEN 5; 325 MG/1; MG/1
1 TABLET ORAL AT BEDTIME
Qty: 30 TABLET | Refills: 0 | Status: SHIPPED | OUTPATIENT
Start: 2023-01-01

## 2023-01-01 RX ORDER — ASPIRIN 81 MG/1
81 TABLET ORAL DAILY
Status: DISCONTINUED | OUTPATIENT
Start: 2023-01-01 | End: 2023-07-21 | Stop reason: HOSPADM

## 2023-01-01 RX ORDER — HYDROMORPHONE HYDROCHLORIDE 1 MG/ML
.3-.5 INJECTION, SOLUTION INTRAMUSCULAR; INTRAVENOUS; SUBCUTANEOUS EVERY 4 HOURS PRN
Status: DISCONTINUED | OUTPATIENT
Start: 2023-01-01 | End: 2023-01-01

## 2023-01-01 RX ORDER — NICOTINE POLACRILEX 4 MG
15-30 LOZENGE BUCCAL
Status: DISCONTINUED | OUTPATIENT
Start: 2023-01-01 | End: 2023-07-21 | Stop reason: HOSPADM

## 2023-01-01 RX ORDER — ACETAMINOPHEN 325 MG/1
650 TABLET ORAL EVERY 6 HOURS PRN
Status: DISCONTINUED | OUTPATIENT
Start: 2023-01-01 | End: 2023-07-21 | Stop reason: HOSPADM

## 2023-01-01 RX ORDER — POTASSIUM CHLORIDE 1500 MG/1
40 TABLET, EXTENDED RELEASE ORAL ONCE
Status: COMPLETED | OUTPATIENT
Start: 2023-01-01 | End: 2023-01-01

## 2023-01-01 RX ORDER — NALOXONE HYDROCHLORIDE 0.4 MG/ML
0.2 INJECTION, SOLUTION INTRAMUSCULAR; INTRAVENOUS; SUBCUTANEOUS
Status: DISCONTINUED | OUTPATIENT
Start: 2023-01-01 | End: 2023-07-21 | Stop reason: HOSPADM

## 2023-01-01 RX ORDER — ACETAMINOPHEN 325 MG/1
325 TABLET ORAL
Status: DISCONTINUED | OUTPATIENT
Start: 2023-01-01 | End: 2023-01-01

## 2023-01-01 RX ORDER — HYDRALAZINE HYDROCHLORIDE 20 MG/ML
10 INJECTION INTRAMUSCULAR; INTRAVENOUS
Status: DISCONTINUED | OUTPATIENT
Start: 2023-01-01 | End: 2023-01-01

## 2023-01-01 RX ORDER — FUROSEMIDE 10 MG/ML
40 INJECTION INTRAMUSCULAR; INTRAVENOUS ONCE
Status: COMPLETED | OUTPATIENT
Start: 2023-01-01 | End: 2023-01-01

## 2023-01-01 RX ORDER — IPRATROPIUM BROMIDE AND ALBUTEROL SULFATE 2.5; .5 MG/3ML; MG/3ML
1 SOLUTION RESPIRATORY (INHALATION) EVERY 6 HOURS PRN
Status: DISCONTINUED | OUTPATIENT
Start: 2023-01-01 | End: 2023-01-01

## 2023-01-01 RX ORDER — METOPROLOL SUCCINATE 50 MG/1
50 TABLET, EXTENDED RELEASE ORAL DAILY
Qty: 90 TABLET | Refills: 3 | Status: SHIPPED | OUTPATIENT
Start: 2023-01-01

## 2023-01-01 RX ORDER — IPRATROPIUM BROMIDE AND ALBUTEROL SULFATE 2.5; .5 MG/3ML; MG/3ML
1 SOLUTION RESPIRATORY (INHALATION) EVERY 4 HOURS PRN
Status: DISCONTINUED | OUTPATIENT
Start: 2023-01-01 | End: 2023-01-01

## 2023-01-01 RX ORDER — FLUMAZENIL 0.1 MG/ML
0.2 INJECTION, SOLUTION INTRAVENOUS
Status: DISCONTINUED | OUTPATIENT
Start: 2023-01-01 | End: 2023-01-01 | Stop reason: HOSPADM

## 2023-01-01 RX ORDER — NALOXONE HYDROCHLORIDE 0.4 MG/ML
0.4 INJECTION, SOLUTION INTRAMUSCULAR; INTRAVENOUS; SUBCUTANEOUS
Status: DISCONTINUED | OUTPATIENT
Start: 2023-01-01 | End: 2023-01-01

## 2023-01-01 RX ORDER — ONDANSETRON 4 MG/1
4 TABLET, ORALLY DISINTEGRATING ORAL EVERY 6 HOURS PRN
Status: DISCONTINUED | OUTPATIENT
Start: 2023-01-01 | End: 2023-07-21 | Stop reason: HOSPADM

## 2023-01-01 RX ORDER — OXYCODONE AND ACETAMINOPHEN 5; 325 MG/1; MG/1
1 TABLET ORAL AT BEDTIME
Status: DISCONTINUED | OUTPATIENT
Start: 2023-01-01 | End: 2023-07-21 | Stop reason: HOSPADM

## 2023-01-01 RX ORDER — AMLODIPINE BESYLATE 5 MG/1
5 TABLET ORAL 2 TIMES DAILY
Status: DISCONTINUED | OUTPATIENT
Start: 2023-01-01 | End: 2023-01-01

## 2023-01-01 RX ORDER — DEXTROSE MONOHYDRATE 25 G/50ML
25-50 INJECTION, SOLUTION INTRAVENOUS
Status: DISCONTINUED | OUTPATIENT
Start: 2023-01-01 | End: 2023-07-21 | Stop reason: HOSPADM

## 2023-01-01 RX ORDER — SODIUM CHLORIDE 450 MG/100ML
INJECTION, SOLUTION INTRAVENOUS CONTINUOUS
Status: DISCONTINUED | OUTPATIENT
Start: 2023-01-01 | End: 2023-01-01 | Stop reason: HOSPADM

## 2023-01-01 RX ORDER — HYDROMORPHONE HYDROCHLORIDE 1 MG/ML
.5-1 INJECTION, SOLUTION INTRAMUSCULAR; INTRAVENOUS; SUBCUTANEOUS EVERY 10 MIN PRN
Status: DISCONTINUED | OUTPATIENT
Start: 2023-01-01 | End: 2023-07-21 | Stop reason: HOSPADM

## 2023-01-01 RX ORDER — CEFTRIAXONE 1 G/1
1 INJECTION, POWDER, FOR SOLUTION INTRAMUSCULAR; INTRAVENOUS EVERY 24 HOURS
Status: DISCONTINUED | OUTPATIENT
Start: 2023-01-01 | End: 2023-01-01

## 2023-01-01 RX ORDER — HYDROMORPHONE HYDROCHLORIDE 1 MG/ML
.3-.5 INJECTION, SOLUTION INTRAMUSCULAR; INTRAVENOUS; SUBCUTANEOUS
Status: DISCONTINUED | OUTPATIENT
Start: 2023-01-01 | End: 2023-01-01

## 2023-01-01 RX ORDER — AMITRIPTYLINE HYDROCHLORIDE 10 MG/1
TABLET ORAL
Qty: 270 TABLET | Refills: 1 | Status: SHIPPED | OUTPATIENT
Start: 2023-01-01

## 2023-01-01 RX ORDER — FENTANYL CITRATE 50 UG/ML
50 INJECTION, SOLUTION INTRAMUSCULAR; INTRAVENOUS ONCE
Status: COMPLETED | OUTPATIENT
Start: 2023-01-01 | End: 2023-01-01

## 2023-01-01 RX ORDER — NITROGLYCERIN 0.4 MG/1
0.4 TABLET SUBLINGUAL EVERY 5 MIN PRN
Status: DISCONTINUED | OUTPATIENT
Start: 2023-01-01 | End: 2023-01-01

## 2023-01-01 RX ORDER — ASPIRIN 81 MG/1
324 TABLET, CHEWABLE ORAL ONCE
Status: COMPLETED | OUTPATIENT
Start: 2023-01-01 | End: 2023-01-01

## 2023-01-01 RX ORDER — NICOTINE POLACRILEX 4 MG
15-30 LOZENGE BUCCAL
Status: DISCONTINUED | OUTPATIENT
Start: 2023-01-01 | End: 2023-01-01

## 2023-01-01 RX ORDER — LIDOCAINE 40 MG/G
CREAM TOPICAL
Status: DISCONTINUED | OUTPATIENT
Start: 2023-01-01 | End: 2023-01-01

## 2023-01-01 RX ORDER — ACETAMINOPHEN 325 MG/1
975 TABLET ORAL ONCE
Status: COMPLETED | OUTPATIENT
Start: 2023-01-01 | End: 2023-01-01

## 2023-01-01 RX ORDER — OXYCODONE AND ACETAMINOPHEN 5; 325 MG/1; MG/1
1 TABLET ORAL AT BEDTIME
Qty: 30 TABLET | Refills: 0 | Status: CANCELLED | OUTPATIENT
Start: 2023-01-01

## 2023-01-01 RX ORDER — AMLODIPINE BESYLATE 5 MG/1
5 TABLET ORAL AT BEDTIME
Qty: 90 TABLET | Refills: 1 | Status: SHIPPED | OUTPATIENT
Start: 2023-01-01

## 2023-01-01 RX ORDER — HYDRALAZINE HYDROCHLORIDE 20 MG/ML
10 INJECTION INTRAMUSCULAR; INTRAVENOUS
Status: COMPLETED | OUTPATIENT
Start: 2023-01-01 | End: 2023-01-01

## 2023-01-01 RX ORDER — DEXTROSE MONOHYDRATE 25 G/50ML
25-50 INJECTION, SOLUTION INTRAVENOUS
Status: DISCONTINUED | OUTPATIENT
Start: 2023-01-01 | End: 2023-01-01

## 2023-01-01 RX ORDER — FENTANYL CITRATE 50 UG/ML
25-50 INJECTION, SOLUTION INTRAMUSCULAR; INTRAVENOUS EVERY 5 MIN PRN
Status: DISCONTINUED | OUTPATIENT
Start: 2023-01-01 | End: 2023-01-01 | Stop reason: HOSPADM

## 2023-01-01 RX ORDER — SIMETHICONE 80 MG
80 TABLET,CHEWABLE ORAL EVERY 6 HOURS PRN
Status: DISCONTINUED | OUTPATIENT
Start: 2023-01-01 | End: 2023-07-21 | Stop reason: HOSPADM

## 2023-01-01 RX ORDER — FUROSEMIDE 20 MG
20 TABLET ORAL
Status: DISCONTINUED | OUTPATIENT
Start: 2023-01-01 | End: 2023-01-01

## 2023-01-01 RX ORDER — ROSUVASTATIN CALCIUM 10 MG/1
10 TABLET, COATED ORAL DAILY
Status: DISCONTINUED | OUTPATIENT
Start: 2023-01-01 | End: 2023-01-01

## 2023-01-01 RX ORDER — HYDRALAZINE HYDROCHLORIDE 20 MG/ML
10 INJECTION INTRAMUSCULAR; INTRAVENOUS EVERY 4 HOURS PRN
Status: DISCONTINUED | OUTPATIENT
Start: 2023-01-01 | End: 2023-07-21 | Stop reason: HOSPADM

## 2023-01-01 RX ORDER — BUSPIRONE HYDROCHLORIDE 5 MG/1
5 TABLET ORAL 3 TIMES DAILY
Qty: 90 TABLET | Refills: 4 | Status: SHIPPED | OUTPATIENT
Start: 2023-01-01

## 2023-01-01 RX ORDER — CEFAZOLIN SODIUM 2 G/100ML
INJECTION, SOLUTION INTRAVENOUS CONTINUOUS PRN
Status: COMPLETED | OUTPATIENT
Start: 2023-01-01 | End: 2023-01-01

## 2023-01-01 RX ORDER — FLUTICASONE FUROATE AND VILANTEROL 100; 25 UG/1; UG/1
1 POWDER RESPIRATORY (INHALATION) DAILY
Status: DISCONTINUED | OUTPATIENT
Start: 2023-01-01 | End: 2023-07-21 | Stop reason: HOSPADM

## 2023-01-01 RX ORDER — HYDRALAZINE HYDROCHLORIDE 25 MG/1
25 TABLET, FILM COATED ORAL ONCE
Status: COMPLETED | OUTPATIENT
Start: 2023-01-01 | End: 2023-01-01

## 2023-01-01 RX ORDER — ISOSORBIDE MONONITRATE 30 MG/1
60 TABLET, EXTENDED RELEASE ORAL DAILY
Status: DISCONTINUED | OUTPATIENT
Start: 2023-01-01 | End: 2023-01-01

## 2023-01-01 RX ORDER — INSULIN GLARGINE 100 [IU]/ML
30 INJECTION, SOLUTION SUBCUTANEOUS EVERY MORNING
Qty: 15 ML | Refills: 5 | Status: SHIPPED | OUTPATIENT
Start: 2023-01-01

## 2023-01-01 RX ORDER — FUROSEMIDE 20 MG
TABLET ORAL
Qty: 180 TABLET | Refills: 0 | Status: SHIPPED | OUTPATIENT
Start: 2023-01-01 | End: 2023-01-01

## 2023-01-01 RX ORDER — HYDROMORPHONE HYDROCHLORIDE 1 MG/ML
.3-.5 INJECTION, SOLUTION INTRAMUSCULAR; INTRAVENOUS; SUBCUTANEOUS EVERY 10 MIN PRN
Status: DISCONTINUED | OUTPATIENT
Start: 2023-01-01 | End: 2023-07-21 | Stop reason: HOSPADM

## 2023-01-01 RX ORDER — LORAZEPAM 2 MG/ML
.5-1 INJECTION INTRAMUSCULAR EVERY 10 MIN PRN
Status: DISCONTINUED | OUTPATIENT
Start: 2023-01-01 | End: 2023-07-21 | Stop reason: HOSPADM

## 2023-01-01 RX ORDER — ASPIRIN 81 MG/1
81 TABLET ORAL DAILY
COMMUNITY

## 2023-01-01 RX ORDER — NITROGLYCERIN 0.4 MG/1
0.4 TABLET SUBLINGUAL EVERY 5 MIN PRN
Status: DISCONTINUED | OUTPATIENT
Start: 2023-01-01 | End: 2023-07-21 | Stop reason: HOSPADM

## 2023-01-01 RX ORDER — BUPIVACAINE HYDROCHLORIDE 2.5 MG/ML
INJECTION, SOLUTION EPIDURAL; INFILTRATION; INTRACAUDAL
Status: DISCONTINUED | OUTPATIENT
Start: 2023-01-01 | End: 2023-01-01 | Stop reason: HOSPADM

## 2023-01-01 RX ORDER — NALOXONE HYDROCHLORIDE 0.4 MG/ML
0.1 INJECTION, SOLUTION INTRAMUSCULAR; INTRAVENOUS; SUBCUTANEOUS
Status: DISCONTINUED | OUTPATIENT
Start: 2023-01-01 | End: 2023-07-21 | Stop reason: HOSPADM

## 2023-01-01 RX ORDER — IPRATROPIUM BROMIDE AND ALBUTEROL SULFATE 2.5; .5 MG/3ML; MG/3ML
3 SOLUTION RESPIRATORY (INHALATION) ONCE
Status: COMPLETED | OUTPATIENT
Start: 2023-01-01 | End: 2023-01-01

## 2023-01-01 RX ORDER — METHYLPREDNISOLONE SODIUM SUCCINATE 125 MG/2ML
125 INJECTION, POWDER, LYOPHILIZED, FOR SOLUTION INTRAMUSCULAR; INTRAVENOUS ONCE
Status: COMPLETED | OUTPATIENT
Start: 2023-01-01 | End: 2023-01-01

## 2023-01-01 RX ORDER — ISOSORBIDE MONONITRATE 30 MG/1
30 TABLET, EXTENDED RELEASE ORAL DAILY
Status: DISCONTINUED | OUTPATIENT
Start: 2023-01-01 | End: 2023-01-01

## 2023-01-01 RX ORDER — BUSPIRONE HYDROCHLORIDE 5 MG/1
5 TABLET ORAL 3 TIMES DAILY
Status: DISCONTINUED | OUTPATIENT
Start: 2023-01-01 | End: 2023-07-21 | Stop reason: HOSPADM

## 2023-01-01 RX ORDER — IPRATROPIUM BROMIDE AND ALBUTEROL SULFATE 2.5; .5 MG/3ML; MG/3ML
1 SOLUTION RESPIRATORY (INHALATION) EVERY 6 HOURS PRN
Qty: 90 ML | Refills: 1 | Status: SHIPPED | OUTPATIENT
Start: 2023-01-01

## 2023-01-01 RX ORDER — ALBUTEROL SULFATE 90 UG/1
2 AEROSOL, METERED RESPIRATORY (INHALATION) EVERY 6 HOURS PRN
Status: DISCONTINUED | OUTPATIENT
Start: 2023-01-01 | End: 2023-07-21 | Stop reason: HOSPADM

## 2023-01-01 RX ORDER — CEFAZOLIN SODIUM 2 G/100ML
2 INJECTION, SOLUTION INTRAVENOUS
Status: DISCONTINUED | OUTPATIENT
Start: 2023-01-01 | End: 2023-01-01 | Stop reason: HOSPADM

## 2023-01-01 RX ORDER — FLUTICASONE FUROATE, UMECLIDINIUM BROMIDE AND VILANTEROL TRIFENATATE 100; 62.5; 25 UG/1; UG/1; UG/1
1 POWDER RESPIRATORY (INHALATION) DAILY
Qty: 60 EACH | Refills: 5 | Status: SHIPPED | OUTPATIENT
Start: 2023-01-01

## 2023-01-01 RX ORDER — INSULIN LISPRO 100 [IU]/ML
10 INJECTION, SOLUTION INTRAVENOUS; SUBCUTANEOUS
Qty: 15 ML | Refills: 5 | Status: SHIPPED | OUTPATIENT
Start: 2023-01-01

## 2023-01-01 RX ORDER — CEFTRIAXONE 1 G/1
1 INJECTION, POWDER, FOR SOLUTION INTRAMUSCULAR; INTRAVENOUS EVERY 24 HOURS
Status: COMPLETED | OUTPATIENT
Start: 2023-01-01 | End: 2023-01-01

## 2023-01-01 RX ORDER — FENTANYL CITRATE 50 UG/ML
INJECTION, SOLUTION INTRAMUSCULAR; INTRAVENOUS
Status: DISCONTINUED | OUTPATIENT
Start: 2023-01-01 | End: 2023-01-01 | Stop reason: HOSPADM

## 2023-01-01 RX ORDER — AMOXICILLIN 250 MG
1 CAPSULE ORAL 2 TIMES DAILY
Status: DISCONTINUED | OUTPATIENT
Start: 2023-01-01 | End: 2023-07-21 | Stop reason: HOSPADM

## 2023-01-01 RX ORDER — POLYETHYLENE GLYCOL 3350 17 G/17G
17 POWDER, FOR SOLUTION ORAL DAILY PRN
Status: DISCONTINUED | OUTPATIENT
Start: 2023-01-01 | End: 2023-07-21 | Stop reason: HOSPADM

## 2023-01-01 RX ORDER — ONDANSETRON 2 MG/ML
4 INJECTION INTRAMUSCULAR; INTRAVENOUS EVERY 6 HOURS PRN
Status: DISCONTINUED | OUTPATIENT
Start: 2023-01-01 | End: 2023-07-21 | Stop reason: HOSPADM

## 2023-01-01 RX ORDER — HYDRALAZINE HYDROCHLORIDE 50 MG/1
50 TABLET, FILM COATED ORAL 3 TIMES DAILY
Status: DISCONTINUED | OUTPATIENT
Start: 2023-01-01 | End: 2023-01-01

## 2023-01-01 RX ORDER — IPRATROPIUM BROMIDE AND ALBUTEROL SULFATE 2.5; .5 MG/3ML; MG/3ML
3 SOLUTION RESPIRATORY (INHALATION)
Status: COMPLETED | OUTPATIENT
Start: 2023-01-01 | End: 2023-01-01

## 2023-01-01 RX ORDER — ALBUTEROL SULFATE 90 UG/1
2 AEROSOL, METERED RESPIRATORY (INHALATION) EVERY 6 HOURS PRN
Qty: 18 G | Refills: 2 | Status: SHIPPED | OUTPATIENT
Start: 2023-01-01

## 2023-01-01 RX ADMIN — ROSUVASTATIN CALCIUM 10 MG: 10 TABLET, FILM COATED ORAL at 09:29

## 2023-01-01 RX ADMIN — IPRATROPIUM BROMIDE AND ALBUTEROL SULFATE 3 ML: .5; 3 SOLUTION RESPIRATORY (INHALATION) at 13:02

## 2023-01-01 RX ADMIN — AMLODIPINE BESYLATE 5 MG: 5 TABLET ORAL at 21:06

## 2023-01-01 RX ADMIN — ALBUTEROL SULFATE 2 PUFF: 90 AEROSOL, METERED RESPIRATORY (INHALATION) at 14:02

## 2023-01-01 RX ADMIN — ONDANSETRON 4 MG: 2 INJECTION INTRAMUSCULAR; INTRAVENOUS at 08:36

## 2023-01-01 RX ADMIN — ASPIRIN 81 MG: 81 TABLET, COATED ORAL at 07:38

## 2023-01-01 RX ADMIN — INSULIN ASPART 2 UNITS: 100 INJECTION, SOLUTION INTRAVENOUS; SUBCUTANEOUS at 09:34

## 2023-01-01 RX ADMIN — IPRATROPIUM BROMIDE AND ALBUTEROL SULFATE 3 ML: .5; 3 SOLUTION RESPIRATORY (INHALATION) at 13:09

## 2023-01-01 RX ADMIN — SODIUM CITRATE AND CITRIC ACID MONOHYDRATE 30 ML: 500; 334 SOLUTION ORAL at 22:01

## 2023-01-01 RX ADMIN — SENNOSIDES AND DOCUSATE SODIUM 1 TABLET: 50; 8.6 TABLET ORAL at 09:25

## 2023-01-01 RX ADMIN — ISOSORBIDE MONONITRATE 60 MG: 60 TABLET, EXTENDED RELEASE ORAL at 09:29

## 2023-01-01 RX ADMIN — SENNOSIDES AND DOCUSATE SODIUM 2 TABLET: 50; 8.6 TABLET ORAL at 13:20

## 2023-01-01 RX ADMIN — ASPIRIN 81 MG: 81 TABLET, COATED ORAL at 09:28

## 2023-01-01 RX ADMIN — AMLODIPINE BESYLATE 5 MG: 5 TABLET ORAL at 22:06

## 2023-01-01 RX ADMIN — SENNOSIDES AND DOCUSATE SODIUM 1 TABLET: 50; 8.6 TABLET ORAL at 09:29

## 2023-01-01 RX ADMIN — IPRATROPIUM BROMIDE AND ALBUTEROL SULFATE 3 ML: .5; 3 SOLUTION RESPIRATORY (INHALATION) at 12:55

## 2023-01-01 RX ADMIN — SENNOSIDES AND DOCUSATE SODIUM 1 TABLET: 50; 8.6 TABLET ORAL at 08:26

## 2023-01-01 RX ADMIN — MICONAZOLE NITRATE: 2 POWDER TOPICAL at 21:05

## 2023-01-01 RX ADMIN — OXYCODONE HYDROCHLORIDE AND ACETAMINOPHEN 1 TABLET: 5; 325 TABLET ORAL at 20:49

## 2023-01-01 RX ADMIN — BUSPIRONE HYDROCHLORIDE 5 MG: 5 TABLET ORAL at 13:06

## 2023-01-01 RX ADMIN — AMLODIPINE BESYLATE 5 MG: 5 TABLET ORAL at 22:15

## 2023-01-01 RX ADMIN — SODIUM CHLORIDE 300 ML: 9 INJECTION, SOLUTION INTRAVENOUS at 14:32

## 2023-01-01 RX ADMIN — SODIUM CHLORIDE 250 ML: 9 INJECTION, SOLUTION INTRAVENOUS at 10:44

## 2023-01-01 RX ADMIN — ASPIRIN 81 MG 324 MG: 81 TABLET ORAL at 18:04

## 2023-01-01 RX ADMIN — OXYCODONE HYDROCHLORIDE AND ACETAMINOPHEN 1 TABLET: 5; 325 TABLET ORAL at 18:40

## 2023-01-01 RX ADMIN — INSULIN ASPART 4 UNITS: 100 INJECTION, SOLUTION INTRAVENOUS; SUBCUTANEOUS at 13:05

## 2023-01-01 RX ADMIN — BUSPIRONE HYDROCHLORIDE 5 MG: 5 TABLET ORAL at 20:19

## 2023-01-01 RX ADMIN — SENNOSIDES AND DOCUSATE SODIUM 1 TABLET: 50; 8.6 TABLET ORAL at 21:06

## 2023-01-01 RX ADMIN — ISOSORBIDE MONONITRATE 60 MG: 60 TABLET, EXTENDED RELEASE ORAL at 13:48

## 2023-01-01 RX ADMIN — IPRATROPIUM BROMIDE AND ALBUTEROL SULFATE 3 ML: .5; 3 SOLUTION RESPIRATORY (INHALATION) at 19:10

## 2023-01-01 RX ADMIN — IPRATROPIUM BROMIDE AND ALBUTEROL SULFATE 3 ML: .5; 3 SOLUTION RESPIRATORY (INHALATION) at 08:16

## 2023-01-01 RX ADMIN — SENNOSIDES AND DOCUSATE SODIUM 2 TABLET: 50; 8.6 TABLET ORAL at 21:07

## 2023-01-01 RX ADMIN — FLUTICASONE FUROATE AND VILANTEROL TRIFENATATE 1 PUFF: 100; 25 POWDER RESPIRATORY (INHALATION) at 11:05

## 2023-01-01 RX ADMIN — SENNOSIDES AND DOCUSATE SODIUM 1 TABLET: 50; 8.6 TABLET ORAL at 22:16

## 2023-01-01 RX ADMIN — SENNOSIDES AND DOCUSATE SODIUM 1 TABLET: 50; 8.6 TABLET ORAL at 21:05

## 2023-01-01 RX ADMIN — INSULIN ASPART 2 UNITS: 100 INJECTION, SOLUTION INTRAVENOUS; SUBCUTANEOUS at 07:58

## 2023-01-01 RX ADMIN — ASPIRIN 81 MG: 81 TABLET, COATED ORAL at 13:20

## 2023-01-01 RX ADMIN — IPRATROPIUM BROMIDE AND ALBUTEROL SULFATE 3 ML: .5; 3 SOLUTION RESPIRATORY (INHALATION) at 19:24

## 2023-01-01 RX ADMIN — SENNOSIDES AND DOCUSATE SODIUM 1 TABLET: 50; 8.6 TABLET ORAL at 07:38

## 2023-01-01 RX ADMIN — HYDROMORPHONE HYDROCHLORIDE 1 MG: 1 INJECTION, SOLUTION INTRAMUSCULAR; INTRAVENOUS; SUBCUTANEOUS at 02:33

## 2023-01-01 RX ADMIN — AMLODIPINE BESYLATE 5 MG: 5 TABLET ORAL at 08:40

## 2023-01-01 RX ADMIN — ASPIRIN 81 MG: 81 TABLET, COATED ORAL at 09:00

## 2023-01-01 RX ADMIN — IPRATROPIUM BROMIDE AND ALBUTEROL SULFATE 3 ML: .5; 3 SOLUTION RESPIRATORY (INHALATION) at 07:26

## 2023-01-01 RX ADMIN — HYDRALAZINE HYDROCHLORIDE 75 MG: 25 TABLET ORAL at 20:19

## 2023-01-01 RX ADMIN — MICONAZOLE NITRATE: 2 POWDER TOPICAL at 08:15

## 2023-01-01 RX ADMIN — IPRATROPIUM BROMIDE AND ALBUTEROL SULFATE 3 ML: .5; 3 SOLUTION RESPIRATORY (INHALATION) at 06:49

## 2023-01-01 RX ADMIN — HEPARIN SODIUM 1300 UNITS/HR: 10000 INJECTION, SOLUTION INTRAVENOUS at 02:17

## 2023-01-01 RX ADMIN — GABAPENTIN 300 MG: 300 CAPSULE ORAL at 20:19

## 2023-01-01 RX ADMIN — BUSPIRONE HYDROCHLORIDE 5 MG: 5 TABLET ORAL at 13:51

## 2023-01-01 RX ADMIN — SODIUM CHLORIDE 250 ML: 9 INJECTION, SOLUTION INTRAVENOUS at 18:02

## 2023-01-01 RX ADMIN — BUSPIRONE HYDROCHLORIDE 5 MG: 5 TABLET ORAL at 09:10

## 2023-01-01 RX ADMIN — HYDROMORPHONE HYDROCHLORIDE 0.5 MG: 1 INJECTION, SOLUTION INTRAMUSCULAR; INTRAVENOUS; SUBCUTANEOUS at 00:24

## 2023-01-01 RX ADMIN — SODIUM CHLORIDE 250 ML: 9 INJECTION, SOLUTION INTRAVENOUS at 12:50

## 2023-01-01 RX ADMIN — DICLOFENAC SODIUM 2 G: 10 GEL TOPICAL at 17:15

## 2023-01-01 RX ADMIN — UMECLIDINIUM 1 PUFF: 62.5 AEROSOL, POWDER ORAL at 13:24

## 2023-01-01 RX ADMIN — IPRATROPIUM BROMIDE AND ALBUTEROL SULFATE 3 ML: .5; 3 SOLUTION RESPIRATORY (INHALATION) at 19:37

## 2023-01-01 RX ADMIN — BUSPIRONE HYDROCHLORIDE 5 MG: 5 TABLET ORAL at 20:15

## 2023-01-01 RX ADMIN — ASPIRIN 81 MG: 81 TABLET, COATED ORAL at 07:49

## 2023-01-01 RX ADMIN — OXYCODONE HYDROCHLORIDE AND ACETAMINOPHEN 1 TABLET: 5; 325 TABLET ORAL at 21:06

## 2023-01-01 RX ADMIN — ACETAMINOPHEN 650 MG: 325 TABLET, FILM COATED ORAL at 08:33

## 2023-01-01 RX ADMIN — ASPIRIN 81 MG: 81 TABLET, COATED ORAL at 08:37

## 2023-01-01 RX ADMIN — IPRATROPIUM BROMIDE AND ALBUTEROL SULFATE 3 ML: .5; 3 SOLUTION RESPIRATORY (INHALATION) at 00:42

## 2023-01-01 RX ADMIN — DICLOFENAC SODIUM 2 G: 10 GEL TOPICAL at 14:47

## 2023-01-01 RX ADMIN — IPRATROPIUM BROMIDE AND ALBUTEROL SULFATE 3 ML: .5; 3 SOLUTION RESPIRATORY (INHALATION) at 00:40

## 2023-01-01 RX ADMIN — HYDROMORPHONE HYDROCHLORIDE 1 MG: 1 INJECTION, SOLUTION INTRAMUSCULAR; INTRAVENOUS; SUBCUTANEOUS at 14:14

## 2023-01-01 RX ADMIN — AMLODIPINE BESYLATE 5 MG: 5 TABLET ORAL at 20:07

## 2023-01-01 RX ADMIN — ACETAMINOPHEN 650 MG: 325 TABLET, FILM COATED ORAL at 01:09

## 2023-01-01 RX ADMIN — ROSUVASTATIN CALCIUM 10 MG: 10 TABLET, FILM COATED ORAL at 13:20

## 2023-01-01 RX ADMIN — AMLODIPINE BESYLATE 5 MG: 5 TABLET ORAL at 21:17

## 2023-01-01 RX ADMIN — SENNOSIDES AND DOCUSATE SODIUM 1 TABLET: 50; 8.6 TABLET ORAL at 21:17

## 2023-01-01 RX ADMIN — SENNOSIDES AND DOCUSATE SODIUM 2 TABLET: 50; 8.6 TABLET ORAL at 09:27

## 2023-01-01 RX ADMIN — DICLOFENAC SODIUM 2 G: 10 GEL TOPICAL at 09:42

## 2023-01-01 RX ADMIN — AMLODIPINE BESYLATE 5 MG: 5 TABLET ORAL at 08:14

## 2023-01-01 RX ADMIN — AMLODIPINE BESYLATE 5 MG: 5 TABLET ORAL at 20:15

## 2023-01-01 RX ADMIN — ISOSORBIDE MONONITRATE 60 MG: 60 TABLET, EXTENDED RELEASE ORAL at 15:32

## 2023-01-01 RX ADMIN — DICLOFENAC SODIUM 2 G: 10 GEL TOPICAL at 18:03

## 2023-01-01 RX ADMIN — SENNOSIDES AND DOCUSATE SODIUM 2 TABLET: 50; 8.6 TABLET ORAL at 20:36

## 2023-01-01 RX ADMIN — BUSPIRONE HYDROCHLORIDE 5 MG: 5 TABLET ORAL at 13:26

## 2023-01-01 RX ADMIN — DICLOFENAC SODIUM 2 G: 10 GEL TOPICAL at 14:49

## 2023-01-01 RX ADMIN — AMLODIPINE BESYLATE 5 MG: 5 TABLET ORAL at 20:19

## 2023-01-01 RX ADMIN — IPRATROPIUM BROMIDE AND ALBUTEROL SULFATE 3 ML: .5; 3 SOLUTION RESPIRATORY (INHALATION) at 00:33

## 2023-01-01 RX ADMIN — ACETAMINOPHEN 650 MG: 325 TABLET, FILM COATED ORAL at 16:19

## 2023-01-01 RX ADMIN — HYDROMORPHONE HYDROCHLORIDE 0.5 MG: 1 INJECTION, SOLUTION INTRAMUSCULAR; INTRAVENOUS; SUBCUTANEOUS at 18:25

## 2023-01-01 RX ADMIN — BISACODYL 10 MG: 10 SUPPOSITORY RECTAL at 23:54

## 2023-01-01 RX ADMIN — INSULIN ASPART 4 UNITS: 100 INJECTION, SOLUTION INTRAVENOUS; SUBCUTANEOUS at 10:16

## 2023-01-01 RX ADMIN — ASPIRIN 81 MG: 81 TABLET, COATED ORAL at 09:02

## 2023-01-01 RX ADMIN — IPRATROPIUM BROMIDE AND ALBUTEROL SULFATE 3 ML: .5; 3 SOLUTION RESPIRATORY (INHALATION) at 00:03

## 2023-01-01 RX ADMIN — CEFTRIAXONE SODIUM 1 G: 1 INJECTION, POWDER, FOR SOLUTION INTRAMUSCULAR; INTRAVENOUS at 18:39

## 2023-01-01 RX ADMIN — DICLOFENAC SODIUM 2 G: 10 GEL TOPICAL at 13:07

## 2023-01-01 RX ADMIN — AMITRIPTYLINE HYDROCHLORIDE 30 MG: 10 TABLET, FILM COATED ORAL at 22:44

## 2023-01-01 RX ADMIN — INSULIN ASPART 4 UNITS: 100 INJECTION, SOLUTION INTRAVENOUS; SUBCUTANEOUS at 18:41

## 2023-01-01 RX ADMIN — ROSUVASTATIN CALCIUM 10 MG: 10 TABLET, FILM COATED ORAL at 08:38

## 2023-01-01 RX ADMIN — BUSPIRONE HYDROCHLORIDE 5 MG: 5 TABLET ORAL at 09:02

## 2023-01-01 RX ADMIN — DICLOFENAC SODIUM 2 G: 10 GEL TOPICAL at 13:53

## 2023-01-01 RX ADMIN — SODIUM CHLORIDE 5.5 UNITS/HR: 9 INJECTION, SOLUTION INTRAVENOUS at 07:45

## 2023-01-01 RX ADMIN — IPRATROPIUM BROMIDE AND ALBUTEROL SULFATE 3 ML: .5; 3 SOLUTION RESPIRATORY (INHALATION) at 12:05

## 2023-01-01 RX ADMIN — AMITRIPTYLINE HYDROCHLORIDE 30 MG: 10 TABLET, FILM COATED ORAL at 21:50

## 2023-01-01 RX ADMIN — BUSPIRONE HYDROCHLORIDE 5 MG: 5 TABLET ORAL at 14:18

## 2023-01-01 RX ADMIN — GABAPENTIN 300 MG: 300 CAPSULE ORAL at 20:46

## 2023-01-01 RX ADMIN — SODIUM CHLORIDE 300 ML: 9 INJECTION, SOLUTION INTRAVENOUS at 10:44

## 2023-01-01 RX ADMIN — CEFTRIAXONE SODIUM 1 G: 1 INJECTION, POWDER, FOR SOLUTION INTRAMUSCULAR; INTRAVENOUS at 18:05

## 2023-01-01 RX ADMIN — BUSPIRONE HYDROCHLORIDE 5 MG: 5 TABLET ORAL at 09:28

## 2023-01-01 RX ADMIN — ISOSORBIDE MONONITRATE 60 MG: 60 TABLET, EXTENDED RELEASE ORAL at 11:04

## 2023-01-01 RX ADMIN — UMECLIDINIUM 1 PUFF: 62.5 AEROSOL, POWDER ORAL at 08:41

## 2023-01-01 RX ADMIN — SENNOSIDES AND DOCUSATE SODIUM 1 TABLET: 50; 8.6 TABLET ORAL at 09:19

## 2023-01-01 RX ADMIN — HEPARIN SODIUM 4000 UNITS: 1000 INJECTION INTRAVENOUS; SUBCUTANEOUS at 09:05

## 2023-01-01 RX ADMIN — GABAPENTIN 300 MG: 300 CAPSULE ORAL at 20:48

## 2023-01-01 RX ADMIN — BUSPIRONE HYDROCHLORIDE 5 MG: 5 TABLET ORAL at 14:47

## 2023-01-01 RX ADMIN — BUSPIRONE HYDROCHLORIDE 5 MG: 5 TABLET ORAL at 19:57

## 2023-01-01 RX ADMIN — GABAPENTIN 300 MG: 300 CAPSULE ORAL at 08:42

## 2023-01-01 RX ADMIN — IPRATROPIUM BROMIDE AND ALBUTEROL SULFATE 3 ML: .5; 3 SOLUTION RESPIRATORY (INHALATION) at 13:53

## 2023-01-01 RX ADMIN — INSULIN ASPART 6 UNITS: 100 INJECTION, SOLUTION INTRAVENOUS; SUBCUTANEOUS at 18:57

## 2023-01-01 RX ADMIN — INSULIN ASPART 9 UNITS: 100 INJECTION, SOLUTION INTRAVENOUS; SUBCUTANEOUS at 17:14

## 2023-01-01 RX ADMIN — MICONAZOLE NITRATE: 2 POWDER TOPICAL at 08:48

## 2023-01-01 RX ADMIN — AMITRIPTYLINE HYDROCHLORIDE 30 MG: 10 TABLET, FILM COATED ORAL at 21:52

## 2023-01-01 RX ADMIN — BUSPIRONE HYDROCHLORIDE 5 MG: 5 TABLET ORAL at 08:26

## 2023-01-01 RX ADMIN — IPRATROPIUM BROMIDE AND ALBUTEROL SULFATE 3 ML: .5; 3 SOLUTION RESPIRATORY (INHALATION) at 07:41

## 2023-01-01 RX ADMIN — HYDRALAZINE HYDROCHLORIDE 50 MG: 50 TABLET, FILM COATED ORAL at 06:36

## 2023-01-01 RX ADMIN — AMLODIPINE BESYLATE 5 MG: 5 TABLET ORAL at 21:50

## 2023-01-01 RX ADMIN — DICLOFENAC SODIUM 2 G: 10 GEL TOPICAL at 08:15

## 2023-01-01 RX ADMIN — GABAPENTIN 300 MG: 300 CAPSULE ORAL at 15:32

## 2023-01-01 RX ADMIN — MICONAZOLE NITRATE: 2 POWDER TOPICAL at 10:00

## 2023-01-01 RX ADMIN — UMECLIDINIUM 1 PUFF: 62.5 AEROSOL, POWDER ORAL at 08:46

## 2023-01-01 RX ADMIN — ROSUVASTATIN CALCIUM 10 MG: 10 TABLET, FILM COATED ORAL at 09:10

## 2023-01-01 RX ADMIN — CEFTRIAXONE SODIUM 1 G: 1 INJECTION, POWDER, FOR SOLUTION INTRAMUSCULAR; INTRAVENOUS at 18:40

## 2023-01-01 RX ADMIN — OXYCODONE HYDROCHLORIDE 5 MG: 5 TABLET ORAL at 15:49

## 2023-01-01 RX ADMIN — OXYCODONE HYDROCHLORIDE AND ACETAMINOPHEN 1 TABLET: 5; 325 TABLET ORAL at 22:00

## 2023-01-01 RX ADMIN — ALBUTEROL SULFATE 2 PUFF: 90 AEROSOL, METERED RESPIRATORY (INHALATION) at 19:13

## 2023-01-01 RX ADMIN — IPRATROPIUM BROMIDE AND ALBUTEROL SULFATE 3 ML: .5; 3 SOLUTION RESPIRATORY (INHALATION) at 13:14

## 2023-01-01 RX ADMIN — Medication 1 MG: at 21:58

## 2023-01-01 RX ADMIN — OXYCODONE HYDROCHLORIDE AND ACETAMINOPHEN 1 TABLET: 5; 325 TABLET ORAL at 08:45

## 2023-01-01 RX ADMIN — BUSPIRONE HYDROCHLORIDE 5 MG: 5 TABLET ORAL at 15:26

## 2023-01-01 RX ADMIN — OXYCODONE HYDROCHLORIDE AND ACETAMINOPHEN 1 TABLET: 5; 325 TABLET ORAL at 21:50

## 2023-01-01 RX ADMIN — GABAPENTIN 300 MG: 300 CAPSULE ORAL at 21:07

## 2023-01-01 RX ADMIN — BUSPIRONE HYDROCHLORIDE 5 MG: 5 TABLET ORAL at 11:03

## 2023-01-01 RX ADMIN — UMECLIDINIUM 1 PUFF: 62.5 AEROSOL, POWDER ORAL at 11:55

## 2023-01-01 RX ADMIN — IPRATROPIUM BROMIDE AND ALBUTEROL SULFATE 3 ML: .5; 3 SOLUTION RESPIRATORY (INHALATION) at 03:35

## 2023-01-01 RX ADMIN — HYDRALAZINE HYDROCHLORIDE 50 MG: 50 TABLET, FILM COATED ORAL at 21:17

## 2023-01-01 RX ADMIN — LORAZEPAM 0.5 MG: 2 INJECTION INTRAMUSCULAR; INTRAVENOUS at 18:26

## 2023-01-01 RX ADMIN — IPRATROPIUM BROMIDE AND ALBUTEROL SULFATE 3 ML: .5; 3 SOLUTION RESPIRATORY (INHALATION) at 13:44

## 2023-01-01 RX ADMIN — BUSPIRONE HYDROCHLORIDE 5 MG: 5 TABLET ORAL at 14:07

## 2023-01-01 RX ADMIN — ROSUVASTATIN CALCIUM 10 MG: 10 TABLET, FILM COATED ORAL at 09:32

## 2023-01-01 RX ADMIN — INSULIN ASPART: 100 INJECTION, SOLUTION INTRAVENOUS; SUBCUTANEOUS at 16:05

## 2023-01-01 RX ADMIN — POLYETHYLENE GLYCOL 3350 17 G: 17 POWDER, FOR SOLUTION ORAL at 16:19

## 2023-01-01 RX ADMIN — BUSPIRONE HYDROCHLORIDE 5 MG: 5 TABLET ORAL at 15:32

## 2023-01-01 RX ADMIN — AMITRIPTYLINE HYDROCHLORIDE 30 MG: 10 TABLET, FILM COATED ORAL at 22:14

## 2023-01-01 RX ADMIN — UMECLIDINIUM 1 PUFF: 62.5 AEROSOL, POWDER ORAL at 09:38

## 2023-01-01 RX ADMIN — IPRATROPIUM BROMIDE AND ALBUTEROL SULFATE 3 ML: .5; 3 SOLUTION RESPIRATORY (INHALATION) at 19:36

## 2023-01-01 RX ADMIN — GABAPENTIN 300 MG: 300 CAPSULE ORAL at 09:10

## 2023-01-01 RX ADMIN — ROSUVASTATIN CALCIUM 10 MG: 10 TABLET, FILM COATED ORAL at 07:37

## 2023-01-01 RX ADMIN — GABAPENTIN 300 MG: 300 CAPSULE ORAL at 08:14

## 2023-01-01 RX ADMIN — BUSPIRONE HYDROCHLORIDE 5 MG: 5 TABLET ORAL at 20:49

## 2023-01-01 RX ADMIN — IPRATROPIUM BROMIDE AND ALBUTEROL SULFATE 3 ML: .5; 3 SOLUTION RESPIRATORY (INHALATION) at 19:56

## 2023-01-01 RX ADMIN — BUSPIRONE HYDROCHLORIDE 5 MG: 5 TABLET ORAL at 08:38

## 2023-01-01 RX ADMIN — AMITRIPTYLINE HYDROCHLORIDE 30 MG: 10 TABLET, FILM COATED ORAL at 20:56

## 2023-01-01 RX ADMIN — UMECLIDINIUM 1 PUFF: 62.5 AEROSOL, POWDER ORAL at 09:42

## 2023-01-01 RX ADMIN — FLUTICASONE FUROATE AND VILANTEROL TRIFENATATE 1 PUFF: 100; 25 POWDER RESPIRATORY (INHALATION) at 08:18

## 2023-01-01 RX ADMIN — HEPARIN SODIUM 1300 UNITS/HR: 10000 INJECTION, SOLUTION INTRAVENOUS at 07:44

## 2023-01-01 RX ADMIN — GABAPENTIN 300 MG: 300 CAPSULE ORAL at 20:54

## 2023-01-01 RX ADMIN — HYDROMORPHONE HYDROCHLORIDE 0.5 MG: 1 INJECTION, SOLUTION INTRAMUSCULAR; INTRAVENOUS; SUBCUTANEOUS at 04:12

## 2023-01-01 RX ADMIN — ROSUVASTATIN CALCIUM 10 MG: 10 TABLET, FILM COATED ORAL at 09:28

## 2023-01-01 RX ADMIN — AMLODIPINE BESYLATE 5 MG: 5 TABLET ORAL at 09:32

## 2023-01-01 RX ADMIN — OXYCODONE HYDROCHLORIDE AND ACETAMINOPHEN 1 TABLET: 5; 325 TABLET ORAL at 22:15

## 2023-01-01 RX ADMIN — UMECLIDINIUM 1 PUFF: 62.5 AEROSOL, POWDER ORAL at 07:48

## 2023-01-01 RX ADMIN — ROSUVASTATIN CALCIUM 10 MG: 10 TABLET, FILM COATED ORAL at 08:39

## 2023-01-01 RX ADMIN — ROSUVASTATIN CALCIUM 10 MG: 10 TABLET, FILM COATED ORAL at 15:32

## 2023-01-01 RX ADMIN — ACETAMINOPHEN 650 MG: 325 TABLET, FILM COATED ORAL at 12:05

## 2023-01-01 RX ADMIN — KIT FOR THE PREPARATION OF TECHNETIUM TC 99M ALBUMIN AGGREGATED 6.8 MILLICURIE: 2.5 INJECTION, POWDER, FOR SOLUTION INTRAVENOUS at 16:00

## 2023-01-01 RX ADMIN — CEFTRIAXONE SODIUM 1 G: 1 INJECTION, POWDER, FOR SOLUTION INTRAMUSCULAR; INTRAVENOUS at 17:07

## 2023-01-01 RX ADMIN — ACETAMINOPHEN 650 MG: 325 TABLET, FILM COATED ORAL at 08:38

## 2023-01-01 RX ADMIN — BUSPIRONE HYDROCHLORIDE 5 MG: 5 TABLET ORAL at 09:00

## 2023-01-01 RX ADMIN — INSULIN ASPART 8 UNITS: 100 INJECTION, SOLUTION INTRAVENOUS; SUBCUTANEOUS at 09:34

## 2023-01-01 RX ADMIN — SENNOSIDES AND DOCUSATE SODIUM 1 TABLET: 50; 8.6 TABLET ORAL at 09:32

## 2023-01-01 RX ADMIN — ALBUTEROL SULFATE 2 PUFF: 90 AEROSOL, METERED RESPIRATORY (INHALATION) at 05:37

## 2023-01-01 RX ADMIN — SENNOSIDES AND DOCUSATE SODIUM 2 TABLET: 50; 8.6 TABLET ORAL at 19:57

## 2023-01-01 RX ADMIN — LORAZEPAM 0.5 MG: 2 INJECTION INTRAMUSCULAR; INTRAVENOUS at 18:35

## 2023-01-01 RX ADMIN — DICLOFENAC SODIUM 2 G: 10 GEL TOPICAL at 14:56

## 2023-01-01 RX ADMIN — SENNOSIDES AND DOCUSATE SODIUM 1 TABLET: 50; 8.6 TABLET ORAL at 08:38

## 2023-01-01 RX ADMIN — IPRATROPIUM BROMIDE AND ALBUTEROL SULFATE 3 ML: .5; 3 SOLUTION RESPIRATORY (INHALATION) at 20:11

## 2023-01-01 RX ADMIN — ASPIRIN 81 MG: 81 TABLET, COATED ORAL at 08:26

## 2023-01-01 RX ADMIN — HYDROMORPHONE HYDROCHLORIDE 1 MG: 1 INJECTION, SOLUTION INTRAMUSCULAR; INTRAVENOUS; SUBCUTANEOUS at 14:51

## 2023-01-01 RX ADMIN — BUSPIRONE HYDROCHLORIDE 5 MG: 5 TABLET ORAL at 08:39

## 2023-01-01 RX ADMIN — INSULIN ASPART 6 UNITS: 100 INJECTION, SOLUTION INTRAVENOUS; SUBCUTANEOUS at 08:39

## 2023-01-01 RX ADMIN — ROSUVASTATIN CALCIUM 10 MG: 10 TABLET, FILM COATED ORAL at 09:27

## 2023-01-01 RX ADMIN — IPRATROPIUM BROMIDE AND ALBUTEROL SULFATE 3 ML: .5; 3 SOLUTION RESPIRATORY (INHALATION) at 20:32

## 2023-01-01 RX ADMIN — DICLOFENAC SODIUM 2 G: 10 GEL TOPICAL at 21:31

## 2023-01-01 RX ADMIN — BUSPIRONE HYDROCHLORIDE 5 MG: 5 TABLET ORAL at 21:51

## 2023-01-01 RX ADMIN — GABAPENTIN 300 MG: 300 CAPSULE ORAL at 21:16

## 2023-01-01 RX ADMIN — IPRATROPIUM BROMIDE AND ALBUTEROL SULFATE 3 ML: .5; 3 SOLUTION RESPIRATORY (INHALATION) at 21:00

## 2023-01-01 RX ADMIN — IPRATROPIUM BROMIDE AND ALBUTEROL SULFATE 3 ML: .5; 3 SOLUTION RESPIRATORY (INHALATION) at 07:51

## 2023-01-01 RX ADMIN — BUSPIRONE HYDROCHLORIDE 5 MG: 5 TABLET ORAL at 09:29

## 2023-01-01 RX ADMIN — OXYCODONE HYDROCHLORIDE AND ACETAMINOPHEN 1 TABLET: 5; 325 TABLET ORAL at 15:37

## 2023-01-01 RX ADMIN — HYDROMORPHONE HYDROCHLORIDE 0.5 MG: 1 INJECTION, SOLUTION INTRAMUSCULAR; INTRAVENOUS; SUBCUTANEOUS at 18:45

## 2023-01-01 RX ADMIN — SENNOSIDES AND DOCUSATE SODIUM 1 TABLET: 50; 8.6 TABLET ORAL at 20:07

## 2023-01-01 RX ADMIN — SENNOSIDES AND DOCUSATE SODIUM 2 TABLET: 50; 8.6 TABLET ORAL at 21:51

## 2023-01-01 RX ADMIN — AMLODIPINE BESYLATE 5 MG: 5 TABLET ORAL at 22:44

## 2023-01-01 RX ADMIN — DICLOFENAC SODIUM 2 G: 10 GEL TOPICAL at 21:06

## 2023-01-01 RX ADMIN — UMECLIDINIUM 1 PUFF: 62.5 AEROSOL, POWDER ORAL at 08:56

## 2023-01-01 RX ADMIN — HYDRALAZINE HYDROCHLORIDE 50 MG: 50 TABLET, FILM COATED ORAL at 06:28

## 2023-01-01 RX ADMIN — DICLOFENAC SODIUM 2 G: 10 GEL TOPICAL at 22:01

## 2023-01-01 RX ADMIN — BUSPIRONE HYDROCHLORIDE 5 MG: 5 TABLET ORAL at 22:15

## 2023-01-01 RX ADMIN — ACETAMINOPHEN 650 MG: 325 TABLET, FILM COATED ORAL at 12:57

## 2023-01-01 RX ADMIN — ACETAMINOPHEN 650 MG: 325 TABLET, FILM COATED ORAL at 17:55

## 2023-01-01 RX ADMIN — HYDRALAZINE HYDROCHLORIDE 25 MG: 25 TABLET ORAL at 08:59

## 2023-01-01 RX ADMIN — FLUTICASONE FUROATE AND VILANTEROL TRIFENATATE 1 PUFF: 100; 25 POWDER RESPIRATORY (INHALATION) at 09:42

## 2023-01-01 RX ADMIN — GABAPENTIN 300 MG: 300 CAPSULE ORAL at 22:02

## 2023-01-01 RX ADMIN — ISOSORBIDE MONONITRATE 60 MG: 60 TABLET, EXTENDED RELEASE ORAL at 08:42

## 2023-01-01 RX ADMIN — FLUTICASONE FUROATE AND VILANTEROL TRIFENATATE 1 PUFF: 100; 25 POWDER RESPIRATORY (INHALATION) at 08:41

## 2023-01-01 RX ADMIN — OXYCODONE HYDROCHLORIDE AND ACETAMINOPHEN 1 TABLET: 5; 325 TABLET ORAL at 22:14

## 2023-01-01 RX ADMIN — IPRATROPIUM BROMIDE AND ALBUTEROL SULFATE 3 ML: .5; 3 SOLUTION RESPIRATORY (INHALATION) at 07:17

## 2023-01-01 RX ADMIN — DICLOFENAC SODIUM 2 G: 10 GEL TOPICAL at 22:07

## 2023-01-01 RX ADMIN — ISOSORBIDE MONONITRATE 60 MG: 60 TABLET, EXTENDED RELEASE ORAL at 09:02

## 2023-01-01 RX ADMIN — FLUTICASONE FUROATE AND VILANTEROL TRIFENATATE 1 PUFF: 100; 25 POWDER RESPIRATORY (INHALATION) at 07:50

## 2023-01-01 RX ADMIN — ROSUVASTATIN CALCIUM 10 MG: 10 TABLET, FILM COATED ORAL at 09:00

## 2023-01-01 RX ADMIN — GABAPENTIN 300 MG: 300 CAPSULE ORAL at 20:36

## 2023-01-01 RX ADMIN — BUSPIRONE HYDROCHLORIDE 5 MG: 5 TABLET ORAL at 21:17

## 2023-01-01 RX ADMIN — ISOSORBIDE MONONITRATE 60 MG: 60 TABLET, EXTENDED RELEASE ORAL at 09:32

## 2023-01-01 RX ADMIN — UMECLIDINIUM 1 PUFF: 62.5 AEROSOL, POWDER ORAL at 09:29

## 2023-01-01 RX ADMIN — LORAZEPAM 0.5 MG: 2 INJECTION INTRAMUSCULAR; INTRAVENOUS at 16:37

## 2023-01-01 RX ADMIN — DICLOFENAC SODIUM 2 G: 10 GEL TOPICAL at 08:38

## 2023-01-01 RX ADMIN — SENNOSIDES AND DOCUSATE SODIUM 2 TABLET: 50; 8.6 TABLET ORAL at 19:35

## 2023-01-01 RX ADMIN — GABAPENTIN 300 MG: 300 CAPSULE ORAL at 09:02

## 2023-01-01 RX ADMIN — ONDANSETRON 4 MG: 2 INJECTION INTRAMUSCULAR; INTRAVENOUS at 16:51

## 2023-01-01 RX ADMIN — AMLODIPINE BESYLATE 5 MG: 5 TABLET ORAL at 11:04

## 2023-01-01 RX ADMIN — UMECLIDINIUM 1 PUFF: 62.5 AEROSOL, POWDER ORAL at 08:28

## 2023-01-01 RX ADMIN — UMECLIDINIUM 1 PUFF: 62.5 AEROSOL, POWDER ORAL at 07:38

## 2023-01-01 RX ADMIN — OXYCODONE HYDROCHLORIDE AND ACETAMINOPHEN 1 TABLET: 5; 325 TABLET ORAL at 02:08

## 2023-01-01 RX ADMIN — OXYCODONE HYDROCHLORIDE AND ACETAMINOPHEN 1 TABLET: 5; 325 TABLET ORAL at 20:54

## 2023-01-01 RX ADMIN — ROSUVASTATIN CALCIUM 10 MG: 10 TABLET, FILM COATED ORAL at 08:41

## 2023-01-01 RX ADMIN — GABAPENTIN 300 MG: 300 CAPSULE ORAL at 20:15

## 2023-01-01 RX ADMIN — SODIUM CHLORIDE 300 ML: 9 INJECTION, SOLUTION INTRAVENOUS at 09:39

## 2023-01-01 RX ADMIN — ACETAMINOPHEN 975 MG: 325 TABLET ORAL at 19:54

## 2023-01-01 RX ADMIN — SODIUM CHLORIDE 250 ML: 9 INJECTION, SOLUTION INTRAVENOUS at 14:32

## 2023-01-01 RX ADMIN — IPRATROPIUM BROMIDE AND ALBUTEROL SULFATE 3 ML: .5; 3 SOLUTION RESPIRATORY (INHALATION) at 13:00

## 2023-01-01 RX ADMIN — ACETAMINOPHEN 650 MG: 325 TABLET, FILM COATED ORAL at 09:27

## 2023-01-01 RX ADMIN — SENNOSIDES AND DOCUSATE SODIUM 1 TABLET: 50; 8.6 TABLET ORAL at 09:00

## 2023-01-01 RX ADMIN — GABAPENTIN 300 MG: 300 CAPSULE ORAL at 07:38

## 2023-01-01 RX ADMIN — BUSPIRONE HYDROCHLORIDE 5 MG: 5 TABLET ORAL at 14:55

## 2023-01-01 RX ADMIN — HYDROMORPHONE HYDROCHLORIDE 1 MG: 1 INJECTION, SOLUTION INTRAMUSCULAR; INTRAVENOUS; SUBCUTANEOUS at 18:02

## 2023-01-01 RX ADMIN — HEPARIN SODIUM 1200 UNITS/HR: 10000 INJECTION, SOLUTION INTRAVENOUS at 06:00

## 2023-01-01 RX ADMIN — SENNOSIDES AND DOCUSATE SODIUM 1 TABLET: 50; 8.6 TABLET ORAL at 20:46

## 2023-01-01 RX ADMIN — AMLODIPINE BESYLATE 5 MG: 5 TABLET ORAL at 20:54

## 2023-01-01 RX ADMIN — AMITRIPTYLINE HYDROCHLORIDE 30 MG: 10 TABLET, FILM COATED ORAL at 21:08

## 2023-01-01 RX ADMIN — AMITRIPTYLINE HYDROCHLORIDE 30 MG: 10 TABLET, FILM COATED ORAL at 21:51

## 2023-01-01 RX ADMIN — BUSPIRONE HYDROCHLORIDE 5 MG: 5 TABLET ORAL at 20:46

## 2023-01-01 RX ADMIN — BUSPIRONE HYDROCHLORIDE 5 MG: 5 TABLET ORAL at 13:46

## 2023-01-01 RX ADMIN — AMLODIPINE BESYLATE 5 MG: 5 TABLET ORAL at 22:01

## 2023-01-01 RX ADMIN — FUROSEMIDE 20 MG: 20 TABLET ORAL at 15:44

## 2023-01-01 RX ADMIN — OXYCODONE HYDROCHLORIDE AND ACETAMINOPHEN 1 TABLET: 5; 325 TABLET ORAL at 21:08

## 2023-01-01 RX ADMIN — AMITRIPTYLINE HYDROCHLORIDE 30 MG: 10 TABLET, FILM COATED ORAL at 20:49

## 2023-01-01 RX ADMIN — BUSPIRONE HYDROCHLORIDE 5 MG: 5 TABLET ORAL at 22:00

## 2023-01-01 RX ADMIN — GABAPENTIN 300 MG: 300 CAPSULE ORAL at 08:38

## 2023-01-01 RX ADMIN — GABAPENTIN 300 MG: 300 CAPSULE ORAL at 21:50

## 2023-01-01 RX ADMIN — NITROGLYCERIN: 0.4 TABLET SUBLINGUAL at 16:27

## 2023-01-01 RX ADMIN — ACETAMINOPHEN 650 MG: 325 TABLET, FILM COATED ORAL at 14:57

## 2023-01-01 RX ADMIN — FUROSEMIDE 40 MG: 10 INJECTION, SOLUTION INTRAVENOUS at 14:18

## 2023-01-01 RX ADMIN — HYDRALAZINE HYDROCHLORIDE 50 MG: 50 TABLET, FILM COATED ORAL at 21:07

## 2023-01-01 RX ADMIN — SODIUM CHLORIDE 1000 ML: 9 INJECTION, SOLUTION INTRAVENOUS at 07:45

## 2023-01-01 RX ADMIN — GABAPENTIN 300 MG: 300 CAPSULE ORAL at 20:07

## 2023-01-01 RX ADMIN — LORAZEPAM 1 MG: 2 INJECTION INTRAMUSCULAR; INTRAVENOUS at 19:05

## 2023-01-01 RX ADMIN — HYDROMORPHONE HYDROCHLORIDE 0.5 MG: 1 INJECTION, SOLUTION INTRAMUSCULAR; INTRAVENOUS; SUBCUTANEOUS at 09:42

## 2023-01-01 RX ADMIN — SENNOSIDES AND DOCUSATE SODIUM 2 TABLET: 50; 8.6 TABLET ORAL at 07:49

## 2023-01-01 RX ADMIN — FUROSEMIDE 20 MG: 20 TABLET ORAL at 15:43

## 2023-01-01 RX ADMIN — ACETAMINOPHEN 650 MG: 325 TABLET, FILM COATED ORAL at 19:36

## 2023-01-01 RX ADMIN — ACETAMINOPHEN 650 MG: 325 TABLET, FILM COATED ORAL at 07:49

## 2023-01-01 RX ADMIN — ALBUTEROL SULFATE 2 PUFF: 90 AEROSOL, METERED RESPIRATORY (INHALATION) at 21:22

## 2023-01-01 RX ADMIN — DICLOFENAC SODIUM 2 G: 10 GEL TOPICAL at 22:16

## 2023-01-01 RX ADMIN — CEFTRIAXONE SODIUM 1 G: 1 INJECTION, POWDER, FOR SOLUTION INTRAMUSCULAR; INTRAVENOUS at 18:22

## 2023-01-01 RX ADMIN — SENNOSIDES AND DOCUSATE SODIUM 2 TABLET: 50; 8.6 TABLET ORAL at 21:50

## 2023-01-01 RX ADMIN — GABAPENTIN 300 MG: 300 CAPSULE ORAL at 19:55

## 2023-01-01 RX ADMIN — ISOSORBIDE MONONITRATE 60 MG: 60 TABLET, EXTENDED RELEASE ORAL at 09:27

## 2023-01-01 RX ADMIN — SENNOSIDES AND DOCUSATE SODIUM 2 TABLET: 50; 8.6 TABLET ORAL at 20:15

## 2023-01-01 RX ADMIN — AMLODIPINE BESYLATE 5 MG: 5 TABLET ORAL at 09:28

## 2023-01-01 RX ADMIN — DICLOFENAC SODIUM 2 G: 10 GEL TOPICAL at 14:18

## 2023-01-01 RX ADMIN — UMECLIDINIUM 1 PUFF: 62.5 AEROSOL, POWDER ORAL at 11:04

## 2023-01-01 RX ADMIN — IPRATROPIUM BROMIDE AND ALBUTEROL SULFATE 3 ML: .5; 3 SOLUTION RESPIRATORY (INHALATION) at 12:27

## 2023-01-01 RX ADMIN — INSULIN ASPART 9 UNITS: 100 INJECTION, SOLUTION INTRAVENOUS; SUBCUTANEOUS at 09:12

## 2023-01-01 RX ADMIN — GABAPENTIN 300 MG: 300 CAPSULE ORAL at 21:06

## 2023-01-01 RX ADMIN — IPRATROPIUM BROMIDE AND ALBUTEROL SULFATE 3 ML: .5; 3 SOLUTION RESPIRATORY (INHALATION) at 19:21

## 2023-01-01 RX ADMIN — AMITRIPTYLINE HYDROCHLORIDE 30 MG: 10 TABLET, FILM COATED ORAL at 19:56

## 2023-01-01 RX ADMIN — BUSPIRONE HYDROCHLORIDE 5 MG: 5 TABLET ORAL at 21:06

## 2023-01-01 RX ADMIN — FUROSEMIDE 20 MG: 20 TABLET ORAL at 16:02

## 2023-01-01 RX ADMIN — ACETAMINOPHEN 650 MG: 325 TABLET, FILM COATED ORAL at 14:55

## 2023-01-01 RX ADMIN — INSULIN GLARGINE 30 UNITS: 100 INJECTION, SOLUTION SUBCUTANEOUS at 22:03

## 2023-01-01 RX ADMIN — AMITRIPTYLINE HYDROCHLORIDE 30 MG: 10 TABLET, FILM COATED ORAL at 21:06

## 2023-01-01 RX ADMIN — ASPIRIN 81 MG: 81 TABLET, COATED ORAL at 09:31

## 2023-01-01 RX ADMIN — GABAPENTIN 300 MG: 300 CAPSULE ORAL at 22:16

## 2023-01-01 RX ADMIN — AMLODIPINE BESYLATE 5 MG: 5 TABLET ORAL at 08:41

## 2023-01-01 RX ADMIN — HYDROMORPHONE HYDROCHLORIDE 0.5 MG: 1 INJECTION, SOLUTION INTRAMUSCULAR; INTRAVENOUS; SUBCUTANEOUS at 18:35

## 2023-01-01 RX ADMIN — SODIUM CHLORIDE 250 ML: 9 INJECTION, SOLUTION INTRAVENOUS at 09:39

## 2023-01-01 RX ADMIN — IPRATROPIUM BROMIDE AND ALBUTEROL SULFATE 3 ML: .5; 3 SOLUTION RESPIRATORY (INHALATION) at 14:54

## 2023-01-01 RX ADMIN — AMITRIPTYLINE HYDROCHLORIDE 30 MG: 10 TABLET, FILM COATED ORAL at 20:36

## 2023-01-01 RX ADMIN — FUROSEMIDE 20 MG: 20 TABLET ORAL at 07:37

## 2023-01-01 RX ADMIN — BUSPIRONE HYDROCHLORIDE 5 MG: 5 TABLET ORAL at 13:48

## 2023-01-01 RX ADMIN — SENNOSIDES AND DOCUSATE SODIUM 2 TABLET: 50; 8.6 TABLET ORAL at 22:44

## 2023-01-01 RX ADMIN — IPRATROPIUM BROMIDE AND ALBUTEROL SULFATE 3 ML: .5; 3 SOLUTION RESPIRATORY (INHALATION) at 01:04

## 2023-01-01 RX ADMIN — AMLODIPINE BESYLATE 5 MG: 5 TABLET ORAL at 20:49

## 2023-01-01 RX ADMIN — IPRATROPIUM BROMIDE AND ALBUTEROL SULFATE 3 ML: .5; 3 SOLUTION RESPIRATORY (INHALATION) at 01:41

## 2023-01-01 RX ADMIN — BUSPIRONE HYDROCHLORIDE 5 MG: 5 TABLET ORAL at 17:31

## 2023-01-01 RX ADMIN — ACETAMINOPHEN 650 MG: 325 TABLET, FILM COATED ORAL at 18:48

## 2023-01-01 RX ADMIN — BUSPIRONE HYDROCHLORIDE 5 MG: 5 TABLET ORAL at 20:36

## 2023-01-01 RX ADMIN — AMITRIPTYLINE HYDROCHLORIDE 30 MG: 10 TABLET, FILM COATED ORAL at 21:21

## 2023-01-01 RX ADMIN — ONDANSETRON 4 MG: 4 TABLET, ORALLY DISINTEGRATING ORAL at 13:06

## 2023-01-01 RX ADMIN — ISOSORBIDE MONONITRATE 60 MG: 60 TABLET, EXTENDED RELEASE ORAL at 08:37

## 2023-01-01 RX ADMIN — AMLODIPINE BESYLATE 5 MG: 5 TABLET ORAL at 08:37

## 2023-01-01 RX ADMIN — FLUTICASONE FUROATE AND VILANTEROL TRIFENATATE 1 PUFF: 100; 25 POWDER RESPIRATORY (INHALATION) at 09:20

## 2023-01-01 RX ADMIN — MICONAZOLE NITRATE: 2 POWDER TOPICAL at 09:38

## 2023-01-01 RX ADMIN — NITROGLYCERIN 0.4 MG: 0.4 TABLET SUBLINGUAL at 16:34

## 2023-01-01 RX ADMIN — BUSPIRONE HYDROCHLORIDE 5 MG: 5 TABLET ORAL at 22:02

## 2023-01-01 RX ADMIN — HYDRALAZINE HYDROCHLORIDE 10 MG: 20 INJECTION INTRAMUSCULAR; INTRAVENOUS at 21:22

## 2023-01-01 RX ADMIN — GABAPENTIN 300 MG: 300 CAPSULE ORAL at 22:05

## 2023-01-01 RX ADMIN — OXYCODONE HYDROCHLORIDE AND ACETAMINOPHEN 1 TABLET: 5; 325 TABLET ORAL at 21:22

## 2023-01-01 RX ADMIN — GABAPENTIN 300 MG: 300 CAPSULE ORAL at 21:05

## 2023-01-01 RX ADMIN — GABAPENTIN 300 MG: 300 CAPSULE ORAL at 13:22

## 2023-01-01 RX ADMIN — LORAZEPAM 0.5 MG: 2 INJECTION INTRAMUSCULAR; INTRAVENOUS at 18:55

## 2023-01-01 RX ADMIN — DICLOFENAC SODIUM 2 G: 10 GEL TOPICAL at 09:14

## 2023-01-01 RX ADMIN — HUMAN ALBUMIN MICROSPHERES AND PERFLUTREN 6 ML: 10; .22 INJECTION, SOLUTION INTRAVENOUS at 15:29

## 2023-01-01 RX ADMIN — Medication: at 18:50

## 2023-01-01 RX ADMIN — SODIUM CHLORIDE 300 ML: 9 INJECTION, SOLUTION INTRAVENOUS at 18:48

## 2023-01-01 RX ADMIN — UMECLIDINIUM 1 PUFF: 62.5 AEROSOL, POWDER ORAL at 09:20

## 2023-01-01 RX ADMIN — AMLODIPINE BESYLATE 5 MG: 5 TABLET ORAL at 09:26

## 2023-01-01 RX ADMIN — OXYCODONE HYDROCHLORIDE AND ACETAMINOPHEN 1 TABLET: 5; 325 TABLET ORAL at 00:12

## 2023-01-01 RX ADMIN — AMITRIPTYLINE HYDROCHLORIDE 30 MG: 10 TABLET, FILM COATED ORAL at 22:00

## 2023-01-01 RX ADMIN — ISOSORBIDE MONONITRATE 60 MG: 60 TABLET, EXTENDED RELEASE ORAL at 09:28

## 2023-01-01 RX ADMIN — IPRATROPIUM BROMIDE AND ALBUTEROL SULFATE 3 ML: .5; 3 SOLUTION RESPIRATORY (INHALATION) at 18:40

## 2023-01-01 RX ADMIN — AMITRIPTYLINE HYDROCHLORIDE 30 MG: 10 TABLET, FILM COATED ORAL at 22:05

## 2023-01-01 RX ADMIN — SENNOSIDES AND DOCUSATE SODIUM 2 TABLET: 50; 8.6 TABLET ORAL at 11:20

## 2023-01-01 RX ADMIN — GABAPENTIN 300 MG: 300 CAPSULE ORAL at 22:44

## 2023-01-01 RX ADMIN — GABAPENTIN 300 MG: 300 CAPSULE ORAL at 19:35

## 2023-01-01 RX ADMIN — FLUTICASONE FUROATE AND VILANTEROL TRIFENATATE 1 PUFF: 100; 25 POWDER RESPIRATORY (INHALATION) at 07:38

## 2023-01-01 RX ADMIN — MICONAZOLE NITRATE: 2 POWDER TOPICAL at 20:08

## 2023-01-01 RX ADMIN — OXYCODONE HYDROCHLORIDE AND ACETAMINOPHEN 1 TABLET: 5; 325 TABLET ORAL at 23:06

## 2023-01-01 RX ADMIN — HYDROMORPHONE HYDROCHLORIDE 0.5 MG: 1 INJECTION, SOLUTION INTRAMUSCULAR; INTRAVENOUS; SUBCUTANEOUS at 16:46

## 2023-01-01 RX ADMIN — IPRATROPIUM BROMIDE AND ALBUTEROL SULFATE 3 ML: .5; 3 SOLUTION RESPIRATORY (INHALATION) at 13:23

## 2023-01-01 RX ADMIN — BUSPIRONE HYDROCHLORIDE 5 MG: 5 TABLET ORAL at 08:14

## 2023-01-01 RX ADMIN — FLUTICASONE FUROATE AND VILANTEROL TRIFENATATE 1 PUFF: 100; 25 POWDER RESPIRATORY (INHALATION) at 09:37

## 2023-01-01 RX ADMIN — SODIUM CHLORIDE 250 ML: 9 INJECTION, SOLUTION INTRAVENOUS at 10:34

## 2023-01-01 RX ADMIN — FLUTICASONE FUROATE AND VILANTEROL TRIFENATATE 1 PUFF: 100; 25 POWDER RESPIRATORY (INHALATION) at 08:46

## 2023-01-01 RX ADMIN — SENNOSIDES AND DOCUSATE SODIUM 2 TABLET: 50; 8.6 TABLET ORAL at 09:10

## 2023-01-01 RX ADMIN — INSULIN GLARGINE 20 UNITS: 100 INJECTION, SOLUTION SUBCUTANEOUS at 11:28

## 2023-01-01 RX ADMIN — IPRATROPIUM BROMIDE AND ALBUTEROL SULFATE 3 ML: .5; 3 SOLUTION RESPIRATORY (INHALATION) at 17:24

## 2023-01-01 RX ADMIN — FLUTICASONE FUROATE AND VILANTEROL TRIFENATATE 1 PUFF: 100; 25 POWDER RESPIRATORY (INHALATION) at 08:55

## 2023-01-01 RX ADMIN — OXYCODONE HYDROCHLORIDE AND ACETAMINOPHEN 1 TABLET: 5; 325 TABLET ORAL at 21:17

## 2023-01-01 RX ADMIN — GABAPENTIN 300 MG: 300 CAPSULE ORAL at 09:26

## 2023-01-01 RX ADMIN — SODIUM CHLORIDE: 9 INJECTION, SOLUTION INTRAVENOUS at 14:22

## 2023-01-01 RX ADMIN — DICLOFENAC SODIUM 2 G: 10 GEL TOPICAL at 18:58

## 2023-01-01 RX ADMIN — DICLOFENAC SODIUM 2 G: 10 GEL TOPICAL at 09:33

## 2023-01-01 RX ADMIN — ALBUTEROL SULFATE 2 PUFF: 90 AEROSOL, METERED RESPIRATORY (INHALATION) at 06:22

## 2023-01-01 RX ADMIN — IPRATROPIUM BROMIDE AND ALBUTEROL SULFATE 3 ML: .5; 3 SOLUTION RESPIRATORY (INHALATION) at 13:13

## 2023-01-01 RX ADMIN — IPRATROPIUM BROMIDE AND ALBUTEROL SULFATE 3 ML: .5; 3 SOLUTION RESPIRATORY (INHALATION) at 12:06

## 2023-01-01 RX ADMIN — IPRATROPIUM BROMIDE AND ALBUTEROL SULFATE 3 ML: .5; 3 SOLUTION RESPIRATORY (INHALATION) at 19:29

## 2023-01-01 RX ADMIN — ASPIRIN 81 MG: 81 TABLET, COATED ORAL at 08:41

## 2023-01-01 RX ADMIN — DICLOFENAC SODIUM 2 G: 10 GEL TOPICAL at 18:46

## 2023-01-01 RX ADMIN — HYDROMORPHONE HYDROCHLORIDE 1 MG: 1 INJECTION, SOLUTION INTRAMUSCULAR; INTRAVENOUS; SUBCUTANEOUS at 19:05

## 2023-01-01 RX ADMIN — IPRATROPIUM BROMIDE AND ALBUTEROL SULFATE 3 ML: .5; 3 SOLUTION RESPIRATORY (INHALATION) at 16:39

## 2023-01-01 RX ADMIN — BUSPIRONE HYDROCHLORIDE 5 MG: 5 TABLET ORAL at 13:23

## 2023-01-01 RX ADMIN — UMECLIDINIUM 1 PUFF: 62.5 AEROSOL, POWDER ORAL at 09:14

## 2023-01-01 RX ADMIN — HYDRALAZINE HYDROCHLORIDE 75 MG: 25 TABLET ORAL at 05:38

## 2023-01-01 RX ADMIN — INSULIN ASPART 3 UNITS: 100 INJECTION, SOLUTION INTRAVENOUS; SUBCUTANEOUS at 18:33

## 2023-01-01 RX ADMIN — OXYCODONE HYDROCHLORIDE AND ACETAMINOPHEN 1 TABLET: 5; 325 TABLET ORAL at 14:48

## 2023-01-01 RX ADMIN — OXYCODONE HYDROCHLORIDE AND ACETAMINOPHEN 1 TABLET: 5; 325 TABLET ORAL at 09:28

## 2023-01-01 RX ADMIN — IPRATROPIUM BROMIDE AND ALBUTEROL SULFATE 3 ML: .5; 3 SOLUTION RESPIRATORY (INHALATION) at 05:44

## 2023-01-01 RX ADMIN — ASPIRIN 81 MG: 81 TABLET, COATED ORAL at 13:49

## 2023-01-01 RX ADMIN — OXYCODONE HYDROCHLORIDE AND ACETAMINOPHEN 1 TABLET: 5; 325 TABLET ORAL at 20:36

## 2023-01-01 RX ADMIN — INSULIN ASPART 2 UNITS: 100 INJECTION, SOLUTION INTRAVENOUS; SUBCUTANEOUS at 14:51

## 2023-01-01 RX ADMIN — SODIUM CHLORIDE 300 ML: 9 INJECTION, SOLUTION INTRAVENOUS at 14:42

## 2023-01-01 RX ADMIN — INSULIN ASPART 3 UNITS: 100 INJECTION, SOLUTION INTRAVENOUS; SUBCUTANEOUS at 09:21

## 2023-01-01 RX ADMIN — Medication: at 10:35

## 2023-01-01 RX ADMIN — ROSUVASTATIN CALCIUM 20 MG: 20 TABLET, FILM COATED ORAL at 07:49

## 2023-01-01 RX ADMIN — ACETAMINOPHEN 650 MG: 325 TABLET, FILM COATED ORAL at 21:32

## 2023-01-01 RX ADMIN — BUSPIRONE HYDROCHLORIDE 5 MG: 5 TABLET ORAL at 21:08

## 2023-01-01 RX ADMIN — FLUTICASONE FUROATE AND VILANTEROL TRIFENATATE 1 PUFF: 100; 25 POWDER RESPIRATORY (INHALATION) at 09:21

## 2023-01-01 RX ADMIN — MICONAZOLE NITRATE: 2 POWDER TOPICAL at 00:18

## 2023-01-01 RX ADMIN — IPRATROPIUM BROMIDE AND ALBUTEROL SULFATE 3 ML: .5; 3 SOLUTION RESPIRATORY (INHALATION) at 19:25

## 2023-01-01 RX ADMIN — BUSPIRONE HYDROCHLORIDE 5 MG: 5 TABLET ORAL at 15:22

## 2023-01-01 RX ADMIN — ASPIRIN 81 MG: 81 TABLET, COATED ORAL at 09:10

## 2023-01-01 RX ADMIN — IPRATROPIUM BROMIDE AND ALBUTEROL SULFATE 3 ML: .5; 3 SOLUTION RESPIRATORY (INHALATION) at 20:08

## 2023-01-01 RX ADMIN — DICLOFENAC SODIUM 2 G: 10 GEL TOPICAL at 00:18

## 2023-01-01 RX ADMIN — GABAPENTIN 300 MG: 300 CAPSULE ORAL at 09:19

## 2023-01-01 RX ADMIN — IPRATROPIUM BROMIDE AND ALBUTEROL SULFATE 3 ML: .5; 3 SOLUTION RESPIRATORY (INHALATION) at 07:55

## 2023-01-01 RX ADMIN — AMITRIPTYLINE HYDROCHLORIDE 30 MG: 10 TABLET, FILM COATED ORAL at 21:31

## 2023-01-01 RX ADMIN — BUSPIRONE HYDROCHLORIDE 5 MG: 5 TABLET ORAL at 22:43

## 2023-01-01 RX ADMIN — HYDROMORPHONE HYDROCHLORIDE 1 MG: 1 INJECTION, SOLUTION INTRAMUSCULAR; INTRAVENOUS; SUBCUTANEOUS at 18:55

## 2023-01-01 RX ADMIN — BUSPIRONE HYDROCHLORIDE 5 MG: 5 TABLET ORAL at 07:49

## 2023-01-01 RX ADMIN — ROSUVASTATIN CALCIUM 10 MG: 10 TABLET, FILM COATED ORAL at 09:19

## 2023-01-01 RX ADMIN — KIT FOR THE PREPARATION OF TECHNETIUM TC 99M PENTETATE 58.5 MILLICURIE: 20 INJECTION, POWDER, LYOPHILIZED, FOR SOLUTION INTRAVENOUS; RESPIRATORY (INHALATION) at 15:31

## 2023-01-01 RX ADMIN — IPRATROPIUM BROMIDE AND ALBUTEROL SULFATE 3 ML: .5; 3 SOLUTION RESPIRATORY (INHALATION) at 13:07

## 2023-01-01 RX ADMIN — BUSPIRONE HYDROCHLORIDE 5 MG: 5 TABLET ORAL at 16:10

## 2023-01-01 RX ADMIN — ASPIRIN 81 MG: 81 TABLET, COATED ORAL at 09:20

## 2023-01-01 RX ADMIN — OXYCODONE HYDROCHLORIDE AND ACETAMINOPHEN 1 TABLET: 5; 325 TABLET ORAL at 09:17

## 2023-01-01 RX ADMIN — GABAPENTIN 300 MG: 300 CAPSULE ORAL at 09:00

## 2023-01-01 RX ADMIN — IPRATROPIUM BROMIDE AND ALBUTEROL SULFATE 3 ML: .5; 3 SOLUTION RESPIRATORY (INHALATION) at 19:43

## 2023-01-01 RX ADMIN — LORAZEPAM 0.5 MG: 2 INJECTION INTRAMUSCULAR; INTRAVENOUS at 06:04

## 2023-01-01 RX ADMIN — FLUTICASONE FUROATE AND VILANTEROL TRIFENATATE 1 PUFF: 100; 25 POWDER RESPIRATORY (INHALATION) at 09:14

## 2023-01-01 RX ADMIN — SENNOSIDES AND DOCUSATE SODIUM 2 TABLET: 50; 8.6 TABLET ORAL at 09:02

## 2023-01-01 RX ADMIN — OXYCODONE HYDROCHLORIDE AND ACETAMINOPHEN 1 TABLET: 5; 325 TABLET ORAL at 21:52

## 2023-01-01 RX ADMIN — AMLODIPINE BESYLATE 5 MG: 5 TABLET ORAL at 20:36

## 2023-01-01 RX ADMIN — BUSPIRONE HYDROCHLORIDE 5 MG: 5 TABLET ORAL at 13:00

## 2023-01-01 RX ADMIN — ONDANSETRON 4 MG: 4 TABLET, ORALLY DISINTEGRATING ORAL at 05:41

## 2023-01-01 RX ADMIN — BUSPIRONE HYDROCHLORIDE 5 MG: 5 TABLET ORAL at 09:27

## 2023-01-01 RX ADMIN — GABAPENTIN 300 MG: 300 CAPSULE ORAL at 09:29

## 2023-01-01 RX ADMIN — HYDRALAZINE HYDROCHLORIDE 75 MG: 25 TABLET ORAL at 14:07

## 2023-01-01 RX ADMIN — FLUTICASONE FUROATE AND VILANTEROL TRIFENATATE 1 PUFF: 100; 25 POWDER RESPIRATORY (INHALATION) at 07:48

## 2023-01-01 RX ADMIN — INSULIN ASPART 3 UNITS: 100 INJECTION, SOLUTION INTRAVENOUS; SUBCUTANEOUS at 13:34

## 2023-01-01 RX ADMIN — MIDAZOLAM 1 MG: 1 INJECTION INTRAMUSCULAR; INTRAVENOUS at 08:58

## 2023-01-01 RX ADMIN — INSULIN ASPART 1 UNITS: 100 INJECTION, SOLUTION INTRAVENOUS; SUBCUTANEOUS at 14:05

## 2023-01-01 RX ADMIN — FLUTICASONE FUROATE AND VILANTEROL TRIFENATATE 1 PUFF: 100; 25 POWDER RESPIRATORY (INHALATION) at 09:33

## 2023-01-01 RX ADMIN — BUSPIRONE HYDROCHLORIDE 5 MG: 5 TABLET ORAL at 20:54

## 2023-01-01 RX ADMIN — FLUTICASONE FUROATE AND VILANTEROL TRIFENATATE 1 PUFF: 100; 25 POWDER RESPIRATORY (INHALATION) at 08:28

## 2023-01-01 RX ADMIN — AMITRIPTYLINE HYDROCHLORIDE 30 MG: 10 TABLET, FILM COATED ORAL at 21:17

## 2023-01-01 RX ADMIN — OXYCODONE HYDROCHLORIDE AND ACETAMINOPHEN 1 TABLET: 5; 325 TABLET ORAL at 22:43

## 2023-01-01 RX ADMIN — ALBUTEROL SULFATE 2 PUFF: 90 AEROSOL, METERED RESPIRATORY (INHALATION) at 17:39

## 2023-01-01 RX ADMIN — SODIUM CHLORIDE 250 ML: 9 INJECTION, SOLUTION INTRAVENOUS at 14:41

## 2023-01-01 RX ADMIN — MICONAZOLE NITRATE: 2 POWDER TOPICAL at 13:06

## 2023-01-01 RX ADMIN — FLUTICASONE FUROATE AND VILANTEROL TRIFENATATE 1 PUFF: 100; 25 POWDER RESPIRATORY (INHALATION) at 13:24

## 2023-01-01 RX ADMIN — AMITRIPTYLINE HYDROCHLORIDE 30 MG: 10 TABLET, FILM COATED ORAL at 23:06

## 2023-01-01 RX ADMIN — METHYLPREDNISOLONE SODIUM SUCCINATE 125 MG: 125 INJECTION, POWDER, FOR SOLUTION INTRAMUSCULAR; INTRAVENOUS at 16:32

## 2023-01-01 RX ADMIN — DICLOFENAC SODIUM 2 G: 10 GEL TOPICAL at 13:00

## 2023-01-01 RX ADMIN — UMECLIDINIUM 1 PUFF: 62.5 AEROSOL, POWDER ORAL at 09:33

## 2023-01-01 RX ADMIN — SENNOSIDES AND DOCUSATE SODIUM 1 TABLET: 50; 8.6 TABLET ORAL at 20:54

## 2023-01-01 RX ADMIN — IPRATROPIUM BROMIDE AND ALBUTEROL SULFATE 3 ML: .5; 3 SOLUTION RESPIRATORY (INHALATION) at 07:12

## 2023-01-01 RX ADMIN — IPRATROPIUM BROMIDE AND ALBUTEROL SULFATE 3 ML: .5; 3 SOLUTION RESPIRATORY (INHALATION) at 02:32

## 2023-01-01 RX ADMIN — OXYCODONE HYDROCHLORIDE AND ACETAMINOPHEN 1 TABLET: 5; 325 TABLET ORAL at 19:59

## 2023-01-01 RX ADMIN — ROSUVASTATIN CALCIUM 10 MG: 10 TABLET, FILM COATED ORAL at 07:49

## 2023-01-01 RX ADMIN — AMITRIPTYLINE HYDROCHLORIDE 30 MG: 10 TABLET, FILM COATED ORAL at 22:15

## 2023-01-01 RX ADMIN — HYDROMORPHONE HYDROCHLORIDE 0.5 MG: 1 INJECTION, SOLUTION INTRAMUSCULAR; INTRAVENOUS; SUBCUTANEOUS at 12:36

## 2023-01-01 RX ADMIN — SENNOSIDES AND DOCUSATE SODIUM 2 TABLET: 50; 8.6 TABLET ORAL at 08:15

## 2023-01-01 RX ADMIN — IPRATROPIUM BROMIDE AND ALBUTEROL SULFATE 3 ML: .5; 3 SOLUTION RESPIRATORY (INHALATION) at 07:09

## 2023-01-01 RX ADMIN — GABAPENTIN 300 MG: 300 CAPSULE ORAL at 21:51

## 2023-01-01 RX ADMIN — MICONAZOLE NITRATE: 2 POWDER TOPICAL at 09:15

## 2023-01-01 RX ADMIN — INSULIN ASPART 7 UNITS: 100 INJECTION, SOLUTION INTRAVENOUS; SUBCUTANEOUS at 12:31

## 2023-01-01 RX ADMIN — IPRATROPIUM BROMIDE AND ALBUTEROL SULFATE 3 ML: .5; 3 SOLUTION RESPIRATORY (INHALATION) at 20:09

## 2023-01-01 RX ADMIN — GLYCOPYRROLATE 0.2 MG: 0.2 INJECTION, SOLUTION INTRAMUSCULAR; INTRAVENOUS at 18:44

## 2023-01-01 RX ADMIN — BUSPIRONE HYDROCHLORIDE 5 MG: 5 TABLET ORAL at 21:52

## 2023-01-01 RX ADMIN — BUSPIRONE HYDROCHLORIDE 5 MG: 5 TABLET ORAL at 07:38

## 2023-01-01 RX ADMIN — POLYETHYLENE GLYCOL 3350 17 G: 17 POWDER, FOR SOLUTION ORAL at 20:41

## 2023-01-01 RX ADMIN — AMLODIPINE BESYLATE 5 MG: 5 TABLET ORAL at 09:10

## 2023-01-01 RX ADMIN — FENTANYL CITRATE 50 MCG: 50 INJECTION, SOLUTION INTRAMUSCULAR; INTRAVENOUS at 08:57

## 2023-01-01 RX ADMIN — IPRATROPIUM BROMIDE AND ALBUTEROL SULFATE 3 ML: .5; 3 SOLUTION RESPIRATORY (INHALATION) at 13:49

## 2023-01-01 RX ADMIN — ASPIRIN 81 MG: 81 TABLET, COATED ORAL at 08:14

## 2023-01-01 RX ADMIN — ROSUVASTATIN CALCIUM 10 MG: 10 TABLET, FILM COATED ORAL at 09:02

## 2023-01-01 RX ADMIN — INSULIN GLARGINE 30 UNITS: 100 INJECTION, SOLUTION SUBCUTANEOUS at 22:51

## 2023-01-01 RX ADMIN — GABAPENTIN 300 MG: 300 CAPSULE ORAL at 11:03

## 2023-01-01 RX ADMIN — INSULIN ASPART 7 UNITS: 100 INJECTION, SOLUTION INTRAVENOUS; SUBCUTANEOUS at 22:04

## 2023-01-01 RX ADMIN — OXYCODONE HYDROCHLORIDE AND ACETAMINOPHEN 1 TABLET: 5; 325 TABLET ORAL at 21:31

## 2023-01-01 RX ADMIN — HEPARIN SODIUM 1600 UNITS/HR: 10000 INJECTION, SOLUTION INTRAVENOUS at 18:24

## 2023-01-01 RX ADMIN — AMLODIPINE BESYLATE 5 MG: 5 TABLET ORAL at 19:58

## 2023-01-01 RX ADMIN — INSULIN ASPART 2 UNITS: 100 INJECTION, SOLUTION INTRAVENOUS; SUBCUTANEOUS at 13:47

## 2023-01-01 RX ADMIN — GABAPENTIN 300 MG: 300 CAPSULE ORAL at 07:48

## 2023-01-01 RX ADMIN — BUSPIRONE HYDROCHLORIDE 5 MG: 5 TABLET ORAL at 09:32

## 2023-01-01 RX ADMIN — FENTANYL CITRATE 50 MCG: 50 INJECTION, SOLUTION INTRAMUSCULAR; INTRAVENOUS at 18:04

## 2023-01-01 RX ADMIN — FLUTICASONE FUROATE AND VILANTEROL TRIFENATATE 1 PUFF: 100; 25 POWDER RESPIRATORY (INHALATION) at 09:29

## 2023-01-01 RX ADMIN — INSULIN ASPART 3 UNITS: 100 INJECTION, SOLUTION INTRAVENOUS; SUBCUTANEOUS at 18:28

## 2023-01-01 RX ADMIN — DICLOFENAC SODIUM 2 G: 10 GEL TOPICAL at 18:44

## 2023-01-01 RX ADMIN — IPRATROPIUM BROMIDE AND ALBUTEROL SULFATE 3 ML: .5; 3 SOLUTION RESPIRATORY (INHALATION) at 16:29

## 2023-01-01 RX ADMIN — ONDANSETRON 4 MG: 2 INJECTION INTRAMUSCULAR; INTRAVENOUS at 06:12

## 2023-01-01 RX ADMIN — ROSUVASTATIN CALCIUM 10 MG: 10 TABLET, FILM COATED ORAL at 11:03

## 2023-01-01 RX ADMIN — GABAPENTIN 300 MG: 300 CAPSULE ORAL at 19:57

## 2023-01-01 RX ADMIN — OXYCODONE HYDROCHLORIDE AND ACETAMINOPHEN 1 TABLET: 5; 325 TABLET ORAL at 02:07

## 2023-01-01 RX ADMIN — AMLODIPINE BESYLATE 5 MG: 5 TABLET ORAL at 15:32

## 2023-01-01 RX ADMIN — GABAPENTIN 300 MG: 300 CAPSULE ORAL at 09:32

## 2023-01-01 RX ADMIN — BUSPIRONE HYDROCHLORIDE 5 MG: 5 TABLET ORAL at 08:42

## 2023-01-01 RX ADMIN — ROSUVASTATIN CALCIUM 10 MG: 10 TABLET, FILM COATED ORAL at 13:48

## 2023-01-01 RX ADMIN — GABAPENTIN 300 MG: 300 CAPSULE ORAL at 09:27

## 2023-01-01 RX ADMIN — SODIUM CHLORIDE: 9 INJECTION, SOLUTION INTRAVENOUS at 08:57

## 2023-01-01 RX ADMIN — SODIUM CHLORIDE 300 ML: 9 INJECTION, SOLUTION INTRAVENOUS at 10:34

## 2023-01-01 RX ADMIN — ISOSORBIDE MONONITRATE 60 MG: 60 TABLET, EXTENDED RELEASE ORAL at 08:38

## 2023-01-01 RX ADMIN — HYDRALAZINE HYDROCHLORIDE 50 MG: 50 TABLET, FILM COATED ORAL at 15:44

## 2023-01-01 RX ADMIN — SODIUM CHLORIDE 250 ML: 9 INJECTION, SOLUTION INTRAVENOUS at 18:49

## 2023-01-01 RX ADMIN — DICLOFENAC SODIUM 2 G: 10 GEL TOPICAL at 08:27

## 2023-01-01 RX ADMIN — SODIUM CHLORIDE 500 ML: 9 INJECTION, SOLUTION INTRAVENOUS at 08:31

## 2023-01-01 RX ADMIN — CLOSTRIDIUM TETANI TOXOID ANTIGEN (FORMALDEHYDE INACTIVATED) AND CORYNEBACTERIUM DIPHTHERIAE TOXOID ANTIGEN (FORMALDEHYDE INACTIVATED) 0.5 ML: 5; 2 INJECTION, SUSPENSION INTRAMUSCULAR at 20:50

## 2023-01-01 RX ADMIN — IPRATROPIUM BROMIDE AND ALBUTEROL SULFATE 3 ML: .5; 3 SOLUTION RESPIRATORY (INHALATION) at 07:04

## 2023-01-01 RX ADMIN — AMLODIPINE BESYLATE 5 MG: 5 TABLET ORAL at 21:08

## 2023-01-01 RX ADMIN — GABAPENTIN 300 MG: 300 CAPSULE ORAL at 08:26

## 2023-01-01 RX ADMIN — SODIUM CHLORIDE 6 UNITS: 9 INJECTION, SOLUTION INTRAVENOUS at 18:44

## 2023-01-01 RX ADMIN — SODIUM CHLORIDE: 9 INJECTION, SOLUTION INTRAVENOUS at 12:57

## 2023-01-01 RX ADMIN — OXYCODONE HYDROCHLORIDE AND ACETAMINOPHEN 1 TABLET: 5; 325 TABLET ORAL at 21:25

## 2023-01-01 RX ADMIN — LIDOCAINE HYDROCHLORIDE 19 ML: 10 INJECTION, SOLUTION INFILTRATION; PERINEURAL at 09:01

## 2023-01-01 RX ADMIN — OXYCODONE HYDROCHLORIDE AND ACETAMINOPHEN 1 TABLET: 5; 325 TABLET ORAL at 01:02

## 2023-01-01 RX ADMIN — SENNOSIDES AND DOCUSATE SODIUM 2 TABLET: 50; 8.6 TABLET ORAL at 08:38

## 2023-01-01 RX ADMIN — BUSPIRONE HYDROCHLORIDE 5 MG: 5 TABLET ORAL at 22:05

## 2023-01-01 RX ADMIN — UMECLIDINIUM 1 PUFF: 62.5 AEROSOL, POWDER ORAL at 08:18

## 2023-01-01 RX ADMIN — ISOSORBIDE MONONITRATE 60 MG: 60 TABLET, EXTENDED RELEASE ORAL at 08:26

## 2023-01-01 RX ADMIN — OXYCODONE HYDROCHLORIDE AND ACETAMINOPHEN 1 TABLET: 5; 325 TABLET ORAL at 22:04

## 2023-01-01 RX ADMIN — IPRATROPIUM BROMIDE AND ALBUTEROL SULFATE 3 ML: .5; 3 SOLUTION RESPIRATORY (INHALATION) at 16:46

## 2023-01-01 RX ADMIN — IPRATROPIUM BROMIDE AND ALBUTEROL SULFATE 3 ML: .5; 3 SOLUTION RESPIRATORY (INHALATION) at 02:10

## 2023-01-01 RX ADMIN — FLUTICASONE FUROATE AND VILANTEROL TRIFENATATE 1 PUFF: 100; 25 POWDER RESPIRATORY (INHALATION) at 09:30

## 2023-01-01 RX ADMIN — OXYCODONE HYDROCHLORIDE AND ACETAMINOPHEN 1 TABLET: 5; 325 TABLET ORAL at 22:01

## 2023-01-01 RX ADMIN — BUSPIRONE HYDROCHLORIDE 5 MG: 5 TABLET ORAL at 14:51

## 2023-01-01 RX ADMIN — ACETAMINOPHEN 650 MG: 325 TABLET, FILM COATED ORAL at 17:31

## 2023-01-01 RX ADMIN — SENNOSIDES AND DOCUSATE SODIUM 2 TABLET: 50; 8.6 TABLET ORAL at 15:32

## 2023-01-01 RX ADMIN — SENNOSIDES AND DOCUSATE SODIUM 1 TABLET: 50; 8.6 TABLET ORAL at 22:05

## 2023-01-01 RX ADMIN — LORAZEPAM 0.5 MG: 2 INJECTION INTRAMUSCULAR; INTRAVENOUS at 22:14

## 2023-01-01 RX ADMIN — BUSPIRONE HYDROCHLORIDE 5 MG: 5 TABLET ORAL at 22:16

## 2023-01-01 RX ADMIN — IPRATROPIUM BROMIDE AND ALBUTEROL SULFATE 3 ML: .5; 3 SOLUTION RESPIRATORY (INHALATION) at 01:23

## 2023-01-01 RX ADMIN — POTASSIUM CHLORIDE 40 MEQ: 1500 TABLET, EXTENDED RELEASE ORAL at 10:02

## 2023-01-01 RX ADMIN — AMLODIPINE BESYLATE 5 MG: 5 TABLET ORAL at 08:26

## 2023-01-01 RX ADMIN — ISOSORBIDE MONONITRATE 60 MG: 60 TABLET, EXTENDED RELEASE ORAL at 08:14

## 2023-01-01 RX ADMIN — FUROSEMIDE 20 MG: 20 TABLET ORAL at 08:39

## 2023-01-01 RX ADMIN — HYDROMORPHONE HYDROCHLORIDE 0.5 MG: 1 INJECTION, SOLUTION INTRAMUSCULAR; INTRAVENOUS; SUBCUTANEOUS at 19:40

## 2023-01-01 RX ADMIN — ASPIRIN 81 MG: 81 TABLET, COATED ORAL at 11:03

## 2023-01-01 RX ADMIN — SODIUM CHLORIDE 300 ML: 9 INJECTION, SOLUTION INTRAVENOUS at 12:50

## 2023-01-01 RX ADMIN — FUROSEMIDE 60 MG: 10 INJECTION, SOLUTION INTRAMUSCULAR; INTRAVENOUS at 18:34

## 2023-01-01 RX ADMIN — MICONAZOLE NITRATE: 2 POWDER TOPICAL at 20:49

## 2023-01-01 RX ADMIN — BUSPIRONE HYDROCHLORIDE 5 MG: 5 TABLET ORAL at 09:19

## 2023-01-01 RX ADMIN — AMITRIPTYLINE HYDROCHLORIDE 30 MG: 10 TABLET, FILM COATED ORAL at 21:25

## 2023-01-01 RX ADMIN — SENNOSIDES AND DOCUSATE SODIUM 2 TABLET: 50; 8.6 TABLET ORAL at 08:42

## 2023-01-01 RX ADMIN — IPRATROPIUM BROMIDE AND ALBUTEROL SULFATE 3 ML: .5; 3 SOLUTION RESPIRATORY (INHALATION) at 00:38

## 2023-01-01 RX ADMIN — SENNOSIDES AND DOCUSATE SODIUM 2 TABLET: 50; 8.6 TABLET ORAL at 20:19

## 2023-01-01 RX ADMIN — LORAZEPAM 0.5 MG: 2 INJECTION INTRAMUSCULAR; INTRAVENOUS at 18:45

## 2023-01-01 RX ADMIN — IPRATROPIUM BROMIDE AND ALBUTEROL SULFATE 3 ML: .5; 3 SOLUTION RESPIRATORY (INHALATION) at 07:32

## 2023-01-01 RX ADMIN — ISOSORBIDE MONONITRATE 60 MG: 60 TABLET, EXTENDED RELEASE ORAL at 09:10

## 2023-01-01 RX ADMIN — ISOSORBIDE MONONITRATE 60 MG: 60 TABLET, EXTENDED RELEASE ORAL at 09:07

## 2023-01-01 RX ADMIN — IPRATROPIUM BROMIDE AND ALBUTEROL SULFATE 3 ML: .5; 3 SOLUTION RESPIRATORY (INHALATION) at 19:51

## 2023-01-01 RX ADMIN — ACETAMINOPHEN 650 MG: 325 TABLET, FILM COATED ORAL at 17:48

## 2023-01-01 RX ADMIN — BUSPIRONE HYDROCHLORIDE 5 MG: 5 TABLET ORAL at 21:31

## 2023-01-01 RX ADMIN — IPRATROPIUM BROMIDE AND ALBUTEROL SULFATE 3 ML: .5; 3 SOLUTION RESPIRATORY (INHALATION) at 16:42

## 2023-01-01 RX ADMIN — AMLODIPINE BESYLATE 5 MG: 5 TABLET ORAL at 09:29

## 2023-01-01 RX ADMIN — UMECLIDINIUM 1 PUFF: 62.5 AEROSOL, POWDER ORAL at 07:50

## 2023-01-01 RX ADMIN — AMLODIPINE BESYLATE 5 MG: 5 TABLET ORAL at 09:02

## 2023-01-01 RX ADMIN — ACETAMINOPHEN 650 MG: 325 TABLET, FILM COATED ORAL at 09:32

## 2023-01-01 RX ADMIN — AMLODIPINE BESYLATE 5 MG: 5 TABLET ORAL at 09:07

## 2023-01-01 RX ADMIN — DICLOFENAC SODIUM 2 G: 10 GEL TOPICAL at 08:43

## 2023-01-01 RX ADMIN — IPRATROPIUM BROMIDE AND ALBUTEROL SULFATE 3 ML: .5; 3 SOLUTION RESPIRATORY (INHALATION) at 07:33

## 2023-01-01 RX ADMIN — HYDROMORPHONE HYDROCHLORIDE 1 MG: 1 INJECTION, SOLUTION INTRAMUSCULAR; INTRAVENOUS; SUBCUTANEOUS at 13:06

## 2023-01-01 RX ADMIN — DICLOFENAC SODIUM 2 G: 10 GEL TOPICAL at 18:14

## 2023-01-01 ASSESSMENT — ENCOUNTER SYMPTOMS
HEADACHES: 1
NECK PAIN: 1
WOUND: 1
ARTHRALGIAS: 1
LIGHT-HEADEDNESS: 0

## 2023-01-01 ASSESSMENT — ACTIVITIES OF DAILY LIVING (ADL)
ADLS_ACUITY_SCORE: 31
ADLS_ACUITY_SCORE: 32
ADLS_ACUITY_SCORE: 28
ADLS_ACUITY_SCORE: 38
ADLS_ACUITY_SCORE: 32
ADLS_ACUITY_SCORE: 32
ADLS_ACUITY_SCORE: 28
ADLS_ACUITY_SCORE: 23
ADLS_ACUITY_SCORE: 36
ADLS_ACUITY_SCORE: 32
ADLS_ACUITY_SCORE: 36
ADLS_ACUITY_SCORE: 36
ADLS_ACUITY_SCORE: 32
ADLS_ACUITY_SCORE: 32
ADLS_ACUITY_SCORE: 23
ADLS_ACUITY_SCORE: 28
ADLS_ACUITY_SCORE: 34
ADLS_ACUITY_SCORE: 36
ADLS_ACUITY_SCORE: 23
ADLS_ACUITY_SCORE: 36
ADLS_ACUITY_SCORE: 32
ADLS_ACUITY_SCORE: 36
ADLS_ACUITY_SCORE: 32
ADLS_ACUITY_SCORE: 28
ADLS_ACUITY_SCORE: 32
ADLS_ACUITY_SCORE: 36
ADLS_ACUITY_SCORE: 32
ADLS_ACUITY_SCORE: 32
ADLS_ACUITY_SCORE: 38
ADLS_ACUITY_SCORE: 38
ADLS_ACUITY_SCORE: 32
ADLS_ACUITY_SCORE: 36
ADLS_ACUITY_SCORE: 32
ADLS_ACUITY_SCORE: 36
ADLS_ACUITY_SCORE: 28
ADLS_ACUITY_SCORE: 35
ADLS_ACUITY_SCORE: 36
ADLS_ACUITY_SCORE: 32
ADLS_ACUITY_SCORE: 28
ADLS_ACUITY_SCORE: 27
ADLS_ACUITY_SCORE: 36
ADLS_ACUITY_SCORE: 32
ADLS_ACUITY_SCORE: 40
ADLS_ACUITY_SCORE: 25
ADLS_ACUITY_SCORE: 32
ADLS_ACUITY_SCORE: 32
ADLS_ACUITY_SCORE: 35
ADLS_ACUITY_SCORE: 32
ADLS_ACUITY_SCORE: 36
ADLS_ACUITY_SCORE: 38
ADLS_ACUITY_SCORE: 28
ADLS_ACUITY_SCORE: 32
ADLS_ACUITY_SCORE: 32
ADLS_ACUITY_SCORE: 35
ADLS_ACUITY_SCORE: 32
ADLS_ACUITY_SCORE: 38
ADLS_ACUITY_SCORE: 31
ADLS_ACUITY_SCORE: 32
ADLS_ACUITY_SCORE: 36
ADLS_ACUITY_SCORE: 32
ADLS_ACUITY_SCORE: 27
ADLS_ACUITY_SCORE: 36
ADLS_ACUITY_SCORE: 23
ADLS_ACUITY_SCORE: 32
ADLS_ACUITY_SCORE: 25
ADLS_ACUITY_SCORE: 32
ADLS_ACUITY_SCORE: 28
ADLS_ACUITY_SCORE: 32
ADLS_ACUITY_SCORE: 27
ADLS_ACUITY_SCORE: 32
ADLS_ACUITY_SCORE: 32
ADLS_ACUITY_SCORE: 36
ADLS_ACUITY_SCORE: 35
ADLS_ACUITY_SCORE: 23
ADLS_ACUITY_SCORE: 32
ADLS_ACUITY_SCORE: 35
ADLS_ACUITY_SCORE: 32
ADLS_ACUITY_SCORE: 36
ADLS_ACUITY_SCORE: 36
ADLS_ACUITY_SCORE: 35
ADLS_ACUITY_SCORE: 23
ADLS_ACUITY_SCORE: 32
ADLS_ACUITY_SCORE: 38
ADLS_ACUITY_SCORE: 23
ADLS_ACUITY_SCORE: 36
ADLS_ACUITY_SCORE: 32
ADLS_ACUITY_SCORE: 36
ADLS_ACUITY_SCORE: 36
ADLS_ACUITY_SCORE: 32
ADLS_ACUITY_SCORE: 36
ADLS_ACUITY_SCORE: 36
ADLS_ACUITY_SCORE: 32
ADLS_ACUITY_SCORE: 36
ADLS_ACUITY_SCORE: 36
ADLS_ACUITY_SCORE: 27
ADLS_ACUITY_SCORE: 32
ADLS_ACUITY_SCORE: 25
ADLS_ACUITY_SCORE: 32
ADLS_ACUITY_SCORE: 25
ADLS_ACUITY_SCORE: 36
ADLS_ACUITY_SCORE: 32
ADLS_ACUITY_SCORE: 38
ADLS_ACUITY_SCORE: 27
ADLS_ACUITY_SCORE: 34
ADLS_ACUITY_SCORE: 32
ADLS_ACUITY_SCORE: 28
ADLS_ACUITY_SCORE: 23
ADLS_ACUITY_SCORE: 36
ADLS_ACUITY_SCORE: 32
ADLS_ACUITY_SCORE: 28
ADLS_ACUITY_SCORE: 32
ADLS_ACUITY_SCORE: 36
ADLS_ACUITY_SCORE: 36
ADLS_ACUITY_SCORE: 32
ADLS_ACUITY_SCORE: 23
ADLS_ACUITY_SCORE: 34
ADLS_ACUITY_SCORE: 36
ADLS_ACUITY_SCORE: 32
ADLS_ACUITY_SCORE: 32
ADLS_ACUITY_SCORE: 36
ADLS_ACUITY_SCORE: 32
ADLS_ACUITY_SCORE: 32
ADLS_ACUITY_SCORE: 25
ADLS_ACUITY_SCORE: 32
ADLS_ACUITY_SCORE: 27
ADLS_ACUITY_SCORE: 34
ADLS_ACUITY_SCORE: 28
ADLS_ACUITY_SCORE: 32
ADLS_ACUITY_SCORE: 23
ADLS_ACUITY_SCORE: 28
ADLS_ACUITY_SCORE: 32
ADLS_ACUITY_SCORE: 36
ADLS_ACUITY_SCORE: 31
ADLS_ACUITY_SCORE: 32
ADLS_ACUITY_SCORE: 25
ADLS_ACUITY_SCORE: 32
ADLS_ACUITY_SCORE: 32
ADLS_ACUITY_SCORE: 36
ADLS_ACUITY_SCORE: 36
ADLS_ACUITY_SCORE: 32
ADLS_ACUITY_SCORE: 25
ADLS_ACUITY_SCORE: 32
ADLS_ACUITY_SCORE: 36
ADLS_ACUITY_SCORE: 32
ADLS_ACUITY_SCORE: 36
ADLS_ACUITY_SCORE: 25
ADLS_ACUITY_SCORE: 25
ADLS_ACUITY_SCORE: 36
ADLS_ACUITY_SCORE: 36
ADLS_ACUITY_SCORE: 28
ADLS_ACUITY_SCORE: 32
ADLS_ACUITY_SCORE: 35
ADLS_ACUITY_SCORE: 32
ADLS_ACUITY_SCORE: 32
ADLS_ACUITY_SCORE: 35
ADLS_ACUITY_SCORE: 34
ADLS_ACUITY_SCORE: 32
ADLS_ACUITY_SCORE: 32
DEPENDENT_IADLS:: CLEANING;COOKING;LAUNDRY;SHOPPING;MEAL PREPARATION;MEDICATION MANAGEMENT;TRANSPORTATION
ADLS_ACUITY_SCORE: 32
ADLS_ACUITY_SCORE: 32
ADLS_ACUITY_SCORE: 36
ADLS_ACUITY_SCORE: 32
ADLS_ACUITY_SCORE: 28
ADLS_ACUITY_SCORE: 35
ADLS_ACUITY_SCORE: 32
ADLS_ACUITY_SCORE: 36
ADLS_ACUITY_SCORE: 32
ADLS_ACUITY_SCORE: 25
ADLS_ACUITY_SCORE: 23
ADLS_ACUITY_SCORE: 25
DEPENDENT_IADLS:: MEDICATION MANAGEMENT;INCONTINENCE
ADLS_ACUITY_SCORE: 27
ADLS_ACUITY_SCORE: 32
ADLS_ACUITY_SCORE: 28
ADLS_ACUITY_SCORE: 32
ADLS_ACUITY_SCORE: 32
ADLS_ACUITY_SCORE: 28
ADLS_ACUITY_SCORE: 34
ADLS_ACUITY_SCORE: 32
ADLS_ACUITY_SCORE: 32
ADLS_ACUITY_SCORE: 27
ADLS_ACUITY_SCORE: 32
ADLS_ACUITY_SCORE: 25
ADLS_ACUITY_SCORE: 32
ADLS_ACUITY_SCORE: 25
ADLS_ACUITY_SCORE: 32
ADLS_ACUITY_SCORE: 36
ADLS_ACUITY_SCORE: 23
ADLS_ACUITY_SCORE: 32
ADLS_ACUITY_SCORE: 38
ADLS_ACUITY_SCORE: 32
ADLS_ACUITY_SCORE: 25
ADLS_ACUITY_SCORE: 25
ADLS_ACUITY_SCORE: 32
ADLS_ACUITY_SCORE: 40
ADLS_ACUITY_SCORE: 36
ADLS_ACUITY_SCORE: 32
ADLS_ACUITY_SCORE: 23
ADLS_ACUITY_SCORE: 32
ADLS_ACUITY_SCORE: 36
ADLS_ACUITY_SCORE: 28
ADLS_ACUITY_SCORE: 32
ADLS_ACUITY_SCORE: 38
ADLS_ACUITY_SCORE: 32

## 2023-01-01 ASSESSMENT — PAIN SCALES - GENERAL
PAINLEVEL: SEVERE PAIN (6)
PAINLEVEL: NO PAIN (0)
PAINLEVEL: NO PAIN (0)

## 2023-01-01 ASSESSMENT — PATIENT HEALTH QUESTIONNAIRE - PHQ9
10. IF YOU CHECKED OFF ANY PROBLEMS, HOW DIFFICULT HAVE THESE PROBLEMS MADE IT FOR YOU TO DO YOUR WORK, TAKE CARE OF THINGS AT HOME, OR GET ALONG WITH OTHER PEOPLE: SOMEWHAT DIFFICULT
SUM OF ALL RESPONSES TO PHQ QUESTIONS 1-9: 18
SUM OF ALL RESPONSES TO PHQ QUESTIONS 1-9: 18

## 2023-01-06 NOTE — LETTER
[facility]  Patient Centered Plan of Care  About Me:        Patient Name:  Hayedr Moore    YOB: 1955  Age:         67 year old   Luis Antonio MRN:    [id number] Telephone Information:  Home Phone [phone]   Mobile [phone]       Address:  [address] [facility] [address] Email address:  [email or web address]      Emergency Contact(s)    Name Relationship Lgl Grd Work Phone Home Phone Mobile Phone   1. TYSON MOORE Son No   [phone]   2. AMIRA MOORE Spouse No  [phone]   3. RUPERTO MOORE* Daughter No  [phone]    4. GAGE MOORE Son   [phone]            Primary language:  English     needed? No   [facility]:  [phone] op. 1  Other communication barriers:Lack of coping    Preferred Method of Communication:  Phone  Current living arrangement: I live in a private home with family    Mobility Status/ Medical Equipment: Independent        Health Maintenance  Health Maintenance Reviewed: No data recorded    My Access Plan  Medical Emergency 911   Primary Clinic Line [facility] - [phone]   24 Hour Appointment Line [phone] or  [phone]) (toll-free)   24 Hour Nurse Line [phone] (toll-free)   Preferred Urgent Care [facility], [phone]     Preferred Hospital [facility]  [phone]     Preferred Pharmacy [facility] [place], [place] - [address]     Behavioral Health Crisis Line The National Suicide Prevention Lifeline at 1-424.174.7386 or Text/Call 108             My Care Team Members  Patient Care Team       Relationship Specialty Notifications Start End    Suma Jenkins NP PCP - General   5/26/05     Phone: [phone] Fax: [phone]         [address] [facility] [address]    Suma Jenkins NP Assigned PCP   10/4/12     Phone:  [phone] Fax: [phone]         [address] [place] [facility] [address]    Simona Grove, RN Specialty Care Coordinator Clinical Cardiac Electrophysiology Admissions 7/27/20     Eri Palafox MD MD Clinical Cardiac Electrophysiology  7/27/20     Phone: [phone] Fax: [phone]         [address] [facility] [address]    Jemma Calloway MA Community Health Worker Primary Care - CC Admissions 10/13/22     Richelle De La Garza Lead Care Coordinator  Admissions 10/17/22     Jose Vazquez [facility] Pharmacist Pharmacist  10/24/22      [address] [facility] [address]    Jose Vazquez [facility] Assigned [facility] Pharmacist   10/29/22      [address] [facility] [address]            My Care Plans  Self Management and Treatment Plan  Care Plan  Care Plan: Help At Home     Problem: Insufficient In-home support     Goal: Establish adequate home support     Start Date: 10/17/2022 Expected End Date: 1/17/2023    This Visit's Progress: 20% Recent Progress: 20%    Note:     Barriers: lack of access to [facility]  Strengths: good family support  Patient expressed understanding of goal: Yes  Action steps to achieve this goal:  1. I will reach out to Senior Linkage Line regarding benefits and SNF/Assisted Living Options (patient's wife states that she hasn't done this because assisted living will be to expensive. CHW encouraged her to call because senior linkage line might be able to provide some assistance. CHW also encouraged patient's wife to call insurance to see if they'd be able to cover in home assistance)  2. I will obtain equipment to help me be safe at home and reduce the risk of falling (patient's wife states that they obtained a blood sugar reader)  3. I will keep in touch with care coordination                         Action Plans on File:                       Advance Care Plans/Directives Type:   No data recorded    My Medical and Care Information  Problem List   Patient Active Problem List    Diagnosis     Anxiety     Bipolar affective disorder in remission (H)     Allergic rhinitis     Cervicalgia     Type 2 diabetes mellitus with stage 3b chronic kidney disease, with long-term current use of insulin (H)     HYPERLIPIDEMIA LDL GOAL <100     Insomnia     Hypertension goal BP (blood pressure) < 130/80     COPD (chronic obstructive pulmonary disease) (H)     GERD (gastroesophageal reflux disease)     Peripheral neuralgia     Microalbuminuria     Vitamin B12 deficiency without anemia     Vitamin D deficiency     Coronary artery disease involving coronary bypass graft of native heart without angina pectoris     Chronic pain     Ischemic cardiomyopathy     Chronic systolic heart failure (H)     Health Care Home     Diabetic polyneuropathy associated with type 2 diabetes mellitus (H)     CKD (chronic kidney disease) stage 3, GFR 30-59 ml/min (H)      Current Medications and Allergies:  See printed Medication Report.    Care Coordination Start Date: 10/13/2022   Frequency of Care Coordination: monthly     Form Last Updated: 01/06/2023

## 2023-01-06 NOTE — PROGRESS NOTES
Clinic Care Coordination Contact    Care Coordination Clinician Chart Review    Situation: Patient chart reviewed by care coordinator.       Background: Care Coordination initial assessment and enrollment to Care Coordination was 10/17/22.   Patient centered goals were developed with participation from patient.  AMNA ORDAZ handed patient off to CHW for continued outreach every 30 days.        Assessment: Per chart review, patient outreach completed by CC CHW on 12/28/22.  Patient is actively working to accomplish goal.  Patient's goal remains appropriate and relevant at this time.   Patient is due for updated Plan of Care.  Annual assessment will be due 10/2023.      Care Plan: Help At Home     Problem: Insufficient In-home support     Goal: Establish adequate home support     Start Date: 10/17/2022 Expected End Date: 1/17/2023    This Visit's Progress: 20% Recent Progress: 20%    Note:     Barriers: lack of access to [facility] services  Strengths: good family support  Patient expressed understanding of goal: Yes  Action steps to achieve this goal:  1. I will reach out to Senior Linkage Line regarding benefits and SNF/Assisted Living Options (patient's wife states that she hasn't done this because assisted living will be to expensive. CHW encouraged her to call because senior linkage line might be able to provide some assistance. CHW also encouraged patient's wife to call insurance to see if they'd be able to cover in home assistance)  2. I will obtain equipment to help me be safe at home and reduce the risk of falling (patient's wife states that they obtained a blood sugar reader)  3. I will keep in touch with care coordination                            Plan/Recommendations: The patient will continue working with Care Coordination to achieve goal as above.  CHW will involve AMNA ORDAZ as needed or if patient is ready to move to maintenance.  AMNA ORDAZ will continue to monitor progress to goals and CHW outreaches every 6 weeks.    Plan of Care updated and mailed to patient: Yes, via [employer].    SEVEN Walton   [facility] Primary Care - Clinic Care Coordination  [phone]

## 2023-01-18 NOTE — TELEPHONE ENCOUNTER
Routing refill request to provider for review/approval because:  --Labs out of range:  Scr, Na, blood pressure.    --Last visit:  6/20/2022 Gregory for diabetes +12.    --Future Visit:   Next 5 appointments (look out 90 days)    Feb 01, 2023  2:00 PM  (Arrive by 1:40 PM)  Provider Visit with Suma Jenkins NP  [facility] [address] [place] [address] [phone]          --Last Written Prescription:     Disp Refills Start End KELLY   furosemide (LASIX) 20 MG tablet 180 tablet 1 6/20/2022  No   Sig: TAKE 1 TABLET(20 MG) BY MOUTH TWICE DAILY         BP Readings from Last 6 Encounters:   12/19/22 (!) 150/80   12/05/22 138/80   11/28/22 138/80   06/20/22 128/78   03/03/22 (!) 153/82   03/08/21 128/88     Sodium   Date Value Ref Range Status   12/05/2022 135 (L) 136 - 145 mmol/L Final   06/20/2022 135 133 - 144 mmol/L Final   02/10/2021 137 133 - 144 mmol/L Final   01/18/2021 135 133 - 144 mmol/L Final   02/19/2020 138 133 - 144 mmol/L Final     Creatinine   Date Value Ref Range Status   12/05/2022 2.29 (H) 0.67 - 1.17 mg/dL Final   06/20/2022 1.98 (H) 0.66 - 1.25 mg/dL Final   02/10/2021 1.98 (H) 0.66 - 1.25 mg/dL Final   01/18/2021 2.45 (H) 0.66 - 1.25 mg/dL Final   02/19/2020 1.46 (H) 0.66 - 1.25 mg/dL Final   10/03/2019 1.65 (H) 0.66 - 1.25 mg/dL Final   08/08/2019 1.31 (H) 0.66 - 1.25 mg/dL Final

## 2023-01-30 NOTE — PROGRESS NOTES
Clinic Care Coordination Contact  [facility]/Voicemail       Clinical Data: Care Coordinator Outreach  Outreach attempted x 1.  Left message on patient's wife's voicemail with call back information and requested return call.    Plan: Care Coordinator will try to reach patient again in 10 business days.    Jemma Calloway CHW, B.A. [facility] Care Coordination  [facility]:   [facility]  [phone]

## 2023-02-01 PROBLEM — N18.30 CKD (CHRONIC KIDNEY DISEASE) STAGE 3, GFR 30-59 ML/MIN (H): Status: RESOLVED | Noted: 2019-08-08 | Resolved: 2023-01-01

## 2023-02-01 PROBLEM — N18.4 CKD (CHRONIC KIDNEY DISEASE) STAGE 4, GFR 15-29 ML/MIN (H): Status: ACTIVE | Noted: 2023-01-01

## 2023-02-01 NOTE — PROGRESS NOTES
Assessment & Plan     (E11.42) Diabetic polyneuropathy associated with type 2 diabetes mellitus (H)  (primary encounter diagnosis)  Comment: stable  Plan: The current medical regimen is effective;  continue present plan and medications.     (E11.22,  N18.4,  Z79.4) Type 2 diabetes mellitus with stage 4 chronic kidney disease, with long-term current use of insulin (H)  Comment: not controlled  Plan: He will follow up with Jose.    (M50.20) Herniation of cervical intervertebral disc  Comment: stable; multi-level C4-5, C5-6, C6-7  Plan: oxyCODONE-acetaminophen (PERCOCET) 5-325 MG         tablet, oxyCODONE-acetaminophen (PERCOCET)         5-325 MG tablet, oxyCODONE-acetaminophen         (PERCOCET) 5-325 MG tablet         reviewed.  Refills given for 3 months.   Follow up in 3 months.     (G89.4) Chronic pain syndrome  Comment: stable  Plan: oxyCODONE-acetaminophen (PERCOCET) 5-325 MG         tablet, oxyCODONE-acetaminophen (PERCOCET)         5-325 MG tablet, oxyCODONE-acetaminophen         (PERCOCET) 5-325 MG tablet        See above.     (Z23) High priority for 2019-nCoV vaccine  Comment:   Plan: COVID-19,PF,PFIZER BOOSTER BIVALENT 12+Yrs            (M72.0) Dupuytren's contracture of left hand  Comment:   Plan: [facility]. Leonard at [facility].     (N18.4) CKD (chronic kidney disease) stage 4, GFR 15-29 ml/min (H)  Comment: worsening  Plan: Albumin Random Urine Quantitative with Creat         Ratio, Basic metabolic panel  (Ca, Cl, CO2,         Creat, Gluc, K, Na, BUN), Adult Nephrology          Referral        Refer to nephrology.     (J44.9) Chronic obstructive pulmonary disease, unspecified COPD type (H)  Comment: stable  Plan: The current medical regimen is effective;  continue present plan and medications.     (I50.22) Chronic systolic heart failure (H)  Comment: stable  Plan: The current medical regimen is effective;  continue present plan and medications.     (Z12.11) Screen for colon cancer  Comment:  "  Plan: Fecal colorectal cancer screen FIT - Future         (S+30)            (F31.70) Bipolar affective disorder in remission (H)  Comment: stable  Plan: He declines a referral to psychiatrist or therapist.       30 minutes spent on the date of the encounter doing chart review, patient visit and documentation        BMI:   Estimated body mass index is 29.65 kg/m  as calculated from the following:    Height as of this encounter: 1.727 m (5' 8\").    Weight as of this encounter: 88.5 kg (195 lb).       Depression Screening Follow Up    PHQ 2/1/2023   PHQ-9 Total Score 18   Q9: Thoughts of better off dead/self-harm past 2 weeks Several days   F/U: Thoughts of suicide or self-harm Yes   F/U: Self harm-plan No   F/U: Self-harm action No   F/U: Safety concerns No                 Follow Up      Follow Up Actions Taken  Crisis resource information provided in the After Visit Summary        No follow-ups on file.    Suma Jenkins NP  [facility]    Makenna Linares is a 68 year old{, presenting for the following health issues:  Musculoskeletal Problem (PT would like to get operated on his LT hand )      Musculoskeletal Problem    History of Present Illness       Back Pain:  He presents for follow up of back pain. Patient's back pain is a chronic problem.  Location of back pain:  Right middle of back  Description of back pain: burning  Back pain spreads: right buttocks and left buttocks    Since patient first noticed back pain, pain is: gradually improving  Does back pain interfere with his job:  Yes      He eats 2-3 servings of fruits and vegetables daily.He consumes 2 sweetened beverage(s) daily.He exercises with enough effort to increase his heart rate 20 to 29 minutes per day.  He exercises with enough effort to increase his heart rate 4 days per week.   He is taking medications regularly.    Today's PHQ-9         PHQ-9 Total Score: 18    PHQ-9 Q9 Thoughts of better off dead/self-harm " "past 2 weeks :   Several days  Thoughts of suicide or self harm: (P) Yes  Self-harm Plan:   (P) No  Self-harm Action:     (P) No  Safety concerns for self or others: (P) No    How difficult have these problems made it for you to do your work, take care of things at home, or get along with other people: Somewhat difficult     He is having pain in his left hand, can't put it flat on a table.  He did have surgery on his right hand in the past for Dupuytren's contracture.    He has been seeing Jose for his diabetes.  Kidney function worsened, so Metformin was stopped and he is now only on insulin.     His COPD and HF have been stable.  He denies any shortness of breath.    He has chronic neuropathic pain.         Review of Systems         Objective    BP (!) 148/82 (BP Location: Left arm, Patient Position: Sitting, Cuff Size: Adult Regular)   Pulse 100   Temp 97.9  F (36.6  C) (Temporal)   Resp 16   Ht 1.727 m (5' 8\")   Wt 88.5 kg (195 lb)   SpO2 99%   BMI 29.65 kg/m    Body mass index is 29.65 kg/m .  Physical Exam   GENERAL: healthy, alert and no distress  MS: contracture of left fourth finger  PSYCH: mentation appears normal, affect normal/bright                    "

## 2023-02-01 NOTE — NURSING NOTE
Prior to immunization administration, verified patients identity using patient s name and date of birth. Please see Immunization Activity for additional information.     Screening Questionnaire for Adult Immunization    Are you sick today?   No   Do you have allergies to medications, food, a vaccine component or latex?   No   Have you ever had a serious reaction after receiving a vaccination?   No   Do you have a long-term health problem with heart, lung, kidney, or metabolic disease (e.g., diabetes), asthma, a blood disorder, no spleen, complement component deficiency, a cochlear implant, or a spinal fluid leak?  Are you on long-term aspirin therapy?   Yes   Do you have cancer, leukemia, HIV/AIDS, or any other immune system problem?   No   Do you have a parent, brother, or sister with an immune system problem?   No   In the past 3 months, have you taken medications that affect  your immune system, such as prednisone, other steroids, or anticancer drugs; drugs for the treatment of rheumatoid arthritis, Crohn s disease, or psoriasis; or have you had radiation treatments?   No   Have you had a seizure, or a brain or other nervous system problem?   No   During the past year, have you received a transfusion of blood or blood    products, or been given immune (gamma) globulin or antiviral drug?   No   For women: Are you pregnant or is there a chance you could become       pregnant during the next month?   No   Have you received any vaccinations in the past 4 weeks?   No     Immunization questionnaire was positive for at least one answer.  Notified DR. Jenkins.        Per orders of Dr. Jenkins, injection of FLU SHOT given by Lay Velasquez MA. Patient instructed to remain in clinic for 15 minutes afterwards, and to report any adverse reaction to me immediately.       Screening performed by Lay Velasquez MA on 2/1/2023 at 2:48 PM.

## 2023-02-01 NOTE — LETTER
February 6, 2023      Vijay Alves  [address] [address]        Dear ,    Your kidney function is worsening.  You need to see a kidney specialist (nephrologist). A  should call you to get this set up.     Your blood sugar is also extremely high.  You need to see Jose as soon as possible to address this.  Please call and make an appointment to see him and make sure you show up to the appointment.       Resulted Orders   Albumin Random Urine Quantitative with Creat Ratio   Result Value Ref Range    Creatinine Urine mg/dL 65.1 mg/dL      Comment:      The reference ranges have not been established in urine creatinine. The results should be integrated into the clinical context for interpretation.    Albumin Urine mg/L 2,297.0 mg/L      Comment:      The reference ranges have not been established in urine albumin. The results should be integrated into the clinical context for interpretation.    Albumin Urine mg/g Cr 3,528.42 (H) 0.00 - 17.00 mg/g Cr      Comment:      Microalbuminuria is defined as an albumin:creatinine ratio of 17 to 299 for males and 25 to 299 for females. A ratio of albumin:creatinine of 300 or higher is indicative of overt proteinuria.  Due to biologic variability, positive results should be confirmed by a second, first-morning random or 24-hour timed urine specimen. If there is discrepancy, a third specimen is recommended. When 2 out of 3 results are in the microalbuminuria range, this is evidence for incipient nephropathy and warrants increased efforts at glucose control, blood pressure control, and institution of therapy with an angiotensin-converting-enzyme (ACE) inhibitor (if the patient can tolerate it).     Basic metabolic panel  (Ca, Cl, CO2, Creat, Gluc, K, Na, BUN)   Result Value Ref Range    Sodium 136 136 - 145 mmol/L    Potassium 4.5 3.4 - 5.3 mmol/L    Chloride 95 (L) 98 - 107 mmol/L    Carbon Dioxide (CO2) 25 22 - 29 mmol/L    Anion Gap  16 (H) 7 - 15 mmol/L    Urea Nitrogen 32.4 (H) 8.0 - 23.0 mg/dL    Creatinine 2.50 (H) 0.67 - 1.17 mg/dL    Calcium 9.4 8.8 - 10.2 mg/dL    Glucose 655 (HH) 70 - 99 mg/dL    GFR Estimate 27 (L) >60 mL/min/1.73m2      Comment:      eGFR calculated using 2021 CKD-EPI equation.       If you have any questions or concerns, please call the clinic at the number listed above.       Sincerely,      MANNY/Suma Jenkins NP

## 2023-02-02 NOTE — TELEPHONE ENCOUNTER
Per ins can only do first fill as 7 day supply unless diagnosed with chronic pain indicated on rx.

## 2023-02-13 NOTE — PROGRESS NOTES
Clinic Care Coordination Contact  [facility]/Voicemail       Clinical Data: Care Coordinator Outreach  Outreach attempted x 2.  Left message on patient's wife's voicemail with call back information and requested return call.    Plan: Care Coordinator will route patient to [facility] to determine if further outreaches should be made.    Jemma Calloway, CHW, B.A. [facility] Care Coordination  [facility]:   [facility]  [phone]

## 2023-02-14 NOTE — LETTER
[facility]ORD[facility]  [address] [place]  [facility] [address]  February 14, 2023    Hayder Alves  [address] [facility] [address]      Dear Hayder,    I have been unsuccessful in reaching you since our last contact. At this time the Care Coordination team will make no further attempts to reach you, however this does not change your ability to continue receiving care from your providers at your primary care clinic. If you need additional support from a care coordinator in the future please contact Jemma at [phone].    All of us at [facility] are invested in your health and are here to assist you in meeting your goals.     Sincerely,    SEVEN Walton   [facility] Primary Care - Clinic Care Coordination

## 2023-02-14 NOTE — PROGRESS NOTES
Clinic Care Coordination Contact    Assessment: Care Coordinator contacted patient for 1 month follow up.  Care Coordinator made 2 attempts per standard workflow.     Enrollment status: Disenrolled.     Plan: No further outreaches at this time.  Patient will continue to follow the plan of care.  If new needs arise a new Care Coordination referral may be placed. Disenrollment letter sent via [employer].    SEVEN Walton   [facility] Primary Care - Clinic Care Coordination  [phone]

## 2023-03-21 NOTE — TELEPHONE ENCOUNTER
[facility]    Phone Message    May a detailed message be left on voicemail: yes     Reason for Call: Order(s): Other:   Reason for requested: LABS  Date needed: 7/11/2023  Provider name: Sheldon      Action Taken: Message routed to:  Other: Neph    Travel Screening: Not Applicable

## 2023-03-28 NOTE — PROGRESS NOTES
Assessment & Plan     (M54.2) Neck pain  (primary encounter diagnosis)  Comment:   Plan: MR Cervical Spine w/o Contrast        Will get a cervical MRI.    (J44.9) Chronic obstructive pulmonary disease, unspecified COPD type (H)  Comment: not controlled  Plan: albuterol (PROAIR HFA/PROVENTIL HFA/VENTOLIN         HFA) 108 (90 Base) MCG/ACT inhaler,         Miscellaneous Order for DME - ONLY FOR DME,         ipratropium - albuterol 0.5 mg/2.5 mg/3 mL         (DUONEB) 0.5-2.5 (3) MG/3ML neb solution,         Fluticasone-Umeclidin-Vilant (TRELEGY ELLIPTA)         100-62.5-25 MCG/ACT oral inhaler        Will add Trelegy.      (M50.20) Herniation of cervical intervertebral disc  Comment: C4-5, C5-6, C6-7  Plan: oxyCODONE-acetaminophen (PERCOCET) 5-325 MG         tablet, oxyCODONE-acetaminophen (PERCOCET)         5-325 MG tablet, oxyCODONE-acetaminophen         (PERCOCET) 5-325 MG tablet         checked.  Discussed not taking more than one tablet per night.  Refills for 3 months given.     (G89.4) Chronic pain syndrome  Comment:   Plan: oxyCODONE-acetaminophen (PERCOCET) 5-325 MG         tablet, oxyCODONE-acetaminophen (PERCOCET)         5-325 MG tablet, oxyCODONE-acetaminophen         (PERCOCET) 5-325 MG tablet        See above.     (I20.0) Intermediate coronary syndrome (H)  Comment: stable  Plan: nitroGLYcerin (NITROSTAT) 0.4 MG sublingual         tablet            (I10) Hypertension goal BP (blood pressure) < 130/80  Comment: at goal  Plan: amLODIPine (NORVASC) 5 MG tablet        The current medical regimen is effective;  continue present plan and medications.     (E11.42) Diabetic polyneuropathy associated with type 2 diabetes mellitus (H)  Comment:   Plan: Will refer to podiatry and neurology.     (N18.4) CKD (chronic kidney disease) stage 4, GFR 15-29 ml/min (H)  Comment: worsening  Plan: Hemoglobin, Basic metabolic panel  (Ca, Cl,         CO2, Creat, Gluc, K, Na, BUN)        He has an appointment scheduled with  "nephrology.     (M79.671,  M79.672) Pain in both feet  Comment:   Plan: Orthopedic  Referral        Referral to podiatry given.     (R25.1) Tremor  Comment:   Plan: Adult Neurology  Referral, MR Brain         w/o Contrast        Will refer to neurology for evaluation.     (R26.89) Balance problems  Comment:   Plan: Adult Neurology  Referral, MR Brain         w/o Contrast        See above.  Will refer to neurology.    (R41.3) Memory loss  Comment:   Plan: Adult Neurology  Referral, MR Brain         w/o Contrast        See above.  He is scheduled for neuropsychological testing.     (E11.22,  N18.4,  Z79.4) Type 2 diabetes mellitus with stage 4 chronic kidney disease, with long-term current use of insulin (H)  Comment: not controlled  Plan: Hemoglobin A1c        He will meet with Jose today.        93 minutes spent by me on the date of the encounter doing chart review, patient visit and documentation        BMI:   Estimated body mass index is 28.43 kg/m  as calculated from the following:    Height as of this encounter: 1.727 m (5' 8\").    Weight as of this encounter: 84.8 kg (187 lb).           Suma Jenkins NP  [facility]    Makenna Linares is a 68 year old, presenting for the following health issues:  Headache and Follow Up    Additional Questions 3/28/2023   Roomed by Ema AMADOR   Accompanied by Wife- Lilia Pickard        He has been getting headache, dull, above eyes about every other day for a few months.   Photophobia, blurred vision, watery eyes.  No nausea  When he stands, his neck and back pain is worse and it seems to trigger his headaches.  Pain down his arms, feels his arms are weak.  He has a history of multi-level cervical fusion.   His balance is off, he has fallen.  He has foot pain bilaterally.  He does have some memory loss, is scheduled for neuropsychological testing next month.  Wife has noticed that he has occasional visual " "hallucinations.    He is not following up with MTM, missing appointments.  He is not checking his blood sugars regularly and is not taking his insulin consistently.                  Review of Systems   Neurological: Positive for headaches.            Objective    BP (!) 144/56   Pulse 76   Temp 98.2  F (36.8  C) (Temporal)   Resp 20   Ht 1.727 m (5' 8\")   Wt 84.8 kg (187 lb)   SpO2 96%   BMI 28.43 kg/m    Body mass index is 28.43 kg/m .  Physical Exam   GENERAL: healthy, alert and no distress  EYES: Eyes grossly normal to inspection, PERRL and conjunctivae and sclerae normal  HENT: ear canals and TM's normal, nose and mouth without ulcers or lesions  MS: decreased ROM of the cervical spine  NEURO: wide-based stance, slight shuffling when walking  PSYCH: mentation appears normal, affect normal/bright                    "

## 2023-03-28 NOTE — PATIENT INSTRUCTIONS
Dr. [facility] [phone]  Call [phone] to schedule the colonoscopy.  Call [facility] Radiology scheduling [phone] to set up the MRI of the cervical spine (neck) and MRI of the brain.   Schedule eye exam.    A  should call you to set up a neurology appointment and podiatry appointment.  Call and make sure the nephrologist has entered labs a week before your lab appointment.

## 2023-03-29 NOTE — PROGRESS NOTES
Medication Therapy Management (MTM) Encounter    ASSESSMENT:                            Medication Adherence/Access: No issues identified          Type 2 Diabetes: Patient is not meeting A1c goal of < 7%(12%). Self monitoring of blood glucose is not at goal of fasting  mg/dL and post prandial < 150 mg/dL. Patient is not meeting average glucose goal of <150mg/dL. Patient is not meeting goal of >70% time in target with continuous glucose monitoring. MTM educated and placed new dexcom G6 sensor + transmitter on him today in clinic .   , Basal Insulin (NPH) :  Stay at dose  20 units twice daily now , Bolus / Rapid Acting Insulin (Regular-Humulin-R) : Start  at dose : 10 units twice daily at main meals, educated MUST change insulin bottles once every 30 days no matter how much is remaining in bottle ,also MUST change insulin syringe each new injection.   Metformin : OFF all metformin 12-5-22 due to ckd issues.   Increased exercise-walk daily as best you can .  Also patient has left hand trigger finger --encouraged him to obtain trigger finger splint from [employer] --if that doesn't work -- see pcp for surgery discussion.         DPNP:  Needs improvement in blood sugars for pain to be better controlled.     Hyperlipidemia: Stable.  Patient is on high intensity statin which is indicated based on 2019 ACC/AHA guidelines for lipid management.        Hypertension: Patient is meeting blood pressure goal of < 140/90mmHg. Patient would benefit from the following changes : continued blood pressure monitoring, watching diet, increasing exercise and weight loss, limiting/reducing sodium.         COPD: Stable  Patient would benefit from no changes at this time      PLAN:                              1. A1c 12%--lets place a new dex com G6 sensor on you today ; remember NPH is long acting insulin --20 units in AM and 20 units in Pm about 12 hours apart, the R-insulin is rapid acting mealtime insulin --give 10 units right before  bfast , lunch , dinner.  If you skip a main meal --donot take R-insulin.    Congrats you have successfully applied a new sensor /transmitter --should be ready to show blood sugars in 2 hours. If low alarm goes off --drink fruit juice right away , if high alarm--drink water , go for a walk to dilute and lower blood sugar --thjen at next meal the R insulin will help lower the sugar --we can adjust your insulin doses as needed.     Please bring your dexcom  and have you current sensor/transmitter still on your body.     Remember -- You MUST change your insulin vials every 30 days no matter how much insulin is left in it .  Use a new syringe for each insulin shot.        Follow-up: Return in about 1 week (around 2023), or @ 1 ;30 PM, for Medication Therapy Management Visit, Blood sugar meter review.    SUBJECTIVE/OBJECTIVE:                          Hayder Alves is a 68 year old male coming in for a follow-up (22) visit. He was referred to me from Suma Jenkins     Significant other Alfonso attended visit with him and brought all his med bottles for him today .     Reason for visit:   Spoke with patient --his dexcom-G6 cgm most likely not working due to   --will see mtm today for placement of new cgm sensor and . Along with insulin review.        Allergies/ADRs: Reviewed in chart  Past Medical History: Reviewed in chart; could not afford xarelto or eliquis.  Tobacco: He reports that he quit smoking about 23 years ago. His smoking use included cigarettes. He has a 45.00 pack-year smoking history. He has never used smokeless tobacco.   Exposed to second hand smoke.   Alcohol: 1-3 beverages / week  Caffeine: large can red bull x 1-2 day.   Social: Lives with alfonso -wife.   Personal Healthcare Goals: control DM, fix dpnp pain.   Activity:  None, wants to join gym but [place]  cannot drive.     Medication Adherence/Access: no issues reported    Type 2 Diabetes:  Currently  taking : NPH  Insulin 20 units twice daily (am and PM), OFF all metformin now due to ckd issues, also he ADMITS today NOT injecting 10 units twice daily Regular insulin at his 2 main meals/day .   Cannot afford trulicity or sglt-2.    Patient is not experiencing side effects.  SMBG:  He is frustrated --cannot get his dexcom  G6 cgm to work.    MTM noticed he has  transmitter in his cgm sensor --replaced that today and placed new sensor in him --we synched up his  --should be able to read blood sugar in 2 hours.   change sensor every 10 days. Change transmitter every 90 days.     Symptoms of low blood sugar? none  Symptoms of high blood sugar? fatigue  Eye exam: due  Foot exam: up to date  Diet/Exercise:   Aspirin: Taking 81mg daily and denies side effects   Statin: Yes: Rosuvastatin    ACEi/ARB: No.   Urine Albumin:   Lab Results   Component Value Date    UMALCR 3,528.42 (H) 2023          Lab Results   Component Value Date    A1C 12.0 2023    A1C 12.4 2022    A1C 11.3 2022    A1C 7.1 2021    A1C 6.9 2020    A1C 9.0 2019    A1C 8.5 2019    A1C 11.3 2018            DPNP: feet killing him lately --takes gabapentin 300mg twice daily.  ALA 600mg./day -not been using them.   Also has amitriptyline 10mg. Tabs - 3 tabs every night at bedtime for pain/sleep.     Has not been on percocet 5-325mg x 6 months --helped his pain/feet/sleep. Saw pcp 3-28-23 and was given more pain pills now.    Hyperlipidemia: Current therapy includes : Rosuvastatin 20mg daily.  Patient reports no significant myalgias or other side effects.  The 10-year ASCVD risk score (Yojana PORTER, et al., 2019) is: 36.3%    Values used to calculate the score:      Age: 68 years      Sex: Male      Is Non- : No      Diabetic: Yes      Tobacco smoker: No      Systolic Blood Pressure: 130 mmHg      Is BP treated: Yes      HDL Cholesterol: 38 mg/dL      Total Cholesterol: 170  mg/dL  Recent Labs   Lab Test 06/20/22  1040 01/18/21  1623 01/03/17  1548 10/05/15  1215   CHOL 170 137   < > 293*   HDL 38* 44   < > 37*   LDL 61 59   < > 185*   TRIG 354* 172*   < > 354*   CHOLHDLRATIO  --   --   --  7.9*    < > = values in this interval not displayed.       Hypertension: Current medications include : Metoprolol 50mg. Tablet twice daily, Furosemide 20mg twice daily.   Patient does not self-monitor blood pressure. (has home meter but not using it ).   Patient reports no current medication side effects.  BP Readings from Last 3 Encounters:   03/28/23 130/60   02/01/23 120/66   12/19/22 (!) 150/80     COPD: Current medications: none.   He has several inhalers and nebs on hand --states has not used them in quite some time.   Patient is not experiencing side effects.   Patient reports the following symptoms: none.  Patient does not have an COPD Action Plan on file.   Has spirometry been completed: Doesn't know    Vitals we will use are from 3-28-23 at pcp visit.   ----------------------------------------------------------------------------------      I spent 30 minutes with this patient today. All changes were made via collaborative practice agreement with Suma Jenkins NP. A copy of the visit note was provided to the patient's provider(s).    The patient was given a summary today of these recommendations.     Jose Vazquez [facility].  Medication Therapy Management Provider  [phone]           Medication Therapy Recommendations  Type 2 diabetes mellitus with stage 4 chronic kidney disease, with long-term current use of insulin (H)    Current Medication: Continuous Blood Gluc Sensor (DEXCOM G6 SENSOR) MISC   Rationale: Does not understand instructions - Adherence - Adherence   Recommendation: Provide Education - restarted new dexcom G6 sensor and transmitter in clinic today .   Status: Accepted per CPA          Current Medication: insulin regular 100 UNIT/ML vial   Rationale: Patient prefers not  to take - Adherence - Adherence   Recommendation: Provide Adherence Intervention - start 10 units 3 x day before main meals.   Status: Accepted per CPA   Note: educated chnage insulin vial eery 30 days even if units remaining in bottle.

## 2023-03-29 NOTE — TELEPHONE ENCOUNTER
Screening Questions  BLUE  KIND OF PREP RED  LOCATION [review exclusion criteria] GREEN  SEDATION TYPE        Y Are you active on mychart?       Suma Jenkins, TOBIAS    Ordering/Referring Provider?        [facility] What type of coverage do you have?      N Have you had a positive covid test in the last 14 days?     28.4 1. BMI  [BMI 40+ - review exclusion criteria]    Y  2. Are you able to give consent for your medical care? [IF NO,RN REVIEW]          Y  3. Are you taking any prescription pain medications on a routine schedule   (ex narcotics: oxycodone, roxicodone, oxycontin,  and percocet)? [RN Review]         OXYCODONE 3a. EXTENDED PREP What kind of prescription?     N 4. Do you have any chemical dependencies such as alcohol, street drugs, or methadone?        **If yes 3- 5 , please schedule with MAC sedation.**          IF YES TO ANY 6 - 10 - HOSPITAL SETTING ONLY.     N 6.   Do you need assistance transferring?     N 7.   Have you had a heart or lung transplant?    N 8.   Are you currently on dialysis?   N 9.   Do you use daily home oxygen?   Y 10. Do you take nitroglycerin?   10a. HAS BUT DOES NOT TAKE If yes, how often?     11. [FEMALES]  N/A Are you currently pregnant?    11a.  If yes, how many weeks? [ Greater than 12 weeks, OR NEEDED]    N 12. Do you have Pulmonary Hypertension? *NEED PAC APPT AT [facility] w/ MAC*     N 13. [review exclusion criteria]  Do you have any implantable devices in your body (pacemaker, defib, LVAD)?    N 14. In the past 6 months, have you had any heart related issues including cardiomyopathy or heart attack?     14a.  If yes, did it require cardiac stenting if so when?     N 15. Have you had a stroke or Transient ischemic attack (TIA - aka  mini stroke ) within 6 months?      N 16. Do you have mod to severe Obstructive Sleep Apnea?  [Hospital only]    N 17. Do you have SEVERE AND UNCONTROLLED asthma? *NEED PAC APPT AT [facility] w/MAC*     18. Are you currently taking any blood thinners?  "    18a. No. Continue to 19.   18b. Yes/no Blood Thinner: No [CONTINUE TO #19]    N 19. Do you take the medication Phentermine?    19a. If yes, \"Hold for 7 days before procedure.  Please consult your prescribing provider if you have questions about holding this medication.\"     Y  20. Do you have chronic kidney disease?      Y  21. Do you have a diagnosis of diabetes?     N  22. On a regular basis do you go 3-5 days between bowel movements?      23. Preferred LOCAL Pharmacy for Pre Prescription    [ LIST ONLY ONE PHARMACY]        [id number] - [place], [place] - [address] AT [facility] & [address] [place] [address]      - CLOSING REMINDERS -    Informed patient they will need an adult    Cannot take any type of public or medical transportation alone    Conscious Sedation- Needs  for 6 hours after the procedure       MAC/General-Needs  for 24 hours after procedure    Pre-Procedure Covid test to be completed [[facility] PCR Testing Required]    Confirmed Nurse will call to complete assessment       - SCHEDULING DETAILS -  NO Hospital Setting Required? If yes, what is the exclusion?:    MAGGY  Surgeon    5/11  Date of Procedure  Lower Endoscopy [Colonoscopy]  [facility]   GOLY[facility] EXTENDED - If you answer yes to questions #1, #3, #22 (Juan Maravilla and CF pts)Which Colonoscopy Prep was Sent?    MAC, PAIN MEDS Sedation Type     Y, with PCP PAC / Pre-op Required         Please call wife Lilia for pre-assessment [phone]. Patient on pain meds, sent extended prep.        "

## 2023-03-29 NOTE — TELEPHONE ENCOUNTER
3-28-23      Patient was supposed to be seen per mtm today at 4;30pm--but unfortunately was not checked in /arrived so mtm didn't realize he was sitting for an hour waiting and then had to go home .    Lab Results   Component Value Date    A1C 12.0 03/28/2023    A1C 12.4 12/05/2022    A1C 11.3 06/20/2022    A1C 7.1 01/18/2021    A1C 6.9 02/19/2020    A1C 9.0 08/08/2019    A1C 8.5 02/27/2019    A1C 11.3 12/13/2018     Blood sugars still quite elevated --they have issues with his dex-com G6 cgm.        Plan:    1.  Gomez  is older than 90 days so he needs a new one . I have a new  for him       2. mtm reschedule him to see me over lunch hour Thursday march 30th -2023.    Will also adjust his NPH + R insulin doses.     Jose Vazquez Rph.  Medication Therapy Management Provider  [phone]

## 2023-03-30 NOTE — LETTER
March 30, 2023  Hayder Alves  [address] [facility] [address]    Dear Mr. Alves, [facility]     Thank you for talking with me on Mar 30, 2023 about your health and medications. As a follow-up to our conversation, I have included two documents:      1. Your Recommended To-Do List has steps you should take to get the best results from your medications.  2. Your Medication List will help you keep track of your medications and how to take them.    If you want to talk about these documents, please call Jose Vazquez RPH at phone: [phone], Monday-Friday 8-4:30pm.    I look forward to working with you and your doctors to make sure your medications work well for you.    Sincerely,  Jose Vazquez RPH  [facility] Pharmacist, [facility]

## 2023-03-30 NOTE — LETTER
_  Medication List        Prepared on: Mar 30, 2023     Bring your Medication List when you go to the doctor, hospital, or   emergency room. And, share it with your family or caregivers.     Note any changes to how you take your medications.  Cross out medications when you no longer use them.    Medication How I take it Why I use it Prescriber   albuterol (PROAIR HFA/PROVENTIL HFA/VENTOLIN HFA) 108 (90 Base) MCG/ACT inhaler Inhale 2 puffs into the lungs every 6 hours as needed for shortness of breath Chronic obstructive pulmonary disease, unspecified COPD type (H) Suma Jenkins NP   amitriptyline (ELAVIL) 10 MG tablet TAKE 3 TABLETS BY MOUTH EVERY NIGHT AT BEDTIME Insomnia, unspecified type Suma Jenkins NP   amLODIPine (NORVASC) 5 MG tablet Take 1 tablet (5 mg) by mouth At Bedtime Hypertension Goal BP (Blood Pressure) < 130/80 Suma Jenkins NP   ASPIRIN PO Take 81 mg by mouth daily  prevent mi/stroke Patient Reported   blood glucose monitoring (FREESTYLE FREEDOM LITE) meter device kit Use to test blood sugars 2 times daily or as directed. Type 2 diabetes mellitus with stage 3 chronic kidney disease, with long-term current use of insulin (H) Suma Jenkins NP   blood glucose monitoring (NO BRAND SPECIFIED) test strip Use to test blood sugars 2 times daily or as directed - use strips compatible with pt's meter and insurance Type 2 diabetes mellitus with stage 3 chronic kidney disease, unspecified long term insulin use status (H) Suma Jenkins NP   Continuous Blood Gluc Sensor (DEXCOM G6 SENSOR) MISC Change every 10 days. Start 11-28-22.  Type-2 Diabetes  Patient Reported   Fluticasone-Umeclidin-Vilant (TRELEGY ELLIPTA) 100-62.5-25 MCG/ACT oral inhaler Inhale 1 puff into the lungs daily Chronic obstructive pulmonary disease, unspecified COPD type (H) Suma Jenkins NP   furosemide (LASIX) 20 MG tablet TAKE 1 TABLET(20 MG) BY MOUTH TWICE DAILY Chronic Systolic Heart Failure (H) Suma CASTRO  "Bottem, NP   gabapentin (NEURONTIN) 300 MG capsule TAKE 1 CAPSULE BY MOUTH TWICE DAILY Diabetic polyneuropathy associated with type 2 diabetes mellitus (H) Suma Jenkins NP   insulin NPH (HUMULIN N VIAL) 100 UNIT/ML vial INJECT 20 UNITS UNDER THE SKIN TWICE DAILY Type 2 diabetes mellitus with stage 3b chronic kidney disease, with long-term current use of insulin (H) Suma Jenkins NP   insulin regular 100 UNIT/ML vial Inject 0.1 mLs (10 Units) Subcutaneous 2 times daily (before meals) Type 2 diabetes mellitus with stage 3b chronic kidney disease, with long-term current use of insulin (H) Suma Jenkins NP   insulin syringe 31G X 5/16\" 1 ML MISC USE 2 SYRINGES DAILY OR AS DIRECTED Type 2 diabetes mellitus with stage 3b chronic kidney disease, with long-term current use of insulin (H) Suma Jenkins NP   ipratropium - albuterol 0.5 mg/2.5 mg/3 mL (DUONEB) 0.5-2.5 (3) MG/3ML neb solution Take 1 vial (3 mLs) by nebulization every 6 hours as needed for shortness of breath, wheezing or cough Chronic obstructive pulmonary disease, unspecified COPD type (H) Suma Jenkins NP   Lancets (MICROLET) MISC Test once daily Type 2 Diabetes, HbA1c Goal < 8% (H) Suma Jenkins NP   metoprolol tartrate (LOPRESSOR) 50 MG tablet Take 1 tablet (50 mg) by mouth 2 times daily Hypertension Goal BP (Blood Pressure) < 130/80 Suma Jenkins NP   Multiple Vitamins-Minerals (CENTRUM SILVER) per tablet Take 1 tablet by mouth daily   Patient Reported   nitroGLYcerin (NITROSTAT) 0.4 MG sublingual tablet Place 1 tablet (0.4 mg) under the tongue every 5 minutes as needed Intermediate Coronary Syndrome (H) Suma Jenkins NP   oxyCODONE-acetaminophen (PERCOCET) 5-325 MG tablet [START ON 4/2/2023] Take 1 tablet by mouth At Bedtime Must last 30 days Herniation of Cervical Intervertebral Disc; Chronic Pain Syndrome Suma Jenkins NP   oxyCODONE-acetaminophen (PERCOCET) 5-325 MG tablet [START ON 4/27/2023] Take 1 tablet " by mouth At Bedtime Must last 30 days Herniation of Cervical Intervertebral Disc; Chronic Pain Syndrome Suma Jenkins NP   oxyCODONE-acetaminophen (PERCOCET) 5-325 MG tablet Take 1 tablet by mouth At Bedtime Must last 30 days Herniation of Cervical Intervertebral Disc; Chronic Pain Syndrome Suma Jenkins NP   oxyCODONE-acetaminophen (PERCOCET) 5-325 MG tablet [START ON 5/27/2023] Take 1 tablet by mouth At Bedtime Must last 30 days Herniation of Cervical Intervertebral Disc; Chronic Pain Syndrome Suma Jenkins NP   rosuvastatin (CRESTOR) 20 MG tablet Take 1 tablet (20 mg) by mouth daily Hypercholesterolemia Suma Jenkins NP         Add new medications, over-the-counter drugs, herbals, vitamins, or  minerals in the blank rows below.    Medication How I take it Why I use it Prescriber                                      Allergies:      adhesive tape; latex; lipitor [atorvastatin calcium]; lisinopril; lunesta        Side effects I have had:               Other Information:              My notes and questions:

## 2023-03-30 NOTE — Clinical Note
Irene --good news --alfonso and mayte intoday -- I placed new decom G6 sensor with working cali reynoso on him , sync up to his dexcom  --also he was not using any R insulin and alfonso thought nph was mealtime insulin. Lastly he has been using  insulin --reminded must change vial every 30 days . Will see him back in 1 week to review that he is doing everything correctly.  Ignacia

## 2023-03-30 NOTE — PATIENT INSTRUCTIONS
"Recommendations from today's MTM visit:                                                         1. A1c 12%--lets place a new dex com G6 sensor on you today ; remember NPH is long acting insulin --20 units in AM and 20 units in Pm about 12 hours apart, the R-insulin is rapid acting mealtime insulin --give 10 units right before bfast , lunch , dinner.  If you skip a main meal --donot take R-insulin.    Congrats you have successfully applied a new sensor /transmitter --should be ready to show blood sugars in 2 hours. If low alarm goes off --drink fruit juice right away , if high alarm--drink water , go for a walk to dilute and lower blood sugar --thjen at next meal the R insulin will help lower the sugar --we can adjust your insulin doses as needed.     Please bring your dexcom  and have you current sensor/transmitter still on your body.     Remember -- You MUST change your insulin vials every 30 days no matter how much insulin is left in it .  Use a new syringe for each insulin shot.        Follow-up: Return in about 1 week (around 4/6/2023), or @ 1 ;30 PM, for Medication Therapy Management Visit, Blood sugar meter review.    It was great speaking with you today.  I value your experience and would be very thankful for your time in providing feedback in our clinic survey. In the next few days, you may receive an email or text message from [employer] with a link to a survey related to your  clinical pharmacist.\"     To schedule another MTM appointment, please call the clinic directly or you may call the MTM scheduling line at [phone] or toll-free at [phone].     My Clinical Pharmacist's contact information:                                                      Please feel free to contact me with any questions or concerns you have.      Jose Vazquez [facility].  Medication Therapy Management Provider  [phone]    "

## 2023-03-30 NOTE — LETTER
"Recommended To-Do List      Prepared on: Mar 30, 2023       You can get the best results from your medications by completing the items on this \"To-Do List.\"      Bring your To-Do List when you go to your doctor. And, share it with your family or caregivers.    My To-Do List:  What we talked about: What I should do:   The importance of taking your medication as intended    Education: : Restarted a new Dexcom G6 sensor /transmitter today --replace every 10 days .            What we talked about: What I should do:   The importance of taking your medication as intended    Reminder to take your medication as prescribed for insulin regular-START 10 units injected right before 3 main meals/day .   Use NPH insulin long acting 20 units twice daily about 12 hours apart.           What we talked about: What I should do:                       "

## 2023-04-02 NOTE — TELEPHONE ENCOUNTER
Caller is Chelsea Yu NP with [employer]    They do yearly eval visit and at this visit they found in a screening the patient has PVD of the right leg.  She is requesting that the patient be contacted by PCP to discuss and further evaluation.       Questions can be directed to [facility] customer service number:  [phone]

## 2023-04-04 NOTE — TELEPHONE ENCOUNTER
[facility] Call Center    Phone Message    May a detailed message be left on voicemail: no     Reason for Call: Other: Lilia called to advise a visiting nurse had suggested Vijay have an US of his RT leg, please add an order per Pt request.  Reach out to Lilia when complete.     Action Taken: Message routed to:  [facility] Cardiology    Travel Screening: Not Applicable                                                                       Female

## 2023-04-04 NOTE — TELEPHONE ENCOUNTER
Please call wife.  The nurse report shows peripheral arterial disease of one of his legs.  I place a referral to a vascular specialist.

## 2023-04-04 NOTE — TELEPHONE ENCOUNTER
Consent to communicate with wife, Lilia.    Writer called patient and Lilia answered the phone stating patient unavailable.    Writer reviewed plan/message per DALE Jenkins CNP, along with Cardiology scheduling number.  Per chart review, patient last saw Dr. Palafox on 7/28/20.    Lilia verbalized understanding and in agreement with plan.    LAZARO PerdueN, RN-BC  [facility]

## 2023-04-05 NOTE — TELEPHONE ENCOUNTER
Pt referred to [facility] by Suma Jenkins NP for PAD.    Pt needs to be scheduled for HALI/TBI and NEW PAD consult with Vascular Medicine.  Will route to scheduling to coordinate an appointment at next available.    Appt note: Ref by Suma Jenkins NP for PAD; notes in Epic.    LAZARO SeguraN, RN-[facility] [place]

## 2023-04-05 NOTE — TELEPHONE ENCOUNTER
Called Lilia:  Left message to call back and ask to speak with an available triage nurse.    LAZARO PerdueN, RN-BC  [facility]

## 2023-04-17 NOTE — TELEPHONE ENCOUNTER
He had a CT scan in 2016, which is different.  The CT mainly looks for bleeding or stroke.  You can give her the number to [facility] and [facility], they may have openings sooner than September.

## 2023-04-17 NOTE — TELEPHONE ENCOUNTER
4-17-23        Alfonso --sig. Other called --she is trying to message pcp or neurologist --wants to compare mri's --alfonso states he last had mri in November 2016 --[facility].       mt will pass on note -- see if pcp wants to call her to discuss results as neurology appt., isnt until sept-2023.      Jose Vazquez [facility].  Medication Therapy Management Provider  [phone]

## 2023-04-18 NOTE — TELEPHONE ENCOUNTER
I reviewed this message with Lilia, pt's wife.  Pt has neuro testing [place] done [place] [facility]  She will see if pt can be seen sooner at the other neuro clinics.  GASPER Huitron

## 2023-04-19 NOTE — ED TRIAGE NOTES
Pt was stepping off a curb; tripped and fell and hit his head.  No LOC.  VSS per EMS.  .  Given Toradol for pain.

## 2023-04-20 NOTE — ED PROVIDER NOTES
History     Chief Complaint:  Fall       HPI   Hayder Alves is a 68 year old male with a history of CAD, CHF, hypertension, COPD, and type II diabetes mellitus who presents after falling while stepping down from a curb and hitting his head around 1500 this afternoon. He did not lose consciousness and is not on blood thinners. No preceding chest pain or light-headedness or dyspnea. He has a facial laceration and abrasions to his left hand. He is having pain in his left hand and neck.  He has a prior history of cervical spine surgery.  No difficulty walking since the incident.      Independent Historian:   None - Patient Only    Review of External Notes: Family medicine note from 3/28/2023 reviewed for the patient was being followed for neck pain (planned on getting an outpatient cervical MRI), COPD that is not currently controlled, chronic pain, stable intermediate coronary syndrome, hypertension at goal, poorly controlled type 2 diabetes with neuropathy (planning podiatry and neurology referral), CKD stage IV that is worsening (planning to see nephrology), amongst other diagnoses.    ROS:  Review of Systems   Cardiovascular: Negative for chest pain.   Musculoskeletal: Positive for arthralgias and neck pain.   Skin: Positive for wound.   Neurological: Negative for light-headedness.   All other systems reviewed and are negative.      Allergies:  Lipitor  Lisinopril  Lunesta     Medications:    Albuterol inhaler  Amitriptyline  Amlodipine  Trelegy Ellipta  Furosemide  Gabapentin  Metoprolol tartrate  Nitroglycerin  Percocet  Rosuvastatin    Past Medical History:    CAD  Hypertension  CHF  Type II diabetes mellitus  Respiratory failure  Bipolar I disorder  Pure hypercholesterolemia  Anxiety  Ischemic cardiomyopathy  Insomnia  GERD  COPD  Chronic kidney disease  Diabetic polyneuropathy   Pseudoarthrosis of cervical spine    Past Surgical History:    CABG  Anesthesia cardioversion  CABG x3 with stent  Lumbar  fusion L1-5  Cervical fusion x2 C4-7, C6-7     Family History:    Father- CAD, cerebrovascular disease  Mother- CAD  Sister- cancer    Social History:  The patient presents via EMS  Former smoker    Physical Exam     Patient Vitals for the past 24 hrs:   BP Pulse Resp SpO2   04/19/23 2208 (!) 145/78 61 16 98 %   04/19/23 1942 128/64 60 20 97 %        Physical Exam  General: Laying on the ED bed, no distress  HEENT: Normocephalic, abrasions and lacerations over the mid and left forehead as well as the anterior nose, EOMI, no evidence of intraoral trauma  Cardiac: Warm and well perfused, regular rate and rhythm  Pulm: Breathing comfortably, no accessory muscle usage, no conversational dyspnea, and lungs clear bilaterally  GI: Abdomen soft, nontender, no rigidity or guarding  MSK: Abrasion over the left fifth MCP with tenderness to palpation over the same, left hand otherwise nontender, left wrist is nontender and with nonpainful range of motion  Skin: Warm and dry  Neuro: Moves all extremities  Psych: Normal mood and affect     Emergency Department Course     Imaging:  XR Wrist Left G/E 3 Views   Final Result   IMPRESSION:   1.  Normal joint spacing and alignment.   2.  No fracture.      XR Hand Left G/E 3 Views   Final Result   IMPRESSION:   1.  Normal joint spacing and alignment.   2.  No fracture.      CT Head w/o Contrast   Final Result   IMPRESSION:   Head CT:     1.  No evidence of acute intracranial hemorrhage or mass effect.   2.  Moderate nonspecific white matter changes.   3.  Moderate brain parenchymal volume loss.      Facial Bones CT:   1.  Mildly displaced nasal bone fracture.   2.  No other evidence of acute maxillofacial fracture by CT imaging.      Cervical Spine CT:   1.  No evidence of acute fracture or subluxation of the cervical spine by CT imaging.   2.  Postsurgical changes ACDF at C4-C7 and posterior fusion at C6/C7. No hardware complication. Postsurgical changes laminectomies at C5 and C6.       CT Facial Bones without Contrast   Final Result   IMPRESSION:   Head CT:     1.  No evidence of acute intracranial hemorrhage or mass effect.   2.  Moderate nonspecific white matter changes.   3.  Moderate brain parenchymal volume loss.      Facial Bones CT:   1.  Mildly displaced nasal bone fracture.   2.  No other evidence of acute maxillofacial fracture by CT imaging.      Cervical Spine CT:   1.  No evidence of acute fracture or subluxation of the cervical spine by CT imaging.   2.  Postsurgical changes ACDF at C4-C7 and posterior fusion at C6/C7. No hardware complication. Postsurgical changes laminectomies at C5 and C6.      Cervical spine CT w/o contrast   Final Result   IMPRESSION:   Head CT:     1.  No evidence of acute intracranial hemorrhage or mass effect.   2.  Moderate nonspecific white matter changes.   3.  Moderate brain parenchymal volume loss.      Facial Bones CT:   1.  Mildly displaced nasal bone fracture.   2.  No other evidence of acute maxillofacial fracture by CT imaging.      Cervical Spine CT:   1.  No evidence of acute fracture or subluxation of the cervical spine by CT imaging.   2.  Postsurgical changes ACDF at C4-C7 and posterior fusion at C6/C7. No hardware complication. Postsurgical changes laminectomies at C5 and C6.         Report per radiology    Procedures     Laceration Repair      Procedure: Laceration Repair    Indication: Laceration    Consent: Verbal    Location:  Forehead    Length: 5 cm    Preparation: Irrigation with Sterile Saline.    Anesthesia/Sedation: Lidocaine - 1%      Treatment/Exploration: Wound explored, no foreign bodies found     Closure: The wound was closed with one layer. Closed with 9 x 5-0 Polyglactin rapide (vicryl) absorbable using simple interrupted sutures.  Patient Status: The patient tolerated the procedure well: Yes. There were no complications.    Emergency Department Course & Assessments:     Interventions:  Medications   Td (tetanus & diphtheria  toxoids) -  adult formulation - for ages 7 years and older (0.5 mLs Intramuscular $Given 23)   acetaminophen (TYLENOL) tablet 975 mg (975 mg Oral $Given 23)      Assessments:   I obtained history and examined the patient as noted above.    I rechecked the patient. Laceration repair, see procedure note above.     Independent Interpretation (X-rays, CTs, rhythm strip):  Left hand x-ray without bony deformity or fracture    Consultations/Discussion of Management or Tests:  None      Social Determinants of Health affecting care:   None    Disposition:  The patient was discharged to home.     Impression & Plan      Medical Decision Makin-year-old male presents with mechanical fall as described above.  No indication for medical work-up such as lab work here.  CT imaging of the head, face, and neck showed a nasal bone fracture but no other acute findings.  X-ray of the left hand and wrist showed no fractures or other bony injuries. The facial abrasions were quite macerated and there was limited lacerated tissue to repair. Patient's lacerations and abrasions were cleaned and repaired where able as documented above.  The patient's tetanus was updated.  Plan is for discharge home with PCP follow-up for further care.    Diagnosis:    ICD-10-CM    1. Fall involving sidewalk curb, initial encounter  W10.1XXA       2. Abrasion of face, initial encounter  S00.81XA       3. Laceration of forehead, initial encounter  S01.81XA       4. Laceration of nose, initial encounter  S01.21XA       5. Closed fracture of nasal bone, initial encounter  S02.2XXA       6. Abrasion of left hand, initial encounter  S60.512A             Scribe Disclosure:  I, Sammie Gallardo, am serving as a scribe at 8:20 PM on 2023 to document services personally performed by Antonio Farfan MD based on my observations and the provider's statements to me.      2023   Antonio Farfan MD King, Colin, MD  [phone]

## 2023-04-20 NOTE — ED NOTES
Rapid Assessment Note    History:   Hayder Alves is a 68 year old male who presents for evaluation following a fall earlier today. The patient reports that he was walking out of the grocery store when tripped on a curb and fell to the ground, hitting his head without loss of consciousness. He denies any lightheadedness, dizziness, shortness of breath, or chest pain before hand and currently has no neck pain. He is not on blood thinners.    Exam:   Constitutional:       Appearance: Normal appearance.      General: Not in acute distress.  HENT:      Head: Normocephalic.  No panchal sign.  No raccoon's eyes.  No mastoid tenderness to palpation.  No septal hematoma.  Irregular laceration over the left forehead in addition to horizontal laceration to the nasal bridge.  See picture below.  No pain with jaw clenching.  Normal dentition.  Eyes:      Extraocular Movements: Extraocular movements intact.      Conjunctiva/sclera: Conjunctivae normal.      Pupils: PERRL  Cardiovascular:      Rate and Rhythm: Normal rate and regular rhythm.   Pulmonary:      Effort: Pulmonary effort is normal. No respiratory distress.   Abdominal:      General: Abdomen is flat. There is no distension.   Musculoskeletal:         General: No swelling or deformity.      Cervical back: Normal range of motion. No rigidity.  No midline cervical spine tenderness to palpation.  Skin:     Coloration: Skin is not jaundiced or pale.   Neurological:      General: No focal deficit present.      Mental Status: Alert and oriented to person, place, and time.   Psychiatric:         Mood and Affect: Mood normal.         Behavior: Behavior normal.            Plan of Care:   I evaluated the patient and developed an initial plan of care. I discussed this plan and explained that I, or one of my partners, would be returning to complete the evaluation.     I, Vi Thompson, am serving as a scribe to document services personally performed by Gilberto Huerta DO,  based on my observations and the provider's statements to me.    4/19/2023  [employer]    Portions of this medical record were completed by a scribe. UPON MY REVIEW AND AUTHENTICATION BY ELECTRONIC SIGNATURE, this confirms (a) I performed the applicable clinical services, and (b) the record is accurate.        Gilberto Huerta DO  04/19/23 1950

## 2023-04-20 NOTE — ED TRIAGE NOTES
No blood thinners, lacerations noted to forehead and bridge of nose. Had some dizziness earlier, has now resolved. C/o back pain d/t uncomfortable chair, mild HA.
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
## 2023-04-24 NOTE — NURSING NOTE
The patient was seen for neuropsychological evaluation at the request of Suma Jenkins NP for the purposes of diagnostic clarification and treatment planning. 230 minutes of test administration and scoring were provided by this writer. Please see Dr. Yasmine Warner's report for a full interpretation of the findings.    Marti Mercer  Psychometrist

## 2023-04-26 NOTE — PROGRESS NOTES
"Provider: KEVIN Garcia: [place]      Patient Name: Hayder Alves (Bill)     : 53      MRN: [id number]      CORNELIUS: 23      Age: 68      Education: 12      Ethnicity: C      Handedness: Right      Station: OP             NEUROPSYCHOLOGICAL TESTS RAW SCORE STANDARDIZED SCORE* DESCRIPTIVE RANGE**   PREMORBID ABILITY       WAIS-IV ACS Test of Premorbid Functioning (Estimated FSIQ) 16 SS= 76 Below Average     Estimated FSIQ = 88 Low Average          GENERAL COGNITIVE STATUS RAW SCORE STANDARDIZED SCORE* DESCRIPTIVE RANGE**   Repeatable Battery for the Assessment of Neuropsychological Status (RBANS-Update; Form C)   Total -- SS = 54 Exceptionally Low           Immediate Memory -- SS = 49 Exceptionally Low           Visuospatial/Construction -- SS =  81 Low Average           Language -- SS =  85 Low Average           Attention -- SS =  64 Exceptionally Low           Delayed Memory -- SS =  40 Exceptionally Low          ATTENTION AND EXECUTIVE FUNCTIONS RAW SCORE STANDARDIZED SCORE* DESCRIPTIVE RANGE**   Wechsler Adult Intelligence Scale - 4th Edition (WAIS-IV)      Digit Span Forward 9 ss= 9 Average   Digit Span Backward 6 ss= 8 Average   Digit Span Sequencing 4 ss= 6 Low Average   Digit Span Total 19 ss= 7 Low Average   Similarities 17 ss= 7 Low Average   Trail Making Test (Time/errors)        Part A s,r,e 62/0 TS= 35 Below Average   Part B s,r,e D/C @ 74\"   Exceptionally Low   Independent Living Scales (ILS)       Health and Safety 35 TS= 50 High   RBANS   Digit Span 8 ss= 7 Low Average   Coding 16 ss= 2 Exceptionally Low          LANGUAGE RAW SCORE STANDARDIZED SCORE* DESCRIPTIVE RANGE**   WAIS-IV Vocabulary 20 ss= 6 Low Average   Louise Naming Test s,r,e 49 TS= 41 Low Average   Letter-Cue Verbal Fluency s,r,e 9-6-3 (18) TS= 34 Below Average   Animal Fluency s,r,e 8 TS=  24 Exceptionally Low   RBANS   Naming 9 %ile= 17-25 Low Average   Semantic Fluency 13 ss= 4 Below Average          VISUOSPATIAL PROCESSING " RAW SCORE STANDARDIZED SCORE* DESCRIPTIVE RANGE**   [place] [facility] Figure Test (RCFT) Copy 27.5 %ile= 2-5 Below Average   RBANS   Line Orientation 17 %ile= 51-75 Average   Figure Copy 14 ss= 4 Below Average          LEARNING & MEMORY RAW SCORE STANDARDIZED SCORE* DESCRIPTIVE RANGE**   RBANS   List Learning 3-4-4-3 (14) ss= 2 Exceptionally Low   Story Learning 7 ss= 2 Exceptionally Low   List Recall 0 %ile= <2 Exceptionally Low   List Recognition 12 %ile= <2 Exceptionally Low   Story Recall 1 ss= 2 Exceptionally Low   Figure Recall 4 ss= 3 Exceptionally Low          PSYCHOLOGICAL & BEHAVIORAL SYMPTOMS RAW SCORE STANDARDIZED SCORE* DESCRIPTIVE RANGE**   Geriatric Depression Scale (GDS) 15 --  Mild   Geriatric Anxiey Scale (GAS)       Somatic 6 --  Mild   Cognitive 2 --  Minimal   Affective 3 --  Minimal   Total 11 --  Minimal   Frontal Systems Behavior Scale (FrSBe; Other)      Apathy Before 42 TS= 77 Elevated   Apathy After 59 TS= 103 Elevated   Disinhibition Before 34 TS= 65 Elevated   Disinhibition After 43 TS= 79 Elevated   Executive Dysfunction Before 55 TS= 72 Elevated   Executive Dysfunction After 72 TS= 87 Elevated   Total Before 131 TS= 74 Elevated   Total After 174 TS= 94 Elevated          PERFORMANCE VALIDITY RAW SCORE   DESCRIPTIVE RANGE**   RDS 8   Valid Range          * All standardized scores are adjusted for age. Superscripts denote additional adjustment for (s)ex, (r)ace/ethnicity, (l)anguage, and/or (e)ducation.   ** Descriptive ranges are based on American Academy of Clinical Neuropsychology (2020) consensus guidelines, or test manuals where appropriate.    WNL = within normal limits. DC = discontinued due to patient s inability to complete the test.     Standardized scores: TS = T-score; mean = 50, standard deviation =10; z = z-score; mean = 0, standard deviation = 1; ss = scaled score; mean = 10, standard deviation = 3; MAS = [place] Older Adult Normative Study age adjusted scaled score; mean = 10,  standard deviation = 3; SS = standard score; mean = 100, standard deviation = 15.
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
## 2023-04-26 NOTE — PROGRESS NOTES
Clinic Care Coordination Contact  Community Health Worker Initial Outreach            Patient accepts CC: Yes. Patient scheduled for assessment with AMNA AmaralLatasha on 5/1/23 at 10:00am. Patient noted desire to discuss PCA services.     CHW notes:    CHW spoke with patient's wife, Lilia. Lilia states that she has a zoom call scheduled for 2pm, so phone call was brief.    Lilia states that she would like assistance in getting the patient a PCA.    NICOLE De Jesus, B.A. [facility] Care Coordination  [facility]:   [facility]  [phone]

## 2023-05-01 NOTE — PROGRESS NOTES
Clinic Care Coordination Contact    Clinic Care Coordination Contact  OUTREACH    Referral Information:  Referral Source: PCP    Primary Diagnosis: Psychosocial    Chief Complaint   Patient presents with     Clinic Care Coordination - Initial     SW CC        Cameron Utilization: as below  Clinic Utilization  Difficulty keeping appointments:: No  Compliance Concerns: No  No-Show Concerns: No  No PCP office visit in Past Year: Yes  Utilization    Hospital Admissions  0             ED Visits  1             No Show Count (past year)  0                Current as of: 4/28/2023 12:00 AM              Clinical Concerns:  Current Medical Concerns:  Per spouse pt has lost 40 lbs in the last 6 months despite eating.      Current Behavioral Concerns: spouse noted a lot of behaviors. She is getting burned out and has concerns with helping him with a shower.     Education Provided to patient: see below for discussion on action steps.    Pain  Pain (GOAL):: Yes  Health Maintenance Reviewed: Due/Overdue   Health Maintenance Due   Topic Date Due     COPD ACTION PLAN  Never done     ZOSTER IMMUNIZATION (1 of 2) Never done     EYE EXAM  02/01/2022     MEDICARE ANNUAL WELLNESS VISIT  03/03/2023     MICROALBUMIN  05/01/2023       Clinical Pathway: None    Medication Management:  Medication review status: Medications reviewed and no changes reported per patient.        Spouse manages      Functional Status:  Dependent ADLs:: Bathing, Eating, Grooming, Incontinence, Toileting  Dependent IADLs:: Medication Management, Incontinence  Bed or wheelchair confined:: No  Mobility Status: Independent    Living Situation:  Current living arrangement:: I live in a private home with family  Type of residence:: Private home - stairs    Lifestyle & Psychosocial Needs:    Social Determinants of Health     Tobacco Use: Medium Risk (3/28/2023)    Patient History      Smoking Tobacco Use: Former      Smokeless Tobacco Use: Never      Passive Exposure:  Not on file   Alcohol Use: Not At Risk (10/13/2022)    AUDIT-C      Frequency of Alcohol Consumption: 2-3 times a week      Average Number of Drinks: 1 or 2      Frequency of Binge Drinking: Never   Financial Resource Strain: Low Risk  (10/13/2022)    Overall Financial Resource Strain (CARDIA)      Difficulty of Paying Living Expenses: Not very hard   Food Insecurity: No Food Insecurity (10/13/2022)    Hunger Vital Sign      Worried About Running Out of Food in the Last Year: Never true      Ran Out of Food in the Last Year: Never true   Transportation Needs: No Transportation Needs (10/13/2022)    PRAPARE - Transportation      Lack of Transportation (Medical): No      Lack of Transportation (Non-Medical): No   Physical Activity: Insufficiently Active (10/13/2022)    Exercise Vital Sign      Days of Exercise per Week: 2 days      Minutes of Exercise per Session: 10 min   Stress: No Stress Concern Present (10/13/2022)    [employer] - Occupational Stress Questionnaire      Feeling of Stress : Only a little   Social Connections: Socially Isolated (10/13/2022)    Social Connection and Isolation Panel [NHANES]      Frequency of Communication with Friends and Family: Never      Frequency of Social Gatherings with Friends and Family: Once a week      Attends Advent Services: Never      Active Member of Clubs or Organizations: No      Attends Club or Organization Meetings: Not on file      Marital Status:    Intimate Partner Violence: Not on file   Depression: At risk (2/1/2023)    PHQ-2      PHQ-2 Score: 5   Housing Stability: Low Risk  (10/13/2022)    Housing Stability Vital Sign      Unable to Pay for Housing in the Last Year: No      Number of Places Lived in the Last Year: 1      Unstable Housing in the Last Year: No     Diet:: Regular  Inadequate nutrition (GOAL):: Yes  Tube Feeding: No  Inadequate activity/exercise (GOAL):: Yes  Significant changes in sleep pattern (GOAL):  Yes  Transportation means:: Family     Congregation or spiritual beliefs that impact treatment:: No  Mental health DX:: Yes  Mental health DX how managed:: Medication  Mental health management concern (GOAL):: Yes  Informal Support system:: Family        Spouse is looking at finding placement for pt.  She has looked at [facility] and the cost is $5-6,000 per month.  She can not afford this.  Spouse talked with [facility] front door and as soon as they heard that they own a home they were told there was no help.  She talked with the senior [facility] line and feel they were little help.  The listing of options for care in the home that they were given sounded as though it was a listing of medicare home care options and not private pay.  She is willing to pay privately for some supports in the home as they work through the other areas.  Many places are telling her to get a  through the clinic to support.  Spouse is wondering if pt would qualify for Hospice and chart CC'd to PCP to inquire.     Decision was made to wait until provider responds about hospice before proceeding.  The challenge will be in getting the right supports as the directions generally given to family/spouse are to contact [facility] for needs assessment, senior linkage line, elder law  and facilities.      Spouse is concerned with cost of ambulance ride.  Did not address this on today's call.     Resources and Interventions:  Current Resources:      Community Resources: None  Supplies Currently Used at Home: Incontinence Supplies, Diabetic Supplies, Nutritional Supplements, Nebulizer tubing     Employment Status: retired         Advance Care Plan/Directive  Advanced Care Plans/Directives on file:: Yes  Type Advanced Care Plans/Directives: Advanced Directive - On File  Advanced Care Plan/Directive Status: Not Applicable           Care Plan:  TBD    Patient/Caregiver understanding: na       Future Appointments               Tomorrow SHVUS2 [facility], [facility]    Tomorrow Sav Gardner MD [facility] Vascular Clinic [facility]    In 3 days Eri Palafox MD [facility] Heart Clinic [facility]    In 2 months SH LAB ONLY [facility], [facility]    In 4 months Arcenio Hurtado MD [facility] Neurology Clinics - [facility]          Plan: will wait for reply from provider and follow up with patient.     SEVEN Leon  [facility] Primary Care - Care Coordinator   5/1/2023   11:28 AM  [phone]

## 2023-05-01 NOTE — TELEPHONE ENCOUNTER
Routing refill request to provider for review/approval because:  --Labs out of range:  Potassium, Scr, Na and blood pressure.    --Last visit:  3/28/2023 [facility].    --Future Visit: I think I am seeing a family practice phone appointment with [facility] or [facility].    --Last Written Prescription:    Disp Refills Start End KELLY   furosemide (LASIX) 20 MG tablet 180 tablet 0 1/19/2023  No   Sig: TAKE 1 TABLET(20 MG) BY MOUTH TWICE DAILY         BP Readings from Last 6 Encounters:   04/19/23 (!) 145/78   03/28/23 130/60   02/01/23 120/66   12/19/22 (!) 150/80   12/05/22 138/80   11/28/22 138/80     Creatinine   Date Value Ref Range Status   03/28/2023 2.84 (H) 0.67 - 1.17 mg/dL Final   02/01/2023 2.50 (H) 0.67 - 1.17 mg/dL Final   12/05/2022 2.29 (H) 0.67 - 1.17 mg/dL Final   06/20/2022 1.98 (H) 0.66 - 1.25 mg/dL Final   02/10/2021 1.98 (H) 0.66 - 1.25 mg/dL Final   01/18/2021 2.45 (H) 0.66 - 1.25 mg/dL Final   02/19/2020 1.46 (H) 0.66 - 1.25 mg/dL Final   10/03/2019 1.65 (H) 0.66 - 1.25 mg/dL Final   08/08/2019 1.31 (H) 0.66 - 1.25 mg/dL Final     Potassium   Date Value Ref Range Status   03/28/2023 3.3 (L) 3.4 - 5.3 mmol/L Final   06/20/2022 4.2 3.4 - 5.3 mmol/L Final   02/10/2021 4.6 3.4 - 5.3 mmol/L Final     Sodium   Date Value Ref Range Status   03/28/2023 133 (L) 136 - 145 mmol/L Final   02/01/2023 136 136 - 145 mmol/L Final   12/05/2022 135 (L) 136 - 145 mmol/L Final   02/10/2021 137 133 - 144 mmol/L Final   01/18/2021 135 133 - 144 mmol/L Final   02/19/2020 138 133 - 144 mmol/L Final

## 2023-05-02 NOTE — TELEPHONE ENCOUNTER
"Caller:  Wife call to Romina Yang RN    Reason for Reschedule/Cancellation (please be detailed, any staff messages or encounters to note?): Per case audit/edit note, caller reached out to cancel. Wife spoke with Romina Yang RN, case removed by RN. Case was scheduled with MAC, patient taking Oxycodone.    Remove reason: \"Other - Wife concerned about anesthesia due to patient's alzheimer's\"      Prior to reschedule please review:    Ordering Provider:Suma Jenkins NP     Sedation per order:MODERATE    Does patient have any ASC Exclusions, please identify?: NO      Notes on Cancelled Procedure:    Procedure:Lower Endoscopy [Colonoscopy]     Date: 5/11    Location:[facility]; [facility] [address] [place] [address]    Surgeon: MAGGY        Rescheduled: No                    "

## 2023-05-02 NOTE — PROGRESS NOTES
INITIAL VASCULAR MEDICAL ASSESSMENT  REFERRAL SOURCE: Suma Jenkins NP for PAD  REASON FOR CONSULT: PAD    HPI: Hayder Alves is a 68 year old male with a h/o Lewy body dementia which was recently diagnosed, htn, HLD, poorly glycemically controlled DM2 w/ diabetic neuropathy, stage IV CKD, HFrEF, CAD, and bipolar d/o whose PCP referred him to us to evaluate for possible PAD. He has no claudication sxs. He has no CLI sxs. He has no tissue loss.  The patient presents today to address possible PAD. A home nurse was sent out by his insurance company. Toe pressures were unable to be obtained per the patient's wife. This is the basis for the referral.     Review Of Systems  Skin: negative  Eyes: negative  Ears/Nose/Throat: negative  Respiratory: No shortness of breath, dyspnea on exertion, cough, or hemoptysis  Cardiovascular: negative  Gastrointestinal: negative  Genitourinary: negative  Musculoskeletal: negative  Neurologic: tingling in feet along with coldness  Psychiatric: negative  Hematologic/Lymphatic/Immunologic: negative  Endocrine: negative      PAST MEDICAL HISTORY:                  Past Medical History:   Diagnosis Date     Bipolar I disorder, most recent episode (or current) manic, severe, specified as with psychotic behavior      CAD (coronary artery disease) 2001    MI, CAB      Cardiovascular disease 5/26/2005     Problem list name updated by automated process. Provider to review     Chronic systolic CHF (congestive heart failure) (H)      Essential hypertension, benign      Impaired fasting glucose      Insomnia, unspecified      Ischemic cardiomyopathy      Other anxiety states      PLANTAR fasciaitis      Pneumonia 10/25/2016     Pure hypercholesterolemia      Respiratory failure (H) 10/11/2016     Syncope     probably iatrogenic due to medications     Type II or unspecified type diabetes mellitus without mention of complication, not stated as uncontrolled        PAST SURGICAL HISTORY:           "        Past Surgical History:   Procedure Laterality Date     ANESTHESIA CARDIOVERSION N/A 10/29/2020    Procedure: ANESTHESIA, FOR CARDIOVERSION @1400  Latex Allergy;  Surgeon: GENERIC ANESTHESIA PROVIDER;  Location: [facility] OR     SURGICAL HISTORY OF -       skin grafts X 4     SURGICAL HISTORY OF -   1978    lumbar fusion L1-5     SURGICAL HISTORY OF -   2008    C4-7 cervical fusion     SURGICAL HISTORY OF -   12/09    C 6-7 fusion     ZZC CABG, ARTERIAL, THREE  3/00    CABG X 3, stent     ZZHC DRUG-ELUTING STENTS, SINGLE  9/10    left main       CURRENT MEDICATIONS:                  Current Outpatient Medications   Medication Sig Dispense Refill     albuterol (PROAIR HFA/PROVENTIL HFA/VENTOLIN HFA) 108 (90 Base) MCG/ACT inhaler Inhale 2 puffs into the lungs every 6 hours as needed for shortness of breath 18 g 2     amitriptyline (ELAVIL) 10 MG tablet TAKE 3 TABLETS BY MOUTH EVERY NIGHT AT BEDTIME 90 tablet 0     amLODIPine (NORVASC) 5 MG tablet Take 1 tablet (5 mg) by mouth At Bedtime 90 tablet 1     ASPIRIN PO Take 81 mg by mouth daily       Continuous Blood Gluc Sensor (DEXCOM G6 SENSOR) MISC Change every 10 days. Start 11-28-22.       Fluticasone-Umeclidin-Vilant (TRELEGY ELLIPTA) 100-62.5-25 MCG/ACT oral inhaler Inhale 1 puff into the lungs daily 60 each 5     furosemide (LASIX) 20 MG tablet TAKE 1 TABLET(20 MG) BY MOUTH TWICE DAILY 180 tablet 0     gabapentin (NEURONTIN) 300 MG capsule TAKE 1 CAPSULE BY MOUTH TWICE DAILY 180 capsule 1     insulin NPH (HUMULIN N VIAL) 100 UNIT/ML vial INJECT 20 UNITS UNDER THE SKIN TWICE DAILY 10 mL 5     insulin regular 100 UNIT/ML vial Inject 0.1 mLs (10 Units) Subcutaneous 2 times daily (before meals) 10 mL 5     insulin syringe 31G X 5/16\" 1 ML MISC USE 2 SYRINGES DAILY OR AS DIRECTED 200 each 1     ipratropium - albuterol 0.5 mg/2.5 mg/3 mL (DUONEB) 0.5-2.5 (3) MG/3ML neb solution Take 1 vial (3 mLs) by nebulization every 6 hours as needed for shortness of breath, wheezing " or cough 90 mL 1     Lancets (MICROLET) MISC Test once daily 100 each 11     metoprolol tartrate (LOPRESSOR) 50 MG tablet Take 1 tablet (50 mg) by mouth 2 times daily 180 tablet 1     Multiple Vitamins-Minerals (CENTRUM SILVER) per tablet Take 1 tablet by mouth daily       nitroGLYcerin (NITROSTAT) 0.4 MG sublingual tablet Place 1 tablet (0.4 mg) under the tongue every 5 minutes as needed 25 tablet 5     oxyCODONE-acetaminophen (PERCOCET) 5-325 MG tablet Take 1 tablet by mouth At Bedtime Must last 30 days 30 tablet 0     oxyCODONE-acetaminophen (PERCOCET) 5-325 MG tablet Take 1 tablet by mouth At Bedtime Must last 30 days 30 tablet 0     [START ON 2023] oxyCODONE-acetaminophen (PERCOCET) 5-325 MG tablet Take 1 tablet by mouth At Bedtime Must last 30 days 30 tablet 0     oxyCODONE-acetaminophen (PERCOCET) 5-325 MG tablet Take 1 tablet by mouth At Bedtime Must last 30 days 30 tablet 0     rosuvastatin (CRESTOR) 20 MG tablet Take 1 tablet (20 mg) by mouth daily 90 tablet 3       ALLERGIES:                  Allergies   Allergen Reactions     Adhesive Tape      From blood draw site.     Eszopiclone      Strange dreams. Cooking during sleep      Latex      Lipitor [Atorvastatin Calcium] Other (See Comments)     myalgias     Lisinopril Other (See Comments)     Hyperkalemia and increased creatinine       SOCIAL HISTORY:                  Social History     Socioeconomic History     Marital status:      Spouse name: Lilia Alves     Number of children: 3     Years of education: Not on file     Highest education level: Not on file   Occupational History     Not on file   Tobacco Use     Smoking status: Former     Packs/day: 1.50     Years: 30.00     Pack years: 45.00     Types: Cigarettes     Quit date: 2000     Years since quittin.3     Smokeless tobacco: Never   Vaping Use     Vaping status: Not on file   Substance and Sexual Activity     Alcohol use: Yes     Comment: occasionally. 3-4 per week       Drug use: No     Sexual activity: Yes     Partners: Female     Birth control/protection: None   Other Topics Concern     Parent/sibling w/ CABG, MI or angioplasty before 65F 55M? Yes      Service Not Asked     Blood Transfusions Not Asked     Caffeine Concern Not Asked     Occupational Exposure Not Asked     Hobby Hazards Not Asked     Sleep Concern Not Asked     Stress Concern Not Asked     Weight Concern Not Asked     Special Diet No     Back Care Not Asked     Exercise Yes     Comment: trying     Bike Helmet Not Asked     Seat Belt Not Asked     Self-Exams Not Asked   Social History Narrative     Not on file     Social Determinants of Health     Financial Resource Strain: Low Risk  (10/13/2022)    Overall Financial Resource Strain (CARDIA)      Difficulty of Paying Living Expenses: Not very hard   Food Insecurity: No Food Insecurity (10/13/2022)    Hunger Vital Sign      Worried About Running Out of Food in the Last Year: Never true      Ran Out of Food in the Last Year: Never true   Transportation Needs: No Transportation Needs (10/13/2022)    PRAPARE - Transportation      Lack of Transportation (Medical): No      Lack of Transportation (Non-Medical): No   Physical Activity: Insufficiently Active (10/13/2022)    Exercise Vital Sign      Days of Exercise per Week: 2 days      Minutes of Exercise per Session: 10 min   Stress: No Stress Concern Present (10/13/2022)    [employer] - Occupational Stress Questionnaire      Feeling of Stress : Only a little   Social Connections: Socially Isolated (10/13/2022)    Social Connection and Isolation Panel [NHANES]      Frequency of Communication with Friends and Family: Never      Frequency of Social Gatherings with Friends and Family: Once a week      Attends Nondenominational Services: Never      Active Member of Clubs or Organizations: No      Attends Club or Organization Meetings: Not on file      Marital Status:    Intimate Partner  Violence: Not on file   Housing Stability: Low Risk  (10/13/2022)    Housing Stability Vital Sign      Unable to Pay for Housing in the Last Year: No      Number of Places Lived in the Last Year: 1      Unstable Housing in the Last Year: No       FAMILY HISTORY:                   Family History   Problem Relation Age of Onset     Cancer Sister         pancreatic ca     C.A.D. Father         mi,stroke,htn, 60's     Cerebrovascular Disease Father 64     C.A.D. Mother         mi,htn, 60's     Breast Cancer No family hx of      Cancer - colorectal No family hx of      Prostate Cancer No family hx of          Physical exam Reveals:    O/P: WNL  HEENT: WNL  NECK: No JVD, thyromegaly, or lymphadenopathy  HEART: RRR, no murmurs, gallops, or rubs  LUNGS: CTA bilaterally without rales, wheezes, or rhonchi  GI: NABS, nondistended, nontender, soft  EXT:without cyanosis, clubbing, or edema  NEURO: nonfocal  : no flank tenderness      Rt femoral: 3 plus palpable   Rt popliteal: 3 plus palpable   Rt DP:  3 plus palpable  Rt PT:   3 plus palpable        Lt femoral: 3 plus palpable   Lt popliteal: 3 plus palpable   Lt DP:  3 plus palpable   Lt PT:   3 plus palpable           Component      Latest Ref Rng 6/20/2022  10:40 AM 3/28/2023  4:03 PM   Sodium      136 - 145 mmol/L  133 (L)    Potassium      3.4 - 5.3 mmol/L  3.3 (L)    Chloride      98 - 107 mmol/L  93 (L)    Carbon Dioxide (CO2)      22 - 29 mmol/L  23    Anion Gap      7 - 15 mmol/L  17 (H)    Urea Nitrogen      8.0 - 23.0 mg/dL  30.7 (H)    Creatinine      0.67 - 1.17 mg/dL  2.84 (H)    Calcium      8.8 - 10.2 mg/dL  9.3    Glucose      70 - 99 mg/dL  121 (H)    GFR Estimate      >60 mL/min/1.73m2  23 (L)    Cholesterol      <200 mg/dL 170     Triglycerides      <150 mg/dL 354 (H)     HDL Cholesterol      >=40 mg/dL 38 (L)     LDL Cholesterol Calculated      <=100 mg/dL 61     Non HDL Cholesterol      <130 mg/dL 132 (H)     Patient Fasting? No     Hemoglobin A1C       0.0 - 5.6 % 11.3 (H)  12.0 (H)       Legend:  (H) High  (L) Low            ULTRASOUND HLAI DOPPLER WITH EXERCISE BILATERAL May 2, 2023 1:51 PM      HISTORY: History of PAD. PAD (peripheral artery disease) (H).     COMPARISON: None.     FINDINGS:  Right HALI:   PT: 208, index of 1.14.  DP: 200, index of 1.1.     Left HALI:   PT: 194, index of 1.07.  DP: 182, index of 1.0.      Waveforms: Both distal posterior tibial and dorsalis pedis waveforms  are triphasic.     Exercise: The patient was exercised on a treadmill at 1 MPH at a 10%  incline for 3 minutes total. No symptoms in legs reported while  walking. Exercise discontinued at 3 minutes due to shortness of  breath.     Right exercise HALI: 1.17.  Left exercise HALI: 1.08.                                                                      IMPRESSION: Normal HALI examination.     HALI CRITERIA:  >1.4 NC  0.95-1.4 Normal  0.90 - 0.94 Mild  0.5 - 0.89 Moderate  0.2 - 0.49 Severe  <0.2 Critical    A/P:    (R93.89) Abnormal findings on examination of body structure  (primary encounter diagnosis)  Comment: He was sent to us to evaluate for possible PAD. He has no claudication sxs. He has no CLI sxs. He has no tissue loss.  A home nurse was sent out by his insurance company. Toe pressures were unable to be obtained per the patient's wife, which was the basis for the referral. HALI are normal at rest and with exercise bilaterally. He has triphasic waveforms and easily palpable DP and PT pulses bilaterally.     Plan: He was given reassurance that he does not have PAD proximal to his toes. He has no tissue loss in his toes. He was advised to RTC prn claudication, CLI, tissue loss. He was advised to obtain better control of DM2 and BP through his PCP.     45 minutes total medical care on today's date.

## 2023-05-02 NOTE — PROGRESS NOTES
"Patient is here to discuss consult    BP (!) 178/84 (BP Location: Right arm, Patient Position: Chair, Cuff Size: Adult Regular)   Pulse 75   Ht 5' 8\" (1.727 m)   Wt 182 lb 9.6 oz (82.8 kg)   SpO2 98%   BMI 27.76 kg/m      Questions patient would like addressed today are: N/A.    Refills are needed: No    Has homecare services and agency name:  [facility]    "

## 2023-05-04 NOTE — PATIENT INSTRUCTIONS
You were seen in the Electrophysiology Clinic today by: Dr Eri Palafox    Plan:     Follow up Visit: as needed    If you have further questions, please utilize [employer] to contact us.     Your Care Team:    EP Cardiology   Telephone Number     Nurse Line  Xi Boyer, RN   Ping Maurer RN   [phone]     For scheduling appointments:   Renetta   [phone]   For procedure scheduling:    Lili Malik     [phone]   For the Device Clinic (Pacemakers, ICDs, Loop Recorders)    During business hours: [phone]  After business hours:   [phone]- select option 4 and ask for job code [id number].       On-call cardiologist for after hours or on weekends:   [phone], option #4, and ask to speak to the on-call cardiologist.     Cardiovascular Clinic:   [address] [place] [address]. [place] [address]      As always, Thank you for trusting us with your health care needs!

## 2023-05-04 NOTE — LETTER
5/4/2023      RE: Hayder Alves  [address] [facility] [address]       Dear Colleague,    Thank you for the opportunity to participate in the care of your patient, Hayder Alves, at the [facility]. Please see a copy of my visit note below.    I am delighted to see Hayder Alves for follow up in cardiology clinic for evaluation of atrial flutter. He is here with his wife.    History of Present Illness:  The patient is a 68 year old  Male whom I last saw 7/28/2020.  He has a h/o atrial flutter which was noted when he presented for a dobutamine stress echo 6/25/2020, stress portion deferred, baseline EF normal. The stress echo was done due to progressive dyspnea that is worse than his usual COPD. The atrial flutter duration was unknown, probably occurred sometime between Feb and June 2020 and could have contributed to dyspnea. I started him on apixaban and he underwent a successful cardioversion on 10/29/2020 to sinus rhythm. He did not return for follow up.     He has since been diagnosed with Lewy body dementia. He's been falling frequently - last week he was coming out of a grocery store and fell off the curb, hit his forehead, did not lose consciousness. He said he put one foot down, couldn't feel the other foot, and down he went. He falls out of bed a lot. He gets dizzy when he stands up. No actual loss of consciousness.     He has a home nurse who recently evaluated him, said could not feel his peripheral pulse so he was seen by vascular cardiology Dr. Gardner, ABIs normal. He has not had any cardiovascular evaluation, including EKGs, since his cardioversion in 10/29/2020. His wife said his apixaban was stopped soon after cardioversion because they thought it was temporary. Again, he did not return for follow up.      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    Eszopiclone      Strange dreams. Cooking during sleep     Latex     Lipitor [Atorvastatin Calcium] Other (See Comments)     myalgias    Lisinopril Other (See Comments)     Hyperkalemia and increased creatinine       Physical examination  Vitals: 128/73, HR 57  BMI= 28 (83 kg)    Constitutional: In general, the patient is a pleasant male in no acute distress.    Cardiovascular: Carotids +2/2 bilaterally without bruits.  No jugular venous distension. Regular rate and rhythm. Normal S1, S2. No murmur, rub, click, or gallop.   Extremities: Pulses are normal bilaterally throughout. No peripheral edema.  Respiratory: Clear to asculation.  No ronchi, wheezes, rales.  No dullness to percussion.     I have personally and independently reviewed the following:  Labs:   3/28/2023: Na 133, K 3.3, cr 2.84, A1C 12, hgb 13  6/20/2022: chol 170, , HDL 38, LDL 61, hgb 14    Echo: 6/25/2020 - EF 55-60%, JENNIFER 37     I have personally and independently reviewed the following:  EKG:   TODAY 5/4/2023: atypical atrial flutter 55 bpm, RBBB  10/29/2020 post CV: sinus, RBBB  7/28/2020: atypical atrial flutter at 82 bpm, RBBB, unchanged compared to 6/25/2020 8/22/2019: sinus 101 bpm, IVCD       Assessment :  1. Atypical atrial flutter. He was cardioverted to sinus on 10/29/2020 and did not return for follow up. There had been no EKG since then. He is back in atypical atrial flutter of unknown duration. He is asymptomatic and  ventricular rate is controlled. No indication for cardioversion.  [id number] score is 4 for hypertension, age >65, diabetes, CAD, long term anticoagulation is recommended. However he is now at fall risk so I would not start any anticoagulation.   2. Frequent falls. Suspect peripheral neuropathy due to poorly controlled diabetes.  3. Hypertension. Controlled.  4. CAD/CABG. Normal EF. Continue aspirin 81 every day.  5. Type II diabetes. A1C is 12. Has nephropathy and neuropathy       Plan:  Would not start anticoagulation due to falls  No indication for cardioversion  Follow up as needed        I spent a total of 20 minutes face to face with  Hayder Alves during today's office visit. I have spend an additional 20 minutes today on chart review and documentation.    The patient is to return as above . The patient understood the treatment plan as outlined above.  There were no barriers to learning.      Eri Palafox MD       Please do not hesitate to contact me if you have any questions/concerns.     Sincerely,     Eri Palafox MD

## 2023-05-04 NOTE — PROGRESS NOTES
Clinic Care Coordination Contact    Follow Up Progress Note      Assessment: talked with spouse and updated her on the comment from PCP that pt is not to the Hospice stage yet.  She asked about respite care for a month at a facility to get his medications managed better.  She is concerned about getting him to take his insulin and medications and his behaviors.  We discussed options and potential costs.  Spouse is going to talk with an elder law  to get help with the process and completing the MA application.  Encouraged a discussion with his PCP regarding any medications to start taking to help with the behaviors and what will the plan be when he refuses to do his injection of insulin.      Discussed his hallucinations and behaviors and approaches.  Reinforced her work on not correcting him and going with what he is saying to avoid conflict.     Care Gaps:    Health Maintenance Due   Topic Date Due     COPD ACTION PLAN  Never done     ZOSTER IMMUNIZATION (1 of 2) Never done     EYE EXAM  02/01/2022     MEDICARE ANNUAL WELLNESS VISIT  03/03/2023     MICROALBUMIN  05/01/2023     COLORECTAL CANCER SCREENING  06/03/2023       Postponed to future appointments     Care Plans  Postponed to next call.     Intervention/Education provided during outreach: n/a     Outreach Frequency: 2 weeks    Plan:   Spouse to schedule appointment with PCP.   Will attempt to goal set on next call.   Care Coordinator will follow up in two weeks.     SEVEN Leon  [facility] Primary [facility] - Care Coordinator   5/4/2023   11:07 AM  [phone]

## 2023-05-04 NOTE — PROGRESS NOTES
I am delighted to see Hayder Alves for follow up in cardiology clinic for evaluation of atrial flutter. He is here with his wife.    History of Present Illness:  The patient is a 68 year old  Male whom I last saw 7/28/2020.  He has a h/o atrial flutter which was noted when he presented for a dobutamine stress echo 6/25/2020, stress portion deferred, baseline EF normal. The stress echo was done due to progressive dyspnea that is worse than his usual COPD. The atrial flutter duration was unknown, probably occurred sometime between Feb and June 2020 and could have contributed to dyspnea. I started him on apixaban and he underwent a successful cardioversion on 10/29/2020 to sinus rhythm. He did not return for follow up.     He has since been diagnosed with Lewy body dementia. He's been falling frequently - last week he was coming out of a grocery store and fell off the curb, hit his forehead, did not lose consciousness. He said he put one foot down, couldn't feel the other foot, and down he went. He falls out of bed a lot. He gets dizzy when he stands up. No actual loss of consciousness.     He has a home nurse who recently evaluated him, said could not feel his peripheral pulse so he was seen by vascular cardiology Dr. Gardner, ABIs normal. He has not had any cardiovascular evaluation, including EKGs, since his cardioversion in 10/29/2020. His wife said his apixaban was stopped soon after cardioversion because they thought it was temporary. Again, he did not return for follow up.      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     Eszopiclone      Strange dreams. Cooking during sleep      Latex      Lipitor [Atorvastatin Calcium] Other (See Comments)     myalgias     Lisinopril Other (See Comments)     Hyperkalemia and increased creatinine       Physical examination  Vitals: 128/73, HR 57  BMI= 28 (83 kg)    Constitutional: In general, the patient is a pleasant male in no acute distress.    Cardiovascular: Carotids +2/2 bilaterally without bruits.  No jugular venous distension. Regular rate and rhythm. Normal S1, S2. No murmur, rub, click, or gallop.   Extremities: Pulses are normal bilaterally throughout. No peripheral edema.  Respiratory: Clear to asculation.  No ronchi, wheezes, rales.  No dullness to percussion.     I have personally and independently reviewed the following:  Labs:   3/28/2023: Na 133, K 3.3, cr 2.84, A1C 12, hgb 13  6/20/2022: chol 170, , HDL 38, LDL 61, hgb 14    Echo: 6/25/2020 - EF 55-60%, JENNIFER 37     I have personally and independently reviewed the following:  EKG:   TODAY 5/4/2023: atypical atrial flutter 55 bpm, RBBB  10/29/2020 post CV: sinus, RBBB  7/28/2020: atypical atrial flutter at 82 bpm, RBBB, unchanged compared to 6/25/2020 8/22/2019: sinus 101 bpm, IVCD       Assessment :  1. Atypical atrial flutter. He was cardioverted to sinus on 10/29/2020 and did not return for follow up. There had been no EKG since then. He is back in atypical atrial flutter of unknown duration. He is asymptomatic and ventricular rate is controlled. No indication for cardioversion.  [id number] score is 4 for hypertension, age >65, diabetes, CAD, long term anticoagulation is recommended. However he is now at fall risk so I would not start any anticoagulation.   2. Frequent falls. Suspect peripheral neuropathy due to poorly controlled diabetes.  3.  Hypertension. Controlled.  4. CAD/CABG. Normal EF. Continue aspirin 81 every day.  5. Type II diabetes. A1C is 12. Has nephropathy and neuropathy       Plan:  Would not start anticoagulation due to falls  No indication for cardioversion  Follow up as needed        I spent a total of 20 minutes face to face with  Hayder Alves during today's office visit. I have spend an additional 20 minutes today on chart review and documentation.    The patient is to return as above . The patient understood the treatment plan as outlined above.  There were no barriers to learning.      Eri Palafox MD

## 2023-05-04 NOTE — NURSING NOTE
Chief Complaint   Patient presents with     Follow Up     almost 3 year f/u for atypical AFL, this appt was scheduled per pts wife for a 'heart check'          Vitals were taken and medications reconciled.     Suresh Herrmann, EMT   2:26 PM

## 2023-05-15 NOTE — TELEPHONE ENCOUNTER
Prescription approved per [facility] Refill Protocol.  LAZARO PerdueN, RN-[facility]

## 2023-05-18 NOTE — LETTER
[address] [address]  Clinic: [phone]      5/18/2023      Lilia Alves  [address] [facility] [facility] [address]      Dear  Lilia,    Below and enclosed are the resources we talked about.  Please feel free to reach out to me with any questions or needs.  Remember to take time for you.     [place],    Latasha Ardon, [facility] Primary [facility] - Care Coordination  [facility]   [phone]      Senior living advisors or websites to find [employer] - [phone] - [email or web address]  [employer]Patrol provides senior care options based on individual needs and preferences to ensure the quality of life for your loved one.      [email or web address]  - no phone number    This is a site that can connect you with private care givers and home care around the [facility]. Generally they offer more flexibility than traditional home care companies, but as a disclaimer I have to say this is an at your own risk service. Independent caregivers do not go through the same background checks as they would in a home care agency and do not have an agency they are accountable too.       A place for Mom (and dads)  [phone] [email or web address]          Free consults, helps find housing to meet needs of senior.        [place] - [phone]      At [place] [facility] , we offer free, personalized referrals for senior housing and other resources to older adults and their families.

## 2023-05-18 NOTE — PROGRESS NOTES
"Clinic Care Coordination Contact    Follow Up Progress Note      Assessment: Lilia, spouse of patient noted that he had been up for 3 days.  She noted he has had previously been diagnosed with Bipolar and has these \"episodes\" about every 4 years.      Lilia said she is getting some help from her daughter to complete the application for MA for pt so they can get placement when needed.  Lilia sounded very stressed and was in tears through most of the call.  She is struggling with taking care of patient and does not want to tell the 3 adult children all that is going on.  She said that the pt and the kids did not always have a good relationship and she is not wanting to ruin what relationship they do have.  Long discussion of the needs of the caregiver in situations like her.  Reviewed that if she gets burnt out then who will take care of him.      Discussed some options for support through this process as well as in home supports.  Spouse is willing to have both sent to her via mail.     Lilia noted three areas of concern that are a challenge for her as caregiver, with the pt.  Bathroom, Outbursts, and insulin/medications.     Pt refuses to wear any kind of incontinent product and voided in his bed.  She is concerned she will need to buy him a new bed because of this.  He has outbursts of anger and she gets fearful of managing his behavior.  She feels she will not be able to give him his insulin when he can no longer do it or choose not to. She is concerned with his behavior and her fear of giving the injections. She noted that he is not taking his medications as prescribed and recently noted that his Metoprolol had not been consistently taken and needs a refill.  It has not been refilled since September of 2022 and had been only a 3 month supply  Message sent to PCP regarding this and options.  She noted that others told her to crush the pills and put it in his food.  She is not sure which can be crushed and pt does not " always eat his meal and feels this will still be a miss of medications. Encouraged her to talk with the pharmacist about what meds could be crushed or opened and put on/in food.    Care Gaps:    Health Maintenance Due   Topic Date Due     COPD ACTION PLAN  Never done     ZOSTER IMMUNIZATION (1 of 2) Never done     EYE EXAM  02/01/2022     MEDICARE ANNUAL WELLNESS VISIT  03/03/2023     MICROALBUMIN  05/01/2023     COLORECTAL CANCER SCREENING  06/03/2023       not discussed on today's call    Care Plans  TBD    Intervention/Education provided during outreach: long discussion on the need to care for herself as the caregiver.  Listened to her concerns about wanting to preserve the relationship between patient and children and not wanting to share all that is going on.  Asked open ended questions about how the adult kids would react if they didn't know all that was going on and urgently needed to care for pt.  Encouraged spouse to talk with kids about what they are willing to do to help her out as she did note that the one who has the least positive relationship with pt, did offer to help her out.     Discussed the importance of caregiver self care and urged her to find ways to practice this. Spouse is also the contact for her uncle who lives in a facility.     Will research options and send to spouse for consideration of supports to walk her through the steps of finding a placement and in home support options.         Plan:   [place] to send resources.  Spouse to work on caregiver breaks and reaching out to resources.   Care Coordinator will follow up in two weeks.     Latasha Ardon [facility] Primary Care - Care Coordinator   5/18/2023   2:53 PM  [phone]

## 2023-05-18 NOTE — Clinical Note
I spoke with spouse today.  She said Vijay has not been taking his meds as prescribed and she notes he is going to be out of his Metoprolol this week. It looks like it has been a while since it has been refilled.  She would like to know if she needs to make an appointment to get the refill, if he really needs it since he was inconsistent at taking it (last filled September 2022 which was a 3 month supply) or are you willing to refill now.  Spouse is trying to manage his medications now yet he still does not want to take consistently.   Please advise.  Thank you Latasha Ardon, [facility] Primary Care - Care Coordination [facility]  [phone]

## 2023-06-01 NOTE — LETTER
It was a pleasure to speak with you.  I would like to provide you with the enclosed information for your records.  As part of care coordination, we are developing care plans to assist in accomplishing your health care goals.  When we speak next, please feel free to let me know if you want to add or change any information on your care plans.    As always, feel free to contact me if you have any questions or concerns.  I look forward to working with you in the effort to achieve your health care and wellness goals .        Sincerely,      Latasha Ardon, [facility]  Clinic Care Coordination  [phone]  [facility]  Patient Centered Plan of Care  About Me:        Patient Name:  Hayder Moore    YOB: 1955  Age:         68 year old   [facility] MRN:    [id number] Telephone Information:  Home Phone [phone]   Mobile [phone]       Address:  [address] [place] [id number] Email address:  [email or web address]      Emergency Contact(s)    Name Relationship Lgl Grd Work Phone Home Phone Mobile Phone   1. TYSON MOORE Son No   [phone]   2. AMIRA MOORE Spouse No  [phone]   3. RUPERTO MOORE* Daughter No  [phone]    4. GAGE MOORE Son   [phone]            Primary language:  English     needed? No   [facility]:  [phone] op. 1  Other communication barriers:Lack of coping; Cognitive impairment    Preferred Method of Communication:  Phone  Current living arrangement: I live in a private home with family    Mobility Status/ Medical Equipment: Independent        Health Maintenance  Health Maintenance Reviewed: Due/Overdue   Health Maintenance Due   Topic Date Due    COPD ACTION PLAN  Never done    ZOSTER IMMUNIZATION (1 of 2) Never done    EYE EXAM  02/01/2022    MEDICARE ANNUAL WELLNESS VISIT  03/03/2023    MICROALBUMIN  05/01/2023    COVID-19 Vaccine (4 - Additional dose for Ralph series) 06/01/2023     COLORECTAL CANCER SCREENING  06/03/2023    LIPID  06/20/2023    DIABETIC FOOT EXAM  06/20/2023    URINE DRUG SCREEN  06/20/2023           My Access Plan  Medical Emergency 911   Primary Clinic Line [facility] [phone]   24 Hour Appointment Line [phone] or  [phone]) (toll-free)   24 Hour Nurse Line [phone] (toll-free)   Preferred Urgent Care [facility], [phone]       Preferred Hospital [facility]  [phone]       Preferred Pharmacy [facility] - [place], [place] - [address] [place] [address]     Behavioral Health Crisis Line The National Suicide Prevention Lifeline at 1-466.807.5976 or Text/Call 138             My Care Team Members  Patient Care Team         Relationship Specialty Notifications Start End    Suma Jenkins NP PCP - General   5/26/05     Phone: [phone] Fax: [phone]         [address],  [place] [address]    Suma Jenkins NP Assigned PCP   10/4/12     Phone: [phone] Fax: [phone]         [address],  [place] [address]    Simona Grove, RN Specialty Care Coordinator Clinical Cardiac Electrophysiology Admissions 7/27/20     Eri Palafox MD MD Clinical Cardiac Electrophysiology  7/27/20     Phone: [phone] Fax: [phone]         [address] [facility] [address]    Richelle De La Garza BSW Lead Care Coordinator  Admissions 10/17/22     Jose Vazquez RPH Pharmacist Pharmacist  10/24/22      [address] [facility] [address]    Jose Vazquez RPH Assigned MTM Pharmacist   10/29/22      [address] [facility] [address]    Suma Jenkins NP Assigned Pain Medication Provider   2/4/23     Phone: [phone] Fax: [phone]         [address],  [place] [address]    Dashawn Chung MD MD Nephrology  3/21/23     Phone: [phone] Fax: [phone]         [address] [place] [address]  [facility] [address]    Latasha Ardon LSW Lead Care Coordinator Primary Care - CC Admissions 4/26/23     Phone: [phone] Fax: [phone]        Yasmine Warner, PhD Assigned Behavioral Health Provider   4/29/23     Phone: [phone] Fax: [phone]         [address] [facility] [address]    Eri Palafox MD Assigned Heart and Vascular Provider   5/13/23     Phone: [phone] Fax: [phone] [address] [facility] [address]              My Care Plans  Self Management and Treatment Plan  Care Plan  Care Plan: General       Problem: Insufficient In-home support       Goal: safe at home until placement is found and affordable       Start Date: 6/1/2023 Expected End Date: 11/28/2023    Note:     Barriers: income, applying through the [facility], caregiver burnout  Strengths: supportive spouse, daughter  Patient expressed understanding of goal: yes  Action steps to achieve this goal:  1. I/spouse will monitor for safety concerns and be proactive in approaches   2. I/spouse will apply for MA for Bill to get in a facility  3. I/spouse will reach out to resources for help  4. I/spouse will reach out to Latasha for additional supports as needed between calls                                 Action Plans on File:                       Advance Care Plans/Directives Type:   Advanced Directive - On File      My Medical and Care Information  Problem List   Patient Active Problem List   Diagnosis    Anxiety    Bipolar affective disorder in remission (H)    Allergic rhinitis    Type 2 diabetes mellitus with stage 4 chronic kidney disease, with long-term current use of insulin (H)    Cervicalgia    HYPERLIPIDEMIA LDL GOAL <100    Insomnia    Hypertension goal BP (blood pressure) < 130/80    COPD (chronic obstructive pulmonary disease) (H)    GERD (gastroesophageal reflux disease)    Peripheral neuralgia    Microalbuminuria    Vitamin B12 deficiency without anemia    Vitamin D deficiency     "Coronary artery disease involving coronary bypass graft of native heart without angina pectoris    Chronic pain    Ischemic cardiomyopathy    Chronic systolic heart failure (H)    Health Care Home    Diabetic polyneuropathy associated with type 2 diabetes mellitus (H)    CKD (chronic kidney disease) stage 4, GFR 15-29 ml/min (H)      Current Medications and Allergies:     Allergies   Allergen Reactions    Adhesive Tape      From blood draw site.    Eszopiclone      Strange dreams. Cooking during sleep     Latex     Lipitor [Atorvastatin Calcium] Other (See Comments)     myalgias    Lisinopril Other (See Comments)     Hyperkalemia and increased creatinine         Current Outpatient Medications:     albuterol (PROAIR HFA/PROVENTIL HFA/VENTOLIN HFA) 108 (90 Base) MCG/ACT inhaler, Inhale 2 puffs into the lungs every 6 hours as needed for shortness of breath, Disp: 18 g, Rfl: 2    amitriptyline (ELAVIL) 10 MG tablet, TAKE 3 TABLETS BY MOUTH EVERY NIGHT AT BEDTIME, Disp: 270 tablet, Rfl: 1    amLODIPine (NORVASC) 5 MG tablet, Take 1 tablet (5 mg) by mouth At Bedtime, Disp: 90 tablet, Rfl: 1    ASPIRIN PO, Take 81 mg by mouth daily, Disp: , Rfl:     Continuous Blood Gluc Sensor (DEXCOM G6 SENSOR) MISC, Change every 10 days. Start 11-28-22., Disp: , Rfl:     Fluticasone-Umeclidin-Vilant (TRELEGY ELLIPTA) 100-62.5-25 MCG/ACT oral inhaler, Inhale 1 puff into the lungs daily (Patient not taking: Reported on 5/4/2023), Disp: 60 each, Rfl: 5    furosemide (LASIX) 20 MG tablet, TAKE 1 TABLET(20 MG) BY MOUTH TWICE DAILY, Disp: 180 tablet, Rfl: 0    gabapentin (NEURONTIN) 300 MG capsule, TAKE 1 CAPSULE BY MOUTH TWICE DAILY, Disp: 180 capsule, Rfl: 1    insulin NPH (HUMULIN N VIAL) 100 UNIT/ML vial, INJECT 20 UNITS UNDER THE SKIN TWICE DAILY, Disp: 10 mL, Rfl: 5    insulin regular 100 UNIT/ML vial, Inject 0.1 mLs (10 Units) Subcutaneous 2 times daily (before meals), Disp: 10 mL, Rfl: 5    insulin syringe 31G X 5/16\" 1 ML MISC, USE 2 " SYRINGES DAILY OR AS DIRECTED, Disp: 200 each, Rfl: 1    ipratropium - albuterol 0.5 mg/2.5 mg/3 mL (DUONEB) 0.5-2.5 (3) MG/3ML neb solution, Take 1 vial (3 mLs) by nebulization every 6 hours as needed for shortness of breath, wheezing or cough, Disp: 90 mL, Rfl: 1    Lancets (MICROLET) MISC, Test once daily, Disp: 100 each, Rfl: 11    metoprolol succinate ER (TOPROL XL) 50 MG 24 hr tablet, Take 1 tablet (50 mg) by mouth daily, Disp: 90 tablet, Rfl: 3    Multiple Vitamins-Minerals (CENTRUM SILVER) per tablet, Take 1 tablet by mouth daily, Disp: , Rfl:     nitroGLYcerin (NITROSTAT) 0.4 MG sublingual tablet, Place 1 tablet (0.4 mg) under the tongue every 5 minutes as needed, Disp: 25 tablet, Rfl: 5    oxyCODONE-acetaminophen (PERCOCET) 5-325 MG tablet, Take 1 tablet by mouth At Bedtime Must last 30 days, Disp: 30 tablet, Rfl: 0    oxyCODONE-acetaminophen (PERCOCET) 5-325 MG tablet, Take 1 tablet by mouth At Bedtime Must last 30 days, Disp: 30 tablet, Rfl: 0    oxyCODONE-acetaminophen (PERCOCET) 5-325 MG tablet, Take 1 tablet by mouth At Bedtime Must last 30 days, Disp: 30 tablet, Rfl: 0    oxyCODONE-acetaminophen (PERCOCET) 5-325 MG tablet, Take 1 tablet by mouth At Bedtime Must last 30 days, Disp: 30 tablet, Rfl: 0    rosuvastatin (CRESTOR) 20 MG tablet, Take 1 tablet (20 mg) by mouth daily, Disp: 90 tablet, Rfl: 3      Care Coordination Start Date: 4/26/2023   Frequency of Care Coordination: monthly       Form Last Updated: 06/01/2023

## 2023-06-01 NOTE — PROGRESS NOTES
Clinic Care Coordination Contact    Follow Up Progress Note      Assessment: spouse noted that she is making very little progress on any actions.  Discussed barriers and a lot of it appears to be her feeling overwhelmed and trying to complete the MA paperwork.  Her daughter is helping yet they are still struggling.   Discussed FRW support and spouse is open to this. She has not gone through resources sent and encouraged her to do this to find supports to help her until placement is found.     Discussed the difference between [facility] and [facility].  Funding differences between the two.     Strongly encouraged spouse to work on self care and search for some supports to help them out for caregiver breaks.      Care Gaps:    Health Maintenance Due   Topic Date Due     COPD ACTION PLAN  Never done     ZOSTER IMMUNIZATION (1 of 2) Never done     EYE EXAM  02/01/2022     MEDICARE ANNUAL WELLNESS VISIT  03/03/2023     MICROALBUMIN  05/01/2023     COVID-19 Vaccine (4 - Additional dose for Ralph series) 06/01/2023     COLORECTAL CANCER SCREENING  06/03/2023     LIPID  06/20/2023     DIABETIC FOOT EXAM  06/20/2023     URINE DRUG SCREEN  06/20/2023       Postponed to future calls     Care Plans  Care Plan: General     Problem: Insufficient In-home support     Goal: safe at home until placement is found and affordable     Start Date: 6/1/2023 Expected End Date: 11/28/2023    Note:     Barriers: income, applying through the county, caregiver burnout  Strengths: supportive spouse, daughter  Patient expressed understanding of goal: yes  Action steps to achieve this goal:  1. I/spouse will monitor for safety concerns and be proactive in approaches   2. I/spouse will apply for MA for Bill to get in a facility  3. I/spouse will reach out to resources for help  4. I/spouse will reach out to Latasha for additional supports as needed between calls                          Intervention/Education provided during outreach: strong  encouragement to look into placement options, calling the [facility] back again about emergency MA for placement, calling resources and careing for self.      Outreach Frequency: monthly        Plan:   Spouse to look into resources  Care Coordinator will follow up in one month.   and Financial Resource Worker Screening    [facility] Benefits  Is patient requesting help applying for [facility] benefits?: Yes  Have you recently applied for any [facility] benefits?: No  How many people in your household?: 4  Do you buy/eat food together?: No (son/daughter in law buy separate)  What is the monthly gross income for the household (wages, social security, workers comp, and pension)? : 7100 (for pt and spouse)    [place]:  [place] No  Is this an insurance renewal?: No  Is this a new insurance application request?: No    Any other information for the FRW?: needing to apply for MA for pt for placement in a facility - son and daughter in law live there but do not contribute to the food or any costs    Care Coordination team will tell patient:   Thank you for answering all the questions, based on screening questions, our Financial Resource Worker will reach out to you with additional questions and next steps.    Latasha Ardon, [facility] Primary [facility] - Care Coordination  [facility]   [phone]

## 2023-06-02 NOTE — PROGRESS NOTES
Clinic Care Coordination Contact  Program:  County:  [facility] Case #:  [facility] Worker:   Marcela #:   Subscriber #:   Renewal:  Date Applied:     FRW Outreach:   6/2/23 FRW Called and set up time to complete application over the phone.      Health Insurance:      Referral/Screening:  [facility] Screening    Row Name 06/01/23 3425       [facility] Benefits   Is patient requesting help applying for [facility] benefits? Yes       Have you recently applied for any [facility] benefits? No       How many people in your household? 4       Do you buy/eat food together? No  son/daughter in law buy separate       What is the monthly gross income for the household (wages, social security, workers comp, and pension)?  7100  for pt and spouse       [place]:   [place]? [place]       Is this an insurance renewal? No       Is this a new insurance application request? No       OTHER   Is this a denisse care application? No       Any other information for the FRW? needing to apply for MA for pt for placement in a facility - son and daughter in law live there but do not contribute to the food or any costs

## 2023-06-03 NOTE — PROGRESS NOTES
Medication Therapy Management (MTM) Encounter    ASSESSMENT:                            Medication Adherence/Access: No issues identified      Type 2 Diabetes: Patient is not meeting A1c goal of < 7%(12%). Self monitoring of blood glucose is not at goal of fasting  mg/dL and post prandial < 150 mg/dL. Patient is not meeting average glucose goal of <150mg/dL. Patient is not meeting goal of >70% time in target with continuous glucose monitoring. MTM educated and placed new dexcom G6 sensor + transmitter on him today in clinic .   , Basal Insulin (NPH) : STOP vials NPH and now change to once daily Lantus -30 units --educated patient and wife on how to use insulin pens + pen needles.   Bolus / Rapid Acting Insulin (Regular-Humulin-R) : STOP Vials R insulin and switch to Humalog kwikpens --injct 10 units --3 x day at 3 main meals--educated alfonso patient wife to change pen needle after each use.    Metformin : OFF all metformin 12-5-22 due to ckd issues.   Increased exercise-walk daily as best you can .  We also discussed please do NOT drink full calorie red bulls --24 ounce can 270 calories and 63 grams sugar in it. Change to zero sugar energy drinks.         DPNP:  Needs improvement in blood sugars for pain to be better controlled.     Hyperlipidemia: Stable.  Patient is on high intensity statin which is indicated based on 2019 ACC/AHA guidelines for lipid management.        Hypertension: Patient is meeting blood pressure goal of < 140/90mmHg. Patient would benefit from the following changes : continued blood pressure monitoring, watching diet, increasing exercise and weight loss, limiting/reducing sodium.         COPD: Stable  Patient would benefit from starting once daily Trelegy inhaler --mtm educated patient and he successfully took first dose in clinic today . Does have a rescue labuterol inhaler at home --sporadic use.   Also mtm put together his nebulizer tubing and educated he and wife on how to use that as  well 2-4 x day as needed with duo-nebs.    Anxiety(lewy body dementia) : see plan for buspirone start low dose to see if anxiety improves. mtm notes high dose red bulls not helping as well.       PLAN:                                1.  Thank you for taking your first puff of once daily Trelegy inhaler today in the office for your COPD.  Drink water after each use.     2. For your diabetes --lets change your NPH insulin to once daily Lantus -30 units daily in AM   AND lets also order you mealtime insulin called Humalog --inject 10 units right at start of your breakfast, lunch and dinner meals.     Also--lets add back in a new dex-com G6 cgm sensor today : congrats you successfully applied a new dex com G6 sensor --it will read your blood sugars in 2 hours. Please Change sensor every 10 days and gray transmitter every 3 months --so save the gray piece to use with next sensors.   Please charge your  at home .   Watch blood sugars and control diet --today you drank a red bull-- its has 63 grams of sugars in it --change to diet red bull or zero calorie monster energy drinks.     3. For anxiety --lets trial low dose buspirone 5mg. Tablets --take 1 tablet up to 3 x day --see if this relaxes you ?    4. Send message to Suma Jenkins --see if you can get a handicap parking sticker on Bill's behalf since you drive him to [place].     5. Thanks for watching you tube video and we successfully put together your nebulizer --use medication in it -2-4 x day as needed --it take about 10 minutes each use.         Follow-up: Return in about 2 weeks (around 6/22/2023), or @ 2 PM, for Medication Therapy Management Visit, Blood sugar meter review, BP Recheck.    SUBJECTIVE/OBJECTIVE:                          Hayder Alves is a 68 year old male coming in for a follow-up (4-6-23) visit. He was referred to me from Suma Jenkins     Significant other Lilia attended visit with him and brought all his med bottles for him today  .     Reason for visit:  Using nph daily only and no r insulin .  Alfonso asking about insulin pens.         Allergies/ADRs: Reviewed in chart  Past Medical History: Reviewed in chart; could not afford xarelto or eliquis.  Tobacco: He reports that he quit smoking about 23 years ago. His smoking use included cigarettes. He has a 45.00 pack-year smoking history. He has never used smokeless tobacco.   Exposed to second hand smoke.   Alcohol: 1-3 beverages / week  Caffeine: large can red bull x 1-2 day.   Social: Lives with alfonso -wife.   Personal Healthcare Goals: control DM, fix dpnp pain.   Activity:  None, wants to join gym but DL  cannot drive.     Medication Adherence/Access: no issues reported    Type 2 Diabetes:  Currently taking : NPH  Insulin 20 units twice daily (am and PM), OFF all metformin now due to ckd issues, also he ADMITS today NOT injecting 10 units twice daily Regular insulin at his 2 main meals/day .   Cannot afford trulicity or sglt-2.    Patient is not experiencing side effects.  SMBG:  He is frustrated --cannot get his dexcom  G6 cgm to work.    MTM noticed he has  transmitter in his cgm sensor --replaced that today and placed new sensor in him --we synched up his  --should be able to read blood sugar in 2 hours.   change sensor every 10 days. Change transmitter every 90 days.     Symptoms of low blood sugar? none  Symptoms of high blood sugar? fatigue  Eye exam: due  Foot exam: up to date  Diet/Exercise:   Aspirin: Taking 81mg daily and denies side effects   Statin: Yes: Rosuvastatin    ACEi/ARB: No.   Urine Albumin:   Lab Results   Component Value Date    UMALCR 3,528.42 (H) 2023          Lab Results   Component Value Date    A1C 12.0 2023    A1C 12.4 2022    A1C 11.3 2022    A1C 7.1 2021    A1C 6.9 2020    A1C 9.0 2019    A1C 8.5 2019    A1C 11.3 2018            DPNP: feet killing him lately --takes gabapentin 300mg twice  daily.  ALA 600mg./day -not been using them.   Also has amitriptyline 10mg. Tabs - 3 tabs every night at bedtime for pain/sleep.     Has not been on percocet 5-325mg x 6 months --helped his pain/feet/sleep. Saw pcp 3-28-23 and was given more pain pills now.    Hyperlipidemia: Current therapy includes : Rosuvastatin 20mg daily.  Patient reports no significant myalgias or other side effects.  The 10-year ASCVD risk score (Yojana PORTER, et al., 2019) is: 29.2%    Values used to calculate the score:      Age: 68 years      Sex: Male      Is Non- : No      Diabetic: Yes      Tobacco smoker: No      Systolic Blood Pressure: 112 mmHg      Is BP treated: Yes      HDL Cholesterol: 38 mg/dL      Total Cholesterol: 170 mg/dL  Recent Labs   Lab Test 06/20/22  1040 01/18/21  1623 01/03/17  1548 10/05/15  1215   CHOL 170 137   < > 293*   HDL 38* 44   < > 37*   LDL 61 59   < > 185*   TRIG 354* 172*   < > 354*   CHOLHDLRATIO  --   --   --  7.9*    < > = values in this interval not displayed.       Hypertension: Current medications include : Metoprolol 50mg. Tablet twice daily, Furosemide 20mg twice daily.   Patient does not self-monitor blood pressure. (has home meter but not using it ).   Patient reports no current medication side effects.  BP Readings from Last 3 Encounters:   06/08/23 112/60   05/04/23 128/73   05/02/23 (!) 152/86     COPD: Current medications: not using any --but did bring trelegy --wants to be educated on how to use it once daily as he states his breathing is not good, also wife alfonso bring home portable nebulizer with bag full of tubing /mouthpieces--she is unsure how to put that altogether --mtm will have them watch you-tube video today and will put pieces together for them .   Patient is not experiencing side effects.   Patient reports the following symptoms: increased SOB.  Patient does not have an COPD Action Plan on file.   Has spirometry been completed: Doesn't know      Anxiety(lewy  body dementia) : wife alfonso states he gets agitated /anxious --can we try low dose rx -- mtm educated patient /wife on buspar med trial  today .         /60   Pulse 55   Wt 188 lb 3.2 oz (85.4 kg)   SpO2 96%   BMI 28.66 kg/m      ----------------------------------------------------------------------------------      I spent 60 minutes with this patient today. All changes were made via collaborative practice agreement with Suma Jenkins NP. A copy of the visit note was provided to the patient's provider(s).    The patient was given a summary today of these recommendations.     Jose Vazquez [facility].  Medication Therapy Management Provider  [phone]           Medication Therapy Recommendations  Anxiety    Current Medication: busPIRone (BUSPAR) 5 MG tablet   Rationale: Untreated condition - Needs additional medication therapy - Indication   Recommendation: Start Medication   Status: Accepted per CPA         COPD (chronic obstructive pulmonary disease) (H)    Current Medication: Fluticasone-Umeclidin-Vilant (TRELEGY ELLIPTA) 100-62.5-25 MCG/ACT oral inhaler   Rationale: Does not understand instructions - Adherence - Adherence   Recommendation: Provide Education   Status: Accepted per CPA          Current Medication: ipratropium - albuterol 0.5 mg/2.5 mg/3 mL (DUONEB) 0.5-2.5 (3) MG/3ML neb solution   Rationale: Does not understand instructions - Adherence - Adherence   Recommendation: Provide Education   Status: Accepted per CPA         Type 2 diabetes mellitus with stage 3b chronic kidney disease, with long-term current use of insulin (H)    Current Medication: Continuous Blood Gluc Sensor (DEXCOM G6 SENSOR) MISC   Rationale: Does not understand instructions - Adherence - Adherence   Recommendation: Provide Education   Status: Accepted per CPA   Note: successfully applied new sensor/transmitter in clinci today .          Current Medication: insulin NPH (HUMULIN N VIAL) 100 UNIT/ML vial (Discontinued)    Rationale: More effective medication available - Ineffective medication - Effectiveness   Recommendation: Change Medication - Lantus SoloStar 100 UNIT/ML soln   Status: Accepted per CPA          Current Medication: insulin regular 100 UNIT/ML vial (Discontinued)   Rationale: More effective medication available - Ineffective medication - Effectiveness   Recommendation: Change Medication - HumaLOG KWIKpen 100 UNIT/ML soln   Status: Accepted per CPA

## 2023-06-08 NOTE — PATIENT INSTRUCTIONS
"Recommendations from today's MTM visit:                                                         1.  Thank you for taking your first puff of once daily Trelegy inhaler today in the office for your COPD.  Drink water after each use.     2. For your diabetes --lets change your NPH insulin to once daily Lantus -30 units daily in AM   AND lets also order you mealtime insulin called Humalog --inject 10 units right at start of your breakfast, lunch and dinner meals.     Also--lets add back in a new dex-com G6 cgm sensor today : congrats you successfully applied a new dex com G6 sensor --it will read your blood sugars in 2 hours. Please Change sensor every 10 days and gray transmitter every 3 months --so save the gray piece to use with next sensors.   Please charge your  at home .   Watch blood sugars and control diet --today you drank a red bull-- its has 63 grams of sugars in ti --change to diet red bull or zero calorie monster energy drinks.     3. For anxiety --lets trial low dose buspirone 5mg. Tablets --take 1 tablet up to 3 x day --see if this relaxes you ?    4. 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     5. Thanks for watching you tube video and we successfully put together your nebulizer --use medication in it -2-4 x day as needed --it take about 10 minutes each use.         Follow-up: Return in about 2 weeks (around 6/22/2023), or @ 2 PM, for Medication Therapy Management Visit, Blood sugar meter review, BP Recheck.    It was great speaking with you today.  I value your experience and would be very thankful for your time in providing feedback in our clinic survey. In the next few days, you may receive an email or text message from [employer] with a link to a survey related to your  clinical pharmacist.\"     To schedule another MTM appointment, please call the clinic directly or you may call the MTM scheduling line at " [phone] or toll-free at [phone].     My Clinical Pharmacist's contact information:                                                      Please feel free to contact me with any questions or concerns you have.      Jose Vazquez [facility].  Medication Therapy Management Provider  [phone]

## 2023-06-08 NOTE — Clinical Note
Irene--nette--evlia in office today --lots of med chnages --see my plan if necessary --2 weeks f/up with me to see how its going.  Karen Butler

## 2023-06-12 NOTE — PROGRESS NOTES
Clinic Care Coordination Contact  Program:  County:  [facility] Case #:  [facility] Worker:   Marcela #:   Subscriber #:   Renewal:  Date Applied:     FRW Outreach:   6/12/23  FRW called patient and left a vm with call back information. FRW will make outreach next week  6/2/23 FRW Called and set up time to complete application over the phone.      Health Insurance:      Referral/Screening:  FRW Screening    Row Name 06/01/23 [id number]       [facility] Benefits   Is patient requesting help applying for [facility] benefits? Yes       Have you recently applied for any [facility] benefits? No       How many people in your household? 4       Do you buy/eat food together? No  son/daughter in law buy separate       What is the monthly gross income for the household (wages, social security, workers comp, and pension)?  7100  for pt and spouse       Insurance:   [place]? No       Is this an insurance renewal? No       Is this a new insurance application request? No       OTHER   Is this a denisse care application? No       Any other information for the FRW? needing to apply for [place] for pt for placement in a facility - son and daughter in law live there but do not contribute to the food or any costs

## 2023-06-21 NOTE — PROGRESS NOTES
Clinic Care Coordination Contact  Program:  County:  [facility] Case #:  [facility] Worker:   Deisyure #:   Subscriber #:   Renewal:  Date Applied:     FRW Outreach:   6/21/23 FRW called patient and left a final vm with call back information as attempt x2 with no answer or return phone calls. FRW closed the FRW program and remove patient from panel. Patient can be referred back to FRW if needed.      6/12/23  FRW called patient and left a vm with call back information. FRW will make outreach next week  6/2/23 FRW Called and set up time to complete application over the phone.      Health Insurance:      Referral/Screening:  [facility] Screening    Row Name 06/01/23 1303       [facility] Benefits   Is patient requesting help applying for [facility] benefits? Yes       Have you recently applied for any [facility] benefits? No       How many people in your household? 4       Do you buy/eat food together? No  son/daughter in law buy separate       What is the monthly gross income for the household (wages, social security, workers comp, and pension)?  7100  for pt and spouse       Insurance:   [place]? No       Is this an insurance renewal? No       Is this a new insurance application request? No       OTHER   Is this a denisse care application? No       Any other information for the FRW? needing to apply for MA for pt for placement in a facility - son and daughter in law live there but do not contribute to the food or any costs

## 2023-06-28 NOTE — TELEPHONE ENCOUNTER
"1. ONSET: \"When did the cough begin?\"       Two days ago.   2. SEVERITY: \"How bad is the cough today?\"       9/10  3. SPUTUM: \"Describe the color of your sputum\" (none, dry cough; clear, white, yellow, green)      Yellow. Quit smoking 20 years ago.   4. HEMOPTYSIS: \"Are you coughing up any blood?\" If so ask: \"How much?\" (flecks, streaks, tablespoons, etc.)      No.   5. DIFFICULTY BREATHING: \"Are you having difficulty breathing?\" If Yes, ask: \"How bad is it?\" (e.g., mild, moderate, severe)     - MILD: No SOB at rest, mild SOB with walking, speaks normally in sentences, can lie down, no retractions, pulse < 100.     - MODERATE: SOB at rest, SOB with minimal exertion and prefers to sit, cannot lie down flat, speaks in phrases, mild retractions, audible wheezing, pulse 100-120.     - SEVERE: Very SOB at rest, speaks in single words, struggling to breathe, sitting hunched forward, retractions, pulse > 120       Mild to moderate.   6. FEVER: \"Do you have a fever?\" If Yes, ask: \"What is your temperature, how was it measured, and when did it start?\"      No.   7. CARDIAC HISTORY: \"Do you have any history of heart disease?\" (e.g., heart attack, congestive heart failure)       Bypass surgery about 8 years ago.   Coronary artery disease  8. LUNG HISTORY: \"Do you have any history of lung disease?\"  (e.g., pulmonary embolus, asthma, emphysema)      COPD  9. PE RISK FACTORS: \"Do you have a history of blood clots?\" (or: recent major surgery, recent prolonged travel, bedridden)      No.   10. OTHER SYMPTOMS: \"Do you have any other symptoms?\" (e.g., runny nose, wheezing, chest pain)        Patient states he is dried up so that it is hard to talk.   Last night had some chest pain which went away when he layed down. Wheezing now because of talking.   Does not think he is running a fever.   Ear ache -- started last night and still sore.     11. PREGNANCY: \"Is there any chance you are pregnant?\" \"When was your last menstrual period?\"   " "     NA  12. TRAVEL: \"Have you traveled out of the country in the last month?\" (e.g., travel history, exposures)        No.     CARE ADVICE:   -- use inhaler more often. Patient has been using only 1x/day. Can use every 6 hours.   Instructed patient to use inhaler that often. If still having difficulty then call for an appointment.   -- throat lozenges  -- cough syrup  -- pain medication as needed  -- pseudoephedrine for congestion    Writer spoke with Geno Jenkins PCP who stated that if inhaler does not help patient he should go into the ER.     Reason for Disposition    MILD difficulty breathing (e.g., minimal/no SOB at rest, SOB with walking, pulse <100) and still present when not coughing    Additional Information    Negative: Bluish (or gray) lips or face    Negative: SEVERE difficulty breathing (e.g., struggling for each breath, speaks in single words)    Negative: Rapid onset of cough and has hives    Negative: Coughing started suddenly after medicine, an allergic food or bee sting    Negative: Difficulty breathing after exposure to flames, smoke, or fumes    Negative: Sounds like a life-threatening emergency to the triager    Negative: Previous asthma attacks and this feels like asthma attack    Negative: Dry cough (non-productive; no sputum or minimal clear sputum) and within 14 days of COVID-19 Exposure    Negative: Patient sounds very sick or weak to the triager    Negative: MODERATE difficulty breathing (e.g., speaks in phrases, SOB even at rest, pulse 100-120) and still present when not coughing    Negative: Chest pain present when not coughing    Negative: Passed out (i.e., fainted, collapsed and was not responding)    Protocols used: COUGH-A-OH    LAZARO CruzN RN  [facility]      "

## 2023-06-29 PROBLEM — J81.0 ACUTE PULMONARY EDEMA (H): Status: ACTIVE | Noted: 2023-01-01

## 2023-06-29 PROBLEM — J44.1 COPD EXACERBATION (H): Status: ACTIVE | Noted: 2023-01-01

## 2023-06-29 PROBLEM — I21.4 NSTEMI (NON-ST ELEVATED MYOCARDIAL INFARCTION) (H): Status: ACTIVE | Noted: 2023-01-01

## 2023-06-29 PROBLEM — R73.9 HYPERGLYCEMIA: Status: ACTIVE | Noted: 2023-01-01

## 2023-06-29 NOTE — ED NOTES
Taken to stabe one due to his pallor and sob with Hx of heart surgery. O2 85RA, HR palpated at 37.

## 2023-06-29 NOTE — PHARMACY-ADMISSION MEDICATION HISTORY
Pharmacist Admission Medication History    Admission medication history is complete. The information provided in this note is only as accurate as the sources available at the time of the update.    Medication reconciliation/reorder completed by provider prior to medication history? No    Information Source(s): Family member, Clinic records and CareEverywhere/SureScripts via in-person    Pertinent Information:   Earlier this month, the patient was supposed to stop NPH insulin (20 units twice daily) and R insulin (10 units twice daily before meals) and start Lantus and Humalog. He has not switched to Humalog and Lantus due to cost. Patient's wife states that he has not been adherent to insulin since this change was made.      Changes made to PTA medication list:    Added: None    Deleted: oxycodone-acetaminophen duplicates    Changed: None    Medication Affordability:  Not including over the counter (OTC) medications, was there a time in the past 3 months when you did not take your medications as prescribed because of cost?: Yes    Allergies reviewed with patient and updates made in EHR: yes    Medication History Completed By: Richelle Cline [facility] 6/29/2023 6:33 PM    Prior to Admission medications    Medication Sig Last Dose Taking? Auth Provider Long Term End Date   albuterol (PROAIR HFA/PROVENTIL HFA/VENTOLIN HFA) 108 (90 Base) MCG/ACT inhaler Inhale 2 puffs into the lungs every 6 hours as needed for shortness of breath PRN Yes Suma Jenkins NP Yes    amitriptyline (ELAVIL) 10 MG tablet TAKE 3 TABLETS BY MOUTH EVERY NIGHT AT BEDTIME 6/28/2023 Yes Suma Jenkins NP Yes    amLODIPine (NORVASC) 5 MG tablet Take 1 tablet (5 mg) by mouth At Bedtime 6/28/2023 Yes Suma Jenkins NP Yes    aspirin 81 MG EC tablet Take 81 mg by mouth daily 6/29/2023 Yes Unknown, Entered By History     busPIRone (BUSPAR) 5 MG tablet Take 1 tablet (5 mg) by mouth 3 times daily 6/29/2023 at AM Yes Suma Jenkins NP Yes   "  Fluticasone-Umeclidin-Vilant (TRELEGY ELLIPTA) 100-62.5-25 MCG/ACT oral inhaler Inhale 1 puff into the lungs daily 6/29/2023 Yes Suma Jenkins NP     furosemide (LASIX) 20 MG tablet TAKE 1 TABLET(20 MG) BY MOUTH TWICE DAILY 6/29/2023 at AM Yes Suma Jenkins NP Yes    gabapentin (NEURONTIN) 300 MG capsule TAKE 1 CAPSULE BY MOUTH TWICE DAILY 6/29/2023 at AM Yes Suma Jenkins NP Yes    insulin glargine (LANTUS SOLOSTAR) 100 UNIT/ML pen Inject 30 Units Subcutaneous every morning Ok to substitute Basaglar at same dose and frequency if insurance prefers.  Yes Suma Jenkins NP Yes    insulin lispro (HUMALOG KWIKPEN) 100 UNIT/ML (1 unit dial) KWIKPEN Inject 10 Units Subcutaneous 3 times daily (before meals)  Yes Suma Jenkins NP Yes    ipratropium - albuterol 0.5 mg/2.5 mg/3 mL (DUONEB) 0.5-2.5 (3) MG/3ML neb solution Take 1 vial (3 mLs) by nebulization every 6 hours as needed for shortness of breath, wheezing or cough PRN Yes Suma Jenkins NP Yes    Multiple Vitamins-Minerals (CENTRUM SILVER) per tablet Take 1 tablet by mouth daily 6/29/2023 Yes Reported, Patient     nitroGLYcerin (NITROSTAT) 0.4 MG sublingual tablet Place 1 tablet (0.4 mg) under the tongue every 5 minutes as needed PRN Yes Suma Jenkins NP Yes    oxyCODONE-acetaminophen (PERCOCET) 5-325 MG tablet Take 1 tablet by mouth At Bedtime Must last 30 days Past Week Yes Suma Jenkins NP     rosuvastatin (CRESTOR) 20 MG tablet Take 1 tablet (20 mg) by mouth daily 6/29/2023 Yes Suma Jenkins NP Yes    Continuous Blood Gluc Sensor (DEXCOM G6 SENSOR) MISC Change every 10 days. Start 11-28-22.   Reported, Patient     insulin pen needle (31G X 5 MM) 31G X 5 MM miscellaneous Use 4 pen needles daily or as directed.   Suma Jenkins, NP     insulin syringe 31G X 5/16\" 1 ML MISC USE 2 SYRINGES DAILY OR AS DIRECTED   Suma Jenkins, NP     Lancets (MICROLET) MISC Test once daily   Suma Jenkins, NP   "   metoprolol succinate ER (TOPROL XL) 50 MG 24 hr tablet Take 1 tablet (50 mg) by mouth daily   Suma Jenkins, NP Yes

## 2023-06-29 NOTE — H&P
[facility]  History and Physical   Hospitalist  Maik Schwartz MD       Hayder Alves MRN# [id number]   YOB: 1955 Age: 68 year old      Date of Admission:  6/29/2023         Assessment and Plan:   Hayder Alves is a 68 year old male with a very complicated past medical history significant for recently diagnosed Lewy Body Dementia, chronic neck pain (s/p spinal fusion), COPD (not on home oxygen), CAD (s/p CABG 2001), CKD stage IV, hypertension, dyslipidemia, systolic CHF, diabetes mellitus, medication noncompliance, h/o atrial fibrillation (s/p cardioversion 2020; not currently on anticoagulation) presented to ED with shortness of breath and left arm pain.    Shortness of breath, acute hypoxic respiratory failure, likely multifactorial  likely COPD exacerbation (see discussion below)  likely acute on chronic systolic CHF exacerbation  Likely non-STEMI  CAD with h/o CABG 2001  new atrial flutter, likely paroxysmal atrial fibrillation with severe bradycardia (h/o cardioversion in 2020, not currently on anticoagulation)  Hypertension  Dyslipidemia  -admit as inpatient  -heart rate on presentation in 30s, stable BP; EKG noted with atrial flutter, subsequently bifascicular  block  - afebrile on admission, mild leukocytosis WBC 14.8, BNP markedly elevated 19,855, troponin elevated at 118, D dimer elevated 1.01  - ultrasound bilateral lower extremity negative for DVT; CT was not obtained due to CKD; will obtain a V/Q scan  -symptoms likely multifactorial, could be some COPD exacerbation but seems most likely cardiac with probable non-STEMI, bradycardia and systolic CHF exacerbation    -Got 60 mg IV Lasix in ED; PTA on Lasix 20 mg BID ; will hold off on further diuretics at this time given some concern for DKA (see below)  -cardiology was consulted from ED, suggested no acute cardiac intervention at this time  -admit to CICU/IMC for close monitoring  -monitor on  telemetry  -follow serial troponins  -started on heparin drip in ED, will continue  -will obtain ECHO; cardiology consult  -will hold off on PTA metoprolol; continue amlodipine, aspirin, Crestor    Likely COPD exacerbation  -not on home oxygen; here was noted 85% on room air; on 3 to 4 L oxygen  -got Solu-Medrol and duo nebs in ED  -will continue PRN nebs; wean oxygen as able  -resume PTA inhalers    Uncontrolled diabetes with likely mild DKA  -family reports medication noncompliance  -his insulin regimen was recently adjusted and switched from NPH to Lantus (30 units daily) and NovoLog 10 units TID with meals which he has not started  -on admission blood glucose 400s, anion gap 20, bicarb 18  -could be mild DKA however given concern for CHF exacerbation will hold off on IV fluids at this time  -got six units NovoLog in ED  -will resume PTA insulin regimen  -high resistance sliding scale insulin  -hypoglycemia protocol    CKD stage IV  Hyponatremia  -on admission creatinine 2.8 which seems stable around his baseline of 2.5 to 2.8; sodium 130  -diuretics discussion as above  -will monitor BMP    Chronic neck pain, history of spinal fusion  -will continue with his PTA Percocet  -continue PTA Neurontin    Recently diagnosed Lewy Body Dementia  -noted  -follows neurology as outpatient    Clinically Significant Risk Factors Present on Admission             # Anion Gap Metabolic Acidosis: Highest Anion Gap = 20 mmol/L in last 2 days, will monitor and treat as appropriate    # Drug Induced Platelet Defect: home medication list includes an antiplatelet medication   # Hypertension: Noted on problem list     # DMII: A1C = N/A within past 6 months               DVT prophylaxis: on heparin drip for likely non-STEMI  Code status: full code    Care plan discussed with ED provider, patient and his wife present in room           Primary Care Physician:   Suma Jenkins [phone]         Chief Complaint:     Shortness of  breath, left arm pain    History is obtained from the patient and his family         History of Present Illness:     Hayder Alves is a 68 year old male with a very complicated past medical history significant for recently diagnosed Lewy Body Dementia, chronic neck pain (s/p spinal fusion), COPD (not on home oxygen), CAD (s/p CABG 2001), CKD stage IV, hypertension, dyslipidemia, systolic CHF, diabetes mellitus, medication noncompliance, h/o atrial fibrillation (s/p cardioversion 2020; not currently on anticoagulation) presented to ED with shortness of breath and left arm pain.    He's a poor historian owing to recently diagnosed Lewy Body dementia. He states for past two days he has been having some worsening shortness of breath and leg swelling; also noted left arm pain which he states is similar to his past heart attack. He denies any fever or chest pain.    In the ED he was seen by Dr. Zamudio; heart rate on presentation in 30s, stable BP; EKG noted with atrial flutter, subsequently bifascicular  block  - afebrile on admission, mild leukocytosis WBC 14.8, BNP markedly elevated 19,855, troponin elevated at 118, D dimer elevated 1.01  - ultrasound bilateral lower extremity negative for DVT  - Got 60 mg IV Lasix in ED; PTA on Lasix 20 mg BID  - cardiology was consulted from ED, suggested no acute cardiac intervention at this time    Hospitalist was requested admission for further evaluation.    The patient denies any fever, chills, rigors or chest pain .  Denies pain abdomen.  No bowel or bladder disturbances.           Past Medical History:     recently diagnosed Lewy Body Dementia  chronic neck pain (s/p spinal fusion)  COPD (not on home oxygen)  CAD (s/p CABG 2001)  CKD stage IV  Hypertension  Dyslipidemia  systolic CHF  diabetes mellitus  medication noncompliance  h/o atrial fibrillation (s/p cardioversion 2020; not currently on anticoagulation)           Past Surgical History:     Past Surgical History:    Procedure Laterality Date     ANESTHESIA CARDIOVERSION N/A 10/29/2020    Procedure: ANESTHESIA, FOR CARDIOVERSION @1400  Latex Allergy;  Surgeon: GENERIC ANESTHESIA PROVIDER;  Location: [facility] OR     SURGICAL HISTORY OF -       skin grafts X 4     SURGICAL HISTORY OF -   1978    lumbar fusion L1-5     SURGICAL HISTORY OF -   2008    C4-7 cervical fusion     SURGICAL HISTORY OF -   12/09    C 6-7 fusion     ZZC CABG, ARTERIAL, THREE  3/00    CABG X 3, stent     ZZHC DRUG-ELUTING STENTS, SINGLE  9/10    left main              Home Medications:     Prior to Admission Medications   Prescriptions Last Dose Informant Patient Reported? Taking?   Continuous Blood Gluc Sensor (DEXCOM G6 SENSOR) MISC   Yes No   Sig: Change every 10 days. Start 11-28-22.   Fluticasone-Umeclidin-Vilant (TRELEGY ELLIPTA) 100-62.5-25 MCG/ACT oral inhaler 6/29/2023  No Yes   Sig: Inhale 1 puff into the lungs daily   Lancets (MICROLET) MISC   No No   Sig: Test once daily   Multiple Vitamins-Minerals (CENTRUM SILVER) per tablet 6/29/2023  Yes Yes   Sig: Take 1 tablet by mouth daily   albuterol (PROAIR HFA/PROVENTIL HFA/VENTOLIN HFA) 108 (90 Base) MCG/ACT inhaler PRN  No Yes   Sig: Inhale 2 puffs into the lungs every 6 hours as needed for shortness of breath   amLODIPine (NORVASC) 5 MG tablet 6/28/2023  No Yes   Sig: Take 1 tablet (5 mg) by mouth At Bedtime   amitriptyline (ELAVIL) 10 MG tablet 6/28/2023  No Yes   Sig: TAKE 3 TABLETS BY MOUTH EVERY NIGHT AT BEDTIME   aspirin 81 MG EC tablet 6/29/2023  Yes Yes   Sig: Take 81 mg by mouth daily   busPIRone (BUSPAR) 5 MG tablet 6/29/2023 at AM  No Yes   Sig: Take 1 tablet (5 mg) by mouth 3 times daily   furosemide (LASIX) 20 MG tablet 6/29/2023 at AM  No Yes   Sig: TAKE 1 TABLET(20 MG) BY MOUTH TWICE DAILY   gabapentin (NEURONTIN) 300 MG capsule 6/29/2023 at AM  No Yes   Sig: TAKE 1 CAPSULE BY MOUTH TWICE DAILY   insulin glargine (LANTUS SOLOSTAR) 100 UNIT/ML pen   No Yes   Sig: Inject 30 Units  "Subcutaneous every morning Ok to substitute Basaglar at same dose and frequency if insurance prefers.   insulin lispro (HUMALOG KWIKPEN) 100 UNIT/ML (1 unit dial) KWIKPEN   No Yes   Sig: Inject 10 Units Subcutaneous 3 times daily (before meals)   insulin pen needle (31G X 5 MM) 31G X 5 MM miscellaneous   No No   Sig: Use 4 pen needles daily or as directed.   insulin syringe 31G X 5/16\" 1 ML MISC   No No   Sig: USE 2 SYRINGES DAILY OR AS DIRECTED   ipratropium - albuterol 0.5 mg/2.5 mg/3 mL (DUONEB) 0.5-2.5 (3) MG/3ML neb solution PRN  No Yes   Sig: Take 1 vial (3 mLs) by nebulization every 6 hours as needed for shortness of breath, wheezing or cough   metoprolol succinate ER (TOPROL XL) 50 MG 24 hr tablet 6/29/2023  No Yes   Sig: Take 1 tablet (50 mg) by mouth daily   nitroGLYcerin (NITROSTAT) 0.4 MG sublingual tablet PRN  No Yes   Sig: Place 1 tablet (0.4 mg) under the tongue every 5 minutes as needed   oxyCODONE-acetaminophen (PERCOCET) 5-325 MG tablet Past Week  No Yes   Sig: Take 1 tablet by mouth At Bedtime Must last 30 days   rosuvastatin (CRESTOR) 20 MG tablet 6/29/2023  No Yes   Sig: Take 1 tablet (20 mg) by mouth daily      Facility-Administered Medications: None            Allergies:     Allergies   Allergen Reactions     Adhesive Tape      From blood draw site.     Eszopiclone      Strange dreams. Cooking during sleep      Latex      Lipitor [Atorvastatin Calcium] Other (See Comments)     myalgias     Lisinopril Other (See Comments)     Hyperkalemia and increased creatinine            Social History:   Hayder Alves  reports that he quit smoking about 23 years ago. His smoking use included cigarettes. He has a 45.00 pack-year smoking history. He has never used smokeless tobacco. He reports current alcohol use. He reports that he does not use drugs.              Family History:   Hayder Alves family history includes C.A.D. in his father and mother; Cancer in his sister; Cerebrovascular Disease " (age of onset: 64) in his father.    Family history was reviewed by myself and not pertinent to current presentation.           Review of Systems:   A10 point Review of Systems was done and were negative other than noted in the HPI.             Physical Exam:   Blood pressure (!) 173/70, pulse (!) 37, temperature 97.6  F (36.4  C), temperature source Temporal, resp. rate 25, weight 89.9 kg (198 lb 3.1 oz), SpO2 90 %.  198 lbs 3.1 oz        Constitutional: Alert, awake and oriented but forgetful; lying comfortably in bed in no apparent distress   HEENT: Pupils equal and reactive to light and accomodation, EOMI intact; neck supple no raised JVD or rigidity    Oral cavity: Moist mucosa   Cardiovascular: Normal s1 s2, regular rhythm but bradycardic, no murmur   Lungs: B/l clear to auscultation, no wheezes or crepitations   Abdomen: Soft, nt, nd, no guarding, rigidity or rebound; BS +   LE : B/l edema +   Musculoskeletal: Power 5/5 in all extremities   Neuro: No focal neurological deficits noted, CN II to XII grossly intact   Psychiatry: normal mood and affect  Skin: No obvious skin rashes or ulcers             Data:   All new lab and imaging data was reviewed in Epic.   Significant labs and imagings include:    CMP notable for sodium 130, BUN 34, creatinine 2.8, glucose 406, bicarb 18, anion gap 20  CBC with WBC 14.8, hemoglobin 12.8  D dimer elevated 1.01  COVID, influenza, RSV negative  chest x-ray noted with pulmonary vascular congestion with likely mild to moderate pulmonary edema at the small bilateral effusion  ultrasound lower extremity noted no DVT  EKG reviewed and noted with atrial flutter with bifascicular block             Maik Schwartz MD  Hospitalist

## 2023-06-29 NOTE — TELEPHONE ENCOUNTER
"Patient's wife, Lilia, called.    Consent to communicate on file.    Per Lilia:  1. Patient refused Emergency Room yesterday  2. Symptoms have not changed  3. Patient and Lilia think he needs oxygen    Writer reviewed yesterday's recommendation per DALE Jenkins CNP:  \"Writer spoke with Geno Jenkins PCP who stated that if inhaler does not help patient he should go into the ER. \"    Lilia verbalized understanding.    Patient then joined phone call via speakerphone and writer relayed plan per DALE Jenkins CNP.    Patient verbalized understanding and in agreement with plan.    Lilia stated patient would go to [facility] Emergency Room.  Patient's questions regarding ER visit answered to the best of writer's knowledge.    LAZARO PerdueN, RN-[facility]    "

## 2023-06-29 NOTE — LETTER
Dialysis Intake Checklist      Your Name: Roselia Fowler RN Contact Phone Number: [phone]     Fax Number and Email: [phone] Hospital/Practice: [facility]     Patient Name: Hayder Alves          See attached documents    Roselia Fowler RN  Care Coordinator  [facility]  [phone] (text or call)

## 2023-06-29 NOTE — LETTER
Dialysis Intake Checklist      Your Name: Roselia Fowler RN Contact Phone Number: [phone]     Fax Number and Email: [phone] Hospital/Practice: [facility]     Patient Name: Hayder DEYSI Alves   Referring Nephrologist: Dr Urrutia     Requested Facility(s) or Zip Code: [address]     Patient Started Date of Dialysis: 07/06/2023     Estimated Outpatient Start Date of Dialysis: 07/10/2023   Treatment Duration & Frequency: 3 times per week     Preferred Schedule: Monday, Wednesday , and Friday PM     Is the patient employed? No   Is there a working shift we can accommodate?  No   Is patient flexible? Yes Shift: willing to do late morning if afternoon not available       Modality:   In-Center Hemo   Diagnosis: Unsure if will regain function at this time     Access Type(s):   CVC    If only a CVC, is there an active AV Access Plan (e.g., Vessel Mapping, Surgeon Consult, etc.)?:   unknown         Where will this patient be discharged to? Other: TCU - unsure of location     Is this patient trach or vent dependent? No     Does this patient have an L-VAD or Life Vest? No     Does this patient have outpatient dialysis history? No     Does this patient receive continuous medication via infusion pumps? No     Daily Care - Activity Management/Activity assistance needed?   Activity Management: activity encouraged   Activity Assistance Provided: assistance, 1 person   Assistive Device Utilized: walker, gait belt      Can this patient sit in a standard chair to dialyze? Yes:      Require treatment in a bed?  No     Is the patient HEP B positive? No     Can this patient sign their own legal consents? Yes     Other special needs? N/A       Allergies:   Allergies   Allergen Reactions    Adhesive Tape      From blood draw site.    Eszopiclone      Strange dreams. Cooking during sleep     Latex     Lipitor [Atorvastatin Calcium] Other (See Comments)     myalgias    Lisinopril Other (See Comments)      Hyperkalemia and increased creatinine        Medication List:   Current Facility-Administered Medications   Medication    - MEDICATION INSTRUCTIONS for Dialysis Patients -    acetaminophen (TYLENOL) tablet 650 mg    Or    acetaminophen (TYLENOL) Suppository 650 mg    acetaminophen (TYLENOL) tablet 325 mg    albuterol (PROVENTIL HFA/VENTOLIN HFA) inhaler    amitriptyline (ELAVIL) half-tab 30 mg    amLODIPine (NORVASC) tablet 5 mg    aspirin EC tablet 81 mg    busPIRone (BUSPAR) tablet 5 mg    cefTRIAXone (ROCEPHIN) 1 g vial to attach to  mL bag for ADULTS or NS 50 mL bag for PEDS    glucose gel 15-30 g    Or    dextrose 50 % injection 25-50 mL    Or    glucagon injection 1 mg    fluticasone-vilanterol (BREO ELLIPTA) 100-25 MCG/ACT inhaler 1 puff    And    umeclidinium (INCRUSE ELLIPTA) 62.5 MCG/ACT inhaler 1 puff    [Held by provider] furosemide (LASIX) tablet 20 mg    gabapentin (NEURONTIN) capsule 300 mg    HOLD: enoxaparin (LOVENOX) Post Procedure    HOLD: heparin (IV or Subcutaneous) Post Procedure    HOLD: metformin and metformin containing medications on day of procedure and 48 hours after IV contrast given    [Held by provider] hydrALAZINE (APRESOLINE) tablet 75 mg    insulin aspart (NovoLOG) injection (RAPID ACTING)    insulin aspart (NovoLOG) injection (RAPID ACTING)    [Held by provider] insulin glargine (LANTUS PEN) injection 20 Units    ipratropium - albuterol 0.5 mg/2.5 mg/3 mL (DUONEB) neb solution 3 mL    isosorbide mononitrate (IMDUR) 24 hr tablet 60 mg    lidocaine (LMX4) cream    lidocaine 1 % 0.1-1 mL    melatonin tablet 1 mg    naloxone (NARCAN) injection 0.2 mg    Or    naloxone (NARCAN) injection 0.4 mg    Or    naloxone (NARCAN) injection 0.2 mg    Or    naloxone (NARCAN) injection 0.4 mg    nitroGLYcerin (NITROSTAT) sublingual tablet 0.4 mg    ondansetron (ZOFRAN ODT) ODT tab 4 mg    Or    ondansetron (ZOFRAN) injection 4 mg    oxyCODONE-acetaminophen (PERCOCET) 5-325 MG per tablet 1  tablet    oxyCODONE-acetaminophen (PERCOCET) 5-325 MG per tablet 1 tablet    Patient is already receiving anticoagulation with heparin, enoxaparin (LOVENOX), warfarin (COUMADIN)  or other anticoagulant medication    polyethylene glycol (MIRALAX) Packet 17 g    rosuvastatin (CRESTOR) tablet 10 mg    senna-docusate (SENOKOT-S/PERICOLACE) 8.6-50 MG per tablet 1 tablet    Or    senna-docusate (SENOKOT-S/PERICOLACE) 8.6-50 MG per tablet 2 tablet    sodium chloride (PF) 0.9% PF flush 3 mL    sodium chloride (PF) 0.9% PF flush 3 mL          HEP Panel:  Hep B Antigen (HBsAG):   Lab Results   Component Value Date    HEPBANG Negative 04/08/2010     Hep B Surface Antibody (HBsAb): No results found for: AUSAB  Hep B Total Core Antibody: No results found for: HBCAB     Chest X-Ray:   Results for orders placed during the hospital encounter of 06/29/23    XR CHEST 2 VIEWS    Narrative  CHEST TWO VIEWS July 6, 2023 8:27 AM    HISTORY: Implantable cardiac device placement, evaluate for  pneumothorax.    COMPARISON: July 4, 2023.    FINDINGS: Cardiac lead(s) noted that project over the cardiac  silhouette. No pneumothorax. Mild bibasilar atelectasis and/or  infiltrate, question some pleural fluid. The cardiac silhouette is  stable. Pulmonary vasculature is unremarkable.    Impression  IMPRESSION: No acute disease.    BEULAH MARTINEZ MD      SYSTEM ID:  [id number]       PPD:  M TUBERCULOSIS RESULT: No results found for: TBRSLT  M TUBERCULOSIS ANTIGEN VALUE: No results found for: TBAGN

## 2023-06-29 NOTE — Clinical Note
Potential access sites were evaluated for patency using ultrasound.   The left subclavian vein was selected. Access was obtained under with Sonosite guidance using a micropuncture 21 gauge needle with direct visualization of needle entry.

## 2023-06-29 NOTE — ED PROVIDER NOTES
History     Chief Complaint:  Shortness of Breath     The history is provided by the patient.      Hayder Alves is a 68 year old male with a history of COPD, hypertension, Lewy body dementia, choric kidney disease, chronic systolic CHF, myocardial infarction and CABG X3, and type II diabetes mellitus who presents with sudden onset of shortness of breath prior to arrival. He is bradycardic and his oxygen saturation on room air is 85% on arrival. He notes he has associated bilateral arm pain which is 7/10 in the ED. He has a history of a myocardial infarction and notes he presented with arm pain. Patient's wife notes the patient was coughing up blood. He denies chest pain, dizziness, or lightheadedness. He also had a triple bypass 22 years ago. He is currently a nonsmoker and quit smoking 20 years ago. No recent hospitalizations. Patient's wife notes the patient has atrial fibrillation and is not prescribed blood thinners due to frequent falls due to underling Lewy body dementia. He adds he has nitroglycerin at home but did not take it prior to arrival. Patient's wife notes a recent change to Amlodipine dosage, reduced metoprolol dose, and noted he was recently started on Buspar. She notes he currently not taking     Independent Historian:   Spouse/Partner - They report as noted above.     Review of External Notes:   I reviewed the nurse triage notes from yesterday and today.    Medications:    Albuterol inhaler  Amitriptyline   Amlodipine   Aspirin   Buspirone   Fluticasone-Umeclidin-Vilantoral inhaler  Furosemide   Gabapentin   Insulin glargine pen  Insulin lispro  Ipratropium - albuterol 0.5 mg/2.5 mg/3 mL neb solution  Metoprolol succinate ER  Nitroglycerin   Oxycodone-acetaminophen  Rosuvastatin     Past Medical History:    Bipolar I disorder, most recent episode (or current) manic, severe, specified as with psychotic behavior  CAD  Cardiovascular disease  Chronic systolic CHF  Essential hypertension,  benign  Impaired fasting glucose  Insomnia  Ischemic cardiomyopathy  Other anxiety states  Plantar fasciitis  Pneumonia  Pure hypercholesterolemia  Respiratory failure  Type II diabetes mellitus  Stage 4 chronic kidney disease  Pseudoarthrosis of cervical spine  Hypertension  MI  PAD    Past Surgical History:    Anesthesia cardioversion  Skin grafts X4  Lumbar fusion L1-5  C4-7 cervical fusion  C6-7 cervical fusion  CABG X, stent  Drug eluting stents, single, left main    Physical Exam     Patient Vitals for the past 24 hrs:   BP Temp Temp src Pulse Resp SpO2 Weight   06/29/23 1814 -- -- -- -- -- 90 % --   06/29/23 1800 (!) 173/70 -- -- (!) 37 -- (!) 89 % --   06/29/23 1715 (!) 161/76 -- -- (!) 35 -- 90 % 89.9 kg (198 lb 3.1 oz)   06/29/23 1700 (!) 154/86 -- -- (!) 35 -- 91 % --   06/29/23 1647 (!) 149/66 97.6  F (36.4  C) Temporal (!) 33 -- 94 % --   06/29/23 1645 -- -- -- (!) 33 -- 95 % --   06/29/23 1630 (!) 155/63 -- -- (!) 37 25 93 % --        Physical Exam  General: alert, transfers to [facility] with difficulty secondary to shortness of breath  HENT: mucous membranes moist  CV: Bradycardic rate, irregularly irregular rhythm.  Resp: Speaking in full sentences, but poor air movement throughout lungs, with slight expiratory rails.  GI: abdomen soft and nontender, no guarding  MSK: no bony tenderness  Skin: appropriately warm and dry  Extremities: Pale, but not cyanotic.  No edema, calves non-tender  Neuro: alert, clear speech, oriented  Psych: normal mood and affect      Emergency Department Course   ECG  ECG taken at 1624, ECG read at 1808  Critical test result: low HR  Atrial flutter  Right bundle branch block  Left posterior fascicular block  Bifascicular block  Worsened bradycardia as compared to prior, dated 05/04/2023.  Rate 35 bpm. IL interval * ms. QRS duration 140 ms. QT/QTc 670/511 ms. P-R-T axes * 120 1.     ECG #2  ECG taken at 1801, ECG read at 1807  Critical test result: low HR, arrhythmia    Idioventricular rhythm  Right bundle branch block  Left posterior fascicular block  Bifascicular block  Cannot rule out inferior infarct, age undetermined  No change as compared to prior, dated 6/29/2023.  Rate 33 bpm. IL interval * ms. QRS duration 144 ms. QT/QTc 726/537 ms. P-R-T axes * 173 -27.     Imaging:  US Lower Extremity Venous Duplex Bilateral   Final Result   IMPRESSION:   1.  No deep venous thrombosis in the bilateral lower extremities.      XR Chest Port 1 View   Final Result   IMPRESSION: Stable size of cardiomediastinal silhouette status post median sternotomy and CABG. Defibrillator pads overlying the left chest. Pulmonary vascular congestion with likely mild to moderate pulmonary edema and small bilateral pleural effusions    with compressive atelectasis, right larger than left. No pneumothorax. Bones are unchanged. Cervical spinal fusion hardware again noted.         Report per radiology    Laboratory:  Labs Ordered and Resulted from Time of ED Arrival to Time of ED Departure   COMPREHENSIVE METABOLIC PANEL - Abnormal       Result Value    Sodium 130 (*)     Potassium 4.5      Chloride 92 (*)     Carbon Dioxide (CO2) 18 (*)     Anion Gap 20 (*)     Urea Nitrogen 34.6 (*)     Creatinine 2.80 (*)     Calcium 9.4      Glucose 406 (*)     Alkaline Phosphatase 93      AST 18      ALT 13      Protein Total 7.4      Albumin 4.0      Bilirubin Total 0.8      GFR Estimate 24 (*)    CBC WITH PLATELETS AND DIFFERENTIAL - Abnormal    WBC Count 14.8 (*)     RBC Count 4.46      Hemoglobin 12.7 (*)     Hematocrit 38.8 (*)     MCV 87      MCH 28.5      MCHC 32.7      RDW 12.9      Platelet Count 182      % Neutrophils 79      % Lymphocytes 11      % Monocytes 8      % Eosinophils 1      % Basophils 0      % Immature Granulocytes 1      NRBCs per 100 WBC 0      Absolute Neutrophils 11.7 (*)     Absolute Lymphocytes 1.7      Absolute Monocytes 1.1      Absolute Eosinophils 0.1      Absolute Basophils 0.1       Absolute Immature Granulocytes 0.1      Absolute NRBCs 0.0     D DIMER QUANTITATIVE - Abnormal    D-Dimer Quantitative 1.01 (*)    NT PROBNP INPATIENT - Abnormal    N terminal Pro BNP Inpatient 19,855 (*)    ISTAT GASES LACTATE VENOUS POCT - Abnormal    Lactic Acid POCT <0.3      Bicarbonate Venous POCT 20 (*)     O2 Sat, Venous POCT 6 (*)     pCO2 Venous POCT 37 (*)     pH Venous POCT 7.33      pO2 Venous POCT 8 (*)    TROPONIN T, HIGH SENSITIVITY - Abnormal    Troponin T, High Sensitivity 118 (*)    CBC WITH PLATELETS - Abnormal    WBC Count 14.5 (*)     RBC Count 4.42      Hemoglobin 12.8 (*)     Hematocrit 38.8 (*)     MCV 88      MCH 29.0      MCHC 33.0      RDW 12.9      Platelet Count 198     INFLUENZA A/B, RSV, & SARS-COV2 PCR - Normal    Influenza A PCR Negative      Influenza B PCR Negative      RSV PCR Negative      SARS CoV2 PCR Negative     TROPONIN T, HIGH SENSITIVITY   GLUCOSE MONITOR NURSING POCT      Emergency Department Course & Assessments:    Interventions:  Medications   nitroGLYcerin (NITROSTAT) sublingual tablet 0.4 mg (0.4 mg Sublingual $Given 6/29/23 1634)   heparin infusion 25,000 units in D5W 250 mL ANTICOAGULANT (1,600 Units/hr Intravenous $New Bag 6/29/23 1824)   insulin regular 1 unit/mL injection 6 Units (has no administration in time range)   ipratropium - albuterol 0.5 mg/2.5 mg/3 mL (DUONEB) neb solution 3 mL (3 mLs Nebulization $Given 6/29/23 1629)   nitroGLYcerin (NITROSTAT) 0.4 MG sublingual tablet (  $Given 6/29/23 1627)   methylPREDNISolone sodium succinate (solu-MEDROL) injection 125 mg (125 mg Intravenous $Given 6/29/23 1632)   ipratropium - albuterol 0.5 mg/2.5 mg/3 mL (DUONEB) neb solution 3 mL (3 mLs Nebulization $Given 6/29/23 1646)   aspirin (ASA) chewable tablet 324 mg (324 mg Oral $Given 6/29/23 1804)   fentaNYL (PF) (SUBLIMAZE) injection 50 mcg (50 mcg Intravenous $Given 6/29/23 1804)   furosemide (LASIX) injection 60 mg (60 mg Intravenous $Given 6/29/23 7741)    heparin ANTICOAGULANT Loading dose for HIGH INTENSITY TREATMENT * Give BEFORE starting heparin infusion (7,200 Units Intravenous $Given 6/29/23 1821)      Independent Interpretation (X-rays, CTs, rhythm strip):  I reviewed the patient's chest radiograph.  This demonstrates mild pulmonary edema.    Assessments/Consultations/Discussion of Management or Tests:  ED Course as of 06/29/23 1838   Thu Jun 29, 2023   1632 I obtained the patient's history and examined as noted above.    1725 Left arm pain was relieved following nitroglycerin.  Now complaining of left neck pain.  Patient remains bradycardic, but mentating well with normal to elevated blood pressure.  Oxygen saturation is 90%.  No increased work of breathing.   1803 I consulted with Dr. Schwartz, hospitalist, regarding the patient's presentation to the ED who accepted the patient for admission.    1816 I consulted with Dr. Snell, cardiology, regarding the patient's presentation to the ED.      Social Determinants of Health affecting care:   None    Disposition:  The patient was admitted to the hospital under the care of Dr. Garcia.     Impression & Plan      Medical Decision Making:  Hayder Alves is a 68 year old male with a history of coronary artery disease, CABG, COPD, cardiomyopathy, who presents today with shortness of breath and left arm pain.  On exam, the patient is very bradycardic, hypoxic, and hypertensive.  Lung exam is consistent with COPD exacerbation.  He does not have evidence for volume overload.  Of note, the patient presented with similar arm pain in the setting of the previous MI.  He had complete relief of his arm pain following 3 nitroglycerin.  He continued to complain of neck pain, which seems more consistent with known chronic neck pain.  This was relieved with fentanyl.  The patient was also noted to be quite hypoxic.  Given lung exam, I suspect combination of pulmonary edema and COPD.  He was started on steroids and  nebulizers.  He remained hypoxic, but is mentating well, and does not have increased work of breathing.  Given no significant hypercarbia, or increased work of breathing, I did not start BiPAP.  Patient was given 1 dose of IV Lasix in the ED.  I considered pulmonary embolism given the degree of hypoxia.  D-dimer returned elevated, however, given renal failure the patient is not a candidate for CT PE.  I am concerned for acute coronary syndrome given elevated troponin.  The patient was started on high intensity heparin to treat for possible PE, though I think this is less likely given findings on lung exam.  Bilateral lower extremity Doppler ultrasounds were negative.  The patient was also noted to be hyperglycemic with a small anion gap.  He may have mild DKA related to cardiac ischemia.  Given concern for concurrent pulmonary edema, I did not give him IV fluids.  He was given a dose of IV insulin in the ED.  He has a very complex patient with multiple organ systems involved, and will require close monitoring of his bradycardia in the ICU.  His bradycardia improved while in the ED.  I suspect this is likely related to cardiac ischemia as well.  The patient was placed on defibrillator pads, but did require pacing in the ED.  At this point, he seems to be perfusing well and that his mental status has remained stable, blood pressures have remained stable.  No need for reversal of amlodipine or transcutaneous pacing at this time.      Diagnosis:    ICD-10-CM    1. NSTEMI (non-ST elevated myocardial infarction) (H)  I21.4       2. Acute pulmonary edema (H)  J81.0       3. COPD exacerbation (H)  J44.1       4. Hyperglycemia  R73.9          Scribe Disclosure:  I, Pamela Medley, am serving as a scribe at 6:04 PM on 6/29/2023 to document services personally performed by Irma Zamudio MD based on my observations and the provider's statements to me.     6/29/2023   Irma Zamudio MD Pepper, Tracy Lynn,  MD  06/29/23 [id number]

## 2023-06-30 NOTE — PROVIDER NOTIFICATION
Brief update:    Patient's blood glucose in the 500 range this morning    Anion gap, acidosis and elevated blood glucose would all be consistent with diabetic ketoacidosis, worsening despite insulin administration overnight    Initiating insulin drip  Ketones added on    Discontinuing sliding scale and prandial insulin dosing while on insulin drip.    Baldemar Skinner MD  7:08 AM

## 2023-06-30 NOTE — PROGRESS NOTES
RECEIVING UNIT ED HANDOFF REVIEW    ED Nurse Handoff Report was reviewed by: Ping Cabral RN on June 30, 2023 at 7:58 AM

## 2023-06-30 NOTE — CONSULTS
[facility]    Cardiology Consultation     Date of Admission:  6/29/2023    Assessment & Plan   Hayder Alves is a 68 year old male with PMH significant for recently diagnosed Lewy Body Dementia, chronic neck pain (s/p spinal fusion), COPD (not on home oxygen), CAD (s/p CABG 2001), CKD stage IV, hypertension, dyslipidemia, systolic CHF, diabetes mellitus, medication noncompliance, h/o atrial fibrillation (s/p cardioversion 2020; not currently on anticoagulation) who presents with sob and L arm pain.  We are asked to see for possible NSTEMI.      1. Acute respiratory failure, requiring 10L by oxymask, improving down to 2 L with diuresis.  Likely mixed C OPD exacerbation as well as heart failure exacerbation.  2. Elevated troponin, flat, and patient with history of CABG over 20 years ago.  Pt describes anginal sx of bilateral arm and chest pain, but these may have been related to ischemia driven by hypoxia.  Although, given his poorly controlled DMII and medication non compliance, I suspect he has real disease.  Unfortunately, patient has acute on chronic renal failure and ischemic evaluation is limited.    3. Atrial fibrillation now with variable block atrial flutter with episodes of bradycardia and junctional escape in the 30s vs complete heart block.  He had been on Toprol 50 mg a day, unclear when he took his last dose.  This is now on hold.  4. Possible DKA and uncontrolled type 2 diabetes.  Last A1c, was 10.3 although this is improved from 12.4 six months ago.  5. Hypertension elevated on admission, improving  6. CKD appreciate nephrology  7. Dyslipidemia  8. COPD, not on home O2  9. Medication noncompliance, limited cardiology follows last seen though by Dr. Palafox 5/23 not started on anticoagulation at that time because of falls.  10. Lewy body dementia    Plan:  Difficult situation with the patient's severe renal failure.  We suspect that he perhaps he developed bradycardia  resulting in poor cardiac output, pulmonary edema, hypoxia and demand infarct.  Of course with his history of severe coronary disease and bypass over 20 years ago and poorly controlled diabetes it is quite likely he is occluded some of those bypasses and has worsening disease.  At this point, we will get an echocardiogram and see what his EF is.  If it is down, our only available medications will be beta-blocker, hydralazine, and nitrates.  At that point, we may need to consider permanent pacemaker.  Otherwise, we will likely need to medically manage any coronary artery disease.  As far as anticoagulation goes, he has not been on oral anticoagulation due to history of falls per his primary cardiologist Dr. Palafox.  His wife is not present, and risks and benefits can continue to be discussed going forward although I think it is unlikely we will restart anticoagulation.  At this time, we will keep him on a low-dose heparin drip.    Thank you for this consult.  This patient was discussed with Dr. Arthur.  Final recommendations are pending his documentation.    High complexity  75 minutes was spent on counseling coronation of care and clearing review of multiple visits.   Jahaira Alanis PA-C  5:36 PM 6/30/2023   [facility]        Primary Care Physician   Suma Jenkins    Reason for Consult   Reason for consult: I was asked by Dr. Caceres to evaluate this patient for bradycardia and nstemi.    History of Present Illness   Hayder Alves is a 68 year old male with PMH significant for recently diagnosed Lewy Body Dementia, chronic neck pain (s/p spinal fusion), COPD (not on home oxygen), CAD (s/p CABG 2001), CKD stage IV, hypertension, dyslipidemia, systolic CHF, diabetes mellitus, medication noncompliance, h/o atrial fibrillation (s/p cardioversion 2020; not currently on anticoagulation) who presents with sob and bilateral arm pain.  Patient notes he was at home at rest when he started feeling short of  breath.  He started having bilateral arm pain that then moved to his chest and worsened with exertion.  He tried moved from 1 part of his house to another unit to sit down and rest on the couch.  He denies this happening in the weeks leading up to this.  He denies being short of breath in the weeks leading up to this.  He has not had any syncope or presyncope.  He denies palpitations.  He has had falls but he states these are secondary to tripping and not syncopal episodes.  On arrival he was found to have oxygen saturations of 85% with a heart rate of 77.  Chest pain was relieved with nitro x3.    Chest x-ray showed pulmonary vascular congestion and mild to moderate pulmonary edema with bilateral pleural effusions.  N-terminal proBNP is elevated at 27,000, with trop at 118 on admission and down trending, but in the context of acute renal failure and creatinine up to 3.1 from baseline of about 2.5-3.  Glucose was 406 on admit increasing to 510.  Patient has had atrial flutter with variable block since admission, initially at 4 and 1 them what appears to be 5 or 6-100 and low heart rates in the 30s.  This was followed by an idioventricular rhythm at 34 bpm versus ongoing atrial flutter with tiny P waves followed by a similar EKG and finally EKG today showing bifasicular block.  Echo is pending, but at baseline pt as normal EF.     Past Medical History   Past Medical History:   Diagnosis Date     Bipolar I disorder, most recent episode (or current) manic, severe, specified as with psychotic behavior      CAD (coronary artery disease) 2001    MI, CAB      Cardiovascular disease 5/26/2005     Problem list name updated by automated process. Provider to review     Chronic systolic CHF (congestive heart failure) (H)      Essential hypertension, benign      Impaired fasting glucose      Insomnia, unspecified      Ischemic cardiomyopathy      Other anxiety states      PLANTAR fasciaitis      Pneumonia 10/25/2016     Pure  hypercholesterolemia      Respiratory failure (H) 10/11/2016     Syncope     probably iatrogenic due to medications     Type II or unspecified type diabetes mellitus without mention of complication, not stated as uncontrolled        Past Surgical History   Past Surgical History:   Procedure Laterality Date     ANESTHESIA CARDIOVERSION N/A 10/29/2020    Procedure: ANESTHESIA, FOR CARDIOVERSION @1400  Latex Allergy;  Surgeon: GENERIC ANESTHESIA PROVIDER;  Location: [facility] OR     SURGICAL HISTORY OF -       skin grafts X 4     SURGICAL HISTORY OF -   1978    lumbar fusion L1-5     SURGICAL HISTORY OF -   2008    C4-7 cervical fusion     SURGICAL HISTORY OF -   12/09    C 6-7 fusion     [facility] CABG, ARTERIAL, THREE  3/00    CABG X 3, stent     [facility]  [facility]    left main       Prior to Admission Medications   Prior to Admission Medications   Prescriptions Last Dose Informant Patient Reported? Taking?   Continuous Blood Gluc Sensor (DEXCOM G6 SENSOR) MISC   Yes No   Sig: Change every 10 days. Start 11-28-22.   Fluticasone-Umeclidin-Vilant (TRELEGY ELLIPTA) 100-62.5-25 MCG/ACT oral inhaler 6/29/2023  No Yes   Sig: Inhale 1 puff into the lungs daily   Lancets (MICROLET) MISC   No No   Sig: Test once daily   Multiple Vitamins-Minerals (CENTRUM SILVER) per tablet 6/29/2023  Yes Yes   Sig: Take 1 tablet by mouth daily   albuterol (PROAIR HFA/PROVENTIL HFA/VENTOLIN HFA) 108 (90 Base) MCG/ACT inhaler PRN  No Yes   Sig: Inhale 2 puffs into the lungs every 6 hours as needed for shortness of breath   amLODIPine (NORVASC) 5 MG tablet 6/28/2023  No Yes   Sig: Take 1 tablet (5 mg) by mouth At Bedtime   amitriptyline (ELAVIL) 10 MG tablet 6/28/2023  No Yes   Sig: TAKE 3 TABLETS BY MOUTH EVERY NIGHT AT BEDTIME   aspirin 81 MG EC tablet 6/29/2023  Yes Yes   Sig: Take 81 mg by mouth daily   busPIRone (BUSPAR) 5 MG tablet 6/29/2023 at AM  No Yes   Sig: Take 1 tablet (5 mg) by mouth 3 times daily   furosemide (LASIX) 20 MG  "tablet 6/29/2023 at AM  No Yes   Sig: TAKE 1 TABLET(20 MG) BY MOUTH TWICE DAILY   gabapentin (NEURONTIN) 300 MG capsule 6/29/2023 at AM  No Yes   Sig: TAKE 1 CAPSULE BY MOUTH TWICE DAILY   insulin glargine (LANTUS SOLOSTAR) 100 UNIT/ML pen   No Yes   Sig: Inject 30 Units Subcutaneous every morning Ok to substitute Basaglar at same dose and frequency if insurance prefers.   insulin lispro (HUMALOG KWIKPEN) 100 UNIT/ML (1 unit dial) KWIKPEN   No Yes   Sig: Inject 10 Units Subcutaneous 3 times daily (before meals)   insulin pen needle (31G X 5 MM) 31G X 5 MM miscellaneous   No No   Sig: Use 4 pen needles daily or as directed.   insulin syringe 31G X 5/16\" 1 ML MISC   No No   Sig: USE 2 SYRINGES DAILY OR AS DIRECTED   ipratropium - albuterol 0.5 mg/2.5 mg/3 mL (DUONEB) 0.5-2.5 (3) MG/3ML neb solution PRN  No Yes   Sig: Take 1 vial (3 mLs) by nebulization every 6 hours as needed for shortness of breath, wheezing or cough   metoprolol succinate ER (TOPROL XL) 50 MG 24 hr tablet 6/29/2023  No Yes   Sig: Take 1 tablet (50 mg) by mouth daily   nitroGLYcerin (NITROSTAT) 0.4 MG sublingual tablet PRN  No Yes   Sig: Place 1 tablet (0.4 mg) under the tongue every 5 minutes as needed   oxyCODONE-acetaminophen (PERCOCET) 5-325 MG tablet Past Week  No Yes   Sig: Take 1 tablet by mouth At Bedtime Must last 30 days   rosuvastatin (CRESTOR) 20 MG tablet 6/29/2023  No Yes   Sig: Take 1 tablet (20 mg) by mouth daily      Facility-Administered Medications: None     Current Facility-Administered Medications   Medication Dose Route Frequency     amitriptyline  30 mg Oral At Bedtime     amLODIPine  5 mg Oral At Bedtime     aspirin  81 mg Oral Daily     busPIRone  5 mg Oral TID     fluticasone-vilanterol  1 puff Inhalation Daily    And     umeclidinium  1 puff Inhalation Daily     gabapentin  300 mg Oral BID     [Held by provider] insulin glargine  30 Units Subcutaneous At Bedtime     oxyCODONE-acetaminophen  1 tablet Oral At Bedtime     " [START ON 7/1/2023] rosuvastatin  10 mg Oral Daily     senna-docusate  1 tablet Oral BID    Or     senna-docusate  2 tablet Oral BID     sodium chloride (PF)  3 mL Intracatheter Q8H     Current Facility-Administered Medications   Medication Last Rate     heparin 1,200 Units/hr (06/30/23 1053)     insulin Stopped (06/30/23 1439)     - MEDICATION INSTRUCTIONS -       - MEDICATION INSTRUCTIONS -       Allergies   Allergies   Allergen Reactions     Adhesive Tape      From blood draw site.     Eszopiclone      Strange dreams. Cooking during sleep      Latex      Lipitor [Atorvastatin Calcium] Other (See Comments)     myalgias     Lisinopril Other (See Comments)     Hyperkalemia and increased creatinine       Social History    reports that he quit smoking about 23 years ago. His smoking use included cigarettes. He has a 45.00 pack-year smoking history. He has never used smokeless tobacco. He reports current alcohol use. He reports that he does not use drugs.    Family History   I have reviewed this patient's family history and updated it with pertinent information if needed.  Family History   Problem Relation Age of Onset     Cancer Sister         pancreatic ca     C.A.D. Father         mi,stroke,htn, 60's     Cerebrovascular Disease Father 64     C.A.D. Mother         mi,htn, 60's     Breast Cancer No family hx of      Cancer - colorectal No family hx of      Prostate Cancer No family hx of           Review of Systems   A comprehensive review of system was performed and is negative other than that noted in the HPI or here.     Physical Exam   Vital Signs with Ranges  Temp:  [97.6  F (36.4  C)] 97.6  F (36.4  C)  Pulse:  [31-79] 75  Resp:  [19-39] 19  BP: (149-173)/() 153/78  SpO2:  [89 %-96 %] 94 %  Wt Readings from Last 4 Encounters:   06/29/23 89.9 kg (198 lb 3.1 oz)   06/08/23 85.4 kg (188 lb 3.2 oz)   05/04/23 83.4 kg (183 lb 14.4 oz)   05/02/23 82.8 kg (182 lb 9.6 oz)     No intake/output data  recorded.      Vitals: BP (!) 153/78   Pulse 75   Temp 97.6  F (36.4  C) (Temporal)   Resp 19   Wt 89.9 kg (198 lb 3.1 oz)   SpO2 94%   BMI 30.18 kg/m      Physical Exam:   General - Alert and oriented to self and place   Eyes - No scleral icterus  HEENT - Neck supple, moist mucous membranes  Cardiovascular - irr irr, II/VI systolic murmur heard best LLSB  Extremities - There is no edema  Respiratory - diminished in bases, but without rales or rhochi.    Skin - No pallor or cyanosis  Gastrointestinal - Non tender and non distended without rebound or guarding  Psych - Appropriate affect   Neurological - No gross motor neurological focal deficits    No lab results found in last 7 days.    Invalid input(s): TROPONINIES    Recent Labs   Lab 06/30/23  1413 06/30/23  1255 06/30/23  1205 06/30/23  1129 06/30/23  0853 06/30/23  0549 06/30/23  0545 06/29/23  1925 06/29/23  1625   WBC  --   --   --   --   --  7.4  --   --  14.5*  14.8*   HGB  --   --   --   --   --  12.1*  --   --  12.8*  12.7*   MCV  --   --   --   --   --  86  --   --  88  87   PLT  --   --   --   --   --  142*  --   --  198  182   NA  --   --   --  131*  --   --  127*  --  130*   POTASSIUM  --   --   --  4.1  --   --  4.7  --  4.5   CHLORIDE  --   --   --  93*  --   --  90*  --  92*   CO2  --   --   --  15*  --   --  16*  --  18*   BUN  --   --   --  47.4*  --   --  45.6*  --  34.6*   CR  --   --   --  3.10*  --   --  3.13*  --  2.80*   GFRESTIMATED  --   --   --  21*  --   --  21*  --  24*   ANIONGAP  --   --   --  23*  --   --  21*  --  20*   WANDA  --   --   --  9.0  --   --  9.0  --  9.4   * 127* 131* 201*   < >  --  510*   < > 406*   ALBUMIN  --   --   --   --   --   --   --   --  4.0   PROTTOTAL  --   --   --   --   --   --   --   --  7.4   BILITOTAL  --   --   --   --   --   --   --   --  0.8   ALKPHOS  --   --   --   --   --   --   --   --  93   ALT  --   --   --   --   --   --   --   --  13   AST  --   --   --   --   --   --   --    --  18    < > = values in this interval not displayed.     Recent Labs   Lab Test 06/20/22  1040 01/18/21  1623 01/03/17  1548 10/05/15  1215   CHOL 170 137   < > 293*   HDL 38* 44   < > 37*   LDL 61 59   < > 185*   TRIG 354* 172*   < > 354*   CHOLHDLRATIO  --   --   --  7.9*    < > = values in this interval not displayed.     Recent Labs   Lab 06/30/23  0549 06/29/23  1625   WBC 7.4 14.5*  14.8*   HGB 12.1* 12.8*  12.7*   HCT 36.3* 38.8*  38.8*   MCV 86 88  87   * 198  182     Recent Labs   Lab 06/30/23  1129 06/29/23  1625   PHV 7.38 7.33   PO2V 64* 8*   PCO2V 32* 37*   HCO3V 19* 20*     Recent Labs   Lab 06/30/23  0545 06/29/23  1625   NTBNPI 26,866* 19,855*     Recent Labs   Lab 06/29/23  1625   DD 1.01*     No results for input(s): SED, CRP in the last 168 hours.  Recent Labs   Lab 06/30/23  0549 06/29/23  1625   * 198  182     No results for input(s): TSH in the last 168 hours.  No results for input(s): COLOR, APPEARANCE, URINEGLC, URINEBILI, URINEKETONE, SG, UBLD, URINEPH, PROTEIN, UROBILINOGEN, NITRITE, LEUKEST, RBCU, WBCU in the last 168 hours.    Imaging:  Recent Results (from the past 48 hour(s))   XR Chest Port 1 View    Narrative    EXAM: XR CHEST PORT 1 VIEW  LOCATION: [facility]  DATE: 6/29/2023    INDICATION: Shortness of breath  COMPARISON: Chest radiograph 08/18/2017.      Impression    IMPRESSION: Stable size of cardiomediastinal silhouette status post median sternotomy and CABG. Defibrillator pads overlying the left chest. Pulmonary vascular congestion with likely mild to moderate pulmonary edema and small bilateral pleural effusions   with compressive atelectasis, right larger than left. No pneumothorax. Bones are unchanged. Cervical spinal fusion hardware again noted.   US Lower Extremity Venous Duplex Bilateral    Narrative    EXAM: US LOWER EXTREMITY VENOUS DUPLEX BILATERAL  LOCATION: [facility]  DATE:  6/29/2023    INDICATION: elevated d dimer, not candidate for CT PE given GFR  COMPARISON: None.  TECHNIQUE: Venous Duplex ultrasound of bilateral lower extremities with and without compression, augmentation and duplex. Color flow and spectral Doppler with waveform analysis performed.    FINDINGS: Exam includes the common femoral, femoral, popliteal veins as well as segmentally visualized deep calf veins and greater saphenous vein.     RIGHT: No deep vein thrombosis. No superficial thrombophlebitis. No popliteal cyst.    LEFT: No deep vein thrombosis. No superficial thrombophlebitis. No popliteal cyst.      Impression    IMPRESSION:  1.  No deep venous thrombosis in the bilateral lower extremities.       Echo:  No results found for this or any previous visit (from the past 4320 hour(s)).    Clinically Significant Risk Factors Present on Admission         # Hyponatremia: Lowest Na = 127 mmol/L in last 2 days, will monitor as appropriate     # Anion Gap Metabolic Acidosis: Highest Anion Gap = 23 mmol/L in last 2 days, will monitor and treat as appropriate    # Drug Induced Platelet Defect: home medication list includes an antiplatelet medication   # Hypertension: Noted on problem list     # DMII: A1C = 10.3 % (Ref range: <5.7 %) within past 6 months           Cardiac Arrhythmia: Atrial flutter: Unspecified    Fluid overload, unspecified    Acute kidney failure, unspecified    Dementia: Unspecified dementia without behavioral disturbance    COPD

## 2023-06-30 NOTE — PROGRESS NOTES
Clinic Care Coordination Contact  Ambulatory Care Coordination to Inpatient Care Management   Hand-In Communication    Date:  June 30, 2023  Name: Hayder Alves is enrolled in Ambulatory Care Coordination program and I am the Lead Care Coordinator.  CC Contact Information: Epic InBasket + phone  Payor Source: Payor: [facility] / Plan: [employer] MEDICARE ADVANTAGE / Product Type: HMO /   Current services in place:     Please see the CC Snaphot and Care Management Flowsheets for specific  details of this Hayder Alves care plan.   Additional details/specific concerns r/t this admission:    Home Safety spouse having a tough time continuing to care for pt and looking into facilities and payor source.  had Financial resource worker referral commpleted and they were not able to reach spouse so closed    I will follow this admission in Epic. Please feel free to contact me with questions or for further collaboration in discharge planning.    SEVEN Leon  [facility] - Care Coordinator   6/30/2023   9:59 AM  [phone]

## 2023-06-30 NOTE — CONSULTS
RENAL CONSULTATION NOTE      REFERRING MD:  Ximena    REASON FOR CONSULTATION:  CKD/KAY/acidosis         A/P:     1.  CKD stage 4   -baseline creatinine 2.5 to 3.0 mg/dl   -eGFR 25 ml/nmin   -presumed diabetic nephropathy  2.  Proteinuria   -recent ACR > 3500 mg/g  3.  Chronic progressive disease   -abnormal creatinines back to 2010   -previous attempts to schedule with [facility]  4.  AGMA   -delta gap/delta HCO3- ~1   -ketones and lactate nl   -low suspicion for ingestion      Given BS > 500 seems consistent with DKA but ketones negative  ? related to kidney disease    5.  Pseudohyponatremia    -corrected Na = 134 mmol/l  6.  History of increased PTH/low vitamin D  7.  Lewy body dementia  8.  DM   -uncontrolled    -A1C > 10% for some time   -no retinopathy 2021  9.  ? Acute Kidney Injury    -no IO   -last diuretic 06/29/23      Cautious diuretic (single dose only)  BS control  PTH/vitamin D level  PCR  SPEP/UPEP   IO      HPI:     68-year-old male presented to the ED in the setting of increasing shortness of breath.  Patient with known multiple comorbid medical conditions as outlined.  Recent diagnosis of Lewy body dementia, COPD, coronary disease, advanced chronic kidney disease, hypertension, and poorly controlled diabetes.    From a renal standpoint he has had abnormal kidney function for in excess of 13 years.  Baseline kidney function has gradually risen over this timeframe.  His most recent creatinines have been in the range of 2.5 to 3.0 mg/dL.    Renal evaluation at the [facility] has been attempted in 2021 and 2023 without scheduled appointment.    He has significant proteinuria in the setting of poorly controlled diabetes.  Despite absence of retinopathy in 2021 I suspect this is diabetic nephropathy.  This is a chronic progressive disease.    He presents in addition with pseudohyponatremia and non-anion gap metabolic acidosis.  His lactate is negative and his ketones were  nondiagnostic.  No suspicion for ingestion.  This I believe is a component of DKA.    Wife is present with him in the room.  He is awake pleasant answers questions  Seems to be able to address his history.    Denies chest pain or significant shortness of breath at this time  He is on oxygen  He did receive IV Solu-Medrol for COPD  This may have aggravated his blood sugars.      ROS:  A complete 10 point review of systems was performed and is negative except as noted above.    PMH:    Past Medical History:   Diagnosis Date     Bipolar I disorder, most recent episode (or current) manic, severe, specified as with psychotic behavior      CAD (coronary artery disease) 2001    MI, CAB      Cardiovascular disease 5/26/2005     Problem list name updated by automated process. Provider to review     Chronic systolic CHF (congestive heart failure) (H)      Essential hypertension, benign      Impaired fasting glucose      Insomnia, unspecified      Ischemic cardiomyopathy      Other anxiety states      PLANTAR fasciaitis      Pneumonia 10/25/2016     Pure hypercholesterolemia      Respiratory failure (H) 10/11/2016     Syncope     probably iatrogenic due to medications     Type II or unspecified type diabetes mellitus without mention of complication, not stated as uncontrolled        PSH:    Past Surgical History:   Procedure Laterality Date     ANESTHESIA CARDIOVERSION N/A 10/29/2020    Procedure: ANESTHESIA, FOR CARDIOVERSION @1400  Latex Allergy;  Surgeon: GENERIC ANESTHESIA PROVIDER;  Location: [facility] OR     SURGICAL HISTORY OF -       skin grafts X 4     SURGICAL HISTORY OF -   1978    lumbar fusion L1-5     SURGICAL HISTORY OF -   2008    C4-7 cervical fusion     SURGICAL HISTORY OF -   12/09    C 6-7 fusion     Z CABG, ARTERIAL, THREE  3/00    CABG X 3, stent     [facility]  [facility]    left main       MEDICATIONS:    No current outpatient medications on file.       ALLERGIES:    Allergies as of  2023 - Reviewed 2023   Allergen Reaction Noted     Adhesive tape  2005     Eszopiclone  2014     Latex  05/10/2006     Lipitor [atorvastatin calcium] Other (See Comments) 2011     Lisinopril Other (See Comments) 2022       FH:    Family History   Problem Relation Age of Onset     Cancer Sister         pancreatic ca     C.A.D. Father         mi,stroke,htn, 60's     Cerebrovascular Disease Father 64     C.A.D. Mother         mi,htn, 60's     Breast Cancer No family hx of      Cancer - colorectal No family hx of      Prostate Cancer No family hx of        SH:    Social History     Socioeconomic History     Marital status:      Spouse name: Lilia Alves     Number of children: 3     Years of education: Not on file     Highest education level: Not on file   Occupational History     Not on file   Tobacco Use     Smoking status: Former     Packs/day: 1.50     Years: 30.00     Pack years: 45.00     Types: Cigarettes     Quit date: 2000     Years since quittin.4     Smokeless tobacco: Never   Substance and Sexual Activity     Alcohol use: Yes     Comment: occasionally. 1-2 per week     Drug use: No     Sexual activity: Yes     Partners: Female     Birth control/protection: None   Other Topics Concern     Parent/sibling w/ CABG, MI or angioplasty before 65F 55M? Yes      Service Not Asked     Blood Transfusions Not Asked     Caffeine Concern Not Asked     Occupational Exposure Not Asked     Hobby Hazards Not Asked     Sleep Concern Not Asked     Stress Concern Not Asked     Weight Concern Not Asked     Special Diet No     Back Care Not Asked     Exercise Yes     Comment: trying     Bike Helmet Not Asked     Seat Belt Not Asked     Self-Exams Not Asked   Social History Narrative     Not on file     Social Determinants of Health     Financial Resource Strain: Low Risk  (10/13/2022)    Overall Financial Resource Strain (CARDIA)      Difficulty of Paying Living  Expenses: Not very hard   Food Insecurity: No Food Insecurity (10/13/2022)    Hunger Vital Sign      Worried About Running Out of Food in the Last Year: Never true      Ran Out of Food in the Last Year: Never true   Transportation Needs: No Transportation Needs (10/13/2022)    PRAPARE - Transportation      Lack of Transportation (Medical): No      Lack of Transportation (Non-Medical): No   Physical Activity: Insufficiently Active (10/13/2022)    Exercise Vital Sign      Days of Exercise per Week: 2 days      Minutes of Exercise per Session: 10 min   Stress: No Stress Concern Present (10/13/2022)    [employer] [place] [employer] - Occupational Stress Questionnaire      Feeling of Stress : Only a little   Social Connections: Socially Isolated (10/13/2022)    Social Connection and Isolation Panel [NHANES]      Frequency of Communication with Friends and Family: Never      Frequency of Social Gatherings with Friends and Family: Once a week      Attends Mandaen Services: Never      Active Member of Clubs or Organizations: No      Attends Club or Organization Meetings: Not on file      Marital Status:    Intimate Partner Violence: Not on file   Housing Stability: Low Risk  (10/13/2022)    Housing Stability Vital Sign      Unable to Pay for Housing in the Last Year: No      Number of Places Lived in the Last Year: 1      Unstable Housing in the Last Year: No       PHYSICAL EXAM:      Vitals were reviewed  Patient Vitals for the past 8 hrs:   BP Pulse Resp SpO2   06/30/23 1100 (!) 168/84 79 27 92 %   06/30/23 0952 -- (!) 47 29 96 %   06/30/23 0900 (!) 156/74 (!) 49 30 95 %   06/30/23 0846 (!) 154/78 -- 25 94 %   06/30/23 0807 -- 51 (!) 33 94 %   06/30/23 0800 (!) 153/101 50 (!) 39 94 %   06/30/23 0630 (!) 149/72 (!) 48 24 95 %   06/30/23 0600 (!) 157/74 (!) 48 21 96 %   06/30/23 0500 (!) 150/80 (!) 32 23 92 %   06/30/23 0400 (!) 152/70 (!) 32 22 92 %   06/30/23 0330 (!) 153/76 (!) 32 21 92 %     No  intake/output data recorded.      Vitals:    06/29/23 1715   Weight: 89.9 kg (198 lb 3.1 oz)         GENERAL: awake, alert, follows  HEENT: NC/AT, PERRLA, EOMI, non icteric, pharynx moist without lesion  RESP:  clear anteriorly  CV: RRR, normal S1 S2  ABDOMEN: soft  SKIN: clear without significant rashes or lesions  NEURO: speech normal and cranial nerves 2-12 intact  PSYCH: affect normal/bright  EXT: trace edema       LABS:        Recent Labs   Lab 06/30/23  1047 06/30/23  0853 06/30/23  0545   NA  --   --  127*   POTASSIUM  --   --  4.7   CHLORIDE  --   --  90*   CO2  --   --  16*   ANIONGAP  --   --  21*   *   < > 510*   BUN  --   --  45.6*   CR  --   --  3.13*   GFRESTIMATED  --   --  21*   WANDA  --   --  9.0    < > = values in this interval not displayed.     Recent Labs   Lab 06/30/23  0545 06/29/23  1625   CR 3.13* 2.80*     Recent Labs   Lab 06/30/23  0545 06/29/23  1625   POTASSIUM 4.7 4.5     Recent Labs   Lab 06/29/23  1625   ALBUMIN 4.0     Recent Labs   Lab 06/29/23  1625   LACT <0.3     Recent Labs   Lab 06/30/23  0549 06/30/23  0545 06/29/23  1625   PHOS  --  5.4*  --    HGB 12.1*  --  12.8*  12.7*       DIAGNOSTICS:  Reviewed      BRODIE Dc    [facility]  [phone]

## 2023-06-30 NOTE — ED NOTES
Dr. Bueno paged with pt's critical glucose of 510. Pt has no complaints at this time. Will give ordered insulin with breakfast. Pt slept soundly most of the night with wife @ bedside. States he is hungry this morning and asking when breakfast will be here.

## 2023-06-30 NOTE — PROGRESS NOTES
Shift 0980-2020   Patient A/Ox4 but can be forgetful at times. Bed alarms on for patient safety. Pt arrived from ED around 0845.  He has been on an insulin gtt (currently at 3u algorithm 2) and a hep gtt (currently at 1200u/hr).  He is NPO for now until lab results are in from 1430  MD noted he'd reassess diet status after this.  Today pt has denied chest pain, nausea, dizziness. He has SOB with exertion but he was able to be weaned down to 1L O2 via oxymask.   Echo was done today and VQ test will also be done today.
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
## 2023-06-30 NOTE — PROGRESS NOTES
[facility]    Medicine Progress Note - Hospitalist Service    Date of Admission:  6/29/2023    Assessment & Plan     Hayder Alves is a 68 year old male with a very complicated past medical history significant for recently diagnosed Lewy Body Dementia, chronic neck pain (s/p spinal fusion), COPD (not on home oxygen), CAD (s/p CABG 2001), CKD stage IV, hypertension, dyslipidemia, systolic CHF, diabetes mellitus, medication noncompliance, h/o atrial fibrillation (s/p cardioversion 2020; not currently on anticoagulation) presented to ED with shortness of breath and left arm pain.      Acute hypoxic respiratory failure likely due to acute on chronic systolic heart failure and exacerbation  NSTEMI likely due to above  History of coronary artery disease status CABG  new atrial flutter, likely paroxysmal atrial fibrillation with severe bradycardia (h/o cardioversion in 2020, not currently on anticoagulation)  Hypertension  Dyslipidemia  -Initially on admission presented with chief complaint of shortness of breath and chest discomfort.  Patient initially mentioned he had left arm pain and then mentioned that he had chest discomfort.  He was not able to provide good history.  -On presentation to the ER his heart rate was in 30s, EKG showed atrial flutter proBNP was significantly elevated at 1 9/855 and troponin was elevated at 118  -Chest x-ray was done in the ED which was concerning for volume overload and patient was given 60 IV Lasix in the ED and cardiology was consulted in the ED with interventions and he was started on heparin drip  -He had echo done in June 2022 and at that time his EF was 55 to 60%, no wall motion changes, mildly dilated left atrium, normal RV ventricular size and systolic function and mildly dilated atrium  -Did receive 60 mg of IV Lasix in the ED  -Duplex of the lower extremity did not show any evidence of DVT  -This morning patient was requiring 10 L of oxygen and  was transitioned to 2 L and has significant bilateral edema over lower extremities  - repeat EKG was done which showed normal sinus rhythm with first-degree AV block  -Troponin peaked at 118 and have trended down to 103  -Cardiology has been consulted, echo is pending and his repeat proBNP was significantly elevated  -We will give 40 mg IV Lasix to the patient, aspirin, statin dose was decreased to 10 mg(given creatinine clearance), amlodipine and beta-blocker held due to significant bradycardia  -We will monitor intake and output, daily weight    Elevated D-dimer  -Patient had elevated D-dimer of 1 on admission and he has history of COPD  -Patient does have CKD stage IV and cannot have CTA chest  -On admission he was started on high intensity heparin drip  -Duplex of the lower extremity does not show any evidence of DVT  -VQ scan has been ordered and does not show any evidence of PE  -Echo has been ordered and results are pending  -Patient does not need to be on heparin drip as far as concerns for PE discussed with cardiology and was switched to low intensity heparin drip and spoke with pharmacy team in person       History of COPD  -not on home oxygen;  and on presentation was noted 85% on room air; on 3 to 4 L oxygen  -Patient did get IV Solu-Medrol in the ER along with DuoNebs and his PTA inhalers were started  -Chest x-ray done on admission showed pulmonary vascular congestion with likely mild to moderate pulmonary edema and small bilateral pleural effusions with compressive atelectasis, right larger than left with no pneumothorax  -On my examination today patient did not had any wheezing and I do not think he is in exacerbation and we will continue him with his PTA inhalers and as needed DuoNebs and he is weaned down to 2 L of oxygen and sats should be around 92 given his COPD     Uncontrolled diabetes with likely ?mild DKA  -family reports medication noncompliance  -his insulin regimen was recently adjusted  and switched from NPH to Lantus (30 units daily) and NovoLog 10 units TID with meals which he has not started  -Patient told me that he is only taking 20 of insulin and I do not think he is compliant with his insulin at all  -on admission blood glucose 400s, anion gap 20, bicarb 18  -Per admitting physician there was concern about mild DKA but given concerns of CHF exacerbation IV fluids were held and patient was started on PTA insulin regimen  -His blood sugars have been significantly high at 510 this a.m. and his anion gap continues to be high at 21 with metabolic acidosis with bicarb of 16  -He was started on insulin drip this morning and ketones were normal at 0.30  -Of note he did receive IV fluid bolus but given concerns of heart failure exacerbation I have discontinued IV fluids  -I did continue him on insulin drip per DKA protocol and no IV fluids were given because of heart failure concerns and there was a delay from the lab in getting his basic metabolic panel, magnesium and phosphorus.  I did review the basic metabolic panel and his anion gap is 23 but he does have associated renal failure.  Venous pH did not show any concerns of acidosis.  I do think that his renal failure is driving his anion gap and acidosis as reviewed prior recent labs and his anion gap is always elevated.  I did discuss his case in detail with Dr. Ayon from nephrology and appreciate input and we will avoid bicarb for now and he was okay giving him 40 IV Lasix  -We will switch him from insulin drip to basal and bolus insulin regimen       CKD stage IV  Pseudo hyponatremia  Anion gap metabolic acidosis  -on admission creatinine 2.8 which seems stable around his baseline of 2.5 to 2.8; sodium  Was 130  -Diuretics were held as above overnight and CMP reviewed today shows that his sodium is 127 but corrected is 133 and his blood sugar is elevated at 510  -His renal function is also increased to 3.13 and it is a challenging situation  as heart failure exacerbation is one of the diagnosis   -Nephrology was consulted and we did start him on IV diuresis and we will monitor his labs closely      Chronic neck pain, history of spinal fusion  -will continue with his PTA Percocet and continue PTA Neurontin     Recently diagnosed Lewy Body Dementia  -noted  -follows neurology as outpatient          Diet: NPO per Anesthesia Guidelines for Procedure/Surgery Except for: Meds    DVT Prophylaxis: Heparin drip  Gardner Catheter: Not present  Lines: None     Cardiac Monitoring: ACTIVE order. Indication: Bradycardias (48 hours)  Code Status: Full Code      Clinically Significant Risk Factors Present on Admission         # Hyponatremia: Lowest Na = 127 mmol/L in last 2 days, will monitor as appropriate     # Anion Gap Metabolic Acidosis: Highest Anion Gap = 21 mmol/L in last 2 days, will monitor and treat as appropriate    # Drug Induced Platelet Defect: home medication list includes an antiplatelet medication   # Hypertension: Noted on problem list     # DMII: A1C = 10.3 % (Ref range: <5.7 %) within past 6 months            Disposition Plan      Expected Discharge Date: 07/01/2023                  Jose Bueno MD  Hospitalist Service  [facility]  Securely message with [employer] (more info)  Text page via invi Paging/Directory   ______________________________________________________________________    Interval History     I saw the patient today and asked him about his insulin regimen and he said he takes 20 units but was not able to tell me the type of insulin.  He does have some shortness of breath and mentioned that he has edema over both lower extremities for the past 1 week.  He denies any fever or chills.    I did discuss the plan of care with patient's nurse, nephrology, cardiology, patient's wife and patient's nurse multiple times during course of the day    I had a long discussion with his wife Janie and she mentioned to me that  "patient has currently not switched to Lantus and he is on Humulin and and are    Physical Exam     Vital signs:  Temp: 97.6  F (36.4  C) Temp src: Temporal BP: (!) 153/78 Pulse: 75   Resp: 19 SpO2: 94 % O2 Device: Oxymask Oxygen Delivery: 2 LPM   Weight: 89.9 kg (198 lb 3.1 oz)  Estimated body mass index is 30.18 kg/m  as calculated from the following:    Height as of 5/4/23: 1.726 m (5' 7.95\").    Weight as of this encounter: 89.9 kg (198 lb 3.1 oz).          General: Patient appears comfortable and in no acute distress.  HEENT: Head is atraumatic, normocephalic.  Pupils are equal, round and reactive to light.  No scleral icterus. Oral mucosa is moist   Neck: Neck is supple   Respiratory: Lungs are clear to auscultation bilaterally with no wheeze or crackles and he was able to speak in full sentences and was turned down to 2 L during course of the day  Cardiovascular: Regular rate , S1 and S2 normal with no murmer or rubs or gallops and has bilateral edema extending up to the thighs over both lower extremities  Abdomen:   soft , non tender , non distended and bowel sound present   Skin: No skin rashes or lesions to inspection or palpation.  Neurologic:  No facial droop  Musculoskeletal: Normal Range of motion over upper and lower extremities bilaterally   Psychiatric: cooperative     Medical Decision Making         Time spent in care of patient is 65 minutes and I reviewed his labs, discussed his case with nephrology, patient's wife, patient's nurse and he was seen multiple times during course of the day    Data     I have personally reviewed the following data over the past 24 hrs:    7.4  \   12.1 (L)   / 142 (L)     127 (L) 90 (L) 45.6 (H) /  466 (H)   4.7 16 (L) 3.13 (H) \       ALT: 13 AST: 18 AP: 93 TBILI: 0.8   ALB: 4.0 TOT PROTEIN: 7.4 LIPASE: N/A       Trop: 103 (HH) BNP: 19,855 (H)       TSH: N/A T4: N/A A1C: 10.3 (H)       Procal: N/A CRP: N/A Lactic Acid: <0.3       INR:  N/A PTT:  N/A   D-dimer:  1.01 " (H) Fibrinogen:  N/A       Imaging results reviewed over the past 24 hrs:   Recent Results (from the past 24 hour(s))   XR Chest Port 1 View    Narrative    EXAM: XR CHEST PORT 1 VIEW  LOCATION: [facility]  DATE: 6/29/2023    INDICATION: Shortness of breath  COMPARISON: Chest radiograph 08/18/2017.      Impression    IMPRESSION: Stable size of cardiomediastinal silhouette status post median sternotomy and CABG. Defibrillator pads overlying the left chest. Pulmonary vascular congestion with likely mild to moderate pulmonary edema and small bilateral pleural effusions   with compressive atelectasis, right larger than left. No pneumothorax. Bones are unchanged. Cervical spinal fusion hardware again noted.   US Lower Extremity Venous Duplex Bilateral    Narrative    EXAM: US LOWER EXTREMITY VENOUS DUPLEX BILATERAL  LOCATION: [facility]  DATE: 6/29/2023    INDICATION: elevated d dimer, not candidate for CT PE given GFR  COMPARISON: None.  TECHNIQUE: Venous Duplex ultrasound of bilateral lower extremities with and without compression, augmentation and duplex. Color flow and spectral Doppler with waveform analysis performed.    FINDINGS: Exam includes the common femoral, femoral, popliteal veins as well as segmentally visualized deep calf veins and greater saphenous vein.     RIGHT: No deep vein thrombosis. No superficial thrombophlebitis. No popliteal cyst.    LEFT: No deep vein thrombosis. No superficial thrombophlebitis. No popliteal cyst.      Impression    IMPRESSION:  1.  No deep venous thrombosis in the bilateral lower extremities.

## 2023-07-01 NOTE — PROGRESS NOTES
MD Notification    Notified Person: MD    Notified Person Name: cJ Guillen MD    Notification Date/Time: 07/01/2023 0120    Notification Interaction: Talked with MD    Purpose of Notification: Pt came in with DKA, ins gtt off now on orals.   BS is 347.   Do you want another dose of ins?  anything else?    Orders Received: give dose of ins, recheck at 0200

## 2023-07-01 NOTE — PROGRESS NOTES
"BRIEF NUTRITION ASSESSMENT      REASON FOR ASSESSMENT:  Hayder Alves is a 68 year old male seen by Registered Dietitian for Admission Nutrition Risk Screen for wt loss \"lost 20 lbs\" per RN documentation     NUTRITION HISTORY:  Chart reviewed  Patient unavailable x2 this afternoon (w/ MD x1, sleeping and not waking to voice x1)   No known food allergies   Per H&P, h/o uncontrolled diabetes w/ family reported medication non-compliance     CURRENT DIET AND INTAKE:  Diet:  Moderate consistent CHO    Ate 100% of breakfast and lunch so far today   Mild edema noted LEs    ANTHROPOMETRICS:  Height: 5' 8\"  Weight:  192 lbs 3.2 oz  Body mass index is 29.26 kg/m .   Weight Status: Overweight BMI 25-29.9  IBW:  70 kg  %IBW: 125%  Weight History: no suspected wt loss in the past year - current wt up with fluid. Unsure typical dry wt   Wt Readings from Last 10 Encounters:   07/01/23 87.2 kg (192 lb 3.2 oz)   06/08/23 85.4 kg (188 lb 3.2 oz)   05/04/23 83.4 kg (183 lb 14.4 oz)   05/02/23 82.8 kg (182 lb 9.6 oz)   03/28/23 84.8 kg (187 lb)   02/01/23 88.5 kg (195 lb)   12/19/22 89.9 kg (198 lb 3.2 oz)   12/05/22 87.6 kg (193 lb 3.2 oz)   11/28/22 84.4 kg (186 lb)   06/20/22 88 kg (194 lb)       LABS:  Labs noted  Lab Results   Component Value Date    A1C 10.3 06/29/2023    A1C 12.0 03/28/2023    A1C 12.4 12/05/2022    A1C 11.3 06/20/2022    A1C 7.1 01/18/2021    A1C 6.9 02/19/2020    A1C 9.0 08/08/2019    A1C 8.5 02/27/2019    A1C 11.3 12/13/2018     Recent Labs   Lab 07/01/23  0723 07/01/23  0550 07/01/23  0110 06/30/23  2104 06/30/23  1834 06/30/23  1644   * 268* 347* 337* 264*  253* 216*       MALNUTRITION:  Visual Nutrition Focused Physical Assessment (NFPA) not completed at this time.  Unable to determine malnutrition status today     % Weight Loss:  None noted on file, more information needed from patient   % Intake:  Unable to determine   Subcutaneous Fat Loss:  Unable to determine   Muscle Loss:  Unable to " determine   Fluid Retention:  Mild     NUTRITION INTERVENTION:  Nutrition Diagnosis:  Altered nutrition related lab value related to uncontrolled diabetes with medication non compliance as evidenced by BG >200 and A1c >10     Implementation:  Nutrition Education:  Per Provider order if indicated (will approach diabetes teaching at follow-up)     FOLLOW UP/MONITORING:   Will re-evaluate in 7 - 10 days, or sooner, if re-consulted.      Joaquina Villafana RD, LD  Clinical Dietitian   Weekend pager [phone]

## 2023-07-01 NOTE — PROGRESS NOTES
[facility]    Cardiology Consultation - Progress Note     Date of Admission:  6/29/2023  Date of Service: 07/01/2023    Reason for Consult   Reason for consult: NSTEMI    History of Present Illness   Hayder Alves is a 68 year old male who was admitted on 6/29/2023 with dyspnea and left arm pain. His medical comorbidities include recently diagnosed Lewy Body Dementia, chronic neck pain (s/p spinal fusion), COPD (not on home oxygen), CAD (s/p CABG 2001), CKD stage IV, hypertension, dyslipidemia, systolic CHF, diabetes mellitus, medication noncompliance, and atrial fibrillation (s/p cardioversion 2020; not currently on anticoagulation). We are asked to see him for possible NSTEMI.    He was at home at rest when he started feeling short of breath.  He started having bilateral arm pain that then moved to his chest and worsened with exertion.  He tried moving from one part of his house to another unit to sit down and rest on the couch.  He denies this happening in the weeks leading up to this.  He denies being short of breath in the weeks leading up to this.  He has not had any syncope or presyncope.  He denies palpitations.  He has had falls but he states these are secondary to tripping and not syncopal episodes.  On arrival he was found to have oxygen saturations of 85% with a heart rate of 77.  Chest pain was relieved with nitro x3.     Chest x-ray showed pulmonary vascular congestion and mild to moderate pulmonary edema with bilateral pleural effusions.  N-terminal proBNP is elevated at 27,000, with trop at 118 on admission and down trending, but in the context of acute renal failure and creatinine up to 3.1 from baseline of about 2.5-3.  Glucose was 406 on admit increasing to 510.  Patient has had atrial flutter with variable block since admission, initially at 4 and 1 them what appears to be 5 or 6-100 and low heart rates in the 30s.  This was followed by an idioventricular rhythm at  34 bpm versus ongoing atrial flutter with tiny P waves followed by a similar EKG and finally EKG today showing bifasicular block.  Echo showed LVEF of 50-55%, grade 2 diastolic dysfunction, mild inferolateral wall hypokinesis, moderate pulmonary hypertension, and mild aortic stenosis.     Assessment & Plan   #1 Acute respiratory failure, O2 requirement improving with diuresis.  Likely mixed COPD exacerbation as well as heart failure exacerbation.  #2 Coronary artery disease, status post remote history of CABG. Elevated but flat troponins. Options for ischemic evaluate limited at present due to renal insufficiency.   #3 Atrial fibrillation/flutter now with variable block atrial flutter   #4 Episodes of bradycardia and junctional escape in the 30s vs complete heart block although he now has excellent rates and AV shawn conduction. Holding metoprolol  #5 Poorly controlled type 2 diabetes.  A1c 10.3 although this is improved from 12.4 six months ago.  #6 Hypertension   #7 CKD stage 4  #8 Dyslipidemia  #9 COPD, not on home O2  #10 Medication noncompliance, limited cardiology follows last seen though by Dr. Palafox 5/23 not started on anticoagulation at that time because of falls.  #11 Lewy body dementia     Plan:  1. EF is preserved, although there may be some coronary disease, the risks of renal failure outweigh the benefits of contrast dye exposure with coronary angiography, we will medically manage at this time  2. Since his EF remains preserved, we can hold off of beta-blocker therapy for now and observe, his heart rate has normalized off of metoprolol  3. Will defer hypertension management to primary service, amlodipine, hydralazine, and nitrates are all reasonable options  4. He has not wanted anticoagulation due to frequent falls  5. Continue aspirin 81 mg daily, rosuvastatin 10 mg daily, and PO Lasix 20 mg BID  6. Heparin can be discontinued this evening once it has been on for 48 hours  7. I would send him out with  a ZioPatch to determine if he is having more tachycardic or bradycardic rhythms off of the metoprolol, if beta-blockade is needed for tachyarrhythmias he may require a pacemaker     Melvin Harding MD    Primary Care Physician   Suma Jenkins    Patient Active Problem List   Diagnosis     Anxiety     Bipolar affective disorder in remission (H)     Allergic rhinitis     Type 2 diabetes mellitus with stage 4 chronic kidney disease, with long-term current use of insulin (H)     Cervicalgia     HYPERLIPIDEMIA LDL GOAL <100     Insomnia     Hypertension goal BP (blood pressure) < 130/80     COPD (chronic obstructive pulmonary disease) (H)     GERD (gastroesophageal reflux disease)     Peripheral neuralgia     Microalbuminuria     Vitamin B12 deficiency without anemia     Vitamin D deficiency     Coronary artery disease involving coronary bypass graft of native heart without angina pectoris     Chronic pain     Ischemic cardiomyopathy     Chronic systolic heart failure (H)     Health Care Home     Diabetic polyneuropathy associated with type 2 diabetes mellitus (H)     CKD (chronic kidney disease) stage 4, GFR 15-29 ml/min (H)     Acute pulmonary edema (H)     Hyperglycemia     COPD exacerbation (H)     NSTEMI (non-ST elevated myocardial infarction) (H)       Past Medical History   I have reviewed this patient's medical history and updated it with pertinent information if needed.   Past Medical History:   Diagnosis Date     Bipolar I disorder, most recent episode (or current) manic, severe, specified as with psychotic behavior      CAD (coronary artery disease) 2001    MI, CAB      Cardiovascular disease 5/26/2005     Problem list name updated by automated process. Provider to review     Chronic systolic CHF (congestive heart failure) (H)      Essential hypertension, benign      Impaired fasting glucose      Insomnia, unspecified      Ischemic cardiomyopathy      Other anxiety states      PLANTAR fasciaitis       Pneumonia 10/25/2016     Pure hypercholesterolemia      Respiratory failure (H) 10/11/2016     Syncope     probably iatrogenic due to medications     Type II or unspecified type diabetes mellitus without mention of complication, not stated as uncontrolled        Past Surgical History   I have reviewed this patient's surgical history and updated it with pertinent information if needed.  Past Surgical History:   Procedure Laterality Date     ANESTHESIA CARDIOVERSION N/A 10/29/2020    Procedure: ANESTHESIA, FOR CARDIOVERSION @1400  Latex Allergy;  Surgeon: GENERIC ANESTHESIA PROVIDER;  Location: [facility] OR     SURGICAL HISTORY OF -       skin grafts X 4     SURGICAL HISTORY OF -   1978    lumbar fusion L1-5     SURGICAL HISTORY OF -   2008    C4-7 cervical fusion     SURGICAL HISTORY OF -   12/09    C 6-7 fusion     ZZC CABG, ARTERIAL, THREE  3/00    CABG X 3, stent     ZZHC DRUG-ELUTING STENTS, SINGLE  9/10    left main       Prior to Admission Medications   Prior to Admission Medications   Prescriptions Last Dose Informant Patient Reported? Taking?   Continuous Blood Gluc Sensor (DEXCOM G6 SENSOR) MISC   Yes No   Sig: Change every 10 days. Start 11-28-22.   Fluticasone-Umeclidin-Vilant (TRELEGY ELLIPTA) 100-62.5-25 MCG/ACT oral inhaler 6/29/2023  No Yes   Sig: Inhale 1 puff into the lungs daily   Lancets (MICROLET) MISC   No No   Sig: Test once daily   Multiple Vitamins-Minerals (CENTRUM SILVER) per tablet 6/29/2023  Yes Yes   Sig: Take 1 tablet by mouth daily   albuterol (PROAIR HFA/PROVENTIL HFA/VENTOLIN HFA) 108 (90 Base) MCG/ACT inhaler PRN  No Yes   Sig: Inhale 2 puffs into the lungs every 6 hours as needed for shortness of breath   amLODIPine (NORVASC) 5 MG tablet 6/28/2023  No Yes   Sig: Take 1 tablet (5 mg) by mouth At Bedtime   amitriptyline (ELAVIL) 10 MG tablet 6/28/2023  No Yes   Sig: TAKE 3 TABLETS BY MOUTH EVERY NIGHT AT BEDTIME   aspirin 81 MG EC tablet 6/29/2023  Yes Yes   Sig: Take 81 mg by mouth  "daily   busPIRone (BUSPAR) 5 MG tablet 6/29/2023 at AM  No Yes   Sig: Take 1 tablet (5 mg) by mouth 3 times daily   furosemide (LASIX) 20 MG tablet 6/29/2023 at AM  No Yes   Sig: TAKE 1 TABLET(20 MG) BY MOUTH TWICE DAILY   gabapentin (NEURONTIN) 300 MG capsule 6/29/2023 at AM  No Yes   Sig: TAKE 1 CAPSULE BY MOUTH TWICE DAILY   insulin glargine (LANTUS SOLOSTAR) 100 UNIT/ML pen   No Yes   Sig: Inject 30 Units Subcutaneous every morning Ok to substitute Basaglar at same dose and frequency if insurance prefers.   insulin lispro (HUMALOG KWIKPEN) 100 UNIT/ML (1 unit dial) KWIKPEN   No Yes   Sig: Inject 10 Units Subcutaneous 3 times daily (before meals)   insulin pen needle (31G X 5 MM) 31G X 5 MM miscellaneous   No No   Sig: Use 4 pen needles daily or as directed.   insulin syringe 31G X 5/16\" 1 ML MISC   No No   Sig: USE 2 SYRINGES DAILY OR AS DIRECTED   ipratropium - albuterol 0.5 mg/2.5 mg/3 mL (DUONEB) 0.5-2.5 (3) MG/3ML neb solution PRN  No Yes   Sig: Take 1 vial (3 mLs) by nebulization every 6 hours as needed for shortness of breath, wheezing or cough   metoprolol succinate ER (TOPROL XL) 50 MG 24 hr tablet 6/29/2023  No Yes   Sig: Take 1 tablet (50 mg) by mouth daily   nitroGLYcerin (NITROSTAT) 0.4 MG sublingual tablet PRN  No Yes   Sig: Place 1 tablet (0.4 mg) under the tongue every 5 minutes as needed   oxyCODONE-acetaminophen (PERCOCET) 5-325 MG tablet Past Week  No Yes   Sig: Take 1 tablet by mouth At Bedtime Must last 30 days   rosuvastatin (CRESTOR) 20 MG tablet 6/29/2023  No Yes   Sig: Take 1 tablet (20 mg) by mouth daily      Facility-Administered Medications: None     Current Facility-Administered Medications   Medication Dose Route Frequency     amitriptyline  30 mg Oral At Bedtime     amLODIPine  5 mg Oral At Bedtime     aspirin  81 mg Oral Daily     busPIRone  5 mg Oral TID     fluticasone-vilanterol  1 puff Inhalation Daily    And     umeclidinium  1 puff Inhalation Daily     gabapentin  300 mg Oral " BID     insulin aspart  11 Units Subcutaneous TID w/meals     insulin aspart  1-10 Units Subcutaneous TID AC     insulin aspart  1-7 Units Subcutaneous At Bedtime     insulin glargine  40 Units Subcutaneous At Bedtime     oxyCODONE-acetaminophen  1 tablet Oral At Bedtime     rosuvastatin  10 mg Oral Daily     senna-docusate  1 tablet Oral BID    Or     senna-docusate  2 tablet Oral BID     sodium chloride (PF)  3 mL Intracatheter Q8H     Current Facility-Administered Medications   Medication Last Rate     heparin 1,200 Units/hr (07/01/23 0600)     - MEDICATION INSTRUCTIONS -       - MEDICATION INSTRUCTIONS -       Allergies   Allergies   Allergen Reactions     Adhesive Tape      From blood draw site.     Eszopiclone      Strange dreams. Cooking during sleep      Latex      Lipitor [Atorvastatin Calcium] Other (See Comments)     myalgias     Lisinopril Other (See Comments)     Hyperkalemia and increased creatinine       Social History    reports that he quit smoking about 23 years ago. His smoking use included cigarettes. He has a 45.00 pack-year smoking history. He has never used smokeless tobacco. He reports current alcohol use. He reports that he does not use drugs.    Family History   Family History   Problem Relation Age of Onset     Cancer Sister         pancreatic ca     C.A.D. Father         mi,stroke,htn, 60's     Cerebrovascular Disease Father 64     C.A.D. Mother         mi,htn, 60's     Breast Cancer No family hx of      Cancer - colorectal No family hx of      Prostate Cancer No family hx of        Review of Systems   The comprehensive Review of Systems is negative other than noted in the HPI or here.     Physical Exam   Vital Signs with Ranges  Temp:  [97.6  F (36.4  C)-97.9  F (36.6  C)] 97.6  F (36.4  C)  Pulse:  [75-93] 93  Resp:  [18-20] 20  BP: (146-169)/(65-92) 146/65  SpO2:  [92 %-98 %] 98 %  Wt Readings from Last 4 Encounters:   07/01/23 87.2 kg (192 lb 3.2 oz)   06/08/23 85.4 kg (188 lb 3.2 oz)    05/04/23 83.4 kg (183 lb 14.4 oz)   05/02/23 82.8 kg (182 lb 9.6 oz)     I/O last 3 completed shifts:  In: -   Out: 200 [Urine:200]      Vitals: BP (!) 146/65 (BP Location: Left arm)   Pulse 93   Temp 97.6  F (36.4  C) (Axillary)   Resp 20   Wt 87.2 kg (192 lb 3.2 oz)   SpO2 98%   BMI 29.26 kg/m      General: Alert, oriented, in no acute distress  HEENT: PERRLA, mucous membranes moist  Neck: Normal JVP  CV: Regular rate and rhythm without murmur  Respiratory: Clear to auscultation bilaterally  GI: Normoactive bowel sounds; soft, non-tender abdomen  Neuro: No focal deficits appreciated  Extremities: 1+ peripheral edema bilaterally    Recent Labs   Lab 07/01/23  0723 07/01/23  0550 07/01/23  0110 06/30/23  2104 06/30/23  1834 06/30/23  1205 06/30/23  1129 06/30/23  0853 06/30/23  0549 06/29/23  1925 06/29/23  1625   WBC  --  10.7  --   --   --   --   --   --  7.4  --  14.5*  14.8*   HGB  --  10.7*  --   --   --   --   --   --  12.1*  --  12.8*  12.7*   MCV  --  86  --   --   --   --   --   --  86  --  88  87   PLT  --  131*  --   --   --   --   --   --  142*  --  198  182   NA  --  131*  --   --  133*  131*  --  131*  --   --    < > 130*   POTASSIUM  --  4.3  --   --  4.7  4.3  --  4.1  --   --    < > 4.5   CHLORIDE  --  93*  --   --  91*  90*  --  93*  --   --    < > 92*   CO2  --  20*  --   --  19*  18*  --  15*  --   --    < > 18*   BUN  --  55.3*  --   --  52.5*  51.3*  --  47.4*  --   --    < > 34.6*   CR  --  2.98*  --   --  3.13*  3.06*  --  3.10*  --   --    < > 2.80*   GFRESTIMATED  --  22*  --   --  21*  21*  --  21*  --   --    < > 24*   ANIONGAP  --  18*  --   --  23*  23*  --  23*  --   --    < > 20*   WANDA  --  8.4*  --   --  8.7*  8.9  --  9.0  --   --    < > 9.4   * 268* 347*   < > 264*  253*   < > 201*   < >  --    < > 406*   ALBUMIN  --   --   --   --   --   --   --   --   --   --  4.0   PROTTOTAL  --   --   --   --   --   --   --   --   --   --  7.4   BILITOTAL  --   --    --   --   --   --   --   --   --   --  0.8   ALKPHOS  --   --   --   --   --   --   --   --   --   --  93   ALT  --   --   --   --   --   --   --   --   --   --  13   AST  --   --   --   --   --   --   --   --   --   --  18    < > = values in this interval not displayed.     Recent Labs   Lab Test 06/20/22  1040 01/18/21  1623 01/03/17  1548 10/05/15  1215   CHOL 170 137   < > 293*   HDL 38* 44   < > 37*   LDL 61 59   < > 185*   TRIG 354* 172*   < > 354*   CHOLHDLRATIO  --   --   --  7.9*    < > = values in this interval not displayed.     Recent Labs   Lab 07/01/23  0550 06/30/23  0549 06/29/23  1625   WBC 10.7 7.4 14.5*  14.8*   HGB 10.7* 12.1* 12.8*  12.7*   HCT 32.5* 36.3* 38.8*  38.8*   MCV 86 86 88  87   * 142* 198  182     Recent Labs   Lab 06/30/23  1129 06/29/23  1625   PHV 7.38 7.33   PO2V 64* 8*   PCO2V 32* 37*   HCO3V 19* 20*     Recent Labs   Lab 06/30/23  0545 06/29/23  1625   NTBNPI 26,866* 19,855*     Recent Labs   Lab 06/29/23  1625   DD 1.01*     No results for input(s): SED, CRP in the last 168 hours.  Recent Labs   Lab 07/01/23  0550 06/30/23  0549 06/29/23  1625   * 142* 198  182     No results for input(s): TSH in the last 168 hours.  No results for input(s): COLOR, APPEARANCE, URINEGLC, URINEBILI, URINEKETONE, SG, UBLD, URINEPH, PROTEIN, UROBILINOGEN, NITRITE, LEUKEST, RBCU, WBCU in the last 168 hours.    Imaging:  Recent Results (from the past 48 hour(s))   XR Chest Port 1 View    Narrative    EXAM: XR CHEST PORT 1 VIEW  LOCATION: [facility]  DATE: 6/29/2023    INDICATION: Shortness of breath  COMPARISON: Chest radiograph 08/18/2017.      Impression    IMPRESSION: Stable size of cardiomediastinal silhouette status post median sternotomy and CABG. Defibrillator pads overlying the left chest. Pulmonary vascular congestion with likely mild to moderate pulmonary edema and small bilateral pleural effusions   with compressive atelectasis, right larger  than left. No pneumothorax. Bones are unchanged. Cervical spinal fusion hardware again noted.   US Lower Extremity Venous Duplex Bilateral    Narrative    EXAM: US LOWER EXTREMITY VENOUS DUPLEX BILATERAL  LOCATION: [facility]  DATE: 2023    INDICATION: elevated d dimer, not candidate for CT PE given GFR  COMPARISON: None.  TECHNIQUE: Venous Duplex ultrasound of bilateral lower extremities with and without compression, augmentation and duplex. Color flow and spectral Doppler with waveform analysis performed.    FINDINGS: Exam includes the common femoral, femoral, popliteal veins as well as segmentally visualized deep calf veins and greater saphenous vein.     RIGHT: No deep vein thrombosis. No superficial thrombophlebitis. No popliteal cyst.    LEFT: No deep vein thrombosis. No superficial thrombophlebitis. No popliteal cyst.      Impression    IMPRESSION:  1.  No deep venous thrombosis in the bilateral lower extremities.   NM Lung Scan Ventilation and Perfusion    Narrative    EXAM: NM LUNG SCAN VENTILATION AND PERFUSION  LOCATION: [facility]  DATE: 2023    INDICATION: Shortness of breath.  COMPARISON: Chest x-ray dated 2023.  TECHNIQUE: 58.5 mCi Tc-99m DTPA inhaled. 6.8 mCi Tc-99m MAA, IV. Standard planar imaging during perfusion and ventilation portions of exam.    FINDINGS: Normal pulmonary ventilation. Normal pulmonary perfusion. No mismatched segmental perfusion defect.      Impression    IMPRESSION:    No evidence of pulmonary embolism.   Echocardiogram Complete   Result Value    LVEF  50-55%    Narrative    [id number]  [id number]  [id number]  [id number]^ANU^[id number]  Echocardiography Laboratory  [address] [place] [address]     Name: MANSOOR MOORE  MRN: [id number]  : 1955  Study Date: 2023 09:10 AM  Age: 68 yrs  Gender: Male  Patient Location: [facility]  Reason For Study: SOB  Ordering  Physician: JAME BRENNAN     BSA: 2.0 m2  Height: 67 in  Weight: 198 lb  HR: 64  BP: 172/74 mmHg  ______________________________________________________________________________  Procedure  Complete Portable Echo Adult.  ______________________________________________________________________________  Interpretation Summary     There is borderline concentric left ventricular hypertrophy.  The visual ejection fraction is 50-55%.  Grade II or moderate diastolic dysfunction.  There is mild inferolateral wall hypokinesis.  The right ventricular systolic function is borderline reduced.  There is mild biatrial enlargement.  The mean mitral valve gradient is (at HR 81) 4mmHg.  IVC diameter and respiratory changes fall into an intermediate range  suggesting an RA pressure of 8 mmHg.  Right ventricular systolic pressure is elevated, consistent with moderate  pulmonary hypertension.  There is mild aortic sclerosis of the left coronary cusp.  Mild valvular aortic stenosis.  ______________________________________________________________________________  Left Ventricle  The left ventricle is normal in size. There is borderline concentric left  ventricular hypertrophy. The visual ejection fraction is 50-55%. Grade II or  moderate diastolic dysfunction. There is mild inferolateral wall hypokinesis.  There is no thrombus seen in the left ventricle.     Right Ventricle  The right ventricle is normal size. The right ventricular systolic function is  borderline reduced.     Atria  There is mild biatrial enlargement.     Mitral Valve  There is trace mitral regurgitation. The mean mitral valve gradient is (at HR  81) 4mmHg.     Tricuspid Valve  There is trace tricuspid regurgitation. The right ventricular systolic  pressure is approximated at 45.7 mmHg plus the right atrial pressure. IVC  diameter and respiratory changes fall into an intermediate range suggesting an  RA pressure of 8 mmHg. Right ventricular systolic pressure is  elevated,  consistent with moderate pulmonary hypertension.     Aortic Valve  There is mild aortic sclerosis of the left coronary cusp. Mild valvular aortic  stenosis. The mean AoV pressure gradient is 7.9 mmHg. The calculated aortic  valve are is 2.1 cm^2.     Pulmonic Valve  The pulmonic valve is not well seen, but is grossly normal.     Vessels  The aortic root is normal size.     Pericardium  The pericardium appears normal.     Rhythm  Sinus rhythm was noted.  ______________________________________________________________________________  MMode/2D Measurements & Calculations  IVSd: 1.1 cm  LVIDd: 5.0 cm  LVIDs: 3.4 cm  LVPWd: 1.5 cm  FS: 32.4 %  LV mass(C)d: 257.1 grams  LV mass(C)dI: 127.7 grams/m2  Ao root diam: 3.4 cm  LA dimension: 4.8 cm  asc Aorta Diam: 3.5 cm  LA/Ao: 1.4  LVOT diam: 2.1 cm  LVOT area: 3.5 cm2  LA Volume (BP): 74.0 ml  LA Volume Index (BP): 36.8 ml/m2     RWT: 0.58  TAPSE: 1.5 cm     Doppler Measurements & Calculations  MV E max daryl: 109.0 cm/sec  MV A max daryl: 53.1 cm/sec  MV E/A: 2.1  MV max P.9 mmHg  MV mean PG: 3.6 mmHg  MV V2 VTI: 38.2 cm  MVA(VTI): 2.0 cm2  MV dec slope: 1295 cm/sec2  MV dec time: 0.08 sec  Ao V2 max: 207.7 cm/sec  Ao max P.3 mmHg  Ao V2 mean: 125.6 cm/sec  Ao mean P.9 mmHg  Ao V2 VTI: 36.7 cm  MARIZA(I,D): 2.1 cm2  MARIZA(V,D): 1.7 cm2  LV V1 max P.1 mmHg  LV V1 max: 101.2 cm/sec  LV V1 VTI: 22.3 cm  SV(LVOT): 77.3 ml  SI(LVOT): 38.4 ml/m2  PA acc time: 0.12 sec  TR max daryl: 338.0 cm/sec  TR max P.7 mmHg  AV Daryl Ratio (DI): 0.49  MARIZA Index (cm2/m2): 1.0  E/E' av.4  Lateral E/e': 13.2     Medial E/e': 15.7  RV S Daryl: 6.3 cm/sec     ______________________________________________________________________________  Report approved by: Dunia Schuler 2023 11:46 AM               Medical Decision Making       35 MINUTES SPENT BY ME on the date of service doing chart review, history, exam, documentation & further activities per the note.

## 2023-07-01 NOTE — PLAN OF CARE
Neuro: A/O x3, forgetful able to reorient  CV/Rhythm: Afib/aflutter w/ BBB and CVR  Resp/02: LS dim at bases, intermittent exp wheeze improved with PRN inhaler, on RA  GI/Diet: Last BM 6-28, on MOD CHO diet  : voids in hat in bathroom, lasix restarted this chivo will monitor response  Skin/Incisions/Sites: scattered bruises on arms  Pulses/CMS: +2 radial/ +1 pedal  Edema: +2 LE edema  Activity/Falls Risk: Up with  SBA to chair and bathroom  Lines/Drains/IVs: Hep gtt 1200 unit(s)/hr plan to stop at 1800 per cardiology  Labs/BGM: /217/180, Cr 2.98, K 4.3  Test/Procedures: EF 50-55%  VS/Pain: BP's intermittently elevated as high as 179/82, hydralazine restarted this evening.  Pt c/o of back/neck pain, decreased with PRN oxycodone  DC Plan: likely home with wife once stable, tomorrow or Monday  Other:

## 2023-07-01 NOTE — PROGRESS NOTES
Pt A&O, VS stable Transitioned to RA this afternoon with 02 sats maintained >92%.  Pt now eating and subcutaneous insulin started.  Heparin gtt changed to low intensity protocol (from high), and now awaiting Xa result to adjust if needed. X1 assist when OOB, bed alarm on. Needs UA on next void.

## 2023-07-01 NOTE — PROGRESS NOTES
[facility]    Nephrology Progress Note     Assessment & Plan   1.  CKD stage 4              -baseline creatinine 2.5 to 3.0 mg/dl              -eGFR 25 ml/nmin              -presumed diabetic nephropathy  2.  Proteinuria              -recent ACR > 3500 mg/g  3.  Chronic progressive disease              -abnormal creatinines back to 2010              -previous attempts to schedule with [facility]  4.  AGMA              -delta gap/delta HCO3- ~1              -ketones and lactate nl              -low suspicion for ingestion      5.  Pseudohyponatremia               -corrected Na = 134 mmol/l  6.  MBD -  Vit D in process.    7.  Lewy body dementia  8.  DM              -uncontrolled               -A1C > 10% for some time              -no retinopathy 2021  9.  ? Acute Kidney Injury - Cr slightly better.     Plan:    Resume oral diuretic.      Lev Ignacio MD  [facility] - Nephrology  [phone]    Interval History     He is feeling well.  Lying flat and breathing OK.  Pt denies f/c/n/v.    No cp/sob/cough.    No dysuria/hematuria/abd or flank pain.  No dizziness, lightheadedness, headaches, or focal neuro sx.      Physical Exam   Temp: 97.6  F (36.4  C) Temp src: Axillary BP: (!) 146/65 Pulse: 93   Resp: 20 SpO2: 98 % O2 Device: None (Room air) Oxygen Delivery: 1 LPM  Vitals:    06/29/23 1715 07/01/23 0600   Weight: 89.9 kg (198 lb 3.1 oz) 87.2 kg (192 lb 3.2 oz)     Vital Signs with Ranges  Temp:  [97.6  F (36.4  C)-97.9  F (36.6  C)] 97.6  F (36.4  C)  Pulse:  [75-93] 93  Resp:  [18-20] 20  BP: (146-169)/(65-92) 146/65  SpO2:  [92 %-98 %] 98 %  I/O last 3 completed shifts:  In: -   Out: 200 [Urine:200]    GENERAL APPEARANCE: pleasant, NAD, a & o  HEENT:  Eyes/ears/nose/neck grossly normal  RESP: crackles and wheeze bilat.   CV: RRR, nl S1/S2, no m/r/g   ABDOMEN: o/s/nt/nd, bs present  EXTREMITIES/SKIN: no rashes/lesions; 1+ BLE ankle edema    Medications     heparin  1,200 Units/hr (07/01/23 0600)     - MEDICATION INSTRUCTIONS -       - MEDICATION INSTRUCTIONS -         amitriptyline  30 mg Oral At Bedtime     amLODIPine  5 mg Oral At Bedtime     aspirin  81 mg Oral Daily     busPIRone  5 mg Oral TID     fluticasone-vilanterol  1 puff Inhalation Daily    And     umeclidinium  1 puff Inhalation Daily     gabapentin  300 mg Oral BID     insulin aspart  11 Units Subcutaneous TID w/meals     insulin aspart  1-10 Units Subcutaneous TID AC     insulin aspart  1-7 Units Subcutaneous At Bedtime     insulin glargine  40 Units Subcutaneous At Bedtime     oxyCODONE-acetaminophen  1 tablet Oral At Bedtime     rosuvastatin  10 mg Oral Daily     senna-docusate  1 tablet Oral BID    Or     senna-docusate  2 tablet Oral BID     sodium chloride (PF)  3 mL Intracatheter Q8H       Data   BMP  Recent Labs   Lab 07/01/23  0723 07/01/23  0550 07/01/23  0110 06/30/23  2104 06/30/23  1834 06/30/23  1205 06/30/23  1129 06/30/23  0853 06/30/23  0545   NA  --  131*  --   --  133*  131*  --  131*  --  127*   POTASSIUM  --  4.3  --   --  4.7  4.3  --  4.1  --  4.7   CHLORIDE  --  93*  --   --  91*  90*  --  93*  --  90*   WANDA  --  8.4*  --   --  8.7*  8.9  --  9.0  --  9.0   CO2  --  20*  --   --  19*  18*  --  15*  --  16*   BUN  --  55.3*  --   --  52.5*  51.3*  --  47.4*  --  45.6*   CR  --  2.98*  --   --  3.13*  3.06*  --  3.10*  --  3.13*   * 268* 347* 337* 264*  253*   < > 201*   < > 510*    < > = values in this interval not displayed.     [email or web address](phos:4)  CBC)  Recent Labs   Lab 07/01/23  0550 06/30/23  0549 06/29/23  1625   WBC 10.7 7.4 14.5*  14.8*   HGB 10.7* 12.1* 12.8*  12.7*   HCT 32.5* 36.3* 38.8*  38.8*   MCV 86 86 88  87   * 142* 198  182     Recent Labs   Lab 06/29/23  1625   AST 18   ALT 13   ALKPHOS 93   BILITOTAL 0.8       Recent Labs   Lab 07/01/23  0550   HGB 10.7*   HCT 32.5*   MCV 86     Recent Labs   Lab 07/01/23  0550   PTHI 129*        Attestation:   I have reviewed today's relevant vital signs, notes, medications, labs and imaging.

## 2023-07-01 NOTE — PLAN OF CARE
Neuro- A&O, forgetful  Most Recent Vitals- Temp: 97.8  F (36.6  C) Temp src: Oral BP: (!) 156/77 Pulse: 80   Resp: 20 SpO2: 93 % O2 Device: None (Room air)   Tele/Cardiac- afib /flutter  Resp- LS wheezy on RA  Activity- A1  Pain- denies  Drips- hep  Drains/Tubes- PIV  Skin- scattered bruising  GI/- voiding adequately  Aggression Color- Green  COVID status- Negative  Plan- diuresing  Misc-     Graciela Quigley, RN  Goal Outcome Evaluation:

## 2023-07-01 NOTE — PROVIDER NOTIFICATION
MD Notification    Notified Person: MD    Notified Person Name: Mindy    Notification Date/Time: 07/01/2023 0208    Notification Interaction: Talked with MD    Purpose of Notification: Pt has been having SBP in the 160's for last few hours, do you want to give him a PRN?      Orders Received: call if systolic bp reaches 190 or pt is symptomatic    Comments:

## 2023-07-01 NOTE — PROGRESS NOTES
[facility]    Medicine Progress Note - Hospitalist Service    Date of Admission:  6/29/2023    Assessment & Plan     Hayder Alves is a 68 year old male with a very complicated  medical history significant for recently diagnosed Lewy Body Dementia, chronic neck pain (s/p spinal fusion), COPD (not on home oxygen), CAD (s/p CABG 2001), CKD stage IV, hypertension, dyslipidemia, systolic CHF, diabetes mellitus, medication noncompliance, h/o atrial fibrillation (s/p cardioversion 2020; not currently on anticoagulation) presented to ED with shortness of breath and left arm pain.      Acute hypoxic respiratory failure likely due to acute on chronic systolic heart failure and exacerbation-resolved  NSTEMI likely due to above  History of coronary artery disease status CABG  new atrial flutter, likely paroxysmal atrial fibrillation with severe bradycardia (h/o cardioversion in 2020, not currently on anticoagulation)  Hypertension  Dyslipidemia  Moderate pulmonary hypertension-new  -Initially on admission presented with chief complaint of shortness of breath and chest discomfort.  Patient initially mentioned he had left arm pain and then mentioned that he had chest discomfort.  He was not able to provide good history.  -On presentation to the ER his heart rate was in 30s, EKG showed atrial flutter proBNP was significantly elevated at 19855 and troponin was elevated at 118  -Last echo in June 2020 showed EF of 55 to 60%, no wall motion changes, mild dilated left atrium and trace MR and TR  -Chest x-ray was done in the ED which was concerning for volume overload and patient was given 60 IV Lasix in the ED and cardiology was consulted in the ED with interventions and he was started on heparin drip  -He had echo done in June 2022 and at that time his EF was 55 to 60%, no wall motion changes, mildly dilated left atrium, normal RV ventricular size and systolic function and mildly dilated  atrium  -Duplex of the lower extremity did not show any evidence of DVT  - repeat EKG on 6/30/2023 was done which showed normal sinus rhythm with first-degree AV block  -Troponin peaked at 118 and have trended down to 103  -Note patient did receive 60 mg IV in ED on admission and 40 mg IV on 6/30 and I do not know if we have correct intake and output  -Exam today he is on room air and does have edema over both lower extremities  -Patient was seen by cardiology team and appreciate input and continue the patient with heparin drip, aspirin, statin(dose decreased due to creatinine clearance) amlodipine and further diuresis to be decided by cardiology team and his renal function has mildly improved to 2.9  -Given his KAY on CKD 4 cardiac catheterization is not considered as it we will put him on dialysis  -Echo done today which shows EF of 50 to 55%, grade 2 or moderate diastolic dysfunction, mild inferior lateral wall hypokinesis, mild biatrial enlargement, RV systolic function is borderline reduced, RVSP is elevated consistent with moderate pulmonary hypertension, mild aortic Valvular stenosis  -We will continue patient with aspirin, statin, heparin drip, amlodipine and reviewed cards note medical management , heparin for total of 48 hours , hold beta blocker , resume lasix 20 mg bid , zio on discharge  - BP is still high and we will start hydralazine 50 mg tid and     Elevated D-dimer  -Patient had elevated D-dimer of 1 on admission and he has history of COPD  -Patient does have CKD stage IV and cannot have CTA chest  -On admission he was started on high intensity heparin drip  -Duplex of the lower extremity does not show any evidence of DVT  -VQ scan has been ordered and does not show any evidence of PE  -Echo has been ordered and results are pending  -Exam he is on room air today       History of COPD  -not on home oxygen;  and on presentation was noted 85% on room air; on 3 to 4 L oxygen  -Patient did get IV  Solu-Medrol in the ER along with DuoNebs and his PTA inhalers were started  -Chest x-ray done on admission showed pulmonary vascular congestion with likely mild to moderate pulmonary edema and small bilateral pleural effusions with compressive atelectasis, right larger than left with no pneumothorax  -On my examination today patient did not had any wheezing and I do not think he is in exacerbation and we will continue him with his PTA inhalers and as needed DuoNebs and he is on room air today     Uncontrolled diabetes with likely ?mild DKA  -family reports medication noncompliance  -his insulin regimen was recently adjusted and switched from NPH to Lantus (30 units daily) and NovoLog 10 units TID with meals which he has not started as per discussion with the wife he is noncompliant  -Patient told me that he is only taking 20 of insulin and I do not think he is compliant with his insulin at all  -on admission blood glucose 400s, anion gap 20, bicarb 18  -Per admitting physician there was concern about mild DKA but given concerns of CHF exacerbation IV fluids were held and patient was started on PTA insulin regimen  -Patient was started on insulin drip and was transitioned to basal and bolus insulin yesterday evening and his bicarb today has improved to 20 and anion gap has decreased to 18 and his acidosis is coming from his renal failure  -He is currently on Lantus 30 units in the night, aspart 5 units with meals and high-dose sliding scale and will increase the dose of aspart to 11 units with meals, Lantus increased to 40 units in the night and high-dose sliding scale and monitor blood sugar during the course of the day      CKD stage IV  Pseudo hyponatremia  Hyponatremia  Anion gap metabolic acidosis  -on admission creatinine 2.8 which seems stable around his baseline of 2.5 to 2.8; sodium  Was 130  -His renal function has been monitored along with sodium and usually he had pseudohyponatremia as his sugars were  significantly high but sodium continues to be on the lower side and anion gap has been chronically elevated and his bicarb is improving  -We will continue to monitor and nephrology is following and we will continue to monitor his renal function closely      Chronic neck pain, history of spinal fusion  -will continue with his PTA Percocet and continue PTA Neurontin     Recently diagnosed Lewy Body Dementia  -noted  -follows neurology as outpatient          Diet: Moderate Consistent Carb (60 g CHO per Meal) Diet    DVT Prophylaxis: Heparin drip  Gardner Catheter: Not present  Lines: None     Cardiac Monitoring: ACTIVE order. Indication: Bradycardias (48 hours)  Code Status: Full Code      Clinically Significant Risk Factors         # Hyponatremia: Lowest Na = 127 mmol/L in last 2 days, will monitor as appropriate  # Hypocalcemia: Lowest Ca = 8.4 mg/dL in last 2 days, will monitor and replace as appropriate    # Anion Gap Metabolic Acidosis: Highest Anion Gap = 23 mmol/L in last 2 days, will monitor and treat as appropriate      # Hypertension: Noted on problem list       # DMII: A1C = 10.3 % (Ref range: <5.7 %) within past 6 months            Disposition Plan     Expected Discharge Date: 07/01/2023                  Jose Bueno MD  Hospitalist Service  [facility]  Securely message with [employer] (more info)  Text page via [facility] Paging/Directory   ______________________________________________________________________    Interval History     Saw The patient today and he is on room air and denies any chest discomfort, nausea, vomiting.  He is able to tolerate the diet.  I did discuss his diabetic management with the patient.  I also discussed the results of VQ scan.  He denies any fever or chills or abdominal discomfort.    Discussed plan of care with the patient's nurse, reviewed cardiology notes and also discussed with his wife today and his daughter Jade was present on the conference call on  "wife's cell phone and patient gave permission and plan of care was discussed    Physical Exam     Vital signs:  Temp: 97.6  F (36.4  C) Temp src: Axillary BP: (!) 166/89 Pulse: 93   Resp: 20 SpO2: 98 % O2 Device: None (Room air) Oxygen Delivery: 1 LPM   Weight: 87.2 kg (192 lb 3.2 oz)  Estimated body mass index is 29.26 kg/m  as calculated from the following:    Height as of 5/4/23: 1.726 m (5' 7.95\").    Weight as of this encounter: 87.2 kg (192 lb 3.2 oz).          General: AOX2-3  Respiratory: Lungs are clear with no expiratory wheeze  Cardiovascular: Regular rate , S1 and S2 normal with no murmer or rubs or gallops and has bilateral edema extending up to the thighs over both lower extremities  Abdomen:   soft , non tender ,   Neurologic:  No facial droop  Musculoskeletal: Normal Range of motion over upper and lower extremities bilaterally   Psychiatric: cooperative     Medical Decision Making         Time spent in care of patient is 51 minutes and I reviewed his labs, reviewed notes from nephrology and cardiology and discussed the plan of care with patient and his wife and patient's nurse    Data     I have personally reviewed the following data over the past 24 hrs:    10.7  \   10.7 (L)   / 131 (L)     131 (L) 93 (L) 55.3 (H) /  210 (H)   4.3 20 (L) 2.98 (H) \       Trop: N/A BNP: N/A       Imaging results reviewed over the past 24 hrs:   Recent Results (from the past 24 hour(s))   NM Lung Scan Ventilation and Perfusion    Narrative    EXAM: NM LUNG SCAN VENTILATION AND PERFUSION  LOCATION: [facility]  DATE: 6/30/2023    INDICATION: Shortness of breath.  COMPARISON: Chest x-ray dated 06/29/2023.  TECHNIQUE: 58.5 mCi Tc-99m DTPA inhaled. 6.8 mCi Tc-99m MAA, IV. Standard planar imaging during perfusion and ventilation portions of exam.    FINDINGS: Normal pulmonary ventilation. Normal pulmonary perfusion. No mismatched segmental perfusion defect.      Impression    IMPRESSION:    No " evidence of pulmonary embolism.

## 2023-07-02 NOTE — PLAN OF CARE
Neuro: A/O x3, forgetful, short term memory deficits able to redirect  CV/Rhythm: afib w/ cvr and bbb  Resp/02: LS dim at bases on RA, audible exp wheeze noted this am, improved with morning inhalers  GI/Diet: BM 7/1 passing gas senna given today , on Mod CHO diet  : Good UO with PO lasix  Skin/Incisions/Sites: scattered bruises on arms  Pulses/CMS: +2 radial/ +1 pedal  Edema: +2 LE edema  Activity/Falls Risk: Up with SBA, impulsive, unsteady at times  Lines/Drains/IVs: SL  Labs/BGM: /181/123, Cr 2.5  Test/Procedures: EF 50-55%  VS/Pain: BP's intermittely elevated, amlodipine and imdur added today, last /74.  Pt c/o of neck pain improved with heat packs and tylenol  DC Plan: TCU recommended per therapy, may be cleared for discharge tomorrow pending bed availability  Other:

## 2023-07-02 NOTE — PROGRESS NOTES
Cr down to 2.5.  Furosemide resumed yesterday.    We will follow up tomorrow.      Lev Ignacio MD

## 2023-07-02 NOTE — PROGRESS NOTES
07/02/23 1620   Appointment Info   Signing Clinician's Name / Credentials (PT) Josie Rosas, PT, DPT   Living Environment   People in Home spouse   Current Living Arrangements house   Home Accessibility stairs to enter home   Number of Stairs, Main Entrance 3   Stair Railings, Main Entrance railings safe and in good condition   Transportation Anticipated family or friend will provide   Living Environment Comments Son lives in area   Self-Care   Usual Activity Tolerance good   Current Activity Tolerance moderate   Equipment Currently Used at Home cane, straight   Fall history within last six months yes   Number of times patient has fallen within last six months 2   Activity/Exercise/Self-Care Comment Pt w/ decreased mobility and cognition recently, family has trialed a cane with him that maybe didn't go well. Pt reports he's independent w/ ADLs, unknown how true this is recently. 2 falls, one where he hit his head on a curb.   General Information   Onset of Illness/Injury or Date of Surgery 06/29/23   Referring Physician Jose Bueno MD   Patient/Family Therapy Goals Statement (PT) to feel better   Pertinent History of Current Problem (include personal factors and/or comorbidities that impact the POC) Acute hypoxic respiratory failure likely due to acute on chronic systolic heart failure and exacerbation-resolved.   NSTEMI, History of CAD; status CABG 2001; new atrial flutter, likely paroxysmal atrial fibrillation with severe bradycardia (h/o cardioversion in 2020, not currently on anticoagulation)  Hypertension  Dyslipidemia  Moderate pulmonary hypertension-newP. Per chart: Pt w/ very complicated  medical history significant for recently diagnosed Lewy Body Dementia, chronic neck pain (s/p spinal fusion), COPD (not on home oxygen), CAD (s/p CABG 2001), CKD stage IV, hypertension, dyslipidemia, systolic CHF, diabetes mellitus, medication noncompliance, h/o atrial fibrillation (s/p cardioversion 2020; not  currently on anticoagulation) presented to ED with shortness of breath and left arm pain.   Existing Precautions/Restrictions fall   Weight-Bearing Status - LLE full weight-bearing   Weight-Bearing Status - RLE full weight-bearing   Cognition   Orientation Status (Cognition) oriented to;person   Follows Commands (Cognition) repetition of directions required;verbal cues/prompting required   Safety Deficit (Cognition) insight into deficits/self-awareness;impulsivity;problem-solving;judgment   Memory Deficit (Cognition) working memory   Pain Assessment   Patient Currently in Pain No   Posture    Posture Kyphosis   Range of Motion (ROM)   Range of Motion ROM is WFL   Strength (Manual Muscle Testing)   Strength Comments generally deconditioned, but has against gravity strength   Bed Mobility   Comment, (Bed Mobility) not assessed today   Transfers   Comment, (Transfers) CGA to stand without device, impulsive w/ poor judgement   Gait/Stairs (Locomotion)   [place] Level (Gait) contact guard   Distance in Feet 20   Distance in Feet (Gait) 100ft w/ two standing rest breaks, pt suddenly needed to lean against counter   Comment, (Gait/Stairs) wider BULMARO, fast pace, fatigues quickly   Balance   Balance Comments affected by poor judgement, impulsivity, and fatigue   Sensory Examination   Sensory Perception Comments pt reports hand and feet numbness ?   Clinical Impression   Criteria for Skilled Therapeutic Intervention Yes, treatment indicated   PT Diagnosis (PT) impaired functional mobility   Influenced by the following impairments decreased activity tolerance, strength, balance, safety insight   Functional limitations due to impairments bed mobility, transfers, ambulation, stairs   Clinical Presentation (PT Evaluation Complexity) Evolving/Changing   Clinical Presentation Rationale clinical judgement   Clinical Decision Making (Complexity) moderate complexity   Planned Therapy Interventions (PT) balance training;bed  mobility training;gait training;home exercise program;neuromuscular re-education;patient/family education;stair training;strengthening;transfer training   Anticipated Equipment Needs at Discharge (PT) walker, rolling   Risk & Benefits of therapy have been explained evaluation/treatment results reviewed;care plan/treatment goals reviewed;risks/benefits reviewed;current/potential barriers reviewed;participants voiced agreement with care plan;patient;participants included   PT Total Evaluation Time   PT Eval, Moderate Complexity Minutes (94616) 15   Physical Therapy Goals   PT Frequency 5x/week   PT Predicted Duration/Target Date for Goal Attainment 07/09/23   PT Goals Bed Mobility;Transfers;Gait   PT: Bed Mobility Independent   PT: Transfers Modified independent;Sit to/from stand;Bed to/from chair;Assistive device   PT: Gait Modified independent;Assistive device;Greater than 200 feet   Interventions   Interventions Quick Adds Gait Training   Gait Training   Gait Training Minutes (35966) 10   Symptoms Noted During/After Treatment (Gait Training) shortness of breath;fatigue   Treatment Detail/Skilled Intervention To/from recliner chair. Pt impulsive but able to follow cuing to wait for PT. Ambulation without device where pt w/ short fast pace steps. Needed to lean against count twice to rest while walking, poor insight w/ difficulty slowing down to improve endurance. Trialed FWW x 25 ft where pt did slow down, able to navigate a turn with device but left it behind when getting back to the chair. O2 >95% on room air   Assumption Level (Gait Training) contact guard   Physical Assistance Level (Gait Training) verbal cues   Weight Bearing (Gait Training) full weight-bearing   PT Discharge Planning   PT Plan trial 4WW for ambulation, vs FWW or no device   PT Discharge Recommendation (DC Rec) Transitional Care Facility   PT Rationale for DC Rec Patient w/ decreased functional mobility, primary impairments include decreased  activity tolerance and safety insight. Pt will benefit from TCU to address this. Not anticipated to be safe in a home environment at this point and would be a high falls risk.   PT Brief overview of current status CGA x 100ft with two standing rest breaks, poor safety insight   Total Session Time   Timed Code Treatment Minutes 10   Total Session Time (sum of timed and untimed services) 25

## 2023-07-02 NOTE — PROGRESS NOTES
[facility]    Cardiology Consultation - Progress Note     Date of Admission:  6/29/2023  Date of Service: 7/2/2023    Reason for Consult   Reason for consult: NSTEMI    History of Present Illness   Hayder Alves is a 68 year old male who was admitted on 6/29/2023 with dyspnea and left arm pain. His medical comorbidities include recently diagnosed Lewy Body Dementia, chronic neck pain (s/p spinal fusion), COPD (not on home oxygen), CAD (s/p CABG 2001), CKD stage IV, hypertension, dyslipidemia, systolic CHF, diabetes mellitus, medication noncompliance, and atrial fibrillation (s/p cardioversion 2020; not currently on anticoagulation). We are asked to see him for possible NSTEMI.    He was at home at rest when he started feeling short of breath.  He started having bilateral arm pain that then moved to his chest and worsened with exertion.  He tried moving from one part of his house to another unit to sit down and rest on the couch.  He denies this happening in the weeks leading up to this.  He denies being short of breath in the weeks leading up to this.  He has not had any syncope or presyncope.  He denies palpitations.  He has had falls but he states these are secondary to tripping and not syncopal episodes.  On arrival he was found to have oxygen saturations of 85% with a heart rate of 77.  Chest pain was relieved with nitro x3.     Chest x-ray showed pulmonary vascular congestion and mild to moderate pulmonary edema with bilateral pleural effusions.  N-terminal proBNP is elevated at 27,000, with trop at 118 on admission and down trending, but in the context of acute renal failure and creatinine up to 3.1 from baseline of about 2.5-3.  Glucose was 406 on admit increasing to 510.  Patient has had atrial flutter with variable block since admission, initially at 4 and 1 them what appears to be 5 or 6-100 and low heart rates in the 30s.  This was followed by an idioventricular rhythm at  34 bpm versus ongoing atrial flutter with tiny P waves followed by a similar EKG and finally EKG showing bifasicular block.  Echo showed LVEF of 50-55%, grade 2 diastolic dysfunction, mild inferolateral wall hypokinesis, moderate pulmonary hypertension, and mild aortic stenosis.     Assessment & Plan   #1 Acute respiratory failure, O2 requirement improving with diuresis.  Likely mixed COPD exacerbation as well as heart failure exacerbation.  #2 Coronary artery disease, status post remote history of CABG. Elevated but flat troponins. Options for ischemic evaluate limited at present due to renal insufficiency.   #3 Atrial fibrillation/flutter now with variable block atrial flutter   #4 Episodes of bradycardia and junctional escape in the 30s vs complete heart block although he now has excellent rates and AV shawn conduction. Holding metoprolol  #5 Poorly controlled type 2 diabetes.  A1c 10.3 although this is improved from 12.4 six months ago.  #6 Hypertension   #7 CKD stage 4  #8 Dyslipidemia  #9 COPD, not on home O2  #10 Medication noncompliance, limited cardiology follows last seen though by Dr. Palafox 5/23 not started on anticoagulation at that time because of falls.  #11 Lewy body dementia     Recommendations:  1. EF is preserved, although there may be some coronary disease, the risks of renal failure outweigh the benefits of contrast dye exposure with coronary angiography, we will medically manage at this time  2. Since his EF remains preserved, we can hold off of beta-blocker therapy for now and observe, his heart rate has normalized off of metoprolol  3. Increasing amlodipine to 5 mg BID and adding Imdur 60 mg daily in addition to hydralazine 50 mg TID. We could consider low dose carvedilol although he had some bradycardia while on metoprolol so would try other medications first  4. No long term anticoagulation due to frequent falls  5. Continue aspirin 81 mg daily, rosuvastatin 10 mg daily, and PO Lasix 20 mg  BID  6. If blood pressure is under reasonable control later today could consider discharging today vs tomorrow  7. I would send him out with a ZioPatch to determine if he is having more tachycardic or bradycardic rhythms off of the metoprolol, if beta-blockade is needed for tachyarrhythmias he may require a pacemaker     Melvin Harding MD    Primary Care Physician   Suma Jenkins    Patient Active Problem List   Diagnosis     Anxiety     Bipolar affective disorder in remission (H)     Allergic rhinitis     Type 2 diabetes mellitus with stage 4 chronic kidney disease, with long-term current use of insulin (H)     Cervicalgia     HYPERLIPIDEMIA LDL GOAL <100     Insomnia     Hypertension goal BP (blood pressure) < 130/80     COPD (chronic obstructive pulmonary disease) (H)     GERD (gastroesophageal reflux disease)     Peripheral neuralgia     Microalbuminuria     Vitamin B12 deficiency without anemia     Vitamin D deficiency     Coronary artery disease involving coronary bypass graft of native heart without angina pectoris     Chronic pain     Ischemic cardiomyopathy     Chronic systolic heart failure (H)     Health Care Home     Diabetic polyneuropathy associated with type 2 diabetes mellitus (H)     CKD (chronic kidney disease) stage 4, GFR 15-29 ml/min (H)     Acute pulmonary edema (H)     Hyperglycemia     COPD exacerbation (H)     NSTEMI (non-ST elevated myocardial infarction) (H)       Past Medical History   I have reviewed this patient's medical history and updated it with pertinent information if needed.   Past Medical History:   Diagnosis Date     Bipolar I disorder, most recent episode (or current) manic, severe, specified as with psychotic behavior      CAD (coronary artery disease) 2001    MI, CAB      Cardiovascular disease 5/26/2005     Problem list name updated by automated process. Provider to review     Chronic systolic CHF (congestive heart failure) (H)      Essential hypertension,  benign      Impaired fasting glucose      Insomnia, unspecified      Ischemic cardiomyopathy      Other anxiety states      PLANTAR fasciaitis      Pneumonia 10/25/2016     Pure hypercholesterolemia      Respiratory failure (H) 10/11/2016     Syncope     probably iatrogenic due to medications     Type II or unspecified type diabetes mellitus without mention of complication, not stated as uncontrolled        Past Surgical History   I have reviewed this patient's surgical history and updated it with pertinent information if needed.  Past Surgical History:   Procedure Laterality Date     ANESTHESIA CARDIOVERSION N/A 10/29/2020    Procedure: ANESTHESIA, FOR CARDIOVERSION @1400  Latex Allergy;  Surgeon: GENERIC ANESTHESIA PROVIDER;  Location: [facility] OR     SURGICAL HISTORY OF -       skin grafts X 4     SURGICAL HISTORY OF -   1978    lumbar fusion L1-5     SURGICAL HISTORY OF -   2008    C4-7 cervical fusion     SURGICAL HISTORY OF -   12/09    C 6-7 fusion     ZZC CABG, ARTERIAL, THREE  3/00    CABG X 3, stent     ZZHC DRUG-ELUTING STENTS, SINGLE  9/10    left main       Prior to Admission Medications   Prior to Admission Medications   Prescriptions Last Dose Informant Patient Reported? Taking?   Continuous Blood Gluc Sensor (DEXCOM G6 SENSOR) MISC   Yes No   Sig: Change every 10 days. Start 11-28-22.   Fluticasone-Umeclidin-Vilant (TRELEGY ELLIPTA) 100-62.5-25 MCG/ACT oral inhaler 6/29/2023  No Yes   Sig: Inhale 1 puff into the lungs daily   Lancets (MICROLET) MISC   No No   Sig: Test once daily   Multiple Vitamins-Minerals (CENTRUM SILVER) per tablet 6/29/2023  Yes Yes   Sig: Take 1 tablet by mouth daily   albuterol (PROAIR HFA/PROVENTIL HFA/VENTOLIN HFA) 108 (90 Base) MCG/ACT inhaler PRN  No Yes   Sig: Inhale 2 puffs into the lungs every 6 hours as needed for shortness of breath   amLODIPine (NORVASC) 5 MG tablet 6/28/2023  No Yes   Sig: Take 1 tablet (5 mg) by mouth At Bedtime   amitriptyline (ELAVIL) 10 MG tablet  "6/28/2023  No Yes   Sig: TAKE 3 TABLETS BY MOUTH EVERY NIGHT AT BEDTIME   aspirin 81 MG EC tablet 6/29/2023  Yes Yes   Sig: Take 81 mg by mouth daily   busPIRone (BUSPAR) 5 MG tablet 6/29/2023 at AM  No Yes   Sig: Take 1 tablet (5 mg) by mouth 3 times daily   furosemide (LASIX) 20 MG tablet 6/29/2023 at AM  No Yes   Sig: TAKE 1 TABLET(20 MG) BY MOUTH TWICE DAILY   gabapentin (NEURONTIN) 300 MG capsule 6/29/2023 at AM  No Yes   Sig: TAKE 1 CAPSULE BY MOUTH TWICE DAILY   insulin glargine (LANTUS SOLOSTAR) 100 UNIT/ML pen   No Yes   Sig: Inject 30 Units Subcutaneous every morning Ok to substitute Basaglar at same dose and frequency if insurance prefers.   insulin lispro (HUMALOG KWIKPEN) 100 UNIT/ML (1 unit dial) KWIKPEN   No Yes   Sig: Inject 10 Units Subcutaneous 3 times daily (before meals)   insulin pen needle (31G X 5 MM) 31G X 5 MM miscellaneous   No No   Sig: Use 4 pen needles daily or as directed.   insulin syringe 31G X 5/16\" 1 ML MISC   No No   Sig: USE 2 SYRINGES DAILY OR AS DIRECTED   ipratropium - albuterol 0.5 mg/2.5 mg/3 mL (DUONEB) 0.5-2.5 (3) MG/3ML neb solution PRN  No Yes   Sig: Take 1 vial (3 mLs) by nebulization every 6 hours as needed for shortness of breath, wheezing or cough   metoprolol succinate ER (TOPROL XL) 50 MG 24 hr tablet 6/29/2023  No Yes   Sig: Take 1 tablet (50 mg) by mouth daily   nitroGLYcerin (NITROSTAT) 0.4 MG sublingual tablet PRN  No Yes   Sig: Place 1 tablet (0.4 mg) under the tongue every 5 minutes as needed   oxyCODONE-acetaminophen (PERCOCET) 5-325 MG tablet Past Week  No Yes   Sig: Take 1 tablet by mouth At Bedtime Must last 30 days   rosuvastatin (CRESTOR) 20 MG tablet 6/29/2023  No Yes   Sig: Take 1 tablet (20 mg) by mouth daily      Facility-Administered Medications: None     Current Facility-Administered Medications   Medication Dose Route Frequency     amitriptyline  30 mg Oral At Bedtime     amLODIPine  5 mg Oral At Bedtime     aspirin  81 mg Oral Daily     busPIRone "  5 mg Oral TID     fluticasone-vilanterol  1 puff Inhalation Daily    And     umeclidinium  1 puff Inhalation Daily     furosemide  20 mg Oral BID     gabapentin  300 mg Oral BID     hydrALAZINE  50 mg Oral TID     insulin aspart  11 Units Subcutaneous TID w/meals     insulin aspart  1-10 Units Subcutaneous TID AC     insulin aspart  1-7 Units Subcutaneous At Bedtime     insulin glargine  40 Units Subcutaneous At Bedtime     oxyCODONE-acetaminophen  1 tablet Oral At Bedtime     rosuvastatin  10 mg Oral Daily     senna-docusate  1 tablet Oral BID    Or     senna-docusate  2 tablet Oral BID     sodium chloride (PF)  3 mL Intracatheter Q8H     Current Facility-Administered Medications   Medication Last Rate     - MEDICATION INSTRUCTIONS -       - MEDICATION INSTRUCTIONS -       Allergies   Allergies   Allergen Reactions     Adhesive Tape      From blood draw site.     Eszopiclone      Strange dreams. Cooking during sleep      Latex      Lipitor [Atorvastatin Calcium] Other (See Comments)     myalgias     Lisinopril Other (See Comments)     Hyperkalemia and increased creatinine       Social History    reports that he quit smoking about 23 years ago. His smoking use included cigarettes. He has a 45.00 pack-year smoking history. He has never used smokeless tobacco. He reports current alcohol use. He reports that he does not use drugs.    Family History   Family History   Problem Relation Age of Onset     Cancer Sister         pancreatic ca     C.A.D. Father         mi,stroke,htn, 60's     Cerebrovascular Disease Father 64     C.A.D. Mother         mi,htn, 60's     Breast Cancer No family hx of      Cancer - colorectal No family hx of      Prostate Cancer No family hx of        Review of Systems   The comprehensive Review of Systems is negative other than noted in the HPI or here.     Physical Exam   Vital Signs with Ranges  Temp:  [97.6  F (36.4  C)-98.2  F (36.8  C)] 97.6  F (36.4  C)  Pulse:  [] 107  Resp:   [18-22] 19  BP: (100-181)/(59-91) 181/83  SpO2:  [93 %-97 %] 97 %  Wt Readings from Last 4 Encounters:   07/02/23 86.5 kg (190 lb 11.2 oz)   06/08/23 85.4 kg (188 lb 3.2 oz)   05/04/23 83.4 kg (183 lb 14.4 oz)   05/02/23 82.8 kg (182 lb 9.6 oz)     I/O last 3 completed shifts:  In: 780 [P.O.:780]  Out: 225 [Urine:225]      Vitals: BP (!) 181/83 (BP Location: Left arm)   Pulse 107   Temp 97.6  F (36.4  C) (Oral)   Resp 19   Wt 86.5 kg (190 lb 11.2 oz)   SpO2 97%   BMI 29.04 kg/m      General: Alert, oriented, in no acute distress  HEENT: PERRLA, mucous membranes moist  Neck: Normal JVP  CV: Regular rate and rhythm without murmur  Respiratory: Clear to auscultation bilaterally  GI: Normoactive bowel sounds; soft, non-tender abdomen  Neuro: No focal deficits appreciated  Extremities: 1+ peripheral edema bilaterally    Recent Labs   Lab 07/02/23  0724 07/02/23  0552 07/02/23  0545 07/01/23  0723 07/01/23  0550 06/30/23  2104 06/30/23  1834 06/30/23  0853 06/30/23  0549 06/29/23  1925 06/29/23  1625   WBC  --   --  6.2  --  10.7  --   --   --  7.4  --  14.5*  14.8*   HGB  --   --  11.3*  --  10.7*  --   --   --  12.1*  --  12.8*  12.7*   MCV  --   --  86  --  86  --   --   --  86  --  88  87   PLT  --   --  111*  --  131*  --   --   --  142*  --  198  182   NA  --   --  133*  --  131*  --  133*  131*   < >  --    < > 130*   POTASSIUM  --   --  3.9  --  4.3  --  4.7  4.3   < >  --    < > 4.5   CHLORIDE  --   --  98  --  93*  --  91*  90*   < >  --    < > 92*   CO2  --   --  21*  --  20*  --  19*  18*   < >  --    < > 18*   BUN  --   --  49.7*  --  55.3*  --  52.5*  51.3*   < >  --    < > 34.6*   CR  --   --  2.50*  --  2.98*  --  3.13*  3.06*   < >  --    < > 2.80*   GFRESTIMATED  --   --  27*  --  22*  --  21*  21*   < >  --    < > 24*   ANIONGAP  --   --  14  --  18*  --  23*  23*   < >  --    < > 20*   WANDA  --   --  8.8  --  8.4*  --  8.7*  8.9   < >  --    < > 9.4   * 173* 178*   < > 268*   <  > 264*  253*   < >  --    < > 406*   ALBUMIN  --   --   --   --   --   --   --   --   --   --  4.0   PROTTOTAL  --   --   --   --   --   --   --   --   --   --  7.4   BILITOTAL  --   --   --   --   --   --   --   --   --   --  0.8   ALKPHOS  --   --   --   --   --   --   --   --   --   --  93   ALT  --   --   --   --   --   --   --   --   --   --  13   AST  --   --   --   --   --   --   --   --   --   --  18    < > = values in this interval not displayed.     Recent Labs   Lab Test 06/20/22  1040 01/18/21  1623 01/03/17  1548 10/05/15  1215   CHOL 170 137   < > 293*   HDL 38* 44   < > 37*   LDL 61 59   < > 185*   TRIG 354* 172*   < > 354*   CHOLHDLRATIO  --   --   --  7.9*    < > = values in this interval not displayed.     Recent Labs   Lab 07/02/23  0545 07/01/23  0550 06/30/23  0549   WBC 6.2 10.7 7.4   HGB 11.3* 10.7* 12.1*   HCT 34.0* 32.5* 36.3*   MCV 86 86 86   * 131* 142*     Recent Labs   Lab 06/30/23  1129 06/29/23  1625   PHV 7.38 7.33   PO2V 64* 8*   PCO2V 32* 37*   HCO3V 19* 20*     Recent Labs   Lab 06/30/23  0545 06/29/23  1625   NTBNPI 26,866* 19,855*     Recent Labs   Lab 06/29/23  1625   DD 1.01*     No results for input(s): SED, CRP in the last 168 hours.  Recent Labs   Lab 07/02/23  0545 07/01/23  0550 06/30/23  0549   * 131* 142*     No results for input(s): TSH in the last 168 hours.  No results for input(s): COLOR, APPEARANCE, URINEGLC, URINEBILI, URINEKETONE, SG, UBLD, URINEPH, PROTEIN, UROBILINOGEN, NITRITE, LEUKEST, RBCU, WBCU in the last 168 hours.    Imaging:  Recent Results (from the past 48 hour(s))   XR Chest Port 1 View    Narrative    EXAM: XR CHEST PORT 1 VIEW  LOCATION: [facility]  DATE: 6/29/2023    INDICATION: Shortness of breath  COMPARISON: Chest radiograph 08/18/2017.      Impression    IMPRESSION: Stable size of cardiomediastinal silhouette status post median sternotomy and CABG. Defibrillator pads overlying the left chest. Pulmonary  vascular congestion with likely mild to moderate pulmonary edema and small bilateral pleural effusions   with compressive atelectasis, right larger than left. No pneumothorax. Bones are unchanged. Cervical spinal fusion hardware again noted.   US Lower Extremity Venous Duplex Bilateral    Narrative    EXAM: US LOWER EXTREMITY VENOUS DUPLEX BILATERAL  LOCATION: [facility]  DATE: 6/29/2023    INDICATION: elevated d dimer, not candidate for CT PE given GFR  COMPARISON: None.  TECHNIQUE: Venous Duplex ultrasound of bilateral lower extremities with and without compression, augmentation and duplex. Color flow and spectral Doppler with waveform analysis performed.    FINDINGS: Exam includes the common femoral, femoral, popliteal veins as well as segmentally visualized deep calf veins and greater saphenous vein.     RIGHT: No deep vein thrombosis. No superficial thrombophlebitis. No popliteal cyst.    LEFT: No deep vein thrombosis. No superficial thrombophlebitis. No popliteal cyst.      Impression    IMPRESSION:  1.  No deep venous thrombosis in the bilateral lower extremities.   NM Lung Scan Ventilation and Perfusion    Narrative    EXAM: NM LUNG SCAN VENTILATION AND PERFUSION  LOCATION: [facility]  DATE: 6/30/2023    INDICATION: Shortness of breath.  COMPARISON: Chest x-ray dated 06/29/2023.  TECHNIQUE: 58.5 mCi Tc-99m DTPA inhaled. 6.8 mCi Tc-99m MAA, IV. Standard planar imaging during perfusion and ventilation portions of exam.    FINDINGS: Normal pulmonary ventilation. Normal pulmonary perfusion. No mismatched segmental perfusion defect.      Impression    IMPRESSION:    No evidence of pulmonary embolism.   Echocardiogram Complete   Result Value    LVEF  50-55%    Narrative    [id number]  [id number]  [id number]  [id number]^ANU^[facility]  Echocardiography Laboratory  [address] [place] [address]     Name: MANSOOR MOORE  MRN:  [id number]  : 1955  Study Date: 2023 09:10 AM  Age: 68 yrs  Gender: Male  Patient Location: [facility]  Reason For Study: SOB  Ordering Physician: JAME BRENNAN     BSA: 2.0 m2  Height: 67 in  Weight: 198 lb  HR: 64  BP: 172/74 mmHg  ______________________________________________________________________________  Procedure  Complete Portable Echo Adult.  ______________________________________________________________________________  Interpretation Summary     There is borderline concentric left ventricular hypertrophy.  The visual ejection fraction is 50-55%.  Grade II or moderate diastolic dysfunction.  There is mild inferolateral wall hypokinesis.  The right ventricular systolic function is borderline reduced.  There is mild biatrial enlargement.  The mean mitral valve gradient is (at HR 81) 4mmHg.  IVC diameter and respiratory changes fall into an intermediate range  suggesting an RA pressure of 8 mmHg.  Right ventricular systolic pressure is elevated, consistent with moderate  pulmonary hypertension.  There is mild aortic sclerosis of the left coronary cusp.  Mild valvular aortic stenosis.  ______________________________________________________________________________  Left Ventricle  The left ventricle is normal in size. There is borderline concentric left  ventricular hypertrophy. The visual ejection fraction is 50-55%. Grade II or  moderate diastolic dysfunction. There is mild inferolateral wall hypokinesis.  There is no thrombus seen in the left ventricle.     Right Ventricle  The right ventricle is normal size. The right ventricular systolic function is  borderline reduced.     Atria  There is mild biatrial enlargement.     Mitral Valve  There is trace mitral regurgitation. The mean mitral valve gradient is (at HR  81) 4mmHg.     Tricuspid Valve  There is trace tricuspid regurgitation. The right ventricular systolic  pressure is approximated at 45.7 mmHg plus the right atrial pressure.  IVC  diameter and respiratory changes fall into an intermediate range suggesting an  RA pressure of 8 mmHg. Right ventricular systolic pressure is elevated,  consistent with moderate pulmonary hypertension.     Aortic Valve  There is mild aortic sclerosis of the left coronary cusp. Mild valvular aortic  stenosis. The mean AoV pressure gradient is 7.9 mmHg. The calculated aortic  valve are is 2.1 cm^2.     Pulmonic Valve  The pulmonic valve is not well seen, but is grossly normal.     Vessels  The aortic root is normal size.     Pericardium  The pericardium appears normal.     Rhythm  Sinus rhythm was noted.  ______________________________________________________________________________  MMode/2D Measurements & Calculations  IVSd: 1.1 cm  LVIDd: 5.0 cm  LVIDs: 3.4 cm  LVPWd: 1.5 cm  FS: 32.4 %  LV mass(C)d: 257.1 grams  LV mass(C)dI: 127.7 grams/m2  Ao root diam: 3.4 cm  LA dimension: 4.8 cm  asc Aorta Diam: 3.5 cm  LA/Ao: 1.4  LVOT diam: 2.1 cm  LVOT area: 3.5 cm2  LA Volume (BP): 74.0 ml  LA Volume Index (BP): 36.8 ml/m2     RWT: 0.58  TAPSE: 1.5 cm     Doppler Measurements & Calculations  MV E max daryl: 109.0 cm/sec  MV A max daryl: 53.1 cm/sec  MV E/A: 2.1  MV max P.9 mmHg  MV mean PG: 3.6 mmHg  MV V2 VTI: 38.2 cm  MVA(VTI): 2.0 cm2  MV dec slope: 1295 cm/sec2  MV dec time: 0.08 sec  Ao V2 max: 207.7 cm/sec  Ao max P.3 mmHg  Ao V2 mean: 125.6 cm/sec  Ao mean P.9 mmHg  Ao V2 VTI: 36.7 cm  MARIZA(I,D): 2.1 cm2  MARIZA(V,D): 1.7 cm2  LV V1 max P.1 mmHg  LV V1 max: 101.2 cm/sec  LV V1 VTI: 22.3 cm  SV(LVOT): 77.3 ml  SI(LVOT): 38.4 ml/m2  PA acc time: 0.12 sec  TR max daryl: 338.0 cm/sec  TR max P.7 mmHg  AV Daryl Ratio (DI): 0.49  MARIZA Index (cm2/m2): 1.0  E/E' av.4  Lateral E/e': 13.2     Medial E/e': 15.7  RV S Daryl: 6.3 cm/sec     ______________________________________________________________________________  Report approved by: Dunia Schuler 2023 11:46 AM               Medical Decision  Making       35 MINUTES SPENT BY ME on the date of service doing chart review, history, exam, documentation & further activities per the note.

## 2023-07-02 NOTE — PLAN OF CARE
Neuro- A&Ox4  Most Recent Vitals- Temp: 97.9  F (36.6  C) Temp src: Oral BP: (!) 180/91 Pulse: 94   Resp: 18 SpO2: 94 % O2 Device: None (Room air)   Tele/Cardiac- Afib/ flutter BBB  Resp- LS dim with wheezes  Activity- A1  Pain- denies  Drips- none  Drains/Tubes- PIV  Skin- scattered bruising  GI/- voiding adequately in hat  Aggression Color- Green  COVID status- Negative  Plan- discharge today/ tomorrow  Misc-     Graciela Quigley, RN  Goal Outcome Evaluation:

## 2023-07-02 NOTE — PROGRESS NOTES
[facility]    Medicine Progress Note - Hospitalist Service    Date of Admission:  6/29/2023    Assessment & Plan     Hayder Alves is a 68 year old male with a very complicated  medical history significant for recently diagnosed Lewy Body Dementia, chronic neck pain (s/p spinal fusion), COPD (not on home oxygen), CAD (s/p CABG 2001), CKD stage IV, hypertension, dyslipidemia, systolic CHF, diabetes mellitus, medication noncompliance, h/o atrial fibrillation (s/p cardioversion 2020; not currently on anticoagulation) presented to ED with shortness of breath and left arm pain.      Acute hypoxic respiratory failure likely due to acute on chronic systolic heart failure and exacerbation-resolved  NSTEMI likely due to above  History of coronary artery disease status CABG  new atrial flutter, likely paroxysmal atrial fibrillation with severe bradycardia (h/o cardioversion in 2020, not currently on anticoagulation)  Hypertension  Dyslipidemia  Moderate pulmonary hypertension-new  -Initially on admission presented with chief complaint of shortness of breath and chest discomfort.  Patient initially mentioned he had left arm pain and then mentioned that he had chest discomfort.  He was not able to provide good history.  -On presentation to the ER his heart rate was in 30s, EKG showed atrial flutter proBNP was significantly elevated at 19855 and troponin was elevated at 118  -Last echo in June 2020 showed EF of 55 to 60%, no wall motion changes, mild dilated left atrium and trace MR and TR  -Chest x-ray was done in the ED which was concerning for volume overload and patient was given 60 IV Lasix in the ED and cardiology was consulted in the ED with interventions and he was started on heparin drip  -He had echo done in June 2022 and at that time his EF was 55 to 60%, no wall motion changes, mildly dilated left atrium, normal RV ventricular size and systolic function and mildly dilated  atrium  -Duplex of the lower extremity did not show any evidence of DVT  - repeat EKG on 6/30/2023 was done which showed normal sinus rhythm with first-degree AV block  -Troponin peaked at 118 and have trended down to 103  -Echo on 7/1/23  which shows EF of 50 to 55%, grade 2 or moderate diastolic dysfunction, mild inferior lateral wall hypokinesis, mild biatrial enlargement, RV systolic function is borderline reduced, RVSP is elevated consistent with moderate pulmonary hypertension, mild aortic Valvular stenosis  -Given his KAY on CKD 4 cardiac catheterization is not considered as it we will put him on dialysis  -Continue patient with aspirin 81 mg daily, rosuvastatin 10 mg daily, p.o. Lasix 20 twice daily  -inCrease amlodipine to 5 mg twice daily, start Imdur 60 daily and continue with hydralazine 50 mg 3 times daily  -Zio patch on discharge  -Long-term anticoagulation due to falls    Elevated D-dimer  -Patient had elevated D-dimer of 1 on admission and he has history of COPD  -Patient does have CKD stage IV and cannot have CTA chest  -On admission he was started on high intensity heparin drip  -Duplex of the lower extremity does not show any evidence of DVT  -VQ scan has been ordered and does not show any evidence of PE  -Echo has been ordered results as above  -Exam he is on room air today       History of COPD  -not on home oxygen;  and on presentation was noted 85% on room air; on 3 to 4 L oxygen  -Patient did get IV Solu-Medrol in the ER along with DuoNebs and his PTA inhalers were started  -Chest x-ray done on admission showed pulmonary vascular congestion with likely mild to moderate pulmonary edema and small bilateral pleural effusions with compressive atelectasis, right larger than left with no pneumothorax  -On exam no wheezing and he is on room air and continue with PTA inhaler along with as needed DuoNebs     Uncontrolled diabetes with likely ?mild DKA  -family reports medication noncompliance  -his  insulin regimen was recently adjusted and switched from NPH to Lantus (30 units daily) and NovoLog 10 units TID with meals which he has not started as per discussion with the wife he is noncompliant  -Patient told me that he is only taking 20 of insulin and I do not think he is compliant with his insulin at all  -on admission blood glucose 400s, anion gap 20, bicarb 18  -Per admitting physician there was concern about mild DKA but given concerns of CHF exacerbation IV fluids were held and patient was started on PTA insulin regimen  -Patient was started on insulin drip and was transitioned to basal and bolus insulin   -his blood sugars are improving and was 178 this morning and continue with current insulin regimen with Lantus 40 units in the night, aspart 11 with meals and sliding scale insulin and we will review his blood sugar during course of the day and may need insulin adjustment     CKD stage IV  Pseudo hyponatremia  Hyponatremia-improving  Anion gap metabolic acidosis likely due to renal failure-resolved  -on admission creatinine 2.8 which seems stable around his baseline of 2.5 to 2.8; sodium  Was 130  -His renal function has been monitored along with sodium and usually he had pseudohyponatremia as his sugars were significantly high but sodium continues to be on the lower side and anion gap has been chronically elevated and with improvement in renal function anion gap is closed and his bicarb is improving and is 21  -Nephrology following and continue with Lasix 20 twice daily      Chronic neck pain, history of spinal fusion  -will continue with his PTA Percocet and continue PTA Neurontin     Recently diagnosed Lewy Body Dementia  -noted  -follows neurology as outpatient    Chronic thrombocytopenia  -He does seem to have chronic thrombocytopenia and his platelet level fluctuates and has no evidence of bleeding and was 111 today     Physical deconditioning  -We will consult physical therapy and Occupational  "Therapy       Diet: Moderate Consistent Carb (60 g CHO per Meal) Diet    DVT Prophylaxis: Heparin drip  Gardner Catheter: Not present  Lines: None     Cardiac Monitoring: ACTIVE order. Indication: Bradycardias (48 hours)  Code Status: Full Code      Clinically Significant Risk Factors             # Anion Gap Metabolic Acidosis: Highest Anion Gap = 23 mmol/L in last 2 days, will monitor and treat as appropriate    # Thrombocytopenia: Lowest platelets = 111 in last 2 days, will monitor for bleeding   # Hypertension: Noted on problem list       # DMII: A1C = 10.3 % (Ref range: <5.7 %) within past 6 months            Disposition Plan     Expected Discharge Date: 07/02/2023                  Jose Bueno MD  Hospitalist Service  [facility]  Securely message with [employer] (more info)  Text page via [employer] Paging/Directory   ______________________________________________________________________    Interval History     Reviewed events of overnight  Blood pressure still elevated and medication adjusted  Blood sugars are stable  No nausea, vomiting, abdominal pain or diarrhea  No fever or chills  He walked and was unsteady. Does have walker which he does not use as it was never prescribed and does not work    discussed plan of care with the patient's nurse, patient wife     Physical Exam     Vital signs:  Temp: 97.6  F (36.4  C) Temp src: Oral BP: (!) 160/75 Pulse: 95   Resp: 18 SpO2: 97 % O2 Device: None (Room air) Oxygen Delivery: 1 LPM   Weight: 86.5 kg (190 lb 11.2 oz)  Estimated body mass index is 29.04 kg/m  as calculated from the following:    Height as of 5/4/23: 1.726 m (5' 7.95\").    Weight as of this encounter: 86.5 kg (190 lb 11.2 oz).          General: AOX2-3  Respiratory: Lungs are clear with no expiratory wheeze  Cardiovascular: Regular rate , S1 and S2 normal with no murmer or rubs or gallops and has bilateral edema extending up to the thighs over both lower extremities  Abdomen:   soft , " non tender ,   Neurologic:  No facial droop  Musculoskeletal: Normal Range of motion over upper and lower extremities bilaterally   Psychiatric: cooperative     Medical Decision Making           Data     I have personally reviewed the following data over the past 24 hrs:    6.2  \   11.3 (L)   / 111 (L)     133 (L) 98 49.7 (H) /  164 (H)   3.9 21 (L) 2.50 (H) \       Imaging results reviewed over the past 24 hrs:   No results found for this or any previous visit (from the past 24 hour(s)).

## 2023-07-03 NOTE — PLAN OF CARE
Goal Outcome Evaluation:  A&O x4, forgetful, cooperative with redirection. VSS ex elevated SBP in the 140s. Gave scheduled hydralazine.Tele Afib w/CVR +BBB. Up with SBA. Neck pain managed with scheduled percocet. Denies SOB/CP Plans for possible TCU at discharge today pending clinical stability.

## 2023-07-03 NOTE — PLAN OF CARE
Goal Outcome Evaluation:      Plan of Care Reviewed With: patient    Overall Patient Progress: improvingOverall Patient Progress: improving    [facility] Nursing [facility]    Patient Information  Name: Hayder Alves  Age: 68 year old    Assessment  Orientation/Neuro: Alert and Oriented x4  Cardiac/Tele: Afib BBB  Resp: SOB treated with a PRN neb which was effective  GI/:  No nausea, vomiting, abdominal pain, diarrhea, but pt does have constipation. PRN senna given.    Mobility: walks with assist   Pain: denies   Diet: Orders Placed This Encounter      Moderate Consistent Carb (60 g CHO per Meal) Diet    Vital Signs  B/P: 162/81, T: 97.7, P: 91, R: 20, O2: 96% on RA    Plan  Increasing BP meds, BP still 160s/150s. Insulin decreased due to low BG. Possible discharge tomorrow if med changes improve BP and blood glucose.     Khushbu Crowe RN

## 2023-07-03 NOTE — PROGRESS NOTES
[facility]  Cardiology Progress Note  Date of Service: 07/03/2023  Date of Admission: 6/29/2023  Inpatient Cardiologist: Dr. Arthur    Summary    Hayder Alves is a 68 year old male with PMH significant for recently diagnosed Lewy Body Dementia, chronic neck pain (s/p spinal fusion), COPD (not on home oxygen), CAD (s/p CABG 2001), CKD stage IV, hypertension, dyslipidemia, systolic CHF, diabetes mellitus, medication noncompliance, h/o atrial fibrillation (s/p cardioversion 2020; not currently on anticoagulation) who initially presented with SOB and left arm pain and concerns for possible NSTEMI.    Interval July 3, 2023    Patient feeling well this morning without any acute concerns.  Says his breathing is stable without any dyspnea.  No chest pain,. LH or dizziness.  SBP averaging 140-160 this AM before medications.  HR stable 80-90s.    Asssessment:    #1 Acute respiratory failure, stable on RA.  Likely mixed COPD and HF exacerbation   #2 Coronary artery disease, status post remote history of CABG. Elevated but flat troponins. Options for ischemic evaluate limited at present due to renal insufficiency.   #3 Atrial fibrillation/flutter now with variable block atrial flutter   #4 Episodes of bradycardia and junctional escape in the 30s vs complete heart block although he now has excellent rates and AV shawn conduction. Holding metoprolol.  #5 Poorly controlled type 2 diabetes.  A1c 10.3 although this is improved from 12.4 six months ago.  #6 Hypertension   #7 CKD stage 4, Cr slowly improving to 2.25 today.  #8 Dyslipidemia, LDL 61 on rosuvastatin.  #9 COPD, not on home O2  #10 Medication noncompliance, limited cardiology follows last seen though by Dr. Palafox 5/23 not started on anticoagulation at that time because of falls.  #11 Lewy body dementia  _____________________________________________________________    Plan:     Continue current medications as ordered.    Stop beta-blocker  and furosemide at discharge.    Place Zio patch heart monitor at time of discharge.    Outpatient clinic follow up with EP BILL in ~4-weeks.    Recommend PCP follow up 1-week post-discharge.    Ok to discharge to home today from a cardiac standpoint.  _____________________________________________________________    Thank you for the opportunity to participate in the care of Mr. Hayder Alves.  Please feel free to reach out with any additional questions.    MICKEY Buckner, CNP   Nurse Practitioner  [facility]  Pager: [phone]  Phone: [phone]  Text Page (8am - 5pm, M-F)    25 Minutes spent in coordination of care, and discussion with the patient and/or family regarding diagnostic results, prognosis, symptom management, risks and benefits of management options, and development of the plan of care of above.      Physical Exam   Temp: 98.5  F (36.9  C) Temp src: Oral BP: (!) 141/96 Pulse: 87   Resp: 18 SpO2: 96 % O2 Device: None (Room air)      Vitals:    07/01/23 0600 07/02/23 0625 07/03/23 0214   Weight: 87.2 kg (192 lb 3.2 oz) 86.5 kg (190 lb 11.2 oz) 86.6 kg (191 lb)     I/O last 3 completed shifts:  In: 980 [P.O.:980]  Out: 2900 [Urine:2900]    Constitutional: Well nourished, NAD.  Eyes: Pupils equal, round. Sclerae anicteric.   HEENT: Normocephalic, atraumatic.   Neck: Supple. Carotid pulses full and equal.  Respiratory: Non-labored. Lungs CTAB.  Cardiovascular: IRR, normal S1 and S2. No M/G/R.  1+ edema at ankles.  Vascular: Peripheral pulses intact, faint and symmetric bilaterally  GI: Soft, non-distended, non-tender.  Skin: Warm and dry. No rashes, cyanosis.  Musculoskeletal/Extremities: Symmetrical movement.   Neurologic: No gross focal deficits. Alert, awake.  Psychiatric: Affect appropriate.    Data   Recent Labs   Lab 07/03/23  0749 07/03/23  0559 07/03/23  0219 07/02/23  0552 07/02/23  0545 07/01/23  0723 07/01/23  0550 06/30/23  0853 06/30/23  0549 06/29/23  1925  06/29/23  1625   WBC  --   --   --   --  6.2  --  10.7  --  7.4  --  14.5*  14.8*   HGB  --   --   --   --  11.3*  --  10.7*  --  12.1*  --  12.8*  12.7*   MCV  --   --   --   --  86  --  86  --  86  --  88  87   PLT  --   --   --   --  111*  --  131*  --  142*  --  198  182   NA  --  133*  --   --  133*  --  131*   < >  --    < > 130*   POTASSIUM  --  3.3*  --   --  3.9  --  4.3   < >  --    < > 4.5   CHLORIDE  --  98  --   --  98  --  93*   < >  --    < > 92*   CO2  --  20*  --   --  21*  --  20*   < >  --    < > 18*   BUN  --  39.3*  --   --  49.7*  --  55.3*   < >  --    < > 34.6*   CR  --  2.25*  --   --  2.50*  --  2.98*   < >  --    < > 2.80*   ANIONGAP  --  15  --   --  14  --  18*   < >  --    < > 20*   WANDA  --  8.8  --   --  8.8  --  8.4*   < >  --    < > 9.4   GLC 99 91 141*   < > 178*   < > 268*   < >  --    < > 406*   ALBUMIN  --   --   --   --   --   --   --   --   --   --  4.0   PROTTOTAL  --   --   --   --   --   --   --   --   --   --  7.4   BILITOTAL  --   --   --   --   --   --   --   --   --   --  0.8   ALKPHOS  --   --   --   --   --   --   --   --   --   --  93   ALT  --   --   --   --   --   --   --   --   --   --  13   AST  --   --   --   --   --   --   --   --   --   --  18    < > = values in this interval not displayed.       Recent Labs   Lab 07/02/23  0545 07/01/23  0550 06/30/23  0549   WBC 6.2 10.7 7.4   HGB 11.3* 10.7* 12.1*   HCT 34.0* 32.5* 36.3*   MCV 86 86 86   * 131* 142*     Recent Labs   Lab 07/03/23  0749 07/03/23  0559 07/03/23  0219 07/02/23  0552 07/02/23  0545 07/01/23  0723 07/01/23  0550   NA  --  133*  --   --  133*  --  131*   POTASSIUM  --  3.3*  --   --  3.9  --  4.3   CHLORIDE  --  98  --   --  98  --  93*   CO2  --  20*  --   --  21*  --  20*   ANIONGAP  --  15  --   --  14  --  18*   GLC 99 91 141*   < > 178*   < > 268*   BUN  --  39.3*  --   --  49.7*  --  55.3*   CR  --  2.25*  --   --  2.50*  --  2.98*   GFRESTIMATED  --  31*  --   --  27*  --  22*    WANDA  --  8.8  --   --  8.8  --  8.4*    < > = values in this interval not displayed.        Patient Active Problem List   Diagnosis     Anxiety     Bipolar affective disorder in remission (H)     Allergic rhinitis     Type 2 diabetes mellitus with stage 4 chronic kidney disease, with long-term current use of insulin (H)     Cervicalgia     HYPERLIPIDEMIA LDL GOAL <100     Insomnia     Hypertension goal BP (blood pressure) < 130/80     COPD (chronic obstructive pulmonary disease) (H)     GERD (gastroesophageal reflux disease)     Peripheral neuralgia     Microalbuminuria     Vitamin B12 deficiency without anemia     Vitamin D deficiency     Coronary artery disease involving coronary bypass graft of native heart without angina pectoris     Chronic pain     Ischemic cardiomyopathy     Chronic systolic heart failure (H)     Health Care Home     Diabetic polyneuropathy associated with type 2 diabetes mellitus (H)     CKD (chronic kidney disease) stage 4, GFR 15-29 ml/min (H)     Acute pulmonary edema (H)     Hyperglycemia     COPD exacerbation (H)     NSTEMI (non-ST elevated myocardial infarction) (H)     Medications     - MEDICATION INSTRUCTIONS -       - MEDICATION INSTRUCTIONS -         amitriptyline  30 mg Oral At Bedtime     amLODIPine  5 mg Oral BID     aspirin  81 mg Oral Daily     busPIRone  5 mg Oral TID     fluticasone-vilanterol  1 puff Inhalation Daily    And     umeclidinium  1 puff Inhalation Daily     furosemide  20 mg Oral BID     gabapentin  300 mg Oral BID     hydrALAZINE  75 mg Oral TID     insulin aspart  5 Units Subcutaneous TID w/meals     insulin aspart  1-10 Units Subcutaneous TID AC     insulin aspart  1-7 Units Subcutaneous At Bedtime     insulin glargine  30 Units Subcutaneous At Bedtime     isosorbide mononitrate  60 mg Oral Daily     oxyCODONE-acetaminophen  1 tablet Oral At Bedtime     rosuvastatin  10 mg Oral Daily     senna-docusate  1 tablet Oral BID    Or     senna-docusate  2 tablet  Oral BID     sodium chloride (PF)  3 mL Intracatheter Q8H       Data   Last 24 hours labs personally reviewed.  Echo:   Recent Results (from the past 4320 hour(s))   Echocardiogram Complete   Result Value    LVEF  50-55%    [facility]    [id number]  [id number]  [id number]  [id number]^[id number]^JAME     [facility]  Echocardiography Laboratory  [address] [place] [address]     Name: MANSOOR MOORE  MRN: [id number]  : 1955  Study Date: 2023 09:10 AM  Age: 68 yrs  Gender: Male  Patient Location: [facility]  Reason For Study: SOB  Ordering Physician: JAME BRENNAN     BSA: 2.0 m2  Height: 67 in  Weight: 198 lb  HR: 64  BP: 172/74 mmHg  ______________________________________________________________________________  Procedure  Complete Portable Echo Adult.  ______________________________________________________________________________  Interpretation Summary     There is borderline concentric left ventricular hypertrophy.  The visual ejection fraction is 50-55%.  Grade II or moderate diastolic dysfunction.  There is mild inferolateral wall hypokinesis.  The right ventricular systolic function is borderline reduced.  There is mild biatrial enlargement.  The mean mitral valve gradient is (at HR 81) 4mmHg.  IVC diameter and respiratory changes fall into an intermediate range  suggesting an RA pressure of 8 mmHg.  Right ventricular systolic pressure is elevated, consistent with moderate  pulmonary hypertension.  There is mild aortic sclerosis of the left coronary cusp.  Mild valvular aortic stenosis.  ______________________________________________________________________________  Left Ventricle  The left ventricle is normal in size. There is borderline concentric left  ventricular hypertrophy. The visual ejection fraction is 50-55%. Grade II or  moderate diastolic dysfunction. There is mild inferolateral wall hypokinesis.  There is no thrombus seen in the left ventricle.     Right  Ventricle  The right ventricle is normal size. The right ventricular systolic function is  borderline reduced.     Atria  There is mild biatrial enlargement.     Mitral Valve  There is trace mitral regurgitation. The mean mitral valve gradient is (at HR  81) 4mmHg.     Tricuspid Valve  There is trace tricuspid regurgitation. The right ventricular systolic  pressure is approximated at 45.7 mmHg plus the right atrial pressure. IVC  diameter and respiratory changes fall into an intermediate range suggesting an  RA pressure of 8 mmHg. Right ventricular systolic pressure is elevated,  consistent with moderate pulmonary hypertension.     Aortic Valve  There is mild aortic sclerosis of the left coronary cusp. Mild valvular aortic  stenosis. The mean AoV pressure gradient is 7.9 mmHg. The calculated aortic  valve are is 2.1 cm^2.     Pulmonic Valve  The pulmonic valve is not well seen, but is grossly normal.     Vessels  The aortic root is normal size.     Pericardium  The pericardium appears normal.     Rhythm  Sinus rhythm was noted.  ______________________________________________________________________________  MMode/2D Measurements & Calculations  IVSd: 1.1 cm  LVIDd: 5.0 cm  LVIDs: 3.4 cm  LVPWd: 1.5 cm  FS: 32.4 %  LV mass(C)d: 257.1 grams  LV mass(C)dI: 127.7 grams/m2  Ao root diam: 3.4 cm  LA dimension: 4.8 cm  asc Aorta Diam: 3.5 cm  LA/Ao: 1.4  LVOT diam: 2.1 cm  LVOT area: 3.5 cm2  LA Volume (BP): 74.0 ml  LA Volume Index (BP): 36.8 ml/m2     RWT: 0.58  TAPSE: 1.5 cm     Doppler Measurements & Calculations  MV E max efra: 109.0 cm/sec  MV A max efra: 53.1 cm/sec  MV E/A: 2.1  MV max P.9 mmHg  MV mean PG: 3.6 mmHg  MV V2 VTI: 38.2 cm  MVA(VTI): 2.0 cm2  MV dec slope: 1295 cm/sec2  MV dec time: 0.08 sec  Ao V2 max: 207.7 cm/sec  Ao max P.3 mmHg  Ao V2 mean: 125.6 cm/sec  Ao mean P.9 mmHg  Ao V2 VTI: 36.7 cm  MARIZA(I,D): 2.1 cm2  MARIZA(V,D): 1.7 cm2  LV V1 max P.1 mmHg  LV V1 max: 101.2 cm/sec  LV V1  VTI: 22.3 cm  SV(LVOT): 77.3 ml  SI(LVOT): 38.4 ml/m2  PA acc time: 0.12 sec  TR max daryl: 338.0 cm/sec  TR max P.7 mmHg  AV Daryl Ratio (DI): 0.49  MARIZA Index (cm2/m2): 1.0  E/E' av.4  Lateral E/e': 13.2     Medial E/e': 15.7  RV S Daryl: 6.3 cm/sec     ______________________________________________________________________________  Report approved by: Dunia Schuler 2023 11:46 AM

## 2023-07-03 NOTE — PROGRESS NOTES
Medicine Progress Note - Hospitalist Service    Date of Admission:  6/29/2023    Assessment & Plan     Hayder Alves is a 68 year old male with a very complicated  medical history significant for recently diagnosed Lewy Body Dementia, chronic neck pain (s/p spinal fusion), COPD (not on home oxygen), CAD (s/p CABG 2001), CKD stage IV, hypertension, dyslipidemia, systolic CHF, diabetes mellitus, medication noncompliance, h/o atrial fibrillation (s/p cardioversion 2020; not currently on anticoagulation) presented to ED with shortness of breath and left arm pain.      Acute hypoxic respiratory failure likely due to acute on chronic systolic heart failure and exacerbation-resolved  NSTEMI likely due to above  History of coronary artery disease status CABG  new atrial flutter, likely paroxysmal atrial fibrillation with severe bradycardia (h/o cardioversion in 2020, not currently on anticoagulation)  Hypertension  Dyslipidemia  Moderate pulmonary hypertension-new  -Initially on admission presented with chief complaint of shortness of breath and chest discomfort.  Patient initially mentioned he had left arm pain and then mentioned that he had chest discomfort.  He was not able to provide good history.  -On presentation to the ER his heart rate was in 30s, EKG showed atrial flutter proBNP was significantly elevated [place] 19855 and troponin was elevated at 118  -Last echo in June 2020 showed EF of 55 to 60%, no wall motion changes, mild dilated left atrium and trace MR and TR  -Chest x-ray was done in the ED which was concerning for volume overload and patient was given 60 IV Lasix in the ED and cardiology was consulted in the ED with interventions and he was started on heparin drip  -He had echo done in June 2022 and at that time his EF was 55 to 60%, no wall motion changes, mildly dilated left atrium, normal RV ventricular size and systolic function and mildly dilated  atrium  -Duplex of the lower extremity did not show any evidence of DVT  - repeat EKG on 6/30/2023 was done which showed normal sinus rhythm with first-degree AV block  -Troponin peaked at 118 and have trended down to 103  -Echo on 7/1/23  which shows EF of 50 to 55%, grade 2 or moderate diastolic dysfunction, mild inferior lateral wall hypokinesis, mild biatrial enlargement, RV systolic function is borderline reduced, RVSP is elevated consistent with moderate pulmonary hypertension, mild aortic Valvular stenosis  -Given his KAY on CKD 4 cardiac catheterization is not considered as it we will put him on dialysis  -Continue patient with aspirin 81 mg daily, rosuvastatin 10 mg daily, p.o. Lasix 20 twice daily  -Continue amlodipine to 5 mg twice daily, s Imdur 60 daily and will increase dose of hydralazine to 75 mg 3 times daily hydralazine-Zio patch on discharge  -Long-term anticoagulation due to falls contraindicated cardiology    Elevated D-dimer  -Patient had elevated D-dimer of 1 on admission and he has history of COPD  -Patient does have CKD stage IV and cannot have CTA chest  -On admission he was started on high intensity heparin drip  -Duplex of the lower extremity does not show any evidence of DVT  -VQ scan has been ordered and does not show any evidence of PE  -Echo has been ordered results as above  -Exam he is on room air today       History of COPD  -not on home oxygen;  and on presentation was noted 85% on room air; on 3 to 4 L oxygen  -Patient did get IV Solu-Medrol in the ER along with DuoNebs and his PTA inhalers were started  -Chest x-ray done on admission showed pulmonary vascular congestion with likely mild to moderate pulmonary edema and small bilateral pleural effusions with compressive atelectasis, right larger than left with no pneumothorax  -On exam no wheezing and he is on room air and continue with PTA inhaler along with as needed DuoNebs     Uncontrolled diabetes with likely ?mild  DKA  -family reports medication noncompliance  -his insulin regimen was recently adjusted and switched from NPH to Lantus (30 units daily) and NovoLog 10 units TID with meals which he has not started as per discussion with the wife he is noncompliant  -Patient told me that he is only taking 20 of insulin and I do not think he is compliant with his insulin at all  -on admission blood glucose 400s, anion gap 20, bicarb 18  -Per admitting physician there was concern about mild DKA but given concerns of CHF exacerbation IV fluids were held and patient was started on PTA insulin regimen  -Patient was started on insulin drip and was transitioned to basal and bolus insulin   -Patient was on Lantus 40 units, aspart 11 with meals and his blood sugar this morning was low at 99 and he was symptomatic and we will cut down the dose of Lantus to 30 units aspart to 5 units with meals and monitor his sugars and adjust as needed    Acute kidney injury on CKD stage IV  Pseudo hyponatremia  Hyponatremia-improving  Anion gap metabolic acidosis likely due to renal failure-resolved  Hypokalemia-new  -on admission creatinine 2.8 which seems stable around his baseline of 2.5 to 2.8; sodium  Was 130  -His renal function has been monitored along with sodium and usually he had pseudohyponatremia as his sugars were significantly high but sodium continues to be on the lower side and anion gap has been chronically elevated and with improvement in renal function anion gap is closed and his bicarb is improving and is 21  -Nephrology following and continue with Lasix 20 twice daily  -His potassium today is low at 3.3 but creatinine is improving and is 2.25  -We will start him on replacement protocol      Chronic neck pain, history of spinal fusion  -will continue with his PTA Percocet and continue PTA Neurontin     Recently diagnosed Lewy Body Dementia  -noted  -follows neurology as outpatient    Chronic thrombocytopenia  -He does seem to have  "chronic thrombocytopenia and his platelet level fluctuates and has no evidence of bleeding and was 111 today     Physical deconditioning  -We will consult physical therapy and Occupational Therapy and I reviewed the note from physical therapy and he will benefit from TCU stay       Diet: Moderate Consistent Carb (60 g CHO per Meal) Diet    DVT Prophylaxis: Heparin drip  Gardner Catheter: Not present  Lines: None     Cardiac Monitoring: ACTIVE order. Indication: Bradycardias (48 hours)  Code Status: Full Code      Clinically Significant Risk Factors        # Hypokalemia: Lowest K = 3.3 mmol/L in last 2 days, will replace as needed         # Thrombocytopenia: Lowest platelets = 111 in last 2 days, will monitor for bleeding   # Hypertension: Noted on problem list       # DMII: A1C = 10.3 % (Ref range: <5.7 %) within past 6 months            Disposition Plan      Expected Discharge Date: 07/03/2023                  Jose Bueno MD  Hospitalist Service  [facility]  Securely message with [employer] (more info)  Text page via [employer] Paging/Directory   ______________________________________________________________________    Interval History     Seen today and he was on room air but his sugar was on the lower side and he was feeling dizzy and scheduled aspart this morning was held.  No fever, no chills, no nausea or vomiting.  His blood pressure has been high and blood pressure medicine regimen was changed    discussed plan of care with the patient's nurse, patient wife and care coordinator and he is willing to tcu     Physical Exam     Vital signs:  Temp: 98.5  F (36.9  C) Temp src: Oral BP: (!) 160/75 Pulse: 93   Resp: 18 SpO2: 97 % O2 Device: None (Room air) Oxygen Delivery: 1 LPM   Weight: 86.6 kg (191 lb)  Estimated body mass index is 29.08 kg/m  as calculated from the following:    Height as of 5/4/23: 1.726 m (5' 7.95\").    Weight as of this encounter: 86.6 kg (191 lb).          General: " AOX2-3  Respiratory: Lungs are clear with no expiratory wheeze and was on room air   Cardiovascular: Regular rate , S1 and S2 normal with no murmer or rubs or gallops and has bilateral edema extending up to the thighs over both lower extremities and the edema is improving significantly  Abdomen:   soft , non tender ,   Neurologic:  No facial droop  Musculoskeletal: Normal Range of motion over upper and lower extremities bilaterally   Psychiatric: cooperative     Medical Decision Making           Data     I have personally reviewed the following data over the past 24 hrs:    N/A  \   N/A   / N/A     133 (L) 98 39.3 (H) /  99   3.3 (L) 20 (L) 2.25 (H) \       Imaging results reviewed over the past 24 hrs:   No results found for this or any previous visit (from the past 24 hour(s)).

## 2023-07-03 NOTE — PROGRESS NOTES
Renal Medicine Progress Note            Assessment/Plan:     1.  CKD stage 4              -baseline creatinine 2.5 to 3.0 mg/dl              -eGFR 25 ml/nmin              -presumed progressive diabetic nephropathy with significant proteinuria              -abnormal creatinines back to 2010              -No established outpatient nephrologist, previous attempts to schedule with [facility], referrals 2021 and 2/23.        2.  mild hyponatremia today, 133  3.  MBD -  Vit D in process.    4.  Lewy body dementia  5.  DM              -uncontrolled               -A1C > 10% for some time              -no retinopathy 2021  6.   Acute Kidney Injury - Cr improved to recent baseline  7. HTN: now on amlodipine, imdur and hydralazine.       Plan/Recs:  1) no renal related barriers to discharge. Discharge on current oral lasix 20mg BID.   2) Needs to establish outpatient nephrology. [facility] appt 7/18/23 cancelled. Can either can continue attempted appts there or else establish care with [facility].       Darian Urrutia DO  [facility]  Office: [phone]  Cell: [phone]        Interval History:      Pt reports he is doing well, back to baseline. Did not remember hospitalist visit that occurred earlier today. Laying flat, no dyspnea reported. Creatinine down to 2.25. Weight 86.6kg today, stable. Yesterday 1.8L out on oral lasix.   BP's 111-161 systolic in last day.          Medications and Allergies:       amitriptyline  30 mg Oral At Bedtime     amLODIPine  5 mg Oral BID     aspirin  81 mg Oral Daily     busPIRone  5 mg Oral TID     fluticasone-vilanterol  1 puff Inhalation Daily    And     umeclidinium  1 puff Inhalation Daily     furosemide  20 mg Oral BID     gabapentin  300 mg Oral BID     hydrALAZINE  75 mg Oral TID     insulin aspart  5 Units Subcutaneous TID w/meals     insulin aspart  1-10 Units Subcutaneous TID AC     insulin aspart  1-7 Units Subcutaneous At Bedtime     insulin glargine   30 Units Subcutaneous At Bedtime     isosorbide mononitrate  60 mg Oral Daily     oxyCODONE-acetaminophen  1 tablet Oral At Bedtime     rosuvastatin  10 mg Oral Daily     senna-docusate  1 tablet Oral BID    Or     senna-docusate  2 tablet Oral BID     sodium chloride (PF)  3 mL Intracatheter Q8H        Allergies   Allergen Reactions     Adhesive Tape      From blood draw site.     Eszopiclone      Strange dreams. Cooking during sleep      Latex      Lipitor [Atorvastatin Calcium] Other (See Comments)     myalgias     Lisinopril Other (See Comments)     Hyperkalemia and increased creatinine            Physical Exam:   Vitals were reviewed  BP (!) 141/96   Pulse 87   Temp 98.5  F (36.9  C) (Oral)   Resp 18   Wt 86.6 kg (191 lb)   SpO2 96%   BMI 29.08 kg/m      Wt Readings from Last 3 Encounters:   07/03/23 86.6 kg (191 lb)   06/08/23 85.4 kg (188 lb 3.2 oz)   05/04/23 83.4 kg (183 lb 14.4 oz)       Intake/Output Summary (Last 24 hours) at 7/3/2023 1105  Last data filed at 7/3/2023 0838  Gross per 24 hour   Intake 1220 ml   Output 2600 ml   Net -1380 ml       GENERAL APPEARANCE:  HEENT:  Eyes/ears/nose/neck grossly normal  RESP: lungs cta b c good efforts, no crackles, rhonchi or wheezes  CV: RRR, nl S1/S2, no m/r/g   ABDOMEN: o/s/nt/nd, bs present  EXTREMITIES/SKIN: no c/c/rashes/lesions; no edema             Data:     BMP  Recent Labs   Lab 07/03/23  0749 07/03/23  0559 07/03/23  0219 07/02/23  2111 07/02/23  0552 07/02/23  0545 07/01/23  0723 07/01/23  0550 06/30/23  2104 06/30/23  1834   NA  --  133*  --   --   --  133*  --  131*  --  133*  131*   POTASSIUM  --  3.3*  --   --   --  3.9  --  4.3  --  4.7  4.3   CHLORIDE  --  98  --   --   --  98  --  93*  --  91*  90*   WANDA  --  8.8  --   --   --  8.8  --  8.4*  --  8.7*  8.9   CO2  --  20*  --   --   --  21*  --  20*  --  19*  18*   BUN  --  39.3*  --   --   --  49.7*  --  55.3*  --  52.5*  51.3*   CR  --  2.25*  --   --   --  2.50*  --  2.98*  --   3.13*  3.06*   GLC 99 91 141* 150*   < > 178*   < > 268*   < > 264*  253*    < > = values in this interval not displayed.     CBC  Recent Labs   Lab 07/02/23  0545 07/01/23  0550 06/30/23  0549 06/29/23  1625   WBC 6.2 10.7 7.4 14.5*  14.8*   HGB 11.3* 10.7* 12.1* 12.8*  12.7*   HCT 34.0* 32.5* 36.3* 38.8*  38.8*   MCV 86 86 86 88  87   * 131* 142* 198  182     Lab Results   Component Value Date    AST 18 06/29/2023    ALT 13 06/29/2023    GGT 40 03/12/2006    ALKPHOS 93 06/29/2023    BILITOTAL 0.8 06/29/2023    BILICONJ 0.0 04/08/2010     Lab Results   Component Value Date    INR 1.08 10/10/2016       Attestation:  I have reviewed today's vital signs, notes, medications, labs and imaging.    DO [facility]  Office: [phone]  Cell: [phone]

## 2023-07-03 NOTE — CONSULTS
Care Management Initial Consult    General Information  Assessment completed with: Pancho Knutson       Primary Care Provider verified and updated as needed: Yes   Readmission within the last 30 days:        Reason for Consult: discharge planning  Advance Care Planning: Advance Care Planning Reviewed: present on chart          Communication Assessment  Patient's communication style: spoken language (English or Bilingual)    Hearing Difficulty or Deaf: no   Wear Glasses or Blind: yes    Cognitive  Cognitive/Neuro/Behavioral: WDL, .WDL except (forgetful)  Level of Consciousness: alert  Arousal Level: opens eyes spontaneously  Orientation: oriented x 4  Mood/Behavior: cooperative, calm  Best Language: 0 - No aphasia  Speech: clear, spontaneous, logical (forgetful)    Living Environment:   People in home: child(jacinta), adult, spouse  Lilia & Ranjit  Current living Arrangements: house      Able to return to prior arrangements: yes       Family/Social Support:  Care provided by: self, spouse/significant other  Provides care for: no one  Marital Status:   Children, Wife          Description of Support System: Supportive, Involved    Support Assessment: Adequate family and caregiver support, Adequate social supports    Current Resources:   Patient receiving home care services: No     Community Resources: None  Equipment currently used at home: cane, straight  Supplies currently used at home: None    Employment/Financial:  Employment Status: retired        Financial Concerns: No concerns identified   Referral to Financial Worker: No       Does the patient's insurance plan have a 3 day qualifying hospital stay waiver?  No    Lifestyle & Psychosocial Needs:  Social Determinants of Health     Tobacco Use: Medium Risk (6/8/2023)    Patient History      Smoking Tobacco Use: Former      Smokeless Tobacco Use: Never      Passive Exposure: Not on file   Alcohol Use: Not At Risk (10/13/2022)    AUDIT-C      Frequency of Alcohol  Consumption: 2-3 times a week      Average Number of Drinks: 1 or 2      Frequency of Binge Drinking: Never   Financial Resource Strain: Low Risk  (10/13/2022)    Overall Financial Resource Strain (CARDIA)      Difficulty of Paying Living Expenses: Not very hard   Food Insecurity: No Food Insecurity (10/13/2022)    Hunger Vital Sign      Worried About Running Out of Food in the Last Year: Never true      Ran Out of Food in the Last Year: Never true   Transportation Needs: No Transportation Needs (10/13/2022)    PRAPARE - Transportation      Lack of Transportation (Medical): No      Lack of Transportation (Non-Medical): No   Physical Activity: Insufficiently Active (10/13/2022)    Exercise Vital Sign      Days of Exercise per Week: 2 days      Minutes of Exercise per Session: 10 min   Stress: No Stress Concern Present (10/13/2022)    [place] - Occupational Stress Questionnaire      Feeling of Stress : Only a little   Social Connections: Socially Isolated (10/13/2022)    Social Connection and Isolation Panel [NHANES]      Frequency of Communication with Friends and Family: Never      Frequency of Social Gatherings with Friends and Family: Once a week      Attends Yazidism Services: Never      Active Member of Clubs or Organizations: No      Attends Club or Organization Meetings: Not on file      Marital Status:    Intimate Partner Violence: Not on file   Depression: At risk (2/1/2023)    PHQ-2      PHQ-2 Score: 5   Housing Stability: Low Risk  (10/13/2022)    Housing Stability Vital Sign      Unable to Pay for Housing in the Last Year: No      Number of Places Lived in the Last Year: 1      Unstable Housing in the Last Year: No       Functional Status:  Prior to admission patient needed assistance:   Dependent ADLs:: Independent  Dependent IADLs:: Cleaning, Cooking, Laundry, Shopping, Meal Preparation, Medication Management, Transportation       Mental Health Status:           Chemical Dependency Status:                Values/Beliefs:  Spiritual, Cultural Beliefs, Mormon Practices, Values that affect care: no               Additional Information:  Per care management/social work consult for discharge planning. Patient admitted on 06/29 due to NSTEMI. SW reviewed chart and stopped by patients room. Patient was sleeping and SW spoke with Son Pancho. Per Pancho's report patient resides in a house with his spouse and adult son Ranjit and his fiance. At baseline patient is independent with mobility and did not need assistance from family. SW informed wife doesn't work but has out of the home obligations about 30% of the time. SW informed son Ranjit and his fiance work full time. SW discussed recommendation for TCU and family in agreement. SW provided list in the room and family will review and provide choices for social work. SW will continue to follow.    SEVEN Douglas    [facility]

## 2023-07-04 NOTE — PROGRESS NOTES
Care Management Follow Up    Length of Stay (days): 5    Expected Discharge Date: 07/05/2023     Concerns to be Addressed:       Patient plan of care discussed at interdisciplinary rounds: Yes    Anticipated Discharge Disposition: Skilled Nursing Facility, Transitional Care     Anticipated Discharge Services: None  Anticipated Discharge DME: None    Patient/family educated on Medicare website which has current facility and service quality ratings: yes  Education Provided on the Discharge Plan:    Patient/Family in Agreement with the Plan: yes    Referrals Placed by CM/SW:    Private pay costs discussed: Not applicable    Additional Information:  Writer left message for patients son Pancho to see if they had choices for TCU. Encouraged Pancho to call back with choices so we can get referrals sent.     SW will continue to follow      SEVEN Moy

## 2023-07-04 NOTE — PROGRESS NOTES
[facility]    Medicine Progress Note - Hospitalist Service    Date of Admission:  6/29/2023    Assessment & Plan     Hayder Alves is a 68 year old male with a very complicated  medical history significant for recently diagnosed Lewy Body Dementia, chronic neck pain (s/p spinal fusion), COPD (not on home oxygen), CAD (s/p CABG 2001), CKD stage IV, hypertension, dyslipidemia, systolic CHF, diabetes mellitus, medication noncompliance, h/o atrial fibrillation (s/p cardioversion 2020; not currently on anticoagulation) presented to ED with shortness of breath and left arm pain.      Acute hypoxic respiratory failure likely due to acute on chronic systolic heart failure and exacerbation-resolved  NSTEMI likely due to above  History of coronary artery disease status CABG  new atrial flutter, likely paroxysmal atrial fibrillation with severe bradycardia (h/o cardioversion in 2020, not currently on anticoagulation)  Hypertension  Dyslipidemia  Moderate pulmonary hypertension-new  -Initially on admission presented with chief complaint of shortness of breath and chest discomfort.  Patient initially mentioned he had left arm pain and then mentioned that he had chest discomfort.  He was not able to provide good history.  -On presentation to the ER his heart rate was in 30s, EKG showed atrial flutter proBNP was significantly elevated at 19855 and troponin was elevated at 118  -Last echo in June 2020 showed EF of 55 to 60%, no wall motion changes, mild dilated left atrium and trace MR and TR  -Chest x-ray was done in the ED which was concerning for volume overload and patient was given 60 IV Lasix in the ED and cardiology was consulted in the ED with interventions and he was started on heparin drip  -He had echo done in June 2022 and at that time his EF was 55 to 60%, no wall motion changes, mildly dilated left atrium, normal RV ventricular size and systolic function and mildly dilated  atrium  -Duplex of the lower extremity did not show any evidence of DVT  - repeat EKG on 6/30/2023 was done which showed normal sinus rhythm with first-degree AV block  -Troponin peaked at 118 and have trended down to 103  -Echo on 7/1/23  which shows EF of 50 to 55%, grade 2 or moderate diastolic dysfunction, mild inferior lateral wall hypokinesis, mild biatrial enlargement, RV systolic function is borderline reduced, RVSP is elevated consistent with moderate pulmonary hypertension, mild aortic Valvular stenosis  -Given his KAY on CKD 4 cardiac catheterization is not considered as it we will put him on dialysis  -Continue patient with aspirin 81 mg daily, rosuvastatin 10 mg daily  -Did review his renal function and his creatinine did increase and discussed with the cardiology and nephrology and we will hold his Lasix  -Patient had significant bradycardia and was symptomatic and hemodynamically he was stable and we will hold all his blood pressure medications going forward  -Pacer pads have been placed and we will monitor him closely and if he becomes symptomatic then we can use dopamine.  Of note patient has right bundle branch block and it can make his condition worse and if that happens then we can use temporary pacing and wire will need to be placed  -nP.o. after midnight  -d/w cardiology and appreciate input      Elevated D-dimer  -Patient had elevated D-dimer of 1 on admission and he has history of COPD  -Patient does have CKD stage IV and cannot have CTA chest  -On admission he was started on high intensity heparin drip  -Duplex of the lower extremity does not show any evidence of DVT  -VQ scan has been ordered and does not show any evidence of PE  -Echo has been ordered results as above         History of COPD  Shortness of breath  -not on home oxygen;  and on presentation was noted 85% on room air; on 3 to 4 L oxygen  -Patient did get IV Solu-Medrol in the ER along with DuoNebs and his PTA inhalers were  started  -Chest x-ray done on admission showed pulmonary vascular congestion with likely mild to moderate pulmonary edema and small bilateral pleural effusions with compressive atelectasis, right larger than left with no pneumothorax  -Have wheezing today and was needing oxygen and I have changed his nebs to every 6 hours scheduled, chest x-ray was done which showed that vascular congestion has improved  and opacity over the right lung has improved but there is persistent opacity  -I checked his procalcitonin which was elevated at 0.26 but his white count is normal and he has not had any fever.  -This might be atelectasis also but given that patient has cough and is needing oxygen we will start him on ceftriaxone     Uncontrolled diabetes with likely ?mild DKA  -family reports medication noncompliance  -his insulin regimen was recently adjusted and switched from NPH to Lantus (30 units daily) and NovoLog 10 units TID with meals which he has not started as per discussion with the wife he is noncompliant  -Patient told me that he is only taking 20 of insulin and I do not think he is compliant with his insulin at all  -on admission blood glucose 400s, anion gap 20, bicarb 18  -Per admitting physician there was concern about mild DKA but given concerns of CHF exacerbation IV fluids were held and patient was started on PTA insulin regimen  -Initially patient was started on insulin drip and was transitioned to basal and bolus insulin   -Patient was on Lantus 40 units, aspart 11 with meals and his blood sugars were running low  -I have restarted him on Lantus 20 units along with sliding scale insulin and we will see the trend of his blood sugars today    Acute kidney injury on CKD stage IV  Pseudo hyponatremia  Hyponatremia-improving  Anion gap metabolic acidosis likely due to renal failure-resolved  Hypokalemia-resolved  -on admission creatinine 2.8 which seems stable around his baseline of 2.5 to 2.8; sodium  Was  130  -His renal function has been monitored along with sodium and usually he had pseudohyponatremia as his sugars were significantly high but sodium continues to be on the lower side and anion gap has been chronically elevated and with improvement in renal function anion gap is closed and his bicarb is improving and is 21  -Nephrology following and and his creatinine did increase to 2.54 and we will hold Lasix and monitor closely  -Discussed with nephrology      Chronic neck pain, history of spinal fusion  -will continue with his PTA Percocet and continue PTA Neurontin     Recently diagnosed Lewy Body Dementia  -noted  -follows neurology as outpatient    Chronic thrombocytopenia  -He does seem to have chronic thrombocytopenia and his platelet level fluctuates and has no evidence of bleeding and was 111 today     Physical deconditioning  -We will consult physical therapy and Occupational Therapy and I reviewed the note from physical therapy and he will benefit from TCU stay       Diet: Moderate Consistent Carb (60 g CHO per Meal) Diet    DVT Prophylaxis: Heparin drip  Gardner Catheter: Not present  Lines: None     Cardiac Monitoring: ACTIVE order. Indication: Bradycardias (48 hours)  Code Status: Full Code      Clinically Significant Risk Factors        # Hypokalemia: Lowest K = 3.3 mmol/L in last 2 days, will replace as needed      # Anion Gap Metabolic Acidosis: Highest Anion Gap = 25 mmol/L in last 2 days, will monitor and treat as appropriate      # Hypertension: Noted on problem list       # DMII: A1C = 10.3 % (Ref range: <5.7 %) within past 6 months            Disposition Plan      Expected Discharge Date: 07/05/2023    Discharge Delays: Placement - TCU  Destination: other (comment) (TCU)            Jose Bueno MD  Hospitalist Service  [facility]  Securely message with Whisper (more info)  Text page via [employer] Paging/Directory  "  ______________________________________________________________________    Interval History       He was seen multiple times during course of the day and this morning was needing oxygen but denied any fever or chills.  He does have mild cough which is nonproductive.  Later in the day patient was feeling cold and was having lower heart rate and RRT was called and please see the notes for RRT.  Patient was given DuoNeb and he felt better.    D/w with patient's nurse, charge nurse, cardiology, nephrology, flying squad nurse    Physical Exam     Vital signs:  Temp: 97.8  F (36.6  C) Temp src: Oral BP: 125/55 Pulse: 54   Resp: 20 SpO2: 91 % O2 Device: Nasal cannula Oxygen Delivery: 2 LPM   Weight: 85.5 kg (188 lb 8.7 oz)  Estimated body mass index is 28.71 kg/m  as calculated from the following:    Height as of 5/4/23: 1.726 m (5' 7.95\").    Weight as of this encounter: 85.5 kg (188 lb 8.7 oz).          General: AOX2-3  Respiratory: Lungs are clear with no expiratory wheeze and was on room air   Cardiovascular: Regular rate , S1 and S2 normal with no murmer or rubs or gallops and has bilateral edema extending up to the thighs over both lower extremities and the edema is improving significantly  Abdomen:   soft , non tender ,   Neurologic:  No facial droop  Musculoskeletal: Normal Range of motion over upper and lower extremities bilaterally   Psychiatric: cooperative     Medical Decision Making           Data     I have personally reviewed the following data over the past 24 hrs:    N/A  \   N/A   / N/A     131 (L) 93 (L) 45.4 (H) /  204 (H)   4.5 13 (L) 2.54 (H) \       Imaging results reviewed over the past 24 hrs:   No results found for this or any previous visit (from the past 24 hour(s)).  "

## 2023-07-04 NOTE — PROGRESS NOTES
Renal Medicine Progress Note            Assessment/Plan:     1.  CKD stage 4              -baseline creatinine 2.5 to 3.0 mg/dl              -eGFR 25 ml/nmin              -presumed progressive diabetic nephropathy with significant proteinuria              -abnormal creatinines back to 2010              -No established outpatient nephrologist, previous attempts to schedule with [facility], referrals 2021 and 2/23.         2.  mild hyponatremia today, 133  3.  MBD -  Vit D in process.    4.  Lewy body dementia  5.  DM              -uncontrolled               -A1C > 10% for some time              -no retinopathy 2021  6.   Acute Kidney Injury - Cr improved to recent baseline yesterday, slightly worse today, ? hemodynamcs vs other.   7. HTN:  on amlodipine, imdur and hydralazine and lasix but all being held with nacho cardia, lower range BP's.         Plan/Recs:  1) no new recs, willl follow up tomorrow with rising creatinine ongoing issues with HR, BP's     Darian Urrutia DO  [facility]  Office: [phone]  Cell: [phone]        Interval History:      Pt doing well but having issues with bradycardia today. Some lower BP's but lowest 117. He is without complaints to me.     1.6L urine out yesterday. Today 200 ml recorded but not accurate I/O's.     Anti-HTN meds being held later today.          Medications and Allergies:       amitriptyline  30 mg Oral At Bedtime     [Held by provider] amLODIPine  5 mg Oral BID     aspirin  81 mg Oral Daily     busPIRone  5 mg Oral TID     fluticasone-vilanterol  1 puff Inhalation Daily    And     umeclidinium  1 puff Inhalation Daily     [Held by provider] furosemide  20 mg Oral BID     gabapentin  300 mg Oral BID     [Held by provider] hydrALAZINE  75 mg Oral TID     insulin aspart  1-10 Units Subcutaneous TID AC     insulin aspart  1-7 Units Subcutaneous At Bedtime     [Held by provider] insulin glargine  20 Units Subcutaneous QAM AC     ipratropium -  albuterol 0.5 mg/2.5 mg/3 mL  1 vial Nebulization Q6H     [Held by provider] isosorbide mononitrate  60 mg Oral Daily     oxyCODONE-acetaminophen  1 tablet Oral At Bedtime     rosuvastatin  10 mg Oral Daily     senna-docusate  1 tablet Oral BID    Or     senna-docusate  2 tablet Oral BID     sodium chloride (PF)  3 mL Intracatheter Q8H        Allergies   Allergen Reactions     Adhesive Tape      From blood draw site.     Eszopiclone      Strange dreams. Cooking during sleep      Latex      Lipitor [Atorvastatin Calcium] Other (See Comments)     myalgias     Lisinopril Other (See Comments)     Hyperkalemia and increased creatinine            Physical Exam:   Vitals were reviewed  /63   Pulse 54   Temp 97.8  F (36.6  C) (Oral)   Resp 20   Wt 85.5 kg (188 lb 8.7 oz)   SpO2 93%   BMI 28.71 kg/m      Wt Readings from Last 3 Encounters:   07/04/23 85.5 kg (188 lb 8.7 oz)   06/08/23 85.4 kg (188 lb 3.2 oz)   05/04/23 83.4 kg (183 lb 14.4 oz)       Intake/Output Summary (Last 24 hours) at 7/4/2023 1723  Last data filed at 7/4/2023 0526  Gross per 24 hour   Intake --   Output 200 ml   Net -200 ml       GENERAL APPEARANCE:  HEENT:  Eyes/ears/nose/neck grossly normal  RESP: lungs cta b c good efforts, no crackles, rhonchi or wheezes  CV: RRR, nl S1/S2, no m/r/g   ABDOMEN: o/s/nt/nd, bs present  EXTREMITIES/SKIN: no c/c/rashes/lesions; no edema           Data:     BMP  Recent Labs   Lab 07/04/23  1708 07/04/23  1433 07/04/23  1153 07/04/23  1121 07/04/23  0732 07/04/23  0529 07/03/23  1723 07/03/23  1445 07/03/23  0749 07/03/23  0559 07/02/23  0552 07/02/23  0545   NA  --   --  129*  --   --  131*  --   --   --  133*  --  133*   POTASSIUM  --   --  5.3  --   --  4.5  --  4.1  --  3.3*  --  3.9   CHLORIDE  --   --  92*  --   --  93*  --   --   --  98  --  98   WANDA  --   --  9.2  --   --  9.4  --   --   --  8.8  --  8.8   CO2  --   --  14*  --   --  13*  --   --   --  20*  --  21*   BUN  --   --  51.2*  --   --  45.4*   --   --   --  39.3*  --  49.7*   CR  --   --  2.87*  --   --  2.54*  --   --   --  2.25*  --  2.50*   * 158* 193* 210*   < > 176*   < >  --    < > 91   < > 178*    < > = values in this interval not displayed.     CBC  Recent Labs   Lab 07/04/23  1534 07/02/23  0545 07/01/23  0550 06/30/23  0549   WBC 9.8 6.2 10.7 7.4   HGB 11.3* 11.3* 10.7* 12.1*   HCT 33.9* 34.0* 32.5* 36.3*   MCV 87 86 86 86    111* 131* 142*     Lab Results   Component Value Date    AST 18 06/29/2023    ALT 13 06/29/2023    GGT 40 03/12/2006    ALKPHOS 93 06/29/2023    BILITOTAL 0.8 06/29/2023    BILICONJ 0.0 04/08/2010     Lab Results   Component Value Date    INR 1.08 10/10/2016       Attestation:  I have reviewed today's vital signs, notes, medications, labs and imaging.    DO [facility]  Office: [phone]  Cell: [phone]

## 2023-07-04 NOTE — PROGRESS NOTES
[facility]    Cardiology Consultation - Progress Note     Date of Admission:  6/29/2023  Date of Service: 7/4/2023    Reason for Consult   Reason for consult: afib/flutter with intermittent slow ventricular response    History of Present Illness   Hayder Alves is a 68 year old male who was admitted on 6/29/2023 with dyspnea and left arm pain. His medical comorbidities include recently diagnosed Lewy Body Dementia, chronic neck pain (s/p spinal fusion), COPD (not on home oxygen), CAD (s/p CABG 2001), CKD stage IV, hypertension, dyslipidemia, systolic CHF, diabetes mellitus, medication noncompliance, and atrial fibrillation (s/p cardioversion 2020; not currently on anticoagulation). We were initially asked to see him for NSTEMI. This was medically managed.    When he initially presented he was in atrial flutter with variable block and briefly bradycardic in the 30s. He has been in atrial fibrillation or flutter since then without bradycardia for the last several days. This morning he again had a brief episodes of rates in the 30s with his flutter, since then he came up to the 50s. He is not on any AV shawn blocking agents here. He is asymptomatic with this.     Assessment & Plan   #1 Acute respiratory failure, O2 requirement improving with diuresis.  Likely mixed COPD exacerbation as well as heart failure exacerbation.  #2 Coronary artery disease, status post remote history of CABG. Elevated but flat troponins. Options for ischemic evaluate limited at present due to renal insufficiency.   #3 Atrial fibrillation/flutter now with variable block atrial flutter   #4 Transient episodes of slow ventricular response in the 30s, asymptomatic. His PTA metoprolol has been discontinued.   #5 Poorly controlled type 2 diabetes.  A1c 10.3 although this is improved from 12.4 six months ago.  #6 Hypertension   #7 CKD stage 4  #8 Dyslipidemia  #9 COPD, not on home O2  #10 Medication noncompliance,  limited cardiology follows last seen though by Dr. Palafox 5/23 not started on anticoagulation at that time because of falls.  #11 Lewy body dementia     Recommendations:  1. Continue to hold any AV shawn blocking agents, nothing further to do today, monitor on telemetry   2. As he is asymptomatic with the brief episodes of slow ventricular response I do not think he warrants a pacemaker. We can have EP comment on this in the morning.  3. No long term anticoagulation due to frequent falls  4. I would send him out with a ZioPatch      Melvin Harding MD    Primary Care Physician   Suma Jenknis    Patient Active Problem List   Diagnosis     Anxiety     Bipolar affective disorder in remission (H)     Allergic rhinitis     Type 2 diabetes mellitus with stage 4 chronic kidney disease, with long-term current use of insulin (H)     Cervicalgia     HYPERLIPIDEMIA LDL GOAL <100     Insomnia     Hypertension goal BP (blood pressure) < 130/80     COPD (chronic obstructive pulmonary disease) (H)     GERD (gastroesophageal reflux disease)     Peripheral neuralgia     Microalbuminuria     Vitamin B12 deficiency without anemia     Vitamin D deficiency     Coronary artery disease involving coronary bypass graft of native heart without angina pectoris     Chronic pain     Ischemic cardiomyopathy     Chronic systolic heart failure (H)     Health Care Home     Diabetic polyneuropathy associated with type 2 diabetes mellitus (H)     CKD (chronic kidney disease) stage 4, GFR 15-29 ml/min (H)     Acute pulmonary edema (H)     Hyperglycemia     COPD exacerbation (H)     NSTEMI (non-ST elevated myocardial infarction) (H)       Past Medical History   I have reviewed this patient's medical history and updated it with pertinent information if needed.   Past Medical History:   Diagnosis Date     Bipolar I disorder, most recent episode (or current) manic, severe, specified as with psychotic behavior      CAD (coronary artery  disease) 2001    MI, CAB      Cardiovascular disease 5/26/2005     Problem list name updated by automated process. Provider to review     Chronic systolic CHF (congestive heart failure) (H)      Essential hypertension, benign      Impaired fasting glucose      Insomnia, unspecified      Ischemic cardiomyopathy      Other anxiety states      PLANTAR fasciaitis      Pneumonia 10/25/2016     Pure hypercholesterolemia      Respiratory failure (H) 10/11/2016     Syncope     probably iatrogenic due to medications     Type II or unspecified type diabetes mellitus without mention of complication, not stated as uncontrolled        Past Surgical History   I have reviewed this patient's surgical history and updated it with pertinent information if needed.  Past Surgical History:   Procedure Laterality Date     ANESTHESIA CARDIOVERSION N/A 10/29/2020    Procedure: ANESTHESIA, FOR CARDIOVERSION @1400  Latex Allergy;  Surgeon: GENERIC ANESTHESIA PROVIDER;  Location: [facility] OR     SURGICAL HISTORY OF -       skin grafts X 4     SURGICAL HISTORY OF -   1978    lumbar fusion L1-5     SURGICAL HISTORY OF -   2008    C4-7 cervical fusion     SURGICAL HISTORY OF -   12/09    C 6-7 fusion     Z CABG, ARTERIAL, THREE  3/00    CABG X 3, stent     [facility]  [facility]    left main       Prior to Admission Medications   Prior to Admission Medications   Prescriptions Last Dose Informant Patient Reported? Taking?   Continuous Blood Gluc Sensor (DEXCOM G6 SENSOR) MISC   Yes No   Sig: Change every 10 days. Start 11-28-22.   Fluticasone-Umeclidin-Vilant (TRELEGY ELLIPTA) 100-62.5-25 MCG/ACT oral inhaler 6/29/2023  No Yes   Sig: Inhale 1 puff into the lungs daily   Lancets (MICROLET) MISC   No No   Sig: Test once daily   Multiple Vitamins-Minerals (CENTRUM SILVER) per tablet 6/29/2023  Yes Yes   Sig: Take 1 tablet by mouth daily   albuterol (PROAIR HFA/PROVENTIL HFA/VENTOLIN HFA) 108 (90 Base) MCG/ACT inhaler PRN  No Yes   Sig:  "Inhale 2 puffs into the lungs every 6 hours as needed for shortness of breath   amLODIPine (NORVASC) 5 MG tablet 6/28/2023  No Yes   Sig: Take 1 tablet (5 mg) by mouth At Bedtime   amitriptyline (ELAVIL) 10 MG tablet 6/28/2023  No Yes   Sig: TAKE 3 TABLETS BY MOUTH EVERY NIGHT AT BEDTIME   aspirin 81 MG EC tablet 6/29/2023  Yes Yes   Sig: Take 81 mg by mouth daily   busPIRone (BUSPAR) 5 MG tablet 6/29/2023 at AM  No Yes   Sig: Take 1 tablet (5 mg) by mouth 3 times daily   furosemide (LASIX) 20 MG tablet 6/29/2023 at AM  No Yes   Sig: TAKE 1 TABLET(20 MG) BY MOUTH TWICE DAILY   gabapentin (NEURONTIN) 300 MG capsule 6/29/2023 at AM  No Yes   Sig: TAKE 1 CAPSULE BY MOUTH TWICE DAILY   insulin glargine (LANTUS SOLOSTAR) 100 UNIT/ML pen   No Yes   Sig: Inject 30 Units Subcutaneous every morning Ok to substitute Basaglar at same dose and frequency if insurance prefers.   insulin lispro (HUMALOG KWIKPEN) 100 UNIT/ML (1 unit dial) KWIKPEN   No Yes   Sig: Inject 10 Units Subcutaneous 3 times daily (before meals)   insulin pen needle (31G X 5 MM) 31G X 5 MM miscellaneous   No No   Sig: Use 4 pen needles daily or as directed.   insulin syringe 31G X 5/16\" 1 ML MISC   No No   Sig: USE 2 SYRINGES DAILY OR AS DIRECTED   ipratropium - albuterol 0.5 mg/2.5 mg/3 mL (DUONEB) 0.5-2.5 (3) MG/3ML neb solution PRN  No Yes   Sig: Take 1 vial (3 mLs) by nebulization every 6 hours as needed for shortness of breath, wheezing or cough   metoprolol succinate ER (TOPROL XL) 50 MG 24 hr tablet 6/29/2023  No Yes   Sig: Take 1 tablet (50 mg) by mouth daily   nitroGLYcerin (NITROSTAT) 0.4 MG sublingual tablet PRN  No Yes   Sig: Place 1 tablet (0.4 mg) under the tongue every 5 minutes as needed   oxyCODONE-acetaminophen (PERCOCET) 5-325 MG tablet Past Week  No Yes   Sig: Take 1 tablet by mouth At Bedtime Must last 30 days   rosuvastatin (CRESTOR) 20 MG tablet 6/29/2023  No Yes   Sig: Take 1 tablet (20 mg) by mouth daily      Facility-Administered " Medications: None     Current Facility-Administered Medications   Medication Dose Route Frequency     amitriptyline  30 mg Oral At Bedtime     amLODIPine  5 mg Oral BID     aspirin  81 mg Oral Daily     busPIRone  5 mg Oral TID     fluticasone-vilanterol  1 puff Inhalation Daily    And     umeclidinium  1 puff Inhalation Daily     [Held by provider] furosemide  20 mg Oral BID     gabapentin  300 mg Oral BID     hydrALAZINE  75 mg Oral TID     insulin aspart  1-10 Units Subcutaneous TID AC     insulin aspart  1-7 Units Subcutaneous At Bedtime     insulin glargine  20 Units Subcutaneous QAM AC     isosorbide mononitrate  60 mg Oral Daily     oxyCODONE-acetaminophen  1 tablet Oral At Bedtime     rosuvastatin  10 mg Oral Daily     senna-docusate  1 tablet Oral BID    Or     senna-docusate  2 tablet Oral BID     sodium chloride (PF)  3 mL Intracatheter Q8H     Current Facility-Administered Medications   Medication Last Rate     - MEDICATION INSTRUCTIONS -       Allergies   Allergies   Allergen Reactions     Adhesive Tape      From blood draw site.     Eszopiclone      Strange dreams. Cooking during sleep      Latex      Lipitor [Atorvastatin Calcium] Other (See Comments)     myalgias     Lisinopril Other (See Comments)     Hyperkalemia and increased creatinine       Social History    reports that he quit smoking about 23 years ago. His smoking use included cigarettes. He has a 45.00 pack-year smoking history. He has never used smokeless tobacco. He reports current alcohol use. He reports that he does not use drugs.    Family History   Family History   Problem Relation Age of Onset     Cancer Sister         pancreatic ca     C.A.D. Father         mi,stroke,htn, 60's     Cerebrovascular Disease Father 64     C.A.D. Mother         mi,htn, 60's     Breast Cancer No family hx of      Cancer - colorectal No family hx of      Prostate Cancer No family hx of        Review of Systems   The comprehensive Review of Systems is  negative other than noted in the HPI or here.     Physical Exam   Vital Signs with Ranges  Temp:  [97.6  F (36.4  C)-97.8  F (36.6  C)] 97.8  F (36.6  C)  Pulse:  [54-97] 54  Resp:  [20-28] 20  BP: (125-162)/(55-87) 128/63  SpO2:  [91 %-94 %] 93 %  Wt Readings from Last 4 Encounters:   07/04/23 85.5 kg (188 lb 8.7 oz)   06/08/23 85.4 kg (188 lb 3.2 oz)   05/04/23 83.4 kg (183 lb 14.4 oz)   05/02/23 82.8 kg (182 lb 9.6 oz)     I/O last 3 completed shifts:  In: 480 [P.O.:480]  Out: 700 [Urine:700]      Vitals: /63   Pulse 54   Temp 97.8  F (36.6  C) (Oral)   Resp 20   Wt 85.5 kg (188 lb 8.7 oz)   SpO2 93%   BMI 28.71 kg/m      General: Alert, oriented, in no acute distress  HEENT: PERRLA, mucous membranes moist  Neck: Normal JVP  CV: Regular rate and rhythm without murmur  Respiratory: Clear to auscultation bilaterally  GI: Normoactive bowel sounds; soft, non-tender abdomen  Neuro: No focal deficits appreciated  Extremities: 1+ peripheral edema bilaterally    Recent Labs   Lab 07/04/23  0732 07/04/23  0529 07/04/23  0213 07/03/23  1723 07/03/23  1445 07/03/23  0749 07/03/23  0559 07/02/23  0552 07/02/23  0545 07/01/23  0723 07/01/23  0550 06/30/23  0853 06/30/23  0549 06/29/23  1925 06/29/23  1625   WBC  --   --   --   --   --   --   --   --  6.2  --  10.7  --  7.4  --  14.5*  14.8*   HGB  --   --   --   --   --   --   --   --  11.3*  --  10.7*  --  12.1*  --  12.8*  12.7*   MCV  --   --   --   --   --   --   --   --  86  --  86  --  86  --  88  87   PLT  --   --   --   --   --   --   --   --  111*  --  131*  --  142*  --  198  182   NA  --  131*  --   --   --   --  133*  --  133*  --  131*   < >  --    < > 130*   POTASSIUM  --  4.5  --   --  4.1  --  3.3*  --  3.9  --  4.3   < >  --    < > 4.5   CHLORIDE  --  93*  --   --   --   --  98  --  98  --  93*   < >  --    < > 92*   CO2  --  13*  --   --   --   --  20*  --  21*  --  20*   < >  --    < > 18*   BUN  --  45.4*  --   --   --   --  39.3*  --   49.7*  --  55.3*   < >  --    < > 34.6*   CR  --  2.54*  --   --   --   --  2.25*  --  2.50*  --  2.98*   < >  --    < > 2.80*   GFRESTIMATED  --  27*  --   --   --   --  31*  --  27*  --  22*   < >  --    < > 24*   ANIONGAP  --  25*  --   --   --   --  15  --  14  --  18*   < >  --    < > 20*   WANDA  --  9.4  --   --   --   --  8.8  --  8.8  --  8.4*   < >  --    < > 9.4   * 176* 133*   < >  --    < > 91   < > 178*   < > 268*   < >  --    < > 406*   ALBUMIN  --   --   --   --   --   --   --   --   --   --   --   --   --   --  4.0   PROTTOTAL  --   --   --   --   --   --   --   --   --   --   --   --   --   --  7.4   BILITOTAL  --   --   --   --   --   --   --   --   --   --   --   --   --   --  0.8   ALKPHOS  --   --   --   --   --   --   --   --   --   --   --   --   --   --  93   ALT  --   --   --   --   --   --   --   --   --   --   --   --   --   --  13   AST  --   --   --   --   --   --   --   --   --   --   --   --   --   --  18    < > = values in this interval not displayed.     Recent Labs   Lab Test 06/20/22  1040 01/18/21  1623 01/03/17  1548 10/05/15  1215   CHOL 170 137   < > 293*   HDL 38* 44   < > 37*   LDL 61 59   < > 185*   TRIG 354* 172*   < > 354*   CHOLHDLRATIO  --   --   --  7.9*    < > = values in this interval not displayed.     Recent Labs   Lab 07/02/23  0545 07/01/23  0550 06/30/23  0549   WBC 6.2 10.7 7.4   HGB 11.3* 10.7* 12.1*   HCT 34.0* 32.5* 36.3*   MCV 86 86 86   * 131* 142*     Recent Labs   Lab 06/30/23  1129 06/29/23  1625   PHV 7.38 7.33   PO2V 64* 8*   PCO2V 32* 37*   HCO3V 19* 20*     Recent Labs   Lab 06/30/23  0545 06/29/23  1625   NTBNPI 26,866* 19,855*     Recent Labs   Lab 06/29/23  1625   DD 1.01*     No results for input(s): SED, CRP in the last 168 hours.  Recent Labs   Lab 07/02/23  0545 07/01/23  0550 06/30/23  0549   * 131* 142*     No results for input(s): TSH in the last 168 hours.  No results for input(s): COLOR, APPEARANCE, URINEGLC,  URINEBILI, URINEKETONE, SG, UBLD, URINEPH, PROTEIN, UROBILINOGEN, NITRITE, LEUKEST, RBCU, WBCU in the last 168 hours.    Imaging:  Recent Results (from the past 48 hour(s))   XR Chest Port 1 View    Narrative    EXAM: XR CHEST PORT 1 VIEW  LOCATION: [facility]  DATE: 6/29/2023    INDICATION: Shortness of breath  COMPARISON: Chest radiograph 08/18/2017.      Impression    IMPRESSION: Stable size of cardiomediastinal silhouette status post median sternotomy and CABG. Defibrillator pads overlying the left chest. Pulmonary vascular congestion with likely mild to moderate pulmonary edema and small bilateral pleural effusions   with compressive atelectasis, right larger than left. No pneumothorax. Bones are unchanged. Cervical spinal fusion hardware again noted.   US Lower Extremity Venous Duplex Bilateral    Narrative    EXAM: US LOWER EXTREMITY VENOUS DUPLEX BILATERAL  LOCATION: [facility]  DATE: 6/29/2023    INDICATION: elevated d dimer, not candidate for CT PE given GFR  COMPARISON: None.  TECHNIQUE: Venous Duplex ultrasound of bilateral lower extremities with and without compression, augmentation and duplex. Color flow and spectral Doppler with waveform analysis performed.    FINDINGS: Exam includes the common femoral, femoral, popliteal veins as well as segmentally visualized deep calf veins and greater saphenous vein.     RIGHT: No deep vein thrombosis. No superficial thrombophlebitis. No popliteal cyst.    LEFT: No deep vein thrombosis. No superficial thrombophlebitis. No popliteal cyst.      Impression    IMPRESSION:  1.  No deep venous thrombosis in the bilateral lower extremities.   NM Lung Scan Ventilation and Perfusion    Narrative    EXAM: NM LUNG SCAN VENTILATION AND PERFUSION  LOCATION: [facility]  DATE: 6/30/2023    INDICATION: Shortness of breath.  COMPARISON: Chest x-ray dated 06/29/2023.  TECHNIQUE: 58.5 mCi Tc-99m DTPA  inhaled. 6.8 mCi Tc-99m MAA, IV. Standard planar imaging during perfusion and ventilation portions of exam.    FINDINGS: Normal pulmonary ventilation. Normal pulmonary perfusion. No mismatched segmental perfusion defect.      Impression    IMPRESSION:    No evidence of pulmonary embolism.   Echocardiogram Complete   Result Value    LVEF  50-55%    [facility]    [id number]  [id number]  [id number]  [id number]^ANU^JAME     [facility]  Echocardiography Laboratory  [address] [place] [address]     Name: MANSOOR MOORE  MRN: [id number]  : 1955  Study Date: 2023 09:10 AM  Age: 68 yrs  Gender: Male  Patient Location: [facility]  Reason For Study: SOB  Ordering Physician: JAME BRENNAN     BSA: 2.0 m2  Height: 67 in  Weight: 198 lb  HR: 64  BP: 172/74 mmHg  ______________________________________________________________________________  Procedure  Complete Portable Echo Adult.  ______________________________________________________________________________  Interpretation Summary     There is borderline concentric left ventricular hypertrophy.  The visual ejection fraction is 50-55%.  Grade II or moderate diastolic dysfunction.  There is mild inferolateral wall hypokinesis.  The right ventricular systolic function is borderline reduced.  There is mild biatrial enlargement.  The mean mitral valve gradient is (at HR 81) 4mmHg.  IVC diameter and respiratory changes fall into an intermediate range  suggesting an RA pressure of 8 mmHg.  Right ventricular systolic pressure is elevated, consistent with moderate  pulmonary hypertension.  There is mild aortic sclerosis of the left coronary cusp.  Mild valvular aortic stenosis.  ______________________________________________________________________________  Left Ventricle  The left ventricle is normal in size. There is borderline concentric left  ventricular hypertrophy. The visual ejection fraction is 50-55%. Grade II or  moderate diastolic  dysfunction. There is mild inferolateral wall hypokinesis.  There is no thrombus seen in the left ventricle.     Right Ventricle  The right ventricle is normal size. The right ventricular systolic function is  borderline reduced.     Atria  There is mild biatrial enlargement.     Mitral Valve  There is trace mitral regurgitation. The mean mitral valve gradient is (at HR  81) 4mmHg.     Tricuspid Valve  There is trace tricuspid regurgitation. The right ventricular systolic  pressure is approximated at 45.7 mmHg plus the right atrial pressure. IVC  diameter and respiratory changes fall into an intermediate range suggesting an  RA pressure of 8 mmHg. Right ventricular systolic pressure is elevated,  consistent with moderate pulmonary hypertension.     Aortic Valve  There is mild aortic sclerosis of the left coronary cusp. Mild valvular aortic  stenosis. The mean AoV pressure gradient is 7.9 mmHg. The calculated aortic  valve are is 2.1 cm^2.     Pulmonic Valve  The pulmonic valve is not well seen, but is grossly normal.     Vessels  The aortic root is normal size.     Pericardium  The pericardium appears normal.     Rhythm  Sinus rhythm was noted.  ______________________________________________________________________________  MMode/2D Measurements & Calculations  IVSd: 1.1 cm  LVIDd: 5.0 cm  LVIDs: 3.4 cm  LVPWd: 1.5 cm  FS: 32.4 %  LV mass(C)d: 257.1 grams  LV mass(C)dI: 127.7 grams/m2  Ao root diam: 3.4 cm  LA dimension: 4.8 cm  asc Aorta Diam: 3.5 cm  LA/Ao: 1.4  LVOT diam: 2.1 cm  LVOT area: 3.5 cm2  LA Volume (BP): 74.0 ml  LA Volume Index (BP): 36.8 ml/m2     RWT: 0.58  TAPSE: 1.5 cm     Doppler Measurements & Calculations  MV E max efra: 109.0 cm/sec  MV A max efra: 53.1 cm/sec  MV E/A: 2.1  MV max P.9 mmHg  MV mean PG: 3.6 mmHg  MV V2 VTI: 38.2 cm  MVA(VTI): 2.0 cm2  MV dec slope: 1295 cm/sec2  MV dec time: 0.08 sec  Ao V2 max: 207.7 cm/sec  Ao max P.3 mmHg  Ao V2 mean: 125.6 cm/sec  Ao mean P.9  mmHg  Ao V2 VTI: 36.7 cm  MARIZA(I,D): 2.1 cm2  MARIZA(V,D): 1.7 cm2  LV V1 max P.1 mmHg  LV V1 max: 101.2 cm/sec  LV V1 VTI: 22.3 cm  SV(LVOT): 77.3 ml  SI(LVOT): 38.4 ml/m2  PA acc time: 0.12 sec  TR max daryl: 338.0 cm/sec  TR max P.7 mmHg  AV Daryl Ratio (DI): 0.49  MARIZA Index (cm2/m2): 1.0  E/E' av.4  Lateral E/e': 13.2     Medial E/e': 15.7  RV S Daryl: 6.3 cm/sec     ______________________________________________________________________________  Report approved by: Dunia Schuler 2023 11:46 AM               Medical Decision Making       35 MINUTES SPENT BY ME on the date of service doing chart review, history, exam, documentation & further activities per the note.

## 2023-07-04 NOTE — PROGRESS NOTES
He has had some more sustained slow atrial flutter with rates in the 30s-40s now with some lightheadedness. ECG shows a regular escape complex perhaps with fusion on some beats. This may represent AV block. Please keep NPO after midnight, will have EP evaluate in the morning, he may benefit from a pacemaker.

## 2023-07-04 NOTE — PLAN OF CARE
Goal Outcome Evaluation:      Plan of Care Reviewed With: patient    Overall Patient Progress: no changeOverall Patient Progress: no change    Outcome Evaluation: Feels constipated    DATE & TIME: 7/3/23 2300 - 7/4/23 0700    COGNITION/BEHAVIOR: A&O  Vital signs:  Temp: 97.6  F (36.4  C) Temp src: Oral BP: (!) 148/63 Pulse: 59   Resp: 28 SpO2: 94 % via RA  TELEMETRY RHYTHM: Slow Fib with a BBB  MOBILITY: SBA with gait belt, walker  PAIN: Denies  DIET: Mod carb  RESP: Expiratory wheezes.  CORNELIUS.  Intermittent nonproductive cough.  Reported shortness of breath around 0540.  Declined PRN Duoneb, but did allow for PRN Albuterol inhaler.  Also provided with heat packs per patient preference to aid in breathlessness symptoms.  CV: Bradycardic.  No reports of chest pain.  GI/: Has had a couple bowel movements documented; had a small soft bowel movement overnight, though did have much straining.  Reports feeling constipated.  Message for AM treatment team requesting PRN MiraLax.  Reported nausea around 0540.  Provided with sips of water and PRN Zofran.  PV/NV: Grossly intact  SKIN: Right hand bruised  LINES: PIV saline locked  LAB/BG: .  Morning K 4.5 and not in need of replacement.  OTHER: Discharge pending TCU placement

## 2023-07-04 NOTE — PROGRESS NOTES
An RRT was called due to his bradycardia. He is feeling lightheaded but has not passed out. His blood pressure has remained stable. His rates vary between the high 20s and low 40s. He is exhibiting complete heart block and I would treat this as such. As long as he is hemodynamically stable with adequate blood pressure I think we can continue to observe him overnight.     If he has low blood pressures I would trial dopamine. Of note, he has a RBBB showing some infrahisian conduction disease, and there is a chance the dopamine could worsen things, so if this is the case then stop the dopamine and transfer to the ICU where they could place a pacing wire/swan. I do not believe this will ultimately be needed however. Keep him NPO after midnight for likely pacemaker tomorrow after being evaluated by EP.

## 2023-07-04 NOTE — CODE/RAPID RESPONSE
Rapid response was called on the patient for bradycardia    Patient's heart rate was found to be in the 20s to 40s and at that time patient felt cold and he was little short of breath.  His blood pressure was systolic 140s.  I did order stat EKG and it was reviewed and it was reviewed.  Patient was given stat DuoNeb and felt better after that.  His son was present.  Patient heart rate does come to 50s at times.      Discussed with on-call cardiology team and Dr. Harding was present in person and that patient is exhibiting signs of complete heart block.  As long as his blood pressure is stable and he is hemodynamically stable we will continue to observe him tonight.      If his heart rate goes low and blood pressure drops then we will trial dopamine and of note he has right bundle branch block and if dopamine worsens then we can stop dopamine and could place the wire.    We will also put Pacer pads on the patient    We will hold all his blood pressure medications including Imdur, hydralazine and amlodipine.      Discussed plan of care with patient's nurse and charge nurse and continue to monitor.       discussed with cardiology, patient's nurse, charge nurse, rapid response nurse and patient's son and plan of care was discussed.  Patient felt better after he got a DuoNeb.  His CBC was done and his WBC count is normal    Critical care time spent 30 minutes

## 2023-07-04 NOTE — PLAN OF CARE
A&Ox4 - forgetful. BP 140s. Tele: afib CVR BBB. No CP/SOB w exertion. RA. Large BM - distended abdomen and reports unable to pass flatus. Scheduled BM meds given. Assist of 1+/walker. Scheduled percocet and hot packs - neck pain. BS 90s - held PM lantus per provider.   Plan: possible discharge tomorrow

## 2023-07-05 NOTE — CONSULTS
Discharge Pharmacy Test Claim    Patient has coverage through [facility] Medicare advantage. Requested test claims listed below.    Test Claim Copay Notes   aformoterol neb soln PA required perforomist, formoterol preferred   budesonide neb soln 19.39    formoterol neb soln 180.58    perforomist neb soln 241.69        Alexus Vanegas Roselia Alvarado 7/5  [facility] Pharmacy Liaison  Ph: [phone] Pager: [phone]   Securely message with the Vocera Web Console (learn more here)

## 2023-07-05 NOTE — CONSULTS
[facility]    Electrophysiology Consultation     Date of Admission:  6/29/2023    Diagnostics:  Echo 7/1/2023: EF 50 to 55%, borderline concentric left ventricular hypertrophy.  Grade 2 diastolic dysfunction.  Mild aortic stenosis, and a trace of mitral tricuspid regurgitation.    EKG sinus rhythm with RBBB 10/2020 after cardioversion (patient was a lost to follow post procedure)  EKG 5/2023 atrial flutter with RBBB at 55 bpm      Assessment & Plan   1. symptomatic advanced heart block with variable heart rate between 20 to 40 bpm, RBBB.  No reversible causes are noted.  Patient is not on AV shawn blocking agents.  Has been n.p.o. since midnight.  We recommend proceeding with pacemaker implantation.  Patient's wife would like to consider this.  We did review risks and benefits and explained his conduction disease to her.  Also reviewed the restrictions post implantation of the device including not raising left arm over shoulder for 3 weeks.    Risks and benefits of the procedure were reviewed with the patient.  Risks include but are not limited to; bruising, bleeding, pocket hematoma, pocket infection, pericardial effusion with possibility of tamponade, pneumothorax and a rare incidence of stroke, or MI.     The patient and his wife will consider implantation of the device and let us know.  He continues to be n.p.o.    2. CHADS-VASc score 4 (HTN, DM, CAD, age)  Not on anticoagulation due to increased falls per patient's wife    3. CKD stage IV  Baseline creatinine 2.5-3.  Creatinine 4.2, GFR 15 this morning  Nephrology following    4.  Hypertension  On amlodipine and hydralazine.  PTA Lasix on hold per hospitalist.      Thank you for this consultation.  This patient was discussed with Dr. Pena.  Final recommendations are pending his documentation.      Kathy Benavides, TOBIAS, APRN CNP  8:28 AM 7/5/2023   [facility]  Pager: [phone]      Primary Care Physician   Suma Jenkins    Reason  for Consult   Reason for consult: I was asked by  to evaluate this patient for for complete heart block.    History of Present Illness   Hayder Alves is a 68 year old male with PMH significant for recently diagnosed Lewy Body Dementia, chronic neck pain (s/p spinal fusion), COPD (not on home oxygen), CAD (s/p CABG 2001), CKD stage IV, hypertension, dyslipidemia, systolic CHF, diabetes mellitus, medication noncompliance, and atrial fibrillation (s/p cardioversion 2020; not currently on anticoagulation).  General cardiology was consulted yesterday for NSTEMI which was medically managed.    When he presented he was in atrial flutter with variable block and briefly bradycardic at 30 bpm.  He has been in atrial fibrillation or flutter over the last several days without bradycardia.  On 7/4/2023 he had brief episodes of heart rates in the 30s with his atrial flutter which would increase to 50 bpm.  Later in the day a rapid response was called when the patient was noted to be in third-degree heart block with symptomatic bradycardia.  Heart rates have since been in the low 40s.  EP was consulted to assist with arrhythmia management.    Patient has a history of atrial fibrillation with cardioversion 10/2020.  Patient's wife states that he did not go to the follow-up appointment following his cardioversion.  Patient is asymptomatic of his atrial fibrillation and was told in May he is back in A-fib.  He is not on OAC due to bleeding risk secondary to fall per wife.    This morning, patient's kidney function has declined further and currently not making urine.  Most likely secondary to decreased perfusion while hypotensive yesterday, but patient also has a history of uncontrolled diabetes.      Feeling better today and no longer short of breath or dizzy.  Remains on O2.  Blood pressure is stable.  No complaints of chest pain.        Past Medical History   Past Medical History:   Diagnosis Date     Bipolar I disorder,  most recent episode (or current) manic, severe, specified as with psychotic behavior      CAD (coronary artery disease) 2001    MI, CAB      Cardiovascular disease 5/26/2005     Problem list name updated by automated process. Provider to review     Chronic systolic CHF (congestive heart failure) (H)      Essential hypertension, benign      Impaired fasting glucose      Insomnia, unspecified      Ischemic cardiomyopathy      Other anxiety states      PLANTAR fasciaitis      Pneumonia 10/25/2016     Pure hypercholesterolemia      Respiratory failure (H) 10/11/2016     Syncope     probably iatrogenic due to medications     Type II or unspecified type diabetes mellitus without mention of complication, not stated as uncontrolled          Past Surgical History   Past Surgical History:   Procedure Laterality Date     ANESTHESIA CARDIOVERSION N/A 10/29/2020    Procedure: ANESTHESIA, FOR CARDIOVERSION @1400  Latex Allergy;  Surgeon: GENERIC ANESTHESIA PROVIDER;  Location: [facility] OR     SURGICAL HISTORY OF -       skin grafts X 4     SURGICAL HISTORY OF -   1978    lumbar fusion L1-5     SURGICAL HISTORY OF -   2008    C4-7 cervical fusion     SURGICAL HISTORY OF -   12/09    C 6-7 fusion     ZZ CABG, ARTERIAL, THREE  3/00    CABG X 3, stent     [facility]  [facility]    left main         Prior to Admission Medications   Prior to Admission Medications   Prescriptions Last Dose Informant Patient Reported? Taking?   Continuous Blood Gluc Sensor (DEXCOM G6 SENSOR) MISC   Yes No   Sig: Change every 10 days. Start 11-28-22.   Fluticasone-Umeclidin-Vilant (TRELEGY ELLIPTA) 100-62.5-25 MCG/ACT oral inhaler 6/29/2023  No Yes   Sig: Inhale 1 puff into the lungs daily   Lancets (MICROLET) MISC   No No   Sig: Test once daily   Multiple Vitamins-Minerals (CENTRUM SILVER) per tablet 6/29/2023  Yes Yes   Sig: Take 1 tablet by mouth daily   albuterol (PROAIR HFA/PROVENTIL HFA/VENTOLIN HFA) 108 (90 Base) MCG/ACT inhaler PRN  No  "Yes   Sig: Inhale 2 puffs into the lungs every 6 hours as needed for shortness of breath   amLODIPine (NORVASC) 5 MG tablet 6/28/2023  No Yes   Sig: Take 1 tablet (5 mg) by mouth At Bedtime   amitriptyline (ELAVIL) 10 MG tablet 6/28/2023  No Yes   Sig: TAKE 3 TABLETS BY MOUTH EVERY NIGHT AT BEDTIME   aspirin 81 MG EC tablet 6/29/2023  Yes Yes   Sig: Take 81 mg by mouth daily   busPIRone (BUSPAR) 5 MG tablet 6/29/2023 at AM  No Yes   Sig: Take 1 tablet (5 mg) by mouth 3 times daily   furosemide (LASIX) 20 MG tablet 6/29/2023 at AM  No Yes   Sig: TAKE 1 TABLET(20 MG) BY MOUTH TWICE DAILY   gabapentin (NEURONTIN) 300 MG capsule 6/29/2023 at AM  No Yes   Sig: TAKE 1 CAPSULE BY MOUTH TWICE DAILY   insulin glargine (LANTUS SOLOSTAR) 100 UNIT/ML pen   No Yes   Sig: Inject 30 Units Subcutaneous every morning Ok to substitute Basaglar at same dose and frequency if insurance prefers.   insulin lispro (HUMALOG KWIKPEN) 100 UNIT/ML (1 unit dial) KWIKPEN   No Yes   Sig: Inject 10 Units Subcutaneous 3 times daily (before meals)   insulin pen needle (31G X 5 MM) 31G X 5 MM miscellaneous   No No   Sig: Use 4 pen needles daily or as directed.   insulin syringe 31G X 5/16\" 1 ML MISC   No No   Sig: USE 2 SYRINGES DAILY OR AS DIRECTED   ipratropium - albuterol 0.5 mg/2.5 mg/3 mL (DUONEB) 0.5-2.5 (3) MG/3ML neb solution PRN  No Yes   Sig: Take 1 vial (3 mLs) by nebulization every 6 hours as needed for shortness of breath, wheezing or cough   metoprolol succinate ER (TOPROL XL) 50 MG 24 hr tablet 6/29/2023  No Yes   Sig: Take 1 tablet (50 mg) by mouth daily   nitroGLYcerin (NITROSTAT) 0.4 MG sublingual tablet PRN  No Yes   Sig: Place 1 tablet (0.4 mg) under the tongue every 5 minutes as needed   oxyCODONE-acetaminophen (PERCOCET) 5-325 MG tablet Past Week  No Yes   Sig: Take 1 tablet by mouth At Bedtime Must last 30 days   rosuvastatin (CRESTOR) 20 MG tablet 6/29/2023  No Yes   Sig: Take 1 tablet (20 mg) by mouth daily    "   Facility-Administered Medications: None     Current Facility-Administered Medications   Medication Dose Route Frequency     amitriptyline  30 mg Oral At Bedtime     [Held by provider] amLODIPine  5 mg Oral BID     aspirin  81 mg Oral Daily     busPIRone  5 mg Oral TID     cefTRIAXone  1 g Intravenous Q24H     fluticasone-vilanterol  1 puff Inhalation Daily    And     umeclidinium  1 puff Inhalation Daily     [Held by provider] furosemide  20 mg Oral BID     gabapentin  300 mg Oral BID     [Held by provider] hydrALAZINE  75 mg Oral TID     insulin aspart  1-10 Units Subcutaneous TID AC     insulin aspart  1-7 Units Subcutaneous At Bedtime     [Held by provider] insulin glargine  20 Units Subcutaneous QAM AC     ipratropium - albuterol 0.5 mg/2.5 mg/3 mL  1 vial Nebulization Q6H     [Held by provider] isosorbide mononitrate  60 mg Oral Daily     oxyCODONE-acetaminophen  1 tablet Oral At Bedtime     rosuvastatin  10 mg Oral Daily     senna-docusate  1 tablet Oral BID    Or     senna-docusate  2 tablet Oral BID     sodium chloride (PF)  3 mL Intracatheter Q8H     Current Facility-Administered Medications   Medication Last Rate     - MEDICATION INSTRUCTIONS -       Allergies   Allergies   Allergen Reactions     Adhesive Tape      From blood draw site.     Eszopiclone      Strange dreams. Cooking during sleep      Latex      Lipitor [Atorvastatin Calcium] Other (See Comments)     myalgias     Lisinopril Other (See Comments)     Hyperkalemia and increased creatinine       Social History    reports that he quit smoking about 23 years ago. His smoking use included cigarettes. He has a 45.00 pack-year smoking history. He has never used smokeless tobacco. He reports current alcohol use. He reports that he does not use drugs.      Family History   I have reviewed this patient's family history and updated it with pertinent information if needed.  Family History   Problem Relation Age of Onset     Cancer Sister          pancreatic ca     C.A.D. Father         mi,stroke,htn, 60's     Cerebrovascular Disease Father 64     C.A.D. Mother         mi,htn, 60's     Breast Cancer No family hx of      Cancer - colorectal No family hx of      Prostate Cancer No family hx of           Review of Systems   A comprehensive review of system was performed and is negative other than that noted in the HPI or here.     Physical Exam   Vital Signs with Ranges  Temp:  [97.7  F (36.5  C)-98.3  F (36.8  C)] 98.3  F (36.8  C)  Pulse:  [38-44] 38  Resp:  [17-21] 18  BP: (113-148)/(57-67) 146/58  SpO2:  [85 %-96 %] 90 %  Wt Readings from Last 4 Encounters:   07/05/23 85.9 kg (189 lb 6 oz)   06/08/23 85.4 kg (188 lb 3.2 oz)   05/04/23 83.4 kg (183 lb 14.4 oz)   05/02/23 82.8 kg (182 lb 9.6 oz)     I/O last 3 completed shifts:  In: 280 [P.O.:280]  Out: -       Vitals: BP (!) 146/58   Pulse (!) 38   Temp 98.3  F (36.8  C) (Oral)   Resp 18   Wt 85.9 kg (189 lb 6 oz)   SpO2 90%   BMI 28.83 kg/m      Physical Exam:   General - Alert and oriented, in no acute distress.   Eyes - No scleral icterus  HEENT - Neck supple, moist mucous membranes  Cardiovascular -irregular irregular, bradycardia  Extremities - There is no edema  Respiratory -Inspiratory wheezing noted otherwise clear  Skin - No pallor or cyanosis  Gastrointestinal - Non tender and non distended without rebound or guarding  Psych - Appropriate affect   Neurological - No gross motor neurological focal deficits    No lab results found in last 7 days.    Invalid input(s): TROPONINIES    Recent Labs   Lab 07/05/23  0737 07/05/23  0605 07/05/23  0130 07/04/23  1708 07/04/23  1534 07/04/23  1433 07/04/23  1153 07/04/23  0732 07/04/23  0529 07/02/23  0552 07/02/23  0545 07/01/23  0723 07/01/23  0550 06/29/23  1925 06/29/23  1625   WBC  --   --   --   --  9.8  --   --   --   --   --  6.2  --  10.7   < > 14.5*  14.8*   HGB  --   --   --   --  11.3*  --   --   --   --   --  11.3*  --  10.7*   < > 12.8*   12.7*   MCV  --   --   --   --  87  --   --   --   --   --  86  --  86   < > 88  87   PLT  --   --   --   --  190  --   --   --   --   --  111*  --  131*   < > 198  182   NA  --  124*  --   --   --   --  129*  --  131*   < > 133*  --  131*   < > 130*   POTASSIUM  --  5.0  --   --   --   --  5.3  --  4.5   < > 3.9  --  4.3   < > 4.5   CHLORIDE  --  87*  --   --   --   --  92*  --  93*   < > 98  --  93*   < > 92*   CO2  --  14*  --   --   --   --  14*  --  13*   < > 21*  --  20*   < > 18*   BUN  --  61.2*  --   --   --   --  51.2*  --  45.4*   < > 49.7*  --  55.3*   < > 34.6*   CR  --  4.20*  --   --   --   --  2.87*  --  2.54*   < > 2.50*  --  2.98*   < > 2.80*   GFRESTIMATED  --  15*  --   --   --   --  23*  --  27*   < > 27*  --  22*   < > 24*   ANIONGAP  --  23*  --   --   --   --  23*  --  25*   < > 14  --  18*   < > 20*   WANDA  --  8.9  --   --   --   --  9.2  --  9.4   < > 8.8  --  8.4*   < > 9.4   * 157* 154*   < >  --    < > 193*   < > 176*   < > 178*   < > 268*   < > 406*   ALBUMIN  --   --   --   --   --   --   --   --   --   --   --   --   --   --  4.0   PROTTOTAL  --   --   --   --   --   --   --   --   --   --   --   --   --   --  7.4   BILITOTAL  --   --   --   --   --   --   --   --   --   --   --   --   --   --  0.8   ALKPHOS  --   --   --   --   --   --   --   --   --   --   --   --   --   --  93   ALT  --   --   --   --   --   --   --   --   --   --   --   --   --   --  13   AST  --   --   --   --   --   --   --   --   --   --   --   --   --   --  18    < > = values in this interval not displayed.     Recent Labs   Lab Test 06/20/22  1040 01/18/21  1623 01/03/17  1548 10/05/15  1215   CHOL 170 137   < > 293*   HDL 38* 44   < > 37*   LDL 61 59   < > 185*   TRIG 354* 172*   < > 354*   CHOLHDLRATIO  --   --   --  7.9*    < > = values in this interval not displayed.     Recent Labs   Lab 07/04/23  1534 07/02/23  0545 07/01/23  0550   WBC 9.8 6.2 10.7   HGB 11.3* 11.3* 10.7*   HCT 33.9* 34.0*  32.5*   MCV 87 86 86    111* 131*     Recent Labs   Lab 06/30/23  1129 06/29/23  1625   PHV 7.38 7.33   PO2V 64* 8*   PCO2V 32* 37*   HCO3V 19* 20*     Recent Labs   Lab 06/30/23  0545 06/29/23  1625   NTBNPI 26,866* 19,855*     Recent Labs   Lab 06/29/23  1625   DD 1.01*     No results for input(s): SED, CRP in the last 168 hours.  Recent Labs   Lab 07/04/23  1534 07/02/23  0545 07/01/23  0550    111* 131*     No results for input(s): TSH in the last 168 hours.  No results for input(s): COLOR, APPEARANCE, URINEGLC, URINEBILI, URINEKETONE, SG, UBLD, URINEPH, PROTEIN, UROBILINOGEN, NITRITE, LEUKEST, RBCU, WBCU in the last 168 hours.    Imaging:  Recent Results (from the past 48 hour(s))   XR Chest Port 1 View    Narrative    EXAM: XR CHEST PORT 1 VIEW  LOCATION: [facility]  DATE: 7/4/2023    INDICATION: Sob  COMPARISON: 06/29/2023      Impression    IMPRESSION: Sternotomy wires are unchanged. Cardiomegaly with pulmonary vascular previously seen opacity at the right lung base has cleared. Persistent opacity left lung base. Probable small left pleural effusion.       Echo:  4/1/2023:  There is borderline concentric left ventricular hypertrophy.  The visual ejection fraction is 50-55%.  Grade II or moderate diastolic dysfunction.  There is mild inferolateral wall hypokinesis.  The right ventricular systolic function is borderline reduced.  There is mild biatrial enlargement.  The mean mitral valve gradient is (at HR 81) 4mmHg.  IVC diameter and respiratory changes fall into an intermediate range  suggesting an RA pressure of 8 mmHg.  Right ventricular systolic pressure is elevated, consistent with moderate  pulmonary hypertension.  There is mild aortic sclerosis of the left coronary cusp.  Mild valvular aortic stenosis

## 2023-07-05 NOTE — PROGRESS NOTES
Uneventful implantation of a single chamber ppm. Pt has been in AF and now with CHB a/w junctional escape of 48 bpm.

## 2023-07-05 NOTE — PLAN OF CARE
A&Ox4. Tele is 3rd degree AVB, with Aflutter. Cards Re-consulted. RRT called for symptomatic bradycardia, see provider notes. Bedrest for rest of night/NPO at midnight. Plan for EP consult in AM.

## 2023-07-05 NOTE — PLAN OF CARE
VS:  Stable BP; HR low at 40's  Assist by: 2 in bed for hygiene cares; on Bedrest overnight     Cardiac:  RRR; CP free overnight   Neuro: Forgetful; Oriented to self and place on assessment  CMS: Paresthesias present to LE's (baseline)  Respi: Diminished RML and BL LL'd to auscultation;  O2 @4 lpm via NC    :  External  catheter in place overnight; No urine outout; Bladder scan at 109mls; Overnight hospitalist referral on separate note.  GI: Abdo obese, soft, non - tender; BS audible; passing flatus; Last BM 7/3/23    Strip/Tele: A flutter with a long QT; Episodes of CHB; BP stable with low heart rate.    Pressure areas/Skin:    - PA's intact    - +2 -+3 Edema  knees down; LE's elevated overnight        LDA's:     PIVC to L & R arm, both SL      Plans:    > hold all his blood pressure medications going forward  > Pacer pads have been placed   > if he becomes symptomatic then we can use dopamine.  > If Dopamine makes his condition worse (Has BBB)  we can use temporary pacing and wire will need to be placed  > NPO after midnight  > 6 hours scheduled Nebs     > Cardio:   - hold any AV shawn blocking agents, nothing further to do today, monitor on telemetry    - EP review in am   - No long term anticoagulation due to frequent falls   - I would send him out with a ZioPatch    > Renal:   - following   > MSW:   - TCU placement on discharge       Problem: Plan of Care - These are the overarching goals to be used throughout the patient stay.    Goal: Absence of Hospital-Acquired Illness or Injury  Intervention: Identify and Manage Fall Risk  Recent Flowsheet Documentation  Taken 7/4/2023 2000 by Martha Jasso, RN  Safety Promotion/Fall Prevention:    activity supervised    clutter free environment maintained    lighting adjusted    mobility aid in reach    nonskid shoes/slippers when out of bed    safety round/check completed    room near nurse's station  Intervention: Prevent Skin Injury  Recent Flowsheet  Documentation  Taken 7/4/2023 2000 by Martha Jasso RN  Body Position: weight shifting

## 2023-07-05 NOTE — PROGRESS NOTES
RRT called for symptomatic bradycardia (dizzy, SOB, nauseous) w/ HR sustaining in low 30's. See Cards note/hopitalist notes.

## 2023-07-05 NOTE — CONSULTS
"RT Chronic Pulmonary Disease Specialist Consult   COPD Initial Assessment   2023    Patient: Hayder Alves      :  1955                    MRN:[id number]      Reason for Consult: COPD EDUC and Treatment Optimization     History of Present Illness: Hayder Alves is a 68 year old male with a very complicated  medical history significant for recently diagnosed Lewy Body Dementia, chronic neck pain (s/p spinal fusion), COPD (not on home oxygen), CAD (s/p CABG ), CKD stage IV, hypertension, dyslipidemia, systolic CHF, diabetes mellitus, medication noncompliance, h/o atrial fibrillation (s/p cardioversion ; not currently on anticoagulation) presented to ED with shortness of breath and left arm pain.    Smoking Hx:  AVG 1.5PPD over 30 years; quit in               Recommendations:    **Continue current BREO and INCRUSE regimen (formulary for PTA Trelegy); recommend making DuoNeb PRN now    **Given cough, added flutter valve--3 sets of 10 breaths, at least BID     **PT/OT consult to perform cognitive evaluation, assess patients functional abilities, limitations, home care needs, and make recommendations for safe transition to home or TCU    **IP Care Management (RNCC/SW) Consult    **Home Oxygen Assessment Testing 24-48 hours prior to discharge to determine O2 needs at rest and with exertion/activity. If the patient requires oxygen at rest, the walk test must be performed using the new baseline O2 to determine if patient needs more oxygen with activity.     In the event patient qualifies for oxygen, at rest or with activity, please use the following verbiage in oxygen order: \"Please provide portable oxygen concentrator AND please test for oxygen conserving device using ____LPM to maintain sats between ____)    **If doesn't qualify for 02 based on above assessment, recommend Overnight Oximetry 24-48 hours prior to discharge to assess for nocturnal hypoxia    At discharge:     **Resume PTA " "Trelegy Ellipta (Triple therapy inhaler containining ICS/LABA/LAMA); resume PTA DuoNeb and Albu MDI PRN    **Patient needs nebulizer compressor at discharge with prescription for tubing, cups and mask. Per insurance requirements, Physician documentation in F2F notes needs to include dx diagnosis and need for nebulizer and medication frequency     **Referral for OP pulm     **Referral for outpatient pulmonary Rehab    **Referral for OP sleep consult to assess for sleep disordered breathing, BRADLEY, nocturnal hypoxia, and hypoventilation         Pulmonologist/Last office visit: None    Most recent  PFT/interpretation on:    Most recent PFT found in EMR on 2021, FEV1 70%, FVC 68%, FEV1/FVC Ratio 103%--interpreted as \"minimal airflow obstruction, mild restriction -Parenchymal, mild diffusion defect\"    Sleep Studies:  None    Home Oxygen Use:   None       Pulmonary Rehab History:    None    Home respiratory medications include:      **Trelegy Ellipta Daily  **DuoNeb Q6PRN  **Albu MDI Q6PRN    Assessment:  Sitting in chair. Awake, alert, and oriented. Forgetful. Not in resp distress, resp rate in the high teens to low 20s. Sp02 low to mid 90s on 2LPM. BS=overall diminished, faint exp wheezes. Now s/t dialysis--removal of 2L. States he's feeling much better from resp stand point.     Action:         Evaluated patients inspiratory flow using In-Check device:     --Low resistance settinLPM, sufficient  inspiratory flow for PTA Albuterol rescue inhaler.  Had not been using spacer with inhaler     --Medium resistance settinLPM, sufficient inspiratory flow for PTA Trelegy Ellipta, which requires a fast and deep inhalation of 30-80LPM with 5-10 second breath hold.     --High resistance settinLPM, insufficient inspiratory flow for most high resistance inhalers, which require a fast and deep inhalation of 30-80LPM with 5-10 second breath hold     --Evaluated patients' coordination and technique with inhaler: " Patient demonstrated adequate technique with BILLIE Ray and Siva MDI; however, he's not inhaling for at least 3-4 seconds. Also not using Spacer with rescue inhaler.  Educated patient on proper technique and inspiratory flows needed for his inhaler. Provided and instructed patient on proper use of Aerochamber spacer with inhaler; reiterated that spacer should always be used with his rescue inhaler.  Given Lewy Body Dementia, writer will call wife and relay recommendations.      --Provided and instructed patient on flutter valve. Able to generate at least [id number] for 2 seconds. Able to generate strong, non-productive cough. Encouraged 3 sets of 10 breaths, at least BID.  Educated patient to perform 2 to 3 'cortes coughs'  to clear airway after each set. Shared that consistent use of this device will help open smaller airways, improve mucus clearance, decrease cough frequency, and improve exercise tolerance.       Will continue to follow and support patient as needed. Will follow up with phone call 48 hours after discharge.     I spent 45 minutes with the patient.    Alberto Gonzalez, RRT, CTTS  Chronic Pulmonary Disease Specialist  Cardiopulmonary Services   Office: [phone]  Pager: [phone]

## 2023-07-05 NOTE — PROGRESS NOTES
Renal Medicine Progress Note            Assessment/Plan:     1.  CKD stage 4              -baseline creatinine 2.5 to 3.0 mg/dl              -eGFR 25 ml/nmin              -presumed progressive diabetic nephropathy with significant proteinuria              -abnormal creatinines back to 2010              -No established outpatient nephrologist, previous attempts to schedule with [facility], referrals 2021 and 2/23.         2.  Oliguric acute kidney injury.  Likely ischemic ATN related to hemodynamics, bradycardia.  Today more urgent I think is the pacemaker placement.  His potassium 5.0, bicarbonate 14 but still no urgent need for dialysis today but expect he will need this tomorrow.      3. worsening  hyponatremia today, 125  4.  MBD -  Vit D in process.    5  Lewy body dementia  6  DM              -uncontrolled               -A1C > 10% for some time              -no retinopathy 2021  7. HTN:  on amlodipine, imdur and hydralazine and lasix but all being held with nacho cardia, lower range BP's.         Plan/Recs:  1) n.p.o. at midnight  2) starting Bicitra 30 cc twice daily   3) unless any signs of recovery, will likely have IR place a dialysis catheter and start dialysis tomorrow morning  4) fluid restriction 1200 ml/day    Darian Urrutia DO  [facility]  Office: [phone]  Cell: [phone]        Interval History:     Patient with spouse at bedside.  Remains in junctional bradycardia and seen by EP this morning.  Recommendation for pacemaker placement which I discussed further with patient and spouse today.  Patient is now oliguric with significant rise in serum creatinine today.  He is unable to tell me any significant change in how he is feeling or doing well.  He is on 4 L of oxygen and he is laying supine though denies any dyspnea.         Medications and Allergies:       amitriptyline  30 mg Oral At Bedtime     [Held by provider] amLODIPine  5 mg Oral BID     aspirin  81 mg Oral Daily      busPIRone  5 mg Oral TID     ceFAZolin  2 g Intravenous Pre-Op/Pre-procedure x 1 dose     cefTRIAXone  1 g Intravenous Q24H     fluticasone-vilanterol  1 puff Inhalation Daily    And     umeclidinium  1 puff Inhalation Daily     [Held by provider] furosemide  20 mg Oral BID     gabapentin  300 mg Oral BID     [Held by provider] hydrALAZINE  75 mg Oral TID     insulin aspart  1-10 Units Subcutaneous TID AC     insulin aspart  1-7 Units Subcutaneous At Bedtime     [Held by provider] insulin glargine  20 Units Subcutaneous QAM AC     ipratropium - albuterol 0.5 mg/2.5 mg/3 mL  1 vial Nebulization Q6H     [Held by provider] isosorbide mononitrate  60 mg Oral Daily     oxyCODONE-acetaminophen  1 tablet Oral At Bedtime     rosuvastatin  10 mg Oral Daily     senna-docusate  1 tablet Oral BID    Or     senna-docusate  2 tablet Oral BID     sodium chloride (PF)  3 mL Intracatheter Q8H     sodium chloride (PF)  3 mL Intracatheter Q8H        Allergies   Allergen Reactions     Adhesive Tape      From blood draw site.     Eszopiclone      Strange dreams. Cooking during sleep      Latex      Lipitor [Atorvastatin Calcium] Other (See Comments)     myalgias     Lisinopril Other (See Comments)     Hyperkalemia and increased creatinine            Physical Exam:   Vitals were reviewed  BP (!) 153/98   Pulse (!) 43   Temp 98.3  F (36.8  C) (Oral)   Resp 18   Wt 85.9 kg (189 lb 6 oz)   SpO2 91%   BMI 28.83 kg/m      Wt Readings from Last 3 Encounters:   07/05/23 85.9 kg (189 lb 6 oz)   06/08/23 85.4 kg (188 lb 3.2 oz)   05/04/23 83.4 kg (183 lb 14.4 oz)       Intake/Output Summary (Last 24 hours) at 7/5/2023 1326  Last data filed at 7/5/2023 0952  Gross per 24 hour   Intake 403 ml   Output --   Net 403 ml       GENERAL APPEARANCE:  HEENT:  Eyes/ears/nose/neck grossly normal  RESP: lungs cta b c good efforts, distant, occasional rales  CV: RRR, nl S1/S2, no m/r/g   ABDOMEN: o/s/nt/nd, bs present  EXTREMITIES/SKIN: no  c/c/rashes/lesions;  edema present today, 1+ bilateral dependent           Data:     BMP  Recent Labs   Lab 07/05/23  1202 07/05/23  0737 07/05/23  0605 07/05/23  0130 07/04/23  1433 07/04/23  1153 07/04/23  0732 07/04/23  0529 07/03/23  1723 07/03/23  1445 07/03/23  0749 07/03/23  0559   NA  --   --  124*  --   --  129*  --  131*  --   --   --  133*   POTASSIUM  --   --  5.0  --   --  5.3  --  4.5  --  4.1  --  3.3*   CHLORIDE  --   --  87*  --   --  92*  --  93*  --   --   --  98   WANDA  --   --  8.9  --   --  9.2  --  9.4  --   --   --  8.8   CO2  --   --  14*  --   --  14*  --  13*  --   --   --  20*   BUN  --   --  61.2*  --   --  51.2*  --  45.4*  --   --   --  39.3*   CR  --   --  4.20*  --   --  2.87*  --  2.54*  --   --   --  2.25*   * 150* 157* 154*   < > 193*   < > 176*   < >  --    < > 91    < > = values in this interval not displayed.     CBC  Recent Labs   Lab 07/04/23  1534 07/02/23  0545 07/01/23  0550 06/30/23  0549   WBC 9.8 6.2 10.7 7.4   HGB 11.3* 11.3* 10.7* 12.1*   HCT 33.9* 34.0* 32.5* 36.3*   MCV 87 86 86 86    111* 131* 142*     Lab Results   Component Value Date    AST 18 06/29/2023    ALT 13 06/29/2023    GGT 40 03/12/2006    ALKPHOS 93 06/29/2023    BILITOTAL 0.8 06/29/2023    BILICONJ 0.0 04/08/2010     Lab Results   Component Value Date    INR 1.08 10/10/2016       Attestation:  I have reviewed today's vital signs, notes, medications, labs and imaging.    DO [facility] - Nephrology  Office: [phone]  Cell: [phone]

## 2023-07-05 NOTE — CONSULTS
Interventional Radiology - Progress Note  Inpatient - [facility]  7/5/2023    IR Brief Note    IR consulted for tunneled HD catheter placement   Currently going to pacemaker placement with EP  Will see in AM  NPO after MN as ordered  Continue to monitor for signs of renal recovery    Bc He PA-C  Interventional Radiology  [phone] (IR)  *[phone] ([facility] Office)

## 2023-07-05 NOTE — PROGRESS NOTES
[facility]    Medicine Progress Note - Hospitalist Service    Date of Admission:  6/29/2023    Assessment & Plan     Hayder Alvse is a 68 year old male with a very complicated  medical history significant for recently diagnosed Lewy Body Dementia, chronic neck pain (s/p spinal fusion), COPD (not on home oxygen), CAD (s/p CABG 2001), CKD stage IV, hypertension, dyslipidemia, systolic CHF, diabetes mellitus, medication noncompliance, h/o atrial fibrillation (s/p cardioversion 2020; not currently on anticoagulation) presented to ED with shortness of breath and left arm pain.      Acute hypoxic respiratory failure likely due to acute on chronic systolic heart failure and exacerbation  NSTEMI likely due to above  History of coronary artery disease status CABG  new atrial flutter, likely paroxysmal atrial fibrillation h/o cardioversion in 2020, not currently on anticoagulation)  Severe bradycardia/complete heart block  Hypertension  Dyslipidemia  Moderate pulmonary hypertension-new  -Initially on admission presented with chief complaint of shortness of breath and chest discomfort.  Patient initially mentioned he had left arm pain and then mentioned that he had chest discomfort.  He was not able to provide good history.  -On presentation to the ER his heart rate was in 30s, EKG showed atrial flutter proBNP was significantly elevated at 19855 and troponin was elevated at 118  -Last echo in June 2020 showed EF of 55 to 60%, no wall motion changes, mild dilated left atrium and trace MR and TR  -Chest x-ray was done in the ED which was concerning for volume overload and patient was given 60 IV Lasix in the ED and cardiology was consulted in the ED with interventions and he was started on heparin drip  -He had echo done in June 2022 and at that time his EF was 55 to 60%, no wall motion changes, mildly dilated left atrium, normal RV ventricular size and systolic function and mildly dilated  atrium  -Duplex of the lower extremity did not show any evidence of DVT  - repeat EKG on 6/30/2023 was done which showed normal sinus rhythm with first-degree AV block  -Troponin peaked at 118 and have trended down to 103  -Echo on 7/1/23  which shows EF of 50 to 55%, grade 2 or moderate diastolic dysfunction, mild inferior lateral wall hypokinesis, mild biatrial enlargement, RV systolic function is borderline reduced, RVSP is elevated consistent with moderate pulmonary hypertension, mild aortic Valvular stenosis  -Given his KAY on CKD 4 cardiac catheterization is not considered as it we will put him on dialysis  -Renal function was being monitored along with hemodynamics and patient developed A-fib with bradycardia associated with advanced block and no reversible causes are identified and he was seen by EP and they have recommended pacemaker placement and discussed with patient and wife and they are in agreement  -We have held all his medications for now including amlodipine, Imdur, hydralazine and continue the patient with aspirin and statin      Elevated D-dimer  -Patient had elevated D-dimer of 1 on admission and he has history of COPD  -Patient does have CKD stage IV and cannot have CTA chest  -On admission he was started on high intensity heparin drip  -Duplex of the lower extremity does not show any evidence of DVT  -VQ scan has been ordered and does not show any evidence of PE  -Echo has been ordered results as above         History of COPD  Shortness of breath  -not on home oxygen;  and on presentation was noted 85% on room air; on 3 to 4 L oxygen  -Patient did get IV Solu-Medrol in the ER along with DuoNebs and his PTA inhalers were started  -Chest x-ray done on admission showed pulmonary vascular congestion with likely mild to moderate pulmonary edema and small bilateral pleural effusions with compressive atelectasis, right larger than left with no pneumothorax  -7/4-chest x-ray was done which showed that  vascular congestion has improved  and opacity over the right lung has improved but there is persistent opacity  -His white count was normal but given his oxygen requirement and elevated procalcitonin at 0.26 he was started on empiric ceftriaxone for short course  -Continue with as needed DuoNebs  -Will stop consult COPD team     Uncontrolled diabetes with likely ?mild DKA  -family reports medication noncompliance  -his insulin regimen was recently adjusted and switched from NPH to Lantus (30 units daily) and NovoLog 10 units TID with meals which he has not started as per discussion with the wife he is noncompliant  -Patient told me that he is only taking 20 of insulin and I do not think he is compliant with his insulin at all  -on admission blood glucose 400s, anion gap 20, bicarb 18  -Per admitting physician there was concern about mild DKA but given concerns of CHF exacerbation IV fluids were held and patient was started on PTA insulin regimen  -Initially patient was started on insulin drip and was transitioned to basal and bolus insulin   -Patient was on Lantus 40 units, aspart 11 with meals and his blood sugars were running low  -He was restarted him on Lantus 20 units along with sliding scale insulin a  -He has been n.p.o. since last night and Lantus has been held with plans to restart once he starts eating later in the day today and might have to cut down on the dose    Acute kidney injury-initially resolved and now has oliguric KAY on CKD stage IV  Pseudo hyponatremia-resolved  Hyponatremia-worsening  Anion gap metabolic acidosis likely due to renal failure  Hypokalemia-resolved  -on admission creatinine 2.8 which seems stable around his baseline of 2.5 to 2.8; sodium  Was 130  -His renal function has been monitored along with sodium and usually he had pseudohyponatremia as his sugars were significantly high but sodium continues to be on the lower side and anion gap has been chronically elevated and with  improvement in renal function anion gap is closed and his bicarb improved  -Since yesterday his creatinine has been increasing and he has developed oliguric renal failure and creatinine has increased to 4.20, sodium has decreased to 124 and his bicarb is low at 14 and potassium is 5 and most likely it is a ischemic event related to bradycardia and discussed in detail with the patient and wife and he does want dialysis if indicated and pacemaker will be placed and there is no indication for dialysis today but may need tomorrow  -d/w with nephrology and appreciate input      Chronic neck pain, history of spinal fusion  -will continue with his PTA Percocet and continue PTA Neurontin     Recently diagnosed Lewy Body Dementia  -noted  -follows neurology as outpatient    Chronic thrombocytopenia  -He does seem to have chronic thrombocytopenia and his platelet level fluctuates and has no evidence of bleeding and was 190 today     Physical deconditioning  -We will consult physical therapy and Occupational Therapy and I reviewed the note from physical therapy and he will benefit from TCU stay       Diet: Moderate Consistent Carb (60 g CHO per Meal) Diet  NPO for Medical/Clinical Reasons Except for: Other; Specify: May take medications with a drink of water prior to Electrophysiology study    DVT Prophylaxis: Heparin drip  Gardner Catheter: Not present  Lines: None     Cardiac Monitoring: ACTIVE order. Indication: Bradycardias (48 hours)  Code Status: Full Code      Clinically Significant Risk Factors         # Hyponatremia: Lowest Na = 124 mmol/L in last 2 days, will monitor as appropriate     # Anion Gap Metabolic Acidosis: Highest Anion Gap = 25 mmol/L in last 2 days, will monitor and treat as appropriate     # Acute Kidney Injury, unspecified: based on a >150% or 0.3 mg/dL increase in last creatinine compared to past 90 day average, will monitor renal function  # Hypertension: Noted on problem list       # DMII: A1C = 10.3  "% (Ref range: <5.7 %) within past 6 months            Disposition Plan      Expected Discharge Date: 07/07/2023    Discharge Delays: Placement - TCU  Destination: other (comment) (TCU)            Jose Bueno MD  Hospitalist Service  [facility]  Securely message with [email or web address] (more info)  Text page via [employer] Paging/Directory   ______________________________________________________________________    Interval History       Seen today, wife by bedside and he has developed oliguric renal failure with minimal urine production.  He was evaluated by EP team and they have recommended pacemaker implantation.  I did discuss the plan of care with the patient and wife and he is aware that he may need dialysis and that he wants everything to be done.  Patient and wife had questions which were answered to satisfaction    D/w with patient's nurse, charge nurse, cardiology, nephrology,     Physical Exam     Vital signs:  Temp: 98.3  F (36.8  C) Temp src: Oral BP: (!) 153/98 Pulse: (!) 43   Resp: 18 SpO2: 91 % O2 Device: Oxymask Oxygen Delivery: 4 LPM   Weight: 85.9 kg (189 lb 6 oz)  Estimated body mass index is 28.83 kg/m  as calculated from the following:    Height as of 5/4/23: 1.726 m (5' 7.95\").    Weight as of this encounter: 85.9 kg (189 lb 6 oz).          General: AOX2-3  Respiratory: He was on 4 L of oxygen and does not have any significant wheeze and minimal crackles  Cardiovascular: Regular rate , S1 and S2 normal with no murmer or rubs or gallops and has bilateral edema extending up to the thighs over both lower extremities and the edema is still there but has shown improvement from initial presentation  Abdomen:   soft , non tender ,   Neurologic:  No facial droop  Musculoskeletal: Normal Range of motion over upper and lower extremities bilaterally   Psychiatric: cooperative     Medical Decision Making         Time spent in care of patient is 65 minutes and I discussed the plan of care with EP, " nephrology, patient's nurse, patient and his wife and reviewed labs, imaging and formulated further plan of care and patient and wife's questions were answered to satisfaction.    Wife is aware that patient is critically ill and his prognosis is guarded.  He confirmed full code with me.  Data     I have personally reviewed the following data over the past 24 hrs:    9.8  \   11.3 (L)   / 190     124 (L) 87 (L) 61.2 (H) /  170 (H)   5.0 14 (L) 4.20 (H) \       Procal: 0.26 (H) CRP: N/A Lactic Acid: N/A         Imaging results reviewed over the past 24 hrs:   No results found for this or any previous visit (from the past 24 hour(s)).

## 2023-07-05 NOTE — PROVIDER NOTIFICATION
Message:    267WS; Patient had no output overnight; Bladder scan at 6am came back at 109mls; Patient has been fasting overnight pending possible EP intervention today; Bladder not distended, soft to palpation, denies having any urge to void at time of scan. I cannot see documentation of UO from day staff yesterday so unknown when last voided. Verbalizing hesitancy to urinate laying down but is on strict bedrest as symptomatic CHB every time up out of bed. Please advise. Pie [phone]      Provider notified: DAVID Barker MD  Via:    [facility] Messaging      At: 0607h            Resolution:                At:  0608h         Nil concerns

## 2023-07-06 NOTE — PLAN OF CARE
Goal Outcome Evaluation:      Plan of Care Reviewed With: patient      Patient had dialysis acces placed this morning, done had dialysis, now up in the chair waiting for lunch, continues very thirsty, continue to wean O2 now patient is at 3 L, VSS, continue complaining off neck arm pain. Dialysis tomorrow morning again.

## 2023-07-06 NOTE — PRE-PROCEDURE
GENERAL PRE-PROCEDURE:   Procedure:  Tunneled dialysis catheter placement  Date/Time:  7/6/2023 8:30 AM    Written consent obtained?: Yes    Risks and benefits: Risks, benefits and alternatives were discussed    Consent given by:  Patient  Patient states understanding of procedure being performed: Yes    Patient's understanding of procedure matches consent: Yes    Procedure consent matches procedure scheduled: Yes    Expected level of sedation:  Moderate  Appropriately NPO:  Yes  ASA Class:  3  Mallampati  :  Grade 3- soft palate visible, posterior pharyngeal wall not visible  Lungs:  Lungs clear with good breath sounds bilaterally  Heart:  Normal heart sounds and rate  History & Physical reviewed:  History and physical reviewed and no updates needed  Statement of review:  I have reviewed the lab findings, diagnostic data, medications, and the plan for sedation

## 2023-07-06 NOTE — IR NOTE
Patient Name: Hayder Alves  Medical Record Number: [id number]  Today's Date: 7/6/2023    Procedure: CVC placement  Proceduralist: Dr. Armendariz  Pathology present: no    Procedure Start: 0857  Procedure end: 0904  Sedation medications administered: Last Dose: 0857, Fentanyl 50 mcg, Versed 1 mg, Lidocaine 19 ml's.    Report given to: TATI Brown RN  : no    Other Notes: Pt will require 1 bedrest post procedure. Pt arrived to IR room 1 from 267. Consent reviewed. Pt denies any questions or concerns regarding procedure. Pt positioned supine and monitored per protocol. Pt tolerated procedure without any noted complications.

## 2023-07-06 NOTE — PROGRESS NOTES
Post Device Implant Instructions    Dressing:  Clean, dry, and intact  Chest XR reviewed:  Yes  Pneumo present?:  No  Lead Measurements per Device Rep:  WNL    Education Provided:  - Avoid raising left arm above the level of the shoulder for 3 weeks.  - Do not shower until outer occlusive dressing has been removed.  - Remove outer dressing in 3 - 4 days.  - Leave steri-strips in place, these will be removed at the 1 week check.   - Call Device Clinic with increased swelling, drainage, or fever > 101 degrees.   - Follow-up with the Device Clinic as scheduled.     Pt verbalized understanding of instructions and AVS updated.      GASPER Loo

## 2023-07-06 NOTE — PROGRESS NOTES
Care Management Follow Up    Length of Stay (days): 7    Expected Discharge Date: 07/08/2023     Concerns to be Addressed:  Discharge planning     Patient plan of care discussed at interdisciplinary rounds: Yes    Anticipated Discharge Disposition: Skilled Nursing Facility, Transitional Care     Anticipated Discharge Services: Therapy  Anticipated Discharge DME: None    Patient/family educated on Medicare website which has current facility and service quality ratings: yes  Education Provided on the Discharge Plan:  yes  Patient/Family in Agreement with the Plan: yes    Referrals Placed by CM/SW:  TCU referrals  Private pay costs discussed: Not applicable    Additional Information:  Spoke with Roselia, the care coordinator, who states that she has spoken with patient's wife and she is asking for TCU referrals to be [facility].  Referrals sent, via the discharge navigator, to check bed availability.  Once a TCU is located we will secure a dialysis location.  The new dialysis paperwork has been started.    Will continue to follow.      FRANK Celis, [facility]    [phone]  [facility]

## 2023-07-06 NOTE — PROGRESS NOTES
Potassium   Date Value Ref Range Status   07/06/2023 4.7 3.4 - 5.3 mmol/L Final   06/20/2022 4.2 3.4 - 5.3 mmol/L Final   02/10/2021 4.6 3.4 - 5.3 mmol/L Final     Hemoglobin   Date Value Ref Range Status   07/06/2023 10.6 (L) 13.3 - 17.7 g/dL Final   02/19/2020 15.4 13.3 - 17.7 g/dL Final     Creatinine   Date Value Ref Range Status   07/06/2023 4.87 (H) 0.67 - 1.17 mg/dL Final   02/10/2021 1.98 (H) 0.66 - 1.25 mg/dL Final     Urea Nitrogen   Date Value Ref Range Status   07/06/2023 70.2 (H) 8.0 - 23.0 mg/dL Final   06/20/2022 24 7 - 30 mg/dL Final   02/10/2021 27 7 - 30 mg/dL Final     Sodium   Date Value Ref Range Status   07/06/2023 125 (L) 136 - 145 mmol/L Final   02/10/2021 137 133 - 144 mmol/L Final     INR   Date Value Ref Range Status   10/10/2016 1.08 0.86 - 1.14 Final       DIALYSIS PROCEDURE NOTE  Hepatitis status of previous patient on machine log was checked and verified ok to use with this patients hepatitis status.  Patient dialyzed for 2.5 hrs. on a K3 bath with a net fluid removal of  2L.  A BFR of 250 ml/min was obtained via a right cvc .      The treatment plan was discussed with Dr. Urrutia during the treatment.    Total heparin received during the treatment: 0 units.     Line flushed, clamped and capped with heparin 1:1000 4.0 mL (1.9 units) per lumen    Meds  given: none   Complications: none      Person educated: Patient. Knowledge base minimal. Barriers to learning: new to dialysis( 1st day). Educated on procedure via verbal mode. The patient verbalized understanding.     ICEBOAT? Timeout performed pre-treatment  I: Patient was identified using 2 identifiers  C:  Consent Signed Yes  E: Equipment preventative maintenance is current and dialysis delivery system OK to use  B: Hepatitis B Surface Antigen: unknown; Draw Date: 07/06/2023      Hepatitis B Surface Antibody: Unknown; Draw Date: 07/06/2023    O: Dialysis orders present and complete prior to treatment  A: Vascular access verified and  assessed prior to treatment  T: Treatment was performed at a clinically appropriate time  ?: Patient was allowed to ask questions and address concerns prior to treatment  See Adult Hemodialysis flowsheet in EPIC for further details and post assessment.  Machine water alarm in place and functioning. Transducer pods intact and checked every 15min.   Pt returned via bed.  Chlorine/Chloramine water system checked every 4 hours.  Outpatient Dialysis at Not established yet    Patient repositioned every 2 hours during the treatment.  Post treatment report given to Stephanie Nichols RN regarding 2L of fluid removed, last BP of 138/51, and patient pain rating of 5/10. Neck and back pain

## 2023-07-06 NOTE — PROGRESS NOTES
[facility]    Medicine Progress Note - Hospitalist Service    Date of Admission:  6/29/2023    Assessment & Plan     Hayder Alves is a 68 year old male with a very complicated  medical history significant for recently diagnosed Lewy Body Dementia, chronic neck pain (s/p spinal fusion), COPD (not on home oxygen), CAD (s/p CABG 2001), CKD stage IV, hypertension, dyslipidemia, systolic CHF, diabetes mellitus, medication noncompliance, h/o atrial fibrillation (s/p cardioversion 2020; not currently on anticoagulation) presented to ED with shortness of breath and left arm pain.      Acute hypoxic respiratory failure likely due to acute on chronic systolic heart failure and exacerbation  NSTEMI likely due to above  History of coronary artery disease status CABG  new atrial flutter, likely paroxysmal atrial fibrillation h/o cardioversion in 2020, not currently on anticoagulation)  Severe bradycardia/complete heart block s/p PPM on 7/5/2023  Hypertension  Dyslipidemia  Moderate pulmonary hypertension-new  -Initially on admission presented with chief complaint of shortness of breath and chest discomfort.  Patient initially mentioned he had left arm pain and then mentioned that he had chest discomfort.  He was not able to provide good history.  -On presentation to the ER his heart rate was in 30s, EKG showed atrial flutter proBNP was significantly elevated at 93334 and troponin was elevated at 118  -Last echo in June 2020 showed EF of 55 to 60%, no wall motion changes, mild dilated left atrium and trace MR and TR  -Chest x-ray was done in the ED which was concerning for volume overload and patient was given 60 IV Lasix in the ED and cardiology was consulted in the ED with interventions and he was started on heparin drip  -He had echo done in June 2022 and at that time his EF was 55 to 60%, no wall motion changes, mildly dilated left atrium, normal RV ventricular size and systolic function and  mildly dilated atrium  -Duplex of the lower extremity did not show any evidence of DVT  - repeat EKG on 6/30/2023 was done which showed normal sinus rhythm with first-degree AV block  -Troponin peaked at 118 and have trended down to 103  -Echo on 7/1/23  which shows EF of 50 to 55%, grade 2 or moderate diastolic dysfunction, mild inferior lateral wall hypokinesis, mild biatrial enlargement, RV systolic function is borderline reduced, RVSP is elevated consistent with moderate pulmonary hypertension, mild aortic Valvular stenosis  -Given his KAY on CKD 4 cardiac catheterizationwas not considered as it we will put him on dialysis during initial stay in hospital  -s/p PPM placement on 7/5/2023 and PPM check per   -We had held all his medications for including amlodipine, Imdur, hydralazine -we will restart amlodipine and imdur  -continue the patient with aspirin and statin  -He is on 4 L of oxygen and is able to speak in full sentences and I do expect that with dialysis his oxygen needs will improve      Elevated D-dimer  -Patient had elevated D-dimer of 1 on admission and he has history of COPD  -Patient does have CKD stage IV and cannot have CTA chest  -On admission he was started on high intensity heparin drip  -Duplex of the lower extremity does not show any evidence of DVT  -VQ scan has been ordered and does not show any evidence of PE  -Echo has been ordered results as above         History of COPD  Shortness of breath  -not on home oxygen;  and on presentation was noted 85% on room air; on 3 to 4 L oxygen  -Patient did get IV Solu-Medrol in the ER along with DuoNebs and his PTA inhalers were started  -Chest x-ray done on admission showed pulmonary vascular congestion with likely mild to moderate pulmonary edema and small bilateral pleural effusions with compressive atelectasis, right larger than left with no pneumothorax  -7/4-chest x-ray was done which showed that vascular congestion has improved  and opacity  over the right lung has improved but there is persistent opacity  -His white count was normal but given his oxygen requirement and elevated procalcitonin at 0.26 he was started on empiric ceftriaxone for short course  -Continue with as needed DuoNebs  - consult COPD team     Uncontrolled diabetes with likely ?mild DKA  -family reports medication noncompliance  -his insulin regimen was recently adjusted and switched from NPH to Lantus (30 units daily) and NovoLog 10 units TID with meals which he has not started as per discussion with the wife he is noncompliant  -Patient told me that he is only taking 20 of insulin and I do not think he is compliant with his insulin at all  -on admission blood glucose 400s, anion gap 20, bicarb 18  -Per admitting physician there was concern about mild DKA but given concerns of CHF exacerbation IV fluids were held and patient was started on PTA insulin regimen  -Initially patient was started on insulin drip and was transitioned to basal and bolus insulin   -Patient was on Lantus 40 units, aspart 11 with meals and his blood sugars were running low and insulin was adjusted   -currently on sliding scale insulin and we will monitor and adjust insulin as per blood sugar levels     Acute kidney injury-initially resolved and now has oliguric KAY on CKD stage IV  Pseudo hyponatremia-resolved  Hyponatremia-worsening  Anion gap metabolic acidosis likely due to renal failure  Hypokalemia-resolved  -on admission creatinine 2.8 which seems stable around his baseline of 2.5 to 2.8; sodium  Was 130  -Nephrology following and his creatinine has worsened and tunneled cathter is placed today and patient was started on hemodialysis today      Chronic neck pain, history of spinal fusion  -will continue with his PTA Percocet and continue PTA Neurontin     Recently diagnosed Lewy Body Dementia  -noted  -follows neurology as outpatient    Chronic thrombocytopenia  -He does seem to have chronic  thrombocytopenia and his platelet level fluctuates and has no evidence of bleeding and was 190 today     Physical deconditioning  -We will consult physical therapy and Occupational Therapy and I reviewed the note from physical therapy and he will benefit from TCU stay       Diet: Fluid restriction 1200 ML FLUID  NPO per Anesthesia Guidelines for Procedure/Surgery Except for: Meds, Ice Chips    DVT Prophylaxis: Heparin drip  Gardner Catheter: Not present  Lines: None     Cardiac Monitoring: ACTIVE order. Indication: Post- EP procedure (48 hours)  Code Status: Full Code      Clinically Significant Risk Factors         # Hyponatremia: Lowest Na = 124 mmol/L in last 2 days, will monitor as appropriate     # Anion Gap Metabolic Acidosis: Highest Anion Gap = 23 mmol/L in last 2 days, will monitor and treat as appropriate    # Thrombocytopenia: Lowest platelets = 126 in last 2 days, will monitor for bleeding  # Acute Kidney Injury, unspecified: based on a >150% or 0.3 mg/dL increase in last creatinine compared to past 90 day average, will monitor renal function  # Hypertension: Noted on problem list       # DMII: A1C = 10.3 % (Ref range: <5.7 %) within past 6 months            Disposition Plan      Expected Discharge Date: 07/08/2023    Discharge Delays: Placement - TCU  Destination: other (comment) (TCU)  Discharge Comments: ppm 7/5,  7/6 to get dialysis cath  and receive dialysis today          Jose Bueno MD  Hospitalist Service  [facility]  Securely message with [employer] (more info)  Text page via [facility] Paging/Directory   ______________________________________________________________________    Interval History     Saw the patient in dialysis room 1 and he had tunneled catheter placed and was undergoing dialysis.  He is feeling good.  He had some question about the pacemaker which I answered to best of my ability.  He mentioned that he is feeling better than yesterday.  No family was present and  "will update wife later in the day.    Discussed plan of care with dialysis nurse, patient and will see him again later in the day    Physical Exam     Vital signs:  Temp: 99  F (37.2  C) Temp src: Axillary BP: (!) 145/72 Pulse: 60   Resp: 15 SpO2: 91 % O2 Device: Nasal cannula Oxygen Delivery: 4 LPM   Weight: 88 kg (194 lb 0.1 oz)  Estimated body mass index is 29.54 kg/m  as calculated from the following:    Height as of 5/4/23: 1.726 m (5' 7.95\").    Weight as of this encounter: 88 kg (194 lb 0.1 oz).          General: AOX2-3  Respiratory: He was on 4 L of oxygen and does not have any significant wheeze and has  crackles and he has a right chest tunneled catheter  Cardiovascular: Regular rate , S1 and S2 normal with no murmer or rubs or gallops and bilateral edema  Abdomen:   soft , non tender ,   Neurologic:  No facial droop  Musculoskeletal: Normal Range of motion over upper and lower extremities bilaterally   Psychiatric: cooperative     Medical Decision Making           Data     I have personally reviewed the following data over the past 24 hrs:    10.1  \   10.6 (L)   / 126 (L)     125 (L) 89 (L) 70.2 (H) /  174 (H)   4.7 15 (L) 4.87 (H) \       Imaging results reviewed over the past 24 hrs:   Recent Results (from the past 24 hour(s))   EP Device    Narrative    Table formatting from the original result was not included.  PROCEDURES PERFORMED:   1. Implantation of a single-chamber pacemaker, MRI compatible  2. Conscious sedation.   3. Cardiac fluoroscopy    INDICATION: AF with  CHB    HISTORY OF PRESENT ILLNESS: This is a 68 year old year-old patient with a   history of persistent AF note to be in CHB with junctional escape in the   40's considered to be irreversible who is referred for a permanent   pacemaker. Risks of the procedure was discussed before the procedure   including but not limited to vascular injury, infection, pneumothorax, and   cardiac perforation.    METHOD: Informed consent was obtained and " intravenous antibiotics was   given prior to the procedure. The patient was prepped and draped in the   usual manner. The procedure was performed under conscious sedation with   Versed and Fentanyl unless contraindicated. 1% lidocaine was infiltrated   into the left axillary vein area. This vein was accessed using the   modified Seldinger's technique with ultrasound guidance and micropuncture   when applicable. An incision was then made with a #15 blade. A sheath was   then glide over the wire into the vein. A lead was then advanced into the   heart and was fixed into the right ventricular area. Appropriate sensing   and threshold were obtained. There was no diaphragmatic stimulation with   high output pacing. The lead was then secured to the pectoralis muscle   fascia using O-Ethibond sutures.     A pocket was then fashioned and was irrigated with normal saline. The   leads were then attached to the device and placed in the pocket.  The   pocket was then closed with 2-0 and 4-0 Vicryl sutures. Steri-Strips and   an OpSite dressing were then placed over the incision.  The patient [facility]   [facility].     DEVICE INFORMATION:  Implant Name Type Inv. Item Serial No.  Lot No. LRB No. Used   Action   IMP LEAD PACING BIPOLAR CAPSUREFIX NOVUS 58CM 5076-58 - [id number] Leads   IMP LEAD PACING BIPOLAR CAPSUREFIX NOVUS 58CM 5076-58 [id number]   [employer] [id number]  1 Implanted   PACEMAKER TANGELA XT SR MRI SYS - [id number] Pacemaker PACEMAKER TANGELA XT SR   MRI SYS [id number] [employer] [id number]  1 Implanted       STIMULATION THRESHOLDS:  RV -  Sense:3.75 mV, Threshold: 1.25 V @ 0.5 ms, Impedance: 570 ohms     Fluoroscopy (minutes): Dose 5.7mSv Fl Time:0min, DAP:2.2    COMPLICATIONS: None    CONCLUSION:  1. Uneventful implantation of a single-chamber pacemaker, MRI compatible    Glenn Huynh MD          X-ray Chest 2 vws*    Narrative    CHEST TWO VIEWS July 6,  2023 8:27 AM     HISTORY: Implantable cardiac device placement, evaluate for  pneumothorax.    COMPARISON: July 4, 2023.    FINDINGS: Cardiac lead(s) noted that project over the cardiac  silhouette. No pneumothorax. Mild bibasilar atelectasis and/or  infiltrate, question some pleural fluid. The cardiac silhouette is  stable. Pulmonary vasculature is unremarkable.      Impression    IMPRESSION: No acute disease.

## 2023-07-06 NOTE — PROGRESS NOTES
[facility]    EP Progress Note    Date of Service (when I saw the patient): 07/06/2023     Assessment & Plan   Hayder Alves is a 68 year old male with h/o HTN, HL, CKD, COPD, CAD (s/p CABG 2001), persistent AFib and newly dx'd Lewy Body Dementia who was admitted on 6/29/2023 with increased SOB. Noted to have CHF/COPD exacerbation and diuresed.  Had significant bradycardia 7/5 resulting in sx'c hypotension and EP consulted. AFib/CHB noted, now s/p PPM.    1. Atrial Fibrillation, CHB -   - S/p DCCV 2020, did not come back for follow-up until this hospitalization.  - Echo 7/1/2023 showed LVEF 50-55%, grade 2 DD and mild AoS    - EKG on admit 6/29 showed underlying AFib with very regular RR intervals, suggestive of high degree AV Block.    - PTA on Metoprolol XL 50 mg daily - held since admit 6/29  - Now s/p single chamber Medtronic PPM 7/5/2023    - Device interrogation (Vinnie) showed 100% RVP in VVIR 60/120. Stable lead measurements  - CXR (reviewed with Dr. Pena) looks good. No PTX.  - Device teaching PENDING -  Encouraged to call family/write instructions in AVS given pt's dementia    - CHADSVASc 4 (HTN, age, DM, CAD) - has NOT been on AC given falls     PLAN:   1. Device RN will arrange follow-up  - encouraged to call Family with instructions given pt's dementia   2. As CXR shows no PTX and proper lead placement, EP will s/o    2. HTN -   - PTA on amlodipine 5 q PM, Lasix 20 BID, Metoprolol XL 50 daily  - Here, amlodipine (incresaed to 5 mg BID), Lasix 20 mg BID and added hydralazine 75 mg TID & Imdur 60 mg daily all on HOLD    - Had significant hypotension a/w bradycardia 7/5.   - SBPs 120-160s  Component      Latest Ref Rng 7/4/2023  11:53 AM 7/5/2023  6:05 AM 7/6/2023  5:52 AM   Sodium      136 - 145 mmol/L 129 (L)  124 (L)  125 (L)    Potassium      3.4 - 5.3 mmol/L 5.3  5.0  4.7    Chloride      98 - 107 mmol/L 92 (L)  87 (L)  89 (L)    Carbon Dioxide (CO2)      22 - 29 mmol/L  "14 (L)  14 (L)  15 (L)    Anion Gap      7 - 15 mmol/L 23 (H)  23 (H)  21 (H)    Urea Nitrogen      8.0 - 23.0 mg/dL 51.2 (H)  61.2 (H)  70.2 (H)    Creatinine      0.67 - 1.17 mg/dL 2.87 (H)  4.20 (H)  4.87 (H)    Calcium      8.8 - 10.2 mg/dL 9.2  8.9  8.5 (L)    Glucose      70 - 99 mg/dL 193 (H)  157 (H)  171 (H)           PLAN:   1. Per Nephrology/Hospitalist Medicine   2. Note he's getting tunneled HD catheter placement with plans to start dialysis     Dariela Howell PA-C, MSPAS    Interval History   Bill is getting ready to go down to CXR, then IR for tunneled catheter placement  He's very thirsty - sponges \"don't do much.\"  He had some mild dizziness this AM, but able to walk to cart for CXR     No sig discomfort at PPM site. Ecchymosis noted. Pressure dressing in place    Physical Exam   Temp: 98.6  F (37  C) Temp src: Oral BP: (!) 160/72 Pulse: 63   Resp: 16 SpO2: 94 % O2 Device: Nasal cannula Oxygen Delivery: 4 LPM  Vitals:    07/03/23 0214 07/04/23 0525 07/05/23 0500   Weight: 86.6 kg (191 lb) 85.5 kg (188 lb 8.7 oz) 85.9 kg (189 lb 6 oz)     Vital Signs with Ranges  Temp:  [97.4  F (36.3  C)-98.6  F (37  C)] 98.6  F (37  C)  Pulse:  [40-63] 63  Resp:  [16-20] 16  BP: (126-160)/() 160/72  SpO2:  [88 %-95 %] 94 %  I/O last 3 completed shifts:  In: 949 [P.O.:940; I.V.:9]  Out: 200 [Urine:200]    Telemetry:  74 bpm with underlying AFib. Occasional PVC    Constitutional: Awake, alert. Transferring to cart to go down to CXR  Respiratory: Mild tachypnea.   Cardiovascular: PPM site with pressure dressing in place (I did not remove). Ecchymosis noted. No hematoma  Musculoskeletal: R>L LE doughy edema    Medications     - MEDICATION INSTRUCTIONS -         amitriptyline  30 mg Oral At Bedtime     [Held by provider] amLODIPine  5 mg Oral BID     aspirin  81 mg Oral Daily     busPIRone  5 mg Oral TID     cefTRIAXone  1 g Intravenous Q24H     fluticasone-vilanterol  1 puff Inhalation Daily    And "     umeclidinium  1 puff Inhalation Daily     [Held by provider] furosemide  20 mg Oral BID     gabapentin  300 mg Oral BID     [Held by provider] hydrALAZINE  75 mg Oral TID     insulin aspart  1-10 Units Subcutaneous TID AC     insulin aspart  1-7 Units Subcutaneous At Bedtime     [Held by provider] insulin glargine  20 Units Subcutaneous QAM AC     ipratropium - albuterol 0.5 mg/2.5 mg/3 mL  1 vial Nebulization Q6H     [Held by provider] isosorbide mononitrate  60 mg Oral Daily     oxyCODONE-acetaminophen  1 tablet Oral At Bedtime     rosuvastatin  10 mg Oral Daily     senna-docusate  1 tablet Oral BID    Or     senna-docusate  2 tablet Oral BID     sodium chloride (PF)  3 mL Intracatheter Q8H     sodium citrate-citric acid  30 mL Oral BID       Data   I personally reviewed the EKG tracing showing PENDING.  Results for orders placed or performed during the hospital encounter of 06/29/23 (from the past 24 hour(s))   Glucose by meter   Result Value Ref Range    GLUCOSE BY METER POCT 150 (H) 70 - 99 mg/dL   Glucose by meter   Result Value Ref Range    GLUCOSE BY METER POCT 170 (H) 70 - 99 mg/dL   EP Device    Narrative    Table formatting from the original result was not included.  PROCEDURES PERFORMED:   1. Implantation of a single-chamber pacemaker, MRI compatible  2. Conscious sedation.   3. Cardiac fluoroscopy    INDICATION: AF with  CHB    HISTORY OF PRESENT ILLNESS: This is a 68 year old year-old patient with a   history of persistent AF note to be in CHB with junctional escape in the   40's considered to be irreversible who is referred for a permanent   pacemaker. Risks of the procedure was discussed before the procedure   including but not limited to vascular injury, infection, pneumothorax, and   cardiac perforation.    METHOD: Informed consent was obtained and intravenous antibiotics was   given prior to the procedure. The patient was prepped and draped in the   usual manner. The procedure was performed  under conscious sedation with   Versed and Fentanyl unless contraindicated. 1% lidocaine was infiltrated   into the left axillary vein area. This vein was accessed using the   modified Seldinger's technique with ultrasound guidance and micropuncture   when applicable. An incision was then made with a #15 blade. A sheath was   then glide over the wire into the vein. A lead was then advanced into the   heart and was fixed into the right ventricular area. Appropriate sensing   and threshold were obtained. There was no diaphragmatic stimulation with   high output pacing. The lead was then secured to the pectoralis muscle   fascia using O-Ethibond sutures.     A pocket was then fashioned and was irrigated with normal saline. The   leads were then attached to the device and placed in the pocket.  The   pocket was then closed with 2-0 and 4-0 Vicryl sutures. Steri-Strips and   an OpSite dressing were then placed over the incision.  The patient was   then [facility] in stable condition.     DEVICE INFORMATION:  Implant Name Type Inv. Item Serial No.  Lot No. LRB No. Used   Action   IMP LEAD PACING BIPOLAR CAPSUREFIX NOVUS 58CM 5076-58 - [id number] Leads   IMP LEAD PACING BIPOLAR CAPSUREFIX NOVUS 58CM 5076-58 [id number]   [employer] [id number]  1 Implanted   PACEMAKER TANGELA XT SR MRI SYS - [id number] Pacemaker PACEMAKER TANGELA XT SR   MRI SYS [id number] [employer] [id number]  1 Implanted       STIMULATION THRESHOLDS:  RV -  Sense:3.75 mV, Threshold: 1.25 V @ 0.5 ms, Impedance: 570 ohms     Fluoroscopy (minutes): Dose 5.7mSv Fl Time:0min, DAP:2.2    COMPLICATIONS: None    CONCLUSION:  1. Uneventful implantation of a single-chamber pacemaker, MRI compatible    Glenn Huynh MD          Glucose by meter   Result Value Ref Range    GLUCOSE BY METER POCT 174 (H) 70 - 99 mg/dL   Glucose by meter   Result Value Ref Range    GLUCOSE BY METER POCT 185 (H) 70 - 99 mg/dL   Glucose by meter    Result Value Ref Range    GLUCOSE BY METER POCT 191 (H) 70 - 99 mg/dL   Basic metabolic panel   Result Value Ref Range    Sodium 125 (L) 136 - 145 mmol/L    Potassium 4.7 3.4 - 5.3 mmol/L    Chloride 89 (L) 98 - 107 mmol/L    Carbon Dioxide (CO2) 15 (L) 22 - 29 mmol/L    Anion Gap 21 (H) 7 - 15 mmol/L    Urea Nitrogen 70.2 (H) 8.0 - 23.0 mg/dL    Creatinine 4.87 (H) 0.67 - 1.17 mg/dL    Calcium 8.5 (L) 8.8 - 10.2 mg/dL    Glucose 171 (H) 70 - 99 mg/dL    GFR Estimate 12 (L) >60 mL/min/1.73m2   CBC with platelets   Result Value Ref Range    WBC Count 10.1 4.0 - 11.0 10e3/uL    RBC Count 3.69 (L) 4.40 - 5.90 10e6/uL    Hemoglobin 10.6 (L) 13.3 - 17.7 g/dL    Hematocrit 31.6 (L) 40.0 - 53.0 %    MCV 86 78 - 100 fL    MCH 28.7 26.5 - 33.0 pg    MCHC 33.5 31.5 - 36.5 g/dL    RDW 12.7 10.0 - 15.0 %    Platelet Count 126 (L) 150 - 450 10e3/uL     *Note: Due to a large number of results and/or encounters for the requested time period, some results have not been displayed. A complete set of results can be found in Results Review.

## 2023-07-06 NOTE — PROGRESS NOTES
Renal Medicine Progress Note            Assessment/Plan:     1.  CKD stage 4              -baseline creatinine 2.5 to 3.0 mg/dl              -eGFR 25 ml/nmin              -presumed progressive diabetic nephropathy with significant proteinuria              -abnormal creatinines back to 2010              -No established outpatient nephrologist, previous attempts to schedule with [facility], referrals 2021 and 2/23.         2.  Oliguric acute kidney injury.  Likely ischemic ATN related to hemodynamics, bradycardia.  Remains oliguric, plan for IR placement and dialysis initiation today.        3. worsening  hyponatremia today, 125  4.  MBD -  Vit D in process.    5  Lewy body dementia  6  DM              -uncontrolled               -A1C > 10% for some time              -no retinopathy 2021  7. HTN:  on amlodipine, imdur and hydralazine and lasix but all being held with nacho cardia, lower range BP's.         Plan/Recs:  1) dialysis catheter placed today, 2.5 hrs dialysis with goal 2kg UF following.   2) stopping Bicitra   3) continue fluid restriction 1200 ml/day   4) second session dialysis tomorrow. Will monitor for any recovery but may need outpatient dialysis for KAY.   - Care coordination should assist in setting up dialysis outpatinet spot in case dialysis dependent still on discharge    - add on hep B core for dialysis unit admission   - 7/6 cxr with no acute disease (TB rule out)    Darian Urrutia DO  [facility]  Office: [phone]  Cell: [phone]        Interval History:      Pt s/p pacemaker yesterday. Remains oliguric. Complaining of being hungry as he is npo. No dyspenea reported.    7/5: no recorded urine output, today 200 ml's. Weight up to 88kg.          Medications and Allergies:       - MEDICATION INSTRUCTIONS for Dialysis Patients -   Does not apply See Admin Instructions     amitriptyline  30 mg Oral At Bedtime     amLODIPine  5 mg Oral BID     aspirin  81 mg Oral Daily      busPIRone  5 mg Oral TID     cefTRIAXone  1 g Intravenous Q24H     fluticasone-vilanterol  1 puff Inhalation Daily    And     umeclidinium  1 puff Inhalation Daily     [Held by provider] furosemide  20 mg Oral BID     gabapentin  300 mg Oral BID     [Held by provider] hydrALAZINE  75 mg Oral TID     insulin aspart  1-10 Units Subcutaneous TID AC     insulin aspart  1-7 Units Subcutaneous At Bedtime     [Held by provider] insulin glargine  20 Units Subcutaneous QAM AC     ipratropium - albuterol 0.5 mg/2.5 mg/3 mL  1 vial Nebulization Q6H     isosorbide mononitrate  60 mg Oral Daily     oxyCODONE-acetaminophen  1 tablet Oral At Bedtime     rosuvastatin  10 mg Oral Daily     senna-docusate  1 tablet Oral BID    Or     senna-docusate  2 tablet Oral BID     sodium chloride (PF)  3 mL Intracatheter Q8H     sodium citrate-citric acid  30 mL Oral BID        Allergies   Allergen Reactions     Adhesive Tape      From blood draw site.     Eszopiclone      Strange dreams. Cooking during sleep      Latex      Lipitor [Atorvastatin Calcium] Other (See Comments)     myalgias     Lisinopril Other (See Comments)     Hyperkalemia and increased creatinine            Physical Exam:   Vitals were reviewed  /62   Pulse 59   Temp 98.4  F (36.9  C) (Axillary)   Resp (!) 9   Wt 88 kg (194 lb 0.1 oz)   SpO2 96%   BMI 29.54 kg/m      Wt Readings from Last 3 Encounters:   07/06/23 88 kg (194 lb 0.1 oz)   06/08/23 85.4 kg (188 lb 3.2 oz)   05/04/23 83.4 kg (183 lb 14.4 oz)       Intake/Output Summary (Last 24 hours) at 7/6/2023 1200  Last data filed at 7/6/2023 0954  Gross per 24 hour   Intake 1126 ml   Output 200 ml   Net 926 ml          GENERAL APPEARANCE:  HEENT:  Eyes/ears/nose/neck grossly normal  RESP: lungs cta b c good efforts  CV: RRR, nl S1/S2, no m/r/g   ABDOMEN: o/s/nt/nd, bs present  EXTREMITIES/SKIN: no c/c/rashes/lesions; , 1-2+ bilateral leg edema         Data:     [facility]  Recent Labs   Lab 07/06/23  0746  07/06/23  0552 07/06/23  0202 07/05/23  2126 07/05/23  0737 07/05/23  0605 07/04/23  1433 07/04/23  1153 07/04/23  0732 07/04/23  0529   NA  --  125*  --   --   --  124*  --  129*  --  131*   POTASSIUM  --  4.7  --   --   --  5.0  --  5.3  --  4.5   CHLORIDE  --  89*  --   --   --  87*  --  92*  --  93*   WANDA  --  8.5*  --   --   --  8.9  --  9.2  --  9.4   CO2  --  15*  --   --   --  14*  --  14*  --  13*   BUN  --  70.2*  --   --   --  61.2*  --  51.2*  --  45.4*   CR  --  4.87*  --   --   --  4.20*  --  2.87*  --  2.54*   * 171* 191* 185*   < > 157*   < > 193*   < > 176*    < > = values in this interval not displayed.     CBC  Recent Labs   Lab 07/06/23  0552 07/04/23  1534 07/02/23  0545 07/01/23  0550   WBC 10.1 9.8 6.2 10.7   HGB 10.6* 11.3* 11.3* 10.7*   HCT 31.6* 33.9* 34.0* 32.5*   MCV 86 87 86 86   * 190 111* 131*     Lab Results   Component Value Date    AST 18 06/29/2023    ALT 13 06/29/2023    GGT 40 03/12/2006    ALKPHOS 93 06/29/2023    BILITOTAL 0.8 06/29/2023    BILICONJ 0.0 04/08/2010     Lab Results   Component Value Date    INR 1.08 10/10/2016       Attestation:  I have reviewed today's vital signs, notes, medications, labs and imaging.    DO [facility]  Office: [phone]  Cell: [phone]

## 2023-07-06 NOTE — PROGRESS NOTES
Care Management Follow Up    Length of Stay (days): 7    Expected Discharge Date: 07/08/2023     Concerns to be Addressed:       Patient plan of care discussed at interdisciplinary rounds: Yes    Anticipated Discharge Disposition: Skilled Nursing Facility, Transitional Care     Anticipated Discharge Services: None  Anticipated Discharge DME: None    Patient/family educated on Medicare website which has current facility and service quality ratings: yes  Education Provided on the Discharge Plan:    Patient/Family in Agreement with the Plan: yes    Referrals Placed by CM/SW:    Private pay costs discussed: Not applicable    Additional Information:  Writer met with patient regarding outpatient dialysis and patient's preference for days of the week and times.  Patient stated he preferred MWF schedule and prefers late afternoon start time as 1st choice and late morning would be his second choice.  Dialysis intake form completed and faxed to [facility].     Call placed to patient's spouse regarding TCU choices and dialysis plan.  Awaiting call back.    3:18 PM, Addendum:    Writer spoke with wife Lilia regarding outpatient diaylsis and TCU placement.  Lilia gave one choice for TCU based on facility being able to provide dialysis - [facility].  Writer was informed that [facility] is currently not accepting dialysis patients.  Lilia states that she or her family is not physically able to transport patient due to his condition.    3:35 PM Addendum:    Writer spoke with Lilia again and she gave the following for choices for TCU: #1 [facility], #2 [facility].  Writer explained that if family is not able to transport patient to dialysis then they will need to pay for transportation.  Lilia expressed understanding and writer left list of transport services in room for Lilia - per her request.    Roselia Fowler RN  Care Coordinator  [facility]  [phone] (text or call)

## 2023-07-06 NOTE — DISCHARGE INSTRUCTIONS
Discharge Instructions for Pacemaker Implantation  You have had a procedure to insert a pacemaker. Once inside your body, this small electronic device helps keep your heart from beating too slowly. A pacemaker can t fix existing heart problems. But it can help you feel better and have more energy. As you recover, follow all of the instructions you are given, including those below.  Activity  Follow the instructions you are given about limiting your activity.  Do not raise your arm on the incision side above shoulder level for 3 weeks. This gives the device lead wires time to attach securely inside your heart.  Ask your doctor when you can expect to return to work.  You can still exercise. It s good for your body and your heart. Talk with your doctor about an exercise plan.  Other Precautions  Follow your doctor's directions carefully for wound care. You may remove the outer dressing in 3 - 4 days. Leave the steri-strips in place; these will be removed at your 1 week follow-up. Never put any creams, lotions, or products like peroxide on an incision unless your doctor tells you to.   You may shower once the outer dressing has been removed.   Before you receive any treatment, tell all health care providers (including your dentist) that you have a pacemaker.  You will be given an ID card that contains information about your pacemaker. Always carry this card with you. You can show this card if your pacemaker sets off a metal detector. You should also show it to avoid screening with a hand-held security wand.  Keep your cell phone away from your pacemaker. Don t carry the phone in your shirt pocket, even when it s turned off.  Avoid strong magnets. Examples are those used in MRIs or in hand-held security wands.  Avoid strong electrical fields. Examples are those made by radio transmitting towers,  ham  radios, and heavy-duty electrical equipment.  Avoid leaning over the open doran of a running car. A running engine creates  an electrical field. Most household and yard appliances will not cause any problems. If you use any large power tools, such as an industrial , talk with your doctor.   Follow-Up  Follow up in the device clinic as scheduled. You have an appointment scheduled for 7/17/23 at 1:00pm. Your appointment is at [facility] in [place], [address] [place] [address], [address], [place] [address].   Make regular follow-up appointments with your device clinic. They will check the pacemaker to make sure it s working properly.  When to Call Your Device Clinic [phone]  Call your doctor immediately if you have any of the following:  Dizziness  Chest pain  Lack of energy  Fainting spells  Twitching chest muscles  Rapid pulse or pounding heartbeat  Shortness of breath  Pain around your pacemaker  Fever above 100.4 F (38 C) or other signs of infection (redness, swelling, drainage, or warmth at the incision site)  Hiccups that won t stop     9895-7779 [employer]. [address] [place] [address]. All rights reserved. This information is not intended as a substitute for professional medical care. Always follow your healthcare professional's instructions.    [facility] in [place]: [phone]  Device Clinic (Monday to Friday, 8am-4pm): [phone]  *The device clinic is closed on weekends and holidays.  Any calls received during this time will be answered on the next business  day. For any urgent questions after hours, please call the main clinic number and you will be put in contact with the cardiologist on call.

## 2023-07-06 NOTE — PLAN OF CARE
Goal Outcome Evaluation:      Plan of Care Reviewed With: patient, spouse      Patient heart rate in 30- 40's, got pacemaker left shoulder this afternoon Niño rate in 60's. Gave pain medications for shoulder neck pain, patient did not void today bladder scanned through the day 170 cc this afternoon.  Need 4 L NC to keep sat's 88-90s. Continue to monitor.

## 2023-07-06 NOTE — CONSULTS
Interventional Radiology - Pre-Procedure Note:  7/6/2023    Procedure Requested: Tunneled HD catheter placement  Requested by: Dr Urrutia    History and Physical Reviewed: H&P documented within 30 days (by Dr Schwartz on 6/29/23).  I have personally reviewed the patient's medical history and have updated the medical record as necessary.    Brief HPI: Hayder Alves is a 68 year old male w h/o COPD, CAD, CKD, HTN, CHF, DM, Afib. Nephrology service following and recommended initiation of HD as renal function has acutely worsened. Underwent pacemaker placement yesterday. C/O feeling tired today. Denies N/V/F/C.      IMAGING:  CXR 7/6/23:    FINDINGS: Cardiac lead(s) noted that project over the cardiac  silhouette. No pneumothorax. Mild bibasilar atelectasis and/or  infiltrate, question some pleural fluid. The cardiac silhouette is  stable. Pulmonary vasculature is unremarkable.                                                                      IMPRESSION: No acute disease.    This result has not been signed. Information might be incomplete.       NPO: YES since [place]  ANTICOAGULANTS: NONE currently  ANTIBIOTICS: Rocephin 1g IV daily, Ancef 2g IV for periprocedural prophylaxis    ALLERGIES  Allergies   Allergen Reactions     Adhesive Tape      From blood draw site.     Eszopiclone      Strange dreams. Cooking during sleep      Latex      Lipitor [Atorvastatin Calcium] Other (See Comments)     myalgias     Lisinopril Other (See Comments)     Hyperkalemia and increased creatinine         LABS:  INR   Date Value Ref Range Status   10/10/2016 1.08 0.86 - 1.14 Final      Hemoglobin   Date Value Ref Range Status   07/06/2023 10.6 (L) 13.3 - 17.7 g/dL Final   02/19/2020 15.4 13.3 - 17.7 g/dL Final   ]  Platelet Count   Date Value Ref Range Status   07/06/2023 126 (L) 150 - 450 10e3/uL Final   02/19/2020 113 (L) 150 - 450 10e9/L Final     Creatinine   Date Value Ref Range Status   07/06/2023 4.87 (H) 0.67 - 1.17 mg/dL  Final   02/10/2021 1.98 (H) 0.66 - 1.25 mg/dL Final     Potassium   Date Value Ref Range Status   07/06/2023 4.7 3.4 - 5.3 mmol/L Final   06/20/2022 4.2 3.4 - 5.3 mmol/L Final   02/10/2021 4.6 3.4 - 5.3 mmol/L Final         EXAM:  BP (!) 151/57 (BP Location: Right arm)   Pulse 59   Temp 99  F (37.2  C) (Axillary)   Resp 22   Wt 88 kg (194 lb 0.1 oz)   SpO2 91%   BMI 29.54 kg/m    General:  Stable.  In no acute distress.    Neuro:  A&O x 3. Moves all extremities equally.  Heart: RRR  Lungs: No increased work of breathing  Chest: Pacemaker left chest    Pre-Sedation Code Status Assessment:  Code Status: Full Code intra procedure, per discussion with patient.       ASSESSMENT/PLAN:   KAY on CKD requiring HD  Afib with heart block s/p pacemaker placement 7/5/23    -RIJ tunneled HD catheter placement today with moderate sedation  -D/W Dr Urrutia who is in agreement with plan    Procedure, risks/benefits, details, alternatives, and sedation reviewed with patient and patient verbalized understanding. All questions answered. OK to proceed with above radiology procedure.     Bc He PA-C  Interventional Radiology  *[id number] [phone]      Total Time: 40 minutes

## 2023-07-07 PROBLEM — N17.0 ACUTE KIDNEY FAILURE WITH TUBULAR NECROSIS (H): Status: ACTIVE | Noted: 2023-01-01

## 2023-07-07 NOTE — PLAN OF CARE
Pt  Alert, forgetful, pleasant, disoriented to time/place. On 4L O2 via NC, Denies SOB but endorses CORNELIUS. Small episode of epistaxis overnight that resolved with light pressure. Tele 100% V-paced. Tolerating renal, Mod CHO diet with 1200 mL F.R. Low urine output, pt on hemodialysis for KAY. Up with Ax1 GB+W. Denies pain. PPM site bruised, also R shoulder bruised from dialysis access port insertion. Plan for dialysis today. Discharge to TCU pending placement.

## 2023-07-07 NOTE — TELEPHONE ENCOUNTER
"Last Written Prescription Date:  6/8/23  Last Fill Quantity: 90,  # refills: 0   Last office visit provider:  3/28/23     Requested Prescriptions   Pending Prescriptions Disp Refills     busPIRone (BUSPAR) 5 MG tablet 90 tablet 0     Sig: Take 1 tablet (5 mg) by mouth 3 times daily       Atypical Antidepressants Protocol Passed - 7/6/2023 10:47 AM        Passed - Recent (12 mo) or future (30 days) visit within the authorizing provider's specialty     Patient has had an office visit with the authorizing provider or a provider within the authorizing providers department within the previous 12 mos or has a future within next 30 days. See \"Patient Info\" tab in inbasket, or \"Choose Columns\" in Meds & Orders section of the refill encounter.              Passed - Medication active on med list        Passed - Patient is age 18 or older             Selma Pate RN 07/07/23 12:47 PM  "

## 2023-07-07 NOTE — PLAN OF CARE
Goal Outcome Evaluation:       Patient alert but forgetful. SBA. Able to move self in bed. Has cough with occ sputum, 2 blood tinged. Had recent bloody nose. On 2 L n/c 90%, some CORNELIUS.Empiric Rocephin  LE edema 2+, Right dialysis site ecchymotic, no change, left pacemaker site wnl. 100 % V paced. TCU at discharge.

## 2023-07-07 NOTE — PROGRESS NOTES
First 2 runs of dialysis faxed to [facility].  3rd run will need to be faxed once completed.    Roselia Fowler RN  Care Coordinator  [facility]  [phone] (text or call)

## 2023-07-07 NOTE — PROGRESS NOTES
Disoriented and forgetful, very pleasant. VSS on 3L O2. Tele V paced. C/o neck pain. Up A1 w/ walker. Plan for dialysis tomorrow.

## 2023-07-07 NOTE — PROGRESS NOTES
"BRIEF NUTRITION RE-ASSESSMENT      NUTRITION HISTORY:  - DX: Acute hypoxic respiratory failure likely due to acute on chronic systolic heart failure and exacerbation    - Per H&P, h/o uncontrolled diabetes w/ family reported medication non-compliance, CKD stage 4   - Oliguric KAY, started dialysis 7/6    CURRENT DIET AND INTAKE:  Diet:  Renal (dialysis), Moderate Consistent Carb (60g CHO), 1200 mL fluid restriction    Information obtained from medical record:   - 50%-100% intake over past few days, appetite reported as \"good\"  - Last BM 7/3, 50mL urine on 7/7    Information obtained from patient:   -  Eating 100% of two meals per day  - Not usually hunger for a third meal because he is not very active at the moment    ANTHROPOMETRICS:  Height: 5' 8\" (172.6 cm)   Weight:  195 lbs 8 oz (88.7 kg)  Body mass index is 29.77 kg/m .   Weight Status: Overweight BMI 25-29.9  IBW:  70 kg  %IBW: 127%  ABW: 74.7 kg  Weight History: no suspected wt loss in the past year - current wt up with fluid. Unsure typical dry wt     Wt Readings from Last 10 Encounters:   07/07/23 88.7 kg (195 lb 8 oz)   06/08/23 85.4 kg (188 lb 3.2 oz)   05/04/23 83.4 kg (183 lb 14.4 oz)   05/02/23 82.8 kg (182 lb 9.6 oz)   03/28/23 84.8 kg (187 lb)   02/01/23 88.5 kg (195 lb)   12/19/22 89.9 kg (198 lb 3.2 oz)   12/05/22 87.6 kg (193 lb 3.2 oz)   11/28/22 84.4 kg (186 lb)   06/20/22 88 kg (194 lb)     Weights during current admission  Date/Time Weight Weight Method   07/07/23 0633 88.7 kg (195 lb 8 oz) Standing scale   07/06/23 0707 88 kg (194 lb 0.1 oz) Standing scale   07/05/23 0500 85.9 kg (189 lb 6 oz) Bed scale   07/04/23 0525 85.5 kg (188 lb 8.7 oz) Standing scale   07/03/23 0214 86.6 kg (191 lb) Standing scale   07/02/23 0625 86.5 kg (190 lb 11.2 oz) Standing scale   07/01/23 0600 87.2 kg (192 lb 3.2 oz) --   06/29/23 1715 89.9 kg (198 lb 3.1 oz)        LABS:  Sodium 128 (L), UN 51.8 (H), Creatinine 3.81 (H), GFR Estimate 16 (L), Calcium 8.4 (L), " Glucose 210 (H)    Lab Results   Component Value Date     A1C 10.3 06/29/2023     A1C 12.0 03/28/2023     A1C 12.4 12/05/2022     A1C 11.3 06/20/2022     A1C 7.1 01/18/2021     A1C 6.9 02/19/2020     A1C 9.0 08/08/2019     A1C 8.5 02/27/2019     A1C 11.3 12/13/2018     Recent Labs   Lab 07/07/23  0733 07/07/23  0607 07/06/23  2102 07/06/23  1811 07/06/23  1720 07/06/23  1323   * 210* 200* 294* 314* 180*         MALNUTRITION:  Visual Nutrition Focused Physical Assessment (NFPA) not completed. Do not suspect muscle/fat losses.  Patient does not meet two of the following criteria necessary for diagnosing malnutrition.     % Weight Loss:  None noted  % Intake:  No decreased intake noted  Subcutaneous Fat Loss:  None observed  Muscle Loss:  None observed  Fluid Retention:  None noted  2+ to 3+ edema per medical chart    NUTRITION INTERVENTION:  Previous Nutrition Diagnosis:  Altered nutrition related lab value related to uncontrolled diabetes with medication non compliance as evidenced by BG >200 and A1c >10     Status: Continues    Current Nutrition Diagnosis:  Altered nutrition related lab value related to uncontrolled diabetes with medication non compliance as evidenced by BG >200 and A1c >10     Food and nutrition related knowledge deficit related to no prior education on renal diet as evidenced by pt and wife acknowledging that being aware of sodium, phosphorus, and potassium levels in food was new to them.     Implementation:  Nutrition Education:     Assessed learning needs, learning preferences, and willingness to learn    Nutrition Education (Content):  a) Provided handouts: Tips for a Low-Sodium Diet, Low Sodium Food and Drinks, Controlling Your Potassium Intake, Managing Your Phosphorus Intake, Carbohydrate Counting  b) Discussed will need to monitor sodium, phosphorus, and potassium intake due to CKD and reviewed carbohydrate counting    Nutrition Education (Application):  a) Discussed eating habits  and recommended alternative food choices    Patient and patient's wife verbalizes understanding of diet by asking for additional information on foods containing phosphorus and potassium, and patient asked about carbohydrate content of foods such as corn and ice cream.     Anticipate good compliance    Diet Education - refer to Education Flowsheet    FOLLOW UP/MONITORING:   Will re-evaluate in 7 - 10 days, or sooner, if re-consulted.

## 2023-07-07 NOTE — PLAN OF CARE
Goal Outcome Evaluation:      Plan of Care Reviewed With: patient, spouse      Patient is up in the room with walker and stand by assist, tolerated lunch, pain continue neck shoulder, gave Tylenol. VSS, need 2 to 4L NC. Wife is at bedside. Patient had dialysis this morning.

## 2023-07-07 NOTE — PROGRESS NOTES
Renal Medicine Progress Note            Assessment/Plan:     1.  CKD stage 4              -baseline creatinine 2.5 to 3.0 mg/dl              -eGFR 25 ml/nmin              -presumed progressive diabetic nephropathy with significant proteinuria              -abnormal creatinines back to 2010              -No established outpatient nephrologist, previous attempts to schedule with [facility], referrals 2021 and 2/23.         2.  Oliguric acute kidney injury.  Likely ischemic ATN related to hemodynamics, bradycardia.  Remains oliguric, s/p CVC placement and HD yesterday. Remains oliguric, 50cc out today.        3. Hyponaatremia, 128 today, improved with HD. Will contiinue fluid restriction until normal rnge.   4.  MBD -  Vit D in process.    5  Lewy body dementia  6  DM              -uncontrolled               -A1C > 10% for some time              -no retinopathy 2021  7. HTN:  on [id number] BID and imdur 60mg daily. BP's may improve with HD and volume removal.         Plan/Recs:  1) HD #2 today  2) additional 2kg UF goal  3) Care coordination should assist in setting up dialysis outpatinet spot in case dialysis dependent still on discharge  4) addess for HD 7/8    DO [facility]  Office: [phone]  Cell: [phone]        Interval History:     Pt with back pains, aches. Some discomfort and bruising with CVC. Seen on diaysis. BP's 130-160. 400 ml urine total yesterday.          Medications and Allergies:       - MEDICATION INSTRUCTIONS for Dialysis Patients -   Does not apply See Admin Instructions     sodium chloride 0.9%  250 mL Intravenous Once in dialysis/CRRT     sodium chloride 0.9%  300 mL Hemodialysis Machine Once     amitriptyline  30 mg Oral At Bedtime     amLODIPine  5 mg Oral BID     aspirin  81 mg Oral Daily     busPIRone  5 mg Oral TID     cefTRIAXone  1 g Intravenous Q24H     fluticasone-vilanterol  1 puff Inhalation Daily    And     umeclidinium  1 puff  Inhalation Daily     [Held by provider] furosemide  20 mg Oral BID     gabapentin  300 mg Oral BID     [Held by provider] hydrALAZINE  75 mg Oral TID     insulin aspart  1-10 Units Subcutaneous TID AC     insulin aspart  1-7 Units Subcutaneous At Bedtime     [Held by provider] insulin glargine  20 Units Subcutaneous QAM AC     ipratropium - albuterol 0.5 mg/2.5 mg/3 mL  1 vial Nebulization Q6H     isosorbide mononitrate  60 mg Oral Daily     - MEDICATION INSTRUCTIONS -   Does not apply Once     oxyCODONE-acetaminophen  1 tablet Oral At Bedtime     rosuvastatin  10 mg Oral Daily     senna-docusate  1 tablet Oral BID    Or     senna-docusate  2 tablet Oral BID     sodium chloride (PF)  3 mL Intracatheter Q8H        Allergies   Allergen Reactions     Adhesive Tape      From blood draw site.     Eszopiclone      Strange dreams. Cooking during sleep      Latex      Lipitor [Atorvastatin Calcium] Other (See Comments)     myalgias     Lisinopril Other (See Comments)     Hyperkalemia and increased creatinine            Physical Exam:   Vitals were reviewed  BP (!) 152/65   Pulse 63   Temp 97.5  F (36.4  C) (Oral)   Resp 18   Wt 88.7 kg (195 lb 8 oz)   SpO2 91%   BMI 29.77 kg/m      Wt Readings from Last 3 Encounters:   07/07/23 88.7 kg (195 lb 8 oz)   06/08/23 85.4 kg (188 lb 3.2 oz)   05/04/23 83.4 kg (183 lb 14.4 oz)       Intake/Output Summary (Last 24 hours) at 7/7/2023 0853  Last data filed at 7/7/2023 0600  Gross per 24 hour   Intake 1503 ml   Output 2250 ml   Net -747 ml       GENERAL APPEARANCE: alert, oriented  HEENT:  Eyes/ears/nose/neck grossly normal  RESP: lungs cta b c good efforts ant/lat  CV: RRR, nl S1/S2, no m/r/g   ABDOMEN: o/s/nt/nd, bs present  EXTREMITIES/SKIN: no c/c/rashes/lesions; , 2+ bilateral leg edema           Data:     BMP  Recent Labs   Lab 07/07/23  0733 07/07/23  0607 07/06/23  2102 07/06/23  1811 07/06/23  0746 07/06/23  0552 07/05/23  0737 07/05/23  0605 07/04/23  1433  07/04/23  1153   NA  --  128*  --   --   --  125*  --  124*  --  129*   POTASSIUM  --  4.3  --   --   --  4.7  --  5.0  --  5.3   CHLORIDE  --  89*  --   --   --  89*  --  87*  --  92*   WANDA  --  8.4*  --   --   --  8.5*  --  8.9  --  9.2   CO2  --  19*  --   --   --  15*  --  14*  --  14*   BUN  --  51.8*  --   --   --  70.2*  --  61.2*  --  51.2*   CR  --  3.81*  --   --   --  4.87*  --  4.20*  --  2.87*   * 210* 200* 294*   < > 171*   < > 157*   < > 193*    < > = values in this interval not displayed.     CBC  Recent Labs   Lab 07/07/23  0607 07/06/23  0552 07/04/23  1534 07/02/23  0545   WBC 8.2 10.1 9.8 6.2   HGB 10.8* 10.6* 11.3* 11.3*   HCT 33.5* 31.6* 33.9* 34.0*   MCV 87 86 87 86   * 126* 190 111*     Lab Results   Component Value Date    AST 18 06/29/2023    ALT 13 06/29/2023    GGT 40 03/12/2006    ALKPHOS 93 06/29/2023    BILITOTAL 0.8 06/29/2023    BILICONJ 0.0 04/08/2010     Lab Results   Component Value Date    INR 1.08 10/10/2016       Attestation:  I have reviewed today's vital signs, notes, medications, labs and imaging.    DO [facility] - [facility]phrology  Office: [phone]  Cell: [phone]

## 2023-07-07 NOTE — PROGRESS NOTES
Care Management Follow Up    Length of Stay (days): 8    Expected Discharge Date: 07/08/2023     Concerns to be Addressed:     Discharge planning  Patient plan of care discussed at interdisciplinary rounds: Yes    Anticipated Discharge Disposition: Skilled Nursing Facility, Transitional Care     Anticipated Discharge Services: therapy  Anticipated Discharge DME: None    Patient/family educated on Medicare website which has current facility and service quality ratings: yes  Education Provided on the Discharge Plan:  yes  Patient/Family in Agreement with the Plan: yes    Referrals Placed by CM/SW:  TCU referrals  Private pay costs discussed: Not applicable    Additional Information:  Message sent to [facility] to follow up on the referral from yesterday.  Voicemail also left to follow up on the referral.  Awaiting a return call.  Patient has [facility] for insurance so he will need pre-auth prior to discharge.    Will continue to follow.      FRANK Celis, [facility]    [phone]  [facility]

## 2023-07-07 NOTE — PROGRESS NOTES
[facility]    Medicine Progress Note - Hospitalist Service    Date of Admission:  6/29/2023    Assessment & Plan     Hayder Alves is a 68 year old male with a very complicated  medical history significant for recently diagnosed Lewy Body Dementia, chronic neck pain (s/p spinal fusion), COPD (not on home oxygen), CAD (s/p CABG 2001), CKD stage IV, hypertension, dyslipidemia, systolic CHF, diabetes mellitus, medication noncompliance, h/o atrial fibrillation (s/p cardioversion 2020; not currently on anticoagulation) presented to ED with shortness of breath and left arm pain.      Acute hypoxic respiratory failure likely due to acute on chronic systolic heart failure and exacerbation  NSTEMI likely due to above  History of coronary artery disease status CABG  new atrial flutter, likely paroxysmal atrial fibrillation h/o cardioversion in 2020, not currently on anticoagulation)  Severe bradycardia/complete heart block s/p PPM on 7/5/2023  Hypertension  Dyslipidemia  Moderate pulmonary hypertension-new  -Initially on admission presented with chief complaint of shortness of breath and chest discomfort.  Patient initially mentioned he had left arm pain and then mentioned that he had chest discomfort.  He was not able to provide good history.  -On presentation to the ER his heart rate was in 30s, EKG showed atrial flutter proBNP was [place] at [address] and troponin was elevated at 118  -Last echo in June 2020 showed EF of 55 to 60%, no wall motion changes, mild dilated left atrium and trace MR and TR  -Chest x-ray was done in the ED which was concerning for volume overload and patient was given 60 IV Lasix in the ED and cardiology was consulted in the ED with interventions and he was started on heparin drip  -He had echo done in June 2022 and at that time his EF was 55 to 60%, no wall motion changes, mildly dilated left atrium, normal RV ventricular size and systolic function and  mildly dilated atrium  -Duplex of the lower extremity did not show any evidence of DVT  -Troponin peaked at 118 and have trended down to 103  -Echo on 7/1/23  which shows EF of 50 to 55%, grade 2 or moderate diastolic dysfunction, mild inferior lateral wall hypokinesis, mild biatrial enlargement, RV systolic function is borderline reduced, RVSP is elevated consistent with moderate pulmonary hypertension, mild aortic Valvular stenosis  -Given his KAY on CKD 4 cardiac catheterization was was not considered as it we will put him on dialysis during initial stay in hospital  -s/p PPM placement on 7/5/2023 and PPM check per   -Of note initially on presentation to the hospital he was requiring oxygen which improved after diuresis and later diuretics were held as he had worsening renal fucntion  And  he developed heart block and after that he has been needing oxygen with worsening edema and was on 4 L yesterday and had another run of dialysis today and was on room air when I saw him but was later requiring 2 L and continues to have significant edema over lower extremities  -Blood pressure is stable for now and will continue the patient with amlodipine 5 twice daily along with Imdur and if the blood pressure continues to be high then hydralazine can be restarted      Elevated D-dimer  -Patient had elevated D-dimer of 1 on admission and he has history of COPD  -Patient does have CKD stage IV and cannot have CTA chest  -On admission he was started on high intensity heparin drip  -Duplex of the lower extremity does not show any evidence of DVT  -VQ scan has been ordered and does not show any evidence of PE  -Echo has been ordered results as above      History of COPD  Shortness of breath  -not on home oxygen;  and on presentation was noted 85% on room air; on 3 to 4 L oxygen  -Patient did get IV Solu-Medrol in the ER along with DuoNebs and his PTA inhalers were started  -Chest x-ray done on admission showed pulmonary vascular  congestion with likely mild to moderate pulmonary edema and small bilateral pleural effusions with compressive atelectasis, right larger than left with no pneumothorax  -7/4-chest x-ray was done which showed that vascular congestion has improved  and opacity over the right lung has improved but there is persistent opacity  -His white count was normal but given his oxygen requirement and elevated procalcitonin at 0.26  And he was started on empiric ceftriaxone and will finish five days today and stop tommorow  -Continue with as needed DuoNebs  - COPD team consulted and patient will need nebulization machine on discharge     Uncontrolled diabetes  -family reports medication noncompliance  -his insulin regimen was recently adjusted and switched from NPH to Lantus (30 units daily) and NovoLog 10 units TID with meals which he has not started as per discussion with the wife he is noncompliant  -Patient told me that he is only taking 20 of insulin and I do not think he is compliant with his insulin at all  -Initially during hospital stay patient was started on insulin drip which was transitioned to basal and bolus but his blood sugars were fluctuating and Lantus was held also because of his n.p.o. status due to pacemaker placement.  -His blood sugar started to increase again and I will restart him back on Lantus 10 units and continue with aspart and sliding scale insulin and uptitrate the dose of insulin based on his levels      Acute kidney injury-initially resolved and now has oliguric KAY on CKD stage IV  Status placement of tunneled catheter on 7/5/2023 and initiated on hemodialysis on 7/5   Pseudo hyponatremia-resolved  Hyponatremia  Anion gap metabolic acidosis likely due to renal failure  Hypokalemia-resolved  -He does have CKD 3-4 at baseline and was noncompliant with medicines and follow-ups and during the course of hospital stay he developed oliguric renal failure and nephrology was following the patient and he  had tunneled catheter placed on 7/5 and was initiated on hemodialysis  -Patient had another run of hemodialysis today and 2 L of fluid was removed-  -he is making about 400 mL of urine   - Appreciate nephrology input      Chronic neck pain, history of spinal fusion  -will continue with his PTA Percocet and continue PTA Neurontin     Recently diagnosed Lewy Body Dementia  -noted  -follows neurology as outpatient    Chronic thrombocytopenia  -He does seem to have chronic thrombocytopenia and his platelet level fluctuates and has no evidence of bleeding and was 124 today     Physical deconditioning  -We will consult physical therapy and Occupational Therapy and I reviewed the note from physical therapy and he will benefit from TCU stay       Diet: Fluid restriction 1200 ML FLUID  Combination Diet Renal Diet (dialysis); Moderate Consistent Carb (60 g CHO per Meal) Diet  Room Service    DVT Prophylaxis: Heparin drip  Gardner Catheter: Not present  Lines: None     Cardiac Monitoring: ACTIVE order. Indication: Post- EP procedure (48 hours)  Code Status: Full Code      Clinically Significant Risk Factors         # Hyponatremia: Lowest Na = 125 mmol/L in last 2 days, will monitor as appropriate     # Anion Gap Metabolic Acidosis: Highest Anion Gap = 21 mmol/L in last 2 days, will monitor and treat as appropriate    # Thrombocytopenia: Lowest platelets = 124 in last 2 days, will monitor for bleeding   # Hypertension: Noted on problem list       # DMII: A1C = 10.3 % (Ref range: <5.7 %) within past 6 months            Disposition Plan      Expected Discharge Date: 07/08/2023    Discharge Delays: Placement - TCU  Destination: other (comment) (TCU)  Discharge Comments: ppm 7/5,  7/6 to get dialysis cath  and receive dialysis today          Jose Bueno MD  Hospitalist Service  [facility]  Securely message with [facility] (more info)  Text page via [employer] Paging/Directory   _  I am off from service in am and  "hospital medicine team to follow   _____________________________________________________________________    Interval History     Seen today and he was on room air and was eating his lunch and felt good but continues to have edema of her lower extremity.  His breathing is better.  His wife was present and the care coordinator team is in touch with the wife.  He will need to go to TCU when the care coordinator team is able to find dialysis bed for him.    Discussed plan of care with dialysis nurse, patient and care coordinator    Physical Exam     Vital signs:  Temp: 98.4  F (36.9  C) Temp src: Oral BP: (!) 142/66 Pulse: 59   Resp: 12 SpO2: 100 % O2 Device: Nasal cannula Oxygen Delivery: 4 LPM   Weight: 88.7 kg (195 lb 8 oz)  Estimated body mass index is 29.77 kg/m  as calculated from the following:    Height as of 5/4/23: 1.726 m (5' 7.95\").    Weight as of this encounter: 88.7 kg (195 lb 8 oz).          General: AOX2-3  Respiratory: He was on 4 L of oxygen and does not have any significant wheeze and has  crackles and he has a right chest tunneled catheter  Cardiovascular: Regular rate , S1 and S2 normal with no murmer or rubs or gallops and bilateral edema  Abdomen:   soft , non tender ,   Neurologic:  No facial droop  Musculoskeletal: Normal Range of motion over upper and lower extremities bilaterally   Psychiatric: cooperative     Medical Decision Making           Data     I have personally reviewed the following data over the past 24 hrs:    8.2  \   10.8 (L)   / 124 (L)     128 (L) 89 (L) 51.8 (H) /  234 (H)   4.3 19 (L) 3.81 (H) \       Imaging results reviewed over the past 24 hrs:   No results found for this or any previous visit (from the past 24 hour(s)).  "

## 2023-07-07 NOTE — PROGRESS NOTES
Potassium   Date Value Ref Range Status   07/07/2023 4.3 3.4 - 5.3 mmol/L Final   06/20/2022 4.2 3.4 - 5.3 mmol/L Final   02/10/2021 4.6 3.4 - 5.3 mmol/L Final     Hemoglobin   Date Value Ref Range Status   07/07/2023 10.8 (L) 13.3 - 17.7 g/dL Final   02/19/2020 15.4 13.3 - 17.7 g/dL Final     Creatinine   Date Value Ref Range Status   07/07/2023 3.81 (H) 0.67 - 1.17 mg/dL Final   02/10/2021 1.98 (H) 0.66 - 1.25 mg/dL Final     Urea Nitrogen   Date Value Ref Range Status   07/07/2023 51.8 (H) 8.0 - 23.0 mg/dL Final   06/20/2022 24 7 - 30 mg/dL Final   02/10/2021 27 7 - 30 mg/dL Final     Sodium   Date Value Ref Range Status   07/07/2023 128 (L) 136 - 145 mmol/L Final   02/10/2021 137 133 - 144 mmol/L Final     INR   Date Value Ref Range Status   10/10/2016 1.08 0.86 - 1.14 Final       DIALYSIS PROCEDURE NOTE  Hepatitis status of previous patient on machine log was checked and verified ok to use with this patients hepatitis status.  Patient dialyzed for 2.5 hrs. on a K2 bath with a net fluid removal of  2L.  A BFR of 300 ml/min was obtained via a CVC.      The treatment plan was discussed with Dr. Urrutia during the treatment.    Total heparin received during the treatment: 0 units.   Line flushed, clamped and capped with heparin 1:1000 1.9 mL (1900 units) per lumen    Meds  given: none   Complications: none      Person educated: patient. Knowledge base minimal. Barriers to learning: confused/forgetful. Educated on procedure via verbal mode. Patient/family verbalized understanding. Pt prefers verbal education style.     ICEBOAT? Timeout performed pre-treatment  I: Patient was identified using 2 identifiers  C:  Consent Signed Yes  E: Equipment preventative maintenance is current and dialysis delivery system OK to use  B: Hepatitis B Surface Antigen: Neg; Draw Date: 7/6/23      Hepatitis B Surface Antibody: Seema; Draw Date: 7/6/23  O: Dialysis orders present and complete prior to treatment  A: Vascular access verified  and assessed prior to treatment  T: Treatment was performed at a clinically appropriate time  ?: Patient was allowed to ask questions and address concerns prior to treatment  See Adult Hemodialysis flowsheet in EPIC for further details and post assessment.  Machine water alarm in place and functioning. Transducer pods intact and checked every 15min.   Pt returned via bed.  Chlorine/Chloramine water system checked every 4 hours.  Outpatient Dialysis at [facility]    Patient repositioned every 2 hours during the treatment.  Post treatment report given to GASPER Brown regarding 2L of fluid removed, last BP of 137/60, and patient pain rating of 0/10.

## 2023-07-08 NOTE — PROGRESS NOTES
Renal Medicine Progress Note            Assessment/Plan:     1.  CKD stage 4              -baseline creatinine 2.5 to 3.0 mg/dl              -eGFR 25 ml/nmin              -presumed progressive diabetic nephropathy with significant proteinuria              -abnormal creatinines back to 2010              -No established outpatient nephrologist, previous attempts to schedule with [facility], referrals 2021 and 2/23.         2.  Oliguric acute kidney injury.  Likely ischemic ATN related to hemodynamics, bradycardia.  Remains oliguric, s/p 2 runs HD.     3. Hyponatremia, 127 today, will run on 140 sodium bath to help improve further.  4.  MBD - , Vit D 21  5  Lewy body dementia  6  DM              -uncontrolled               -A1C > 10% for some time              -no retinopathy 2021  7. HTN:  on [id number] BID and imdur 60mg daily. BP's 130's this am and still edematous.         Plan/Recs:  1) HD #3 today  2) additional 2-3kg UF goal  3) Care coordination should assist in setting up dialysis outpatinet spot in case dialysis dependent still on discharge      Darian Urrutia DO  [facility]  Office: [phone]  Cell: [phone]        Interval History:     S/p HD yesterday, 2.4kg UF done. Weight unchanged at 89kg. Notable +333 ml for 7/7.   7/7 only 300 ml urine recorded, none today.      Pt states he was yelled at last night, did not sleep well. No dyspnea but remains on oxygen.          Medications and Allergies:       - MEDICATION INSTRUCTIONS for Dialysis Patients -   Does not apply See Admin Instructions     amitriptyline  30 mg Oral At Bedtime     amLODIPine  5 mg Oral BID     aspirin  81 mg Oral Daily     busPIRone  5 mg Oral TID     cefTRIAXone  1 g Intravenous Q24H     fluticasone-vilanterol  1 puff Inhalation Daily    And     umeclidinium  1 puff Inhalation Daily     [Held by provider] furosemide  20 mg Oral BID     gabapentin  300 mg Oral BID     [Held by provider] hydrALAZINE  75 mg  Oral TID     insulin aspart  1-10 Units Subcutaneous TID AC     insulin aspart  1-7 Units Subcutaneous At Bedtime     insulin glargine  10 Units Subcutaneous QAM AC     ipratropium - albuterol 0.5 mg/2.5 mg/3 mL  1 vial Nebulization Q6H     isosorbide mononitrate  60 mg Oral Daily     oxyCODONE-acetaminophen  1 tablet Oral At Bedtime     rosuvastatin  10 mg Oral Daily     senna-docusate  1 tablet Oral BID    Or     senna-docusate  2 tablet Oral BID     sodium chloride (PF)  3 mL Intracatheter Q8H        Allergies   Allergen Reactions     Adhesive Tape      From blood draw site.     Eszopiclone      Strange dreams. Cooking during sleep      Latex      Lipitor [Atorvastatin Calcium] Other (See Comments)     myalgias     Lisinopril Other (See Comments)     Hyperkalemia and increased creatinine            Physical Exam:   Vitals were reviewed  BP (!) 141/63   Pulse 59   Temp 98.5  F (36.9  C) (Oral)   Resp 18   Wt 89 kg (196 lb 1.6 oz)   SpO2 94%   BMI 29.86 kg/m      Wt Readings from Last 3 Encounters:   07/08/23 89 kg (196 lb 1.6 oz)   06/08/23 85.4 kg (188 lb 3.2 oz)   05/04/23 83.4 kg (183 lb 14.4 oz)       Intake/Output Summary (Last 24 hours) at 7/8/2023 0905  Last data filed at 7/7/2023 2200  Gross per 24 hour   Intake 3023 ml   Output 2700 ml   Net 323 ml          GENERAL APPEARANCE: alert, oriented  HEENT:  Eyes/ears/nose/neck grossly normal  RESP: lungs cta b c good efforts ant/lat  CV: RRR, nl S1/S2, no m/r/g   ABDOMEN: o/s/nt/nd, bs present  EXTREMITIES/SKIN: no c/c/rashes/lesions; , 2+ bilateral leg edema           Data:     BMP  Recent Labs   Lab 07/08/23  0729 07/08/23  0544 07/08/23  0208 07/07/23  2109 07/07/23  0733 07/07/23  0607 07/06/23  0746 07/06/23  0552 07/05/23  0737 07/05/23  0605   NA  --  127*  --   --   --  128*  --  125*  --  124*   POTASSIUM  --  3.9  --   --   --  4.3  --  4.7  --  5.0   CHLORIDE  --  90*  --   --   --  89*  --  89*  --  87*   WANDA  --  8.0*  --   --   --  8.4*  --   8.5*  --  8.9   CO2  --  21*  --   --   --  19*  --  15*  --  14*   BUN  --  35.8*  --   --   --  51.8*  --  70.2*  --  61.2*   CR  --  3.04*  --   --   --  3.81*  --  4.87*  --  4.20*   * 231* 279* 129*   < > 210*   < > 171*   < > 157*    < > = values in this interval not displayed.     CBC  Recent Labs   Lab 07/07/23  0607 07/06/23  0552 07/04/23  1534 07/02/23  0545   WBC 8.2 10.1 9.8 6.2   HGB 10.8* 10.6* 11.3* 11.3*   HCT 33.5* 31.6* 33.9* 34.0*   MCV 87 86 87 86   * 126* 190 111*     Lab Results   Component Value Date    AST 18 06/29/2023    ALT 13 06/29/2023    GGT 40 03/12/2006    ALKPHOS 93 06/29/2023    BILITOTAL 0.8 06/29/2023    BILICONJ 0.0 04/08/2010     Lab Results   Component Value Date    INR 1.08 10/10/2016       Attestation:  I have reviewed today's vital signs, notes, medications, labs and imaging.    DO [facility]  Office: [phone]  Cell: [phone]

## 2023-07-08 NOTE — PROGRESS NOTES
[facility]    Medicine Progress Note - Hospitalist Service    Date of Admission:  6/29/2023    Assessment & Plan     Hayder Alves is a 68 year old male with a very complicated  medical history significant for recently diagnosed Lewy Body Dementia, chronic neck pain (s/p spinal fusion), COPD (not on home oxygen), CAD (s/p CABG 2001), CKD stage IV, hypertension, dyslipidemia, systolic CHF, diabetes mellitus, medication noncompliance, h/o atrial fibrillation (s/p cardioversion 2020; not currently on anticoagulation) presented to ED with shortness of breath and left arm pain.      Acute hypoxic respiratory failure likely due to acute on chronic systolic heart failure and exacerbation  NSTEMI likely due to above  History of coronary artery disease status CABG  new atrial flutter, likely paroxysmal atrial fibrillation h/o cardioversion in 2020, not currently on anticoagulation)  Severe bradycardia/complete heart block s/p PPM on 7/5/2023  Hypertension  Dyslipidemia  Moderate pulmonary hypertension-new  -Initially on admission presented with chief complaint of shortness of breath and chest discomfort.  Patient initially mentioned he had left arm pain and then mentioned that he had chest discomfort.  He was not able to provide good history.  -On presentation to the ER his heart rate was in 30s, EKG showed atrial flutter proBNP was significantly elevated at 01066 and troponin was elevated at 118  -Last echo in June 2020 showed EF of 55 to 60%, no wall motion changes, mild dilated left atrium and trace MR and TR  -Chest x-ray was done in the ED which was concerning for volume overload and patient was given 60 IV Lasix in the ED and cardiology was consulted in the ED with interventions and he was started on heparin drip  -He had echo done in June 2022 and at that time his EF was 55 to 60%, no wall motion changes, mildly dilated left atrium, normal RV ventricular size and systolic function and  mildly dilated atrium  -Duplex of the lower extremity did not show any evidence of DVT  -Troponin peaked at 118 and have trended down to 103  -Echo on 7/1/23  which shows EF of 50 to 55%, grade 2 or moderate diastolic dysfunction, mild inferior lateral wall hypokinesis, mild biatrial enlargement, RV systolic function is borderline reduced, RVSP is elevated consistent with moderate pulmonary hypertension, mild aortic Valvular stenosis  -Given his KAY on CKD 4 cardiac catheterization was was not considered as it we will put him on dialysis during initial stay in hospital  -s/p PPM placement on 7/5/2023 and PPM check per   -Of note initially on presentation to the hospital he was requiring oxygen which improved after diuresis and later diuretics were held as he had worsening renal fucntion  And  he developed heart block and after that he has been needing oxygen with worsening edema and was on 4 L yesterday and had another run of dialysis today and was on room air when I saw him but was later requiring 2 L and continues to have significant edema over lower extremities  -Blood pressure is stable for now and will continue the patient with amlodipine 5 twice daily along with Imdur and if the blood pressure continues to be high then hydralazine can be restarted      Elevated D-dimer  -Patient had elevated D-dimer of 1 on admission and he has history of COPD  -Patient does have CKD stage IV and cannot have CTA chest  -On admission he was started on high intensity heparin drip  -Duplex of the lower extremity does not show any evidence of DVT  -VQ scan has been ordered and does not show any evidence of PE  -Echo has been ordered results as above      History of COPD  Shortness of breath  -not on home oxygen;  and on presentation was noted 85% on room air; on 3 to 4 L oxygen  -Patient did get IV Solu-Medrol in the ER along with DuoNebs and his PTA inhalers were started  -Chest x-ray done on admission showed pulmonary vascular  congestion with likely mild to moderate pulmonary edema and small bilateral pleural effusions with compressive atelectasis, right larger than left with no pneumothorax  -7/4-chest x-ray was done which showed that vascular congestion has improved  and opacity over the right lung has improved but there is persistent opacity  -His white count was normal but given his oxygen requirement and elevated procalcitonin at 0.26  And he was started on empiric ceftriaxone and will finish five days today and stop tommorow  -Continue with as needed DuoNebs  - COPD team consulted and patient will need nebulization machine on discharge     Uncontrolled diabetes  -family reports medication noncompliance  -his insulin regimen was recently adjusted and switched from NPH to Lantus (30 units daily) and NovoLog 10 units TID with meals which he has not started as per discussion with the wife he is noncompliant  Patient reproted that he is only taking 20 of insulin and I do not think he is compliant with his insulin at all  Initially during hospital stay patient was started on insulin drip which was transitioned to basal and bolus but his blood sugars were fluctuating and Lantus was held also because of his n.p.o. status due to pacemaker placement.  His blood sugar started to increase again and was restart him back on Lantus 10 units  sugars have been on the higher side.  - Cont lantus at 10 daily (titrate as indicated).  - Cont sliding scale insulin.  - Add carb coverage.  - Avoid hypoglycemia.  - Adjust regimen as indicated.       Acute kidney injury-initially resolved and now has oliguric KAY on CKD stage IV  Status placement of tunneled catheter on 7/5/2023 and initiated on hemodialysis on 7/5   Pseudo hyponatremia-resolved  Hyponatremia  Anion gap metabolic acidosis likely due to renal failure  Hypokalemia-resolved  -He does have CKD 3-4 at baseline and was noncompliant with medicines and follow-ups and during the course of hospital  "stay he developed oliguric renal failure and nephrology was following the patient and he had tunneled catheter placed on 7/5 and was initiated on hemodialysis  - Management plan per nephrology (appreciate assistance).      Chronic neck pain, history of spinal fusion  -will continue with his PTA Percocet and continue PTA Neurontin     Recently diagnosed Lewy Body Dementia  -noted  -follows neurology as outpatient    Chronic thrombocytopenia  -He does seem to have chronic thrombocytopenia and his platelet level fluctuates and has no evidence of bleeding and was 124 today     Physical deconditioning  -PT/OT.       Diet: Fluid restriction 1200 ML FLUID  Combination Diet Renal Diet (dialysis); Moderate Consistent Carb (60 g CHO per Meal) Diet  Room Service    DVT Prophylaxis: Heparin drip  Gardner Catheter: Not present  Lines: None     Cardiac Monitoring: ACTIVE order. Indication: Post- EP procedure (48 hours)  Code Status: Full Code        Disposition Plan      Expected Discharge Date: 07/09/2023    Discharge Delays: Placement - TCU  Destination: other (comment) (TCU)  Discharge Comments: HD today.       Av Daugherty MD  Hospitalist Service  [facility]  _____________________________________________________________________    Interval History   Breathing better. No cp. Reports conflicts with staff overnight (reassured).       Physical Exam     Vital signs:  Temp: 98.5  F (36.9  C) Temp src: Oral BP: (!) 141/63 Pulse: 59   Resp: 18 SpO2: 94 % O2 Device: Nasal cannula Oxygen Delivery: 6 LPM   Weight: 89 kg (196 lb 1.6 oz)  Estimated body mass index is 29.86 kg/m  as calculated from the following:    Height as of 5/4/23: 1.726 m (5' 7.95\").    Weight as of this encounter: 89 kg (196 lb 1.6 oz).          General: AOX2-3  Respiratory: He was on 4 L of oxygen and does not have any significant wheeze and has  crackles and he has a right chest tunneled catheter  Cardiovascular: Regular rate , S1 and S2 " normal with no murmer or rubs or gallops and bilateral edema  Abdomen:   soft , non tender ,   Neurologic:  No facial droop  Musculoskeletal: Normal Range of motion over upper and lower extremities bilaterally   Psychiatric: cooperative           Data     I have personally reviewed the following data over the past 24 hrs:    N/A  \   N/A   / N/A     127 (L) 90 (L) 35.8 (H) /  206 (H)   3.9 21 (L) 3.04 (H) \       Imaging results reviewed over the past 24 hrs:   No results found for this or any previous visit (from the past 24 hour(s)).

## 2023-07-08 NOTE — PLAN OF CARE
Alert and oriented x 4, but oftentimes forgetful, and repetitive. VS stable, on 3-6L NC today, his needs have decreased after dialysis, he also sounds significantly less wheezy. He has +3-4 LE edema and has lis legs elevated. Tele 100% V paced with rates in the 60's. Plan for further weaning of oxygen.

## 2023-07-08 NOTE — PROGRESS NOTES
Potassium   Date Value Ref Range Status   07/08/2023 3.9 3.4 - 5.3 mmol/L Final   06/20/2022 4.2 3.4 - 5.3 mmol/L Final   02/10/2021 4.6 3.4 - 5.3 mmol/L Final     Hemoglobin   Date Value Ref Range Status   07/07/2023 10.8 (L) 13.3 - 17.7 g/dL Final   02/19/2020 15.4 13.3 - 17.7 g/dL Final     Creatinine   Date Value Ref Range Status   07/08/2023 3.04 (H) 0.67 - 1.17 mg/dL Final   02/10/2021 1.98 (H) 0.66 - 1.25 mg/dL Final     Urea Nitrogen   Date Value Ref Range Status   07/08/2023 35.8 (H) 8.0 - 23.0 mg/dL Final   06/20/2022 24 7 - 30 mg/dL Final   02/10/2021 27 7 - 30 mg/dL Final     Sodium   Date Value Ref Range Status   07/08/2023 127 (L) 136 - 145 mmol/L Final   02/10/2021 137 133 - 144 mmol/L Final     INR   Date Value Ref Range Status   10/10/2016 1.08 0.86 - 1.14 Final       DIALYSIS PROCEDURE NOTE  Hepatitis status of previous patient on machine log was checked and verified ok to use with this patients hepatitis status.  Patient dialyzed for 3 hrs. on a K3 bath with a net fluid removal of  3L.  A BFR of 400 ml/min was obtained via a CVC .      The treatment plan was discussed with Dr. Urrutia during the treatment.    Total heparin received during the treatment: 0 units.   Line flushed, clamped and capped with heparin 1:1000 1.9 mL (1900 units) per lumen    Meds  given: none   Complications: none      Person educated: patient. Knowledge base minimal. Barriers to learning: confused. Educated on procedure via verbal mode. Patient/family verbalized understanding. Pt prefers verbal education style.     ICEBOAT? Timeout performed pre-treatment  I: Patient was identified using 2 identifiers  C:  Consent Signed Yes  E: Equipment preventative maintenance is current and dialysis delivery system OK to use  B: Hepatitis B Surface Antigen: Neg; Draw Date: 7/6/23      Hepatitis B Surface Antibody: Seema; Draw Date: 7/6/23  O: Dialysis orders present and complete prior to treatment  A: Vascular access verified and assessed  prior to treatment  T: Treatment was performed at a clinically appropriate time  ?: Patient was allowed to ask questions and address concerns prior to treatment  See Adult Hemodialysis flowsheet in EPIC for further details and post assessment.  Machine water alarm in place and functioning. Transducer pods intact and checked every 15min.   Pt returned via bed.  Chlorine/Chloramine water system checked every 4 hours.  Outpatient Dialysis at [facility]    Patient repositioned every 2 hours during the treatment.  Post treatment report given to GASPER Vaughn regarding 3L of fluid removed, last BP of 123//79, and patient pain rating of 0/10.     Edema & Lung sounds improved with tx. O2 turned down to 4L.

## 2023-07-08 NOTE — PROGRESS NOTES
Care Management Follow Up    Length of Stay (days): 9    Expected Discharge Date: 07/09/2023     Concerns to be Addressed:       Patient plan of care discussed at interdisciplinary rounds: Yes    Anticipated Discharge Disposition: Skilled Nursing Facility, Transitional Care     Anticipated Discharge Services: None  Anticipated Discharge DME: None    Patient/family educated on Medicare website which has current facility and service quality ratings: yes  Education Provided on the Discharge Plan:    Patient/Family in Agreement with the Plan: yes    Referrals Placed by CM/SW:    Private pay costs discussed: Not applicable    Additional Information:  Writer messaged [facility] via teams. [facility] confirmed they are still reviewing referrals for TCU and currently have limited beds available at this time. [facility] will continue to follow for placement for the patient. Patient is newly on dialysis and will need a dialysis location.     [facility]

## 2023-07-08 NOTE — PROGRESS NOTES
[id number]  Alert and oriented times four but forgetful  SBA W  4 L NC overnight   Lower extremity edema 2+  Pacer site WDL  vpaced  Denies new chest pain  Plan: continue to monitor

## 2023-07-09 NOTE — PROGRESS NOTES
[id number]  Alert and oriented times four but forgetful   SBA W   4-6 L NC overnight  Pacer site WDL  100% vpaced  Denies new chest pain  Neck pain relived with oxycodone /heat pack  Plan: continue to monitor

## 2023-07-09 NOTE — PROGRESS NOTES
1900 Patient generally feels unwell. Continues to report dizziness with activity, /83. resp therapy here to assess breathing. He has increased crackles in his right lungs and has ongoing expiratory wheezes.     1700 Patient had a couple high blood pressures (160's-170s'/80's) and was very dizzy during this time with minimal activity. Hospitalist and nephrology paged. Nephrology Ok with elevated BP for now with the idea that they will attempt to get 3L off during dialysis tomorrow.     Alert and oriented x 4 but at times forgetful and repetitive. Tele 100% V paced with rates in the 60's. Asked about getting up on a light walk twice today and declined any activity further than to the bathroom. He continues to need 4-6L of oxygen to maintain his O2 sats and gets short of breath with light activity. Plan for dialysis tomorrow. He has not had a BM in days, given senna and miralax.

## 2023-07-09 NOTE — PROGRESS NOTES
Renal Medicine Progress Note            Assessment/Plan:     1.  CKD stage 4              -baseline creatinine 2.5 to 3.0 mg/dl              -eGFR 25 ml/nmin              -presumed progressive diabetic nephropathy with significant proteinuria              -abnormal creatinines back to 2010              -No established outpatient nephrologist, previous attempts to schedule with [facility], referrals 2021 and 2/23.         2.  Oliguric acute kidney injury.  Likely ischemic ATN related to hemodynamics, bradycardia.  Remains oliguric, s/p 3 runs HD.     3. Hyponatremia, 130 today, acidosis resolved with dialysis  4.  MBD - , Vit D 21  5  Lewy body dementia  6  DM              -uncontrolled               -A1C > 10% for some time              -no retinopathy 2021  7. HTN:  on amlodipine 5mg BID and imdur 60mg daily.  Remains hypervolemic.         Plan/Recs:  1) HD tomorrow 7/10  2) additional 3kg UF goal  3) TCU and dialysis unit placement pending    Darian Urrutia DO  [facility]  Office: [phone]  Cell: [phone]        Interval History:     S/p HD yesterday, ran 3hrs on 3K bath with 3Kg ultrafiltration which he tolerated well. BP's mainly in the 140's.     210 urine output recorded yesterday, 300 so far today. Weight 87.5kg, 89 yesterday.           Medications and Allergies:       - MEDICATION INSTRUCTIONS for Dialysis Patients -   Does not apply See Admin Instructions     amitriptyline  30 mg Oral At Bedtime     amLODIPine  5 mg Oral BID     aspirin  81 mg Oral Daily     busPIRone  5 mg Oral TID     fluticasone-vilanterol  1 puff Inhalation Daily    And     umeclidinium  1 puff Inhalation Daily     [Held by provider] furosemide  20 mg Oral BID     gabapentin  300 mg Oral BID     [Held by provider] hydrALAZINE  75 mg Oral TID     insulin aspart   Subcutaneous TID w/meals     insulin aspart  1-10 Units Subcutaneous TID AC     insulin aspart  1-7 Units Subcutaneous At Bedtime     insulin  glargine  10 Units Subcutaneous QAM AC     ipratropium - albuterol 0.5 mg/2.5 mg/3 mL  1 vial Nebulization Q6H     isosorbide mononitrate  60 mg Oral Daily     oxyCODONE-acetaminophen  1 tablet Oral At Bedtime     rosuvastatin  10 mg Oral Daily     senna-docusate  1 tablet Oral BID    Or     senna-docusate  2 tablet Oral BID     sodium chloride (PF)  3 mL Intracatheter Q8H        Allergies   Allergen Reactions     Adhesive Tape      From blood draw site.     Eszopiclone      Strange dreams. Cooking during sleep      Latex      Lipitor [Atorvastatin Calcium] Other (See Comments)     myalgias     Lisinopril Other (See Comments)     Hyperkalemia and increased creatinine            Physical Exam:   Vitals were reviewed  /64 (BP Location: Right arm)   Pulse 59   Temp 97.7  F (36.5  C) (Oral)   Resp 18   Wt 87.5 kg (192 lb 12.8 oz)   SpO2 90%   BMI 29.36 kg/m      Wt Readings from Last 3 Encounters:   07/09/23 87.5 kg (192 lb 12.8 oz)   06/08/23 85.4 kg (188 lb 3.2 oz)   05/04/23 83.4 kg (183 lb 14.4 oz)       Intake/Output Summary (Last 24 hours) at 7/9/2023 0854  Last data filed at 7/9/2023 0821  Gross per 24 hour   Intake 3690 ml   Output 3910 ml   Net -220 ml        GENERAL APPEARANCE: alert, oriented  HEENT:  Eyes/ears/nose/neck grossly normal  RESP: lungs cta b c good efforts ant/lat  CV: RRR, nl S1/S2, no m/r/g   ABDOMEN: o/s/nt/nd, bs present  EXTREMITIES/SKIN: no c/c/rashes/lesions; , 2+ bilateral leg edema, improved         Data:     BMP  Recent Labs   Lab 07/09/23  0725 07/09/23  0549 07/09/23  0202 07/08/23  2139 07/08/23  0729 07/08/23  0544 07/07/23  0733 07/07/23  0607 07/06/23  0746 07/06/23  0552   NA  --  130*  --   --   --  127*  --  128*  --  125*   POTASSIUM  --  3.8  --   --   --  3.9  --  4.3  --  4.7   CHLORIDE  --  92*  --   --   --  90*  --  89*  --  89*   WANDA  --  8.0*  --   --   --  8.0*  --  8.4*  --  8.5*   CO2  --  23  --   --   --  21*  --  19*  --  15*   BUN  --  20.7  --   --    --  35.8*  --  51.8*  --  70.2*   CR  --  2.52*  --   --   --  3.04*  --  3.81*  --  4.87*   * 184* 170* 125*   < > 231*   < > 210*   < > 171*    < > = values in this interval not displayed.     CBC  Recent Labs   Lab 07/07/23  0607 07/06/23  0552 07/04/23  1534   WBC 8.2 10.1 9.8   HGB 10.8* 10.6* 11.3*   HCT 33.5* 31.6* 33.9*   MCV 87 86 87   * 126* 190     Lab Results   Component Value Date    AST 18 06/29/2023    ALT 13 06/29/2023    GGT 40 03/12/2006    ALKPHOS 93 06/29/2023    BILITOTAL 0.8 06/29/2023    BILICONJ 0.0 04/08/2010     Lab Results   Component Value Date    INR 1.08 10/10/2016       Attestation:  I have reviewed today's vital signs, notes, medications, labs and imaging.    DO [facility]  Office: [phone]  Cell: [phone]

## 2023-07-09 NOTE — PROGRESS NOTES
Care Management Follow Up    Length of Stay (days): 10    Expected Discharge Date: 07/10/2023     Concerns to be Addressed:       Patient plan of care discussed at interdisciplinary rounds: Yes    Anticipated Discharge Disposition: Skilled Nursing Facility, Transitional Care     Anticipated Discharge Services: None  Anticipated Discharge DME: None    Patient/family educated on Medicare website which has current facility and service quality ratings: yes  Education Provided on the Discharge Plan:    Patient/Family in Agreement with the Plan: yes    Referrals Placed by CM/SW:    Private pay costs discussed: Not applicable    Additional Information:  Writer made a phone call to Lilia, the patient's wife. Lilia said that patient often has a hard time remembering things and that he may not remember but SW can keep him updated on discharge planning. Patient was denied at [facility] and [facility] because of his acuity and high needs. Referral was sent to [facility] to accomodate his dialysis. Lilia, the patient's wife was in agreement with this. Referral was sent to [facility] as Lilia said there was a dialysis unit close by. SW will continue to follow for discharge.    Addendum 10:42 Lilia, patient's wife called back and offered [facility]. Writer put in referrals for both of these facilities. Lilia, Patient's wife said she would be calling the insurance company [facility] to see if they would cover any transportation costs for dialysis.     Addendum 11:57 Spoke with patient at beside with wife present to inform him about TCU's declining and about some of the new choices that have been sent to facilities. Patient was in agreement with new choices.    Tony AMEZCUA  [facility]  Care Management         JOVANNA Medrano

## 2023-07-09 NOTE — PROGRESS NOTES
[facility]    Medicine Progress Note - Hospitalist Service    Date of Admission:  6/29/2023    Assessment & Plan     Hayder Alves is a 68 year old male with a very complicated  medical history significant for recently diagnosed Lewy Body Dementia, chronic neck pain (s/p spinal fusion), COPD (not on home oxygen), CAD (s/p CABG 2001), CKD stage IV, hypertension, dyslipidemia, systolic CHF, diabetes mellitus, medication noncompliance, h/o atrial fibrillation (s/p cardioversion 2020; not currently on anticoagulation) presented to ED with shortness of breath and left arm pain.      Acute hypoxic respiratory failure likely due to acute on chronic systolic heart failure and exacerbation  NSTEMI likely due to above  History of coronary artery disease status CABG  new atrial flutter, likely paroxysmal atrial fibrillation h/o cardioversion in 2020, not currently on anticoagulation)  Severe bradycardia/complete heart block s/p PPM on 7/5/2023  Hypertension  Dyslipidemia  Moderate pulmonary hypertension-new  -Initially on admission presented with chief complaint of shortness of breath and chest discomfort.  Patient initially mentioned he had left arm pain and then mentioned that he had chest discomfort.  He was not able to provide good history.  -On presentation to the ER his heart rate was in 30s, EKG showed atrial flutter proBNP was significantly elevated at [address] and troponin was elevated at 118  -Last echo in June 2020 showed EF of 55 to 60%, no wall motion changes, mild dilated left atrium and trace MR and TR  -Chest x-ray was done in the ED which was concerning for volume overload and patient was given 60 IV Lasix in the ED and cardiology was consulted in the ED with interventions and he was started on heparin drip  -He had echo done in June 2022 and at that time his EF was 55 to 60%, no wall motion changes, mildly dilated left atrium, normal RV ventricular size and systolic function and  mildly dilated atrium  -Duplex of the lower extremity did not show any evidence of DVT  -Troponin peaked at 118 and have trended down to 103  -Echo on 7/1/23  which shows EF of 50 to 55%, grade 2 or moderate diastolic dysfunction, mild inferior lateral wall hypokinesis, mild biatrial enlargement, RV systolic function is borderline reduced, RVSP is elevated consistent with moderate pulmonary hypertension, mild aortic Valvular stenosis  -Given his KAY on CKD 4 cardiac catheterization was was not considered as it we will put him on dialysis during initial stay in hospital  -s/p PPM placement on 7/5/2023 and PPM check per   -Of note initially on presentation to the hospital he was requiring oxygen which improved after diuresis and later diuretics were held as he had worsening renal fucntion  And  he developed heart block and after that he has been needing oxygen with worsening edema and was on 4 L yesterday and had another run of dialysis today and was on room air when I saw him but was later requiring 2 L and continues to have significant edema over lower extremities  -Blood pressure is stable for now and will continue the patient with amlodipine 5 twice daily along with Imdur and if the blood - -Hydralazine can be restarted prn.      Elevated D-dimer  -Patient had elevated D-dimer of 1 on admission and he has history of COPD  -Patient does have CKD stage IV and cannot have CTA chest  -On admission he was started on high intensity heparin drip  -Duplex of the lower extremity does not show any evidence of DVT  -VQ scan has been ordered and does not show any evidence of PE  -Echo has been ordered results as above      History of COPD  Shortness of breath  -not on home oxygen;  and on presentation was noted 85% on room air; on 3 to 4 L oxygen  -Patient did get IV Solu-Medrol in the ER along with DuoNebs and his PTA inhalers were started  -Chest x-ray done on admission showed pulmonary vascular congestion with likely mild  to moderate pulmonary edema and small bilateral pleural effusions with compressive atelectasis, right larger than left with no pneumothorax  -7/4-chest x-ray was done which showed that vascular congestion has improved  and opacity over the right lung has improved but there is persistent opacity  -His white count was normal but given his oxygen requirement and elevated procalcitonin at 0.26  And he was started on empiric ceftriaxone and will finish five days today and stop tommorow  -Continue with as needed DuoNebs  - COPD team consulted and patient will need nebulization machine on discharge     Uncontrolled diabetes  -family reports medication noncompliance  -his insulin regimen was recently adjusted and switched from NPH to Lantus (30 units daily) and NovoLog 10 units TID with meals which he has not started as per discussion with the wife he is noncompliant  Patient reproted that he is only taking 20 of insulin and I do not think he is compliant with his insulin at all  Initially during hospital stay patient was started on insulin drip which was transitioned to basal and bolus but his blood sugars were fluctuating and Lantus was held also because of his n.p.o. status due to pacemaker placement.  His blood sugar started to increase again and was restart him back on Lantus 10 units  sugars have been on the higher side.  - Cont lantus at 10 daily (titrate as indicated).  - Cont sliding scale insulin.  - Add carb coverage.  - Avoid hypoglycemia.  - Adjust regimen as indicated.       Acute kidney injury-initially resolved and now has oliguric KAY on CKD stage IV  Status placement of tunneled catheter on 7/5/2023 and initiated on hemodialysis on 7/5   Pseudo hyponatremia-resolved  Hyponatremia  Anion gap metabolic acidosis likely due to renal failure  Hypokalemia-resolved  -He does have CKD 3-4 at baseline and was noncompliant with medicines and follow-ups and during the course of hospital stay he developed oliguric  "renal failure and nephrology was following the patient and he had tunneled catheter placed on 7/5 and was initiated on hemodialysis  - Management plan per nephrology (appreciate assistance).      Chronic neck pain, history of spinal fusion  -will continue with his PTA Percocet and continue PTA Neurontin     Recently diagnosed Lewy Body Dementia  -noted  -follows neurology as outpatient    Chronic thrombocytopenia  -He does seem to have chronic thrombocytopenia and his platelet level fluctuates and has no evidence of bleeding and was 124 today     Physical deconditioning  -PT/OT.       Diet: Fluid restriction 1200 ML FLUID  Combination Diet Renal Diet (dialysis); Moderate Consistent Carb (60 g CHO per Meal) Diet  Room Service    DVT Prophylaxis: Heparin drip  Gardner Catheter: Not present  Lines: None     Cardiac Monitoring: ACTIVE order. Indication: Post- EP procedure (48 hours)  Code Status: Full Code        Disposition Plan      Expected Discharge Date: 07/09/2023    Discharge Delays: Placement - TCU  Destination: other (comment) (TCU)  Discharge Comments: HD today.       Av Daugherty MD  Hospitalist Service  [facility]  _____________________________________________________________________    Interval History   Breathing better again today. No cp. No reports of conflicts this am.      Physical Exam     Vital signs:  Temp: 97.7  F (36.5  C) Temp src: Oral BP: 135/64 Pulse: 59   Resp: 18 SpO2: 90 % O2 Device: Nasal cannula Oxygen Delivery: 5 LPM   Weight: 87.5 kg (192 lb 12.8 oz)  Estimated body mass index is 29.36 kg/m  as calculated from the following:    Height as of 5/4/23: 1.726 m (5' 7.95\").    Weight as of this encounter: 87.5 kg (192 lb 12.8 oz).          General: AOX2-3  Respiratory: He was on 4 L of oxygen and does not have any significant wheeze and has  crackles and he has a right chest tunneled catheter  Cardiovascular: Regular rate , S1 and S2 normal with no murmer or rubs or " gallops and bilateral edema  Abdomen:   soft , non tender ,   Neurologic:  No facial droop  Musculoskeletal: Normal Range of motion over upper and lower extremities bilaterally   Psychiatric: cooperative           Data     I have personally reviewed the following data over the past 24 hrs:    N/A  \   N/A   / N/A     130 (L) 92 (L) 20.7 /  173 (H)   3.8 23 2.52 (H) \       Imaging results reviewed over the past 24 hrs:   No results found for this or any previous visit (from the past 24 hour(s)).

## 2023-07-10 NOTE — PROGRESS NOTES
Renal Medicine Inpatient Dialysis Note                                Hayder Alves MRN# [id number]   Age: 68 year old YOB: 1955   Date of Admission: 6/29/2023 Hospital LOS: 11          Assessment/Plan:       1.  CKD stage 4              -baseline creatinine 2.5 to 3.0 mg/dl              -eGFR 25 ml/nmin              -presumed progressive diabetic nephropathy with significant proteinuria              -abnormal creatinines back to 2010             No established outpatient nephrologist, previous attempts to schedule with [facility],   referrals 2021 and 2/23.       2.  Oliguric acute kidney injury.  Likely ischemic ATN related to hemodynamics, bradycardia.  Remains oliguric, s/p 3 runs HD.     3. Hyponatremia   -hypervolemic     Should improve as target weight decreased     4. MBD - , Vit D 21  5  Lewy body dementia  6  DM              -uncontrolled               -A1C > 10% for some time              -no retinopathy 2021  7. HTN:  on amlodipine 5mg BID and imdur 60mg daily.  Remains hypervolemic.    Continue MWF dialysis  UF as able    0530 MWF [facility] outpatient chair    Interval History:     Dialysis run parameters reviewed with dialysis RN at patient bedside.  Seen on run  Follows    Edema persists    3 hours  3K  3 liter UF  IJ access    Stable initial portion of run     ROS     GENERAL: NAD, No fever,chills  R: NEGATIVE for significant cough or SOB  CV: NEGATIVE for chest pain, palpitations  EXT: no change edema  ROS otherwise negative    Dialysis Parameters:     Vitals were reviewed  Patient Vitals for the past 8 hrs:   BP Temp Temp src Pulse Resp SpO2 Weight   07/10/23 0945 (!) 155/67 -- -- 59 18 -- --   07/10/23 0930 (!) 154/67 -- -- 59 15 96 % --   07/10/23 0915 (!) 158/73 -- -- 59 25 96 % --   07/10/23 0908 (!) 156/69 -- -- 59 21 94 % --   07/10/23 0841 (!) 155/66 97.7  F (36.5  C) -- -- 20 -- --   07/10/23 0749 -- -- -- 59 20 -- --   07/10/23 0600 -- 97.9  F (36.6   C) Oral -- 18 90 % --   07/10/23 0400 (!) 146/69 98  F (36.7  C) Oral 60 18 94 % --   07/10/23 0200 (!) 159/70 97.9  F (36.6  C) Oral 60 20 90 % --   07/10/23 0158 -- -- -- -- -- -- 82.6 kg (182 lb)     I/O last 3 completed shifts:  In: 940 [P.O.:940]  Out: 300 [Urine:300]    Vitals:    07/07/23 0633 07/08/23 0359 07/08/23 0550 07/09/23 0205   Weight: 88.7 kg (195 lb 8 oz) 89 kg (196 lb 3.2 oz) 89 kg (196 lb 1.6 oz) 87.5 kg (192 lb 12.8 oz)    07/10/23 0158   Weight: 82.6 kg (182 lb)       Current Weight: 82.6  Dry Weight: 75 range ?  Dialysis Temp: 36.5  C  Access Device: IJ  Access Site: right  Dialyzer: Revaclear  Dialysis Bath: 3  Sodium Profile: n  UF Goal: 3  Blood Flow Rate (mL/min): 400  Total Treatment Time (hrs): 3  Heparin: Low dose as required      EPO dose: n  Zemplar: n  IV Fe: n      Medications and Allergies:     Reviewed      Physical Exam:     Seen and examined during course of dialysis run    BP (!) 154/67   Pulse 59   Temp 97.7  F (36.5  C)   Resp 15   Wt 82.6 kg (182 lb)   SpO2 96%   BMI 27.71 kg/m      GENERAL: awake, alert, follows  HEENT: NC/AT, PERRLA, EOMI, non icteric, pharynx moist without lesion  RESP: clear  CV: RRR, normal S1 S2  ABDOMEN: S/NT, BS present  MS: 3 + edema  SKIN: catheter site clean without drainage  NEURO: speech normal  PSYCH: affect normal/bright    Data:       Recent Labs   Lab 07/10/23  0723 07/10/23  0630   NA  --  127*   POTASSIUM  --  3.6   CHLORIDE  --  89*   CO2  --  23   ANIONGAP  --  15   * 170*   BUN  --  28.9*   CR  --  3.01*   GFRESTIMATED  --  22*   WANDA  --  8.7*     Recent Labs   Lab 07/10/23  0630 07/09/23  0549 07/08/23  0544 07/07/23  0607 07/06/23  0552 07/05/23  0605 07/04/23  1153 07/04/23  0529   CR 3.01* 2.52* 3.04* 3.81* 4.87* 4.20* 2.87* 2.54*     Recent Labs   Lab Test 07/10/23  0630 07/09/23  0549 07/08/23  0544 07/07/23  0607 07/06/23  0552 07/05/23  0605 07/04/23  1153 07/04/23  0529 07/03/23  0559 07/02/23  0545   * 130*  127* 128* 125* 124* 129* 131* 133* 133*     No lab results found in last 7 days.  Recent Labs   Lab 07/07/23  0607 07/06/23  0552 07/04/23  1534   HGB 10.8* 10.6* 11.3*         G Brayden Dc MD    [facility]  [phone]

## 2023-07-10 NOTE — PROGRESS NOTES
3rd round of dialysis faxed via Epic to [facility].    Roselia Fowler RN  Care Coordinator  [facility]  [phone] (text or call)

## 2023-07-10 NOTE — PROGRESS NOTES
[facility]    Medicine Progress Note - Hospitalist Service    Date of Admission:  6/29/2023    Assessment & Plan     Hayder Alves is a 68 year old male with a very complicated  medical history significant for recently diagnosed Lewy Body Dementia, chronic neck pain (s/p spinal fusion), COPD (not on home oxygen), CAD (s/p CABG 2001), CKD stage IV, hypertension, dyslipidemia, systolic CHF, diabetes mellitus, medication noncompliance, h/o atrial fibrillation (s/p cardioversion 2020; not currently on anticoagulation) presented to ED with shortness of breath and left arm pain.      Acute hypoxic respiratory failure likely due to acute on chronic systolic heart failure and exacerbation: Improving with dialysis  NSTEMI likely due to above  History of coronary artery disease status CABG  New atrial flutter, likely paroxysmal atrial fibrillation h/o cardioversion in 2020, not currently on anticoagulation)  Severe bradycardia/complete heart block s/p PPM on 7/5/2023  Essential Hypertension  Dyslipidemia  Moderate pulmonary hypertension-newly diagnosed       -- Initially on admission presented with chief complaint of shortness of breath and chest discomfort.    -- Patient mentioned about left arm pain and chest discomfort.   --On presentation to the ER his heart rate was in 30s, EKG showed atrial flutter proBNP was [place] 11209 and troponin was elevated at 118  --Last echo was in June 2020 showed EF of 55 to 60%, no wall motion changes, mild dilated left atrium and trace MR and TR  --Chest x-ray was done in the ED which was concerning for volume overload and patient was given 60 IV Lasix in the ED and cardiology was consulted in the ED with interventions and he was started on heparin drip  --He had echo done in June 2022 and at that time his EF was 55 to 60%, no wall motion changes, mildly dilated left atrium, normal RV ventricular size and systolic function and mildly  dilated atrium  --Duplex of the lower extremity did not show any evidence of DVT  --Troponin peaked at 118 and have trended down to 103  --Echo on 7/1/23 showed EF of 50 to 55%, grade 2 or moderate diastolic dysfunction, mild inferior lateral wall hypokinesis, mild biatrial enlargement, RV systolic function is borderline reduced, RVSP is elevated consistent with moderate pulmonary hypertension, mild aortic Valvular stenosis  --Given his KAY on CKD 4 , initially cardiac catheterization was was not considered as it will push him to HD  -- S/P PPM placement on 7/5/2023   -- Nephrology has been following closely, patient did not have any outpatient established nephrologist before the hospitalization although referrals were sent in the past.  --Patient will be continued on amlodipine 5 mg twice daily and Imdur 60 mg daily for antihypertensive regimen while patient continues to undergo hemodialysis.  -- Per nephrology note, patient has [facility] outpatient chair Monday Wednesday Friday at 0530    Acute kidney injury-initially resolved and now has oliguric KAY on CKD stage IV  Status placement of tunneled catheter on 7/5/2023 and initiated on hemodialysis on 7/5   Pseudo hyponatremia-resolved  Hyponatremia  Anion gap metabolic acidosis likely due to renal failure  Hypokalemia-resolved    --He does have CKD 3-4 at baseline and was noncompliant with medicines and follow-ups and during the course of hospital stay he developed oliguric renal failure   -- Nephrology has been consulted, following closely  -- Tunneled catheter was placed on 07/05, has been initiated on hemodialysis  -- As above, patient did not have any outpatient established nephrologist before the hospitalization although referrals were sent in the past.  --Patient will be continued on amlodipine 5 mg twice daily and Imdur 60 mg daily for antihypertensive regimen while patient continues to undergo hemodialysis.  -- Per nephrology note, patient has  [facility] outpatient chair Monday Wednesday Friday at 0530    Elevated D-dimer: Most likely secondary to CKD    --Patient had elevated D-dimer of 1 on admission and he has history of COPD  --Patient does have CKD stage IV and cannot have CTA chest  --On admission he was started on high intensity heparin drip  --Duplex of the lower extremity does not show any evidence of DVT  --VQ scan has been ordered and does not show any evidence of PE  --Echo has been ordered results as above    History of COPD  Acute hypoxic respiratory failure, improving with dialysis    -- Not on home oxygen;  on presentation was noted 85% on room air; on 3 to 4 L oxygen  --Patient did get IV Solu-Medrol in the ER along with DuoNebs and his PTA inhalers were started  --Chest x-ray done on admission showed pulmonary vascular congestion with likely mild to moderate pulmonary edema and small bilateral pleural effusions with compressive atelectasis, right larger than left with no pneumothorax  --7/4-chest x-ray was done which showed that vascular congestion has improved  and opacity over the right lung has improved but there is persistent opacity  --His white count was normal but given his oxygen requirement and elevated procalcitonin at 0.26  , he was started on empiric ceftriaxone, patient has now completed his 5-day course of ceftriaxone.  --Continue with as needed DuoNebs  -- COPD team consulted and patient will need nebulization machine on discharge from rehab     Uncontrolled diabetes; hemoglobin A1c came back 10.9    Family reported medication noncompliance.  His insulin regimen was recently adjusted and switched from NPH to Lantus and NovoLog combination  Patient was supposed to be taking 30 units of Lantus and NovoLog 10 units 3 times daily with meals which he has not started yet as per wife.  Initially during this hospitalization, patient was a started on insulin infusion which was then transitioned to basal and bolus, blood  sugars were noticed to be fluctuating, also patient had n.p.o. status for pacemaker placement during the hospitalization  Patient now tolerating Lantus 10 units.    --Continue patient on Lantus 10 units, blood sugars have been running below 200  -- Continue high-dose sliding scale insulin  -- Continue meal coverage with 1 unit of NovoLog for 15 g of carb  -- Avoid hypoglycemia, continue hypoglycemia protocol    Chronic neck pain, history of spinal fusion  -- will continue with his PTA Percocet and continue PTA Neurontin     Recently diagnosed Lewy Body Dementia  --noted  --follows neurology as outpatient    Chronic thrombocytopenia  --He does seem to have chronic thrombocytopenia and his platelet level fluctuates and has no evidence of bleeding and was 124 today     Physical deconditioning  --Patient has been evaluated by physical and Occupational Therapy, currently has been recommended to be discharged to TCU     Diet: Fluid restriction 1200 ML FLUID  Combination Diet Renal Diet (dialysis); Moderate Consistent Carb (60 g CHO per Meal) Diet  Room Service    DVT Prophylaxis: Heparin drip  Gardner Catheter: Not present  Lines: None     Cardiac Monitoring: ACTIVE order. Indication: Post- EP procedure (48 hours)  Code Status: Full Code        Disposition Plan :  Tentative plan to discharge to TCU once bed is available, appreciate care coordinator and  help in safe disposition planning, patient seems medically stable to be discharged, hypervolemia is improving with each dialysis sessions.    Medical Decision Making     40 MINUTES SPENT BY ME on the date of service doing chart review, history, exam, documentation & further activities per the note.         Expected Discharge Date: 07/11/2023    Discharge Delays: Placement - TCU  Destination: other (comment) (TCU)  Discharge Comments: HD today.       Violetta Rome MD  Hospitalist Service  [facility]  "Hospital  _____________________________________________________________________    Interval History      Patient care was assumed this morning, patient was seen and examined, getting hemodialysis, shortness of breath is improving, denying any new complaints aware that care coordinators have been working on discharge planning.    Physical Exam     Vital signs:  Temp: 97.9  F (36.6  C) Temp src: Oral BP: (!) 146/69 Pulse: 59   Resp: 20 SpO2: 90 % O2 Device: Oxymizer cannula Oxygen Delivery: 4 LPM   Weight: 82.6 kg (182 lb)  Estimated body mass index is 27.71 kg/m  as calculated from the following:    Height as of 5/4/23: 1.726 m (5' 7.95\").    Weight as of this encounter: 82.6 kg (182 lb).    General: AOX2-3  Respiratory: Wearing 3 L of nasal cannula oxygen, no significant wheezing, few basal crackles, he has right chest tunneled catheter  Cardiovascular: Regular rate , S1 and S2 normal with no murmer or rubs or gallops and bilateral edema  Abdomen:   soft , non tender ,   Neurologic:  No facial droop  Musculoskeletal: Normal Range of motion over upper and lower extremities bilaterally   Psychiatric: cooperative         Data     I have personally reviewed the following data over the past 24 hrs:    N/A  \   N/A   / N/A     127 (L) 89 (L) 28.9 (H) /  166 (H)   3.6 23 3.01 (H) \       Imaging results reviewed over the past 24 hrs:   No results found for this or any previous visit (from the past 24 hour(s)).  "

## 2023-07-10 NOTE — PROGRESS NOTES
Potassium   Date Value Ref Range Status   07/10/2023 3.6 3.4 - 5.3 mmol/L Final   06/20/2022 4.2 3.4 - 5.3 mmol/L Final   02/10/2021 4.6 3.4 - 5.3 mmol/L Final     Hemoglobin   Date Value Ref Range Status   07/07/2023 10.8 (L) 13.3 - 17.7 g/dL Final   02/19/2020 15.4 13.3 - 17.7 g/dL Final     Creatinine   Date Value Ref Range Status   07/10/2023 3.01 (H) 0.67 - 1.17 mg/dL Final   02/10/2021 1.98 (H) 0.66 - 1.25 mg/dL Final     Urea Nitrogen   Date Value Ref Range Status   07/10/2023 28.9 (H) 8.0 - 23.0 mg/dL Final   06/20/2022 24 7 - 30 mg/dL Final   02/10/2021 27 7 - 30 mg/dL Final     Sodium   Date Value Ref Range Status   07/10/2023 127 (L) 136 - 145 mmol/L Final   02/10/2021 137 133 - 144 mmol/L Final     INR   Date Value Ref Range Status   10/10/2016 1.08 0.86 - 1.14 Final       DIALYSIS PROCEDURE NOTE  Hepatitis status of previous patient on machine log was checked and verified ok to use with this patients hepatitis status.  Patient dialyzed for 3 hrs. on a K3 bath with a net fluid removal of  3L.  A BFR of 400 ml/min was obtained via a CVC.      The treatment plan was discussed with Dr. Elias during the treatment.    Total heparin received during the treatment: 0 units.   Line flushed, clamped and capped with heparin 1:1000 1.9 mL (1900 units) per lumen    Meds  given: none   Complications: none      Person educated: patient. Knowledge base adequate. Barriers to learning: none. Educated on procdure via verbal mode. P:atient/family verbalized understanding. Pt prefers verbal education style.     ICEBOAT? Timeout performed pre-treatment  I: Patient was identified using 2 identifiers  C:  Consent Signed Yes  E: Equipment preventative maintenance is current and dialysis delivery system OK to use  B: Hepatitis B Surface Antigen: Neg; Draw Date: 7/6/23      Hepatitis B Surface Antibody: Seema; Draw Date: 7/6/23  O: Dialysis orders present and complete prior to treatment  A: Vascular access verified and assessed  prior to treatment  T: Treatment was performed at a clinically appropriate time  ?: Patient was allowed to ask questions and address concerns prior to treatment  See Adult Hemodialysis flowsheet in EPIC for further details and post assessment.  Machine water alarm in place and functioning. Transducer pods intact and checked every 15min.   Pt returned via bed.  Chlorine/Chloramine water system checked every 4 hours.  Outpatient Dialysis at [facility]    Patient repositioned every 2 hours during the treatment.  Post treatment report given to GASPER Rangel regarding 3L of fluid removed, last BP of 151/65, and patient pain rating of 0/10.     .

## 2023-07-10 NOTE — PROGRESS NOTES
[id number]  Alert and oriented times four but forgetful   SBA W  6 L oximizer   Pacer site WDL  100% vpaced  Neck pain treated with heat pack/oxycodone   Plan: continue to monitor and dialysis tomorrow

## 2023-07-10 NOTE — PLAN OF CARE
Patient A&Ox4, forgetful. VSS, 6LPM O2 via oxymizer cannula at rest. O2 increased to 9-10LPM with activity  as sats decrease to mid-80s. Patient dyspneic & expiratory wheezing with minimal exertion, LS otherwise diminished. IS encouraged. Infrequent dry cough. Tele 100% V-paced. +2-3 edema BLE. PRN Percocet given for chronic neck pain and helpful. Pt reported some dizziness when getting up. Orthostatic BP checked. Plan for dialysis today. Ax1 pivot to commode. 2 PIVs, both SL. Discharge pending TCU placement.

## 2023-07-10 NOTE — PROGRESS NOTES
Care Management Follow Up    Length of Stay (days): 11    Expected Discharge Date: 07/11/2023     Concerns to be Addressed:   Discharge planning    Patient plan of care discussed at interdisciplinary rounds: Yes    Anticipated Discharge Disposition: Skilled Nursing Facility, Transitional Care     Anticipated Discharge Services: therapy  Anticipated Discharge DME: None    Patient/family educated on Medicare website which has current facility and service quality ratings: yes  Education Provided on the Discharge Plan:  yes  Patient/Family in Agreement with the Plan: yes    Referrals Placed by CM/SW:  TCU referrals  Private pay costs discussed: Not applicable    Additional Information:  Additional TCU referrals sent, via the discharge navigator, to Nicky, the [facility] liaison for any of her facilities, [facility], and resent the referral to the errol steele at [facility].    Will continue to follow.      FRANK Celis, [facility]    [phone]  [facility]

## 2023-07-11 NOTE — PROGRESS NOTES
Renal Medicine       Following for advanced CKD  Acute Kidney Injury  Dialysis dependence      Potential renal recovery unclear    Dialyzed 07/12/23 without issue  UF 3 liter    Events of last PM noted     Additional run for UF today  Continue MWF schedule         Recent Labs   Lab 07/11/23  0745 07/11/23  0532 07/10/23  0723 07/10/23  0630   NA  --  131*  --  127*   POTASSIUM  --  4.0  --  3.6   CHLORIDE  --   --   --  89*   CO2  --   --   --  23   ANIONGAP  --   --   --  15   *  --    < > 170*   BUN  --   --   --  28.9*   CR  --  2.30*  --  3.01*   GFRESTIMATED  --  30*  --  22*   WANDA  --   --   --  8.7*    < > = values in this interval not displayed.           BRODIE Dc    [facility]  [phone]

## 2023-07-11 NOTE — PLAN OF CARE
MD Notification    Notified Person: ARIEL Osullivan MD   Notification Date/Time: 2103  Notification Interaction: Glad to Have You Messaging   Purpose of Notification: Pt has not had BM since 7/7, pushing fluids w/miralax and gave night time senna. Bowel sounds L side hypoactive and R side - silent. R side is more distended, but L side is more tender to the touch. Imaging needed? With the push for fluids and taking the miralax, increased SOB and belly breathing has occurred. Last /80    Orders Received: ordered portable CXR and abdominal. Tap enema ordered. PRN hydralazine.   Comments: follow up if/with abnormal results

## 2023-07-11 NOTE — PROGRESS NOTES
Potassium   Date Value Ref Range Status   07/11/2023 4.0 3.4 - 5.3 mmol/L Final   06/20/2022 4.2 3.4 - 5.3 mmol/L Final   02/10/2021 4.6 3.4 - 5.3 mmol/L Final     Hemoglobin   Date Value Ref Range Status   07/11/2023 10.9 (L) 13.3 - 17.7 g/dL Final   02/19/2020 15.4 13.3 - 17.7 g/dL Final     Creatinine   Date Value Ref Range Status   07/11/2023 2.30 (H) 0.67 - 1.17 mg/dL Final   02/10/2021 1.98 (H) 0.66 - 1.25 mg/dL Final     Urea Nitrogen   Date Value Ref Range Status   07/10/2023 28.9 (H) 8.0 - 23.0 mg/dL Final   06/20/2022 24 7 - 30 mg/dL Final   02/10/2021 27 7 - 30 mg/dL Final     Sodium   Date Value Ref Range Status   07/11/2023 131 (L) 136 - 145 mmol/L Final   02/10/2021 137 133 - 144 mmol/L Final     INR   Date Value Ref Range Status   10/10/2016 1.08 0.86 - 1.14 Final       DIALYSIS PROCEDURE NOTE  Hepatitis status of previous patient on machine log was checked and verified ok to use with this patients hepatitis status.  Patient dialyzed for 2.5 hrs. on a K3 bath with a net fluid removal of  2.5L.  A BFR of 400 ml/min was obtained via a  Right CVC.      The treatment plan was discussed with Dr. VIRA Pereira during the treatment.    Total heparin received during the treatment: 0 units.   Line flushed, clamped and capped with normal saline 1:1000 1.9 mL (1900 units) per lumen    Meds  given: none   Complications: none      Person educated: patient. Knowledge base substantial. Barriers to learning: none. Educated on procedure  via verbal mode. Patient verbalized understanding. Pt prefers verbal education style.     ICEBOAT? Timeout performed pre-treatment  I: Patient was identified using 2 identifiers  C:  Consent Signed Yes  E: Equipment preventative maintenance is current and dialysis delivery system OK to use  B: Hepatitis B Surface Antigen: negative ; Draw Date: 07/06/23      Hepatitis B Surface Antibody: susceptible; Draw Date: 07/06/23  O: Dialysis orders present and complete prior to treatment  A:  Vascular access verified and assessed prior to treatment  T: Treatment was performed at a clinically appropriate time  ?: Patient was allowed to ask questions and address concerns prior to treatment  See Adult Hemodialysis flowsheet in EPIC for further details and post assessment.  Machine water alarm in place and functioning. Transducer pods intact and checked every 15min.   Chlorine/Chloramine water system checked every 4 hours.  Outpatient Dialysis at     Patient repositioned every 2 hours during the treatment.  Post treatment report given to ELBA Quigley RN regarding 2.5L of fluid removed, last BP of 158/69, and patient pain rating of 0/10.

## 2023-07-11 NOTE — PROGRESS NOTES
RRT called due to SOB and increased O2 needs. I put the patient on BIPAP per NP order.   The BIPAP settings: 14/5 16 40%.  The patient O2 need and the SOB improved.  RT will continue to follow.    Keron Hernadez, RT  7/10/2023

## 2023-07-11 NOTE — PLAN OF CARE
Pt disoriented to time. Ax1-2 to get to bedside commode. Dyspnea on exertion. Pt currently on 45 L (50%) high flow O2. Has intermittently been on Bipap today. Accessory muscles used with breathing. High flow O2 has decreased this. Pt went back and forth from NPO to regular (heart healthy, low sodium) based on if Bipap was on. Pt on a 1200 FR. Tele - V paced. Pt received dialysis today, 2.5 kilos of fluid removed. Pt's breathing improved following dialysis. Intermittent stomach pain throughout day, eased by either IV Zofran or Bipap. Pt had a large bowel movement today, which helped his distention and discomfort. Bps fluctuated throughout shift, highest range in 170s. IV hydralazine ordered if needed to keep BP below 170.     Plan: conversation with family tomorrow regarding wishes moving forward. Continue to monitor respiratory status.     Kavya Pitt, SN

## 2023-07-11 NOTE — PROGRESS NOTES
[facility]    Medicine Progress Note - Hospitalist Service    Date of Admission:  6/29/2023    Assessment & Plan     Hayder Alves is a 68 year old male with a very complicated  medical history significant for recently diagnosed Lewy Body Dementia, chronic neck pain (s/p spinal fusion), COPD (not on home oxygen), CAD (s/p CABG 2001), CKD stage IV, hypertension, dyslipidemia, systolic CHF, diabetes mellitus, medication noncompliance, h/o atrial fibrillation (s/p cardioversion 2020; not currently on anticoagulation) presented to ED with shortness of breath and left arm pain.    Flash moderate Pulmonary edema with right pleural effusion;   RRT 22:00 Last night sec to above   Acute hypoxic respiratory failure ; secondary to combination of renal failure with fluid overload along with chronic systolic heart failure with exacerbation.(See below)     -- Events from last night were noted in detail, highly appreciate house officer help.  --RRT was called secondary to respiratory distress along with abdominal pain.  -- Stat chest x-ray and abdominal x-ray was done.  -- Abdominal x-ray showed normal bowel gas pattern, moderate stool retention , no concerns for obstruction or free air.  -- Chest x-ray showed increased right pleural effusion with adjacent compressive atelectasis, decreased trace left pleural effusion, no pneumothorax, diffuse prominence of the pulmonary vascularity with hazy perihilar and lower lobe opacities which are likely mild to moderate pulmonary edema.  -- Patient was placed on BiPAP, has been tolerating well, has been n.p.o., patient was evaluated at bedside with bedside nursing.  -- Agree with nephrology planning another session of hemodialysis today, patient tolerated the session well   -- Discussed with bedside nursing regarding switching patient to high flow nasal cannula or nasal cannula by RT post hemodialysis, hopefully patient can be started back on some oral intake  along with oral medications.  -- If patient is unable to come off the BiPAP then will probably start patient on D5 at 30 mL/h and will switch insulin Accu-Cheks to every 4 hours from before meals and at bedtime.  -- We will hold a.m. Lantus today as patient has been n.p.o., will continue to hold sliding scale till patient is done with dialysis and can be started back on diet.    Goals of care discussion;  -- Unfortunately, patient has complicated hospitalization with multiple comorbidities, currently on hemodialysis, developing intermittent flash pulmonary edema with underlying Lewy body dementia and uncontrolled diabetes  -- I did have discussion with Vijay regarding his ongoing clinical status, he wished if he would be able to go home but understands that he is currently very sick we also talked about comfort /hospice care option, patient showed interest but understandably patient does have underlying dementia and is not able to make decisions completely on his own.  -- I also called patient wife Lilia, who is very overwhelmed at this time with patient illness and difficult disposition planning and his worsening status  -- I did update Lilia regarding patient undergoing hemodialysis today and patient making clinical improvement after dialysis with improvement in his hypoxemia and blood pressure  -- We discussed about palliative team consultation so they can have further discussion with Lilia and son Pancho regarding goals of care, their daughter is in [place] who can join the conference via phone if needed.  Lilia would appreciate meeting with palliative team and would like to have further discussion regarding goals of care for Vijay in case if he remains on hemodialysis long-term requiring nursing home placement versus if Vijay would be able to come off the hemodialysis and short period of time  -- CODE STATUS and goals of care conversation can be further addressed, patient and Pancho would be able to come to the  [facility] to meet with palliative team I will consult palliative team to evaluate patient, family is aware that palliative team might not be able to evaluate patient today.    Acute hypoxic respiratory failure likely due to acute on chronic systolic heart failure and exacerbation: Improving with dialysis  NSTEMI likely due to above  History of coronary artery disease status CABG  New atrial flutter, likely paroxysmal atrial fibrillation h/o cardioversion in 2020, not currently on anticoagulation)  Severe bradycardia/complete heart block s/p PPM on 7/5/2023  Essential Hypertension  Dyslipidemia  Moderate pulmonary hypertension-newly diagnosed     -- Initially on admission presented with chief complaint of shortness of breath and chest discomfort.    -- Patient mentioned about left arm pain and chest discomfort.   --On presentation to the ER his heart rate was in 30s, EKG showed atrial flutter proBNP was significantly elevated at 19855 and troponin was elevated at 118  --Last echo was in June 2020 showed EF of 55 to 60%, no wall motion changes, mild dilated left atrium and trace MR and TR  --Chest x-ray was done in the ED which was concerning for volume overload and patient was given 60 IV Lasix in the ED and cardiology was consulted in the ED with interventions and he was started on heparin drip  --He had echo done in June 2022 and at that time his EF was 55 to 60%, no wall motion changes, mildly dilated left atrium, normal RV ventricular size and systolic function and mildly dilated atrium  --Duplex of the lower extremity did not show any evidence of DVT  --Troponin peaked at 118 and have trended down to 103  --Echo on 7/1/23 showed EF of 50 to 55%, grade 2 or moderate diastolic dysfunction, mild inferior lateral wall hypokinesis, mild biatrial enlargement, RV systolic function is borderline reduced, RVSP is elevated consistent with moderate pulmonary hypertension, mild aortic Valvular stenosis  --Given his KAY on  CKD 4 , initially cardiac catheterization was was not considered as it will push him to HD  -- S/P PPM placement on 7/5/2023   -- Nephrology has been following closely, patient did not have any outpatient established nephrologist before the hospitalization although referrals were sent in the past.  --Patient will be continued on amlodipine 5 mg twice daily and Imdur 60 mg daily for antihypertensive regimen while patient continues to undergo hemodialysis.  -- Per nephrology note, patient has Jennifer York outpatient chair Monday Wednesday Friday at 0530  -- Complicated discharge due to multiple comorbidities, Lewy body dementia along with patient being on hemodialysis.  -- As patient has been n.p.o., is unable to take his oral medications, will order IV hydralazine 10 mg every 4 hours as needed for systolic blood pressure more than 170.    Acute kidney injury-initially resolved, later developed oliguric KAY on CKD stage IV  Status placement of tunneled catheter on 7/5/2023 and initiated on hemodialysis on 7/5   Pseudo hyponatremia-resolved  Hyponatremia  Anion gap metabolic acidosis likely due to renal failure  Hypokalemia-resolved    -- He does have CKD 3-4 at baseline and was noncompliant with medicines and follow-ups and during the course of hospital stay he developed oliguric renal failure   -- Nephrology has been consulted, following closely  -- Tunneled catheter was placed on 07/05, has been initiated on hemodialysis  --  As above, patient did not have any outpatient established nephrologist before the hospitalization although referrals were sent in the past.  -- Patient will be continued on amlodipine 5 mg twice daily and Imdur 60 mg daily for antihypertensive regimen while patient continues to undergo hemodialysis.  -- Per nephrology note, patient has Jennifer York outpatient chair Monday Wednesday Friday at 0530    Elevated D-dimer: Most likely secondary to CKD    --Patient had elevated D-dimer of 1 on  admission and he has history of COPD  --Patient does have CKD stage IV and cannot have CTA chest  --On admission he was started on high intensity heparin drip  --Duplex of the lower extremity does not show any evidence of DVT  --VQ scan has been ordered and does not show any evidence of PE  --Echo has been ordered results as above    History of COPD  Acute hypoxic respiratory failure, improving with dialysis    -- Not on home oxygen;  on presentation was noted 85% on room air; on 3 to 4 L oxygen  --Patient did get IV Solu-Medrol in the ER along with DuoNebs and his PTA inhalers were started  --Chest x-ray done on admission showed pulmonary vascular congestion with likely mild to moderate pulmonary edema and small bilateral pleural effusions with compressive atelectasis, right larger than left with no pneumothorax  --7/4-chest x-ray was done which showed that vascular congestion has improved  and opacity over the right lung has improved but there is persistent opacity  --His white count was normal but given his oxygen requirement and elevated procalcitonin at 0.26  , he was started on empiric ceftriaxone, patient has now completed his 5-day course of ceftriaxone.  --Chest x-ray was again repeated on 07/10/2023 as above, did not show any infiltrates concern for mild to moderate pulmonary edema.  --Continue with as needed DuoNebs  -- COPD team consulted and patient will need nebulization machine on discharge from rehab     Uncontrolled diabetes; hemoglobin A1c came back 10.9    Family reported medication noncompliance.  His insulin regimen was recently adjusted and switched from NPH to Lantus and NovoLog combination  Patient was supposed to be taking 30 units of Lantus and NovoLog 10 units 3 times daily with meals which he has not started yet as per wife.  Initially during this hospitalization, patient was a started on insulin infusion which was then transitioned to basal and bolus, blood sugars were noticed to be  fluctuating, also patient had n.p.o. status for pacemaker placement during the hospitalization  Patient now tolerating Lantus 10 units.    --Continue patient on Lantus 10 units, blood sugars have been running below 200  --We will hold Lantus this morning as patient was n.p.o. on BiPAP  -- Continue high-dose sliding scale insulin  --Discussed with bedside nursing if patient is not able to be started back on oral diet then will change sliding scale and Accu-Cheks for n.p.o. status and will start patient on D5 at 30 mL/h.  -- Continue meal coverage with 1 unit of NovoLog for 15 g of carb  -- Avoid hypoglycemia, continue hypoglycemia protocol    Chronic neck pain, history of spinal fusion  -- will continue with his PTA Percocet and continue PTA Neurontin     Recently diagnosed Lewy Body Dementia  --noted  --follows neurology as outpatient    Chronic thrombocytopenia  --He does seem to have chronic thrombocytopenia and his platelet level fluctuates and has no evidence of bleeding and was 124 today     Physical deconditioning  --Patient has been evaluated by physical and Occupational Therapy, currently has been recommended to be discharged to TCU     Diet: Fluid restriction 1200 ML FLUID  Combination Diet Renal Diet (dialysis); Moderate Consistent Carb (60 g CHO per Meal) Diet  Room Service    DVT Prophylaxis: Heparin drip  Gardner Catheter: Not present  Lines: None     Cardiac Monitoring: ACTIVE order. Indication: Post- EP procedure (48 hours)  Code Status: Full Code        Disposition Plan :    Tentative plan to discharge to TCU once patient is clinically stable, complicated discharge, appreciate care coordinator and  help in safe disposition planning.  As above, we will have further discussion with patient family regarding goals of care.    Medical Decision Making     60 MINUTES SPENT BY ME on the date of service doing chart review, history, exam, documentation & further activities per the note.      "  Expected Discharge Date: 07/13/2023    Discharge Delays: Placement - TCU  Destination: other (comment) (TCU)  Discharge Comments: HD today.       Violetta Rome MD  Hospitalist Service  [facility]  _____________________________________________________________________    Interval History      Patient was seen and examined this morning, remains on BiPAP, plan to undergo hemodialysis later on, does not seem to be in significant respiratory distress at this time, tolerating BiPAP well, is able to answer some questions.  We did discuss about his current clinical status, patient seemed to be open to the idea of hospice/comfort care.  Patient does have underlying dementia and is unable to make decisions for himself completely on his own.  Patient is okay for his wife Lilia to be updated.  Plan of care was also discussed in detail with bedside nursing, discussed with bedside nursing regarding switching patient to nasal cannula or high flow nasal cannula after hemodialysis so patient can be started back on diet and can also be started on oral medications    Physical Exam     Vital signs:  Temp: 97.8  F (36.6  C) Temp src: Axillary BP: (!) 163/72 Pulse: 60   Resp: 28 SpO2: 97 % O2 Device: BiPAP/CPAP Oxygen Delivery: 12 LPM   Weight: 82.6 kg (182 lb)  Estimated body mass index is 27.71 kg/m  as calculated from the following:    Height as of 5/4/23: 1.726 m (5' 7.95\").    Weight as of this encounter: 82.6 kg (182 lb).    General: AOX2-3  Respiratory: Wearing 3 L of nasal cannula oxygen, no significant wheezing, few basal crackles, he has right chest tunneled catheter  Cardiovascular: Regular rate , S1 and S2 normal with no murmer or rubs or gallops and bilateral edema  Abdomen:   soft , non tender ,   Neurologic:  No facial droop  Musculoskeletal: Normal Range of motion over upper and lower extremities bilaterally   Psychiatric: cooperative         Data     I have personally reviewed the following data " over the past 24 hrs:    9.7  \   10.9 (L)   / 120 (L)     N/A N/A N/A /  162 (H)   4.0 N/A N/A \       Procal: N/A CRP: N/A Lactic Acid: 1.1         Imaging results reviewed over the past 24 hrs:   Recent Results (from the past 24 hour(s))   XR Abdomen Port 1 View    Narrative    EXAM: XR ABDOMEN PORT 1 VIEW  LOCATION: [facility]  DATE: 7/10/2023    INDICATION: constipation  COMPARISON: None.      Impression    IMPRESSION: Suboptimal combination. Left abdomen incompletely visualized. Bowel gas pattern is normal. Moderate stool gas retention. Nothing for obstruction or free air. No evidence for renal stones.   XR Chest Port 1 View    Narrative    EXAM: XR CHEST PORT 1 VIEW  LOCATION: [facility]  DATE: 7/10/2023    INDICATION: Shortness of breath.  COMPARISON: 07/06/2023      Impression    IMPRESSION:     New right IJ catheter with tip in the mid SVC. Left subclavian approach pacemaker. Median sternotomy wires. Partially visualized cervical spinal fusion hardware.    Increased layering right pleural effusion with adjacent compressive atelectasis. Decreased trace left pleural effusion. No pneumothorax.    Diffuse prominence of the pulmonary vascularity with hazy perihilar and lower lobe opacities, likely mild-to-moderate pulmonary edema.    Stable cardiomegaly.

## 2023-07-11 NOTE — PLAN OF CARE
A&OX4, forgetful. Tele: 100% v-paced. BP 160s - highest 180s and treated w/ PRN hydralazine. Increased SOB and abdominal pain - on Bipap @ 40%. Abdominal pain went away once placed on Bipap. Pt reports feeling dizzy when sitting on edge of bed - unable to have BM on bedside commode.  Suppository, mirilax, senna, given. No BM since 7/7. Very small, wartery stool/smear w bedpan. Abdominal sounds varied over shift - L side audible and R side - less audible. L side pain, R side more distended. CXR pre-RRT (+) for plueral effusion and pulm edema and Abdominal XR (-) for obstruction. HD site - CDI and CMS intact. BLE 2-3+. Facial edema noted.   Plan: MWF dialysis, BM regimen, pending discharge - possible TCU

## 2023-07-11 NOTE — PROGRESS NOTES
Increased SOB, RR 30, belly breathing, 5L to 12L oxymizer. Repeating he cant breathe for the last 30 mins. RT said to give albuterol inhaler and eval from there. X-rays being done currently. Per Rolando Osullivan MD - call RRT.

## 2023-07-11 NOTE — PROGRESS NOTES
Renal Medicine       Following for advanced CKD  Acute Kidney Injury  Dialysis dependence      Potential renal recovery unclear    Second visit  Urgent dialysis for SOB  Patient currently on BiPAP    Dialysis run parameters reviewed with dialysis RN at patient bedside.  Seen on run    2.5 hours  2.5 liter    Run 07/12/23        Recent Labs   Lab 07/11/23  0745 07/11/23  0532 07/10/23  0723 07/10/23  0630   NA  --  131*  --  127*   POTASSIUM  --  4.0  --  3.6   CHLORIDE  --   --   --  89*   CO2  --   --   --  23   ANIONGAP  --   --   --  15   *  --    < > 170*   BUN  --   --   --  28.9*   CR  --  2.30*  --  3.01*   GFRESTIMATED  --  30*  --  22*   WANDA  --   --   --  8.7*    < > = values in this interval not displayed.           BRODIE Dc    [facility]  [phone]

## 2023-07-11 NOTE — CODE/RAPID RESPONSE
[facility]    House BILL RRT Note  7/10/2023   Time Called: 2203    RRT called for: SOB, abdominal pain    Assessment & Plan     Acute hypoxic respiratory failure 2/2 mild-moderate pulmonary edema, R pleural effusion in setting of acute on chronic HFpEF/HFrEF, KAY on CKD IV, atelectasis in setting of PMH COPD.  Alternatively considered PE, ACS, fever, HAP, aspiration PNA/pneumonitis, PTX  L sided abdominal pain likely 2/2 constipation, moderate stool burden.  Alternatively considered bowel perforation, mesenteric ischemia, diverticulitis, PUD/GERD  - Upon arrival, pt lying in bed at 60 degrees, awake, alert, in moderate respiratory distress.  Nursing notes while at rest in bed, pt developed acute tachypnea, reporting SOB and new onset LLQ sharp abdominal pain.  Nursing notes pt had attempted to sit on edge of bed recently, but pt noted he was not able to due to SOB.  Nursing notes pt had auscultated and audible expiratory wheezes noted at that time.  Pt tachypneic and required 12L O2 oximyzer to maintain O2 sats > 90%, up from 5L O2 via NC.  Pt notes abdominal pain 7/10, tender with palpation.  Pt notes he had a regular stool a couple days ago.  Pt notes tolerating adequate PO intake.  Pt reports no recent N/V, no concerns for aspiration.  Pt's VS noting SBP 170s-180s, HR 60, RR 20s-30s, afebrile.         INTERVENTIONS:  - Cross covering hospitalist recently ordered stat CXR, abd xray  - Bipap therapy  - Will order PRN dulcolax suppository, simethicone   - Pt remains IM status  - Will obtain CBC with AM labs  - Goal O2 sat > 90%  - Will defer to nephrology if additional HD run deemed appropriate     At the end of the RRT pt reports much improved SOB on Bipap therapy and notes improving abdominal pain, requesting for lights to be dimmed    Discussed with and defer further cares to nursing and Dr. Rolando Osullivan, cross covering hospitalist    Interval History     Hayder Alves is a 68  year old male who was admitted on 6/29/2023 for SOB, LUE pain.    Medical history significant for: Recently diagnosed Lewy Body Dementia, chronic neck pain (s/p spinal fusion), COPD (not on home oxygen), CAD (s/p CABG 2001), CKD stage IV, hypertension, dyslipidemia, systolic CHF, diabetes mellitus, medication noncompliance, h/o atrial fibrillation (s/p cardioversion 2020; not currently on anticoagulation)    Code Status: Full Code    Allergies   Allergies   Allergen Reactions     Adhesive Tape      From blood draw site.     Eszopiclone      Strange dreams. Cooking during sleep      Latex      Lipitor [Atorvastatin Calcium] Other (See Comments)     myalgias     Lisinopril Other (See Comments)     Hyperkalemia and increased creatinine       Physical Exam   Vital Signs with Ranges:  Temp:  [97.7  F (36.5  C)-98.2  F (36.8  C)] 97.7  F (36.5  C)  Pulse:  [59-60] 60  Resp:  [11-35] 28  BP: (136-186)/(62-78) 150/68  FiO2 (%):  [35 %] 35 %  SpO2:  [87 %-97 %] 94 %  I/O last 3 completed shifts:  In: 3782 [P.O.:780; Other:3002]  Out: 3402 [Other:3402]    Constitutional: Pt lying in bed at 60 degrees, awake, alert, in moderate respiratory distress  Neck: No upper airway wheezes or stridor noted  Pulmonary: In moderate respiratory distress, clear to auscultation bilaterally, no crackles or wheezes noted  Cardiovascular: Regular rate and rhythm, normal S1S2, no murmur, rub or gallop noted  GI: Round, soft, nondistended, mild tenderness with palpation, no obvious guarding or rebound tenderness noted, active bowel sounds  Skin/Integumen: Warm, dry, pink  Neuro: Awake, alert, clear speech, no obvious focal neuro deficit noted  Psych:  Calm  Extremities: Trace BLE edema    Data     IMAGING: (X-ray/CT/MRI)   Recent Results (from the past 24 hour(s))   XR Abdomen Port 1 View    Narrative    EXAM: XR ABDOMEN PORT 1 VIEW  LOCATION: [facility]  DATE: 7/10/2023    INDICATION: constipation  COMPARISON: None.       Impression    IMPRESSION: Suboptimal combination. Left abdomen incompletely visualized. Bowel gas pattern is normal. Moderate stool gas retention. Nothing for obstruction or free air. No evidence for renal stones.   XR Chest Port 1 View    Narrative    EXAM: XR CHEST PORT 1 VIEW  LOCATION: [facility]  DATE: 7/10/2023    INDICATION: Shortness of breath.  COMPARISON: 07/06/2023      Impression    IMPRESSION:     New right IJ catheter with tip in the mid SVC. Left subclavian approach pacemaker. Median sternotomy wires. Partially visualized cervical spinal fusion hardware.    Increased layering right pleural effusion with adjacent compressive atelectasis. Decreased trace left pleural effusion. No pneumothorax.    Diffuse prominence of the pulmonary vascularity with hazy perihilar and lower lobe opacities, likely mild-to-moderate pulmonary edema.    Stable cardiomegaly.     Lactic acid:  Recent Labs   Lab 07/10/23  2120   LACT 1.1     Time Spent on this Encounter   I spent 40 minutes of critical care time on the unit/floor managing the care of Hayder Alves. Upon evaluation, this patient had a high probability of imminent or life-threatening deterioration due to respiratory failure, abdominal pain, which required my direct attention, intervention, and personal management. 100% of my time was spent at the bedside counseling the patient and/or coordinating care regarding services listed in this note.    MICKEY Fernandez [facility]  Hospitalist-House BILL  Hospitalist Service  Securely message with deeplocal (more info)  Text page via [facility] Paging/Directory

## 2023-07-12 NOTE — PROGRESS NOTES
[facility]    Medicine Progress Note - Hospitalist Service    Date of Admission:  6/29/2023    Assessment & Plan     Hayder Alves is a 68 year old male with a very complicated  medical history significant for recently diagnosed Lewy Body Dementia, chronic neck pain (s/p spinal fusion), COPD (not on home oxygen), CAD (s/p CABG 2001), CKD stage IV, hypertension, dyslipidemia, systolic CHF, diabetes mellitus, medication noncompliance, h/o atrial fibrillation (s/p cardioversion 2020; not currently on anticoagulation) presented to ED with shortness of breath and left arm pain.    Acute hypoxic respiratory failure likely due to acute on chronic systolic heart failure and exacerbation: Fluid overload is also playing role, patient is developing recurrent flash pulmonary edema, currently on high flow nasal cannula 45 L with 50% FiO2  S/p RRT on 07/10/2023: From flash pulmonary edema ,patient underwent emergent hemodialysis on 07/11/2023, see note from 07/11/2023 for further details  Type II NSTEMI, most likely secondary to above  History of coronary artery disease status CABG  New atrial flutter, likely paroxysmal atrial fibrillation h/o cardioversion in 2020, not currently on anticoagulation)  Severe bradycardia/complete heart block s/p PPM on 7/5/2023  Essential Hypertension  Dyslipidemia  Moderate pulmonary hypertension-newly diagnosed     Hospital course :  -- Initially on admission presented with shortness of breath and chest discomfort.    --On presentation to ER his heart rate was in 30s, EKG showed atrial flutter   -- proBNP was significantly elevated at 72262 and troponin was elevated at 118  -- Last Echo was in June 2020 showed EF of 55 to 60%, no wall motion changes, mild dilated left atrium and trace MR and TR  -- Chest x-ray from ED was concerning for volume overload a  -- Patient was given 60 IV Lasix in the ED , cardiology was consulted   -- Patient was started on heparin  drip  -- He had echo done in June 2022 and at that time his EF was 55 to 60%, no wall motion changes, mildly dilated left atrium, normal RV ventricular size and systolic function and mildly dilated atrium  --Duplex of the lower extremity did not show any evidence of DVT  --Troponin peaked at 118 and have trended down to 103  --Echo on 7/1/23 showed EF of 50 to 55%, grade 2 or moderate diastolic dysfunction, mild inferior lateral wall hypokinesis, mild biatrial enlargement, RV systolic function is borderline reduced, RVSP is elevated consistent with moderate pulmonary hypertension, mild aortic Valvular stenosis  --Given his KAY on CKD 4 , initially cardiac catheterization was not considered due to the concern for patient might end up on dialysis   -- S/P PPM placement on 7/5/2023   -- Nephrology has been following closely, patient did not have any outpatient established nephrologist before the hospitalization although referrals were sent in the past.    Plan :  -- Continue to monitor patient on telemetry, currently on high flow nasal cannula  --Patient required BiPAP but has been able to come off of BiPAP after hemodialysis on 07/11/2023  --Wife present at the bedside, detailed conversation was done, please review goals of care conversation below  --Continue patient on amlodipine 5 mg twice daily  --Continue patient on Imdur 60 mg p.o. daily  --Cardiology has signed off, patient was evaluated by EP on 07/05 and has been recommended to have follow-up in an outpatient setting  -- Considering patient family would like to pursue restorative management and patient has been developing recurrent flash pulmonary edema, will discuss with general cardiology if patient will benefit from further ischemic cardiac work-up which was not pursued initially due to the concern for patient's CKD stage IV.  -- As needed hydralazine available every 4 hours for systolic blood pressure more than 170.    Acute kidney injury-initially  resolved  Later developed oliguric KAY on CKD stage IV  Status placement of tunneled catheter on 7/5/2023   HD start since 7/5   Hyponatremia, secondary to hypervolemia  Anion gap metabolic acidosis likely due to renal failure  Hypokalemia-resolved    --Patient had CKD stage III-IV at baseline, history of noncompliance with medications and follow-ups  -- It seems like during the hospitalization, patient developed oliguric renal failure.  -- Nephrology has been consulted, following closely  -- Tunneled catheter was placed on 07/05, has been initiated on hemodialysis  --  As above, patient did not have any outpatient established nephrologist before the hospitalization although referrals were sent in the past.  -- Patient will be continued on amlodipine 5 mg twice daily and Imdur 60 mg daily for antihypertensive regimen while patient continues to undergo hemodialysis.  -- Per nephrology note, patient has [facility] outpatient chair Monday Wednesday Friday at 0530  -- Patient has undergone hemodialysis persistently on 07/10/2023 and then emergent hemodialysis on 07/11/2023 due to worsening respiratory status and is planned to undergo further hemodialysis later this afternoon.  -- Patient will be getting dialysis in CICU as remains on 50% FiO2 on 45 L.    Goals of care discussion:  -- 07/11/2023: Patient critical status, RRT events and additional hemodialysis was discussed with patient's wife Lilia on phone, we also discussed about involving palliative care.  -- 07/12/2023: Long discussion with patient's wife at bedside, son Pancho was also present on the phone.  Patient clinical status was updated.  Patient has been off BiPAP, remains on 50% FiO2 with 45 L of oxygen, discussed with patient's wife that patient currently remains at risk for recurrent flash pulmonary edema, we need to have further discussion with nephrology.  Patient also have significant underlying dementia, wife and son showed understanding, they are  in agreement to meet with palliative team to have further discussion  -- I also discussed with patient,s wife and son regarding considering CODE STATUS to DNR/DNI   -- I also discussed patient current clinical status with palliative team, they will be meeting with patient and family later in the day, highly appreciate their help  -- Plan of care was also discussed with bedside nursing in detail    Elevated D-dimer: Most likely secondary to CKD  --Patient had elevated D-dimer on admission and he has history of COPD  --Patient does have CKD stage IV and cannot have CTA chest  --On admission he was started on high intensity heparin drip  --Duplex of the lower extremity does not show any evidence of DVT  --VQ scan has been ordered and does not show any evidence of PE  --Echo has been ordered results as above    History of COPD  Acute hypoxic respiratory failure, improving with dialysis    -- Not on home oxygen;  on presentation was noted 85% on room air; on 3 to 4 L oxygen  --Patient did get IV Solu-Medrol in the ER along with DuoNebs and his PTA inhalers were started  --Chest x-ray done on admission showed pulmonary vascular congestion with likely mild to moderate pulmonary edema and small bilateral pleural effusions with compressive atelectasis, right larger than left with no pneumothorax  --7/4-chest x-ray was done which showed that vascular congestion has improved  and opacity over the right lung has improved but there is persistent opacity  --His white count was normal but given his oxygen requirement and elevated procalcitonin at 0.26  , he was started on empiric ceftriaxone, patient has now completed his 5-day course of ceftriaxone.  --Chest x-ray was again repeated on 07/10/2023 as above, did not show any infiltrates concern for mild to moderate pulmonary edema.  --Continue with as needed DuoNebs  -- COPD team consulted and patient will need nebulization machine on discharge from rehab     Uncontrolled diabetes;    Hemoglobin A1c came back 10.9  Family reported medication noncompliance.  His insulin regimen was recently adjusted and switched from NPH to Lantus and NovoLog combination  Patient was supposed to be taking 30 units of Lantus and NovoLog 10 units 3 times daily with meals which he has not started yet as per wife.  Initially during this hospitalization, patient was a started on insulin infusion which was then transitioned to basal and bolus, blood sugars were noticed to be fluctuating, also patient had n.p.o. status for pacemaker placement during the hospitalization  Patient now tolerating Lantus 10 units.    --Patient has been receiving Lantus 10 units, was on BiPAP, now switched to high flow nasal cannula, will hold Lantus today  -- Continue patient on high-dose sliding scale insulin.  --Continue patient on meal coverage with 1 unit of NovoLog for 15 g of carb.  -- Avoid hypoglycemia, continue hypoglycemia protocol    Chronic neck pain, history of spinal fusion  -- will Continue  PTA Percocet and continue PTA Neurontin     Recently diagnosed Lewy Body Dementia  --noted  --follows neurology as outpatient    Chronic thrombocytopenia  --He does seem to have chronic thrombocytopenia and his platelet level fluctuates and has no evidence of bleeding and was 124 today     Physical deconditioning  --Patient has been evaluated by physical and Occupational Therapy, currently has been recommended to be discharged to TCU     Diet: Fluid restriction 1200 ML FLUID  Combination Diet Renal Diet (dialysis); Moderate Consistent Carb (60 g CHO per Meal) Diet  Room Service    DVT Prophylaxis: Heparin drip  Gardner Catheter: Not present  Lines: None     Cardiac Monitoring: ACTIVE order. Indication: Post- EP procedure (48 hours)  Code Status: Full Code        Disposition Plan :    Patient recommended to be discharged to TCU by therapies, currently on high flow nasal cannula 45 L oxygen at 50% FiO2, not ready for discharge, [facility]  "conversation has been done with family, as above.    Medical Decision Making     60 MINUTES SPENT BY ME on the date of service doing chart review, history, exam, documentation & further activities per the note.       Expected Discharge Date: 07/13/2023    Discharge Delays: Placement - TCU  Destination: other (comment) (TCU)  Discharge Comments: HD today.       Violetta Rome MD  Hospitalist Service  [facility]  _____________________________________________________________________    Interval History      Patient was seen and examined this morning, currently on high flow nasal: Cannula, pleasantly confused baseline , has been cooperative with medical care in the hospital although history of noncompliance with medications and follow-ups in an outpatient setting.  Wife present at the bedside, son Pancho was also present on the phone.  Patient clinical status was updated, we did have discussion regarding his CODE STATUS and goals of care, they would have further discussion with palliative team, I discussed with them regarding multiple options of continuing with restorative care plan at this point versus consideration for comfort care considering patient multiple comorbidities.    Physical Exam     Vital signs:  Temp: 98.6  F (37  C) Temp src: Oral BP: (!) 158/80 Pulse: 61   Resp: 20 SpO2: (!) 86 % O2 Device: High Flow Nasal Cannula (HFNC) Oxygen Delivery: 45 LPM   Weight: 83.9 kg (184 lb 15.5 oz)  Estimated body mass index is 28.16 kg/m  as calculated from the following:    Height as of 5/4/23: 1.726 m (5' 7.95\").    Weight as of this encounter: 83.9 kg (184 lb 15.5 oz).    General: AOX2-3  Respiratory: On high flow nasal cannula, few basal crackles, right chest tunneled catheter in place  Cardiovascular: Regular rate , S1 and S2 normal with no murmer or rubs or gallops and bilateral edema  Abdomen:   soft , non tender ,   Neurologic:  No facial droop  Musculoskeletal: Normal Range of motion over " upper and lower extremities bilaterally   Psychiatric: cooperative         Data     I have personally reviewed the following data over the past 24 hrs:    N/A  \   N/A   / N/A     130 (L) 93 (L) 23.2 (H) /  142 (H)   4.2 22 2.62 (H) \       ALT: N/A AST: N/A AP: N/A TBILI: N/A   ALB: 3.6 TOT PROTEIN: N/A LIPASE: N/A       Imaging results reviewed over the past 24 hrs:   No results found for this or any previous visit (from the past 24 hour(s)).

## 2023-07-12 NOTE — PROGRESS NOTES
Renal Medicine Inpatient Dialysis Note                                Hayder Alves MRN# [id number]   Age: 68 year old YOB: 1955   Date of Admission: 6/29/2023 Hospital LOS: 13          Assessment/Plan:       1.  CKD stage 4              -baseline creatinine 2.5 to 3.0 mg/dl              -eGFR 25 ml/nmin              -presumed progressive diabetic nephropathy with significant proteinuria              -abnormal creatinines back to 2010             No established outpatient nephrologist, previous attempts to schedule with [facility], referrals 2021 and 2/23.       2.  Oliguric acute kidney injury.  Likely ischemic ATN related to hemodynamics, bradycardia.  Remains oliguric     3. Hyponatremia   -hypervolemic     Should improve as target weight decreased     4. MBD - , Vit D 21  5  Lewy body dementia  6  DM              -uncontrolled               -A1C > 10% for some time              -no retinopathy 2021  7. HTN:  on amlodipine 5mg BID and imdur 60mg daily.  Remains hypervolemic.    Continue MWF dialysis  UF as able    Seems unlikely to manage without long term dialysis   Advanced CKD prior to dialysis initiation  Significant renal recovery unlikely     [facility]    Interval History:     Dialysis run parameters reviewed with dialysis RN at patient bedside.  Seen on run  Follows    Edema persists    3 hours  3K  3.5 liter UF  IJ access    Stable initial portion of run     Reviewed current issues with wife and daughter by phone    ROS     GENERAL: NAD, No fever,chills  R: NEGATIVE for significant cough or SOB  CV: NEGATIVE for chest pain, palpitations  EXT: no change edema  ROS otherwise negative    Dialysis Parameters:     Vitals were reviewed  Patient Vitals for the past 8 hrs:   BP Temp Temp src Pulse Resp SpO2   07/12/23 1400 (!) 145/60 -- -- 60 -- 98 %   07/12/23 1345 126/87 -- -- 60 -- 96 %   07/12/23 1330 138/64 -- -- 59 -- 96 %   07/12/23 1317 --  -- -- -- -- 96 %   07/12/23 1315 124/81 -- -- 59 -- (!) 88 %   07/12/23 1307 -- -- -- -- -- 94 %   07/12/23 1300 (!) 149/64 -- -- 60 -- 97 %   07/12/23 1245 (!) 145/65 -- -- 61 -- 97 %   07/12/23 1230 (!) 155/65 -- -- 60 -- 95 %   07/12/23 1225 (!) 144/66 98.3  F (36.8  C) Oral 60 20 95 %   07/12/23 0902 (!) 145/63 -- -- -- -- --   07/12/23 0749 (!) 158/80 98.6  F (37  C) Oral 61 20 (!) 86 %   07/12/23 0704 -- -- -- -- -- 94 %     I/O last 3 completed shifts:  In: 2500 [Other:2500]  Out: 2900 [Other:2900]    Vitals:    07/08/23 0359 07/08/23 0550 07/09/23 0205 07/10/23 0158   Weight: 89 kg (196 lb 3.2 oz) 89 kg (196 lb 1.6 oz) 87.5 kg (192 lb 12.8 oz) 82.6 kg (182 lb)    07/12/23 0500   Weight: 83.9 kg (184 lb 15.5 oz)       Current Weight: 82.6  Dry Weight: 75 range ?  Dialysis Temp: 36.5  C  Access Device: IJ  Access Site: right  Dialyzer: Revaclear  Dialysis Bath: 3  Sodium Profile: n  UF Goal: 3  Blood Flow Rate (mL/min): 400  Total Treatment Time (hrs): 3  Heparin: Low dose as required      EPO dose: n  Zemplar: n  IV Fe: n      Medications and Allergies:     Reviewed      Physical Exam:     Seen and examined during course of dialysis run    BP (!) 145/60   Pulse 60   Temp 98.3  F (36.8  C) (Oral)   Resp 20   Wt 83.9 kg (184 lb 15.5 oz)   SpO2 98%   BMI 28.16 kg/m      GENERAL: awake, alert, follows  HEENT: NC/AT, PERRLA, EOMI, non icteric, pharynx moist without lesion  RESP: clear  CV: RRR, normal S1 S2  ABDOMEN: S/NT, BS present  MS: 3 + edema  SKIN: catheter site clean without drainage  NEURO: speech normal  PSYCH: affect normal/bright    Data:       Recent Labs   Lab 07/12/23  1216 07/12/23  0746 07/12/23  0529   NA  --   --  130*   POTASSIUM  --   --  4.2   CHLORIDE  --   --  93*   CO2  --   --  22   ANIONGAP  --   --  15   *   < > 143*   BUN  --   --  23.2*   CR  --   --  2.62*   GFRESTIMATED  --   --  26*   WANDA  --   --  8.8    < > = values in this interval not displayed.     Recent Labs    Lab 07/12/23  0529 07/11/23  0532 07/10/23  0630 07/09/23  0549 07/08/23  0544 07/07/23  0607 07/06/23  0552   CR 2.62* 2.30* 3.01* 2.52* 3.04* 3.81* 4.87*     Recent Labs   Lab Test 07/12/23  0529 07/11/23  0532 07/10/23  0630 07/09/23  0549 07/08/23  0544 07/07/23  0607 07/06/23  0552 07/05/23  0605 07/04/23  1153 07/04/23  0529   * 131* 127* 130* 127* 128* 125* 124* 129* 131*     Recent Labs   Lab 07/12/23  0529   ALBUMIN 3.6     Recent Labs   Lab 07/12/23  0529 07/11/23  0532 07/07/23  0607 07/06/23  0552   PHOS 3.9  --   --   --    HGB  --  10.9* 10.8* 10.6*         G Brayden Dc MD    [facility] - Nephrology  [phone]

## 2023-07-12 NOTE — PROGRESS NOTES
Care Management Follow Up    Length of Stay (days): 13    Expected Discharge Date: 07/13/2023     Concerns to be Addressed:       Patient plan of care discussed at interdisciplinary rounds: Yes    Anticipated Discharge Disposition: Skilled Nursing Facility, Transitional Care     Anticipated Discharge Services: None  Anticipated Discharge DME: None    Patient/family educated on Medicare website which has current facility and service quality ratings: yes  Education Provided on the Discharge Plan:    Patient/Family in Agreement with the Plan: yes    Referrals Placed by CM/SW:    Private pay costs discussed: Not applicable    Additional Information:  Followed up with [facility] intake regarding chair time/days of the week for outpatient dialysis.  Per [facility]  admissions intake, they are still needing Hepatitis B panel and first 3 dialysis runs faxed to them. Discussed with intake that previous [facility] notes state this information was faxed to them and intake stated it was never received.      CC faxed Hepatitis B panel and first 3 dialysis run notes to [phone] today.  Again requested afternoon or late morning chair time per pt original preference.  Noted that pt not medically ready at this time.    Lisa Noel RN, BS  Care Coordinator  [email or web address]

## 2023-07-12 NOTE — PLAN OF CARE
Pt A&O x4, but forgetful. Ax1-2, stood at side of bed once today. Previously reporting dizziness with standing. Tele - V paced. Diet - regular diet, heart healthy and low sodium. Pt's appetite increased today, ate all three meals during this shift. Pt's VSS on 45 L, 50% high flow oxygen. Pt no longer using accessory muscles while breathing now that he is on high flow. No reports of pain this shift. Bps have remained below threshold of need for IV hydralazine. No BM this shift. Baseline neck pain eased by warm wrap and scheduled meds.     Palliative consulted today, family set to meet with palliative on Friday afternoon. Pt received another round of dialysis today. 2.3 L removed today.    Plan: Continue to monitor respiratory status. Determine the goals of care with family/palliative on Friday.     Kavya Pitt SN

## 2023-07-12 NOTE — PLAN OF CARE
A&Ox2-3; time and situation. SBP 150s. High flow NC 50L/45% - tolerating well, desats w/ activity. Complains of dizziness when sitting at edge of bed. Urinal at bedside. Tele:v-paced. No CP reported. Audible bowel sounds - slight distention.  No BM this shift. Baseline pain in neck treated with scheduled meds and hot pack.   Plan: goals of care meeting today, possible dialysis

## 2023-07-12 NOTE — CONSULTS
Palliative Care Consultation Note  [facility]      Patient: Hayder Alves  Date of Admission:  6/29/2023    Requesting Clinician / Team: Dr. Rome/Hospitalist   Reason for consult: Goals of care     Recommendations & Counseling     GOALS OF CARE:     Restorative without limits      Pt and family want to give a few more days to see how Vijay's body responds to dialysis     Did review a comfort care plan and compassionate high flow O2 removal process, symptom management at end of life, and prepared pt and family that Vijay's dying process would likely be short and take place in the hospital     Plan to have a family care conference on Friday 7/14 at 3PM in private. Vijay is unable to demonstrate capacity to make his own complex medical decisions. He repeatedly states he wants to go home, yet lacks insight into his serious illness and limitations/barriers to going home at this point. Thus, surrogate decision makers per his HCD, son Pancho and wife Lilia, are serving as decision makers at this point     Recommended DNR/DNI. Pt indicates he doesn't want to die. As noted above, pt is not able to make complex medical decisions for himself. Wife Lilia did not indicate interest in changing code status today. Will need to readdress code status on Friday during family care conference     ADVANCE CARE PLANNING:    Patient has an advance directive dated 3/2022.  Primary Health Care Agent sharmin Deleon, wife Lilia.  Surrogates are sharmin Deleon, wife Lilia.     No POLST on file     Code status: Full Code    MEDICAL MANAGEMENT:     We are not currently managing symptoms at this time    We did discuss a comfort plan of care and reviewed how dyspnea/air hunger would be managed with opioids/benzos with compassionate high flow O2 removal     PSYCHOSOCIAL/SPIRITUAL SUPPORT:    Family - supportive wife Lilia. Sons Pancho and Ranjit, daughter Jade lives in [place] and available by phone today    [place] community: [employer] -  appreciates support from spiritual health team. Will ask for  to visit today, per wife's request     Palliative Care will continue to follow. Thank you for the consult and allowing us to aid in the care of Hayder Alves.    These recommendations have been discussed with nursing staff (Alexus Wilson RN) and Dr. Rome.    MICKEY Aragon CNP  Securely message with [employer] (more info)  Text page via [facility] Paging/Directory       Palliative Summary/HPI     Hayder Alves is a 68 year old male with a past medical history significant for recently diagnosed Lewy Body Dementia, chronic neck pain (s/p spinal fusion), COPD (not on home oxygen), CAD (s/p CABG 2001), CKD stage IV, hypertension, dyslipidemia, systolic CHF, diabetes mellitus, medication noncompliance, h/o atrial fibrillation (s/p cardioversion 2020; not currently on anticoagulation) who presents with shortness of breath and left arm pain. He was found to have severe bradycardia/complete heart block s/p PPM 7/5. His hospital course has been further complicated by acute hypoxic respiratory failure 2/2 acute on chronic systolic heart failure and exacerbation. He has developed oliguric KAY, requiring initiation of dialysis, Nephrology following. He is fluctuating between needing high flow O2 and bipap support due to flash pulmonary edema.     Today, the patient was seen for:  Goals of care     Palliative Care Summary:   Met with Vijay, along with wife Lilia in person, daughter Jade (who lives in [place]) via phone.   I introduced our role as an extra layer of support and how we help patients and families dealing with serious, potentially life-limiting illnesses. I explained the composition of the palliative care team.  Palliative care helps patients and families navigate their care while focusing on the whole person; providing emotional, social and spiritual support  Palliative care often assists with symptom management, information sharing about  "what to expect from the illness, available treatment options and what effect those options may have on the disease course, and provide effective communication and caring support.    Prognosis, Goals, & Planning:      Functional Status just prior to this current hospitalization:    newly diagnosed LBD     ECOG3 (Capable of only limited self-care; needs help with ADLs; in bed/chair >50% of waking hours)    Interim history/status while hospitalized: Nearly 2 week hospitalization, multi organ failure requiring life support (dialysis, high flow O2)      Prognosis, Goals, and/or Advance Care Planning:    Advance Care Planning Discussion 7/12/2023. Airam HASSAN APRN CNP met with Patient and their family (wife Lilia in person, daughter Jade via phone) today at the hospital to discuss Advance Care Planning. Hayder Alves does not have decisional capacity  and was present for this discussion.  Those present were informed of the voluntary nature of this discussion and wished to proceed. This discussion began at 1122 and ended at 1150 for a total of 28 minutes.    We discussed general treatment options (full/restorative, selective/conservatives, and comfort only/hospice). We then discussed how these specifically apply to Vijay. Based on this discussion:  It is clear that Vijay does not have capacity to make his own complex medical decisions. Will need to rely on son Pancho and wife Lilia, who are Vijay's designated surrogate decision makers, per his HCD  Vijay repeatedly states he wants to go home, yet cannot grasp the complexity of his health issues that are a barrier to going home. He does make statements that \"if it's my time, then it's my time.\"  We review his multi organ failure and the need for life sustaining treatments (dialysis, high flow O2). Family grasps that this may not improve and this may in fact represent Vijay's dying process   - Pt and family want to give a few more days to see how Vijay's body " responds to dialysis   - We did review what comfort measures and a compassionate high flow O2 removal process would entail. Prepared pt and family that his dying process is likely to be short and will take place in the hospital (difficult for Vijay to understand this). Discussed symptom management at end of life, particularly with compassionate high flow O2 removal   - Plan to have a family care conference on Friday 7/14 at 3PM in private. Vijay is unable to demonstrate capacity to make his own complex medical decisions. He repeatedly states he wants to go home, yet lacks insight into his serious illness and limitations/barriers to going home at this point. Thus, surrogate decision makers per his HCD, son Pancho and wife Lilia, are serving as decision makers at this point   - Recommended DNR/DNI. Pt indicates he doesn't want to die. As noted above, pt is not able to make complex medical decisions for himself. Wife Lilia did not indicate interest in changing code status today. Will need to readdress code status on Friday during family care conference       Code Status was addressed today:     Yes, We discussed potential risks and rationale of attempting cardiac resuscitation, intubation, and mechanical ventilation.  We also discussed probability of survival as well as quality of life implications.  Based on this discussion, patient or surrogate response/decision: Pt indicates he doesn't want to die. As noted above, pt is not able to make complex medical decisions for himself. Wife Lilia did not indicate interest in changing code status today. Will need to readdress code status on Friday during family care conference         Patient's decision making preferences: unable to assess          Patient has decision-making capacity today for complex decisions: No            Coping, Meaning, & Spirituality:     Mood, coping, and/or meaning in the context of serious illness were addressed today: Yes. Wife requesting to see a   today. Request placed.     Social:   Living situation:lives with significant other/spouse  Important relationships/caregivers:Wife Lilia, adult children (Ranjit, Pancho), daughter Jade lives in [place]  Areas of fulfillment/anthony: Plays handheld games (3 day Blind[employer]), enjoys blues music, his cat     Medications:  I have reviewed this patient's medication profile and medications from this hospitalization. Notable medications:  Amitriptyline 30mg PO at bedtime   Norvasc  ASA  Buspar 5mg PO TID   Bronchodilators  Gabapentin 300mg PO BID   Insulin   Duonebs   Imdur   Percocet 1 tab PO at bedtime   Crestor  Senna 1-2 tabs PO BID   Percocet 1 tab PO Q4hrs PRN pain     ROS:  Comprehensive ROS is reviewed and is negative except as here & per HPI: N/A    Physical Exam   Vital Signs with Ranges  Temp:  [97.9  F (36.6  C)-98.6  F (37  C)] 98.6  F (37  C)  Pulse:  [57-63] 61  Resp:  [20] 20  BP: (125-164)/(52-80) 145/63  FiO2 (%):  [50 %] 50 %  SpO2:  [86 %-100 %] 86 %  184 lbs 15.46 oz  CONSTITUTIONAL: Chronically ill man seen resting in bed in NAD, conversant, [place], disoriented to situation. He is calm and cooperative. Wife at bedside   HEENT: NCAT  RESPIRATORY: NL respiratory effort on high flow O2   NEUROLOGIC: Appropriately responsive during interview, yet disoriented   PSYCH: Affect engaged     Data reviewed:  Recent imaging independently reviewed, my comments on pertinents:   7/10 CXR with pulmonary edema     Recent lab data independently reviewed, my comments on pertinents:   Na 130  K 4.2  Creat 2.62  Phos 3.9  WBC 9.7  Hgb 10.9  Plt 120  Albumin 3.6    Medical Decision Making   Please see A&P for additional details of medical decision making.  MANAGEMENT DISCUSSED with the following over the past 24 hours: Dr. Rome, nursing staff (Katie Vaughan)    NOTE(S)/MEDICAL RECORDS REVIEWED over the past 24 hours: Hospitalist notes, Nephrology notes, Cardiology notes, H&P, nursing notes   Tests personally interpreted in the past 24  hours:  - CHEST XRAY showing pulmonary edema   Tests REVIEWED in the past 24 hours:  - BMP  - CBC  SUPPLEMENTAL HISTORY, in addition to the patient's history, over the past 24 hours obtained from:   - Alexus Donahue and Katie RN, wife Lilia, daughter Jade  Medical complexity over the past 24 hours:  - Decision to DE-ESCALATE CARE based on prognosis

## 2023-07-12 NOTE — PROGRESS NOTES
Potassium   Date Value Ref Range Status   07/12/2023 4.2 3.4 - 5.3 mmol/L Final   06/20/2022 4.2 3.4 - 5.3 mmol/L Final   02/10/2021 4.6 3.4 - 5.3 mmol/L Final     Hemoglobin   Date Value Ref Range Status   07/11/2023 10.9 (L) 13.3 - 17.7 g/dL Final   02/19/2020 15.4 13.3 - 17.7 g/dL Final     Creatinine   Date Value Ref Range Status   07/12/2023 2.62 (H) 0.67 - 1.17 mg/dL Final   02/10/2021 1.98 (H) 0.66 - 1.25 mg/dL Final     Urea Nitrogen   Date Value Ref Range Status   07/12/2023 23.2 (H) 8.0 - 23.0 mg/dL Final   06/20/2022 24 7 - 30 mg/dL Final   02/10/2021 27 7 - 30 mg/dL Final     Sodium   Date Value Ref Range Status   07/12/2023 130 (L) 136 - 145 mmol/L Final   02/10/2021 137 133 - 144 mmol/L Final     INR   Date Value Ref Range Status   10/10/2016 1.08 0.86 - 1.14 Final      Latest Reference Range & Units 07/06/23 05:52   Hep B Surface Agn Nonreactive  Nonreactive   Hepatitis B Core Lilli Nonreactive  Nonreactive   Hepatitis B Surface Antibody Instrument Value <8.00 m[IU]/mL 0.99   Hepatitis B Surface Antibody  Nonreactive       DIALYSIS PROCEDURE NOTE  Hepatitis status of previous patient on machine log was checked and verified ok to use with this patients hepatitis status.  Patient dialyzed for 3 hrs. on a K3 bath with a net fluid removal of  2.3L.  A BFR of 400 ml/min was obtained via a Right CVC.     The treatment plan was discussed with Dr. Shell during the treatment.    Total heparin received during the treatment: 0 units.   Line flushed, clamped and capped with heparin 1:1000 1.9 mL (1900 units) per lumen    Meds  given: None   Complications: pt reported camping in right hand UF turned off and turned on when cramping resolved. See flow sheet for details. MD aware.      Person educated: patient/family. Knowledge base none. Barriers to learning: intermittent confusion. Educated on procedure via verbal mode. patient/family verbalized understanding. Pt prefers verbal education style.     ICEBOAT? Timeout  performed pre-treatment  I: Patient was identified using 2 identifiers  C:  Consent Signed Yes  E: Equipment preventative maintenance is current and dialysis delivery system OK to use  B: See note above  O: Dialysis orders present and complete prior to treatment  A: Vascular access verified and assessed prior to treatment  T: Treatment was performed at a clinically appropriate time  ?: Patient was allowed to ask questions and address concerns prior to treatment  See Adult Hemodialysis flowsheet in EPIC for further details and post assessment.  Machine water alarm in place and functioning. Transducer pods intact and checked every 15min.   Pt received treatment in 267  Chlorine/Chloramine water system checked every 4 hours.  Outpatient Dialysis at D    Patient repositioned every 2 hours during the treatment.  Post treatment report given to ELBA Quigley RN regarding 2.3L of fluid removed, last BP of 153/65, and patient pain rating of 0/10.

## 2023-07-12 NOTE — CONSULTS
"SPIRITUAL HEALTH SERVICES Progress Note  [facility]    Saw pt Hayder \"Bill\" DEYSI Alves and his spouse, Lilia, per bedside nurse referral and routine consult request placed by palliative provider for support of patient and family.      Patient/Family Understanding of Illness and Goals of Care - Vijay expressed hope that he might be able to \"go home,\" but then recognized that he can't go home with the dialysis machine. Lilia shared that following conversations with palliative care and the \"kidney doctor\" that they want to \"try this for 2 more days\" and then make a decision about next steps of care.      Distress and Loss     Vijay was tearful as he shared that he wants \"Lilia and the kids to be okay after all of this is done.\" He named that he misses his granddaughters and his cat. He was also shared concern about his granddaughters (8 yrs and twins 4 yrs) seeing him in the hospital because he doesn't want \"them to be afraid.\"    Lilia spoke about the stress of seeing Vijay's decline with Lewy body over the last five years. Since coming into the hospital there has been such a significant change in his health that Lilia is struggling with making decisions about how much and how long to continue medical interventions.    Lilia shared regarding the grief and loss that their family has experienced over the last years with a number of family members dying and Vijay's mother and two sisters also having been diagnosed with or  from Lewy body dementia.      Strengths, Coping, and Resources     Lilia named family and friends as being primary sources of support. She also shared that \"information\" is helpful, so she has been reading about dementia, recognizing that at times the same information can become \"too much to take in.\"    Vijay named his cat, \"Kita\"      Meaning, Beliefs, and Spirituality - Both Vijay and Lilia shared that their [employer] cindy is important to them. They have attended a number of " "churches in the past, but are not connected to a particular Ho[employer]ism at this time. Lilia shared that she listens to [employer]TIS on her way back and forth to the hospital \"which is helpful.\" Bill welcomed prayer, which I offered.     Lilia requested SHS participation in the family care conference on Friday at 3pm, if possible.      Plan of Care - Will update palliative SHS team about request for presence at Friday afternoon's care conference. SHS will continue to follow for spiritual and emotional support.    Lexie Staton MDiv  Associate   [facility] pager [phone]  [facility] phone [phone]    [facility] available 24/7 for emergent requests/referrals, either by having the on-call  paged or by entering an ASAP/STAT consult in Epic (this will also page the on-call ).        "

## 2023-07-13 NOTE — PROVIDER NOTIFICATION
MD Notification    Notified Person: MD    Notified Person Name: antoni Rome    Notification Date/Time: 7/13/2023 at 1558    Notification Interaction: awaiting order    Purpose of Notification: pt is comlaaining on throbbing feet pain, asking for cream like medication.    Orders Received:    Comments:  1610: Spoke with Dr. Wild MD will order Voltaren 1% gel 2g QID for pain.

## 2023-07-13 NOTE — PLAN OF CARE
Pt stable over night with no change in his current conditions.  VSS, he continues on HFNC and his respiratory status at rest is stable but he is very SOB/CORNELIUS with standing at bedside.  Tele:V Paced.  Pt has been sleeping between cares over night and is able to make his needs known.  No new issues noted.

## 2023-07-13 NOTE — PROGRESS NOTES
Renal Medicine Progress Note                                Hayder Alves MRN# [id number]   Age: 68 year old YOB: 1955   Date of Admission: 6/29/2023 Hospital LOS: 14                  Assessment/Plan:     1.  CKD stage 4              -baseline creatinine 2.5 to 3.0 mg/dl              -eGFR 25 ml/nmin              -presumed progressive diabetic nephropathy with significant proteinuria              -abnormal creatinines back to 2010             No established outpatient nephrologist, previous attempts to schedule with [facility], referrals 2021 and 2/23.       2.  Oliguric acute kidney injury.  Likely ischemic ATN related to hemodynamics, bradycardia.  Remains oliguric     3. Hyponatremia              -hypervolemic      Should improve as target weight decreased      4. MBD - , Vit D 21  5  Lewy body dementia  6  DM              -uncontrolled               -A1C > 10% for some time              -no retinopathy 2021  7. HTN:  on amlodipine 5mg BID and imdur 60mg daily.  Remains hypervolemic.  8. ? CAD      Dialysis 07/14/23  Continued UF     Would avoid IV contrast   Could limit any renal recovery    Care conference tomorrow to delineate goals of care       Interval History:     Remains in bed  Unclear if can sit in chair for several hours (outpatient dialysis requirement)  O2 dependent    UF over past 3 days = 8600 ml     ROS:     GENERAL: NAD, No fever,chills  RESP:dyspnea on exertion  CV: NEGATIVE for chest pain, palpitations  EXT: no change edema  ROS otherwise negative    Medications and Allergies:     Reviewed    Physical Exam:     Vitals were reviewed  Patient Vitals for the past 8 hrs:   BP Temp Temp src Pulse Resp SpO2 Weight   07/13/23 1046 -- -- -- -- -- 93 % --   07/13/23 1000 133/66 -- -- 61 -- 95 % --   07/13/23 0751 -- -- -- -- -- 94 % --   07/13/23 0747 134/62 98.4  F (36.9  C) Oral 60 18 -- --   07/13/23 0607 -- -- -- -- -- -- 83.5 kg (184 lb 1.4 oz)   07/13/23 0600 (!)  147/64 -- -- 59 20 93 % --   07/13/23 0400 (!) 147/63 -- -- 60 -- 90 % --     I/O last 3 completed shifts:  In: -   Out: 2300 [Other:2300]    Vitals:    07/08/23 0550 07/09/23 0205 07/10/23 0158 07/12/23 0500   Weight: 89 kg (196 lb 1.6 oz) 87.5 kg (192 lb 12.8 oz) 82.6 kg (182 lb) 83.9 kg (184 lb 15.5 oz)    07/13/23 0607   Weight: 83.5 kg (184 lb 1.4 oz)         GENERAL: awake, alert, follows  HEENT: NC/AT, PERRLA, EOMI, non icteric, pharynx moist without lesion  NECK: supple, no masses or adenopathy  RESP:  clear anteriorly  CV: RRR, normal S1 S2  ABDOMEN: soft  MS: no clubbing, cyanosis   SKIN: clear without significant rashes or lesions  EXT: edema     Data:     Recent Labs   Lab 07/13/23  0857 07/13/23  0602 07/13/23  0222 07/12/23  2109 07/12/23  1216 07/12/23  0746 07/12/23  0529 07/11/23  0745 07/11/23  0532 07/10/23  0723 07/10/23  0630 07/09/23  0725 07/09/23  0549 07/08/23  0729 07/08/23  0544   NA  --   --   --   --   --   --  130*  --  131*  --  127*  --  130*  --  127*   POTASSIUM  --  3.7  --   --   --   --  4.2  --  4.0  --  3.6  --  3.8  --  3.9   CHLORIDE  --   --   --   --   --   --  93*  --   --   --  89*  --  92*  --  90*   CO2  --   --   --   --   --   --  22  --   --   --  23  --  23  --  21*   ANIONGAP  --   --   --   --   --   --  15  --   --   --  15  --  15  --  16*   *  --  149* 171* 152*   < > 143*   < >  --    < > 170*   < > 184*   < > 231*   BUN  --   --   --   --   --   --  23.2*  --   --   --  28.9*  --  20.7  --  35.8*   CR  --   --   --   --   --   --  2.62*  --  2.30*  --  3.01*  --  2.52*  --  3.04*   GFRESTIMATED  --   --   --   --   --   --  26*  --  30*  --  22*  --  27*  --  21*   WANDA  --   --   --   --   --   --  8.8  --   --   --  8.7*  --  8.0*  --  8.0*    < > = values in this interval not displayed.         Recent Labs   Lab Test 07/12/23  0529 07/11/23  0532 07/10/23  0630 07/09/23  0549 07/08/23  0544 07/07/23  0607 07/06/23  0552 07/05/23  0605 07/04/23  1150  07/04/23  0529   CR 2.62* 2.30* 3.01* 2.52* 3.04* 3.81* 4.87* 4.20* 2.87* 2.54*     Recent Labs   Lab 07/12/23  0529   ALBUMIN 3.6     Recent Labs   Lab 07/12/23  0529 07/11/23  0532 07/07/23  0607   PHOS 3.9  --   --    HGB  --  10.9* 10.8*         G Brayden Dc MD    [facility] - Nephrology  [phone]

## 2023-07-13 NOTE — PROGRESS NOTES
[facility]    Medicine Progress Note - Hospitalist Service    Date of Admission:  6/29/2023    Assessment & Plan     Hayder Alves is a 68 year old male with a very complicated  medical history significant for recently diagnosed Lewy Body Dementia, chronic neck pain (s/p spinal fusion), COPD (not on home oxygen), CAD (s/p CABG 2001), CKD stage IV, hypertension, dyslipidemia, systolic CHF, diabetes mellitus, medication noncompliance, h/o atrial fibrillation (s/p cardioversion 2020; not currently on anticoagulation) presented to ED with shortness of breath and left arm pain.    Acute hypoxic respiratory failure likely due to acute on chronic systolic heart failure and exacerbation: Fluid overload is also playing role, patient is developing recurrent flash pulmonary edema, currently on high flow nasal cannula 45 L with 50% FiO2  S/p RRT on 07/10/2023: From flash pulmonary edema ,patient underwent emergent hemodialysis on 07/11/2023, see note from 07/11/2023 for further details  Type II NSTEMI, most likely secondary to above  History of coronary artery disease status CABG  New atrial flutter, likely paroxysmal atrial fibrillation h/o cardioversion in 2020, not currently on anticoagulation)  Severe bradycardia/complete heart block s/p PPM on 7/5/2023  Essential Hypertension  Dyslipidemia  Moderate pulmonary hypertension-newly diagnosed     Hospital course :  -- Initially on admission presented with shortness of breath and chest discomfort.    -- On presentation to ER ,heart rate was in 30s, EKG showed atrial flutter   -- proBNP was significantly elevated at 32478 and troponin was elevated at 118  -- Last Echo was in June 2020 showed EF of 55 to 60%, no wall motion changes, mild dilated left atrium and trace MR and TR  -- Chest x-ray from ED was concerning for volume overload   -- Patient was a started on diuresis, heparin infusion  --Neurology was consulted  --Duplex of the lower extremity  did not show any evidence of DVT  --Troponin peaked at 118 and have trended down to 103  --Echo on 7/1/23 showed EF of 50 to 55%, grade 2 or moderate diastolic dysfunction, mild inferior lateral wall hypokinesis, mild biatrial enlargement, RV systolic function is borderline reduced, RVSP is elevated consistent with moderate pulmonary hypertension, mild aortic Valvular stenosis  --Given his KAY on CKD 4 , initially cardiac catheterization was not considered due to the concern for patient might end up on dialysis   -- S/P PPM placement on 7/5/2023   -- Nephrology has been following closely, patient did not have any outpatient established nephrologist before the hospitalization although referrals were sent in the past.  -- Patient had RRT on 07/10/23 , required emergent HD on 07/11, placed on BIPAP , switched to HFNC on 07/11/23 after HD  -- Patient went another HD on 07/12/23, still on HFNC, down to 40 Liters    Plan :  -- Continue to monitor patient on telemetry, currently on high flow nasal cannula  --Continue to wean oxygen as able to, appreciate RT help  --Wife present at the bedside, detailed conversation was done, please review goals of care conversation below  --Continue patient on amlodipine 5 mg twice daily  --Continue patient on Imdur 60 mg p.o. daily  --Cardiology has signed off, patient was evaluated by EP on 07/05 and has been recommended to have follow-up in an outpatient setting  -- Considering patient family would like to pursue restorative management and patient has been developing recurrent flash pulmonary edema, will re consult general Cardiology   -- As needed hydralazine available every 4 hours for systolic blood pressure more than 170.  -- Nephrology is concerned regarding further contrast exposure for any renal recovery, cards will postpone angiogram and will obtain echocardiogram for reassessment of left ventricular systolic function.  -- Plan for further goals of care discussion  tomorrow.    Acute kidney injury, initially resolved  Later developed oliguric KAY on CKD stage IV  Status placement of tunneled catheter on 7/5/2023   HD start since 7/5   Hyponatremia, secondary to hypervolemia  Anion gap metabolic acidosis likely due to renal failure  Hypokalemia-resolved    -- Patient had CKD stage III-IV at baseline, history of medication noncompliance and follow-ups  -- It seems like during the hospitalization, patient developed oliguric renal failure.  -- Nephrology has been consulted, following closely  -- Tunneled catheter was placed on 07/05, has been initiated on HD  --  As above, patient did not have any outpatient established nephrologist before the hospitalization although referrals were sent in the past.  -- Patient will be continued on amlodipine 5 mg twice daily and Imdur 60 mg daily for antihypertensive regimen while patient continues to undergo hemodialysis.  -- Per nephrology note, patient has [facility] outpatient chair Monday Wednesday Friday at 0530  -- Patient has undergone hemodialysis persistently on 07/10/2023 and then emergent hemodialysis on 07/11/2023 due to worsening respiratory status and went HD on 07/12/2023  --Next hemodialysis 07/14/2023  --Patient remains on 50 L on 40% FiO2, gets extremely short of breath on minimal exertion like going to bedside commode    Goals of care discussion:    --Bedside discussions with wife Lilia and son Pancho on phone have been done on 07/11 and 07/12  -- Wife Lilia present at bedside today, has met with nephrology and cardiology, would like to continue current management and monitor for clinical improvement  -- His current vitals, oxygen requirement, labs, nephrology and cardiology recommendations were discussed in detail with patient and his wife.  -- Family plans to meet with palliative team again tomorrow to further delineate goals of care and CODE STATUS.  --After my detailed discussion, wife did show  understanding    Elevated D-dimer: Most likely secondary to CKD    --Patient had elevated D-dimer on admission ,history of COPD  --Patient does have CKD stage IV and cannot have CTA chest  --On admission he was started on high intensity heparin drip  --Duplex of the lower extremity does not show any evidence of DVT  --VQ scan was ordered and does not show any evidence of PE  --Echo has been ordered results as above    History of COPD  Acute hypoxic respiratory failure ( see Problem 1)     -- Not on home oxygen;  on presentation was noted 85% on room air; on 3 to 4 L oxygen  -- Patient did get IV Solu-Medrol in the ER along with DuoNebs and his PTA inhalers were started  -- Chest x-ray done on admission showed pulmonary vascular congestion with likely mild to moderate pulmonary edema and small bilateral pleural effusions with compressive atelectasis, right larger than left with no pneumothorax  -- 7/4-chest x-ray was done which showed that vascular congestion has improved  and opacity over the right lung has improved but there is persistent opacity  -- His white count was normal but given his oxygen requirement and elevated procalcitonin at 0.26  , he was started on empiric ceftriaxone, patient has now completed his 5-day course of ceftriaxone.  --Chest x-ray was again repeated on 07/10/2023 as above, did not show any infiltrates concern for mild to moderate pulmonary edema.    -- Continue with scheduled and as needed DuoNebs  -- COPD team consulted and patient will need nebulization machine on discharge from rehab     Uncontrolled diabetes;   Hemoglobin A1c came back 10.9  Family reported medication noncompliance.  His insulin regimen was recently adjusted and switched from NPH to Lantus and NovoLog combination  Patient was supposed to be taking 30 units of Lantus and NovoLog 10 units 3 times daily with meals which he has not started yet as per wife.  Initially during this hospitalization, patient was a started on  insulin infusion which was then transitioned to basal and bolus, blood sugars were noticed to be fluctuating, also patient had n.p.o. status for pacemaker placement during the hospitalization  Patient now tolerating Lantus 10 units.    --Patient was receiving Lantus 10 units, was on BiPAP, now switched to high flow nasal cannula  -- Holding Lantus for last 48 hours , Blood sugars have been controlled with sliding scale insulin , will continue to hold Lantus for now   -- Continue patient on high-dose sliding scale insulin.  -- Continue patient on meal coverage with 1 unit of NovoLog for 15 g of carb.  -- Avoid hypoglycemia, continue hypoglycemia protocol    Chronic neck pain, history of spinal fusion    -- Continue PTA Percocet and continue PTA Neurontin  -- will add Voltaren gel      Recently diagnosed Lewy Body Dementia  -- noted  -- Follows neurology as outpatient  -- Underlying dementia is playing role in big picture    Chronic thrombocytopenia  -- He does seem to have chronic thrombocytopenia and his platelet level fluctuates and has no evidence of bleeding and was 124 today     Physical deconditioning  --Patient has been evaluated by physical and Occupational Therapy, currently has been recommended to be discharged to TCU     Diet: Fluid restriction 1200 ML FLUID  Combination Diet Renal Diet (dialysis); Moderate Consistent Carb (60 g CHO per Meal) Diet  Room Service    DVT Prophylaxis: Heparin drip  Gardner Catheter: Not present  Lines: None     Cardiac Monitoring: ACTIVE order. Indication: Post- EP procedure (48 hours)  Code Status: Full Code        Disposition Plan :    Patient recommended to be discharged to TCU by therapies, currently on high flow nasal cannula 40 L oxygen at 50% FiO2, not ready for discharge, GOC conversation has been done with family, as above.  Next family meeting with palliative tomorrow afternoon    Medical Decision Making     50 MINUTES SPENT BY ME on the date of service doing chart  "review, history, exam, documentation & further activities per the note.       Expected Discharge Date: 07/13/2023    Discharge Delays: Placement - TCU  Destination: other (comment) (TCU)  Discharge Comments: HD today.       Violetta Rome MD  Hospitalist Service  [facility]  _____________________________________________________________________    Interval History      Patient was seen and examined, remains on high flow nasal cannula, gets short of breath easily on minimal exertion like using bedside commode.  Wife also present at the bedside.  Discussed with patient regarding his current clinical status, patient has also been evaluated by nephrology in regards this morning.  Cardiology did discuss about angiogram initially but due to the concern for further dye exposure, at this time echocardiogram has been ordered, cardiology will be following up on the results, highly appreciate their help.  Patient is complaining of some bilateral feet discomfort in requesting Voltaren gel which will be ordered, patient is also requiring some DuoNebs between his scheduled treatments will also add as needed DuoNebs.  Family is planning to meet with palliative team again tomorrow.    Physical Exam     Vital signs:  Temp: 98.2  F (36.8  C) Temp src: Oral BP: (!) 147/64 Pulse: 59   Resp: 20 SpO2: 93 % O2 Device: High Flow Nasal Cannula (HFNC) Oxygen Delivery: 40 LPM   Weight: 83.5 kg (184 lb 1.4 oz)  Estimated body mass index is 28.03 kg/m  as calculated from the following:    Height as of 5/4/23: 1.726 m (5' 7.95\").    Weight as of this encounter: 83.5 kg (184 lb 1.4 oz).    General: Alert and awake, oriented to person and place, seems to have significant memory issues does not remember most of the conversations which are done in the past but shows some understanding at the time of conversation.  Respiratory: On high flow nasal cannula, few basal crackles, right chest tunneled catheter in " place  Cardiovascular: Regular rate , S1 and S2 normal with no murmer or rubs or gallops and bilateral edema  Abdomen:   soft , non tender ,   Neurologic:  No facial droop  Musculoskeletal: Normal Range of motion over upper and lower extremities bilaterally   Psychiatric: cooperative         Data     I have personally reviewed the following data over the past 24 hrs:    N/A  \   N/A   / N/A     N/A N/A N/A /  149 (H)   3.7 N/A N/A \       Imaging results reviewed over the past 24 hrs:   No results found for this or any previous visit (from the past 24 hour(s)).

## 2023-07-13 NOTE — PLAN OF CARE
Orientation: A/Ox4 forgetful, dementia at BL    Vitals/Tele: 100% V-paced, 60bpm. HTN, Afebrile, HHFNC - 55% 40 LPM    IV Access/drains: Bilat PIV, Hemodialysis access - Upper RA    Diet: CC, FR of 1200cc, NPO at 2300 7/13    Mobility: Ax2 w/GB+W    GI/: 1BM via BSC, 1 urination via BSC, BS active    Wound/Skin: Generalized arm bruising, Pacemaker site - covered with dressing, steri strips. LE edema +2     Consults: Cards, Nephrology, palliative care     Discharge Plan: Pending care conference tomorrow       See Flow sheets for assessment    Problem: Plan of Care - These are the overarching goals to be used throughout the patient stay.    Goal: Readiness for Transition of Care  Outcome: Not Progressing

## 2023-07-13 NOTE — PROGRESS NOTES
[facility]  Cardiology Progress Note    Date of Service (when I saw the patient): 07/13/2023  Primary Cardiologist: Dr. Palafox at [facility]       Interval History:   Denies chest or left arm discomfort.  Breathing seems to be better.  Received dialysis earlier this week.    ----------------------------------------------------------------------------------------    Assessment:  Hayder Alves is a 68 year old male who was admitted on 6/29/2023 with SOB and left arm pain. Cardiology was consulted on admission for NSTEMI.     Hayder has an extensive past medical history notable for CAD (CABG x3 LIMA-LAD, SVG-OM, free JUANITA grafted from SVG to PDA in 2001; coronary angiogram in 2016 (LVEF was down to 40-35% demonstrated patent bypass vessels with no new obstructive lesions in the remaining vessels; echo images showed persistent inferior WMA); recent development of advanced AVB on current admission (s/p PPM implantation on 7/5), history of atrial fibrillation (history of cardioversion in 2020; due to fall risk and patient preference he has not been on anticoagulation per his primary cardiologist Dr. Palafox; on current admission his been on IV heparin), hypertension, hyperlipidemia, HFpEF, Lewy body dementia, chronic neck pain with spinal fusion procedures, COPD, CKD stage IV with recent worsening in function now requiring emergent dialysis after flash pulmonary edema, medication noncompliance, uncontrolled type 2 diabetes mellitus (with recent history of DKA; hemoglobin A1c improved from 12 to 10%), chronic thrombocytopenia with platelets stable around 120, mild aortic stenosis, and remote history of tobacco dependence.    He was treated for acute respiratory failure.  He initially required 10 L by oxygen mask but oxygen demands improved with IV diuresis.  His troponin was elevated but this was flat in pattern and overall it was felt to be more demand ischemia rather than true ACS.  His renal  function was also significantly declines thus coronary angiogram was deferred to avoid dialysis.    It also appears in the interim there has been some discussions with patient and his family regarding goals of care.  Patient was felt to not demonstrate capacity to make complex medical decisions thus conversations were had with his son and wife who are the surrogate decision makers.  Plan is for family conference tomorrow (7/14) for further discussion of this.  It appears palliative care team recommended CODE STATUS changed to DNR/DNI.  Patient family wanted to keep his CODE STATUS at full code and see if his symptoms improved slightly with dialysis for the next day or 2.    Patient continues to demand higher oxygen needs after 2 rounds of dialysis. Cardiology was asked to see patient to have discussion of his candidacy for ischemic work up.       # Acute Respiratory Failure, remains on high flow despite multiple dialysis runs  # Acute HFpEF  # Advanced renal disease, now on dialysis (4 runs so far)  # Recent hx of advanced AVB, s/p PPM  # NSTEMI, trops with flat pattern  # CAD with remote hx of CABG   # Hx of Atrial fibrillation  # HTN, intermittently elevated  # HLD  # Anemia, likely chronic disease  # Full code per patient preference but plan for family conference 7/14    ----------------------------------------------------------------------------------------    Plan:  -- Complete echocardiogram today LVEF and WMA evaluation  -- Detailed discussion with nephrology team and family- there is risk of permanent dialysis with contrast exposure- ultimate decision was made to have further discussion at family care conference (nurse team will see if they can move it earlier time) and determine if patient would like to proceed with invasive coronary angiogram- please make NPO at midnight  -- Cardiology will follow      ----------------------------------------------------------------------------------------  Physical Exam    Temp: 98.4  F (36.9  C) Temp src: Oral BP: 134/62 Pulse: 60   Resp: 18 SpO2: 94 % O2 Device: High Flow Nasal Cannula (HFNC) Oxygen Delivery: 40 LPM  Vitals:    07/10/23 0158 07/12/23 0500 07/13/23 0607   Weight: 82.6 kg (182 lb) 83.9 kg (184 lb 15.5 oz) 83.5 kg (184 lb 1.4 oz)     GEN:  Oxygen on high flow. Spouse, Lilia, at bedside.  HEENT: Mucous membranes moist.  NECK: Mild JVD.  C/V:  Regular rate and rhythm, no murmur, rub or gallop.   RESP: Diminished at the bases bilaterally.  GI: Abdomen soft, nontender, nondistended.    EXTREM: 1+ pitting LE edema.   NEURO: Alert and oriented, cooperative.   PSYCH: Normal affect.  SKIN: Warm and dry.   VASC: 2+ radial and dorsalis pedis pulses bilaterally.      Medications     - MEDICATION INSTRUCTIONS -       - MEDICATION INSTRUCTIONS -       - MEDICATION INSTRUCTIONS -         - MEDICATION INSTRUCTIONS for Dialysis Patients -   Does not apply See Admin Instructions     amitriptyline  30 mg Oral At Bedtime     amLODIPine  5 mg Oral BID     aspirin  81 mg Oral Daily     busPIRone  5 mg Oral TID     fluticasone-vilanterol  1 puff Inhalation Daily    And     umeclidinium  1 puff Inhalation Daily     [Held by provider] furosemide  20 mg Oral BID     gabapentin  300 mg Oral BID     [Held by provider] hydrALAZINE  75 mg Oral TID     insulin aspart   Subcutaneous TID w/meals     insulin aspart  1-10 Units Subcutaneous TID AC     insulin aspart  1-7 Units Subcutaneous At Bedtime     [Held by provider] insulin glargine  10 Units Subcutaneous QAM AC     ipratropium - albuterol 0.5 mg/2.5 mg/3 mL  1 vial Nebulization Q6H     isosorbide mononitrate  60 mg Oral Daily     oxyCODONE-acetaminophen  1 tablet Oral At Bedtime     rosuvastatin  10 mg Oral Daily     senna-docusate  1 tablet Oral BID    Or     senna-docusate  2 tablet Oral BID     sodium chloride (PF)  3 mL Intracatheter Q8H       Data   Reviewed.     Yael Vieyra PA-C   7/13/2023  Pager: [phone]

## 2023-07-14 NOTE — PROGRESS NOTES
RT called to the bedside by RN to assess the pt. Patient SpO2 88% upon arrival. Patient stated he did have increased WOB, I offered to place the pt on BiPAP in which he refused. RT increased to FiO2 to 80%. SpO2 93% on 80% and 55L, RT will continue to follow     Antoni Hilario, RRT

## 2023-07-14 NOTE — PROGRESS NOTES
Palliative Care Daily Progress Note  [facility]     Patient Name: Hayder Alves  Date of Admission: 2023   Today the patient was seen for:  Goals of care     Recommendations & Counseling     GOALS OF CARE:    Restorative with limits -Family would like to stay the course for another few days.  Continue dialysis per nephrology schedule    Daughter Jade is going to make her way from [place], likely over the weekend    Family agreeable to DNR/DNI    Anticipate likely transition to comfort measures early next week, once daughter has arrived from [place]    If Bill acutely decompensates before then, family agreeable to no escalation of care to ICU, and likely transition to comfort measures sooner    No need for angiogram.  This was discussed with Dr. Leoinla Payne for p.o. diet    Prepared family if that with discontinuation of dialysis and a compassionate high flow O2 removal, patient will likely not live very long and is expected to probably die in the hospital    Family will work on hearing from Bill requests for his  arrangements, and spending additional time with Bill in the coming days    ADVANCE CARE PLANNING:    Patient has an advance directive dated 3/2022.  Primary Health Care Agent son Pancho, wife Lilia.  Surrogates are son Pancho, wife Lilia.   Vijay is unable to demonstrate capacity to make complex medical decisions for himself    No POLST on file     Code status: No CPR- Do NOT Intubate    MEDICAL MANAGEMENT:    Family agreeable to low-dose Dilaudid 0.3 to 0.5 mg IV every 4 hours as needed pain, shortness of breath    Continue high flow O2 support.  Anticipate possible compassionate high flow O2 removal next week with transition to comfort cares    PSYCHOSOCIAL/SPIRITUAL:    Family - supportive wife Lilia. Sons Pancho and Ranjit, daughter Jade lives in [place] and available by phone today, will make her way to [place].  Supportive brother and sister-in-law present  today as well    [place] community: [facility] - appreciates support from spiritual health team, who was present for today's care conference     Palliative Care will continue to follow. Thank you for the consult and allowing us to aid in the care of Hayder Alves.    These recommendations have been discussed with Avelino JACKMAN, Dr. Rome, Dr. Dc, and Dr. Puckett.    MICKEY Aragon CNP  Securely message with [employer] (more info)  Text page via [facility] Paging/Directory         Assessments           Hayder Alves is a 68 year old male with a past medical history significant for recently diagnosed Lewy Body Dementia, chronic neck pain (s/p spinal fusion), COPD (not on home oxygen), CAD (s/p CABG 2001), CKD stage IV, hypertension, dyslipidemia, systolic CHF, diabetes mellitus, medication noncompliance, h/o atrial fibrillation (s/p cardioversion 2020; not currently on anticoagulation) who presents with shortness of breath and left arm pain. He was found to have severe bradycardia/complete heart block s/p PPM 7/5. His hospital course has been further complicated by acute hypoxic respiratory failure 2/2 acute on chronic systolic heart failure and exacerbation. He has developed oliguric KAY, requiring initiation of dialysis, Nephrology following. He remains high flow O2 dependent, despite initiation of dialysis.    Prognosis, Goals, & Planning:   Prognosis, Goals, and/or Advance Care Planning addressed today:    Advance Care Planning Discussion 7/14/2023. Airam HASSAN APRN CNP met with family (wife Lilia, son Ranjit and his partner, brother, sister in law, daughter Jade and son Pancho via phone) today at the hospital to discuss Advance Care Planning. Hayder Alves does not have decisional capacity  and was not present for this discussion due to dementia, inability to understand complexity of his health issues. Those present were informed of the voluntary nature of this discussion and wished to proceed.  This discussion began at 1505 and ended at 1545 for a total of 40 minutes.      We discussed general treatment options (full/restorative, selective/conservatives, and comfort only/hospice). We then discussed how these specifically apply to Bill. Based on this discussion,     Family would like to stay the course for another few days.  Continue dialysis per nephrology schedule    Daughter Jade is going to make her way from [place], likely over the weekend    Family agreeable to DNR/DNI    Anticipate likely transition to comfort measures early next week, once daughter has arrived from [place]    If Vijay acutely decompensates before then, family agreeable to no escalation of care to ICU, and likely transition to comfort measures sooner    No need for angiogram.  This was discussed with Dr. Leonila Payne for p.o. diet    Prepared family if that with discontinuation of dialysis and a compassionate high flow O2 removal, patient will likely not live very long and is expected to probably die in the hospital    We discussed utilization of medications to ease air hunger, agitation and restlessness at the time that high flow O2 is compassionately withdrawn. Discussed that this process is very purposeful in terms of ensuring patient is as comfortable as possible and that family wishes are honored    Family will work on hearing from Vijay requests for his  arrangements, and spending additional time with Vijay in the coming days    Code Status was addressed today:     Yes, We discussed potential risks and rationale of attempting cardiac resuscitation, intubation, and mechanical ventilation.  We also discussed probability of survival as well as quality of life implications.  Based on this discussion, patient or surrogate response/decision: DNR/DNI      Coping, Meaning, & Spirituality:   Mood, coping, and/or meaning in the context of serious illness were addressed today: Yes.  Spiritual health present for today's care  conference.         Interval History:     Chart review/discussion with unit or clinical team members:   No improvement in respiratory status with dialysis this week.  Remains high flow oxygen dependent.  Dyspnea upon exertion getting up to the commode.  Cardiology evaluated patient, considering angiogram.    Per patient or family/caregivers today:  Patient asleep upon my visit.  Did not attempt to wake him up.         Review of Systems:     Besides above, an additional N/A system ROS was reviewed and is unremarkable          Physical Exam:   Temp:  [97.8  F (36.6  C)-98.4  F (36.9  C)] 98.4  F (36.9  C)  Pulse:  [59-68] 59  Resp:  [16-20] 18  BP: (130-167)/() 139/69  FiO2 (%):  [55 %-65 %] (P) 65 %  SpO2:  [90 %-97 %] (P) 92 %  186 lbs 11.67 oz  CONSTITUTIONAL: Chronically ill man seen sleeping in bed in NAD, did not attempt to wake him up           Current Problem List:   Principal Problem:    NSTEMI (non-ST elevated myocardial infarction) (H)  Active Problems:    Acute pulmonary edema (H)    Hyperglycemia    COPD exacerbation (H)    Acute kidney failure with tubular necrosis (H)      Allergies   Allergen Reactions     Adhesive Tape      From blood draw site.     Eszopiclone      Strange dreams. Cooking during sleep      Latex      Lipitor [Atorvastatin Calcium] Other (See Comments)     myalgias     Lisinopril Other (See Comments)     Hyperkalemia and increased creatinine            Medications:   Amitriptyline 30mg PO at bedtime   Norvasc  ASA  Buspar 5mg PO TID   Diclofenac topical gel 4 times daily  Bronchodilators  Gabapentin 300mg PO BID   Insulin   Duonebs   Imdur   Percocet 1 tab PO at bedtime   Crestor  Senna 1-2 tabs PO BID   Percocet 1 tab PO Q4hrs PRN pain   Dilaudid 0.3 to 0.5 mg IV every 4 hours as needed pain, shortness of breath         Data Reviewed:   Recent imaging independently reviewed, my comments on pertinents:   7/13 echo with preserved EF    Recent lab data independently reviewed, my  comments on pertinents:   Na 129  K 3.9  Creat 2.99  Phos 5.1  WBC 9.6  Hgb 10.3  Plt 117  Albumin 3.6      Medical Decision Making   Please see A&P for additional details of medical decision making.  MANAGEMENT DISCUSSED with the following over the past 24 hours: Avelino JACKMAN, Dr. Rome, Dr. Dc, Dr. Puckett.   NOTE(S)/MEDICAL RECORDS REVIEWED over the past 24 hours: Hospitalist notes, nephrology notes, cardiology note, nursing notes  Tests personally interpreted in the past 24 hours:  - 7/13 echo showing Preserved EF  Tests REVIEWED in the past 24 hours:  - See lab/imaging results included in the data section of the note  - BMP  - CBC  - Phosphorus  SUPPLEMENTAL HISTORY, in addition to the patient's history, over the past 24 hours obtained from:   - Wife Lilia, son Pancho, daughter Barbara, son Ranjit, brother, ROGERS, Avelino JACKMAN, Dr. Rome, Dr. Dc  Medical complexity over the past 24 hours:  - Parenteral (IV) CONTROLLED SUBSTANCES ordered  - Decision to DE-ESCALATE CARE based on prognosis

## 2023-07-14 NOTE — PLAN OF CARE
Goal Outcome Evaluation: Pt is A&Ox4, forgetful, VSS except on High Flow 55 L 60 % O2, on tele V-Paced, weak and lightheaded - not out of bed, Male liberty catheter on, Voltaren gel applied on painful feet- helpful. Plan to keep NPO and dialysis tomorrow.        Plan of Care Reviewed With: patient    Overall Patient Progress: no changeOverall Patient Progress: no change

## 2023-07-14 NOTE — PROGRESS NOTES
Care Management Follow Up    Length of Stay (days): 15    Expected Discharge Date: 07/15/2023     Concerns to be Addressed:       Patient plan of care discussed at interdisciplinary rounds: Yes    Anticipated Discharge Disposition: Skilled Nursing Facility, Transitional Care     Anticipated Discharge Services: None  Anticipated Discharge DME: None    Patient/family educated on Medicare website which has current facility and service quality ratings: yes  Education Provided on the Discharge Plan:    Patient/Family in Agreement with the Plan: yes    Referrals Placed by CM/SW:    Private pay costs discussed: Not applicable    Additional Information:  Received call from Meghann [phone] Ext [phone], with [facility] admissions requesting Hepatitis B antigen.  CC sent fax 7/12 with Hepatitis B panel and Meghann able to find results in records.  This was final thing needed to finalize tentative chair for outpatient dialysis. Per Meghann, requested location is [facility]  and she will let CC know when accepted and what chair time will be for patient.     Updated Meghann that pt not medically ready and family meeting for goals of care conference today.  Arranging chair time in case needed at discharge.  CC to keep [facility] updated on plan.    Addendum 1400:  Meghann with [facility] admission called with update that pt has been accepted at [facility] with M-W-F scheduled with 0520 chair time, arrival time of 5am.  Meghann to follow up with CC Monday, as pt is not medically ready for discharge and family goals of care conference planned for this afternoon.    Lisa Noel RN, BS  Care Coordinator  [email or web address]

## 2023-07-14 NOTE — PROVIDER NOTIFICATION
MD Notification    Notified Person: MD    Notified Person Name: hospitalist Wild    Notification Date/Time:    Notification Interaction:[employer] web    Purpose of Notification:pt [address] can pt have dinner at pts request? was NPO pending palliative confer.    Orders Received:2 gm Na diet    Comments:

## 2023-07-14 NOTE — PROGRESS NOTES
Potassium   Date Value Ref Range Status   07/14/2023 3.9 3.4 - 5.3 mmol/L Final   06/20/2022 4.2 3.4 - 5.3 mmol/L Final   02/10/2021 4.6 3.4 - 5.3 mmol/L Final     Hemoglobin   Date Value Ref Range Status   07/14/2023 10.3 (L) 13.3 - 17.7 g/dL Final   02/19/2020 15.4 13.3 - 17.7 g/dL Final     Creatinine   Date Value Ref Range Status   07/14/2023 2.99 (H) 0.67 - 1.17 mg/dL Final   02/10/2021 1.98 (H) 0.66 - 1.25 mg/dL Final     Urea Nitrogen   Date Value Ref Range Status   07/14/2023 30.1 (H) 8.0 - 23.0 mg/dL Final   06/20/2022 24 7 - 30 mg/dL Final   02/10/2021 27 7 - 30 mg/dL Final     Sodium   Date Value Ref Range Status   07/14/2023 129 (L) 136 - 145 mmol/L Final   02/10/2021 137 133 - 144 mmol/L Final     INR   Date Value Ref Range Status   10/10/2016 1.08 0.86 - 1.14 Final       DIALYSIS PROCEDURE NOTE  Hepatitis status of previous patient on machine log was checked and verified ok to use with this patients hepatitis status.  Patient dialyzed for 3 hrs. on a K3 bath with a net fluid removal of  3L.  A BFR of 400 ml/min was obtained via a CVC.      The treatment plan was discussed with Dr. Rivas just prior to treatment treatment.    Total heparin received during the treatment: 0 units.     Line flushed, clamped and capped with heparin 1:1000 1.9 mL (1900 units) per lumen    Meds  given: none   Complications: none      Person educated: patient. Knowledge base minimal. Barriers to learning: cognitive/memory. Educated on procedure via verbal mode. patient/family verbalized understanding.    ICEBOAT? Timeout performed pre-treatment  I: Patient was identified using 2 identifiers  C:  Consent Signed Yes  E: Equipment preventative maintenance is current and dialysis delivery system OK to use  B: Hepatitis B Surface Antigen: Negative; Draw Date: 7/6/23      Hepatitis B Surface Antibody: susceptible; Draw Date: 7/6/23  O: Dialysis orders present and complete prior to treatment  A: Vascular access verified and assessed  prior to treatment  T: Treatment was performed at a clinically appropriate time  ?: Patient was allowed to ask questions and address concerns prior to treatment  See Adult Hemodialysis flowsheet in EPIC for further details and post assessment.  Machine water alarm in place and functioning. Transducer pods intact and checked every 15min.   Pt treated in room.  Chlorine/Chloramine water system checked every 4 hours.  Outpatient Dialysis at not established.    Patient repositioned every 2 hours during the treatment.  Post treatment report given to RN regarding 3L of fluid removed and stable BP's.

## 2023-07-14 NOTE — PROGRESS NOTES
Renal Medicine Inpatient Dialysis Note                                Hayder Alves MRN# [id number]   Age: 68 year old YOB: 1955   Date of Admission: 6/29/2023 Hospital LOS: 15          Assessment/Plan:       1.  CKD stage 4              -baseline creatinine 2.5 to 3.0 mg/dl              -eGFR 25 ml/nmin              -presumed progressive diabetic nephropathy with significant proteinuria              -abnormal creatinines back to 2010             No established outpatient nephrologist, previous attempts to schedule with [facility], referrals 2021 and 2/23.       2.  Oliguric acute kidney injury.  Likely ischemic ATN related to hemodynamics, bradycardia.  Remains oliguric     3. Hyponatremia   -hypervolemic     Should improve as target weight decreased     4. MBD - , Vit D 21  5  Lewy body dementia  6  DM              -uncontrolled               -A1C > 10% for some time              -no retinopathy 2021  7. HTN:  on amlodipine 5mg BID and imdur 60mg daily.  Remains hypervolemic.    Continue MWF dialysis  UF as able    Seems unlikely to manage without long term dialysis   Advanced CKD prior to dialysis initiation  Current O2 requirement precludes discharge     0530 [facility] outpatient chair    Interval History:     Dialysis run parameters reviewed with dialysis RN at patient bedside.  Seen just prior to run  Follows    Edema persists    3 hours  3K  3.5 liter UF  IJ access        ROS     GENERAL: NAD, No fever,chills  R: NEGATIVE for significant cough or SOB  CV: NEGATIVE for chest pain, palpitations  EXT: no change edema  ROS otherwise negative    Dialysis Parameters:     Vitals were reviewed  Patient Vitals for the past 8 hrs:   BP Temp Temp src Pulse Resp SpO2   07/14/23 1505 -- -- -- -- -- 92 %   07/14/23 1400 (!) 144/68 -- -- 60 18 93 %   07/14/23 1205 -- -- -- -- -- 92 %   07/14/23 1200 (!) 155/78 -- -- 59 18 93 %   07/14/23 1000 139/69 -- -- 59 18 93 %   07/14/23 0900  -- 98.4  F (36.9  C) Oral -- -- --   07/14/23 0800 137/53 -- -- 60 18 97 %     I/O last 3 completed shifts:  In: 360 [P.O.:360]  Out: 350 [Urine:350]    Vitals:    07/09/23 0205 07/10/23 0158 07/12/23 0500 07/13/23 0607   Weight: 87.5 kg (192 lb 12.8 oz) 82.6 kg (182 lb) 83.9 kg (184 lb 15.5 oz) 83.5 kg (184 lb 1.4 oz)    07/14/23 0600   Weight: 84.7 kg (186 lb 11.7 oz)       Current Weight: 84.7  Dry Weight: 75 range ?  Dialysis Temp: 36.5  C  Access Device: IJ  Access Site: right  Dialyzer: Revaclear  Dialysis Bath: 3  Sodium Profile: n  UF Goal: 3  Blood Flow Rate (mL/min): 400  Total Treatment Time (hrs): 3  Heparin: Low dose as required      EPO dose: n  Zemplar: n  IV Fe: n      Medications and Allergies:     Reviewed      Physical Exam:     Seen and examined during course of dialysis run    BP (!) 144/68 (BP Location: Right arm)   Pulse 60   Temp 98.4  F (36.9  C) (Oral)   Resp 18   Wt 84.7 kg (186 lb 11.7 oz)   SpO2 92%   BMI 28.43 kg/m      GENERAL: awake, alert, follows  HEENT: NC/AT, PERRLA, EOMI, non icteric, pharynx moist without lesion  RESP: clear  CV: RRR, normal S1 S2  ABDOMEN: S/NT, BS present  MS: 3 + edema  SKIN: catheter site clean without drainage  NEURO: speech normal  PSYCH: affect normal/bright    Data:       Recent Labs   Lab 07/14/23  1221 07/14/23  0919 07/14/23  0659   NA  --   --  129*   POTASSIUM  --   --  3.9   CHLORIDE  --   --  94*   CO2  --   --  22   ANIONGAP  --   --  13   *   < > 137*   BUN  --   --  30.1*   CR  --   --  2.99*   GFRESTIMATED  --   --  22*   WANDA  --   --  8.9    < > = values in this interval not displayed.     Recent Labs   Lab 07/14/23  0659 07/12/23  0529 07/11/23  0532 07/10/23  0630 07/09/23  0549 07/08/23  0544   CR 2.99* 2.62* 2.30* 3.01* 2.52* 3.04*     Recent Labs   Lab Test 07/14/23  0659 07/12/23  0529 07/11/23  0532 07/10/23  0630 07/09/23  0549 07/08/23  0544 07/07/23  0607 07/06/23  0552 07/05/23  0605 07/04/23  1153   * 130* 131*  127* 130* 127* 128* 125* 124* 129*     Recent Labs   Lab 07/14/23  0659 07/12/23  0529   ALBUMIN 3.6 3.6     Recent Labs   Lab 07/14/23  0659 07/12/23  0529 07/11/23  0532   PHOS 5.1* 3.9  --    HGB 10.3*  --  10.9*         G Brayden Dc MD    [facility] - Nephrology  [phone]

## 2023-07-14 NOTE — PROGRESS NOTES
Cardiology Progress Note          Assessment and Plan:         Hayder Alves is a 68 year old male who was admitted on 2023 with SOB and left arm pain. Cardiology was consulted on admission for NSTEMI.     Impression/plan     1.  Acute heart failure with preserved ejection fraction  2.  End-stage renal disease currently on dialysis  3.  Non-ST elevation myocardial infarction, troponins relatively flat.  4.  CAD with remote history of CABG  5.  Atrial fibrillation/flutter and advanced AV block, status post pacemaker implantation this admission.    Plan    Echocardiogram yesterday demonstrated normal left ventricular systolic function with an LVEF of 55 to 60%.  His previous echocardiogram on 2023 demonstrated low normal left ventricular systolic function with mild inferolateral hypokinesis.  Severe pulmonary hypertension was present on the current study.    A family care conference is planned today.  We will await the results of this and make a subsequent decision regarding further evaluation with coronary angiography.        Interval History:     No chest discomfort.             Review of Systems:   As per subjective, otherwise 5 systems reviewed and negative.           Physical Exam:   Blood pressure 135/62, pulse 60, temperature 98  F (36.7  C), temperature source Oral, resp. rate 18, weight 84.7 kg (186 lb 11.7 oz), SpO2 96 %.      Vital Sign Ranges  Temperature Temp  Av  F (36.7  C)  Min: 97.7  F (36.5  C)  Max: 98.4  F (36.9  C)   Blood pressure Systolic (24hrs), Av , Min:130 , Max:167        Diastolic (24hrs), Av, Min:62, Max:146      Pulse Pulse  Av.8  Min: 59  Max: 68   Respirations Resp  Av.6  Min: 16  Max: 20   Pulse oximetry SpO2  Av.2 %  Min: 90 %  Max: 97 %         Intake/Output Summary (Last 24 hours) at 2023 0740  Last data filed at 2023 0500  Gross per 24 hour   Intake 360 ml   Output 350 ml   Net 10 ml       Constitutional: NAD  Skin: Warm and  dry  Neck: No JVD  Lungs: CTA  Cardiovascular: RRR, no m/r/g  Abdomen: Soft, non tender.  Extremities and Back: No LE edema  Neurological: Nonfocal           Medications:     I have reviewed this patient's current medications.              Data:     Labs reviewed.             Dario Puckett MD, M.D.

## 2023-07-14 NOTE — PROGRESS NOTES
[facility] HEALTH SERVICES  SPIRITUAL ASSESSMENT Progress Note  [facility]     REFERRAL SOURCE: Request by spouse to attend care conference    Attended care conference led by Monserrat Parker, Palliative NP with seven family members attending both in person and via phone to discuss goals of care. Family expressed their understanding that Vijay is in the dying process and elected not to escalate cares. Vijay's daughter, Jade, plans to travel from [place] to spend time with her father and family as they say their goodbyes.    Following the meeting, Lilia was tearful as she expressed her fear that Vijay doesn't understand, but she also doesn't want him to suffer. We talked about his healthcare directive and how today's conversation aligns with Vijay's expressed intentions.    I offered spiritual and emotional support through reflective listening that affirmed emotions, experience, and meaning.     Family is aware of ongoing [facility] support and how to request if desired over the weekend.     PLAN: Will plan to follow up on Monday.    Lexie Staton  Associate   [facility] Phone [phone]  [facility] Pager [phone]    [facility] available 24/7 for emergent requests/referrals, either by having the on-call  paged or by entering an ASAP/STAT consult in Epic (this will also page the on-call ).

## 2023-07-14 NOTE — PROGRESS NOTES
CLINICAL NUTRITION SERVICES - REASSESSMENT NOTE    Malnutrition:   % Weight Loss: difficult to determine true wt trends as pt actively on dialysis and has fluid shifts  % Intake:  No decreased intake noted  Subcutaneous Fat Loss:  None observed  Muscle Loss:  None observed  Fluid Retention: 2+ mild generalized edema    Malnutrition Diagnosis: Patient does not meet two of the above criteria necessary for diagnosing malnutrition     EVALUATION OF PROGRESS TOWARD GOALS   Diet:  NPO for echocardiogram 7/13  Renal diet 7/6-7/13    Intake/Tolerance:    - % intakes documented per flowsheet  - Has been ordering TID meals when not NPO  - Spoke with patient at bedside. He said his appetite has been great for the last week. Ordering 3 meals per day and usually eating >75%. Patient NPO and really wishing he could eat today as he's hungry and has a big appetite.    NEW FINDINGS:   General: RRT called 7/11 d/t respiratory distress and abdominal pain. Care conference today @ 3pm to discuss GOC.  Labs: Na 129 (L), BUN 30.1 (H), Cr 2.99 (H), GFR 22 (L), phos 5.1 (H), -197 (H)  Meds: high sliding scale insulin, senokot  Weight: wt's trending down for the past week, however suspect this is d/t fluid status changes as pt is on dialysis - difficult to determine true wt trends  07/14/23 0600 84.7 kg (186 lb 11.7 oz) Bed scale   07/13/23 0607 83.5 kg (184 lb 1.4 oz) Standing scale   07/12/23 0500 83.9 kg (184 lb 15.5 oz) Bed scale   07/10/23 0158 82.6 kg (182 lb) Standing scale   07/09/23 0205 87.5 kg (192 lb 12.8 oz) --   07/08/23 0550 89 kg (196 lb 1.6 oz) Standing scale   07/08/23 0359 89 kg (196 lb 3.2 oz) Standing scale   07/07/23 0633 88.7 kg (195 lb 8 oz) Standing scale     Previous Nutrition Diagnosis:   Altered nutrition related lab value related to uncontrolled diabetes with medication non compliance as evidenced by BG >200 and A1c >10      Food and nutrition related knowledge deficit related to no prior education  on renal diet as evidenced by pt and wife acknowledging that being aware of sodium, phosphorus, and potassium levels in food was new to them.   Evaluation: No change, Met - received diet ed 7/7    CURRENT NUTRITION DIAGNOSIS  Altered nutrition-related lab value related to uncontrolled diabetes as evidenced by current high BG levels 137-197    INTERVENTIONS  Recommendations / Nutrition Prescription  Patient to continue renal and MCHO diet once diet advances from NPO    Implementation  None at this time    Goals  Diet advancement within 24 hours  BG < 180    MONITORING AND EVALUATION:  Progress towards goals will be monitored and evaluated per protocol and Practice Guidelines    Devi Ventura  Clinical Dietitian - [facility]

## 2023-07-14 NOTE — PLAN OF CARE
Neuro- A/Ox4, forgetful  Most Recent Vitals- Temp: 98  F (36.7  C) Temp src: Oral BP: 135/62 Pulse: 60   Resp: 18 SpO2: 97 % O2 Device: High Flow Nasal Cannula (HFNC)   Tele/Cardiac- 100% V-paced  Resp- on 55L 65% HFNC, LS diminished  Activity- ind in bed, 2A/lift OOB  Pain- percocet given overnight for headache  Drips- n/a  Drains/Tubes- R subclavian HD port, PIV  Skin- BLE 2+ edema  GI/- pt on HD, oliguria; ext cath in place  Plan- HD run, family care conference, possible angio; TCU at discharge  Misc- NPO since 0000    Kiera Erickson RN

## 2023-07-14 NOTE — PROGRESS NOTES
[facility]    Medicine Progress Note - Hospitalist Service    Date of Admission:  6/29/2023    Assessment & Plan     Hayder Alves is a 68 year old male with a very complicated  medical history significant for recently diagnosed Lewy Body Dementia, chronic neck pain (s/p spinal fusion), COPD (not on home oxygen), CAD (s/p CABG 2001), CKD stage IV, hypertension, dyslipidemia, systolic CHF, diabetes mellitus, medication noncompliance, h/o atrial fibrillation (s/p cardioversion 2020; not currently on anticoagulation) presented to ED with shortness of breath and left arm pain.    Acute hypoxic respiratory failure likely due to acute on chronic systolic heart failure and exacerbation: Fluid overload is also playing role, patient is developing recurrent flash pulmonary edema, currently on high flow nasal cannula 45 L with 50% FiO2  Acute on Chronic CHF exacerbation with reduced EF:  S/p RRT on 07/10/2023: From flash pulmonary edema ,patient underwent emergent hemodialysis on 07/11/2023, see note from 07/11/2023 for further details  Type II NSTEMI, most likely secondary to above  History of coronary artery disease status CABG  New atrial flutter, likely paroxysmal atrial fibrillation h/o cardioversion in 2020, not currently on anticoagulation)  Severe bradycardia/complete heart block s/p PPM on 7/5/2023  Essential Hypertension  Dyslipidemia  Moderate pulmonary hypertension-newly diagnosed     Hospital course :  -- Initially on admission presented with shortness of breath and chest discomfort.    -- On presentation to ER ,heart rate was in 30s, EKG showed atrial flutter   -- proBNP was significantly elevated at 93390 and troponin was elevated at 118  -- Last Echo was in June 2020 showed EF of 55 to 60%, no wall motion changes, mild dilated left atrium and trace MR and TR  -- Chest x-ray from ED was concerning for volume overload   -- Patient was a started on diuresis, heparin  infusion  --Neurology was consulted  --Duplex of the lower extremity did not show any evidence of DVT  --Troponin peaked at 118 and have trended down to 103  --Echo on 7/1/23 showed EF of 50 to 55%, grade 2 or moderate diastolic dysfunction, mild inferior lateral wall hypokinesis, mild biatrial enlargement, RV systolic function is borderline reduced, RVSP is elevated consistent with moderate pulmonary hypertension, mild aortic Valvular stenosis  --Given his KAY on CKD 4 , initially cardiac catheterization was not considered due to the concern for patient might end up on dialysis   -- S/P PPM placement on 7/5/2023   -- Nephrology consulted , patient did not have any outpatient established nephrologist although referrals were sent in the past.  -- Patient had RRT on 07/10/23 --> Flash Pulm edema -- > required emergent HD on 07/11, placed on BIPAP , switched to HFNC on 07/11/23 after HD  -- Patient went another HD on 07/12/23, still on HFNC, down to 40 Liters    Plan :  -- Continue to monitor patient on telemetry  -- On HFNC , continues to be on 65% FIO2 and 55 Liter  -- Continue to wean oxygen as able to, appreciate RT help  -- Daily conversations with patient and family are done, family requested to continue current management for the last 48 hours and then reassess again today later.  -- Continue patient on amlodipine 5 mg twice daily  -- Continue patient on Imdur 60 mg p.o. daily  -- Cardiology initially were consulted , patient was evaluated by EP on 07/05 and has been recommended to have follow-up in an outpatient setting  -- Considering patient family would like to pursue restorative management for now and patient has been developing recurrent flash pulmonary edema,  re consulted general Cardiology on 07/13.  -- After RRT , Patient was on BIPAP, later after HD has been switched to HFNC  -- PRN Hydralazine available   -- Cards discussed about angiogram but Nephrology is concerned regarding further contrast  exposure for any renal recovery  -- Cards will avoid angiogram at the moment and will obtain ECHO for reassessment of left ventricular systolic function.  --Patient currently remains n.p.o., plan to proceed with family meeting later in the afternoon and if patient and family would like to continue restorative care, cardiology would consider angiogram with the understanding that patient will end up on a long-term hemodialysis.  Per nephrology patient is a poor candidate for long-term hemodialysis considering underlying vasculopathy and dementia.  --Plan of care was discussed this morning with palliative team and nephrology, highly appreciate their help, plan for family meeting at 3 PM later today.  -- Echocardiogram is repeated on 07/13/2023, showing EF of 55 to 60%, diastolic Doppler findings suggest left ventricular filling pressures and increased, septal wall motion abnormality may reflect pacemaker activation, severe pulmonary hypertension is now present      Acute kidney injury, initially resolved  Later developed oliguric KAY on CKD stage IV  Status placement of tunneled catheter on 7/5/2023   HD start since 7/5   Hyponatremia, secondary to hypervolemia  Anion gap metabolic acidosis likely due to renal failure  Hypokalemia-resolved    -- Patient had CKD stage III-IV at baseline, history of medication noncompliance and follow-ups  --  It seems like during the hospitalization, patient developed oliguric renal failure.  --  Nephrology was consulted  -- Tunneled catheter was placed on 07/05, has been initiated on HD  --  As above, patient did not have any outpatient established nephrologist before the hospitalization although referrals were sent in the past.  -- Continued Amlodipine 5 mg twice daily   -- Continue and Imdur 60 mg daily   -- Patient had HD on 07/10/2023 and then underwent emergent HD on 07/11/2023 due to worsening respiratory status from pulm edema and then went for HD on 07/12/2023  -- Next  hemodialysis is due later today 07/14/2023  -- On HFNC , continues to be on 65% FIO2 and 55 Liter  -- [facility]    Goals of care discussion:    -- Bedside discussions with wife Lilia and son Pancho on phone have been done on 07/11   -- Daily conversations with wife Lilia on 07/12 and 07/13, plan for family meeting later in the day  --Family wanted to continue aggressive management earlier in the week and reassess if patient has been any clinical improvement till today before they meet with palliative team again.  -- Unfortunately patient remains on high flow nasal cannula, significantly dyspneic on minimal exertion like going to the commode  -- Has been getting hemodialysis, not good candidate for long-term HD  -- Patient CODE STATUS remains full code, wife would like to have further discussion with palliative team further later today    Elevated D-dimer: Most likely secondary to CKD    --Patient had elevated D-dimer on admission , considering all his comorbidities.  --Patient does have CKD stage IV and was not able to have CTA chest  --On admission he was started on high intensity heparin drip  --Duplex of the lower extremity did not show any evidence of DVT  --VQ scan was ordered and dated not show any evidence of PE    History of COPD  Acute hypoxic respiratory failure ( see Problem 1)     -- Not on home oxygen;  on presentation was noted 85% on room air; on 3 to 4 L oxygen  -- Patient did get IV Solu-Medrol in the ER along with DuoNebs and his PTA inhalers were started  -- Chest x-ray done on admission showed pulmonary vascular congestion with likely mild to moderate pulmonary edema and small bilateral pleural effusions with compressive atelectasis, right larger than left with no pneumothorax  -- 7/4-chest x-ray was done which showed that vascular congestion has improved  and opacity over the right lung has improved but there  is persistent opacity  -- His white count was normal but given his oxygen requirement and elevated procalcitonin at 0.26  , he was started on empiric ceftriaxone, patient has now completed his 5-day course of ceftriaxone on 07/08  --Chest x-ray was again repeated on 07/10/2023 as above, did not show any infiltrates concern for mild to moderate pulmonary edema.    -- Continue with scheduled and as needed DuoNebs  -- COPD team consulted and patient will need nebulization machine on discharge from rehab     Uncontrolled diabetes;   Hemoglobin A1c came back 10.9  Family reported medication noncompliance.  His insulin regimen was recently adjusted and switched from NPH to Lantus and NovoLog combination  Patient was supposed to be taking 30 units of Lantus and NovoLog 10 units 3 times daily with meals which he has not started yet as per wife.  Initially during this hospitalization, patient was on insulin infusion which was then transitioned to basal and bolus, blood sugars were noticed to be fluctuating  Patient was on Lantus 10 units before RRT.    -- Patient was receiving Lantus 10 units, was on BiPAP, now switched to high flow nasal cannula  -- Holding Lantus since RRT , was NPO on BIPAP, poor oral intake    --  Blood sugars have been controlled with sliding scale insulin , will continue to hold Lantus for now   -- Continue patient on high-dose sliding scale insulin.  -- Continue patient on meal coverage with 1 unit of NovoLog for 15 g of carb.  -- Avoid hypoglycemia, continue hypoglycemia protocol    Chronic neck pain, history of spinal fusion    -- Continue PTA Percocet and continue PTA Neurontin  --  added Voltaren gel on 07/13/23     Recently diagnosed Lewy Body Dementia  -- noted  -- Follows neurology as outpatient  -- Underlying dementia is playing role in big picture    Chronic thrombocytopenia  -- He does seem to have chronic thrombocytopenia and his platelet level fluctuates and has no evidence of bleeding and  was 124 today     Physical deconditioning  --Patient has been evaluated by physical and Occupational Therapy, currently has been recommended to be discharged to TCU  Unfortunately patient remains critically sick on high flow nasal cannula, not making any significant improvement in respiratory status, plan for family conference later in the afternoon.     Diet: Fluid restriction 1200 ML FLUID  Room Service  NPO for Medical/Clinical Reasons Except for: Meds, Ice Chips    DVT Prophylaxis: Heparin drip  Gardner Catheter: Not present  Lines: None     Cardiac Monitoring: ACTIVE order. Indication: Post- EP procedure (48 hours)  Code Status: Full Code        Disposition Plan :    Patient was recommended to be discharged to TCU by therapies, currently on high flow nasal cannula 40 L oxygen at 50% FiO2, not ready for discharge, GOC conversation has been done with family, as above.  Next family meeting with palliative this afternoon    Medical Decision Making     50 MINUTES SPENT BY ME on the date of service doing chart review, history, exam, documentation & further activities per the note.       Expected Discharge Date: 07/13/2023    Discharge Delays: Placement - TCU  Destination: other (comment) (TCU)  Discharge Comments: HD today.       Violetta Rome MD  Hospitalist Service  [facility]  _____________________________________________________________________    Interval History      Patient was seen and examined, remains on HFNC, continues to get SOB easily on minimal exertion like using bed commode.  At the time of my evaluation this morning, family is not present, they are planning to come back later in the afternoon.  Plan of care was discussed with palliative team and nephrology, patient is a poor candidate for long-term hemodialysis, angiogram increases his risk for being on long-term hemodialysis, family was already concerned about patient not able to return home, in case if family continues to  "proceed to restorative care patient might require prolonged rehab before patient can return home.  Currently patient remains significantly dyspneic on high flow nasal cannula, nonresponsive to hemodialysis treatments, will probably benefit from considering comfort care as goals of care as patient does not seem to be making improvement to be able to get discharge from hospital.    Physical Exam     Vital signs:  Temp: 98  F (36.7  C) Temp src: Oral BP: 135/62 Pulse: 60   Resp: 18 SpO2: 96 % O2 Device: High Flow Nasal Cannula (HFNC) Oxygen Delivery: 55 LPM   Weight: 84.7 kg (186 lb 11.7 oz)  Estimated body mass index is 28.43 kg/m  as calculated from the following:    Height as of 23: 1.726 m (5' 7.95\").    Weight as of this encounter: 84.7 kg (186 lb 11.7 oz).    General: Alert and awake, oriented to person and place, seems to have significant memory issues does not remember most of the conversations which are done in the past but shows  some understanding at the time of conversation.  Respiratory: On high flow nasal cannula, few basal crackles, right chest tunneled catheter in place  Cardiovascular: Regular rate , S1 and S2 normal with no murmer or rubs or gallops and bilateral edema  Abdomen:   soft , non tender ,   Neurologic:  No facial droop  Musculoskeletal: Normal Range of motion over upper and lower extremities bilaterally   Psychiatric: cooperative         Data     I have personally reviewed the following data over the past 24 hrs:    9.6  \   10.3 (L)   / 117 (L)     N/A N/A N/A /  172 (H)   N/A N/A N/A \       Imaging results reviewed over the past 24 hrs:   Recent Results (from the past 24 hour(s))   Echocardiogram Complete   Result Value    LVEF  55-60%    Narrative    [id number]  [id number]  [id number]  [id number]^[email or web address]^VICKI     [facility]  Echocardiography Laboratory  [address] [place] [address]     Name: MANSOOR MOORE  MRN: [id number]  : 1955  Study " Date: 07/13/2023 03:02 PM  Age: 68 yrs  Gender: Male  Patient Location: [facility]  Reason For Study: SOB  Ordering Physician: VICKI BARRON  Referring Physician: NICOL VOGEL  Performed By: Purvi Barker     BSA: 2.0 m2  Height: 67 in  Weight: 184 lb  HR: 60  BP: 151/69 mmHg  ______________________________________________________________________________  Procedure  Complete Echo Adult.  ______________________________________________________________________________  Interpretation Summary     The visual ejection fraction is 55-60%.  Diastolic Doppler findings (E/E' ratio and/or other parameters) suggest left  ventricular filling pressures are increased.  Septal wall motion abnormality may reflect pacemaker activation.  Severe (>55mmHg) pulmonary hypertension is present.  ______________________________________________________________________________  Left Ventricle  The left ventricle is normal in size. There is normal left ventricular wall  thickness. The visual ejection fraction is 55-60%. Diastolic Doppler findings  (E/E' ratio and/or other parameters) suggest left ventricular filling  pressures are increased. Septal wall motion abnormality may reflect pacemaker  activation.     Right Ventricle  There is a catheter/pacemaker lead seen in the right ventricle. The right  ventricle is normal in size and function.     Atria  The left atrium is mildly dilated. Right atrial size is normal. There is no  color Doppler evidence of an atrial shunt.     Mitral Valve  The mitral valve leaflets are moderately thickened. There is mild (1+) mitral  regurgitation.     Tricuspid Valve  The right ventricular systolic pressure is approximated at 55mmHg plus the  right atrial pressure. There is mild (1+) tricuspid regurgitation. IVC  diameter and respiratory changes fall into an intermediate range suggesting an  RA pressure of 8 mmHg. Severe (>55mmHg) pulmonary hypertension is present.     Aortic Valve  There is mild  trileaflet aortic sclerosis. No aortic regurgitation is present.  The mean AoV pressure gradient is 11.0 mmHg. The calculated aortic valve are  is 1.9 cm^2. There is sclerosis, calcification, and restriction of the aortic  valve opening compatible with mild aortic stenosis.     Pulmonic Valve  There is trace pulmonic valvular regurgitation.     Vessels  Normal size aorta. The aortic root is normal size.     Pericardium  There is no pericardial effusion. Moderate ascites.     Rhythm  The rhythm was atrial fibrillation.  ______________________________________________________________________________  MMode/2D Measurements & Calculations  IVSd: 1.0 cm     LVIDd: 5.3 cm  LVIDs: 4.3 cm  LVPWd: 1.2 cm  FS: 19.7 %  LV mass(C)d: 221.5 grams  LV mass(C)dI: 113.5 grams/m2  Ao root diam: 3.5 cm  asc Aorta Diam: 3.5 cm  LVOT diam: 2.2 cm  LVOT area: 3.8 cm2  LA Volume (BP): 66.3 ml  LA Volume Index (BP): 34.0 ml/m2  RWT: 0.43     Doppler Measurements & Calculations  MV E max daryl: 140.0 cm/sec  MV max P.6 mmHg  MV mean P.0 mmHg  MV V2 VTI: 34.7 cm  MVA(VTI): 2.5 cm2  MV dec slope: 549.7 cm/sec2  MV dec time: 0.25 sec  Ao V2 max: 222.0 cm/sec  Ao max P.0 mmHg  Ao V2 mean: 152.0 cm/sec  Ao mean P.0 mmHg  Ao V2 VTI: 44.2 cm  MARIZA(I,D): 1.9 cm2  MARIZA(V,D): 2.0 cm2  LV V1 max P.5 mmHg  LV V1 max: 117.0 cm/sec  LV V1 VTI: 22.5 cm  SV(LVOT): 85.5 ml  SI(LVOT): 43.8 ml/m2  PA V2 max: 144.0 cm/sec  PA max P.3 mmHg  PA acc time: 0.11 sec  AV Daryl Ratio (DI): 0.53  MARIZA Index (cm2/m2): 0.99  E/E' av.8  Lateral E/e': 20.5  Medial E/e': 27.0  RV S Daryl: 7.9 cm/sec     ______________________________________________________________________________  Report approved by: Dunia Farris 2023 04:48 PM

## 2023-07-14 NOTE — PROGRESS NOTES
A/O x 4 with some forgetfulness, h/o lewy body dementia, CORNELIUS, c/o shortness of breath, on HFNC at 55L 80% Fio2 saturating 92-93%, declined Bi-pap per RT, patient on HD through RT subclavian port, patient will be dialyzed this afternoon, palliative care conference this afternoon, Angio pending conference decision, tele 100 % V-paced.

## 2023-07-15 NOTE — PLAN OF CARE
VS:  Stable  Assist by:  2 in bed and  with GB and walker when OOB    Cardiac: RRR; CP free overnight   Neuro: A&Ox4  CMS: Numbness present to BL LE's (baseline)  Respi: diminished to auscultation; on hi-Jorge A O2 @80% FiO2, 55L   :  External catheter in place overnight; minimal output; On HD  GI: Abdo obese, soft, non - tender; BS audible;       Strip/Tele:     Pressure areas/Skin:    - PA's intact    - BL LE's Edema +2      LDA's:     - PIVC to L & R arm SL;   - HD access to right subclavicular area      Plans:    >Continue to wean oxygen as able to, appreciate RT help  > will continue to hold Lantus for now   > Anticipate likely transition to comfort measures early next week  > family agreeable to no escalation of care to ICU, and likely transition to comfort measures sooner  > No need for angiogram.    > Okay for p.o. diet     > Cardio:   - Complete echocardiogram LVEF and WMA evaluation      > Renal:   - Continue MWF dialysis     > MSW/Care Coord:   - has been accepted at [facility] with 0530h M-W-F on discharge           Problem: Plan of Care - These are the overarching goals to be used throughout the patient stay.    Goal: Absence of Hospital-Acquired Illness or Injury  Intervention: Identify and Manage Fall Risk  Recent Flowsheet Documentation  Taken 7/14/2023 2204 by Martha Jasso, RN  Safety Promotion/Fall Prevention:    activity supervised    clutter free environment maintained    increased rounding and observation    lighting adjusted    mobility aid in reach    nonskid shoes/slippers when out of bed    room near nurse's station    safety round/check completed  Intervention: Prevent Skin Injury  Recent Flowsheet Documentation  Taken 7/14/2023 2204 by Martha Jasso, RN  Body Position:    right    turned     Problem: COPD (Chronic Obstructive Pulmonary Disease) Comorbidity  Goal: Maintenance of COPD Symptom Control  Intervention: Maintain COPD-Symptom Control  Recent  Flowsheet Documentation  Taken 7/14/2023 2204 by Martha Jasso, RN  Medication Review/Management: medications reviewed     Problem: Hypertension Comorbidity  Goal: Blood Pressure in Desired Range  Intervention: Maintain Blood Pressure Management  Recent Flowsheet Documentation  Taken 7/14/2023 2204 by Martha Jasso, RN  Medication Review/Management: medications reviewed

## 2023-07-15 NOTE — PLAN OF CARE
Goal Outcome Evaluation:    Shift [id number]    Pt A/O x4, forgetful at times. Follows commands. BENITEZ, neuros intact. Tolerated meal HD run today, tolerated well no SBP changes. Palliative care conf today w/ family. Denies CP or discomfort. Continues on HIFNC. Will continue to monitor.

## 2023-07-15 NOTE — PROGRESS NOTES
Renal Medicine Progress Note                                Hayder Alves MRN# [id number]   Age: 68 year old YOB: 1955   Date of Admission: 6/29/2023 Hospital LOS: 16                  Assessment/Plan:     1.  CKD stage 4              -baseline creatinine 2.5 to 3.0 mg/dl              -eGFR 25 ml/nmin              -presumed progressive diabetic nephropathy with significant proteinuria              -abnormal creatinines back to 2010             No established outpatient nephrologist, previous attempts to schedule with [facility], referrals 2021 and 2/23.       2.  Oliguric acute kidney injury.  Likely ischemic ATN related to hemodynamics, bradycardia.  Remains oliguric     3. Hyponatremia              -hypervolemic      Should improve as target weight decreased      4. MBD - , Vit D 21  5  Lewy body dementia  6  DM              -uncontrolled               -A1C > 10% for some time              -no retinopathy 2021  7. HTN:  on amlodipine 5mg BID and imdur 60mg daily.  Remains hypervolemic.  8. ? CAD      Dialysis 07/17/23  Continued UF     Would avoid IV contrast   Could limit any renal recovery    Appears likely to transition to comfort cares at some point      Interval History:     Remains in bed  Unclear if can sit in chair for several hours (outpatient dialysis requirement)  High flow O2 dependent despite aggressive UF    UF over past 4 days = 11,600 ml     ROS:     GENERAL: NAD, No fever,chills  RESP:dyspnea on exertion  CV: NEGATIVE for chest pain, palpitations  EXT: no change edema  ROS otherwise negative    Medications and Allergies:     Reviewed    Physical Exam:     Vitals were reviewed  Patient Vitals for the past 8 hrs:   BP Temp Temp src Pulse Resp SpO2 Weight   07/15/23 1059 (!) 142/69 97.5  F (36.4  C) Oral 59 -- 91 % --   07/15/23 0720 (!) 151/68 98.1  F (36.7  C) Oral 59 20 97 % --   07/15/23 0617 -- -- -- -- -- -- 79.7 kg (175 lb 11.3 oz)   07/15/23 0600 (!) 156/69 --  -- 60 18 95 % --   07/15/23 0400 (!) 152/69 -- -- 60 17 -- --     I/O last 3 completed shifts:  In: 390 [P.O.:390]  Out: 3200 [Urine:200; Other:3000]    Vitals:    07/10/23 0158 07/12/23 0500 07/13/23 0607 07/14/23 0600   Weight: 82.6 kg (182 lb) 83.9 kg (184 lb 15.5 oz) 83.5 kg (184 lb 1.4 oz) 84.7 kg (186 lb 11.7 oz)    07/15/23 0617   Weight: 79.7 kg (175 lb 11.3 oz)         GENERAL: awake, alert, follows  HEENT: NC/AT, PERRLA, EOMI, non icteric, pharynx moist without lesion  NECK: supple, no masses or adenopathy  RESP:  clear anteriorly  CV: RRR, normal S1 S2  ABDOMEN: soft  MS: no clubbing, cyanosis   SKIN: clear without significant rashes or lesions  EXT: edema     Data:     Recent Labs   Lab 07/15/23  0754 07/15/23  0213 07/14/23  2142 07/14/23  1650 07/14/23  0919 07/14/23  0659 07/13/23  0857 07/13/23  0602 07/12/23  0746 07/12/23  0529 07/11/23  0745 07/11/23  0532 07/10/23  0723 07/10/23  0630 07/09/23  0725 07/09/23  0549   NA  --   --   --   --   --  129*  --   --   --  130*  --  131*  --  127*  --  130*   POTASSIUM  --   --   --   --   --  3.9  --  3.7  --  4.2  --  4.0  --  3.6  --  3.8   CHLORIDE  --   --   --   --   --  94*  --   --   --  93*  --   --   --  89*  --  92*   CO2  --   --   --   --   --  22  --   --   --  22  --   --   --  23 -- 23   ANIONGAP  --   --   --   --   --  13  --   --   --  15  --   --   --  15  --  15   * 226* 248* 194*   < > 137*   < >  --    < > 143*   < >  --    < > 170*   < > 184*   BUN  --   --   --   --   --  30.1*  --   --   --  23.2*  --   --   --  28.9*  --  20.7   CR  --   --   --   --   --  2.99*  --   --   --  2.62*  --  2.30*  --  3.01*  --  2.52*   GFRESTIMATED  --   --   --   --   --  22*  --   --   --  26*  --  30*  --  22*  --  27*   WANDA  --   --   --   --   --  8.9  --   --   --  8.8  --   --   --  8.7*  --  8.0*    < > = values in this interval not displayed.         Recent Labs   Lab Test 07/14/23  0659 07/12/23  0529 07/11/23  0532  07/10/23  0630 07/09/23  0549 07/08/23  0544 07/07/23  0607 07/06/23  0552 07/05/23  0605 07/04/23  1153   CR 2.99* 2.62* 2.30* 3.01* 2.52* 3.04* 3.81* 4.87* 4.20* 2.87*     Recent Labs   Lab 07/14/23  0659 07/12/23  0529   ALBUMIN 3.6 3.6     Recent Labs   Lab 07/14/23  0659 07/12/23  0529 07/11/23  0532   PHOS 5.1* 3.9  --    HGB 10.3*  --  10.9*         G Brayden Dc MD    [facility]  [phone]

## 2023-07-15 NOTE — PLAN OF CARE
Care plan note:      Recent Vitals:  Temp: 98.5  F (36.9  C) Temp src: Oral BP: 138/62 Pulse: 59   Resp: 20 SpO2: 95 % O2 Device: High Flow Nasal Cannula (HFNC)      Orientation/Neuro: Forgetful  Pain: The patient is not having any pain.   Tele: Paced   IV medications: None   Mobility: Assist of 2 and Total w/ lift   Skin:  groin skin breakdown.   GI: WDL  : Using male external catheter and Hemodialysis     Diet: Tolerating diet:   Well  Orders Placed This Encounter      2 Gram Sodium Diet      Safety/Concerns:  Fall Risk and Zelalem Risk  Aggression Color: Green    Plan: Pt and family to meet with palliative again on Monday, also having dialysis run, pt oliguric. Appears comfortable, no improvement in oxygen needs.   Continue to monitor.      Merry Hawthorne RN

## 2023-07-15 NOTE — PROGRESS NOTES
[facility]    Medicine Progress Note - Hospitalist Service    Date of Admission:  6/29/2023    Assessment & Plan     Hayder Alves is a 68 year old male with a very complicated  medical history significant for recently diagnosed Lewy Body Dementia, chronic neck pain (s/p spinal fusion), COPD (not on home oxygen), CAD (s/p CABG 2001), CKD stage IV, hypertension, dyslipidemia, systolic CHF, diabetes mellitus, medication noncompliance, h/o atrial fibrillation (s/p cardioversion 2020; not currently on anticoagulation) presented to ED with shortness of breath and left arm pain.    Acute hypoxic respiratory failure: Persistent  likely due to acute on chronic systolic heart failure and exacerbation in the picture of renal failure   Patient had recurrent flash pulmonary edema, currently remains on high flow nasal cannula 45L with 50% FiO2 since 07/11  Acute on Chronic CHF exacerbation with reduced EF:  S/p RRT on 07/10/2023: From flash pulmonary edema ,patient underwent emergent hemodialysis on 07/11/2023, see note from 07/11/2023 for further details  Type II NSTEMI, sec to above  H/O CAD status CABG  New atrial flutter, likely paroxysmal atrial fibrillation h/o cardioversion in 2020, not currently on anticoagulation)  Severe bradycardia/complete heart block s/p PPM on 7/5/2023  Essential Hypertension  Dyslipidemia  Moderate pulmonary hypertension-newly diagnosed --> repeat ECHO showing Severe Pulm HTN    Prolonged Hospital course :    -- Initially on admission presented with SOB and chest discomfort.    -- In ER ,heart rate was in 30s, EKG showed atrial flutter   -- proBNP was significantly elevated at 11146 and troponin was elevated at 118  -- Last Echo was in June 2020 showed EF of 55 to 60%, no wall motion changes, mild dilated left atrium and trace MR and TR  -- Chest x-ray from ED was concerning for volume overload   -- Patient was started on diuresis and heparin infusion  -- Duplex of  the lower extremity did not show any evidence of DVT  -- Troponin peaked at 118 and have trended down to 103  -- Echo on 7/1/23 showed EF of 50 to 55%, grade 2 or moderate diastolic dysfunction, mild inferior lateral wall hypokinesis, mild biatrial enlargement, RV systolic function is borderline reduced, RVSP is elevated consistent with moderate pulmonary hypertension, mild aortic Valvular stenosis  -- Cardiology was consulted due to complete heart bloch and CHF   -- Given his KAY on CKD 4 , initially cardiac catheterization was not considered due to the concern for patient might end up on dialysis   -- S/P PPM placement on 7/5/2023   -- Cardiology ( EP ) signed off   -- Nephrology consulted later on the course due to oliguric renal failure ( see below )  -- Patient has been started on HD , no significant clinical improvement   -- Patient had RRT on 07/10/23 --> Flash Pulm edema -- > required extra emergent HD on 07/11, placed on BIPAP , was able to be switched to HFNC on 07/11/23 after HD  -- Since then patient has been getting HD but remains on HF without significant improvement in resp status  -- General Cards were re consulted for possible ischemia workup   -- Extensive discussion has been done with family , Cards and Nephro   -- Repeat ECHO on 07/13/2023 showed EF of 55 to 60%, diastolic Doppler findings suggest left ventricular filling pressures and increased, septal wall motion abnormality may reflect pacemaker activation. Severe pulmonary HTN is now present  -- Nephro recommended against ischemic workup at this point as it will push patient towards permanent HD  -- Palliative team has been consulted due to patient poor clinical improvement despite aggressive management and regular HD    -- Code status changed to DNR/DNI on 07/14 , plan to meet with family again on 04/17    Plan :    -- Continue to monitor patient on telemetry  -- On HFNC , continues to be on 65% FIO2 and 55 Liter  -- Continue to wean oxygen  as able to, appreciate RT help  -- continue to wean patient as able , family would like to continue current management over the weekend   -- Continue amlodipine 5 mg twice daily  -- Continue Imdur 60 mg p.o. daily  -- PRN Hydralazine available   -- Cards have signed off , no plan to proceed with angiogram due to renal status   -- Patient is not a candidate for long term HD per Nephro ( see below )       Acute kidney injury, initially resolved  Later developed oliguric KAY on CKD stage IV  Status placement of tunneled catheter on 7/5/2023   HD start since 7/5   Hyponatremia, secondary to hypervolemia  Anion gap metabolic acidosis likely due to renal failure  Hypokalemia-resolved    Patient had CKD stage III-IV at baseline  H/O of medication noncompliance and follow-ups  Patient did not have any outpatient established nephrologist before the hospitalization although referrals were sent in the past.    --  During the hospitalization, patient developed oliguric renal failure.  --  Nephrology was consulted  --  Tunneled catheter was placed on 07/05  --  Patient was initiated on HD  --  Patient had HD on 07/10/2023 and then again underwent emergent HD on 07/11/2023 due to worsening respiratory status from pulm edema   -- Since then has been getting persistent HD but no improvement in resp status as above   -- Due to poor improvement , palliative has been involved  -- Patient is not a good candidate for long term HD  -- On HFNC , continues to be on 65% FIO2 and 55 Liter  -- Per nephrology note, patient has [facility] outpatient chair Monday Wednesday Friday at 0530  --  On going discussion with family regarding stopping HD and compassionate weaning of HFNC if not improvement over the weekend       Goals of care discussion:    -- Bedside discussions are done withwife Lilia and son Pancho   --Family wanted to continue aggressive management earlier this week and reassess if patient makes any clinical improvement   --  Palliative has been following   -- Unfortunately patient so far has not shown any significant improvement   -- After family meeting , code status changed to DNR/DNI on 07/14/23   -- Daughter planning to fly from [place] this weekend , plan to meet with palliative on 07/17  -- If no improvement , family will consider changing GOC to comfort   -- If Vijay detoriates before Monday , no plan for escalation of the care and transfer to ICU  -- Of note, Vijay is unable to make decisions due to dementia , wife Lilia and kids are making decisions      Elevated D-dimer: Most likely secondary to CKD    -- Patient had elevated D-dimer on admission , considering all his comorbidities.  -- No CTA chest was done due to CKD stage 4   -- On admission he was started on high intensity heparin drip  -- Duplex of the lower extremity did not show any evidence of DVT  -- VQ scan was ordered and dated not show any evidence of PE  -- Heparin was stopped     History of COPD  Acute hypoxic respiratory failure ( see Problem 1) Multifactorial as above     -- Not on home oxygenon presentation was noted 85% on room air   -- Patient did get IV Solu-Medrol ,DuoNebs and his PTA inhalers initially   -- Chest x-ray on admission showed pulmonary vascular congestion with likely mild to moderate pulmonary edema and small bilateral pleural effusions with compressive atelectasis, right larger than left with no pneumothorax  -- 7/4 chest x-ray  showed again vascular congestion   -- Given his hypoxia and elevated procalcitonin , he was treated withempiric ceftriaxone for 5 days , done on 07/08  -- 07/10 Chest XR  Remains negative for infiltrates but showed concern for mild to moderate pulmonary edema.    -- Continue with scheduled and as needed DuoNebs  -- COPD team consulted and recommended nebulization machine on discharge from rehab     Uncontrolled diabetes;   Hemoglobin A1c came back 10.9  Family reported medication noncompliance.  His Outpatient  insulin regimen was recently adjusted and switched from NPH to Lantus and NovoLog combination  Patient was supposed to be taking 30 units of Lantus and NovoLog 10 units 3 times daily with meals which he has not started yet as per wife.  Initially patient was on insulin infusion which was then transitioned to basal and bolus  Patient was on Lantus 10 units before RRT.  Since RRT , not requiring Lantus     -- Holding Lantus since RRT , was NPO on BIPAP, poor oral intake    -- Continue high dose sliding scale insulin    -- Continue patient on meal coverage with 1 unit of NovoLog for 15 g of carb.  -- Avoid hypoglycemia, continue hypoglycemia protocol    Chronic neck pain, history of spinal fusion    -- Continue PTA Percocet and continue PTA Neurontin  --  added Voltaren gel on 07/13/23     Recently diagnosed Lewy Body Dementia    -- noted  -- Follows neurology as outpatient  -- Underlying dementia is playing role in big picture    Chronic thrombocytopenia  -- He does seem to have chronic thrombocytopenia and his platelet level fluctuates and has no evidence of bleeding and was 124 today     Physical deconditioning  --Patient has been evaluated by physical and Occupational Therapy, currently has been recommended to be discharged to TCU  Unfortunately patient remains critically sick on high flow nasal cannula, not making any significant improvement in respiratory status, plan for next family conference on Monday        Diet: Fluid restriction 1200 ML FLUID  Room Service  2 Gram Sodium Diet  Fluid restriction 1200 ML FLUID    DVT Prophylaxis: Heparin drip  Gardner Catheter: Not present  Lines: None     Cardiac Monitoring: ACTIVE order. Indication: Post- EP procedure (48 hours)  Code Status: No CPR- Do NOT Intubate        Disposition Plan :    Patient was recommended to be discharged to TCU by therapies, currently on high flow nasal cannula 40 L oxygen at 50% FiO2, not ready for discharge, [facility] conversation is in progress ,  "next meeting Monday    Medical Decision Making     55 MINUTES SPENT BY ME on the date of service doing chart review, history, exam, documentation & further activities per the note.       Expected Discharge Date: 07/19/2023    Discharge Delays: Placement - TCU  Destination: other (comment) (TCU)  Discharge Comments: HD today.       Violetta Rome MD  Hospitalist Service  [facility]  _____________________________________________________________________    Interval History      Patient was seen and examined, remains on high flow nasal cannula.  Polite and cooperative, does have some underlying memory issues but answers questions appropriately.  Patient is requesting some medication to help him sleep at nighttime, patient is currently receiving amitriptyline 30 mg at bedtime  Denying any chest pain, does get dyspneic with minimal exertion  Next hemodialysis is due on Monday along with next family conference.  All the questions were answered.    Physical Exam     Vital signs:  Temp: 98.1  F (36.7  C) Temp src: Oral BP: (!) 151/68 Pulse: 59   Resp: 20 SpO2: 97 % O2 Device: High Flow Nasal Cannula (HFNC) Oxygen Delivery: 70 LPM   Weight: 79.7 kg (175 lb 11.3 oz)  Estimated body mass index is 26.75 kg/m  as calculated from the following:    Height as of 5/4/23: 1.726 m (5' 7.95\").    Weight as of this encounter: 79.7 kg (175 lb 11.3 oz).    General: Alert and awake, oriented to person and place, does have memory issues   Respiratory: On high flow nasal cannula, few basal crackles, right chest tunneled catheter in place  Cardiovascular: Regular rate , S1 and S2 normal with no murmer or rubs or gallops and bilateral edema  Abdomen:   soft , non tender ,   Neurologic:  No facial droop  Musculoskeletal: Normal Range of motion over upper and lower extremities bilaterally   Psychiatric: cooperative         Data         Imaging results reviewed over the past 24 hrs:   No results found for this or any " previous visit (from the past 24 hour(s)).

## 2023-07-16 NOTE — PROGRESS NOTES
Renal Medicine Progress Note                                Hayder Alves MRN# [id number]   Age: 68 year old YOB: 1955   Date of Admission: 6/29/2023 Hospital LOS: 17                  Assessment/Plan:     1.  CKD stage 4              -baseline creatinine 2.5 to 3.0 mg/dl              -eGFR 25 ml/nmin              -presumed progressive diabetic nephropathy with significant proteinuria              -abnormal creatinines back to 2010             No established outpatient nephrologist, previous attempts to schedule with [facility], referrals 2021 and 2/23.       2.  Oliguric acute kidney injury.  Likely ischemic ATN related to hemodynamics, bradycardia.  Remains oliguric     3. Hyponatremia              -hypervolemic      Should improve as target weight decreased      4. MBD - , Vit D 21  5  Lewy body dementia  6  DM              -uncontrolled               -A1C > 10% for some time              -no retinopathy 2021  7. HTN:  on amlodipine 5mg BID and imdur 60mg daily.  Remains hypervolemic.  8. ? CAD      Dialysis 07/17/23  Continued UF     Would avoid IV contrast   Could limit any renal recovery    Appears likely to transition to comfort cares at some point      Interval History:     Remains in bed  Unclear if can sit in chair for several hours (outpatient dialysis requirement)  High flow O2 dependent despite aggressive UF    UF over past 4 days = 11,600 ml     Awake and alert  Follows     ROS:     GENERAL: NAD, No fever,chills  RESP:dyspnea on exertion  CV: NEGATIVE for chest pain, palpitations  EXT: no change edema  ROS otherwise negative    Medications and Allergies:     Reviewed    Physical Exam:     Vitals were reviewed  Patient Vitals for the past 8 hrs:   BP Temp Temp src Pulse Resp SpO2 Weight   07/16/23 0816 -- -- -- -- -- 97 % --   07/16/23 0809 (!) 144/92 97.1  F (36.2  C) Oral 61 20 -- --   07/16/23 0600 137/66 -- -- 60 20 96 % --   07/16/23 0557 -- -- -- -- -- -- 83.5 kg  (184 lb 1.4 oz)   07/16/23 0400 138/68 -- -- 60 -- 98 % --     I/O last 3 completed shifts:  In: 560 [P.O.:560]  Out: 220 [Urine:220]    Vitals:    07/12/23 0500 07/13/23 0607 07/14/23 0600 07/15/23 0617   Weight: 83.9 kg (184 lb 15.5 oz) 83.5 kg (184 lb 1.4 oz) 84.7 kg (186 lb 11.7 oz) 79.7 kg (175 lb 11.3 oz)    07/16/23 0557   Weight: 83.5 kg (184 lb 1.4 oz)         GENERAL: awake, alert, follows  HEENT: NC/AT, PERRLA, EOMI, non icteric, pharynx moist without lesion  NECK: supple, no masses or adenopathy  RESP:  clear anteriorly  CV: RRR, normal S1 S2  ABDOMEN: soft  MS: no clubbing, cyanosis   SKIN: clear without significant rashes or lesions  EXT: edema     Data:     Recent Labs   Lab 07/16/23  0814 07/16/23  0149 07/15/23  2100 07/15/23  1708 07/14/23  0919 07/14/23  0659 07/13/23  0857 07/13/23  0602 07/12/23  0746 07/12/23  0529 07/11/23  0745 07/11/23  0532 07/10/23  0723 07/10/23  0630   NA  --   --   --   --   --  129*  --   --   --  130*  --  131*  --  127*   POTASSIUM  --   --   --   --   --  3.9  --  3.7  --  4.2  --  4.0  --  3.6   CHLORIDE  --   --   --   --   --  94*  --   --   --  93*  --   --   --  89*   CO2  --   --   --   --   --  22  --   --   --  22  --   --   --  23   ANIONGAP  --   --   --   --   --  13  --   --   --  15  --   --   --  15   * 146* 168* 172*   < > 137*   < >  --    < > 143*   < >  --    < > 170*   BUN  --   --   --   --   --  30.1*  --   --   --  23.2*  --   --   --  28.9*   CR  --   --   --   --   --  2.99*  --   --   --  2.62*  --  2.30*  --  3.01*   GFRESTIMATED  --   --   --   --   --  22*  --   --   --  26*  --  30*  --  22*   WANDA  --   --   --   --   --  8.9  --   --   --  8.8  --   --   --  8.7*    < > = values in this interval not displayed.         Recent Labs   Lab Test 07/14/23  0659 07/12/23  0529 07/11/23  0532 07/10/23  0630 07/09/23  0549 07/08/23  0544 07/07/23  0607 07/06/23  0552 07/05/23  0605 07/04/23  1153   CR 2.99* 2.62* 2.30* 3.01* 2.52* 3.04*  3.81* 4.87* 4.20* 2.87*     Recent Labs   Lab 07/14/23  0659 07/12/23  0529   ALBUMIN 3.6 3.6     Recent Labs   Lab 07/14/23  0659 07/12/23  0529 07/11/23  0532   PHOS 5.1* 3.9  --    HGB 10.3*  --  10.9*         G Brayden Dc MD    [facility]  [phone]

## 2023-07-16 NOTE — PROGRESS NOTES
[facility]    Medicine Progress Note - Hospitalist Service    Date of Admission:  6/29/2023    Assessment & Plan     Hayder Alves is a 68 year old male with a very complicated  medical history significant for recently diagnosed Lewy Body Dementia, chronic neck pain (s/p spinal fusion), COPD (not on home oxygen), CAD (s/p CABG 2001), CKD stage IV, hypertension, dyslipidemia, systolic CHF, diabetes mellitus, medication noncompliance, h/o atrial fibrillation (s/p cardioversion 2020; not currently on anticoagulation) presented to ED with shortness of breath and left arm pain.    Acute hypoxic respiratory failure: Persistent  likely due to acute on chronic systolic heart failure and exacerbation in the picture of renal failure   Patient had recurrent flash pulmonary edema, currently remains on high flow nasal cannula 45L with 50% FiO2 since 07/11  Acute on Chronic CHF exacerbation with reduced EF:  S/p RRT on 07/10/2023: From flash pulmonary edema ,patient underwent emergent hemodialysis on 07/11/2023, see note from 07/11/2023 for further details  Type II NSTEMI, sec to above  H/O CAD status CABG  New atrial flutter, likely paroxysmal atrial fibrillation h/o cardioversion in 2020, not currently on anticoagulation)  Severe bradycardia/complete heart block s/p PPM on 7/5/2023  Essential Hypertension  Dyslipidemia  Moderate pulmonary hypertension-newly diagnosed --> repeat ECHO showing Severe Pulm HTN    Prolonged Hospital course :    -- Initially on admission presented with SOB and chest discomfort.    -- In ER ,heart rate was in 30s, EKG showed atrial flutter   -- proBNP was significantly elevated at 19,855 and troponin was elevated at 118  -- Last Echo was in June 2020 showed EF of 55 to 60%, no wall motion changes, mild dilated left atrium and trace MR and TR  -- Chest x-ray from ED was concerning for volume overload   -- Patient was started on diuresis and heparin infusion  -- Duplex of  the lower extremity did not show any evidence of DVT  -- Troponin peaked at 118 and have trended down to 103  -- Echo on 7/1/23 showed EF of 50 to 55%, grade 2 or moderate diastolic dysfunction, mild inferior lateral wall hypokinesis, mild biatrial enlargement, RV systolic function is borderline reduced, RVSP is elevated consistent with moderate pulmonary hypertension, mild aortic Valvular stenosis  -- Cardiology was consulted due to complete heart bloch and CHF   -- Given his KAY on CKD 4 , initially cardiac catheterization was not considered due to the concern for patient might end up on dialysis   -- S/P PPM placement on 7/5/2023   -- Cardiology ( EP ) signed off   -- Nephrology consulted later on the course due to oliguric renal failure ( see below )  -- Patient has been started on HD , no significant clinical improvement   -- Patient had RRT on 07/10/23 --> Flash Pulm edema -- > required extra emergent HD on 07/11, placed on BIPAP , was able to be switched to HFNC on 07/11/23 after HD  -- Since then patient has been getting HD but remains on HF without significant improvement in resp status  -- General Cards were re consulted for possible ischemia workup   -- Extensive discussion has been done with family , Cards and Nephro   -- Repeat ECHO on 07/13/2023 showed EF of 55 to 60%, diastolic Doppler findings suggest left ventricular filling pressures and increased, septal wall motion abnormality may reflect pacemaker activation. Severe pulmonary HTN is now present  -- Nephro recommended against ischemic workup at this point as it will push patient towards permanent HD  -- Palliative team has been consulted due to patient poor clinical improvement despite aggressive management and regular HD    -- Code status changed to DNR/DNI on 07/14 , plan to meet with family again on 04/17    Plan :    -- Continue to monitor patient on telemetry  -- On HFNC , continues to be on 80 % FIO2 and 50 Liter ( FIO2 is increased from 70  %)  -- Continue to wean oxygen as able to, appreciate RT help  -- continue to wean patient as able , family would like to continue current management over the weekend   -- Continue amlodipine 5 mg twice daily  -- Continue Imdur 60 mg p.o. daily  -- PRN Hydralazine available   -- Cards have signed off , no plan to proceed with angiogram due to renal status   -- Patient is not a candidate for long term HD per Nephro ( see below )   -- Wife is questioning regarding fluid restriction, discussed with bedside nursing that as currently status has not been changed to comfort care and patient is dealing with fluid overload, will wait till tomorrow while family has more chance to have further discussion with nephrology and palliative team before liberalizing fluid intake.    Acute kidney injury, initially resolved  Later developed oliguric KAY on CKD stage IV  Status placement of tunneled catheter on 7/5/2023   HD start since 7/5   Hyponatremia, secondary to hypervolemia  Anion gap metabolic acidosis likely due to renal failure  Hypokalemia-resolved    Patient had CKD stage III-IV at baseline  H/O of medication noncompliance and follow-ups  Patient did not have any outpatient established nephrologist before the hospitalization although referrals were sent in the past.    --  During the hospitalization, patient developed oliguric renal failure.  --  Nephrology was consulted  --  Tunneled catheter was placed on 07/05  --  Patient was initiated on HD  --  Patient had HD on 07/10/2023 and then again underwent emergent HD on 07/11/2023 due to worsening respiratory status from pulm edema   -- Since then has been getting persistent HD but no improvement in resp status as above   -- Due to poor improvement , palliative has been involved  -- Patient is not a good candidate for long term HD  -- On HFNC , continues to be on 65% FIO2 and 55 Liter  -- Per nephrology note, patient has [facility] outpatient chair Monday Wednesday  Friday at 0530  --  On going discussion with family regarding stopping HD and compassionate weaning of HFNC if not improvement over the weekend       Goals of care discussion:    -- Bedside discussions are done withwife Lilia and son Pancho   --Family wanted to continue aggressive management earlier this week and reassess if patient makes any clinical improvement   -- Palliative has been following   -- Unfortunately patient so far has not shown any significant improvement   -- After family meeting , code status changed to DNR/DNI on 07/14/23   -- Daughter planning to fly from [place] this weekend , plan to meet with palliative on 07/17  -- If no improvement , family will consider changing GOC to comfort   -- If Vijay detoriates before Monday , no plan for escalation of the care and transfer to ICU  -- Of note, iVjay is unable to make decisions due to dementia , wife Lilia and kids are making decisions      Elevated D-dimer: Most likely secondary to CKD    -- Patient had elevated D-dimer on admission , considering all his comorbidities.  -- No CTA chest was done due to CKD stage 4   -- On admission he was started on high intensity heparin drip  -- Duplex of the lower extremity did not show any evidence of DVT  -- VQ scan was ordered and dated not show any evidence of PE  -- Heparin was stopped     History of COPD  Acute hypoxic respiratory failure ( see Problem 1) Multifactorial as above     -- Not on home oxygenon presentation was noted 85% on room air   -- Patient did get IV Solu-Medrol ,DuoNebs and his PTA inhalers initially   -- Chest x-ray on admission showed pulmonary vascular congestion with likely mild to moderate pulmonary edema and small bilateral pleural effusions with compressive atelectasis, right larger than left with no pneumothorax  -- 7/4 chest x-ray  showed again vascular congestion   -- Given his hypoxia and elevated procalcitonin , he was treated withempiric ceftriaxone for 5 days , done on  07/08  -- 07/10 Chest XR  Remains negative for infiltrates but showed concern for mild to moderate pulmonary edema.    -- Continue with scheduled and as needed DuoNebs  -- COPD team consulted and recommended nebulization machine on discharge from rehab     Uncontrolled diabetes;   Hemoglobin A1c came back 10.9  Family reported medication noncompliance.  His Outpatient insulin regimen was recently adjusted and switched from NPH to Lantus and NovoLog combination  Patient was supposed to be taking 30 units of Lantus and NovoLog 10 units 3 times daily with meals which he has not started yet as per wife.  Initially patient was on insulin infusion which was then transitioned to basal and bolus  Patient was on Lantus 10 units before RRT.  Since RRT , not requiring Lantus     -- Holding Lantus since RRT , was NPO on BIPAP, poor oral intake    -- Continue high dose sliding scale insulin    -- Continue patient on meal coverage with 1 unit of NovoLog for 15 g of carb.  -- Avoid hypoglycemia, continue hypoglycemia protocol    Chronic neck pain, history of spinal fusion    -- Continue PTA Percocet and continue PTA Neurontin  --  added Voltaren gel on 07/13/23     Recently diagnosed Lewy Body Dementia    -- noted  -- Follows neurology as outpatient  -- Underlying dementia is playing role in big picture    Chronic thrombocytopenia  -- He does seem to have chronic thrombocytopenia and his platelet level fluctuates and has no evidence of bleeding and was 124 today     Physical deconditioning  --Patient has been evaluated by physical and Occupational Therapy, currently has been recommended to be discharged to TCU  -- Unfortunately patient remains critically sick on high flow nasal cannula, not making any significant improvement in respiratory status, plan for next family conference on Monday        Diet: Fluid restriction 1200 ML FLUID  Room Service  2 Gram Sodium Diet  Fluid restriction 1200 ML FLUID    DVT Prophylaxis: Heparin  "drip  Gardner Catheter: Not present  Lines: None     Cardiac Monitoring: ACTIVE order. Indication: Post- EP procedure (48 hours)  Code Status: No CPR- Do NOT Intubate        Disposition Plan :    Patient was recommended to be discharged to TCU by therapies, currently on high flow nasal cannula 50 L oxygen at 80% FiO2, not ready for discharge, [facility] conversation is in progress , next meeting Monday    Medical Decision Making     50 MINUTES SPENT BY ME on the date of service doing chart review, history, exam, documentation & further activities per the note.       Expected Discharge Date: 07/19/2023    Discharge Delays: Placement - TCU  Destination: other (comment) (TCU)  Discharge Comments: HD today.       Violetta Rome MD  Hospitalist Service  [facility]  _____________________________________________________________________    Interval History      Patient was seen and examined, remains on HFNC.  Polite and cooperative, does have underlying memory issues but answer questions appropriately.  Denying any chest pain and palpitations, remains on high flow nasal cannula.  Hoping to see his daughter later who is flying from [place].  Discussed with patient that next hemodialysis and family conference is due tomorrow.  Patient showed understanding.    Physical Exam     Vital signs:  Temp: 97.1  F (36.2  C) Temp src: Oral BP: (!) 144/92 Pulse: 61   Resp: 20 SpO2: 97 % O2 Device: High Flow Nasal Cannula (HFNC) Oxygen Delivery: 50 LPM   Weight: 83.5 kg (184 lb 1.4 oz)  Estimated body mass index is 28.03 kg/m  as calculated from the following:    Height as of 5/4/23: 1.726 m (5' 7.95\").    Weight as of this encounter: 83.5 kg (184 lb 1.4 oz).    General: Alert and awake, oriented to person and place, does have memory issues   Respiratory: On high flow nasal cannula, few basal crackles, right chest tunneled catheter in place  Cardiovascular: Regular rate , S1 and S2 normal with no murmer or rubs or " gallops and bilateral edema  Abdomen:   soft , non tender ,   Neurologic:  No facial droop  Musculoskeletal: Normal Range of motion over upper and lower extremities bilaterally   Psychiatric: cooperative         Data         Imaging results reviewed over the past 24 hrs:   No results found for this or any previous visit (from the past 24 hour(s)).

## 2023-07-16 NOTE — PLAN OF CARE
Pt stable over night with no change in his current conditions.  VSS, no c/o pain or SOB.  He continues on HFNC with good O2 sat's.  Tele:V Paced.  He has slept most of the shift, no new issues noted.

## 2023-07-16 NOTE — PLAN OF CARE
1900h-2300h Nursing Cares    VS:  Stable  Assist by: 2; GB and walker when OOB    Cardiac: RRR; CP free overnight   Neuro: A&Ox4; Forgetful  CMS:  Numbness present to bilateal feet (baseline)  Respi: diminished to auscultation; on hi-travis O2 @75% 50lpm   : Incontinent; External cathether in place overnight; On HD  GI: Abdo rounded, soft, non- tender; BS audible; Last SARAH 7/13/2023    Strip/Tele: 100% V-Paced    Pressure areas/Skin:    - Excoriated BL groins;  Cleansed, dried, Miconazole powder applied   - Preventative Foam dressing applied to Sacrum/coccyx / Buttocks        LDA's:     - PIVC to L / R arm SL   - Left Subclav PPM site WDL  - Right Subclav HD access WDL    Plans:    > Continue to wean oxygen as able to, appreciate RT help  > Continue amlodipine 5 mg twice daily; Imdur 60 mg p.o. daily  > PRN Hydralazine available   > Cards have signed off , no plan to proceed with angiogram due to renal status   > Palliative has been following   > plan to meet with palliative on 07/17  > If Bill detoriates before Monday , no plan for escalation of the care and transfer to ICU  >Nephro recommended against ischemic workup at this point as it will push patient towards permanent HD  >No need for angiogram.  This was discussed with Dr. Leonila Payne for p.o. diet     > Renal:   - [facility] outpatient chair Monday Wednesday Friday at 0530   - Dialysis 07/17/23         Problem: Plan of Care - These are the overarching goals to be used throughout the patient stay.    Goal: Absence of Hospital-Acquired Illness or Injury  Intervention: Identify and Manage Fall Risk  Recent Flowsheet Documentation  Taken 7/15/2023 1920 by Martha Jasso RN  Safety Promotion/Fall Prevention:    activity supervised    clutter free environment maintained    increased rounding and observation    lighting adjusted    room door open    room near nurse's station    safety round/check completed  Intervention: Prevent Skin Injury  Recent  Flowsheet Documentation  Taken 7/15/2023 2100 by Martha Jasso, RN  Body Position:    left    turned    heels elevated  Goal: Optimal Comfort and Wellbeing  Intervention: Monitor Pain and Promote Comfort  Recent Flowsheet Documentation  Taken 7/15/2023 1920 by Martha Jasso RN  Pain Management Interventions:    medication (see MAR)    emotional support    heat applied  Intervention: Provide Person-Centered Care  Recent Flowsheet Documentation  Taken 7/15/2023 1920 by Martha Jasso RN  Trust Relationship/Rapport:    care explained    emotional support provided     Problem: COPD (Chronic Obstructive Pulmonary Disease) Comorbidity  Goal: Maintenance of COPD Symptom Control  Intervention: Maintain COPD-Symptom Control  Recent Flowsheet Documentation  Taken 7/15/2023 1920 by Martha Jasso RN  Medication Review/Management: medications reviewed     Problem: Hypertension Comorbidity  Goal: Blood Pressure in Desired Range  Intervention: Maintain Blood Pressure Management  Recent Flowsheet Documentation  Taken 7/15/2023 1920 by Martha Jasso, RN  Medication Review/Management: medications reviewed

## 2023-07-17 NOTE — PLAN OF CARE
VS: BP Stable;   Assist by: IND / SB / 1 / GB and walker     Cardiac: RRR / Irregular R&R / CP free overnight   Neuro: A&Ox3-4; forgetful but reorientable  CMS:  Paresthesias present to bilateral LE's (baseline)  Respi: Episode of increased labor of breathing, desats to 77% on max Hi-Jorge A, necessitating conversion to BiPAP overnight; NP reviewed--keep on BiPAP overnight; IMC orders per hospitalist  :  External cathether in place overnight; On HD  GI: Abdo soft, non-tender; BS audible;      Strip/Tele: V-paced    Pressure areas/Skin:    - PA's intact   - Excoriated BL groins;  Cleansed, dried, Miconazole powder applied            LDA's:      - PIVC to [place] R arm SL     - Left Subclav PPM site WDL   - Right Subclav HD port WDL      Plans:    > Continue to wean oxygen as able to, appreciate RT help  > Continue amlodipine 5 mg twice daily; Imdur 60 mg p.o. daily  > PRN Hydralazine available  > Cards have signed off , no plan to proceed with angiogram due to renal status   > On going discussion with family regarding stopping HD and compassionate weaning of HFNC if not improvement over the weekend   > If Bill detoriates before Monday , no plan for escalation of the care and transfer to ICU     > Cardio:   - S/O      > Renal:   - [facility] outpatient chair Monday Wednesday Friday at 0530   - Dialysis 07/17/23   - Appears likely to transition to comfort cares at some point

## 2023-07-17 NOTE — PROGRESS NOTES
[facility]    Medicine Progress Note - Hospitalist Service    Date of Admission:  6/29/2023    Assessment & Plan   Hayder Alves is a 68 year old male with a very complicated  medical history significant for recently diagnosed Lewy Body Dementia, chronic neck pain (s/p spinal fusion), COPD (not on home oxygen), CAD (s/p CABG 2001), CKD stage IV, hypertension, dyslipidemia, systolic CHF, diabetes mellitus, medication noncompliance, h/o atrial fibrillation (s/p cardioversion 2020; not currently on anticoagulation) presented to ED with shortness of breath and left arm pain.     Acute hypoxic respiratory failure: Persistent  likely due to acute on chronic systolic heart failure and exacerbation in the picture of renal failure   Patient had recurrent flash pulmonary edema, currently remains on high flow nasal cannula 45L with 50% FiO2 since 07/11  Acute on Chronic CHF exacerbation with reduced EF:  S/p RRT on 07/10/2023: From flash pulmonary edema ,patient underwent emergent hemodialysis on 07/11/2023, see note from 07/11/2023 for further details  Type II NSTEMI, sec to above  H/O CAD status CABG  New atrial flutter, likely paroxysmal atrial fibrillation h/o cardioversion in 2020, not currently on anticoagulation)  Severe bradycardia/complete heart block s/p PPM on 7/5/2023  Essential Hypertension  Dyslipidemia  Moderate pulmonary hypertension-newly diagnosed --> repeat ECHO showing Severe Pulm HTN     Prolonged Hospital course :     -- Initially on admission presented with SOB and chest discomfort.    -- In ER ,heart rate was in 30s, EKG showed atrial flutter   -- proBNP was significantly elevated at 19,855 and troponin was elevated at 118  -- Last Echo was in June 2020 showed EF of 55 to 60%, no wall motion changes, mild dilated left atrium and trace MR and TR  -- Chest x-ray from ED was concerning for volume overload   -- Patient was started on diuresis and heparin infusion  -- Duplex  of the lower extremity did not show any evidence of DVT  -- Troponin peaked at 118 and have trended down to 103  -- Echo on 7/1/23 showed EF of 50 to 55%, grade 2 or moderate diastolic dysfunction, mild inferior lateral wall hypokinesis, mild biatrial enlargement, RV systolic function is borderline reduced, RVSP is elevated consistent with moderate pulmonary hypertension, mild aortic Valvular stenosis  -- Cardiology was consulted due to complete heart bloch and CHF   -- Given his KAY on CKD 4 , initially cardiac catheterization was not considered due to the concern for patient might end up on dialysis   -- S/P PPM placement on 7/5/2023   -- Cardiology ( EP ) signed off   -- Nephrology consulted later on the course due to oliguric renal failure ( see below )  -- Patient has been started on HD , no significant clinical improvement   -- Patient had RRT on 07/10/23 --> Flash Pulm edema -- > required extra emergent HD on 07/11, placed on BIPAP , was able to be switched to HFNC on 07/11/23 after HD  -- Since then patient has been getting HD but remains on HF without significant improvement in resp status  -- General Cards were re consulted for possible ischemia workup   -- Extensive discussion has been done with family , Cards and Nephro   -- Repeat ECHO on 07/13/2023 showed EF of 55 to 60%, diastolic Doppler findings suggest left ventricular filling pressures and increased, septal wall motion abnormality may reflect pacemaker activation. Severe pulmonary HTN is now present  -- Nephro recommended against ischemic workup at this point as it will push patient towards permanent HD  -- Palliative team has been consulted due to patient poor clinical improvement despite aggressive management and regular HD    -- Code status changed to DNR/DNI on 07/14 , plan to meet with family again on 04/17     Plan :     -- Continue to monitor patient on telemetry  -- On HFNC , continues to be on 80 % FIO2 and 50 Liter ( FIO2 is increased  from 70 %)  -- Continue to wean oxygen as able to, appreciate RT help  -- continue to wean patient as able , family would like to continue current management over the weekend   -- Continue amlodipine 5 mg twice daily  -- Continue Imdur 60 mg p.o. daily  -- PRN Hydralazine available   -- Cards have signed off , no plan to proceed with angiogram due to renal status   -- Patient is not a candidate for long term HD per Nephro ( see below )   -- Wife is questioning regarding fluid restriction, discussed with bedside nursing that as currently status has not been changed to comfort care and patient is dealing with fluid overload, will wait till tomorrow while family has more chance to have further discussion with nephrology and palliative team before liberalizing fluid intake.     Acute kidney injury, initially resolved  Later developed oliguric KAY on CKD stage IV  Status placement of tunneled catheter on 7/5/2023   HD start since 7/5   Hyponatremia, secondary to hypervolemia  Anion gap metabolic acidosis likely due to renal failure  Hypokalemia-resolved     Patient had CKD stage III-IV at baseline  H/O of medication noncompliance and follow-ups  Patient did not have any outpatient established nephrologist before the hospitalization although referrals were sent in the past.     --  During the hospitalization, patient developed oliguric renal failure.  --  Nephrology was consulted  --  Tunneled catheter was placed on 07/05  --  Patient was initiated on HD  --  Patient had HD on 07/10/2023 and then again underwent emergent HD on 07/11/2023 due to worsening respiratory status from pulm edema   -- Since then has been getting persistent HD but no improvement in resp status as above   -- Due to poor improvement , palliative has been involved  -- Patient is not a good candidate for long term HD  -- On HFNC , continues to be on 65% FIO2 and 55 Liter  -- Per nephrology note, patient has Jennifer [facility] outpatient chair Monday  Wednesday Friday at 0530  --  On going discussion with family regarding stopping HD and compassionate weaning of HFNC if not improvement over the weekend         Goals of care discussion:     -- Bedside discussions are done withwife Lilia and son Pancho   --Family wanted to continue aggressive management earlier this week and reassess if patient makes any clinical improvement   -- Palliative has been following   -- Unfortunately patient so far has not shown any significant improvement   -- After family meeting , code status changed to DNR/DNI on 07/14/23   -- Daughter planning to fly from [place] this weekend , plan to meet with palliative on 07/17  -- If no improvement , family will consider changing GOC to comfort   -- If Vijay detoriates before Monday , no plan for escalation of the care and transfer to ICU  -- Of note, Vijay is unable to make decisions due to dementia , wife Lilia and kids are making decisions       Elevated D-dimer: Most likely secondary to CKD     -- Patient had elevated D-dimer on admission , considering all his comorbidities.  -- No CTA chest was done due to CKD stage 4   -- On admission he was started on high intensity heparin drip  -- Duplex of the lower extremity did not show any evidence of DVT  -- VQ scan was ordered and dated not show any evidence of PE  -- Heparin was stopped      History of COPD  Acute hypoxic respiratory failure ( see Problem 1) Multifactorial as above      -- Not on home oxygenon presentation was noted 85% on room air   -- Patient did get IV Solu-Medrol ,DuoNebs and his PTA inhalers initially   -- Chest x-ray on admission showed pulmonary vascular congestion with likely mild to moderate pulmonary edema and small bilateral pleural effusions with compressive atelectasis, right larger than left with no pneumothorax  -- 7/4 chest x-ray  showed again vascular congestion   -- Given his hypoxia and elevated procalcitonin , he was treated withempiric ceftriaxone for 5 days ,  done on 07/08  -- 07/10 Chest XR  Remains negative for infiltrates but showed concern for mild to moderate pulmonary edema.     -- Continue with scheduled and as needed DuoNebs  -- COPD team consulted and recommended nebulization machine on discharge from rehab     Uncontrolled diabetes;   Hemoglobin A1c came back 10.9  Family reported medication noncompliance.  His Outpatient insulin regimen was recently adjusted and switched from NPH to Lantus and NovoLog combination  Patient was supposed to be taking 30 units of Lantus and NovoLog 10 units 3 times daily with meals which he has not started yet as per wife.  Initially patient was on insulin infusion which was then transitioned to basal and bolus  Patient was on Lantus 10 units before RRT.  Since RRT , not requiring Lantus      -- Holding Lantus since RRT , was NPO on BIPAP, poor oral intake    -- Continue high dose sliding scale insulin    -- Continue patient on meal coverage with 1 unit of NovoLog for 15 g of carb.  -- Avoid hypoglycemia, continue hypoglycemia protocol     Chronic neck pain, history of spinal fusion     -- Continue PTA Percocet and continue PTA Neurontin  --  added Voltaren gel on 07/13/23     Recently diagnosed Lewy Body Dementia     -- noted  -- Follows neurology as outpatient  -- Underlying dementia is playing role in big picture     Chronic thrombocytopenia  -- He does seem to have chronic thrombocytopenia and his platelet level fluctuates and has no evidence of bleeding and was 124 today     Physical deconditioning  --Patient has been evaluated by physical and Occupational Therapy, currently has been recommended to be discharged to TCU  -- Unfortunately patient remains critically sick on high flow nasal cannula, not making any significant improvement in respiratory status, plan for next family conference on Monday        Diet: Fluid restriction 1200 ML FLUID  Room Service  2 Gram Sodium Diet  Fluid restriction 1200 ML FLUID    DVT  Prophylaxis: Pneumatic Compression Devices  Gardner Catheter: Not present  Lines: None     Cardiac Monitoring: ACTIVE order. Indication: Acute decompensated heart failure (48 hours)  Code Status: No CPR- Do NOT Intubate      Clinically Significant Risk Factors              # Hypoalbuminemia: Lowest albumin = 3.4 g/dL at 7/17/2023  5:33 AM, will monitor as appropriate   # Thrombocytopenia: Lowest platelets = 101 in last 2 days, will monitor for bleeding   # Hypertension: Noted on problem list       # DMII: A1C = 10.3 % (Ref range: <5.7 %) within past 6 months            Disposition Plan      Expected Discharge Date: 07/18/2023    Discharge Delays: Placement - TCU  Destination: other (comment) (TCU)  Discharge Comments: Care conference on 7/14 at 3pm          Clay Awad MD  Hospitalist Service  [facility]  Securely message with [employer] (more info)  Text page via [employer] Paging/Directory   ______________________________________________________________________    Interval History   RRT called for hypoxia, he was placed on Bipap. He very much did not like the bipap. Planning HD today.    Reviewed with palliative care. Discussed with patient, RN, RT, SW, CC.    Physical Exam   Vital Signs: Temp: 97.9  F (36.6  C) Temp src: Oral BP: (!) 144/68 Pulse: 59   Resp: 18 SpO2: 100 % O2 Device: High Flow Nasal Cannula (HFNC) Oxygen Delivery: 60 LPM  Weight: 184 lbs 8.4 oz    Constitutional: Awake, alert, cooperative, no apparent distress  Respiratory: Clear to auscultation bilaterally, no crackles or wheezing  Cardiovascular: Regular rate and rhythm, normal S1 and S2, and no murmur noted  GI: Normal bowel sounds, soft, non-distended, non-tender  Skin/Integumen: No rashes, no cyanosis, no edema  Other:       Medical Decision Making       54 MINUTES SPENT BY ME on the date of service doing chart review, history, exam, documentation & further activities per the note.      Data   -------------------------  PAST 24 HR DATA REVIEWED -----------------------------------------------    I have personally reviewed the following data over the past 24 hrs:    6.2  \   10.0 (L)   / 101 (L)     130 (L) 94 (L) 36.4 (H) /  176 (H)   4.0 23 2.99 (H) \       ALT: N/A AST: N/A AP: N/A TBILI: N/A   ALB: 3.4 (L) TOT PROTEIN: N/A LIPASE: N/A       Imaging results reviewed over the past 24 hrs:   Recent Results (from the past 24 hour(s))   Cardiac Device Check - Remote   Result Value    Date Time Interrogation Session [id number]    Implantable Pulse Generator  Medtronic    Implantable Pulse Generator Model [id number] Fay XT SR MRI    Implantable Pulse Generator Serial Number [id number]    Type Interrogation Session Remote    Clinic Name [facility]    Implantable Pulse Generator Type Pacemaker    Implantable Pulse Generator Implant Date 20230706    Implantable Lead  Medtronic    Implantable Lead Model [id number] CapSureFix Novus    Implantable Lead Serial Number [id number]    Implantable Lead Implant Date 20230706    Implantable Lead Polarity Type Bipolar Lead    Implantable Lead Location Detail 1 UNKNOWN    Implantable Lead Location Right Ventricle    Juan Miguel Setting Mode (NBG Code) VVI    Juan Miguel Setting Lower Rate Limit 60    Juan Miguel Setting Hysterisis Rate DISABLED    Lead Channel Setting Sensing Polarity Bipolar    Lead Channel Setting Sensing Anode Location Right Ventricle    Lead Channel Setting Sensing Anode Terminal Ring    Lead Channel Setting Sensing Cathode Location Right Ventricle    Lead Channel Setting Sensing Cathode Terminal Tip    Lead Channel Setting Sensing Sensitivity 0.9    Lead Channel Setting Pacing Polarity Bipolar    Lead Channel Setting Pacing Anode Location Right Ventricle    Lead Channel Setting Pacing Anode Terminal Ring    Lead Channel Setting Sensing Cathode Location Right Ventricle    Lead Channel Setting Sensing Cathode Terminal Tip    Lead Channel Setting Pacing Pulse Width 0.4    Lead  Channel Setting Pacing Amplitude 2    Lead Channel Setting Pacing Capture Mode Adaptive    Zone Setting Type Category VF    Zone Setting Type Category VT    Zone Setting Type Category VT    Zone Setting Type Category VT    Zone Setting Detection Interval 360    Zone Setting Type Category ATRIAL_FIBRILLATION    Zone Setting Type Category AT/AF    Lead Channel Impedance Value 475    Lead Channel Impedance Value 380    Lead Channel Sensing Intrinsic Amplitude 4.375    Lead Channel Pacing Threshold Amplitude 0.5    Lead Channel Pacing Threshold Pulse Width 0.4    Battery Date Time of Measurements 63663889926830    Battery Status OK    Battery RRT Trigger 2.625    Battery Remaining Longevity 165    Battery Voltage 3.22    Juan Miguel Statistic Date Time Start 78561167149655    Juan Miguel Statistic Date Time End 53942619885634    Juan Miguel Statistic RV Percent Paced 99.49    Episode Statistic Recent Count 0    Episode Statistic Type Category Patient Activated    Episode Statistic Recent Count 0    Episode Statistic Type Category VT    Episode Statistic Recent Count 0    Episode Statistic Type Category VT    Episode Statistic Recent Date Time Start 45859220901842    Episode Statistic Recent Date Time End [id number]    Episode Statistic Recent Date Time Start 46635373745205    Episode Statistic Recent Date Time End 62944133137640    Episode Statistic Recent Date Time Start 64543559724994    Episode Statistic Recent Date Time End 83567797398023    Episode Statistic Total Count 0    Episode Statistic Type Category Patient Activated    Episode Statistic Total Count 0    Episode Statistic Type Category VT    Episode Statistic Total Count 0    Episode Statistic Type Category VT    Episode Statistic Total Date Time Start 26622871553792    Episode Statistic Total Date Time End 39911693900204    Episode Statistic Total Date Time Start 75804000858118    Episode Statistic Total Date Time End 78655665675380    Episode Statistic Total Date Time  Start 71463535081949    Episode Statistic Total Date Time End 16486007741791    Narrative    No Epic Interface Required    Patient was still in the hospital on 7/17/23.  Called and spoke with the bedside RNTianna, who assessed incision, reviewed instructions, and had [employer] transmission sent as below:     Medtronic [place] (S) 7-10 day Post Pacemaker Remote Device Check   : 99.5%  Mode: VVI 60   Underlying Rhythm: Regular  rhythm in the 60s  Heart Rate: Blunted at 60bpm per histogram.  Patient has been hospitalized.   Sensing: Not obtained   Pacing Threshold: WNL   Impedance: WNL   Battery Status: Estimated at 13.7 years remaining   Incision: Per bedside RN, incision is CDI with no redness or signs of infection noted.  Steri-strips were removed and single steri-strip was reapplied due to area with slight separation.   Ventricular Arrhythmia: 0    Care Plan: Reviewed arm restrictions and incision monitoring with bedside RNTianna, who will pass this on to the patient.  Called and spoke with patient's wife, Lilia, and reviewed transmission.  Per notes, they are considering comfort care for patient.  Lilia will contact clinic if/when patient is discharged from the hospital to arrange for his 6 week device check. GASPER Cano     I have reviewed and interpreted the device interrogation, settings, programming and nurse's summary. The device is functioning within normal device parameters. I agree with the current findings, assessment and plan.

## 2023-07-17 NOTE — TELEPHONE ENCOUNTER
Received message from patient's family stating that he was in the hospital and would not be able to make it to his 10 day device check at 1300 today.      [facility] with [facility]'s RN, Tianna.  Requested they send in a [employer] transmission so that we can review the lead measurements and stored data from the PPM.  Also requested that she assess the PPM incision and remove the steri-strips, checking for any signs of infection (redness, drainage, swelling, etc).  If any portion of the incision is not well approximated, requested a new steri-strip be placed and left on for one week.  Notified Tianna that patient was okay to shower from a pacemaker perspective (no baths/submerging).    Tianna, RN assessed the incision and stated that the incision showed no swelling or redness. There was one area that was slightly open that she will reapply a steri-strip to.  Per Tianna, they are able to do the [employer] Express once they have an order.  Order was placed.  Arm restrictions also reviewed (no raising arm above shoulder or lifting more than 10 pounds until 7/27/23).  Tianna will pass this on to the patient.     GASPER Cano       7/17/23 Addendum at 1110:    Carelink Express Transmission received.  Data as follows:     Medtronic Fay (S) 7-10 day Post Pacemaker Remote Device Check   : 99.5%  Mode: VVI 60   Underlying Rhythm: Regular  rhythm in the 60s  Heart Rate: Blunted at 60bpm per histogram.  Patient has been hospitalized.   Sensing: Not obtained   Pacing Threshold: WNL   Impedance: WNL   Battery Status: Estimated at 13.7 years remaining   Incision: Per bedside RN, incision is CDI with no redness or signs of infection noted.  Steri-strips were removed and single steri-strip was reapplied due to area with slight separation.   Ventricular Arrhythmia: 0    Care Plan: Reviewed arm restrictions and incision monitoring with bedside RNTianna, who will pass this on to the patient.   Called and spoke with patient's wife, Lilia, and reviewed transmission.  Per notes, they are considering comfort care for patient.  Lilia will contact clinic if/when patient is discharged from the hospital to arrange for his 6 week device check. GASPER Cano

## 2023-07-17 NOTE — PLAN OF CARE
Care plan note:      Recent Vitals:  Temp: 97.9  F (36.6  C) Temp src: Oral BP: (!) 153/72 Pulse: 68   Resp: 20 SpO2: 94 % O2 Device: High Flow Nasal Cannula (HFNC)      Orientation/Neuro: Alert and Oriented x 4, Forgetful  Pain: Pain is controlled with current analgesics.  Medication(s) being used: percocet.   Tele: Paced   IV medications: None   Mobility: Assist of 1, Gait belt, and Walker   Skin: Bruises: scattered.   GI: WDL  : Hemodialysis     Diet: Tolerating diet:   Well  Orders Placed This Encounter      2 Gram Sodium Diet      Safety/Concerns:  Fall Risk and [place] Risk  Aggression Color: Green    Plan: Goals of care conference and hemodialysis tomorrow.    Continue to monitor.      Merry Hawthorne RN

## 2023-07-17 NOTE — PROGRESS NOTES
"Palliative Care Daily Progress Note  [facility]     Patient Name: Hayder Alves  Date of Admission: 6/29/2023   Today the patient was seen for:  Goals of care     Recommendations & Counseling     GOALS OF CARE:    Restorative with limits -stay the course for now     Daughter Jade is going to make her way from [place], arriving this afternoon, and staying indefinitely    Per family care conference last week, no escalation of care to ICU    Wife Lilia at bedside today.  Feeling distressed about \"conflict\" between cardiology and nephrology. She does not understand why his numbers look \"good,\" but the doctors are saying he isn't getting better. Reeducated her on Vijay's pathophysiology involving his heart failure, renal failure/ATN, and subsequent respiratory failure.  Expressed that his respiratory failure remains serious, and that he remains on life support with escalating O2 support overnight.  This is our most limiting issue right now, which impacts his ability to leave the hospital and continue on a restorative pathway.  After this reeducation, Lilia seemingly more understanding, but will likely continue to need reinforcement of this information    Although alert and conversant, Vijay is unable to demonstrate capacity to make complex medical decisions for himself.  Vijay has been making statements such as \"if it is my time, then it is my time,\" and today he stated \"I cannot live on these machines forever.\"  Wife Lilia distressed that Vijay does not understand what is happening to him.  She wishes he could make the decision, and is distressed by having to make the decision for him.  I reframed this for Lilia, expressing that Vijay's body is making the decision for him    Another family care conference may be helpful in the coming days    ADVANCE CARE PLANNING:    Patient has an advance directive dated 3/2022.  Primary Health Care Agent son Pancho, wife Lilia.  Surrogates are son Pancho, " wife Lilia.     No POLST on file     Code status: No CPR- Do NOT Intubate    MEDICAL MANAGEMENT:    Family agreeable to low-dose Dilaudid 0.3 to 0.5 mg IV every 4 hours as needed pain, shortness of breath    Continue high flow O2 support.  Ongoing discussions with family regarding recommendation for comfort cares, eventual compassionate high flow oxygen removal    PSYCHOSOCIAL/SPIRITUAL:    Family - supportive wife Lilia. Sons Pancho and Ranjit, daughter Jade lives in [place] and flying to [place] today, staying indefinitely.  Supportive brother and sister-in-law     [place] community: [employer] - appreciates support from spiritual health team    Palliative Care will continue to follow. Thank you for the consult and allowing us to aid in the care of Hayder Alves.    These recommendations have been discussed with Dr. Awad.     MICKEY Aragon CNP  Securely message with [place] [employer] (more info)  Text page via [facility] Paging/Directory         Assessments           Hayder Alves is a 68 year old male with a past medical history significant for recently diagnosed Lewy Body Dementia, chronic neck pain (s/p spinal fusion), COPD (not on home oxygen), CAD (s/p CABG 2001), CKD stage IV, hypertension, dyslipidemia, systolic CHF, poorly controlled diabetes mellitus, medication noncompliance, h/o atrial fibrillation (s/p cardioversion 2020; not currently on anticoagulation) who presents with shortness of breath and left arm pain. He was found to have severe bradycardia/complete heart block s/p PPM 7/5. His hospital course has been further complicated by acute hypoxic respiratory failure 2/2 acute on chronic systolic heart failure and exacerbation. He has developed oliguric KAY, likely ischemic ATN related to hemodynamics, bradycardia requiring initiation of dialysis, Nephrology following. He remains high flow O2 dependent, despite initiation of dialysis.    Prognosis, Goals, & Planning:   Prognosis, Goals, and/or Advance  "Care Planning addressed today:    Restorative with limits -stay the course for now     Daughter Jade is going to make her way from [place], arriving this afternoon, and staying indefinitely    Per family care conference last week, no escalation of care to ICU    Wife Lilia at bedside today.  Feeling distressed about \"conflict\" between cardiology and nephrology. She does not understand why his numbers look \"good,\" but the doctors are saying he isn't going well. Reeducated her on Vijay's pathophysiology involving his heart failure, renal failure/ATN, and subsequent respiratory failure.  Expressed that his respiratory failure remains serious, and that he remains on life support with escalating O2 support overnight.  This is our most limiting issue right now, which impacts his ability to leave the hospital and continue on a restorative pathway.  After this reeducation, Lilia seemingly more understanding, but will likely continue to need reinforcement of this information    Although alert and conversant, Vijay is unable to demonstrate capacity to make complex medical decisions for himself.  Vijay has been making statements such as \"if it is my time, then it is my time,\" and today he stated \"I cannot live on these machines forever.\"  Wife Lilia distressed that Vijay does not understand what is happening to him.  She wishes he could make the decision, and is distressed by having to make the decision for him.  I reframed this for Lilia, expressing that Vijay's body is making the decision for him    Another family care conference may be helpful in the coming days    Code Status was addressed today:     No, already DNR/DNI, addressed last week     Coping, Meaning, & Spirituality:   Mood, coping, and/or meaning in the context of serious illness were addressed today: Yes.  Appreciate support from spiritual health         Interval History:     Chart review/discussion with unit or clinical team members:   No improvement in " respiratory status with dialysis this week.  Remains high flow oxygen dependent.  Hypoxia and respiratory decline overnight, BiPAP initiated.  Dialysis today.    Per patient or family/caregivers today:  Bill is alert today, no complaints.  States that he cannot live on machines forever.         Review of Systems:     Besides above, an additional N/A system ROS was reviewed and is unremarkable          Physical Exam:   Temp:  [97.8  F (36.6  C)-98  F (36.7  C)] 97.8  F (36.6  C)  Pulse:  [58-68] 60  Resp:  [16-37] 23  BP: (111-156)/(63-85) 138/63  FiO2 (%):  [60 %-90 %] 80 %  SpO2:  [86 %-100 %] 100 %  184 lbs 8.4 oz  CONSTITUTIONAL: Chronically ill man seen resting in bed in NAD, pleasantly confused, wife at bedside  RESPIRATORY: Nonlabored breathing on high flow oxygen           Current Problem List:   Principal Problem:    NSTEMI (non-ST elevated myocardial infarction) (H)  Active Problems:    Acute pulmonary edema (H)    Hyperglycemia    COPD exacerbation (H)    Acute kidney failure with tubular necrosis (H)      Allergies   Allergen Reactions     Adhesive Tape      From blood draw site.     Eszopiclone      Strange dreams. Cooking during sleep      Latex      Lipitor [Atorvastatin Calcium] Other (See Comments)     myalgias     Lisinopril Other (See Comments)     Hyperkalemia and increased creatinine            Medications:   Amitriptyline 30mg PO at bedtime   Norvasc  ASA  Buspar 5mg PO TID   Diclofenac topical gel 4 times daily  Bronchodilators  Gabapentin 300mg PO BID   Insulin   Duonebs   Imdur   Percocet 1 tab PO at bedtime   Crestor  Senna 1-2 tabs PO BID   Percocet 1 tab PO Q4hrs PRN pain   Dilaudid 0.3 to 0.5 mg IV every 4 hours as needed pain, shortness of breath         Data Reviewed:   Recent imaging independently reviewed, my comments on pertinents:   Results for orders placed or performed during the hospital encounter of 06/29/23   XR Chest Port 1 View    Impression    IMPRESSION: Stable size of  cardiomediastinal silhouette status post median sternotomy and CABG. Defibrillator pads overlying the left chest. Pulmonary vascular congestion with likely mild to moderate pulmonary edema and small bilateral pleural effusions   with compressive atelectasis, right larger than left. No pneumothorax. Bones are unchanged. Cervical spinal fusion hardware again noted.   US Lower Extremity Venous Duplex Bilateral    Impression    IMPRESSION:  1.  No deep venous thrombosis in the bilateral lower extremities.   NM Lung Scan Ventilation and Perfusion    Impression    IMPRESSION:    No evidence of pulmonary embolism.   XR Chest Port 1 View    Impression    IMPRESSION: Sternotomy wires are unchanged. Cardiomegaly with pulmonary vascular previously seen opacity at the right lung base has cleared. Persistent opacity left lung base. Probable small left pleural effusion.   IR CVC Tunnel Placement > 5 Yrs of Age    Impression    IMPRESSION:    1.  Tunneled dialysis catheter placement, as discussed above.  2.  The catheter position was verified fluoroscopically and this is  ready for use.    TROY LAZAR MD         SYSTEM ID:  [id number]   X-ray Chest 2 vws*    Impression    IMPRESSION: No acute disease.     BEULAH MARTINEZ MD         SYSTEM ID:  [id number]   XR Abdomen Port 1 View    Impression    IMPRESSION: Suboptimal combination. Left abdomen incompletely visualized. Bowel gas pattern is normal. Moderate stool gas retention. Nothing for obstruction or free air. No evidence for renal stones.   XR Chest Port 1 View    Impression    IMPRESSION:     New right IJ catheter with tip in the mid SVC. Left subclavian approach pacemaker. Median sternotomy wires. Partially visualized cervical spinal fusion hardware.    Increased layering right pleural effusion with adjacent compressive atelectasis. Decreased trace left pleural effusion. No pneumothorax.    Diffuse prominence of the pulmonary vascularity with hazy perihilar and lower lobe  opacities, likely mild-to-moderate pulmonary edema.    Stable cardiomegaly.   Echocardiogram Complete   Result Value Ref Range    LVEF  50-55%    Echocardiogram Complete   Result Value Ref Range    LVEF  55-60%      7/17 EKG no longer in a.fib, Qtc normalized     Recent lab data independently reviewed, my comments on pertinents:   Na 130  K 4  Creat 2.99  Phos 4.8  WBC 6.2  Hgb 10  Plt 101  Albumin 3.4      Medical Decision Making   Please see A&P for additional details of medical decision making.  MANAGEMENT DISCUSSED with the following over the past 24 hours: Dr. Awad   NOTE(S)/MEDICAL RECORDS REVIEWED over the past 24 hours: Hospitalist notes, nephrology notes, cardiology note, nursing notes  Tests personally interpreted in the past 24 hours:  - EKG tracing showing no longer in a.fib, Qtc normalized  Tests REVIEWED in the past 24 hours:  - See lab/imaging results included in the data section of the note  - BMP  - CBC  - Phosphorus  SUPPLEMENTAL HISTORY, in addition to the patient's history, over the past 24 hours obtained from:   - Wife Lilia, Dr. Awad  Medical complexity over the past 24 hours:  - Decision to DE-ESCALATE CARE based on prognosis

## 2023-07-17 NOTE — PROGRESS NOTES
"SPIRITUAL HEALTH SERVICES  SPIRITUAL ASSESSMENT Progress Note  [facility]     REFERRAL SOURCE: Follow up visit    Brief visit with Vijay. He expressed that he's \"doing well\" and has had visits from family over the weekend, including his 3 granddaughters. He is looking forward to seeing his daughter who is arriving from [place].   Vijay had no spiritual health needs at this time, but welcomed an olive wood holding cross and follow up visits from spiritual health.    PLAN: Will continue to follow for ongoing support.    Lexie Staton  Associate   [facility] Phone [phone]  [facility] Pager [phone]    [facility] available 24/7 for emergent requests/referrals, either by having the on-call  paged or by entering an ASAP/STAT consult in Epic (this will also page the on-call ).    "

## 2023-07-17 NOTE — PROGRESS NOTES
Potassium   Date Value Ref Range Status   07/17/2023 4.0 3.4 - 5.3 mmol/L Final   06/20/2022 4.2 3.4 - 5.3 mmol/L Final   02/10/2021 4.6 3.4 - 5.3 mmol/L Final     Hemoglobin   Date Value Ref Range Status   07/17/2023 10.0 (L) 13.3 - 17.7 g/dL Final   02/19/2020 15.4 13.3 - 17.7 g/dL Final     Creatinine   Date Value Ref Range Status   07/17/2023 2.99 (H) 0.67 - 1.17 mg/dL Final   02/10/2021 1.98 (H) 0.66 - 1.25 mg/dL Final     Urea Nitrogen   Date Value Ref Range Status   07/17/2023 36.4 (H) 8.0 - 23.0 mg/dL Final   06/20/2022 24 7 - 30 mg/dL Final   02/10/2021 27 7 - 30 mg/dL Final     Sodium   Date Value Ref Range Status   07/17/2023 130 (L) 136 - 145 mmol/L Final   02/10/2021 137 133 - 144 mmol/L Final     INR   Date Value Ref Range Status   10/10/2016 1.08 0.86 - 1.14 Final       DIALYSIS PROCEDURE NOTE  Hepatitis status of previous patient on machine log was checked and verified ok to use with this patients hepatitis status.  Patient dialyzed for 3 hrs. on a K3 bath with a net fluid removal of  3.5L.  A BFR of 400 ml/min was obtained via a CVC.      The treatment plan was discussed with Dr. Toro just prior to treatment treatment.    Total heparin received during the treatment: 0 units.      Line flushed, clamped and capped with heparin 1:1000 1.9 mL (1900 units) per lumen     Meds  given: none   Complications: none       Person educated: patient. Knowledge base minimal. Barriers to learning: cognitive/memory. Educated on procedure via verbal mode. patient/family verbalized understanding.     ICEBOAT? Timeout performed pre-treatment  I: Patient was identified using 2 identifiers  C:  Consent Signed Yes  E: Equipment preventative maintenance is current and dialysis delivery system OK to use  B: Hepatitis B Surface Antigen: Negative; Draw Date: 7/6/23      Hepatitis B Surface Antibody: susceptible; Draw Date: 7/6/23  O: Dialysis orders present and complete prior to treatment  A: Vascular access verified and  assessed prior to treatment  T: Treatment was performed at a clinically appropriate time  ?: Patient was allowed to ask questions and address concerns prior to treatment  See Adult Hemodialysis flowsheet in EPIC for further details and post assessment.  Machine water alarm in place and functioning. Transducer pods intact and checked every 15min.   Pt treated in room.  Chlorine/Chloramine water system checked every 4 hours.  Outpatient Dialysis at not established.     Patient repositioned every 2 hours during the treatment.  Post treatment report given to KEKE Weaver RN regarding 3.5L of fluid removed and stable BP's.

## 2023-07-17 NOTE — PROGRESS NOTES
Renal Medicine Progress Note                                Hayder Alves MRN# [id number]   Age: 68 year old YOB: 1955   Date of Admission: 6/29/2023 Hospital LOS: 18                  Assessment/Plan:     1.  CKD stage 4              -baseline creatinine 2.5 to 3.0 mg/dl              -eGFR 25 ml/nmin              -presumed progressive diabetic nephropathy with significant proteinuria              -abnormal creatinines back to 2010             No established outpatient nephrologist, previous attempts to schedule with [facility], referrals 2021 and 2/23.       2.  Oliguric acute kidney injury.  Likely ischemic ATN related to hemodynamics, bradycardia.  Remains oliguric.  On dialysis since 7/5     3. Hyponatremia              -hypervolemic with renal failure        4. MBD - , Vit D 21  5  Lewy body dementia  6  DM              -uncontrolled               -A1C > 10% for some time              -no retinopathy 2021  7. HTN:  on amlodipine 5mg BID and imdur 60mg daily.  Remains hypervolemic.  8. ? CAD      Dialysis 07/17/23  Continued UF .  3.5 L ultrafiltration as tolerated    Noted plans for goals of care discussion which is reasonable.    Interval History:     Remains on high flow oxygen  Awake and alert.  Requesting dialysis this morning.  Afebrile.      Medications and Allergies:     Reviewed    Physical Exam:     Vitals were reviewed  Patient Vitals for the past 8 hrs:   BP Temp Temp src Pulse Resp SpO2 Weight   07/17/23 1245 (!) 144/72 -- -- 59 24 100 % --   07/17/23 1230 111/81 -- -- 60 28 100 % --   07/17/23 1215 (!) 144/66 -- -- 59 29 100 % --   07/17/23 1200 (!) 150/85 -- -- 59 22 100 % --   07/17/23 1145 (!) 156/66 -- -- 60 (!) 37 99 % --   07/17/23 1130 (!) 150/69 -- -- 60 26 99 % --   07/17/23 1120 (!) 149/65 -- -- 60 27 98 % --   07/17/23 1115 (!) 152/76 -- -- 59 26 96 % --   07/17/23 1100 (!) 152/76 97.9  F (36.6  C) Oral 60 (!) 34 90 % --   07/17/23 0930 -- -- -- 59 21 97 %  --   07/17/23 0800 131/72 -- -- 60 16 98 % --   07/17/23 0600 139/66 -- -- 60 20 -- --   07/17/23 0510 -- -- -- -- -- -- 83.7 kg (184 lb 8.4 oz)     I/O last 3 completed shifts:  In: 1080 [P.O.:1080]  Out: 100 [Urine:100]    Vitals:    07/13/23 0607 07/14/23 0600 07/15/23 0617 07/16/23 0557   Weight: 83.5 kg (184 lb 1.4 oz) 84.7 kg (186 lb 11.7 oz) 79.7 kg (175 lb 11.3 oz) 83.5 kg (184 lb 1.4 oz)    07/17/23 0510   Weight: 83.7 kg (184 lb 8.4 oz)         GENERAL: awake, alert, follows  RESP:  clear anteriorly  CV: RRR, normal S1 S2  ABDOMEN: soft  MS: no clubbing, cyanosis   Access-right IJ tunneled dialysis cath    Data:     Recent Labs   Lab 07/17/23  1214 07/17/23  0824 07/17/23  0533 07/17/23  0208 07/16/23  2158 07/16/23  1733 07/14/23  0919 07/14/23  0659 07/13/23  0857 07/13/23  0602 07/12/23  0746 07/12/23  0529 07/11/23  0745 07/11/23  0532   NA  --   --  130*  --   --   --   --  129*  --   --   --  130*  --  131*   POTASSIUM  --   --  4.0  --   --  4.5  --  3.9  --  3.7  --  4.2  --  4.0   CHLORIDE  --   --  94*  --   --   --   --  94*  --   --   --  93*  --   --    CO2  --   --  23  --   --   --   --  22  --   --   --  22  --   --    ANIONGAP  --   --  13  --   --   --   --  13  --   --   --  15  --   --    * 137* 153* 171*   < >  --    < > 137*   < >  --    < > 143*   < >  --    BUN  --   --  36.4*  --   --   --   --  30.1*  --   --   --  23.2*  --   --    CR  --   --  2.99*  --   --   --   --  2.99*  --   --   --  2.62*  --  2.30*   GFRESTIMATED  --   --  22*  --   --   --   --  22*  --   --   --  26*  --  30*   WANDA  --   --  8.6*  --   --   --   --  8.9  --   --   --  8.8  --   --     < > = values in this interval not displayed.         Recent Labs   Lab Test 07/17/23  0533 07/14/23  0659 07/12/23  0529 07/11/23  0532 07/10/23  0630 07/09/23  0549 07/08/23  0544 07/07/23  0607 07/06/23  0552 07/05/23  0605   CR 2.99* 2.99* 2.62* 2.30* 3.01* 2.52* 3.04* 3.81* 4.87* 4.20*     Recent Labs   Lab  07/17/23  0533 07/14/23  0659 07/12/23  0529   ALBUMIN 3.4* 3.6 3.6     Recent Labs   Lab 07/17/23  0533 07/14/23  0659 07/12/23  0529 07/11/23  0532   PHOS 4.8* 5.1* 3.9  --    HGB 10.0* 10.3*  --  10.9*         Myrna Toro MD  [facility] - Nephrology   [phone]

## 2023-07-17 NOTE — PROVIDER NOTIFICATION
Pt was on high flow 50 LPM 75%. Pt tachypneic and SpO2 dropping to 77%. RT called. BIPAP placed at 100% FiO2. RT here to adjust settings. Primary RN updated. Dr. Cabrales paged for IMC orders and house NP paged to assess patient.

## 2023-07-18 NOTE — PROGRESS NOTES
[id number]  Alert and oriented times four, forgetful  Tele: 100% vpaced  Denies new pain, neck pain treated with Percocet  Highflow overnight AT 55 LPM and FIO2 at 80%  Soft BM overnight  Not out of bed during shift  External cath in place  Plan: continue to monitor

## 2023-07-18 NOTE — PROGRESS NOTES
Report received via phone. Due to pt needing BiPAP to transfer and pt just ate, holding off transferring pt to unit. Writer will give report to next nurse taking over.

## 2023-07-18 NOTE — PROGRESS NOTES
Palliative Care Initial Social Work Note  Location: [facility]    Patient Info:  Hayder Alves is a 68 year old male with recently diagnosed lewy body dementia, severe brachycardia s/p PPM on 7/5.  Hospital course has been complicated by acute hypoxic respiratory failure 2/2 acute on chronic systolic heart failure and exacerbation. On high flow despite initiation of dialysis.     Brief summary of visit: Visited with Vijay while his family was in care conference with palliative care medical provider.  Vijay was conversant and pleasantly confused. He shared that he doesn't like the high flow O2 because he can't get out of bed, and he's bored in the hospital watching television all day.  When I asked what he has heard from the medical team he referenced a sore on his foot that was covered with a bandage but did not offer any information past that.     Date of Admission: 6/29/2023    Reason for consult: Patient and family support    Sources of information: Patient    Recommendations & Plan:  Will be available as needed.        Symptoms & Concerns Addressed Today:  Emotional making sense of illness, Vijay describes feeling bored, wants to get out of hospital     Strengths Identified:        Relationships & Support:  Aspects of relationships and support assessed today:    Identified family members: wife, daughters    Professional supports:     Family coping:     Bereavement Risk concerns:     Coping, Mental Health & Adjustment to Illness:   Adjustment to Illness/Hospitalization    Goals, Decision Making & Advance Care Planning:   Prognosis, Goals, and/or Advance Care Planning were assessed today: No  If yes, brief summary of discussion:   Preferred language:   Patient's decision making preferences: unable to assess  I have concerns about the patient/family's health literacy today: No -- Vijay doeds not have decision making capacity.  His family was in a care conference with palliative care and hospitalist at the time of  my visit and there are no identified concerns about their decision making at this time.   Patient has a completed Health Care Directive: Yes, and on file.  Code status per chart review: No CPR / No Intubation      Clinical Social Work Interventions:   Assessment of palliative specific issues    Introduction of Palliative clinical social work interventions  Facilitation of processing of thoughts/feelings      FRANK Knowles, [facility]   Palliative Care Clinical   Ph: [phone]

## 2023-07-18 NOTE — PROGRESS NOTES
"SPIRITUAL HEALTH SERVICES  SPIRITUAL ASSESSMENT Progress Note  [facility]     REFERRAL SOURCE: Follow up visit       Visited with Vijay and Lilia this morning. Lilia was waiting for daughter, Ramón, and son, Pancho, to arrive so that they could all meet with the doctor for a care conference.    Lilia shared that she has not been sleeping well, \"which isn't new\" for her, but recognizes that it can impact her \"memory\" and processing information regarding Vijay's medical situation.    Vijay shared that he was experiencing discomfort on his cheeks due to pressure from the nasal cannula.    Lilia and Vijay expressed no specific needs at this time, but both expressed appreciation and interest in ongoing spiritual health support.    Addendum: Brief conversation with Lilia outside of Vijay's room. She shared that during the meeting with the doctors and palliative care that they have decided to \"stop dialysis\" and focus on making his \"comfortable.\"    PLAN: Updated bedside nurse. Will continue to follow every 1-2 days for ongoing spiritual and emotional support.    Lexie Staton  Associate   [facility] Phone [phone]  [facility] Pager [phone]    [facility] available 24/7 for emergent requests/referrals, either by having the on-call  paged or by entering an ASAP/STAT consult in Epic (this will also page the on-call ).    "

## 2023-07-18 NOTE — PROGRESS NOTES
[facility]    Medicine Progress Note - Hospitalist Service    Date of Admission:  6/29/2023    Assessment & Plan   Hayder Alves is a 68 year old male with a very complicated  medical history significant for recently diagnosed Lewy Body Dementia, chronic neck pain (s/p spinal fusion), COPD (not on home oxygen), CAD (s/p CABG 2001), CKD stage IV, hypertension, dyslipidemia, systolic CHF, diabetes mellitus, medication noncompliance, h/o atrial fibrillation (s/p cardioversion 2020; not currently on anticoagulation) presented to ED with shortness of breath and left arm pain.     Comfort cares  Acute hypoxic respiratory failure: Persistent  likely due to acute on chronic systolic heart failure and exacerbation in the picture of renal failure   Patient had recurrent flash pulmonary edema, currently remains on high flow nasal cannula 45L with 50% FiO2 since 07/11  Acute on Chronic CHF exacerbation with reduced EF:  S/p RRT on 07/10/2023: From flash pulmonary edema ,patient underwent emergent hemodialysis on 07/11/2023, see note from 07/11/2023 for further details  Type II NSTEMI, sec to above  H/O CAD status CABG  New atrial flutter, likely paroxysmal atrial fibrillation h/o cardioversion in 2020, not currently on anticoagulation)  Severe bradycardia/complete heart block s/p PPM on 7/5/2023  Essential Hypertension  Dyslipidemia  Moderate pulmonary hypertension-newly diagnosed --> repeat ECHO showing Severe Pulm HTN     Prolonged Hospital course :     -- Initially on admission presented with SOB and chest discomfort.    -- In ER ,heart rate was in 30s, EKG showed atrial flutter   -- proBNP was significantly elevated at 19,855 and troponin was elevated at 118  -- Last Echo was in June 2020 showed EF of 55 to 60%, no wall motion changes, mild dilated left atrium and trace MR and TR  -- Chest x-ray from ED was concerning for volume overload   -- Patient was started on diuresis and heparin  infusion  -- Duplex of the lower extremity did not show any evidence of DVT  -- Troponin peaked at 118 and have trended down to 103  -- Echo on 7/1/23 showed EF of 50 to 55%, grade 2 or moderate diastolic dysfunction, mild inferior lateral wall hypokinesis, mild biatrial enlargement, RV systolic function is borderline reduced, RVSP is elevated consistent with moderate pulmonary hypertension, mild aortic Valvular stenosis  -- Cardiology was consulted due to complete heart bloch and CHF   -- Given his KAY on CKD 4 , initially cardiac catheterization was not considered due to the concern for patient might end up on dialysis   -- S/P PPM placement on 7/5/2023   -- Cardiology ( EP ) signed off   -- Nephrology consulted later on the course due to oliguric renal failure ( see below )  -- Patient has been started on HD , no significant clinical improvement   -- Patient had RRT on 07/10/23 --> Flash Pulm edema -- > required extra emergent HD on 07/11, placed on BIPAP , was able to be switched to HFNC on 07/11/23 after HD  -- Since then patient has been getting HD but remains on HF without significant improvement in resp status  -- General Cards were re consulted for possible ischemia workup   -- Extensive discussion has been done with family , Cards and Nephro   -- Repeat ECHO on 07/13/2023 showed EF of 55 to 60%, diastolic Doppler findings suggest left ventricular filling pressures and increased, septal wall motion abnormality may reflect pacemaker activation. Severe pulmonary HTN is now present  -- Nephro recommended against ischemic workup at this point as it will push patient towards permanent HD  -- Palliative team has been consulted due to patient poor clinical improvement despite aggressive management and regular HD    -- Code status changed to DNR/DNI on 07/14  -- Goals of care conversation with palliative care, nephrology, hospitalist, wife, daughter and daughter's partner. Decision made to transition to comfort  cares, no more dialysis. Will continue HFNC to give family a bit more time to say what they need to.      Plan :     -- Continue to monitor patient on telemetry  -- Continue to wean oxygen as able to, appreciate RT help  -- Continue amlodipine 5 mg twice daily  -- Continue Imdur 60 mg p.o. daily  -- PRN Hydralazine available   -- Cards have signed off , no plan to proceed with angiogram due to renal status   -- no plan for further dialysis  -- do NOT start bipap, if he is requiring BiPAP, give comfort medications  -- continue HFNC to give family a bit more time  -- Comfort cares      Acute kidney injury, initially resolved  Later developed oliguric KAY on CKD stage IV  Status placement of tunneled catheter on 7/5/2023   HD start since 7/5   Hyponatremia, secondary to hypervolemia  Anion gap metabolic acidosis likely due to renal failure  Hypokalemia-resolved     Patient had CKD stage III-IV at baseline  H/O of medication noncompliance and follow-ups  Patient did not have any outpatient established nephrologist before the hospitalization although referrals were sent in the past.     --  During the hospitalization, patient developed oliguric renal failure.  --  Nephrology was consulted  --  Tunneled catheter was placed on 07/05  --  Patient was initiated on HD  --  Patient had HD on 07/10/2023 and then again underwent emergent HD on 07/11/2023 due to worsening respiratory status from pulm edema   -- Since then has been getting persistent HD but no improvement in resp status as above   -- Due to poor improvement , palliative has been involved  -- Patient is not a good candidate for long term HD  -- On HFNC   -- no further dialysis       Goals of care discussion:     -- Palliative has been following   -- Unfortunately patient so far has not shown any significant improvement   -- After family meeting , code status changed to DNR/DNI on 07/14/23   -- If Bill detoriates, no plan for escalation of care, do not start bipap,  and do not transfer to ICU  -- Of note, Bill is unable to make decisions due to dementia , wife Lilia and kids are making decisions       Elevated D-dimer: Most likely secondary to CKD     -- Patient had elevated D-dimer on admission , considering all his comorbidities.  -- No CTA chest was done due to CKD stage 4   -- On admission he was started on high intensity heparin drip  -- Duplex of the lower extremity did not show any evidence of DVT  -- VQ scan was ordered and dated not show any evidence of PE  -- Heparin was stopped      History of COPD  Acute hypoxic respiratory failure ( see Problem 1) Multifactorial as above      -- Not on home oxygenon presentation was noted 85% on room air   -- Patient did get IV Solu-Medrol ,DuoNebs and his PTA inhalers initially   -- Chest x-ray on admission showed pulmonary vascular congestion with likely mild to moderate pulmonary edema and small bilateral pleural effusions with compressive atelectasis, right larger than left with no pneumothorax  -- 7/4 chest x-ray  showed again vascular congestion   -- Given his hypoxia and elevated procalcitonin , he was treated withempiric ceftriaxone for 5 days , done on 07/08  -- 07/10 Chest XR  Remains negative for infiltrates but showed concern for mild to moderate pulmonary edema.     -- Continue with scheduled and as needed DuoNebs  -- COPD team consulted and recommended nebulization machine on discharge from rehab     Uncontrolled diabetes;   Hemoglobin A1c came back 10.9  Family reported medication noncompliance.  His Outpatient insulin regimen was recently adjusted and switched from NPH to Lantus and NovoLog combination  Patient was supposed to be taking 30 units of Lantus and NovoLog 10 units 3 times daily with meals which he has not started yet as per wife.  Initially patient was on insulin infusion which was then transitioned to basal and bolus  Patient was on Lantus 10 units before RRT.  Since RRT , not requiring Lantus      --  Holding Lantus since RRT , was NPO on BIPAP, poor oral intake    -- Continue high dose sliding scale insulin    -- Continue patient on meal coverage with 1 unit of NovoLog for 15 g of carb.  -- Avoid hypoglycemia, continue hypoglycemia protocol     Chronic neck pain, history of spinal fusion     -- Continue PTA Percocet and continue PTA Neurontin  --  added Voltaren gel on 07/13/23     Recently diagnosed Lewy Body Dementia     -- noted  -- Follows neurology as outpatient  -- Underlying dementia is playing role in big picture     Chronic thrombocytopenia  -- He does seem to have chronic thrombocytopenia and his platelet level fluctuates and has no evidence of bleeding and was 124 today     Physical deconditioning  --Patient has been evaluated by physical and Occupational Therapy, currently has been recommended to be discharged to TCU  -- Unfortunately patient remains critically sick on high flow nasal cannula, not making any significant improvement in respiratory status, plan for next family conference on Monday        Diet: Room Service  Regular Diet Adult    DVT Prophylaxis: Pneumatic Compression Devices  Gardner Catheter: Not present  Lines: None     Cardiac Monitoring: None  Code Status: No CPR- Do NOT Intubate      Clinically Significant Risk Factors              # Hypoalbuminemia: Lowest albumin = 3.4 g/dL at 7/17/2023  5:33 AM, will monitor as appropriate   # Thrombocytopenia: Lowest platelets = 101 in last 2 days, will monitor for bleeding   # Hypertension: Noted on problem list       # DMII: A1C = 10.3 % (Ref range: <5.7 %) within past 6 months            Disposition Plan     Expected Discharge Date: 07/18/2023    Discharge Delays: Placement - TCU  Destination: other (comment) (TCU)  Discharge Comments: Care conference on 7/14 at 3pm          Clay Awad MD  Hospitalist Service  [facility]  Securely message with [employer] (more info)  Text page via [email or web address] Paging/Directory    ______________________________________________________________________    Interval History   No major clinical changes, continues to require HFNC with increasing needs.     Discussed with patient, RN, wife, daughter, daughter's partner, nephrology, palliative care for [facility] today.    Will continue HFNC. Will stop dialysis. Start comfort cares.     Physical Exam   Vital Signs: Temp: 98.4  F (36.9  C) Temp src: Oral BP: (!) 159/72 Pulse: 59   Resp: 21 SpO2: 95 % O2 Device: High Flow Nasal Cannula (HFNC) Oxygen Delivery: 55 LPM  Weight: 184 lbs 1.35 oz    Constitutional: Awake, alert, cooperative, no apparent distress  Respiratory: Clear to auscultation bilaterally, no crackles or wheezing  Cardiovascular: Regular rate and rhythm, normal S1 and S2, and no murmur noted  GI: Normal bowel sounds, soft, non-distended, non-tender  Skin/Integumen: No rashes, no cyanosis, no edema  Other:       Medical Decision Making       60 MINUTES SPENT BY ME on the date of service doing chart review, history, exam, documentation & further activities per the note.      Data   ------------------------- PAST 24 HR DATA REVIEWED -----------------------------------------------        Imaging results reviewed over the past 24 hrs:   No results found for this or any previous visit (from the past 24 hour(s)).

## 2023-07-18 NOTE — PROGRESS NOTES
Renal Medicine Progress Note                                Hayder Alves MRN# [id number]   Age: 68 year old YOB: 1955   Date of Admission: 6/29/2023 Hospital LOS: 19                  Assessment/Plan:     1.  CKD stage 4              -baseline creatinine 2.5 to 3.0 mg/dl              -eGFR 25 ml/nmin              -presumed progressive diabetic nephropathy with significant proteinuria              -abnormal creatinines back to 2010             No established outpatient nephrologist, previous attempts to schedule with [facility], referrals 2021 and 2/23.       2.  Oliguric acute kidney injury.  Likely ischemic ATN related to hemodynamics, bradycardia.  Remains oliguric.  On dialysis since 7/5     3. Hyponatremia              -hypervolemic with renal failure        4. MBD - , Vit D 21  5  Lewy body dementia  6  DM              -uncontrolled               -A1C > 10% for some time              -no retinopathy 2021  7. HTN:  on amlodipine 5mg BID and imdur 60mg daily.  Remains hypervolemic.  8. ? CAD    Discussion-  Prolonged discussion early in the room with patient's wife and later again with palliative team, hospitalist and myself with patient's wife, daughter and her significant other.  Discussed patient's prior stated desire and healthcare directive not to pursue aggressive measures or be placed on machines.  Discussed gradual deterioration in his health and failure to improve his respiratory status despite aggressive ultrafiltration and dialysis.  Total of 22 L of fluid has been removed since initiating dialysis on 7/6.  Echocardiogram in 7/13 in fact showed worsening of pulmonary hypertension.  Regardless of his respiratory status, it seems highly likely that he will remain on dialysis.  This does not align with his prior stated directives and also will be progressively more difficult with his progressing dementia.    Family plans to initiate some of the comfort measures today.  They  will get together with remaining family members tonight and come up with a final decision.  No plans to pursue further dialysis at this time.    Total time more than 35 minutes, more than half spent in counseling and coordination of care.      Interval History:     Remains on high flow oxygen  No acute issues overnight.  Dialyzed yesterday with 3.5 L removed.  No improvement in respiratory status with ultrafiltration.        Medications and Allergies:     Reviewed    Physical Exam:     Vitals were reviewed  Patient Vitals for the past 8 hrs:   BP Temp Temp src Pulse Resp SpO2 Weight   07/18/23 1200 (!) 159/72 -- -- 59 21 95 % --   07/18/23 0815 139/63 -- -- 59 (!) 33 93 % --   07/18/23 0809 139/63 98.4  F (36.9  C) Oral -- -- -- --   07/18/23 0600 (!) 141/68 -- -- 60 13 98 % 83.5 kg (184 lb 1.4 oz)     I/O last 3 completed shifts:  In: -   Out: 3750 [Urine:250; Other:3500]    Vitals:    07/14/23 0600 07/15/23 0617 07/16/23 0557 07/17/23 0510   Weight: 84.7 kg (186 lb 11.7 oz) 79.7 kg (175 lb 11.3 oz) 83.5 kg (184 lb 1.4 oz) 83.7 kg (184 lb 8.4 oz)    07/18/23 0600   Weight: 83.5 kg (184 lb 1.4 oz)         GENERAL: awake, alert, follows  RESP:  clear anteriorly  CV: RRR, normal S1 S2  ABDOMEN: soft  MS: no clubbing, cyanosis   Access-right IJ tunneled dialysis cath    Data:     Recent Labs   Lab 07/18/23  0813 07/18/23  0158 07/17/23  2114 07/17/23  1737 07/17/23  0824 07/17/23  0533 07/16/23  2158 07/16/23  1733 07/14/23  0919 07/14/23  0659 07/13/23  0857 07/13/23  0602 07/12/23  0746 07/12/23  0529   NA  --   --   --   --   --  130*  --   --   --  129*  --   --   --  130*   POTASSIUM  --   --   --   --   --  4.0  --  4.5  --  3.9  --  3.7  --  4.2   CHLORIDE  --   --   --   --   --  94*  --   --   --  94*  --   --   --  93*   CO2  --   --   --   --   --  23  --   --   --  22  --   --   --  22   ANIONGAP  --   --   --   --   --  13  --   --   --  13  --   --   --  15   * 154* 172* 206*   < > 153*   < >  --     < > 137*   < >  --    < > 143*   BUN  --   --   --   --   --  36.4*  --   --   --  30.1*  --   --   --  23.2*   CR  --   --   --   --   --  2.99*  --   --   --  2.99*  --   --   --  2.62*   GFRESTIMATED  --   --   --   --   --  22*  --   --   --  22*  --   --   --  26*   WANDA  --   --   --   --   --  8.6*  --   --   --  8.9  --   --   --  8.8    < > = values in this interval not displayed.         Recent Labs   Lab Test 07/17/23 0533 07/14/23  0659 07/12/23  0529 07/11/23  0532 07/10/23  0630 07/09/23  0549 07/08/23  0544 07/07/23  0607 07/06/23  0552 07/05/23  0605   CR 2.99* 2.99* 2.62* 2.30* 3.01* 2.52* 3.04* 3.81* 4.87* 4.20*     Recent Labs   Lab 07/17/23 0533 07/14/23  0659 07/12/23  0529   ALBUMIN 3.4* 3.6 3.6     Recent Labs   Lab 07/17/23  0533 07/14/23  0659 07/12/23  0529   PHOS 4.8* 5.1* 3.9   HGB 10.0* 10.3*  --          Myrna Toro MD  [facility] - Nephrology   [phone]

## 2023-07-18 NOTE — PROGRESS NOTES
Palliative Care Daily Progress Note  [facility]     Patient Name: Hayder Alves  Date of Admission: 6/29/2023   Today the patient was seen for:  Goals of care goals of care     Recommendations & Counseling     GOALS OF CARE:    Comfort focused -Family agreeable to transitioning to comfort measures.  Continue high flow oxygen support to maintain stability over the next couple of days, as family gathers to visit Vijay and say goodbye     Dialysis to be discontinued, last run was yesterday Monday 7/17    Anticipate a likely compassionate high flow oxygen removal in the coming days, once family is prepared.  Anticipate that patient will die quickly in the hospital, prognosis measured in minutes to hours after high flow O2 removal    ADVANCE CARE PLANNING:    Patient has an advance directive dated 3/2022.  Primary Health Care Agent son Pancho, wife Lilia.  Surrogates are son Pancho, wife Lilia.  As noted in my previous documentation, Vijay is unable to demonstrate capacity to understand the complexity of his medical health issues.  Decision making defaults to family.  Family notably distressed about having to help with decision making, confused by Vijay's various statements, unsure what is accurate or not.  Today, we referenced Vijay's healthcare directive from 2022, making note of what Vijay has historically stated about his healthcare wishes to ideally help the family with decision making today    No POLST on file     Code status: No CPR- Do NOT Intubate    MEDICAL MANAGEMENT:    Continue with daily medications for now, as we allow time for family to gather to say goodbye    Dilaudid 0.5 to 1 mg IV every hour as needed pain, shortness of breath    Ativan 0.5 to 1 mg IV every 1 hour as needed anxiety    Atropine, Robinul as needed secretions    As above, anticipate a compassionate high flow O2 removal in the coming days, once family ready.  At time of high flow O2 removal to room air, prudent to  have aggressive symptom management.  Will recommend premedicating high flow O2 removal with Dilaudid 1 mg IV and Ativan 1 mg IV 10 minutes before planned O2 removal.  RN to have additional Dilaudid 1 mg IV every 10 minutes as needed for air hunger, and Ativan 1 mg IV every 10 minutes as needed for anxiety to support Bill's comfort with compassionate high flow O2 removal    PSYCHOSOCIAL/SPIRITUAL:    Family - supportive wife Lilia. Sons Pancho and Ranjit, daughter Jade lives in [place] and has arrived from [place] with her partner, Antonio.    [place] community: [employer] - appreciate support from spiritual health team, Lexie Staton    Palliative Care will continue to follow. Thank you for the consult and allowing us to aid in the care of Hayder Alves.    These recommendations have been discussed with Christal JACKMAN, Dr. Toro, and Dr. Awad.    MICKEY Aragon CNP  Securely message with [employer] (more info)  Text page via [facility] Paging/Directory         Assessments           Hayder Alves is a 68 year old male with a past medical history significant for recently diagnosed Lewy Body Dementia, chronic neck pain (s/p spinal fusion), COPD (not on home oxygen), CAD (s/p CABG 2001), CKD stage IV, hypertension, dyslipidemia, systolic CHF, poorly controlled diabetes mellitus, medication noncompliance, h/o atrial fibrillation (s/p cardioversion 2020; not currently on anticoagulation) who presents with shortness of breath and left arm pain. He was found to have severe bradycardia/complete heart block s/p PPM 7/5. His hospital course has been further complicated by acute hypoxic respiratory failure 2/2 acute on chronic systolic heart failure and exacerbation. He has developed oliguric KAY, likely ischemic ATN related to hemodynamics, bradycardia requiring initiation of dialysis, Nephrology following. He remains high flow O2 dependent, despite initiation of dialysis.    Prognosis, Goals, & Planning:   Prognosis, Goals,  and/or Advance Care Planning addressed today:    Advance Care Planning Discussion 7/18/2023. Airam HASSAN APRN CNP met with Family (wife Lilia, daughter Jade, Jade's partner), along with Dr. Toro and Dr. Awad today at the hospital to discuss Advance Care Planning. Hayder Alves does not have decisional capacity  and was not present for this discussion due to dementia (see above). Those present were informed of the voluntary nature of this discussion and wished to proceed. This discussion began at 1225 and ended at 1300 for a total of 35 minutes.      We acknowledged Vijay's multiorgan failure.  He has become dependent on machines, including high flow oxygen support and dialysis.  Despite dialysis in the last 2 weeks or so, and sufficient volume removed, feels respiratory status has not improved.  In fact, it has further deteriorated, requiring more oxygen support and intermittent BiPAP.  There is worry that dialysis will be needed long-term.  Vijay is not clinically stable to leave the hospital, due to the high flow O2 support.  He is hospital dependent.  We discussed his healthcare directive from 2022, where he names that if he has a terminal illness, he would not want to be sustained on machines or life support.  We acknowledge that life support measures have already been initiated, with no bridge toward recovery.  These interventions have extended his time to live, yet they are no longer helping Vijay live meaningfully, but rather extending his dying process.  Unfortunately with prolonging this dying process, it allows for more time of suffering.    After extensive conversation with the medical team and family, family has decided on comfort measures only. Education provided on transition to comfort-focused goals of care would be including discontinuation of IV fluids, cardiac monitoring, labs,  and other interventions that do not directly promote comfort.  Anticipatory guidance was given  regarding feeding, hunger, fluids at end of life. We discussed utilization of medications to ease air hunger, agitation and restlessness at the time that high flow oxygen is compassionately withdrawn. Discussed that this process is very purposeful in terms of ensuring patient is as comfortable as possible and that family wishes are honored.  Prognosis reviewed with compassionate high flow O2 removal, expressing to family that time to live is likely going to be measured in minutes to hours.     Code Status was addressed today:     No Already DNR/DNI    Coping, Meaning, & Spirituality:   Mood, coping, and/or meaning in the context of serious illness were addressed today: Yes.  Appreciate ongoing support from spiritual health, Lexie Staton.          Interval History:     Chart review/discussion with unit or clinical team members:   No significant change in Vijay's medical condition in the last 24 hours.    Per patient or family/caregivers today:  Vijay is seen resting in bed comfortably, alert, pleasantly disoriented.         Review of Systems:     Besides above, an additional N/A system ROS was reviewed and is unremarkable          Physical Exam:   Temp:  [97.8  F (36.6  C)-98.4  F (36.9  C)] 98.4  F (36.9  C)  Pulse:  [58-60] 59  Resp:  [13-33] 21  BP: (135-159)/(60-72) 159/72  FiO2 (%):  [70 %-80 %] 70 %  SpO2:  [76 %-100 %] 95 %  184 lbs 1.35 oz  CONSTITUTIONAL: Chronically ill man seen resting in bed in NAD, alert, pleasantly disoriented.  Family at bedside   RESPIRATORY: NL respiratory effort on high flow O2 support           Current Problem List:   Principal Problem:    NSTEMI (non-ST elevated myocardial infarction) (H)  Active Problems:    Acute pulmonary edema (H)    Hyperglycemia    COPD exacerbation (H)    Acute kidney failure with tubular necrosis (H)      Allergies   Allergen Reactions     Adhesive Tape      From blood draw site.     Eszopiclone      Strange dreams. Cooking during sleep      Latex       Lipitor [Atorvastatin Calcium] Other (See Comments)     myalgias     Lisinopril Other (See Comments)     Hyperkalemia and increased creatinine            Medications:   Amitriptyline 30mg PO at bedtime   Norvasc  ASA  Buspar 5mg PO TID   Diclofenac topical gel 4 times daily  Bronchodilators  Gabapentin 300mg PO BID   Insulin   Duonebs   Imdur   Percocet 1 tab PO at bedtime   Senna 1-2 tabs PO BID   Dilaudid 0.5 to 1 mg IV every hour as needed pain, shortness of breath  Ativan 0.5 to 1 mg IV every 1 hour as needed anxiety  Atropine, Robinul as needed secretions         Data Reviewed:   Recent imaging independently reviewed, my comments on pertinents:   Results for orders placed or performed during the hospital encounter of 06/29/23   XR Chest Port 1 View    Impression    IMPRESSION: Stable size of cardiomediastinal silhouette status post median sternotomy and CABG. Defibrillator pads overlying the left chest. Pulmonary vascular congestion with likely mild to moderate pulmonary edema and small bilateral pleural effusions   with compressive atelectasis, right larger than left. No pneumothorax. Bones are unchanged. Cervical spinal fusion hardware again noted.   US Lower Extremity Venous Duplex Bilateral    Impression    IMPRESSION:  1.  No deep venous thrombosis in the bilateral lower extremities.   NM Lung Scan Ventilation and Perfusion    Impression    IMPRESSION:    No evidence of pulmonary embolism.   XR Chest Port 1 View    Impression    IMPRESSION: Sternotomy wires are unchanged. Cardiomegaly with pulmonary vascular previously seen opacity at the right lung base has cleared. Persistent opacity left lung base. Probable small left pleural effusion.   IR CVC Tunnel Placement > 5 Yrs of Age    Impression    IMPRESSION:    1.  Tunneled dialysis catheter placement, as discussed above.  2.  The catheter position was verified fluoroscopically and this is  ready for use.    TROY LAZAR MD         SYSTEM ID:  [id number]    X-ray Chest 2 vws*    Impression    IMPRESSION: No acute disease.     BEULAH MARTINEZ MD         SYSTEM ID:  [id number]   XR Abdomen Port 1 View    Impression    IMPRESSION: Suboptimal combination. Left abdomen incompletely visualized. Bowel gas pattern is normal. Moderate stool gas retention. Nothing for obstruction or free air. No evidence for renal stones.   XR Chest Port 1 View    Impression    IMPRESSION:     New right IJ catheter with tip in the mid SVC. Left subclavian approach pacemaker. Median sternotomy wires. Partially visualized cervical spinal fusion hardware.    Increased layering right pleural effusion with adjacent compressive atelectasis. Decreased trace left pleural effusion. No pneumothorax.    Diffuse prominence of the pulmonary vascularity with hazy perihilar and lower lobe opacities, likely mild-to-moderate pulmonary edema.    Stable cardiomegaly.   Echocardiogram Complete   Result Value Ref Range    LVEF  50-55%    Echocardiogram Complete   Result Value Ref Range    LVEF  55-60%      Recent lab data independently reviewed, my comments on pertinents:   Na 130  K 4  Creat 2.99  Phos 4.8  Albumin 3.4  WBC 6.2  Hgb 10  Plt 101    Medical Decision Making   Please see A&P for additional details of medical decision making.  MANAGEMENT DISCUSSED with the following over the past 24 hours: Christal JACKMAN, Dr. Awad, Dr. Toro    NOTE(S)/MEDICAL RECORDS REVIEWED over the past 24 hours: Nursing notes, hospitalist note, nephrology note  Tests personally interpreted in the past 24 hours:  Tests REVIEWED in the past 24 hours:  - See lab/imaging results included in the data section of the note  - BMP  - CMP  SUPPLEMENTAL HISTORY, in addition to the patient's history, over the past 24 hours obtained from:   - Christal JACKMAN, Dr. Muna Keys, wife Lilia, daughter Jade, Jade's partner Antonio  Medical complexity over the past 24 hours:  - Parenteral (IV) CONTROLLED SUBSTANCES ordered  - Decision to  DE-ESCALATE CARE based on prognosis

## 2023-07-19 NOTE — PLAN OF CARE
Arrived on unit from  around 1930. Modified comfort cares. A&Ox4, varied throughout the night. VS being taken as family requests/as pt permits. 2200 VSS stable on 55L high flow nasal cannula. C/o continuous back, neck pain, managed with scheduled percocet and IV dilaudid, available q1h. Ativan given x2 for comfort. A2 lift. Cont of b/b, male ext cath in place, anuria. Two PIV in L and R AC, prefers use of L IV. Regular diet with room service. BG checks ACHS or as pt permits. Family at bedside most of day. Palliative following.

## 2023-07-19 NOTE — PROGRESS NOTES
Clinic Care Coordination Contact    Situation: Patient chart reviewed by care coordinator.    Background: patient admitted to the hospital 6/29/23    Assessment: per note pt will be transitioning to comfort cares with anticipation of stopping O2 once family is ready and pt passing in the hospital - per palliative care.     Plan/Recommendations: will close to care coordination at this time.     SEVEN Leon  [facility] Primary Care - Care Coordinator   7/19/2023   8:01 AM  [phone]

## 2023-07-19 NOTE — PROGRESS NOTES
Palliative Care Initial Social Work Note  Location: [facility]    Patient Info:  Hayder Alves is a 68 year old male with recently diagnosed lewy body dementia, severe brachycardia s/p PPM on 7/5.  Hospital course has been complicated by acute hypoxic respiratory failure 2/2 acute on chronic systolic heart failure and exacerbation. On high flow despite initiation of dialysis.     Brief summary of visit: Brief check in with Vijay and his family this morning. Wife, daughter and her partner were at bedside. Vijay was in good spirits.  Family tearful, did not identify concerns, but we were also in the room and I suspect they did not want to discuss in front of Vijay due to his dementia. .     Date of Admission: 6/29/2023    Reason for consult: Patient and family support    Sources of information: Patient    Recommendations & Plan:  Will be available as needed.        Symptoms & Concerns Addressed Today:  Family    Strengths Identified:    Family at bedside, supportive    Relationships & Support:  Aspects of relationships and support assessed today:    Identified family members: wife, daughters    Professional supports:     Family coping:     Bereavement Risk concerns:     Coping, Mental Health & Adjustment to Illness:   Adjustment to Illness/Hospitalization    Goals, Decision Making & Advance Care Planning:   Prognosis, Goals, and/or Advance Care Planning were assessed today: No  If yes, brief summary of discussion:   Preferred language:   Patient's decision making preferences: unable to assess  I have concerns about the patient/family's health literacy today: No -- Vijay doeds not have decision making capacity due to his lewy body dementia. Family serving as decision makers. Patient has a completed Health Care Directive: Yes, and on file.  Code status per chart review: No CPR / No Intubation      Clinical Social Work Interventions:   Assessment of palliative specific issues    Introduction of Palliative clinical  social work interventions  Facilitation of processing of thoughts/feelings      FRANK Knowles, [facility]   Palliative Care Clinical   Ph: [phone]

## 2023-07-19 NOTE — PROGRESS NOTES
Patient remains on HFNC 55L FiO2 95% with SpO2 in the mid to upper 90's. Skin intact under oxygen device.   Will cont to monitor.  7/19/2023  Purvi Shi, RT

## 2023-07-19 NOTE — PROVIDER NOTIFICATION
MD Notification    Notified Person: MD    Notified Person Name: Dr. Clay Awad    Notification Date/Time: 4:11 PM 07/19/23    Notification Interaction: [employer] Messaging    Purpose of Notification:    Pt bladder scanned at 476, there is no straight cath or marsh order.     Orders Received: Place marsh please    Comments:

## 2023-07-19 NOTE — PLAN OF CARE
Physical Therapy Discharge Summary    Reason for therapy discharge:    Change in medical status.  Transitioned to comfort cares    Progress towards therapy goal(s). See goals on Care Plan in [facility] electronic health record for goal details.  Goals not met.  Barriers to achieving goals:   limited tolerance for therapy.    Therapy recommendation(s):    No further therapy is recommended.

## 2023-07-19 NOTE — PROGRESS NOTES
Patient has transition to comfort measures.  No plans to pursue further dialysis.  Discussed with primary team in person.    Nephrology will sign off.  Please call back with questions.    Myrna Toro MD  [facility] - Nephrology   [phone]

## 2023-07-19 NOTE — PROGRESS NOTES
"SPIRITUAL HEALTH SERVICES Progress Note  [facility] Oncology    Saw pt Hayder \"Bill\" DEYSI MedellinLong, Lilia, and family per end of life. Met with Lilia in the family lounge, joined by Dr Awad.      Patient/Family Understanding of Illness and Goals of Care - family made the decision to move to comfort care. Family will be returning this evening to share a meal of Vijay's favorite food from [employer]. Friends are calling to say goodbyes.      Distress and Loss     Lilia shared her concern for her children and grandchildren as they process their grief and loss. She wondered about Vijay's understanding of what was happening to him. Later, when Lilia was alone in the room with him, Vijay asked her \"Am I dying?\" before moving onto a different subject.    She was tearful as she shared that he \"thanked me for taking care of him and I thanked him for putting up with me.\"    Lilia expressed concern about the granddaughters being able to understand and process death and grief. Delivered age appropriate grief resources for talking to children about death and dying and a copy of \"The Invisible String.\"       Strengths, Coping, and Resources - The family is very supportive of one another. Jade (daughter) has been encouraging the family in storytelling and bringing in objects and photos from home, since he can't return home that they might be able to bring \"home to him.\"      Meaning, Beliefs, and Spirituality - Lilia reflected on Bill \"going home to God\" and that she will \"be with him again one day.\" Lilia requested prayer, which I offered.      Plan of Care - Will follow up for support tomorrow. Please consult [facility] as needs arise.    Lexie Staton MDiv  Associate   [facility] pager [phone]  [facility] phone [phone]    [facility] available 24/7 for emergent requests/referrals, either by having the on-call  paged or by entering an ASAP/STAT consult in Epic (this will also page the on-call ).      "

## 2023-07-19 NOTE — PROGRESS NOTES
[facility]    Medicine Progress Note - Hospitalist Service    Date of Admission:  6/29/2023    Assessment & Plan   Hayder Alves is a 68 year old male with a very complicated  medical history significant for recently diagnosed Lewy Body Dementia, chronic neck pain (s/p spinal fusion), COPD (not on home oxygen), CAD (s/p CABG 2001), CKD stage IV, hypertension, dyslipidemia, systolic CHF, diabetes mellitus, medication noncompliance, h/o atrial fibrillation (s/p cardioversion 2020; not currently on anticoagulation) presented to ED with shortness of breath and left arm pain.     Comfort cares  Acute hypoxic respiratory failure: Persistent  likely due to acute on chronic systolic heart failure and exacerbation in the picture of renal failure   Patient had recurrent flash pulmonary edema, currently remains on high flow nasal cannula 45L with 50% FiO2 since 07/11  Acute on Chronic CHF exacerbation with reduced EF:  S/p RRT on 07/10/2023: From flash pulmonary edema ,patient underwent emergent hemodialysis on 07/11/2023, see note from 07/11/2023 for further details  Type II NSTEMI, sec to above  H/O CAD status CABG  New atrial flutter, likely paroxysmal atrial fibrillation h/o cardioversion in 2020, not currently on anticoagulation)  Severe bradycardia/complete heart block s/p PPM on 7/5/2023  Essential Hypertension  Dyslipidemia  Moderate pulmonary hypertension-newly diagnosed --> repeat ECHO showing Severe Pulm HTN     Prolonged Hospital course :     -- Initially on admission presented with SOB and chest discomfort.    -- In ER ,heart rate was in 30s, EKG showed atrial flutter   -- proBNP was significantly elevated at 19,855 and troponin was elevated at 118  -- Last Echo was in June 2020 showed EF of 55 to 60%, no wall motion changes, mild dilated left atrium and trace MR and TR  -- Chest x-ray from ED was concerning for volume overload   -- Patient was started on diuresis and heparin  infusion  -- Duplex of the lower extremity did not show any evidence of DVT  -- Troponin peaked at 118 and have trended down to 103  -- Echo on 7/1/23 showed EF of 50 to 55%, grade 2 or moderate diastolic dysfunction, mild inferior lateral wall hypokinesis, mild biatrial enlargement, RV systolic function is borderline reduced, RVSP is elevated consistent with moderate pulmonary hypertension, mild aortic Valvular stenosis  -- Cardiology was consulted due to complete heart bloch and CHF   -- Given his KAY on CKD 4 , initially cardiac catheterization was not considered due to the concern for patient might end up on dialysis   -- S/P PPM placement on 7/5/2023   -- Cardiology ( EP ) signed off   -- Nephrology consulted later on the course due to oliguric renal failure ( see below )  -- Patient has been started on HD , no significant clinical improvement   -- Patient had RRT on 07/10/23 --> Flash Pulm edema -- > required extra emergent HD on 07/11, placed on BIPAP , was able to be switched to HFNC on 07/11/23 after HD  -- Since then patient has been getting HD but remains on HF without significant improvement in resp status  -- General Cards were re consulted for possible ischemia workup   -- Extensive discussion has been done with family , Cards and Nephro   -- Repeat ECHO on 07/13/2023 showed EF of 55 to 60%, diastolic Doppler findings suggest left ventricular filling pressures and increased, septal wall motion abnormality may reflect pacemaker activation. Severe pulmonary HTN is now present  -- Nephro recommended against ischemic workup at this point as it will push patient towards permanent HD  -- Palliative team has been consulted due to patient poor clinical improvement despite aggressive management and regular HD    -- Code status changed to DNR/DNI on 07/14  -- Goals of care conversation with palliative care, nephrology, hospitalist, wife, daughter and daughter's partner. Decision made to transition to comfort  cares, no more dialysis. Will continue HFNC to give family a bit more time to say what they need to.      Goals of care discussion:     -- Palliative has been following   -- Of note, Vijay is unable to make decisions due to dementia , wife Lilia and kids are making decisions    -- After family meeting , code status changed to DNR/DNI on 07/14/23   -- Transitioned to comfort cares 7/18 with continued HFNC to allow for family to come and meet with patient  -- last dialysis 7/17  - comfort care orders in     Acute kidney injury, initially resolved  Later developed oliguric KAY on CKD stage IV  Status placement of tunneled catheter on 7/5/2023   HD start since 7/5   Hyponatremia, secondary to hypervolemia  Anion gap metabolic acidosis likely due to renal failure  Hypokalemia-resolved  Elevated D-dimer: Most likely secondary to CKD  History of COPD  Acute hypoxic respiratory failure ( see Problem 1) Multifactorial as above   Uncontrolled diabetes;   Chronic neck pain, history of spinal fusion  Recently diagnosed Lewy Body Dementia  Chronic thrombocytopenia  Physical deconditioning         Diet: Room Service  Regular Diet Adult    DVT Prophylaxis: Pneumatic Compression Devices  Gardner Catheter: Not present  Lines: None     Cardiac Monitoring: None  Code Status: No CPR- Do NOT Intubate      Clinically Significant Risk Factors              # Hypoalbuminemia: Lowest albumin = 3.4 g/dL at 7/17/2023  5:33 AM, will monitor as appropriate     # Hypertension: Noted on problem list       # DMII: A1C = 10.3 % (Ref range: <5.7 %) within past 6 months            Disposition Plan      Expected Discharge Date: 07/19/2023      Destination: other (comment) (TCU)  Discharge Comments: Care conference on 7/14 at 3pm          Clay Awad MD  Hospitalist Service  [employer] [facility]  Securely message with [employer] (more info)  Text page via [employer] Paging/Directory    ______________________________________________________________________    Interval History   Seen in AM and again in afternoon. Seems to be working harder to breath. Family was in to see patient in the afternoon. Discussed with wife and  today. Wife is still hoping a few more people come in. She will let us know when the time is right to shut off O2.    Physical Exam   Vital Signs: Temp: 98.6  F (37  C) Temp src: Axillary BP: (!) 142/59 Pulse: 61   Resp: 19 SpO2: 97 % O2 Device: High Flow Nasal Cannula (HFNC) Oxygen Delivery: 55 LPM  Weight: 184 lbs 1.35 oz    Constitutional: Awake, alert, cooperative, no apparent distress  Respiratory: symmetric expansion, appears to be working harder  Cardiovascular: no cyanosis  GI: Normal bowel sounds, soft, non-distended, non-tender  Skin/Integumen: No rashes, no cyanosis, no edema  Other:       Medical Decision Making       60 MINUTES SPENT BY ME on the date of service doing chart review, history, exam, documentation & further activities per the note.      Data   ------------------------- PAST 24 HR DATA REVIEWED -----------------------------------------------        Imaging results reviewed over the past 24 hrs:   No results found for this or any previous visit (from the past 24 hour(s)).

## 2023-07-19 NOTE — PROGRESS NOTES
Occupational Therapy Discharge Summary    Reason for therapy discharge:    Change in medical status.    Progress towards therapy goal(s). See goals on Care Plan in [facility] electronic health record for goal details.  Goals not met.  Barriers to achieving goals:   pt transitioned to comfort cares.    Therapy recommendation(s):    No further therapy is recommended.

## 2023-07-20 NOTE — PROGRESS NOTES
PALLIATIVE CARE PROGRESS NOTE  [facility]     Patient Name: Hayder Alves  Date of Admission: 6/29/2023   Today the patient was seen for: GOC and symptom follow-up      Recommendations & Counseling     Impressions & Recommendations:    GOALS OF CARE:    Comfort focused -Family agreeable to transitioning to comfort measures.  Continue high flow oxygen support to maintain stability, as family gathers to visit Vijay and say goodbye     Dialysis discontinued, last run was 7/17    Anticipate a likely compassionate high flow oxygen removal in the coming days, once family is prepared, and as his renal function declines.     Anticipate that patient will die quickly in the hospital, prognosis measured in minutes to hours after high flow O2 removal    ADVANCE CARE PLANNING:    Patient has an advance directive dated 3/2022.  Primary Health Care Agent son Pancho, wife Lilia.  Surrogates are son Pancho, wife Lilia.  As noted in my previous documentation, Vijay is unable to demonstrate capacity to understand the complexity of his medical health issues.  Decision making defaults to family.  Family notably distressed about having to help with decision making, confused by Vijay's various statements, unsure what is accurate or not.  Today, we referenced Vijay's healthcare directive from 2022, making note of what Vijay has historically stated about his healthcare wishes to ideally help the family with decision making today    No POLST on file     Code status: No CPR- Do NOT Intubate    MEDICAL MANAGEMENT:    Continue with daily medications for now, as we allow time for family to gather to say goodbye    Dilaudid 0.5 to 1 mg IV every hour as needed pain, shortness of breath    Ativan 0.5 to 1 mg IV every 1 hour as needed anxiety    Atropine, Robinul as needed secretions    #BLE mild pain.     C/o BLE aching, he attributes to immobility. Plan to get into the chair today. Massage had been beneficial and if hoping  "for consistent massages. Discussed comfort benefits of compression stocking and/or PCDs.  He is agreeable to trying PCD's today.    Tylenol PRN available.    Can consider topicals if continues       #Compassionate HFNC Removal  Once appropriate for HFNC removal, recommend the following:  Review titration plan with bedside RN and RT.  Bedside RN and RT should be prepared to remain at bedside for 1 hour, recommend planning ahead to ensure adequate staffing.  Coordinate timing of procedure. When family is ready, place order set for \"Comfort Measures for End of Life.\"  Order one time pre-medication bolus of opioid and benzodiazepine with additional doses q10 minutes prn for dyspnea, pain and anxiety.  Ensure at least 3 doses each of opioid and benzodiazepine medications ready at bedside.  Modify respiratory order comments to include titration instructions below.  Decrease FiO2 and liter flow in a step-wise titration over 40 minutes (25% every 10 minutes then remove):  1) Pre-medicate with bolus opioid and benzodiazepine 10 minutes before starting wean. Wait 10 minutes after pre-medication for peak effect. Decrease FiO2 and liter flow by 25%. Assess for symptoms and give bolus dose opioids and/or benzodiazepines as needed.  2) Consider repeat pre-medication and wait 10 minutes for peak effect. Decrease FiO2 and liter flow by another 25%. Assess for symptoms and give bolus dose opioids and/or benzodiazepines as needed.  3) Consider repeat pre-medication and wait 10 minutes for peak effect. Decrease FiO2 and liter flow by another 25%. Assess for symptoms and give bolus dose opioids and/or benzodiazepines as needed.  4) Consider repeat pre-medication and wait 10 minutes for peak effect. Remove HFNC. Rebolus opioid and/or benzodiazepine every 10 minutes as needed for symptom control.  If symptoms are inadequately controlled at any point, increase opioid and/or benzodiazepine by %    Adapted from: Bogdan Turner, " Toño STRATTON, Niko HOLDER. Compassionate Removal of Heated High-Flow Nasal Cannula for End of Life: Case Series and Protocol Development. J Hosp Palliat Nurs. 2021 Aug 1;23(4):360-366. doi: [email or web address]/[facility].[id number]. PMID: 19857937.    PSYCHOSOCIAL/SPIRITUAL:    Family - supportive wife Lilia. Sons Pancho and Ranjit, daughter Jade lives in [place] and has arrived from [place] with her partner, Antonio.    [place] community: Gnosticist - appreciate support from spiritual health team, Lexie Staton    Palliative Care will continue to follow. Thank you for the consult and allowing us to aid in the care of Hayder Alves.    These recommendations have been discussed with pt, family, and bedside team     Nelson Keating NP   Securely message with [email or web address] (more info)  Text page via [facility] Paging/Directory           Assessments          Hayder Alves is a 68 year old male with a past medical history significant for recently diagnosed Lewy Body Dementia, chronic neck pain (s/p spinal fusion), COPD (not on home oxygen), CAD (s/p CABG 2001), CKD stage IV, hypertension, dyslipidemia, systolic CHF, poorly controlled diabetes mellitus, medication noncompliance, h/o atrial fibrillation (s/p cardioversion 2020; not currently on anticoagulation) who presents with shortness of breath and left arm pain. He was found to have severe bradycardia/complete heart block s/p PPM 7/5. His hospital course has been further complicated by acute hypoxic respiratory failure 2/2 acute on chronic systolic heart failure and exacerbation. He has developed oliguric KAY, likely ischemic ATN related to hemodynamics, bradycardia requiring initiation of dialysis, Nephrology following. He remains high flow O2 dependent, despite initiation of dialysis.  Prognosis, Goals, or Advance Care Planning was addressed today with: Yes.  Mood, coping, and/or meaning in the context of serious illness were addressed today: Yes.              Interval History:     C/o of BLE  pain/aching. For Vjiay, he was hoping that he would be able to return home. Education [ provided on high flow and the amount of support he needs and how that can only be done in the a facility. He acknowledged understanding that his needs cannot be met safely at home. For Lilia, her concern today and going forward is that he is not in pain especially now that dialysis is completed. We discussed that the pathophysiology of renal failure and how the progression of the disease is generally comfortable, but if he or family feel that there are symptoms that are not being treated to please let the team know so we can ensure his comfort             Review of Systems:     Besides above, an additional  system ROS was reviewed and is unremarkable               Physical Exam:   Temp: 99.1  F (37.3  C) Temp src: Oral Temp  Min: 97.2  F (36.2  C)  Max: 99.1  F (37.3  C) BP: (!) 147/67 Pulse: 61   Resp: 20 SpO2: 97 % O2 Device: High Flow Nasal Cannula (HFNC) Oxygen Delivery: 50 LPM  Vital Signs with Ranges  Temp:  [97.2  F (36.2  C)-99.1  F (37.3  C)] 99.1  F (37.3  C)  Pulse:  [61-65] 61  Resp:  [20] 20  BP: (147-171)/(67-89) 147/67  FiO2 (%):  [80 %-95 %] 83 %  SpO2:  [95 %-99 %] 97 %  184 lbs 1.35 oz    Physical Exam  Nursing note reviewed.   Constitutional:       General: He is not in acute distress.  HENT:      Head: Normocephalic.      Mouth/Throat:      Mouth: Mucous membranes are moist.      Pharynx: Oropharynx is clear.   Eyes:      Extraocular Movements: Extraocular movements intact.      Conjunctiva/sclera: Conjunctivae normal.      Pupils: Pupils are equal, round, and reactive to light.   Cardiovascular:      Rate and Rhythm: Normal rate and regular rhythm.      Pulses: Normal pulses.   Pulmonary:      Effort: Pulmonary effort is normal. No respiratory distress.      Breath sounds: No wheezing.   Abdominal:      General: Abdomen is flat. There is no distension.      Tenderness: There is no abdominal tenderness.    Musculoskeletal:         General: Normal range of motion.   Skin:     General: Skin is warm and dry.      Capillary Refill: Capillary refill takes less than 2 seconds.   Neurological:      General: No focal deficit present.      Mental Status: He is alert. Mental status is at baseline.   Psychiatric:         Mood and Affect: Mood normal.         Thought Content: Thought content normal.                  Current Problem List:   Principal Problem:    NSTEMI (non-ST elevated myocardial infarction) (H)  Active Problems:    Acute pulmonary edema (H)    Hyperglycemia    COPD exacerbation (H)    Acute kidney failure with tubular necrosis (H)      Allergies   Allergen Reactions     Adhesive Tape      From blood draw site.     Eszopiclone      Strange dreams. Cooking during sleep      Latex      Lipitor [Atorvastatin Calcium] Other (See Comments)     myalgias     Lisinopril Other (See Comments)     Hyperkalemia and increased creatinine            Data Reviewed:       Data   Results for orders placed or performed during the hospital encounter of 06/29/23 (from the past 24 hour(s))   Glucose by meter   Result Value Ref Range    GLUCOSE BY METER POCT 269 (H) 70 - 99 mg/dL   Glucose by meter   Result Value Ref Range    GLUCOSE BY METER POCT 125 (H) 70 - 99 mg/dL   Glucose by meter   Result Value Ref Range    GLUCOSE BY METER POCT 197 (H) 70 - 99 mg/dL   Glucose by meter   Result Value Ref Range    GLUCOSE BY METER POCT 199 (H) 70 - 99 mg/dL   Glucose by meter   Result Value Ref Range    GLUCOSE BY METER POCT 194 (H) 70 - 99 mg/dL     *Note: Due to a large number of results and/or encounters for the requested time period, some results have not been displayed. A complete set of results can be found in Results Review.   *      -----------------------------------------------------------------------------------------------------------------          ====================================================  TT: I have personally spent a  total of 51 minutes on the date of the encounter,  on the unit in review of medical record, consultation with the medical providers and assessment of Hayder Alves  today, with more than 50% of this time spent in counseling, coordination of care, and discussion re: diagnostic results, prognosis, symptom management, risks and benefits of management options, and development of plan of care as noted above.      ====================================================

## 2023-07-20 NOTE — PROGRESS NOTES
[facility]    Medicine Progress Note - Hospitalist Service    Date of Admission:  6/29/2023    Assessment & Plan   Hayder Alves is a 68 year old male with a very complicated  medical history significant for recently diagnosed Lewy Body Dementia, chronic neck pain (s/p spinal fusion), COPD (not on home oxygen), CAD (s/p CABG 2001), CKD stage IV, hypertension, dyslipidemia, systolic CHF, diabetes mellitus, medication noncompliance, h/o atrial fibrillation (s/p cardioversion 2020; not currently on anticoagulation) presented to ED with shortness of breath and left arm pain.     Addendum 1745: wife assents to compassionate removal of HFNC. We discussed protocol for removal. We will pre-medicate with benzo and opiate and then decrease HFNC. We will monitor for symptoms at treat as appropriate. We will then turn off HFNC. If patient still has unmanaged symptoms, we will increase doses by % of dilaudid. These orders are in place. I discussed with bedside RN, charge RN.     Comfort cares  Acute hypoxic respiratory failure: Persistent  likely due to acute on chronic systolic heart failure and exacerbation in the picture of renal failure   Patient had recurrent flash pulmonary edema, currently remains on high flow nasal cannula 45L with 50% FiO2 since 07/11  Acute on Chronic CHF exacerbation with reduced EF:  S/p RRT on 07/10/2023: From flash pulmonary edema ,patient underwent emergent hemodialysis on 07/11/2023, see note from 07/11/2023 for further details  Type II NSTEMI, sec to above  H/O CAD status CABG  New atrial flutter, likely paroxysmal atrial fibrillation h/o cardioversion in 2020, not currently on anticoagulation)  Severe bradycardia/complete heart block s/p PPM on 7/5/2023  Essential Hypertension  Dyslipidemia  Moderate pulmonary hypertension-newly diagnosed --> repeat ECHO showing Severe Pulm HTN     Prolonged Hospital course :     -- Initially on admission presented with SOB  and chest discomfort.    -- In ER ,heart rate was in 30s, EKG showed atrial flutter   -- proBNP was significantly elevated at 19,855 and troponin was elevated at 118  -- Last Echo was in June 2020 showed EF of 55 to 60%, no wall motion changes, mild dilated left atrium and trace MR and TR  -- Chest x-ray from ED was concerning for volume overload   -- Patient was started on diuresis and heparin infusion  -- Duplex of the lower extremity did not show any evidence of DVT  -- Troponin peaked at 118 and have trended down to 103  -- Echo on 7/1/23 showed EF of 50 to 55%, grade 2 or moderate diastolic dysfunction, mild inferior lateral wall hypokinesis, mild biatrial enlargement, RV systolic function is borderline reduced, RVSP is elevated consistent with moderate pulmonary hypertension, mild aortic Valvular stenosis  -- Cardiology was consulted due to complete heart bloch and CHF   -- Given his KAY on CKD 4 , initially cardiac catheterization was not considered due to the concern for patient might end up on dialysis   -- S/P PPM placement on 7/5/2023   -- Cardiology ( EP ) signed off   -- Nephrology consulted later on the course due to oliguric renal failure ( see below )  -- Patient has been started on HD , no significant clinical improvement   -- Patient had RRT on 07/10/23 --> Flash Pulm edema -- > required extra emergent HD on 07/11, placed on BIPAP , was able to be switched to HFNC on 07/11/23 after HD  -- Since then patient has been getting HD but remains on HF without significant improvement in resp status  -- General Cards were re consulted for possible ischemia workup   -- Extensive discussion has been done with family , Cards and Nephro   -- Repeat ECHO on 07/13/2023 showed EF of 55 to 60%, diastolic Doppler findings suggest left ventricular filling pressures and increased, septal wall motion abnormality may reflect pacemaker activation. Severe pulmonary HTN is now present  -- Nephro recommended against  ischemic workup at this point as it will push patient towards permanent HD  -- Palliative team has been consulted due to patient poor clinical improvement despite aggressive management and regular HD    -- Code status changed to DNR/DNI on 07/14  -- Goals of care conversation with palliative care, nephrology, hospitalist, wife, daughter and daughter's partner. Decision made to transition to comfort cares, no more dialysis. Will continue HFNC to give family a bit more time to say what they need to.      Goals of care discussion:     -- Palliative has been following   -- Of note, Bill is unable to make decisions due to dementia , wife Lilia and kids are making decisions    -- After family meeting , code status changed to DNR/DNI on 07/14/23   -- Transitioned to comfort cares 7/18 with continued HFNC to allow for family to come and meet with patient  -- last dialysis 7/17  -- comfort care orders in  -- marsh for comfort  -- Awaiting clearance from family that they are ready for compassionate removal of high flow     -- see recommendations from palliative care team for compassionate removal of HFNC on 7/20/23    ** Cause of death would be acute hypoxic respiratory failure (days) due to HFrEF and pulmonary HTN. Contributing factors are KAY on CKD now dialysis dependent.     Acute kidney injury, initially resolved  Later developed oliguric KAY on CKD stage IV  Status placement of tunneled catheter on 7/5/2023   HD start since 7/5   Hyponatremia, secondary to hypervolemia  Anion gap metabolic acidosis likely due to renal failure  Hypokalemia-resolved  Elevated D-dimer: Most likely secondary to CKD  History of COPD  Acute hypoxic respiratory failure ( see Problem 1) Multifactorial as above   Uncontrolled diabetes;   Chronic neck pain, history of spinal fusion  Recently diagnosed Lewy Body Dementia  Chronic thrombocytopenia  Physical deconditioning         Diet: Room Service  Regular Diet Adult    DVT Prophylaxis: Pneumatic  Compression Devices  Gardner Catheter: PRESENT, indication: Retention  Lines: None     Cardiac Monitoring: None  Code Status: No CPR- Do NOT Intubate      Clinically Significant Risk Factors              # Hypoalbuminemia: Lowest albumin = 3.4 g/dL at 7/17/2023  5:33 AM, will monitor as appropriate     # Hypertension: Noted on problem list       # DMII: A1C = 10.3 % (Ref range: <5.7 %) within past 6 months            Disposition Plan      Expected Discharge Date: 07/21/2023      Destination: other (comment) (TCU)  Discharge Comments: Care conference on 7/14 at 3pm          Clay Awad MD  Hospitalist Service  [facility]  Securely message with [employer] (more info)  Text page via [facility] Paging/Directory   ______________________________________________________________________    Interval History   Seen in AM. Discussed with wife at bedside and in urena. Vijay seems in good spirits. He is eager to see his daughter who is on the way in. He had crab legs and lobster yesterday from [employer], but reports he was happy to have a big dinner of ribs the prior evening. Gardner was placed yesterday. Family continues to struggle with timing of shutting off the HFNC. Sons are struggling.     Addendum: Family conference again in the PM with daughter Alisha, Alisha's partner Antonio, son Eric and his wife, Lilia(patient's wife) and son Ranjit (on phone). We discussed again that the patient is dying and we have temporarily arrested that process with HFNC. We discussed that patient has limited capacity to understand what is going on and process. Son Eric seems to be in denial, and seems to be hopeful that there will be some recovery. He wants to ask his dad if what he wants. E.g does he still want dialysis, would he want us to take off HFNC. We discussed that by continuing on with HFNC we are not honoring Vijay's previously stated wishes, that he made when he was well and prior to his dementia diagnosis.     Then  met with family in person at bedside. Family shared with patient that he is loved, they will be okay if he dies. I did tell the patient that he is ill, he will not recover, and he cannot go home in his current state. It is not clear how well he understood this. Pancho attempted to discuss dialysis with patient, unclear that Bill understood much or any of that discussion. The family still would like some more time. I was very clear and stated several times, that he patient is working hard to breath and has pain. Likely with his current medical status, he will need to be in some stuporous like state to remain comfortable. The family is as yet unwilling to proceed with compassionate removal of HFNC. Discussed with RN that in line with family's stated goal of keeping patient comfortable, we will increase the frequency of meds available    I spent an additional 2 hours in the above outline activities. This is in addition the the 50 minutes spent previously in the day.    Physical Exam   Vital Signs: Temp: 99.1  F (37.3  C) Temp src: Oral BP: (!) 147/67 Pulse: 61   Resp: 20 SpO2: 97 % O2 Device: High Flow Nasal Cannula (HFNC) Oxygen Delivery: 50 LPM  Weight: 184 lbs 1.35 oz    Constitutional: Awake, alert, cooperative, moderate distress, on HFNC  Respiratory: symmetric expansion, belly breathing, accessory muscle use.   Cardiovascular: no cyanosis  Skin/Integumen: No rashes, no cyanosis, no edema  Other:       Medical Decision Making       51 MINUTES SPENT BY ME on the date of service doing chart review, history, exam, documentation & further activities per the note.      Data   ------------------------- PAST 24 HR DATA REVIEWED -----------------------------------------------        Imaging results reviewed over the past 24 hrs:   No results found for this or any previous visit (from the past 24 hour(s)).

## 2023-07-20 NOTE — PROGRESS NOTES
"SPIRITUAL HEALTH SERVICES Progress Note  [facility] Oncology    Saw pt Hayder DEYSI Alves and met with Lilia, Pancho, daughter-in-law, Jade, and her partner, Antonio for impromtu care conference in the [facility].    Patient/Family Understanding of Illness and Goals of Care - Lilia and Jade expressed understanding that Vijay is at the end of his life. Pancho is struggling and requested more information from the doctor regarding the Bill's decline and if there are options for ongoing medical care.    Distress and Loss - Son, Pancho, named his frustration about not understanding the progression of Vijay's physical decline and wondering if there is anything more medically that could be done. He also shared his fear that Bill hasn't been included in the decision-making process of moving him to comfort care. Per family's request, Dr Awad was paged and he came to meet with family.  Jade was tearful as she named her own distress and \"suffering\" that \"we're not following through on his wishes\" because family is struggling with compassionate removal of HFNC.    Strengths, Coping, and Resources - Family is supportive of one another and love Bill wanting to provide comfort and affirming each person's grief process.    Meaning, Beliefs, and Spirituality - Mandaen; Lilia is aware of [facility] support when the decision of full comfort measures are put in place.     Plan of Care - [facility] remains available for ongoing emotional and spiritual support.    Lexie Staton MDiv  Associate   [facility] pager [phone]  [facility] phone [phone]    [facility] available 24/7 for emergent requests/referrals, either by having the on-call  paged or by entering an ASAP/STAT consult in Epic (this will also page the on-call ).      "

## 2023-07-20 NOTE — PLAN OF CARE
Goal Outcome Evaluation: [id number]    A&O x4, forgetful. VSS on 55L high flow NC. Full assessment deferred r/t comfort cares. Pain in neck controlled with scheduled Percocet x1 and PRN Dilaudid x1. Gardner patent.  Sacral mepilex in place. Up to bedside commode 2 assist x2- no BM. Scheduled senna x1. Blood sugar stable. Takes pills whole. L and R PIV SL.

## 2023-07-20 NOTE — PLAN OF CARE
07/19/23 [id number]   A/Ox4-forgetful. VSS-on 55L high-flow NC. Assessments deferred-comfort/compassion cares. Pt has pain in neck and back-Dilaudid and hot packs given. Gardner was placed this shift due to retention. Ax2 with lift. Pt is contient-no BM this shift. BG checks. Family by pt side all shift. Palliative following.

## 2023-07-20 NOTE — DISCHARGE SUMMARY
[facility]    Death Summary - Hospitalist Service     Date of Admission:  6/29/2023  Date of Death: 7/20/2023 10:05 PM  Discharging Provider: Clay Awad MD    Discharge Diagnoses   Comfort cares  Acute hypoxic respiratory failure: Persistent    Acute on Chronic CHF exacerbation with reduced EF  Flash pulmonary edema  Type II NSTEMI, due to above  CAD status CABG  New atrial flutter, likely paroxysmal atrial fibrillation h/o cardioversion in 2020  Severe bradycardia/complete heart block s/p PPM on 7/5/2023  Essential Hypertension  Dyslipidemia  Severe Pulm HTN, newly diagnosed  Acute kidney injury, initially resolved  Later developed oliguric KAY on CKD stage IV  Status placement of tunneled catheter on 7/5/2023   HD start since 7/5   Hyponatremia, secondary to hypervolemia  Anion gap metabolic acidosis likely due to renal failure  Hypokalemia-resolved  Elevated D-dimer: Most likely secondary to CKD  History of COPD  Acute hypoxic respiratory failure ( see Problem 1) Multifactorial as above   Uncontrolled diabetes;   Chronic neck pain, history of spinal fusion  Recently diagnosed Lewy Body Dementia  Chronic thrombocytopenia  Physical deconditioning      Cause of death: acute hypoxic respiratory failure (days) due to acute on chronic HFrEF and pulmonary HTN. Contributing factors are KAY on CKD now dialysis dependent    Hospital Course   Hayder Alves is a 68 year old male with a very complicated  medical history significant for recently diagnosed Lewy Body Dementia, chronic neck pain (s/p spinal fusion), COPD (not on home oxygen), CAD (s/p CABG 2001), CKD stage IV, hypertension, dyslipidemia, systolic CHF, diabetes mellitus, medication noncompliance, h/o atrial fibrillation (s/p cardioversion 2020; not currently on anticoagulation) presented to ED with shortness of breath and left arm pain.     He had an extremely complex medical course as outlined in prior progress notes and in  below timeline. In brief he presented with above symptoms and had pacer placed on 23. He subsequently developed renal failure and started on dialysis. He also developed progressive hypoxic respiratory failure that was not responsive to therapies. There were multiple goals of care discussions. On , he was made DNR/DNI. On , patient had his last run of dialysis. On , patient was transitioned to comfort cares with continued HFNC to give family time to meet with patient. On 23, patient's wife, Lilia, consented to compassionate removal of high flow nasal cannula which he had been maintained on for 10 days without improvement.  He  on 2023 at 2008H.    May he rest in peace.    Clay Awad MD  [facility]  ______________________________________________________________________      Significant Results and Procedures   Most Recent 3 CBC's:  Recent Labs   Lab Test 23  0533 23  0659 23  0532   WBC 6.2 9.6 9.7   HGB 10.0* 10.3* 10.9*   MCV 85 86 86   * 117* 120*     Most Recent 3 BMP's:  Recent Labs   Lab Test 23  1142 23  0815 23  0334 23  0824 23  0533 23  2158 23  1733 23  0919 23  0659 23  0746 23  0529   NA  --   --   --   --  130*  --   --   --  129*  --  130*   POTASSIUM  --   --   --   --  4.0  --  4.5  --  3.9   < > 4.2   CHLORIDE  --   --   --   --  94*  --   --   --  94*  --  93*   CO2  --   --   --   --  23  --   --   --    --  22   BUN  --   --   --   --  36.4*  --   --   --  30.1*  --  23.2*   CR  --   --   --   --  2.99*  --   --   --  2.99*  --  2.62*   ANIONGAP  --   --   --   --  13  --   --   --  13  --  15   WANDA  --   --   --   --  8.6*  --   --   --  8.9  --  8.8   * 194* 199*   < > 153*   < >  --    < > 137*   < > 143*    < > = values in this interval not displayed.     Most Recent 2 LFT's:  Recent Labs   Lab Test 23  1625 20  1047    AST 18 22   ALT 13 31   ALKPHOS 93 61   BILITOTAL 0.8 0.3   ,   Results for orders placed or performed during the hospital encounter of 06/29/23   XR Chest Port 1 View    Narrative    EXAM: XR CHEST PORT 1 VIEW  LOCATION: [facility]  DATE: 6/29/2023    INDICATION: Shortness of breath  COMPARISON: Chest radiograph 08/18/2017.      Impression    IMPRESSION: Stable size of cardiomediastinal silhouette status post median sternotomy and CABG. Defibrillator pads overlying the left chest. Pulmonary vascular congestion with likely mild to moderate pulmonary edema and small bilateral pleural effusions   with compressive atelectasis, right larger than left. No pneumothorax. Bones are unchanged. Cervical spinal fusion hardware again noted.   US Lower Extremity Venous Duplex Bilateral    Narrative    EXAM: US LOWER EXTREMITY VENOUS DUPLEX BILATERAL  LOCATION: [facility]  DATE: 6/29/2023    INDICATION: elevated d dimer, not candidate for CT PE given GFR  COMPARISON: None.  TECHNIQUE: Venous Duplex ultrasound of bilateral lower extremities with and without compression, augmentation and duplex. Color flow and spectral Doppler with waveform analysis performed.    FINDINGS: Exam includes the common femoral, femoral, popliteal veins as well as segmentally visualized deep calf veins and greater saphenous vein.     RIGHT: No deep vein thrombosis. No superficial thrombophlebitis. No popliteal cyst.    LEFT: No deep vein thrombosis. No superficial thrombophlebitis. No popliteal cyst.      Impression    IMPRESSION:  1.  No deep venous thrombosis in the bilateral lower extremities.   NM Lung Scan Ventilation and Perfusion    Narrative    EXAM: NM LUNG SCAN VENTILATION AND PERFUSION  LOCATION: [facility]  DATE: 6/30/2023    INDICATION: Shortness of breath.  COMPARISON: Chest x-ray dated 06/29/2023.  TECHNIQUE: 58.5 mCi Tc-99m DTPA inhaled. 6.8 mCi Tc-99m MAA,  IV. Standard planar imaging during perfusion and ventilation portions of exam.    FINDINGS: Normal pulmonary ventilation. Normal pulmonary perfusion. No mismatched segmental perfusion defect.      Impression    IMPRESSION:    No evidence of pulmonary embolism.   XR Chest Port 1 View    Narrative    EXAM: XR CHEST PORT 1 VIEW  LOCATION: [facility]  DATE: 7/4/2023    INDICATION: Sob  COMPARISON: 06/29/2023      Impression    IMPRESSION: Sternotomy wires are unchanged. Cardiomegaly with pulmonary vascular previously seen opacity at the right lung base has cleared. Persistent opacity left lung base. Probable small left pleural effusion.   IR CVC Tunnel Placement > 5 Yrs of Age    Narrative    [facility]  LOCATION: [facility]  DATE: 7/6/2023    PROCEDURE:TUNNELED DIALYSIS CATHETER PLACEMENT    INTERVENTIONAL RADIOLOGIST: Hima Armendariz MD.    INDICATION: 68-year-old male with renal failure in need of  hemodialysis.    CONSENT: The risks, benefits and alternatives of tunneled dialysis  catheter placement were discussed with the patient  in detail. All  questions were answered. Informed consent was given to proceed with  the procedure.    MODERATE SEDATION: Versed 1 mg IV; Fentanyl 50 mcg IV.  Under  physician supervision, Versed and fentanyl were administered for  moderate sedation. Pulse oximetry, heart rate and blood pressure were  continuously monitored by an independent trained observer. The  physician spent 8 minutes of face-to-face sedation time with the  patient.    CONTRAST: None.  ANTIBIOTICS: Ancef 2 grams IV.  ADDITIONAL MEDICATIONS: None.    FLUOROSCOPIC TIME: 0.1 minutes.  RADIATION DOSE: Air Kerma: 2 mGy.    COMPLICATIONS: No immediate complications.    STERILE BARRIER TECHNIQUE: Maximum sterile barrier technique was used.  Cutaneous antisepsis was performed at the operative site with  application of 2% chlorhexidine and large sterile drape. Prior to the  procedure, the   and assistant performed hand hygiene and wore  hat, mask, sterile gown, and sterile gloves during the entire  procedure.    PROCEDURE:     Using real-time ultrasound guidance, the right internal jugular vein  was punctured. A subcutaneous tunnel was created requiring a second  incision. Using this access, a 15.5 [place], 23 cm tip to cuff  palindrome dialysis catheter was advanced until the tip was in the  proximal right atrium.  The catheter was tested and found to flush and  aspirate appropriately.  The catheter was heparinized and secured to  the skin.    FINDINGS:  Ultrasound shows an anechoic and compressible jugular vein. A  permanent image was stored.      At the completion of the study, the new catheter tip lies in the  proximal right atrium.      Impression    IMPRESSION:    1.  Tunneled dialysis catheter placement, as discussed above.  2.  The catheter position was verified fluoroscopically and this is  ready for use.    TROY LAZAR MD         SYSTEM ID:  [id number]   X-ray Chest 2 vws*    Narrative    CHEST TWO VIEWS July 6, 2023 8:27 AM     HISTORY: Implantable cardiac device placement, evaluate for  pneumothorax.    COMPARISON: July 4, 2023.    FINDINGS: Cardiac lead(s) noted that project over the cardiac  silhouette. No pneumothorax. Mild bibasilar atelectasis and/or  infiltrate, question some pleural fluid. The cardiac silhouette is  stable. Pulmonary vasculature is unremarkable.      Impression    IMPRESSION: No acute disease.     BEULAH MARTINEZ MD         SYSTEM ID:  [id number]   XR Abdomen Port 1 View    Narrative    EXAM: XR ABDOMEN PORT 1 VIEW  LOCATION: [facility]  DATE: 7/10/2023    INDICATION: constipation  COMPARISON: None.      Impression    IMPRESSION: Suboptimal combination. Left abdomen incompletely visualized. Bowel gas pattern is normal. Moderate stool gas retention. Nothing for obstruction or free air. No evidence for renal stones.   XR Chest Port 1 View     Narrative    EXAM: XR CHEST PORT 1 VIEW  LOCATION: [facility]  DATE: 7/10/2023    INDICATION: Shortness of breath.  COMPARISON: 07/06/2023      Impression    IMPRESSION:     New right IJ catheter with tip in the mid SVC. Left subclavian approach pacemaker. Median sternotomy wires. Partially visualized cervical spinal fusion hardware.    Increased layering right pleural effusion with adjacent compressive atelectasis. Decreased trace left pleural effusion. No pneumothorax.    Diffuse prominence of the pulmonary vascularity with hazy perihilar and lower lobe opacities, likely mild-to-moderate pulmonary edema.    Stable cardiomegaly.   EP Device    Narrative    Table formatting from the original result was not included.  PROCEDURES PERFORMED:   1. Implantation of a single-chamber pacemaker, MRI compatible  2. Conscious sedation.   3. Cardiac fluoroscopy    INDICATION: AF with  CHB    HISTORY OF PRESENT ILLNESS: This is a 68 year old year-old patient with a   history of persistent AF note to be in CHB with junctional escape in the   40's considered to be irreversible who is referred for a permanent   pacemaker. Risks of the procedure was discussed before the procedure   including but not limited to vascular injury, infection, pneumothorax, and   cardiac perforation.    METHOD: Informed consent was obtained and intravenous antibiotics was   given prior to the procedure. The patient was prepped and draped in the   usual manner. The procedure was performed under conscious sedation with   Versed and Fentanyl unless contraindicated. 1% lidocaine was infiltrated   into the left axillary vein area. This vein was accessed using the   modified Seldinger's technique with ultrasound guidance and micropuncture   when applicable. An incision was then made with a #15 blade. A sheath was   then glide over the wire into the vein. A lead was then advanced into the   heart and was fixed into the right  ventricular area. Appropriate sensing   and threshold were obtained. There was no diaphragmatic stimulation with   high output pacing. The lead was then secured to the pectoralis muscle   fascia using O-Ethibond sutures.     A pocket was then fashioned and was irrigated with normal saline. The   leads were then attached to the device and placed in the pocket.  The   pocket was then closed with 2-0 and 4-0 Vicryl sutures. Steri-Strips and   an OpSite dressing were then placed over the incision.  The patient was   [facility].     DEVICE INFORMATION:  Implant Name Type Inv. Item Serial No.  Lot No. LRB No. Used   Action   IMP LEAD PACING BIPOLAR CAPSUREFIX NOVUS 58CM 5076-58 - [id number] Leads   IMP LEAD PACING BIPOLAR CAPSUREFIX NOVUS 58CM 5076-58 [id number]   [employer] [id number]  1 Implanted   PACEMAKER TANGELA XT SR MRI SYS - [id number] Pacemaker PACEMAKER TANGELA XT SR   MRI SYS [id number] [employer] [id number]  1 Implanted       STIMULATION THRESHOLDS:  RV -  Sense:3.75 mV, Threshold: 1.25 V @ 0.5 ms, Impedance: 570 ohms     Fluoroscopy (minutes): Dose 5.7mSv Fl Time:0min, DAP:2.2    COMPLICATIONS: None    CONCLUSION:  1. Uneventful implantation of a single-chamber pacemaker, MRI compatible    Glenn Huynh MD          Echocardiogram Complete     Value    LVEF  50-55%    Narrative    [id number]  [id number]  [id number]  [id number]^ANU^JAME     [facility]  Echocardiography Laboratory  [address] [place] [address]     Name: MANSOOR MOORE  MRN: [id number]  : 1955  Study Date: 2023 09:10 AM  Age: 68 yrs  Gender: Male  Patient Location: [facility]  Reason For Study: SOB  Ordering Physician: JAME BRENNAN     BSA: 2.0 m2  Height: 67 in  Weight: 198 lb  HR: 64  BP: 172/74 mmHg  ______________________________________________________________________________  Procedure  Complete Portable Echo  Adult.  ______________________________________________________________________________  Interpretation Summary     There is borderline concentric left ventricular hypertrophy.  The visual ejection fraction is 50-55%.  Grade II or moderate diastolic dysfunction.  There is mild inferolateral wall hypokinesis.  The right ventricular systolic function is borderline reduced.  There is mild biatrial enlargement.  The mean mitral valve gradient is (at HR 81) 4mmHg.  IVC diameter and respiratory changes fall into an intermediate range  suggesting an RA pressure of 8 mmHg.  Right ventricular systolic pressure is elevated, consistent with moderate  pulmonary hypertension.  There is mild aortic sclerosis of the left coronary cusp.  Mild valvular aortic stenosis.  ______________________________________________________________________________  Left Ventricle  The left ventricle is normal in size. There is borderline concentric left  ventricular hypertrophy. The visual ejection fraction is 50-55%. Grade II or  moderate diastolic dysfunction. There is mild inferolateral wall hypokinesis.  There is no thrombus seen in the left ventricle.     Right Ventricle  The right ventricle is normal size. The right ventricular systolic function is  borderline reduced.     Atria  There is mild biatrial enlargement.     Mitral Valve  There is trace mitral regurgitation. The mean mitral valve gradient is (at HR  81) 4mmHg.     Tricuspid Valve  There is trace tricuspid regurgitation. The right ventricular systolic  pressure is approximated at 45.7 mmHg plus the right atrial pressure. IVC  diameter and respiratory changes fall into an intermediate range suggesting an  RA pressure of 8 mmHg. Right ventricular systolic pressure is elevated,  consistent with moderate pulmonary hypertension.     Aortic Valve  There is mild aortic sclerosis of the left coronary cusp. Mild valvular aortic  stenosis. The mean AoV pressure gradient is 7.9 mmHg. The  calculated aortic  valve are is 2.1 cm^2.     Pulmonic Valve  The pulmonic valve is not well seen, but is grossly normal.     Vessels  The aortic root is normal size.     Pericardium  The pericardium appears normal.     Rhythm  Sinus rhythm was noted.  ______________________________________________________________________________  MMode/2D Measurements & Calculations  IVSd: 1.1 cm  LVIDd: 5.0 cm  LVIDs: 3.4 cm  LVPWd: 1.5 cm  FS: 32.4 %  LV mass(C)d: 257.1 grams  LV mass(C)dI: 127.7 grams/m2  Ao root diam: 3.4 cm  LA dimension: 4.8 cm  asc Aorta Diam: 3.5 cm  LA/Ao: 1.4  LVOT diam: 2.1 cm  LVOT area: 3.5 cm2  LA Volume (BP): 74.0 ml  LA Volume Index (BP): 36.8 ml/m2     RWT: 0.58  TAPSE: 1.5 cm     Doppler Measurements & Calculations  MV E max daryl: 109.0 cm/sec  MV A max daryl: 53.1 cm/sec  MV E/A: 2.1  MV max P.9 mmHg  MV mean PG: 3.6 mmHg  MV V2 VTI: 38.2 cm  MVA(VTI): 2.0 cm2  MV dec slope: 1295 cm/sec2  MV dec time: 0.08 sec  Ao V2 max: 207.7 cm/sec  Ao max P.3 mmHg  Ao V2 mean: 125.6 cm/sec  Ao mean P.9 mmHg  Ao V2 VTI: 36.7 cm  MARIZA(I,D): 2.1 cm2  MARIZA(V,D): 1.7 cm2  LV V1 max P.1 mmHg  LV V1 max: 101.2 cm/sec  LV V1 VTI: 22.3 cm  SV(LVOT): 77.3 ml  SI(LVOT): 38.4 ml/m2  PA acc time: 0.12 sec  TR max daryl: 338.0 cm/sec  TR max P.7 mmHg  AV Daryl Ratio (DI): 0.49  MARIZA Index (cm2/m2): 1.0  E/E' av.4  Lateral E/e': 13.2     Medial E/e': 15.7  RV S Daryl: 6.3 cm/sec     ______________________________________________________________________________  Report approved by: Dunia Schuler 2023 11:46 AM         Echocardiogram Complete     Value    LVEF  55-60%    Narrative    [id number]  [id number]  [id number]  [id number]^BEATRICE^[facility]  Echocardiography Laboratory  [address] [place] [address]     Name: MANSOOR MOORE  MRN: [id number]  : 1955  Study Date: 2023 03:02 PM  Age: 68 yrs  Gender: Male  Patient Location: [facility]  Reason  For Study: SOB  Ordering Physician: VICKI BARRON  Referring Physician: NICOL VOGEL  Performed By: Purvi Barker     BSA: 2.0 m2  Height: 67 in  Weight: 184 lb  HR: 60  BP: 151/69 mmHg  ______________________________________________________________________________  Procedure  Complete Echo Adult.  ______________________________________________________________________________  Interpretation Summary     The visual ejection fraction is 55-60%.  Diastolic Doppler findings (E/E' ratio and/or other parameters) suggest left  ventricular filling pressures are increased.  Septal wall motion abnormality may reflect pacemaker activation.  Severe (>55mmHg) pulmonary hypertension is present.  ______________________________________________________________________________  Left Ventricle  The left ventricle is normal in size. There is normal left ventricular wall  thickness. The visual ejection fraction is 55-60%. Diastolic Doppler findings  (E/E' ratio and/or other parameters) suggest left ventricular filling  pressures are increased. Septal wall motion abnormality may reflect pacemaker  activation.     Right Ventricle  There is a catheter/pacemaker lead seen in the right ventricle. The right  ventricle is normal in size and function.     Atria  The left atrium is mildly dilated. Right atrial size is normal. There is no  color Doppler evidence of an atrial shunt.     Mitral Valve  The mitral valve leaflets are moderately thickened. There is mild (1+) mitral  regurgitation.     Tricuspid Valve  The right ventricular systolic pressure is approximated at 55mmHg plus the  right atrial pressure. There is mild (1+) tricuspid regurgitation. IVC  diameter and respiratory changes fall into an intermediate range suggesting an  RA pressure of 8 mmHg. Severe (>55mmHg) pulmonary hypertension is present.     Aortic Valve  There is mild trileaflet aortic sclerosis. No aortic regurgitation is present.  The mean AoV pressure  gradient is 11.0 mmHg. The calculated aortic valve are  is 1.9 cm^2. There is sclerosis, calcification, and restriction of the aortic  valve opening compatible with mild aortic stenosis.     Pulmonic Valve  There is trace pulmonic valvular regurgitation.     Vessels  Normal size aorta. The aortic root is normal size.     Pericardium  There is no pericardial effusion. Moderate ascites.     Rhythm  The rhythm was atrial fibrillation.  ______________________________________________________________________________  MMode/2D Measurements & Calculations  IVSd: 1.0 cm     LVIDd: 5.3 cm  LVIDs: 4.3 cm  LVPWd: 1.2 cm  FS: 19.7 %  LV mass(C)d: 221.5 grams  LV mass(C)dI: 113.5 grams/m2  Ao root diam: 3.5 cm  asc Aorta Diam: 3.5 cm  LVOT diam: 2.2 cm  LVOT area: 3.8 cm2  LA Volume (BP): 66.3 ml  LA Volume Index (BP): 34.0 ml/m2  RWT: 0.43     Doppler Measurements & Calculations  MV E max daryl: 140.0 cm/sec  MV max P.6 mmHg  MV mean P.0 mmHg  MV V2 VTI: 34.7 cm  MVA(VTI): 2.5 cm2  MV dec slope: 549.7 cm/sec2  MV dec time: 0.25 sec  Ao V2 max: 222.0 cm/sec  Ao max P.0 mmHg  Ao V2 mean: 152.0 cm/sec  Ao mean P.0 mmHg  Ao V2 VTI: 44.2 cm  MARIZA(I,D): 1.9 cm2  MARIZA(V,D): 2.0 cm2  LV V1 max P.5 mmHg  LV V1 max: 117.0 cm/sec  LV V1 VTI: 22.5 cm  SV(LVOT): 85.5 ml  SI(LVOT): 43.8 ml/m2  PA V2 max: 144.0 cm/sec  PA max P.3 mmHg  PA acc time: 0.11 sec  AV Daryl Ratio (DI): 0.53  MARIZA Index (cm2/m2): 0.99  E/E' av.8  Lateral E/e': 20.5  Medial E/e': 27.0  RV S Daryl: 7.9 cm/sec     ______________________________________________________________________________  Report approved by: Dunia Farris 2023 04:48 PM           *Note: Due to a large number of results and/or encounters for the requested time period, some results have not been displayed. A complete set of results can be found in Results Review.       Consultations This Hospital Stay   PHARMACY IP CONSULT  CARDIOLOGY IP CONSULT  NEPHROLOGY IP  CONSULT  CARE MANAGEMENT / SOCIAL WORK IP CONSULT  PHYSICAL THERAPY ADULT IP CONSULT  OCCUPATIONAL THERAPY ADULT IP CONSULT  CARDIOLOGY IP CONSULT  ELECTROPHYSIOLOGY IP CONSULT  PHARMACY LIAISON FOR MEDICATION COVERAGE CONSULT  INTERVENTIONAL RADIOLOGY ADULT/PEDS IP CONSULT  IP RESPIRATORY CARE CHRONIC PULMONARY DISEASE SPECIALIST  PALLIATIVE CARE ADULT IP CONSULT  SPIRITUAL HEALTH SERVICES IP CONSULT  CARDIOLOGY IP CONSULT    Primary Care Physician   Suma Jenkins    Time Spent on this Encounter   I, Clay Awad MD, discharged this patient today but I did not personally see the patient today and will not be billing for the patient's discharge.

## 2023-07-21 NOTE — PROVIDER NOTIFICATION
MD Notification    Notified Person: MD    Notified Person Name: Dajuan Duff NP    Notification Date/Time: 7/20/23 2017    Notification Interaction: [employer]    Purpose of Notification: Pt has passed away, Can you please pronounce pt? Thanks    Orders Received:    Comments:

## 2023-07-21 NOTE — PROGRESS NOTES
SPIRITUAL HEALTH SERVICES Progress Note  [facility]    Provided spiritual and emotional care to Vijay, his wife, his three children and their spouses from the time of his compassionate extubation to the time of his passing.    Plan: [facility] remains available.    DANIELLE Mcfadden.   Intern    [facility] routine referrals *[phone]  [facility] available 24/7 for emergent requests/referrals, either by paging the on-call  or by entering an ASAP/STAT consult in Epic (this will also page the on-call ).

## 2023-07-21 NOTE — PROGRESS NOTES
House BILL Death Pronouncement    Called by nursing staff to pronounce Hayder Alves dead.    Physical Exam: Unresponsive to noxious stimuli, Spontaneous respirations absent, Breath sounds absent, Carotid pulse absent, Heart sounds absent and Corneal blink reflex absent    Patient was pronounced dead at 8:08 PM, 2023.    Principal Problem:    NSTEMI (non-ST elevated myocardial infarction) (H)  Active Problems:    Acute pulmonary edema (H)    Hyperglycemia    COPD exacerbation (H)    Acute kidney failure with tubular necrosis (H)       Infectious disease present?: NO    Communicable disease present? (examples: HIV, chicken pox, TB, Ebola, CJD) :  NO    Multi-drug resistant organism present? (example: MRSA): NO    Please consider an autopsy if any of the following exist:  NO Unexpected or unexplained death during or following any dental, medical, or surgical diagnostic treatment procedures.   NO Death of mother at or up to seven days after delivery.     NO All  and pediatric deaths.     NO Death where the cause is sufficiently obscure to delay completion of the death certificate.   NO Deaths in which autopsy would confirm a suspected illness/condition that would affect surviving family members or recipients of transplanted organs.     The following deaths must be reported to the 's Office:  NO A death that may be due entirely or in part to any factors other than natural disease (recent surgery, recent trauma, suspected abuse/neglect).   NO A death that may be an accident, suicide, or homicide.     NO Any sudden, unexpected death in which there is no prior history of significant heart disease or any other condition associated with sudden death.   NO A death under suspicious, unusual, or unexpected circumstances.    NO Any death which is apparently due to natural causes but in which the  does not have a personal physician familiar with the patient s medical history, social, or  environmental situation or the circumstances of the terminal event.   NO Any death apparently due to Sudden Infant Death Syndrome.     NO Deaths that occur during, in association with, or as consequences of a diagnostic, therapeutic, or anesthetic procedure.   NO Any death in which a fracture of a major bone has occurred within the past (6) six months.   NO A death of persons not seen by their physician within 120 days of demise.     NO Any death in which the  was an inmate of a public institution or was in the custody of Law Enforcement personnel.   NO  All unexpected deaths of children   NO Solid organ donors   NO Unidentified bodies   Pending Deaths of persons whose bodies are to be cremated or otherwise disposed of so that the bodies will later be unavailable for examination;   NO Deaths unattended by a physician outside of a licensed healthcare facility or licensed residential hospice program   NO Deaths occurring within 24 hours of arrival to a health care facility if death is unexpected.    NO Deaths associated with the decedent s employment.   NO Deaths attributed to acts of terrorism.   NO Any death in which there is uncertainty as to whether it is a medical examiner s care should be discussed with the medical investigator.      Death Certificate to be directed to Dr. Clay Awad, hospitalist    Body disposition: Body released to the morgue.    MICKEY Bragg, CNP  Hospitalist-House BILL  Hospitalist Service  Securely message with [employer] (more info)  Text page via [employer] Paging/Directory

## 2023-07-21 NOTE — PLAN OF CARE
07/20/23 [id number]   A/Ox4-forgetful. VS and assessments deferred-Comfort/Compassion Cares-on 55L high-flow NC. Family went to full comfort cares this afternoon. Dilaudid and Ativan given for comfort. [place] came up for support. Family at bedside.

## 2023-07-22 LAB
MDC_IDC_LEAD_IMPLANT_DT: NORMAL
MDC_IDC_LEAD_LOCATION: NORMAL
MDC_IDC_LEAD_LOCATION_DETAIL_1: NORMAL
MDC_IDC_LEAD_MFG: NORMAL
MDC_IDC_LEAD_MODEL: NORMAL
MDC_IDC_LEAD_POLARITY_TYPE: NORMAL
MDC_IDC_LEAD_SERIAL: NORMAL
MDC_IDC_MSMT_BATTERY_DTM: NORMAL
MDC_IDC_MSMT_BATTERY_REMAINING_LONGEVITY: 165 MO
MDC_IDC_MSMT_BATTERY_RRT_TRIGGER: 2.62
MDC_IDC_MSMT_BATTERY_STATUS: NORMAL
MDC_IDC_MSMT_BATTERY_VOLTAGE: 3.22 V
MDC_IDC_MSMT_LEADCHNL_RV_IMPEDANCE_VALUE: 380 OHM
MDC_IDC_MSMT_LEADCHNL_RV_IMPEDANCE_VALUE: 475 OHM
MDC_IDC_MSMT_LEADCHNL_RV_PACING_THRESHOLD_AMPLITUDE: 0.5 V
MDC_IDC_MSMT_LEADCHNL_RV_PACING_THRESHOLD_PULSEWIDTH: 0.4 MS
MDC_IDC_MSMT_LEADCHNL_RV_SENSING_INTR_AMPL: 4.38 MV
MDC_IDC_PG_IMPLANT_DTM: NORMAL
MDC_IDC_PG_MFG: NORMAL
MDC_IDC_PG_MODEL: NORMAL
MDC_IDC_PG_SERIAL: NORMAL
MDC_IDC_PG_TYPE: NORMAL
MDC_IDC_SESS_CLINIC_NAME: NORMAL
MDC_IDC_SESS_DTM: NORMAL
MDC_IDC_SESS_TYPE: NORMAL
MDC_IDC_SET_BRADY_HYSTRATE: NORMAL
MDC_IDC_SET_BRADY_LOWRATE: 60 {BEATS}/MIN
MDC_IDC_SET_BRADY_MODE: NORMAL
MDC_IDC_SET_LEADCHNL_RV_PACING_AMPLITUDE: 2 V
MDC_IDC_SET_LEADCHNL_RV_PACING_ANODE_ELECTRODE_1: NORMAL
MDC_IDC_SET_LEADCHNL_RV_PACING_ANODE_LOCATION_1: NORMAL
MDC_IDC_SET_LEADCHNL_RV_PACING_CAPTURE_MODE: NORMAL
MDC_IDC_SET_LEADCHNL_RV_PACING_CATHODE_ELECTRODE_1: NORMAL
MDC_IDC_SET_LEADCHNL_RV_PACING_CATHODE_LOCATION_1: NORMAL
MDC_IDC_SET_LEADCHNL_RV_PACING_POLARITY: NORMAL
MDC_IDC_SET_LEADCHNL_RV_PACING_PULSEWIDTH: 0.4 MS
MDC_IDC_SET_LEADCHNL_RV_SENSING_ANODE_ELECTRODE_1: NORMAL
MDC_IDC_SET_LEADCHNL_RV_SENSING_ANODE_LOCATION_1: NORMAL
MDC_IDC_SET_LEADCHNL_RV_SENSING_CATHODE_ELECTRODE_1: NORMAL
MDC_IDC_SET_LEADCHNL_RV_SENSING_CATHODE_LOCATION_1: NORMAL
MDC_IDC_SET_LEADCHNL_RV_SENSING_POLARITY: NORMAL
MDC_IDC_SET_LEADCHNL_RV_SENSING_SENSITIVITY: 0.9 MV
MDC_IDC_SET_ZONE_DETECTION_INTERVAL: 360 MS
MDC_IDC_SET_ZONE_TYPE: NORMAL
MDC_IDC_STAT_BRADY_DTM_END: NORMAL
MDC_IDC_STAT_BRADY_DTM_START: NORMAL
MDC_IDC_STAT_BRADY_RV_PERCENT_PACED: 99.49 %
MDC_IDC_STAT_EPISODE_RECENT_COUNT: 0
MDC_IDC_STAT_EPISODE_RECENT_COUNT_DTM_END: NORMAL
MDC_IDC_STAT_EPISODE_RECENT_COUNT_DTM_START: NORMAL
MDC_IDC_STAT_EPISODE_TOTAL_COUNT: 0
MDC_IDC_STAT_EPISODE_TOTAL_COUNT_DTM_END: NORMAL
MDC_IDC_STAT_EPISODE_TOTAL_COUNT_DTM_START: NORMAL
MDC_IDC_STAT_EPISODE_TYPE: NORMAL

## 2023-07-23 NOTE — PROGRESS NOTES
Writer received voice message from patient's spouse asking to speak with .  Writer contacted Lexie in  office and she will contact spouse later this morning.    Roselia Fowler RN  Care Coordinator  [facility]  [phone] (text or call)

## 2023-10-04 DIAGNOSIS — I50.22 CHRONIC SYSTOLIC HEART FAILURE (H): ICD-10-CM

## 2023-10-04 RX ORDER — FUROSEMIDE 20 MG
TABLET ORAL
Qty: 180 TABLET | Refills: 0 | OUTPATIENT
Start: 2023-10-04

## 2024-01-31 NOTE — TELEPHONE ENCOUNTER
Last Written Prescription Date:  8/8/19  Last Fill Quantity: 180,  # refills: 1   Last office visit: 2/19/2020 with prescribing provider:     Future Office Visit:  .  Routing refill request to provider for review/approval because:  Drug not on the [facility] refill protocol          Patient will be seen by Nia today for symptomatic Afib and possible cardioversion.  Will plan to see patient back with EKG after cardioversion.  Continue same medications/treatment.  Patient educated on proper medication use.  Please bring all medicines, vitamins and herbal supplements with you when you come to the office.    Ally VARGHESE LPN, am scribing for and in the presence of Dr. Alberto Franco MD, FACC

## (undated) DEVICE — DEFIB PRO-PADZ LVP LQD GEL ADULT 8900-2105-01

## (undated) DEVICE — SHEATH PRELUDE SNAP 7FRX13CM PLS-1007

## (undated) DEVICE — PACK PCMKR PERM SRG PROC LF SAN32PC573

## (undated) DEVICE — CABLE PACING ALLIGATOR CLIP 301-CG

## (undated) DEVICE — RAD INTRODUCER KIT MICRO 5FRX10CM .018 NITINOL G/W

## (undated) RX ORDER — LIDOCAINE HYDROCHLORIDE 10 MG/ML
INJECTION, SOLUTION INFILTRATION; PERINEURAL
Status: DISPENSED
Start: 2023-01-01

## (undated) RX ORDER — ONDANSETRON 2 MG/ML
INJECTION INTRAMUSCULAR; INTRAVENOUS
Status: DISPENSED
Start: 2023-01-01

## (undated) RX ORDER — HEPARIN SODIUM 1000 [USP'U]/ML
INJECTION, SOLUTION INTRAVENOUS; SUBCUTANEOUS
Status: DISPENSED
Start: 2023-01-01

## (undated) RX ORDER — LIDOCAINE HYDROCHLORIDE 10 MG/ML
INJECTION, SOLUTION EPIDURAL; INFILTRATION; INTRACAUDAL; PERINEURAL
Status: DISPENSED
Start: 2023-01-01

## (undated) RX ORDER — FENTANYL CITRATE 50 UG/ML
INJECTION, SOLUTION INTRAMUSCULAR; INTRAVENOUS
Status: DISPENSED
Start: 2023-01-01

## (undated) RX ORDER — BUPIVACAINE HYDROCHLORIDE 2.5 MG/ML
INJECTION, SOLUTION EPIDURAL; INFILTRATION; INTRACAUDAL
Status: DISPENSED
Start: 2023-01-01